# Patient Record
Sex: MALE | Race: BLACK OR AFRICAN AMERICAN | NOT HISPANIC OR LATINO | URBAN - METROPOLITAN AREA
[De-identification: names, ages, dates, MRNs, and addresses within clinical notes are randomized per-mention and may not be internally consistent; named-entity substitution may affect disease eponyms.]

---

## 2023-01-01 ENCOUNTER — INPATIENT (INPATIENT)
Facility: HOSPITAL | Age: 73
LOS: 1 days | Discharge: HOME | DRG: 280 | End: 2023-03-30
Attending: INTERNAL MEDICINE | Admitting: INTERNAL MEDICINE
Payer: MEDICARE

## 2023-01-01 ENCOUNTER — INPATIENT (INPATIENT)
Facility: HOSPITAL | Age: 73
LOS: 4 days | Discharge: NURSING, REHAB OR LONG-TERM CARE | DRG: 682 | End: 2023-05-09
Attending: INTERNAL MEDICINE | Admitting: INTERNAL MEDICINE
Payer: MEDICARE

## 2023-01-01 ENCOUNTER — INPATIENT (INPATIENT)
Facility: HOSPITAL | Age: 73
LOS: 10 days | Discharge: HOME | DRG: 683 | End: 2023-05-23
Attending: HOSPITALIST | Admitting: HOSPITALIST
Payer: MEDICARE

## 2023-01-01 ENCOUNTER — INPATIENT (INPATIENT)
Facility: HOSPITAL | Age: 73
LOS: 2 days | DRG: 951 | End: 2023-05-26
Attending: FAMILY MEDICINE | Admitting: FAMILY MEDICINE
Payer: OTHER MISCELLANEOUS

## 2023-01-01 ENCOUNTER — EMERGENCY (EMERGENCY)
Facility: HOSPITAL | Age: 73
LOS: 1 days | Discharge: HOME | End: 2023-01-01
Attending: EMERGENCY MEDICINE
Payer: MEDICARE

## 2023-01-01 ENCOUNTER — INPATIENT (INPATIENT)
Facility: HOSPITAL | Age: 73
LOS: 19 days | Discharge: NURSING, REHAB OR LONG-TERM CARE | DRG: 291 | End: 2023-04-28
Attending: STUDENT IN AN ORGANIZED HEALTH CARE EDUCATION/TRAINING PROGRAM | Admitting: STUDENT IN AN ORGANIZED HEALTH CARE EDUCATION/TRAINING PROGRAM
Payer: MEDICARE

## 2023-01-01 VITALS
TEMPERATURE: 99 F | RESPIRATION RATE: 18 BRPM | OXYGEN SATURATION: 100 % | SYSTOLIC BLOOD PRESSURE: 121 MMHG | WEIGHT: 149.03 LBS | HEART RATE: 84 BPM | HEIGHT: 64 IN | DIASTOLIC BLOOD PRESSURE: 80 MMHG

## 2023-01-01 VITALS
SYSTOLIC BLOOD PRESSURE: 153 MMHG | RESPIRATION RATE: 18 BRPM | DIASTOLIC BLOOD PRESSURE: 105 MMHG | OXYGEN SATURATION: 100 % | TEMPERATURE: 97 F | HEART RATE: 101 BPM

## 2023-01-01 VITALS
SYSTOLIC BLOOD PRESSURE: 70 MMHG | OXYGEN SATURATION: 87 % | RESPIRATION RATE: 20 BRPM | HEART RATE: 42 BPM | TEMPERATURE: 101 F | DIASTOLIC BLOOD PRESSURE: 38 MMHG

## 2023-01-01 VITALS
OXYGEN SATURATION: 99 % | TEMPERATURE: 98 F | RESPIRATION RATE: 22 BRPM | HEART RATE: 115 BPM | SYSTOLIC BLOOD PRESSURE: 145 MMHG | DIASTOLIC BLOOD PRESSURE: 87 MMHG

## 2023-01-01 VITALS
RESPIRATION RATE: 21 BRPM | HEART RATE: 130 BPM | DIASTOLIC BLOOD PRESSURE: 94 MMHG | SYSTOLIC BLOOD PRESSURE: 142 MMHG | TEMPERATURE: 98 F | OXYGEN SATURATION: 98 %

## 2023-01-01 VITALS
RESPIRATION RATE: 19 BRPM | HEART RATE: 87 BPM | DIASTOLIC BLOOD PRESSURE: 92 MMHG | TEMPERATURE: 98 F | SYSTOLIC BLOOD PRESSURE: 142 MMHG | OXYGEN SATURATION: 100 %

## 2023-01-01 VITALS
DIASTOLIC BLOOD PRESSURE: 85 MMHG | WEIGHT: 138.01 LBS | HEART RATE: 98 BPM | OXYGEN SATURATION: 95 % | HEIGHT: 64 IN | RESPIRATION RATE: 20 BRPM | SYSTOLIC BLOOD PRESSURE: 130 MMHG

## 2023-01-01 VITALS
DIASTOLIC BLOOD PRESSURE: 83 MMHG | HEART RATE: 79 BPM | SYSTOLIC BLOOD PRESSURE: 105 MMHG | RESPIRATION RATE: 16 BRPM | OXYGEN SATURATION: 100 % | TEMPERATURE: 98 F

## 2023-01-01 VITALS
OXYGEN SATURATION: 100 % | DIASTOLIC BLOOD PRESSURE: 87 MMHG | HEART RATE: 72 BPM | RESPIRATION RATE: 18 BRPM | HEIGHT: 64 IN | SYSTOLIC BLOOD PRESSURE: 100 MMHG

## 2023-01-01 VITALS
OXYGEN SATURATION: 99 % | HEART RATE: 78 BPM | HEIGHT: 64 IN | TEMPERATURE: 98 F | SYSTOLIC BLOOD PRESSURE: 104 MMHG | DIASTOLIC BLOOD PRESSURE: 70 MMHG | RESPIRATION RATE: 18 BRPM

## 2023-01-01 VITALS
SYSTOLIC BLOOD PRESSURE: 133 MMHG | DIASTOLIC BLOOD PRESSURE: 92 MMHG | TEMPERATURE: 98 F | OXYGEN SATURATION: 97 % | HEART RATE: 95 BPM | RESPIRATION RATE: 18 BRPM

## 2023-01-01 DIAGNOSIS — F20.9 SCHIZOPHRENIA, UNSPECIFIED: ICD-10-CM

## 2023-01-01 DIAGNOSIS — R53.81 OTHER MALAISE: ICD-10-CM

## 2023-01-01 DIAGNOSIS — I21.4 NON-ST ELEVATION (NSTEMI) MYOCARDIAL INFARCTION: ICD-10-CM

## 2023-01-01 DIAGNOSIS — R06.03 ACUTE RESPIRATORY DISTRESS: ICD-10-CM

## 2023-01-01 DIAGNOSIS — Z51.5 ENCOUNTER FOR PALLIATIVE CARE: ICD-10-CM

## 2023-01-01 DIAGNOSIS — I42.8 OTHER CARDIOMYOPATHIES: ICD-10-CM

## 2023-01-01 DIAGNOSIS — F79 UNSPECIFIED INTELLECTUAL DISABILITIES: ICD-10-CM

## 2023-01-01 DIAGNOSIS — Z29.9 ENCOUNTER FOR PROPHYLACTIC MEASURES, UNSPECIFIED: ICD-10-CM

## 2023-01-01 DIAGNOSIS — I50.9 HEART FAILURE, UNSPECIFIED: ICD-10-CM

## 2023-01-01 DIAGNOSIS — J44.9 CHRONIC OBSTRUCTIVE PULMONARY DISEASE, UNSPECIFIED: ICD-10-CM

## 2023-01-01 DIAGNOSIS — E78.5 HYPERLIPIDEMIA, UNSPECIFIED: ICD-10-CM

## 2023-01-01 DIAGNOSIS — R31.9 HEMATURIA, UNSPECIFIED: ICD-10-CM

## 2023-01-01 DIAGNOSIS — I10 ESSENTIAL (PRIMARY) HYPERTENSION: ICD-10-CM

## 2023-01-01 DIAGNOSIS — I25.10 ATHEROSCLEROTIC HEART DISEASE OF NATIVE CORONARY ARTERY WITHOUT ANGINA PECTORIS: ICD-10-CM

## 2023-01-01 DIAGNOSIS — Q90.9 DOWN SYNDROME, UNSPECIFIED: ICD-10-CM

## 2023-01-01 DIAGNOSIS — E11.9 TYPE 2 DIABETES MELLITUS WITHOUT COMPLICATIONS: ICD-10-CM

## 2023-01-01 DIAGNOSIS — R56.9 UNSPECIFIED CONVULSIONS: ICD-10-CM

## 2023-01-01 DIAGNOSIS — I24.8 OTHER FORMS OF ACUTE ISCHEMIC HEART DISEASE: ICD-10-CM

## 2023-01-01 DIAGNOSIS — R91.1 SOLITARY PULMONARY NODULE: ICD-10-CM

## 2023-01-01 DIAGNOSIS — D72.829 ELEVATED WHITE BLOOD CELL COUNT, UNSPECIFIED: ICD-10-CM

## 2023-01-01 DIAGNOSIS — K11.7 DISTURBANCES OF SALIVARY SECRETION: ICD-10-CM

## 2023-01-01 DIAGNOSIS — N17.9 ACUTE KIDNEY FAILURE, UNSPECIFIED: ICD-10-CM

## 2023-01-01 DIAGNOSIS — N39.0 URINARY TRACT INFECTION, SITE NOT SPECIFIED: ICD-10-CM

## 2023-01-01 DIAGNOSIS — R07.9 CHEST PAIN, UNSPECIFIED: ICD-10-CM

## 2023-01-01 DIAGNOSIS — Z87.09 PERSONAL HISTORY OF OTHER DISEASES OF THE RESPIRATORY SYSTEM: ICD-10-CM

## 2023-01-01 DIAGNOSIS — E87.5 HYPERKALEMIA: ICD-10-CM

## 2023-01-01 DIAGNOSIS — I50.42 CHRONIC COMBINED SYSTOLIC (CONGESTIVE) AND DIASTOLIC (CONGESTIVE) HEART FAILURE: ICD-10-CM

## 2023-01-01 DIAGNOSIS — I50.23 ACUTE ON CHRONIC SYSTOLIC (CONGESTIVE) HEART FAILURE: ICD-10-CM

## 2023-01-01 DIAGNOSIS — N40.0 BENIGN PROSTATIC HYPERPLASIA WITHOUT LOWER URINARY TRACT SYMPTOMS: ICD-10-CM

## 2023-01-01 DIAGNOSIS — I42.9 CARDIOMYOPATHY, UNSPECIFIED: ICD-10-CM

## 2023-01-01 DIAGNOSIS — G93.41 METABOLIC ENCEPHALOPATHY: ICD-10-CM

## 2023-01-01 DIAGNOSIS — E46 UNSPECIFIED PROTEIN-CALORIE MALNUTRITION: ICD-10-CM

## 2023-01-01 DIAGNOSIS — I50.22 CHRONIC SYSTOLIC (CONGESTIVE) HEART FAILURE: ICD-10-CM

## 2023-01-01 DIAGNOSIS — J02.9 ACUTE PHARYNGITIS, UNSPECIFIED: ICD-10-CM

## 2023-01-01 DIAGNOSIS — I50.43 ACUTE ON CHRONIC COMBINED SYSTOLIC (CONGESTIVE) AND DIASTOLIC (CONGESTIVE) HEART FAILURE: ICD-10-CM

## 2023-01-01 DIAGNOSIS — J96.01 ACUTE RESPIRATORY FAILURE WITH HYPOXIA: ICD-10-CM

## 2023-01-01 LAB
-  AMIKACIN: SIGNIFICANT CHANGE UP
-  AMOXICILLIN/CLAVULANIC ACID: SIGNIFICANT CHANGE UP
-  AMPICILLIN/SULBACTAM: SIGNIFICANT CHANGE UP
-  AMPICILLIN: SIGNIFICANT CHANGE UP
-  AZTREONAM: SIGNIFICANT CHANGE UP
-  CEFAZOLIN: SIGNIFICANT CHANGE UP
-  CEFEPIME: SIGNIFICANT CHANGE UP
-  CEFOXITIN: SIGNIFICANT CHANGE UP
-  CEFTRIAXONE: SIGNIFICANT CHANGE UP
-  CEFUROXIME: SIGNIFICANT CHANGE UP
-  CIPROFLOXACIN: SIGNIFICANT CHANGE UP
-  ERTAPENEM: SIGNIFICANT CHANGE UP
-  GENTAMICIN: SIGNIFICANT CHANGE UP
-  IMIPENEM: SIGNIFICANT CHANGE UP
-  LEVOFLOXACIN: SIGNIFICANT CHANGE UP
-  MEROPENEM: SIGNIFICANT CHANGE UP
-  NITROFURANTOIN: SIGNIFICANT CHANGE UP
-  PIPERACILLIN/TAZOBACTAM: SIGNIFICANT CHANGE UP
-  TOBRAMYCIN: SIGNIFICANT CHANGE UP
-  TRIMETHOPRIM/SULFAMETHOXAZOLE: SIGNIFICANT CHANGE UP
A1C WITH ESTIMATED AVERAGE GLUCOSE RESULT: 6.8 % — HIGH (ref 4–5.6)
ALBUMIN SERPL ELPH-MCNC: 1.9 G/DL — LOW (ref 3.5–5)
ALBUMIN SERPL ELPH-MCNC: 2.1 G/DL — LOW (ref 3.5–5)
ALBUMIN SERPL ELPH-MCNC: 2.2 G/DL — LOW (ref 3.5–5)
ALBUMIN SERPL ELPH-MCNC: 2.4 G/DL — LOW (ref 3.5–5)
ALBUMIN SERPL ELPH-MCNC: 2.4 G/DL — LOW (ref 3.5–5)
ALBUMIN SERPL ELPH-MCNC: 2.6 G/DL — LOW (ref 3.5–5)
ALBUMIN SERPL ELPH-MCNC: 2.7 G/DL — LOW (ref 3.5–5)
ALBUMIN SERPL ELPH-MCNC: 2.8 G/DL — LOW (ref 3.5–5)
ALBUMIN SERPL ELPH-MCNC: 2.8 G/DL — LOW (ref 3.5–5)
ALBUMIN SERPL ELPH-MCNC: 2.9 G/DL — LOW (ref 3.5–5)
ALBUMIN SERPL ELPH-MCNC: 3 G/DL — LOW (ref 3.5–5)
ALBUMIN SERPL ELPH-MCNC: 3 G/DL — LOW (ref 3.5–5)
ALBUMIN SERPL ELPH-MCNC: 3.2 G/DL — LOW (ref 3.5–5)
ALBUMIN SERPL ELPH-MCNC: 3.2 G/DL — LOW (ref 3.5–5)
ALBUMIN SERPL ELPH-MCNC: 3.3 G/DL — LOW (ref 3.5–5)
ALP SERPL-CCNC: 104 U/L — SIGNIFICANT CHANGE UP (ref 40–120)
ALP SERPL-CCNC: 107 U/L — SIGNIFICANT CHANGE UP (ref 40–120)
ALP SERPL-CCNC: 108 U/L — SIGNIFICANT CHANGE UP (ref 40–120)
ALP SERPL-CCNC: 114 U/L — SIGNIFICANT CHANGE UP (ref 40–120)
ALP SERPL-CCNC: 114 U/L — SIGNIFICANT CHANGE UP (ref 40–120)
ALP SERPL-CCNC: 119 U/L — SIGNIFICANT CHANGE UP (ref 40–120)
ALP SERPL-CCNC: 120 U/L — SIGNIFICANT CHANGE UP (ref 40–120)
ALP SERPL-CCNC: 74 U/L — SIGNIFICANT CHANGE UP (ref 40–120)
ALP SERPL-CCNC: 78 U/L — SIGNIFICANT CHANGE UP (ref 40–120)
ALP SERPL-CCNC: 79 U/L — SIGNIFICANT CHANGE UP (ref 40–120)
ALP SERPL-CCNC: 87 U/L — SIGNIFICANT CHANGE UP (ref 40–120)
ALP SERPL-CCNC: 87 U/L — SIGNIFICANT CHANGE UP (ref 40–120)
ALP SERPL-CCNC: 88 U/L — SIGNIFICANT CHANGE UP (ref 40–120)
ALP SERPL-CCNC: 91 U/L — SIGNIFICANT CHANGE UP (ref 40–120)
ALP SERPL-CCNC: 92 U/L — SIGNIFICANT CHANGE UP (ref 40–120)
ALP SERPL-CCNC: 93 U/L — SIGNIFICANT CHANGE UP (ref 40–120)
ALP SERPL-CCNC: 95 U/L — SIGNIFICANT CHANGE UP (ref 40–120)
ALP SERPL-CCNC: 97 U/L — SIGNIFICANT CHANGE UP (ref 40–120)
ALP SERPL-CCNC: 99 U/L — SIGNIFICANT CHANGE UP (ref 40–120)
ALT FLD-CCNC: 105 U/L DA — HIGH (ref 10–60)
ALT FLD-CCNC: 47 U/L DA — SIGNIFICANT CHANGE UP (ref 10–60)
ALT FLD-CCNC: 52 U/L DA — SIGNIFICANT CHANGE UP (ref 10–60)
ALT FLD-CCNC: 53 U/L DA — SIGNIFICANT CHANGE UP (ref 10–60)
ALT FLD-CCNC: 54 U/L DA — SIGNIFICANT CHANGE UP (ref 10–60)
ALT FLD-CCNC: 55 U/L DA — SIGNIFICANT CHANGE UP (ref 10–60)
ALT FLD-CCNC: 58 U/L DA — SIGNIFICANT CHANGE UP (ref 10–60)
ALT FLD-CCNC: 61 U/L DA — HIGH (ref 10–60)
ALT FLD-CCNC: 63 U/L DA — HIGH (ref 10–60)
ALT FLD-CCNC: 65 U/L DA — HIGH (ref 10–60)
ALT FLD-CCNC: 66 U/L DA — HIGH (ref 10–60)
ALT FLD-CCNC: 66 U/L DA — HIGH (ref 10–60)
ALT FLD-CCNC: 67 U/L DA — HIGH (ref 10–60)
ALT FLD-CCNC: 68 U/L DA — HIGH (ref 10–60)
ALT FLD-CCNC: 71 U/L DA — HIGH (ref 10–60)
ALT FLD-CCNC: 71 U/L DA — HIGH (ref 10–60)
ALT FLD-CCNC: 72 U/L DA — HIGH (ref 10–60)
ALT FLD-CCNC: 72 U/L DA — HIGH (ref 10–60)
ALT FLD-CCNC: 75 U/L DA — HIGH (ref 10–60)
ALT FLD-CCNC: 76 U/L DA — HIGH (ref 10–60)
ALT FLD-CCNC: 79 U/L DA — HIGH (ref 10–60)
ALT FLD-CCNC: 83 U/L DA — HIGH (ref 10–60)
ALT FLD-CCNC: 83 U/L DA — HIGH (ref 10–60)
ANION GAP SERPL CALC-SCNC: 10 MMOL/L — SIGNIFICANT CHANGE UP (ref 5–17)
ANION GAP SERPL CALC-SCNC: 10 MMOL/L — SIGNIFICANT CHANGE UP (ref 5–17)
ANION GAP SERPL CALC-SCNC: 3 MMOL/L — LOW (ref 5–17)
ANION GAP SERPL CALC-SCNC: 4 MMOL/L — LOW (ref 5–17)
ANION GAP SERPL CALC-SCNC: 5 MMOL/L — SIGNIFICANT CHANGE UP (ref 5–17)
ANION GAP SERPL CALC-SCNC: 6 MMOL/L — SIGNIFICANT CHANGE UP (ref 5–17)
ANION GAP SERPL CALC-SCNC: 7 MMOL/L — SIGNIFICANT CHANGE UP (ref 5–17)
ANION GAP SERPL CALC-SCNC: 8 MMOL/L — SIGNIFICANT CHANGE UP (ref 5–17)
ANION GAP SERPL CALC-SCNC: 9 MMOL/L — SIGNIFICANT CHANGE UP (ref 5–17)
ANISOCYTOSIS BLD QL: SLIGHT — SIGNIFICANT CHANGE UP
ANISOCYTOSIS BLD QL: SLIGHT — SIGNIFICANT CHANGE UP
APPEARANCE UR: ABNORMAL
APPEARANCE UR: ABNORMAL
APPEARANCE UR: CLEAR — SIGNIFICANT CHANGE UP
APPEARANCE UR: CLEAR — SIGNIFICANT CHANGE UP
APTT BLD: 30.5 SEC — SIGNIFICANT CHANGE UP (ref 27.5–35.5)
APTT BLD: 35.6 SEC — HIGH (ref 27.5–35.5)
APTT BLD: 62 SEC — HIGH (ref 27.5–35.5)
AST SERPL-CCNC: 20 U/L — SIGNIFICANT CHANGE UP (ref 10–40)
AST SERPL-CCNC: 21 U/L — SIGNIFICANT CHANGE UP (ref 10–40)
AST SERPL-CCNC: 23 U/L — SIGNIFICANT CHANGE UP (ref 10–40)
AST SERPL-CCNC: 25 U/L — SIGNIFICANT CHANGE UP (ref 10–40)
AST SERPL-CCNC: 26 U/L — SIGNIFICANT CHANGE UP (ref 10–40)
AST SERPL-CCNC: 27 U/L — SIGNIFICANT CHANGE UP (ref 10–40)
AST SERPL-CCNC: 27 U/L — SIGNIFICANT CHANGE UP (ref 10–40)
AST SERPL-CCNC: 28 U/L — SIGNIFICANT CHANGE UP (ref 10–40)
AST SERPL-CCNC: 30 U/L — SIGNIFICANT CHANGE UP (ref 10–40)
AST SERPL-CCNC: 31 U/L — SIGNIFICANT CHANGE UP (ref 10–40)
AST SERPL-CCNC: 32 U/L — SIGNIFICANT CHANGE UP (ref 10–40)
AST SERPL-CCNC: 33 U/L — SIGNIFICANT CHANGE UP (ref 10–40)
AST SERPL-CCNC: 34 U/L — SIGNIFICANT CHANGE UP (ref 10–40)
AST SERPL-CCNC: 35 U/L — SIGNIFICANT CHANGE UP (ref 10–40)
AST SERPL-CCNC: 37 U/L — SIGNIFICANT CHANGE UP (ref 10–40)
AST SERPL-CCNC: 38 U/L — SIGNIFICANT CHANGE UP (ref 10–40)
AST SERPL-CCNC: 39 U/L — SIGNIFICANT CHANGE UP (ref 10–40)
AST SERPL-CCNC: 39 U/L — SIGNIFICANT CHANGE UP (ref 10–40)
AST SERPL-CCNC: 42 U/L — HIGH (ref 10–40)
AST SERPL-CCNC: 43 U/L — HIGH (ref 10–40)
AST SERPL-CCNC: 43 U/L — HIGH (ref 10–40)
AST SERPL-CCNC: 48 U/L — HIGH (ref 10–40)
AST SERPL-CCNC: 49 U/L — HIGH (ref 10–40)
AST SERPL-CCNC: 68 U/L — HIGH (ref 10–40)
BACTERIA # UR AUTO: ABNORMAL /HPF
BASE EXCESS BLDA CALC-SCNC: 1.5 MMOL/L — SIGNIFICANT CHANGE UP (ref -2–3)
BASE EXCESS BLDV CALC-SCNC: 1.8 MMOL/L — SIGNIFICANT CHANGE UP
BASOPHILS # BLD AUTO: 0.01 K/UL — SIGNIFICANT CHANGE UP (ref 0–0.2)
BASOPHILS # BLD AUTO: 0.01 K/UL — SIGNIFICANT CHANGE UP (ref 0–0.2)
BASOPHILS # BLD AUTO: 0.02 K/UL — SIGNIFICANT CHANGE UP (ref 0–0.2)
BASOPHILS # BLD AUTO: 0.03 K/UL — SIGNIFICANT CHANGE UP (ref 0–0.2)
BASOPHILS # BLD AUTO: 0.04 K/UL — SIGNIFICANT CHANGE UP (ref 0–0.2)
BASOPHILS # BLD AUTO: 0.05 K/UL — SIGNIFICANT CHANGE UP (ref 0–0.2)
BASOPHILS # BLD AUTO: 0.06 K/UL — SIGNIFICANT CHANGE UP (ref 0–0.2)
BASOPHILS # BLD AUTO: 0.06 K/UL — SIGNIFICANT CHANGE UP (ref 0–0.2)
BASOPHILS # BLD AUTO: 0.07 K/UL — SIGNIFICANT CHANGE UP (ref 0–0.2)
BASOPHILS # BLD AUTO: 0.09 K/UL — SIGNIFICANT CHANGE UP (ref 0–0.2)
BASOPHILS # BLD AUTO: 0.09 K/UL — SIGNIFICANT CHANGE UP (ref 0–0.2)
BASOPHILS # BLD AUTO: 0.12 K/UL — SIGNIFICANT CHANGE UP (ref 0–0.2)
BASOPHILS NFR BLD AUTO: 0.1 % — SIGNIFICANT CHANGE UP (ref 0–2)
BASOPHILS NFR BLD AUTO: 0.2 % — SIGNIFICANT CHANGE UP (ref 0–2)
BASOPHILS NFR BLD AUTO: 0.3 % — SIGNIFICANT CHANGE UP (ref 0–2)
BASOPHILS NFR BLD AUTO: 0.4 % — SIGNIFICANT CHANGE UP (ref 0–2)
BASOPHILS NFR BLD AUTO: 0.5 % — SIGNIFICANT CHANGE UP (ref 0–2)
BASOPHILS NFR BLD AUTO: 0.6 % — SIGNIFICANT CHANGE UP (ref 0–2)
BASOPHILS NFR BLD AUTO: 0.7 % — SIGNIFICANT CHANGE UP (ref 0–2)
BASOPHILS NFR BLD AUTO: 0.8 % — SIGNIFICANT CHANGE UP (ref 0–2)
BASOPHILS NFR BLD AUTO: 1 % — SIGNIFICANT CHANGE UP (ref 0–2)
BASOPHILS NFR BLD AUTO: 1.1 % — SIGNIFICANT CHANGE UP (ref 0–2)
BILIRUB SERPL-MCNC: 0.4 MG/DL — SIGNIFICANT CHANGE UP (ref 0.2–1.2)
BILIRUB SERPL-MCNC: 0.5 MG/DL — SIGNIFICANT CHANGE UP (ref 0.2–1.2)
BILIRUB SERPL-MCNC: 0.6 MG/DL — SIGNIFICANT CHANGE UP (ref 0.2–1.2)
BILIRUB SERPL-MCNC: 0.7 MG/DL — SIGNIFICANT CHANGE UP (ref 0.2–1.2)
BILIRUB SERPL-MCNC: 0.9 MG/DL — SIGNIFICANT CHANGE UP (ref 0.2–1.2)
BILIRUB SERPL-MCNC: 1 MG/DL — SIGNIFICANT CHANGE UP (ref 0.2–1.2)
BILIRUB SERPL-MCNC: 1.1 MG/DL — SIGNIFICANT CHANGE UP (ref 0.2–1.2)
BILIRUB UR-MCNC: ABNORMAL
BILIRUB UR-MCNC: NEGATIVE — SIGNIFICANT CHANGE UP
BLOOD GAS COMMENTS ARTERIAL: SIGNIFICANT CHANGE UP
BUN SERPL-MCNC: 28 MG/DL — HIGH (ref 7–18)
BUN SERPL-MCNC: 30 MG/DL — HIGH (ref 7–18)
BUN SERPL-MCNC: 32 MG/DL — HIGH (ref 7–18)
BUN SERPL-MCNC: 32 MG/DL — HIGH (ref 7–18)
BUN SERPL-MCNC: 34 MG/DL — HIGH (ref 7–18)
BUN SERPL-MCNC: 34 MG/DL — HIGH (ref 7–18)
BUN SERPL-MCNC: 35 MG/DL — HIGH (ref 7–18)
BUN SERPL-MCNC: 36 MG/DL — HIGH (ref 7–18)
BUN SERPL-MCNC: 38 MG/DL — HIGH (ref 7–18)
BUN SERPL-MCNC: 42 MG/DL — HIGH (ref 7–18)
BUN SERPL-MCNC: 43 MG/DL — HIGH (ref 7–18)
BUN SERPL-MCNC: 47 MG/DL — HIGH (ref 7–18)
BUN SERPL-MCNC: 50 MG/DL — HIGH (ref 7–18)
BUN SERPL-MCNC: 51 MG/DL — HIGH (ref 7–18)
BUN SERPL-MCNC: 55 MG/DL — HIGH (ref 7–18)
BUN SERPL-MCNC: 57 MG/DL — HIGH (ref 7–18)
BUN SERPL-MCNC: 57 MG/DL — HIGH (ref 7–18)
BUN SERPL-MCNC: 58 MG/DL — HIGH (ref 7–18)
BUN SERPL-MCNC: 58 MG/DL — HIGH (ref 7–18)
BUN SERPL-MCNC: 60 MG/DL — HIGH (ref 7–18)
BUN SERPL-MCNC: 60 MG/DL — HIGH (ref 7–18)
BUN SERPL-MCNC: 63 MG/DL — HIGH (ref 7–18)
BUN SERPL-MCNC: 64 MG/DL — HIGH (ref 7–18)
BUN SERPL-MCNC: 66 MG/DL — HIGH (ref 7–18)
BUN SERPL-MCNC: 66 MG/DL — HIGH (ref 7–18)
BUN SERPL-MCNC: 67 MG/DL — HIGH (ref 7–18)
BUN SERPL-MCNC: 69 MG/DL — HIGH (ref 7–18)
BUN SERPL-MCNC: 70 MG/DL — HIGH (ref 7–18)
BUN SERPL-MCNC: 72 MG/DL — HIGH (ref 7–18)
BUN SERPL-MCNC: 73 MG/DL — HIGH (ref 7–18)
BUN SERPL-MCNC: 77 MG/DL — HIGH (ref 7–18)
BUN SERPL-MCNC: 79 MG/DL — HIGH (ref 7–18)
BUN SERPL-MCNC: 80 MG/DL — HIGH (ref 7–18)
BUN SERPL-MCNC: 83 MG/DL — HIGH (ref 7–18)
BUN SERPL-MCNC: 83 MG/DL — HIGH (ref 7–18)
BUN SERPL-MCNC: 84 MG/DL — HIGH (ref 7–18)
BUN SERPL-MCNC: 84 MG/DL — HIGH (ref 7–18)
BUN SERPL-MCNC: 85 MG/DL — HIGH (ref 7–18)
BUN SERPL-MCNC: 91 MG/DL — HIGH (ref 7–18)
BUN SERPL-MCNC: 96 MG/DL — HIGH (ref 7–18)
CALCIUM SERPL-MCNC: 10 MG/DL — SIGNIFICANT CHANGE UP (ref 8.4–10.5)
CALCIUM SERPL-MCNC: 10.1 MG/DL — SIGNIFICANT CHANGE UP (ref 8.4–10.5)
CALCIUM SERPL-MCNC: 10.2 MG/DL — SIGNIFICANT CHANGE UP (ref 8.4–10.5)
CALCIUM SERPL-MCNC: 10.2 MG/DL — SIGNIFICANT CHANGE UP (ref 8.4–10.5)
CALCIUM SERPL-MCNC: 10.4 MG/DL — SIGNIFICANT CHANGE UP (ref 8.4–10.5)
CALCIUM SERPL-MCNC: 10.4 MG/DL — SIGNIFICANT CHANGE UP (ref 8.4–10.5)
CALCIUM SERPL-MCNC: 10.5 MG/DL — SIGNIFICANT CHANGE UP (ref 8.4–10.5)
CALCIUM SERPL-MCNC: 10.6 MG/DL — HIGH (ref 8.4–10.5)
CALCIUM SERPL-MCNC: 8.9 MG/DL — SIGNIFICANT CHANGE UP (ref 8.4–10.5)
CALCIUM SERPL-MCNC: 9 MG/DL — SIGNIFICANT CHANGE UP (ref 8.4–10.5)
CALCIUM SERPL-MCNC: 9.2 MG/DL — SIGNIFICANT CHANGE UP (ref 8.4–10.5)
CALCIUM SERPL-MCNC: 9.3 MG/DL — SIGNIFICANT CHANGE UP (ref 8.4–10.5)
CALCIUM SERPL-MCNC: 9.3 MG/DL — SIGNIFICANT CHANGE UP (ref 8.4–10.5)
CALCIUM SERPL-MCNC: 9.4 MG/DL — SIGNIFICANT CHANGE UP (ref 8.4–10.5)
CALCIUM SERPL-MCNC: 9.5 MG/DL — SIGNIFICANT CHANGE UP (ref 8.4–10.5)
CALCIUM SERPL-MCNC: 9.6 MG/DL — SIGNIFICANT CHANGE UP (ref 8.4–10.5)
CALCIUM SERPL-MCNC: 9.7 MG/DL — SIGNIFICANT CHANGE UP (ref 8.4–10.5)
CALCIUM SERPL-MCNC: 9.8 MG/DL — SIGNIFICANT CHANGE UP (ref 8.4–10.5)
CALCIUM SERPL-MCNC: 9.9 MG/DL — SIGNIFICANT CHANGE UP (ref 8.4–10.5)
CHLORIDE SERPL-SCNC: 101 MMOL/L — SIGNIFICANT CHANGE UP (ref 96–108)
CHLORIDE SERPL-SCNC: 103 MMOL/L — SIGNIFICANT CHANGE UP (ref 96–108)
CHLORIDE SERPL-SCNC: 103 MMOL/L — SIGNIFICANT CHANGE UP (ref 96–108)
CHLORIDE SERPL-SCNC: 105 MMOL/L — SIGNIFICANT CHANGE UP (ref 96–108)
CHLORIDE SERPL-SCNC: 105 MMOL/L — SIGNIFICANT CHANGE UP (ref 96–108)
CHLORIDE SERPL-SCNC: 106 MMOL/L — SIGNIFICANT CHANGE UP (ref 96–108)
CHLORIDE SERPL-SCNC: 107 MMOL/L — SIGNIFICANT CHANGE UP (ref 96–108)
CHLORIDE SERPL-SCNC: 107 MMOL/L — SIGNIFICANT CHANGE UP (ref 96–108)
CHLORIDE SERPL-SCNC: 108 MMOL/L — SIGNIFICANT CHANGE UP (ref 96–108)
CHLORIDE SERPL-SCNC: 109 MMOL/L — HIGH (ref 96–108)
CHLORIDE SERPL-SCNC: 110 MMOL/L — HIGH (ref 96–108)
CHLORIDE SERPL-SCNC: 111 MMOL/L — HIGH (ref 96–108)
CHLORIDE SERPL-SCNC: 112 MMOL/L — HIGH (ref 96–108)
CHLORIDE SERPL-SCNC: 113 MMOL/L — HIGH (ref 96–108)
CHLORIDE SERPL-SCNC: 114 MMOL/L — HIGH (ref 96–108)
CHLORIDE SERPL-SCNC: 115 MMOL/L — HIGH (ref 96–108)
CHLORIDE SERPL-SCNC: 115 MMOL/L — HIGH (ref 96–108)
CHLORIDE SERPL-SCNC: 116 MMOL/L — HIGH (ref 96–108)
CHLORIDE SERPL-SCNC: 116 MMOL/L — HIGH (ref 96–108)
CHLORIDE SERPL-SCNC: 118 MMOL/L — HIGH (ref 96–108)
CHLORIDE UR-SCNC: 32 MMOL/L — SIGNIFICANT CHANGE UP
CHLORIDE UR-SCNC: <10 MMOL/L — SIGNIFICANT CHANGE UP
CHOLEST SERPL-MCNC: 152 MG/DL — SIGNIFICANT CHANGE UP
CO2 SERPL-SCNC: 22 MMOL/L — SIGNIFICANT CHANGE UP (ref 22–31)
CO2 SERPL-SCNC: 23 MMOL/L — SIGNIFICANT CHANGE UP (ref 22–31)
CO2 SERPL-SCNC: 24 MMOL/L — SIGNIFICANT CHANGE UP (ref 22–31)
CO2 SERPL-SCNC: 24 MMOL/L — SIGNIFICANT CHANGE UP (ref 22–31)
CO2 SERPL-SCNC: 25 MMOL/L — SIGNIFICANT CHANGE UP (ref 22–31)
CO2 SERPL-SCNC: 26 MMOL/L — SIGNIFICANT CHANGE UP (ref 22–31)
CO2 SERPL-SCNC: 27 MMOL/L — SIGNIFICANT CHANGE UP (ref 22–31)
CO2 SERPL-SCNC: 28 MMOL/L — SIGNIFICANT CHANGE UP (ref 22–31)
CO2 SERPL-SCNC: 29 MMOL/L — SIGNIFICANT CHANGE UP (ref 22–31)
CO2 SERPL-SCNC: 30 MMOL/L — SIGNIFICANT CHANGE UP (ref 22–31)
CO2 SERPL-SCNC: 31 MMOL/L — SIGNIFICANT CHANGE UP (ref 22–31)
CO2 SERPL-SCNC: 31 MMOL/L — SIGNIFICANT CHANGE UP (ref 22–31)
COLOR SPEC: YELLOW — SIGNIFICANT CHANGE UP
COMMENT - URINE: SIGNIFICANT CHANGE UP
CREAT ?TM UR-MCNC: 149 MG/DL — SIGNIFICANT CHANGE UP
CREAT ?TM UR-MCNC: 46 MG/DL — SIGNIFICANT CHANGE UP
CREAT ?TM UR-MCNC: 94 MG/DL — SIGNIFICANT CHANGE UP
CREAT ?TM UR-MCNC: 94 MG/DL — SIGNIFICANT CHANGE UP
CREAT ?TM UR-MCNC: 96 MG/DL — SIGNIFICANT CHANGE UP
CREAT SERPL-MCNC: 1.38 MG/DL — HIGH (ref 0.5–1.3)
CREAT SERPL-MCNC: 1.57 MG/DL — HIGH (ref 0.5–1.3)
CREAT SERPL-MCNC: 1.59 MG/DL — HIGH (ref 0.5–1.3)
CREAT SERPL-MCNC: 1.6 MG/DL — HIGH (ref 0.5–1.3)
CREAT SERPL-MCNC: 1.63 MG/DL — HIGH (ref 0.5–1.3)
CREAT SERPL-MCNC: 1.71 MG/DL — HIGH (ref 0.5–1.3)
CREAT SERPL-MCNC: 1.72 MG/DL — HIGH (ref 0.5–1.3)
CREAT SERPL-MCNC: 1.77 MG/DL — HIGH (ref 0.5–1.3)
CREAT SERPL-MCNC: 1.82 MG/DL — HIGH (ref 0.5–1.3)
CREAT SERPL-MCNC: 2.08 MG/DL — HIGH (ref 0.5–1.3)
CREAT SERPL-MCNC: 2.13 MG/DL — HIGH (ref 0.5–1.3)
CREAT SERPL-MCNC: 2.13 MG/DL — HIGH (ref 0.5–1.3)
CREAT SERPL-MCNC: 2.14 MG/DL — HIGH (ref 0.5–1.3)
CREAT SERPL-MCNC: 2.18 MG/DL — HIGH (ref 0.5–1.3)
CREAT SERPL-MCNC: 2.23 MG/DL — HIGH (ref 0.5–1.3)
CREAT SERPL-MCNC: 2.28 MG/DL — HIGH (ref 0.5–1.3)
CREAT SERPL-MCNC: 2.47 MG/DL — HIGH (ref 0.5–1.3)
CREAT SERPL-MCNC: 2.58 MG/DL — HIGH (ref 0.5–1.3)
CREAT SERPL-MCNC: 2.69 MG/DL — HIGH (ref 0.5–1.3)
CREAT SERPL-MCNC: 2.81 MG/DL — HIGH (ref 0.5–1.3)
CREAT SERPL-MCNC: 2.81 MG/DL — HIGH (ref 0.5–1.3)
CREAT SERPL-MCNC: 2.85 MG/DL — HIGH (ref 0.5–1.3)
CREAT SERPL-MCNC: 2.95 MG/DL — HIGH (ref 0.5–1.3)
CREAT SERPL-MCNC: 2.97 MG/DL — HIGH (ref 0.5–1.3)
CREAT SERPL-MCNC: 2.99 MG/DL — HIGH (ref 0.5–1.3)
CREAT SERPL-MCNC: 3.02 MG/DL — HIGH (ref 0.5–1.3)
CREAT SERPL-MCNC: 3.02 MG/DL — HIGH (ref 0.5–1.3)
CREAT SERPL-MCNC: 3.03 MG/DL — HIGH (ref 0.5–1.3)
CREAT SERPL-MCNC: 3.04 MG/DL — HIGH (ref 0.5–1.3)
CREAT SERPL-MCNC: 3.09 MG/DL — HIGH (ref 0.5–1.3)
CREAT SERPL-MCNC: 3.14 MG/DL — HIGH (ref 0.5–1.3)
CREAT SERPL-MCNC: 3.3 MG/DL — HIGH (ref 0.5–1.3)
CREAT SERPL-MCNC: 3.58 MG/DL — HIGH (ref 0.5–1.3)
CREAT SERPL-MCNC: 3.62 MG/DL — HIGH (ref 0.5–1.3)
CREAT SERPL-MCNC: 3.76 MG/DL — HIGH (ref 0.5–1.3)
CREAT SERPL-MCNC: 4.1 MG/DL — HIGH (ref 0.5–1.3)
CREAT SERPL-MCNC: 4.21 MG/DL — HIGH (ref 0.5–1.3)
CREAT SERPL-MCNC: 4.27 MG/DL — HIGH (ref 0.5–1.3)
CREAT SERPL-MCNC: 4.38 MG/DL — HIGH (ref 0.5–1.3)
CREAT SERPL-MCNC: 4.55 MG/DL — HIGH (ref 0.5–1.3)
CREAT SERPL-MCNC: 4.87 MG/DL — HIGH (ref 0.5–1.3)
CREAT SERPL-MCNC: 4.9 MG/DL — HIGH (ref 0.5–1.3)
CREAT SERPL-MCNC: 5.01 MG/DL — HIGH (ref 0.5–1.3)
CREAT SERPL-MCNC: 5.16 MG/DL — HIGH (ref 0.5–1.3)
CULTURE RESULTS: NO GROWTH — SIGNIFICANT CHANGE UP
CULTURE RESULTS: SIGNIFICANT CHANGE UP
DIFF PNL FLD: ABNORMAL
DIFF PNL FLD: ABNORMAL
DIFF PNL FLD: NEGATIVE — SIGNIFICANT CHANGE UP
DIFF PNL FLD: NEGATIVE — SIGNIFICANT CHANGE UP
EGFR: 11 ML/MIN/1.73M2 — LOW
EGFR: 12 ML/MIN/1.73M2 — LOW
EGFR: 13 ML/MIN/1.73M2 — LOW
EGFR: 14 ML/MIN/1.73M2 — LOW
EGFR: 15 ML/MIN/1.73M2 — LOW
EGFR: 16 ML/MIN/1.73M2 — LOW
EGFR: 17 ML/MIN/1.73M2 — LOW
EGFR: 17 ML/MIN/1.73M2 — LOW
EGFR: 19 ML/MIN/1.73M2 — LOW
EGFR: 20 ML/MIN/1.73M2 — LOW
EGFR: 21 ML/MIN/1.73M2 — LOW
EGFR: 22 ML/MIN/1.73M2 — LOW
EGFR: 23 ML/MIN/1.73M2 — LOW
EGFR: 25 ML/MIN/1.73M2 — LOW
EGFR: 26 ML/MIN/1.73M2 — LOW
EGFR: 27 ML/MIN/1.73M2 — LOW
EGFR: 30 ML/MIN/1.73M2 — LOW
EGFR: 31 ML/MIN/1.73M2 — LOW
EGFR: 32 ML/MIN/1.73M2 — LOW
EGFR: 33 ML/MIN/1.73M2 — LOW
EGFR: 39 ML/MIN/1.73M2 — LOW
EGFR: 41 ML/MIN/1.73M2 — LOW
EGFR: 42 ML/MIN/1.73M2 — LOW
EGFR: 42 ML/MIN/1.73M2 — LOW
EGFR: 45 ML/MIN/1.73M2 — LOW
EGFR: 46 ML/MIN/1.73M2 — LOW
EGFR: 46 ML/MIN/1.73M2 — LOW
EGFR: 47 ML/MIN/1.73M2 — LOW
EGFR: 55 ML/MIN/1.73M2 — LOW
EOSINOPHIL # BLD AUTO: 0.04 K/UL — SIGNIFICANT CHANGE UP (ref 0–0.5)
EOSINOPHIL # BLD AUTO: 0.05 K/UL — SIGNIFICANT CHANGE UP (ref 0–0.5)
EOSINOPHIL # BLD AUTO: 0.05 K/UL — SIGNIFICANT CHANGE UP (ref 0–0.5)
EOSINOPHIL # BLD AUTO: 0.06 K/UL — SIGNIFICANT CHANGE UP (ref 0–0.5)
EOSINOPHIL # BLD AUTO: 0.06 K/UL — SIGNIFICANT CHANGE UP (ref 0–0.5)
EOSINOPHIL # BLD AUTO: 0.07 K/UL — SIGNIFICANT CHANGE UP (ref 0–0.5)
EOSINOPHIL # BLD AUTO: 0.08 K/UL — SIGNIFICANT CHANGE UP (ref 0–0.5)
EOSINOPHIL # BLD AUTO: 0.09 K/UL — SIGNIFICANT CHANGE UP (ref 0–0.5)
EOSINOPHIL # BLD AUTO: 0.11 K/UL — SIGNIFICANT CHANGE UP (ref 0–0.5)
EOSINOPHIL # BLD AUTO: 0.22 K/UL — SIGNIFICANT CHANGE UP (ref 0–0.5)
EOSINOPHIL # BLD AUTO: 0.47 K/UL — SIGNIFICANT CHANGE UP (ref 0–0.5)
EOSINOPHIL # BLD AUTO: 0.5 K/UL — SIGNIFICANT CHANGE UP (ref 0–0.5)
EOSINOPHIL # BLD AUTO: 0.51 K/UL — HIGH (ref 0–0.5)
EOSINOPHIL # BLD AUTO: 0.62 K/UL — HIGH (ref 0–0.5)
EOSINOPHIL # BLD AUTO: 0.66 K/UL — HIGH (ref 0–0.5)
EOSINOPHIL # BLD AUTO: 0.7 K/UL — HIGH (ref 0–0.5)
EOSINOPHIL # BLD AUTO: 0.74 K/UL — HIGH (ref 0–0.5)
EOSINOPHIL # BLD AUTO: 0.94 K/UL — HIGH (ref 0–0.5)
EOSINOPHIL # BLD AUTO: 0.96 K/UL — HIGH (ref 0–0.5)
EOSINOPHIL # BLD AUTO: 1.1 K/UL — HIGH (ref 0–0.5)
EOSINOPHIL # BLD AUTO: 1.35 K/UL — HIGH (ref 0–0.5)
EOSINOPHIL NFR BLD AUTO: 0.5 % — SIGNIFICANT CHANGE UP (ref 0–6)
EOSINOPHIL NFR BLD AUTO: 0.6 % — SIGNIFICANT CHANGE UP (ref 0–6)
EOSINOPHIL NFR BLD AUTO: 0.8 % — SIGNIFICANT CHANGE UP (ref 0–6)
EOSINOPHIL NFR BLD AUTO: 0.9 % — SIGNIFICANT CHANGE UP (ref 0–6)
EOSINOPHIL NFR BLD AUTO: 1 % — SIGNIFICANT CHANGE UP (ref 0–6)
EOSINOPHIL NFR BLD AUTO: 1.1 % — SIGNIFICANT CHANGE UP (ref 0–6)
EOSINOPHIL NFR BLD AUTO: 1.2 % — SIGNIFICANT CHANGE UP (ref 0–6)
EOSINOPHIL NFR BLD AUTO: 1.3 % — SIGNIFICANT CHANGE UP (ref 0–6)
EOSINOPHIL NFR BLD AUTO: 1.3 % — SIGNIFICANT CHANGE UP (ref 0–6)
EOSINOPHIL NFR BLD AUTO: 1.4 % — SIGNIFICANT CHANGE UP (ref 0–6)
EOSINOPHIL NFR BLD AUTO: 10.1 % — HIGH (ref 0–6)
EOSINOPHIL NFR BLD AUTO: 10.2 % — HIGH (ref 0–6)
EOSINOPHIL NFR BLD AUTO: 10.3 % — HIGH (ref 0–6)
EOSINOPHIL NFR BLD AUTO: 11.4 % — HIGH (ref 0–6)
EOSINOPHIL NFR BLD AUTO: 11.4 % — HIGH (ref 0–6)
EOSINOPHIL NFR BLD AUTO: 13.3 % — HIGH (ref 0–6)
EOSINOPHIL NFR BLD AUTO: 15.5 % — HIGH (ref 0–6)
EOSINOPHIL NFR BLD AUTO: 2.7 % — SIGNIFICANT CHANGE UP (ref 0–6)
EOSINOPHIL NFR BLD AUTO: 4 % — SIGNIFICANT CHANGE UP (ref 0–6)
EOSINOPHIL NFR BLD AUTO: 4.4 % — SIGNIFICANT CHANGE UP (ref 0–6)
EOSINOPHIL NFR BLD AUTO: 4.4 % — SIGNIFICANT CHANGE UP (ref 0–6)
EOSINOPHIL NFR BLD AUTO: 6.5 % — HIGH (ref 0–6)
EPI CELLS # UR: ABNORMAL /HPF
EPI CELLS # UR: ABNORMAL /HPF
EPI CELLS # UR: SIGNIFICANT CHANGE UP /HPF
ESTIMATED AVERAGE GLUCOSE: 148 MG/DL — HIGH (ref 68–114)
GAS PNL BLDV: SIGNIFICANT CHANGE UP
GLUCOSE BLDC GLUCOMTR-MCNC: 101 MG/DL — HIGH (ref 70–99)
GLUCOSE BLDC GLUCOMTR-MCNC: 104 MG/DL — HIGH (ref 70–99)
GLUCOSE BLDC GLUCOMTR-MCNC: 106 MG/DL — HIGH (ref 70–99)
GLUCOSE BLDC GLUCOMTR-MCNC: 108 MG/DL — HIGH (ref 70–99)
GLUCOSE BLDC GLUCOMTR-MCNC: 108 MG/DL — HIGH (ref 70–99)
GLUCOSE BLDC GLUCOMTR-MCNC: 109 MG/DL — HIGH (ref 70–99)
GLUCOSE BLDC GLUCOMTR-MCNC: 110 MG/DL — HIGH (ref 70–99)
GLUCOSE BLDC GLUCOMTR-MCNC: 110 MG/DL — HIGH (ref 70–99)
GLUCOSE BLDC GLUCOMTR-MCNC: 113 MG/DL — HIGH (ref 70–99)
GLUCOSE BLDC GLUCOMTR-MCNC: 115 MG/DL — HIGH (ref 70–99)
GLUCOSE BLDC GLUCOMTR-MCNC: 115 MG/DL — HIGH (ref 70–99)
GLUCOSE BLDC GLUCOMTR-MCNC: 117 MG/DL — HIGH (ref 70–99)
GLUCOSE BLDC GLUCOMTR-MCNC: 118 MG/DL — HIGH (ref 70–99)
GLUCOSE BLDC GLUCOMTR-MCNC: 118 MG/DL — HIGH (ref 70–99)
GLUCOSE BLDC GLUCOMTR-MCNC: 120 MG/DL — HIGH (ref 70–99)
GLUCOSE BLDC GLUCOMTR-MCNC: 121 MG/DL — HIGH (ref 70–99)
GLUCOSE BLDC GLUCOMTR-MCNC: 122 MG/DL — HIGH (ref 70–99)
GLUCOSE BLDC GLUCOMTR-MCNC: 123 MG/DL — HIGH (ref 70–99)
GLUCOSE BLDC GLUCOMTR-MCNC: 124 MG/DL — HIGH (ref 70–99)
GLUCOSE BLDC GLUCOMTR-MCNC: 126 MG/DL — HIGH (ref 70–99)
GLUCOSE BLDC GLUCOMTR-MCNC: 127 MG/DL — HIGH (ref 70–99)
GLUCOSE BLDC GLUCOMTR-MCNC: 127 MG/DL — HIGH (ref 70–99)
GLUCOSE BLDC GLUCOMTR-MCNC: 128 MG/DL — HIGH (ref 70–99)
GLUCOSE BLDC GLUCOMTR-MCNC: 129 MG/DL — HIGH (ref 70–99)
GLUCOSE BLDC GLUCOMTR-MCNC: 131 MG/DL — HIGH (ref 70–99)
GLUCOSE BLDC GLUCOMTR-MCNC: 132 MG/DL — HIGH (ref 70–99)
GLUCOSE BLDC GLUCOMTR-MCNC: 134 MG/DL — HIGH (ref 70–99)
GLUCOSE BLDC GLUCOMTR-MCNC: 135 MG/DL — HIGH (ref 70–99)
GLUCOSE BLDC GLUCOMTR-MCNC: 140 MG/DL — HIGH (ref 70–99)
GLUCOSE BLDC GLUCOMTR-MCNC: 141 MG/DL — HIGH (ref 70–99)
GLUCOSE BLDC GLUCOMTR-MCNC: 142 MG/DL — HIGH (ref 70–99)
GLUCOSE BLDC GLUCOMTR-MCNC: 142 MG/DL — HIGH (ref 70–99)
GLUCOSE BLDC GLUCOMTR-MCNC: 143 MG/DL — HIGH (ref 70–99)
GLUCOSE BLDC GLUCOMTR-MCNC: 143 MG/DL — HIGH (ref 70–99)
GLUCOSE BLDC GLUCOMTR-MCNC: 144 MG/DL — HIGH (ref 70–99)
GLUCOSE BLDC GLUCOMTR-MCNC: 145 MG/DL — HIGH (ref 70–99)
GLUCOSE BLDC GLUCOMTR-MCNC: 146 MG/DL — HIGH (ref 70–99)
GLUCOSE BLDC GLUCOMTR-MCNC: 146 MG/DL — HIGH (ref 70–99)
GLUCOSE BLDC GLUCOMTR-MCNC: 147 MG/DL — HIGH (ref 70–99)
GLUCOSE BLDC GLUCOMTR-MCNC: 148 MG/DL — HIGH (ref 70–99)
GLUCOSE BLDC GLUCOMTR-MCNC: 149 MG/DL — HIGH (ref 70–99)
GLUCOSE BLDC GLUCOMTR-MCNC: 153 MG/DL — HIGH (ref 70–99)
GLUCOSE BLDC GLUCOMTR-MCNC: 154 MG/DL — HIGH (ref 70–99)
GLUCOSE BLDC GLUCOMTR-MCNC: 155 MG/DL — HIGH (ref 70–99)
GLUCOSE BLDC GLUCOMTR-MCNC: 156 MG/DL — HIGH (ref 70–99)
GLUCOSE BLDC GLUCOMTR-MCNC: 157 MG/DL — HIGH (ref 70–99)
GLUCOSE BLDC GLUCOMTR-MCNC: 158 MG/DL — HIGH (ref 70–99)
GLUCOSE BLDC GLUCOMTR-MCNC: 159 MG/DL — HIGH (ref 70–99)
GLUCOSE BLDC GLUCOMTR-MCNC: 160 MG/DL — HIGH (ref 70–99)
GLUCOSE BLDC GLUCOMTR-MCNC: 161 MG/DL — HIGH (ref 70–99)
GLUCOSE BLDC GLUCOMTR-MCNC: 161 MG/DL — HIGH (ref 70–99)
GLUCOSE BLDC GLUCOMTR-MCNC: 162 MG/DL — HIGH (ref 70–99)
GLUCOSE BLDC GLUCOMTR-MCNC: 163 MG/DL — HIGH (ref 70–99)
GLUCOSE BLDC GLUCOMTR-MCNC: 165 MG/DL — HIGH (ref 70–99)
GLUCOSE BLDC GLUCOMTR-MCNC: 166 MG/DL — HIGH (ref 70–99)
GLUCOSE BLDC GLUCOMTR-MCNC: 167 MG/DL — HIGH (ref 70–99)
GLUCOSE BLDC GLUCOMTR-MCNC: 172 MG/DL — HIGH (ref 70–99)
GLUCOSE BLDC GLUCOMTR-MCNC: 175 MG/DL — HIGH (ref 70–99)
GLUCOSE BLDC GLUCOMTR-MCNC: 176 MG/DL — HIGH (ref 70–99)
GLUCOSE BLDC GLUCOMTR-MCNC: 176 MG/DL — HIGH (ref 70–99)
GLUCOSE BLDC GLUCOMTR-MCNC: 177 MG/DL — HIGH (ref 70–99)
GLUCOSE BLDC GLUCOMTR-MCNC: 180 MG/DL — HIGH (ref 70–99)
GLUCOSE BLDC GLUCOMTR-MCNC: 181 MG/DL — HIGH (ref 70–99)
GLUCOSE BLDC GLUCOMTR-MCNC: 181 MG/DL — HIGH (ref 70–99)
GLUCOSE BLDC GLUCOMTR-MCNC: 184 MG/DL — HIGH (ref 70–99)
GLUCOSE BLDC GLUCOMTR-MCNC: 185 MG/DL — HIGH (ref 70–99)
GLUCOSE BLDC GLUCOMTR-MCNC: 186 MG/DL — HIGH (ref 70–99)
GLUCOSE BLDC GLUCOMTR-MCNC: 186 MG/DL — HIGH (ref 70–99)
GLUCOSE BLDC GLUCOMTR-MCNC: 187 MG/DL — HIGH (ref 70–99)
GLUCOSE BLDC GLUCOMTR-MCNC: 188 MG/DL — HIGH (ref 70–99)
GLUCOSE BLDC GLUCOMTR-MCNC: 188 MG/DL — HIGH (ref 70–99)
GLUCOSE BLDC GLUCOMTR-MCNC: 189 MG/DL — HIGH (ref 70–99)
GLUCOSE BLDC GLUCOMTR-MCNC: 190 MG/DL — HIGH (ref 70–99)
GLUCOSE BLDC GLUCOMTR-MCNC: 191 MG/DL — HIGH (ref 70–99)
GLUCOSE BLDC GLUCOMTR-MCNC: 191 MG/DL — HIGH (ref 70–99)
GLUCOSE BLDC GLUCOMTR-MCNC: 193 MG/DL — HIGH (ref 70–99)
GLUCOSE BLDC GLUCOMTR-MCNC: 195 MG/DL — HIGH (ref 70–99)
GLUCOSE BLDC GLUCOMTR-MCNC: 196 MG/DL — HIGH (ref 70–99)
GLUCOSE BLDC GLUCOMTR-MCNC: 197 MG/DL — HIGH (ref 70–99)
GLUCOSE BLDC GLUCOMTR-MCNC: 198 MG/DL — HIGH (ref 70–99)
GLUCOSE BLDC GLUCOMTR-MCNC: 199 MG/DL — HIGH (ref 70–99)
GLUCOSE BLDC GLUCOMTR-MCNC: 199 MG/DL — HIGH (ref 70–99)
GLUCOSE BLDC GLUCOMTR-MCNC: 202 MG/DL — HIGH (ref 70–99)
GLUCOSE BLDC GLUCOMTR-MCNC: 202 MG/DL — HIGH (ref 70–99)
GLUCOSE BLDC GLUCOMTR-MCNC: 204 MG/DL — HIGH (ref 70–99)
GLUCOSE BLDC GLUCOMTR-MCNC: 207 MG/DL — HIGH (ref 70–99)
GLUCOSE BLDC GLUCOMTR-MCNC: 209 MG/DL — HIGH (ref 70–99)
GLUCOSE BLDC GLUCOMTR-MCNC: 210 MG/DL — HIGH (ref 70–99)
GLUCOSE BLDC GLUCOMTR-MCNC: 213 MG/DL — HIGH (ref 70–99)
GLUCOSE BLDC GLUCOMTR-MCNC: 213 MG/DL — HIGH (ref 70–99)
GLUCOSE BLDC GLUCOMTR-MCNC: 216 MG/DL — HIGH (ref 70–99)
GLUCOSE BLDC GLUCOMTR-MCNC: 218 MG/DL — HIGH (ref 70–99)
GLUCOSE BLDC GLUCOMTR-MCNC: 218 MG/DL — HIGH (ref 70–99)
GLUCOSE BLDC GLUCOMTR-MCNC: 221 MG/DL — HIGH (ref 70–99)
GLUCOSE BLDC GLUCOMTR-MCNC: 223 MG/DL — HIGH (ref 70–99)
GLUCOSE BLDC GLUCOMTR-MCNC: 225 MG/DL — HIGH (ref 70–99)
GLUCOSE BLDC GLUCOMTR-MCNC: 227 MG/DL — HIGH (ref 70–99)
GLUCOSE BLDC GLUCOMTR-MCNC: 228 MG/DL — HIGH (ref 70–99)
GLUCOSE BLDC GLUCOMTR-MCNC: 232 MG/DL — HIGH (ref 70–99)
GLUCOSE BLDC GLUCOMTR-MCNC: 234 MG/DL — HIGH (ref 70–99)
GLUCOSE BLDC GLUCOMTR-MCNC: 235 MG/DL — HIGH (ref 70–99)
GLUCOSE BLDC GLUCOMTR-MCNC: 240 MG/DL — HIGH (ref 70–99)
GLUCOSE BLDC GLUCOMTR-MCNC: 241 MG/DL — HIGH (ref 70–99)
GLUCOSE BLDC GLUCOMTR-MCNC: 241 MG/DL — HIGH (ref 70–99)
GLUCOSE BLDC GLUCOMTR-MCNC: 244 MG/DL — HIGH (ref 70–99)
GLUCOSE BLDC GLUCOMTR-MCNC: 249 MG/DL — HIGH (ref 70–99)
GLUCOSE BLDC GLUCOMTR-MCNC: 250 MG/DL — HIGH (ref 70–99)
GLUCOSE BLDC GLUCOMTR-MCNC: 270 MG/DL — HIGH (ref 70–99)
GLUCOSE BLDC GLUCOMTR-MCNC: 278 MG/DL — HIGH (ref 70–99)
GLUCOSE BLDC GLUCOMTR-MCNC: 280 MG/DL — HIGH (ref 70–99)
GLUCOSE BLDC GLUCOMTR-MCNC: 297 MG/DL — HIGH (ref 70–99)
GLUCOSE BLDC GLUCOMTR-MCNC: 305 MG/DL — HIGH (ref 70–99)
GLUCOSE BLDC GLUCOMTR-MCNC: 317 MG/DL — HIGH (ref 70–99)
GLUCOSE BLDC GLUCOMTR-MCNC: 48 MG/DL — CRITICAL LOW (ref 70–99)
GLUCOSE BLDC GLUCOMTR-MCNC: 49 MG/DL — CRITICAL LOW (ref 70–99)
GLUCOSE BLDC GLUCOMTR-MCNC: 57 MG/DL — LOW (ref 70–99)
GLUCOSE BLDC GLUCOMTR-MCNC: 68 MG/DL — LOW (ref 70–99)
GLUCOSE BLDC GLUCOMTR-MCNC: 69 MG/DL — LOW (ref 70–99)
GLUCOSE BLDC GLUCOMTR-MCNC: 70 MG/DL — SIGNIFICANT CHANGE UP (ref 70–99)
GLUCOSE BLDC GLUCOMTR-MCNC: 74 MG/DL — SIGNIFICANT CHANGE UP (ref 70–99)
GLUCOSE BLDC GLUCOMTR-MCNC: 75 MG/DL — SIGNIFICANT CHANGE UP (ref 70–99)
GLUCOSE BLDC GLUCOMTR-MCNC: 79 MG/DL — SIGNIFICANT CHANGE UP (ref 70–99)
GLUCOSE BLDC GLUCOMTR-MCNC: 79 MG/DL — SIGNIFICANT CHANGE UP (ref 70–99)
GLUCOSE BLDC GLUCOMTR-MCNC: 83 MG/DL — SIGNIFICANT CHANGE UP (ref 70–99)
GLUCOSE BLDC GLUCOMTR-MCNC: 88 MG/DL — SIGNIFICANT CHANGE UP (ref 70–99)
GLUCOSE BLDC GLUCOMTR-MCNC: 88 MG/DL — SIGNIFICANT CHANGE UP (ref 70–99)
GLUCOSE BLDC GLUCOMTR-MCNC: 89 MG/DL — SIGNIFICANT CHANGE UP (ref 70–99)
GLUCOSE BLDC GLUCOMTR-MCNC: 91 MG/DL — SIGNIFICANT CHANGE UP (ref 70–99)
GLUCOSE BLDC GLUCOMTR-MCNC: 91 MG/DL — SIGNIFICANT CHANGE UP (ref 70–99)
GLUCOSE BLDC GLUCOMTR-MCNC: 93 MG/DL — SIGNIFICANT CHANGE UP (ref 70–99)
GLUCOSE BLDC GLUCOMTR-MCNC: 95 MG/DL — SIGNIFICANT CHANGE UP (ref 70–99)
GLUCOSE BLDC GLUCOMTR-MCNC: 98 MG/DL — SIGNIFICANT CHANGE UP (ref 70–99)
GLUCOSE SERPL-MCNC: 104 MG/DL — HIGH (ref 70–99)
GLUCOSE SERPL-MCNC: 105 MG/DL — HIGH (ref 70–99)
GLUCOSE SERPL-MCNC: 109 MG/DL — HIGH (ref 70–99)
GLUCOSE SERPL-MCNC: 110 MG/DL — HIGH (ref 70–99)
GLUCOSE SERPL-MCNC: 117 MG/DL — HIGH (ref 70–99)
GLUCOSE SERPL-MCNC: 120 MG/DL — HIGH (ref 70–99)
GLUCOSE SERPL-MCNC: 126 MG/DL — HIGH (ref 70–99)
GLUCOSE SERPL-MCNC: 130 MG/DL — HIGH (ref 70–99)
GLUCOSE SERPL-MCNC: 136 MG/DL — HIGH (ref 70–99)
GLUCOSE SERPL-MCNC: 142 MG/DL — HIGH (ref 70–99)
GLUCOSE SERPL-MCNC: 144 MG/DL — HIGH (ref 70–99)
GLUCOSE SERPL-MCNC: 148 MG/DL — HIGH (ref 70–99)
GLUCOSE SERPL-MCNC: 148 MG/DL — HIGH (ref 70–99)
GLUCOSE SERPL-MCNC: 150 MG/DL — HIGH (ref 70–99)
GLUCOSE SERPL-MCNC: 152 MG/DL — HIGH (ref 70–99)
GLUCOSE SERPL-MCNC: 157 MG/DL — HIGH (ref 70–99)
GLUCOSE SERPL-MCNC: 167 MG/DL — HIGH (ref 70–99)
GLUCOSE SERPL-MCNC: 167 MG/DL — HIGH (ref 70–99)
GLUCOSE SERPL-MCNC: 168 MG/DL — HIGH (ref 70–99)
GLUCOSE SERPL-MCNC: 170 MG/DL — HIGH (ref 70–99)
GLUCOSE SERPL-MCNC: 171 MG/DL — HIGH (ref 70–99)
GLUCOSE SERPL-MCNC: 172 MG/DL — HIGH (ref 70–99)
GLUCOSE SERPL-MCNC: 172 MG/DL — HIGH (ref 70–99)
GLUCOSE SERPL-MCNC: 178 MG/DL — HIGH (ref 70–99)
GLUCOSE SERPL-MCNC: 180 MG/DL — HIGH (ref 70–99)
GLUCOSE SERPL-MCNC: 182 MG/DL — HIGH (ref 70–99)
GLUCOSE SERPL-MCNC: 186 MG/DL — HIGH (ref 70–99)
GLUCOSE SERPL-MCNC: 191 MG/DL — HIGH (ref 70–99)
GLUCOSE SERPL-MCNC: 191 MG/DL — HIGH (ref 70–99)
GLUCOSE SERPL-MCNC: 192 MG/DL — HIGH (ref 70–99)
GLUCOSE SERPL-MCNC: 196 MG/DL — HIGH (ref 70–99)
GLUCOSE SERPL-MCNC: 197 MG/DL — HIGH (ref 70–99)
GLUCOSE SERPL-MCNC: 204 MG/DL — HIGH (ref 70–99)
GLUCOSE SERPL-MCNC: 228 MG/DL — HIGH (ref 70–99)
GLUCOSE SERPL-MCNC: 233 MG/DL — HIGH (ref 70–99)
GLUCOSE SERPL-MCNC: 269 MG/DL — HIGH (ref 70–99)
GLUCOSE SERPL-MCNC: 285 MG/DL — HIGH (ref 70–99)
GLUCOSE SERPL-MCNC: 321 MG/DL — HIGH (ref 70–99)
GLUCOSE SERPL-MCNC: 72 MG/DL — SIGNIFICANT CHANGE UP (ref 70–99)
GLUCOSE SERPL-MCNC: 74 MG/DL — SIGNIFICANT CHANGE UP (ref 70–99)
GLUCOSE SERPL-MCNC: 86 MG/DL — SIGNIFICANT CHANGE UP (ref 70–99)
GLUCOSE SERPL-MCNC: 95 MG/DL — SIGNIFICANT CHANGE UP (ref 70–99)
GLUCOSE UR QL: 1000 MG/DL
GLUCOSE UR QL: 1000 MG/DL
GLUCOSE UR QL: 250 MG/DL
GLUCOSE UR QL: NEGATIVE — SIGNIFICANT CHANGE UP
HCO3 BLDA-SCNC: 26 MMOL/L — SIGNIFICANT CHANGE UP (ref 21–28)
HCO3 BLDV-SCNC: 27 MMOL/L — SIGNIFICANT CHANGE UP (ref 22–29)
HCT VFR BLD CALC: 34.2 % — LOW (ref 39–50)
HCT VFR BLD CALC: 35.5 % — LOW (ref 39–50)
HCT VFR BLD CALC: 35.8 % — LOW (ref 39–50)
HCT VFR BLD CALC: 36.1 % — LOW (ref 39–50)
HCT VFR BLD CALC: 36.6 % — LOW (ref 39–50)
HCT VFR BLD CALC: 37.2 % — LOW (ref 39–50)
HCT VFR BLD CALC: 37.2 % — LOW (ref 39–50)
HCT VFR BLD CALC: 38 % — LOW (ref 39–50)
HCT VFR BLD CALC: 39.2 % — SIGNIFICANT CHANGE UP (ref 39–50)
HCT VFR BLD CALC: 39.6 % — SIGNIFICANT CHANGE UP (ref 39–50)
HCT VFR BLD CALC: 40.4 % — SIGNIFICANT CHANGE UP (ref 39–50)
HCT VFR BLD CALC: 40.5 % — SIGNIFICANT CHANGE UP (ref 39–50)
HCT VFR BLD CALC: 40.9 % — SIGNIFICANT CHANGE UP (ref 39–50)
HCT VFR BLD CALC: 41.2 % — SIGNIFICANT CHANGE UP (ref 39–50)
HCT VFR BLD CALC: 41.4 % — SIGNIFICANT CHANGE UP (ref 39–50)
HCT VFR BLD CALC: 42.4 % — SIGNIFICANT CHANGE UP (ref 39–50)
HCT VFR BLD CALC: 42.6 % — SIGNIFICANT CHANGE UP (ref 39–50)
HCT VFR BLD CALC: 42.9 % — SIGNIFICANT CHANGE UP (ref 39–50)
HCT VFR BLD CALC: 44.3 % — SIGNIFICANT CHANGE UP (ref 39–50)
HCT VFR BLD CALC: 46.6 % — SIGNIFICANT CHANGE UP (ref 39–50)
HCT VFR BLD CALC: 47 % — SIGNIFICANT CHANGE UP (ref 39–50)
HCT VFR BLD CALC: 47.2 % — SIGNIFICANT CHANGE UP (ref 39–50)
HCT VFR BLD CALC: 48.1 % — SIGNIFICANT CHANGE UP (ref 39–50)
HCT VFR BLD CALC: 48.1 % — SIGNIFICANT CHANGE UP (ref 39–50)
HCT VFR BLD CALC: 48.5 % — SIGNIFICANT CHANGE UP (ref 39–50)
HCT VFR BLD CALC: 48.9 % — SIGNIFICANT CHANGE UP (ref 39–50)
HCT VFR BLD CALC: 49.1 % — SIGNIFICANT CHANGE UP (ref 39–50)
HCT VFR BLD CALC: 49.4 % — SIGNIFICANT CHANGE UP (ref 39–50)
HCT VFR BLD CALC: 50.1 % — HIGH (ref 39–50)
HCT VFR BLD CALC: 50.5 % — HIGH (ref 39–50)
HCT VFR BLD CALC: 50.8 % — HIGH (ref 39–50)
HCT VFR BLD CALC: 50.9 % — HIGH (ref 39–50)
HCT VFR BLD CALC: 51.5 % — HIGH (ref 39–50)
HCT VFR BLD CALC: 52.2 % — HIGH (ref 39–50)
HCT VFR BLD CALC: 52.3 % — HIGH (ref 39–50)
HCT VFR BLD CALC: 53.2 % — HIGH (ref 39–50)
HCT VFR BLD CALC: 53.6 % — HIGH (ref 39–50)
HCV AB S/CO SERPL IA: 0.11 S/CO — SIGNIFICANT CHANGE UP (ref 0–0.99)
HCV AB SERPL-IMP: SIGNIFICANT CHANGE UP
HDLC SERPL-MCNC: 79 MG/DL — SIGNIFICANT CHANGE UP
HGB BLD-MCNC: 10.6 G/DL — LOW (ref 13–17)
HGB BLD-MCNC: 10.6 G/DL — LOW (ref 13–17)
HGB BLD-MCNC: 10.9 G/DL — LOW (ref 13–17)
HGB BLD-MCNC: 11 G/DL — LOW (ref 13–17)
HGB BLD-MCNC: 11.1 G/DL — LOW (ref 13–17)
HGB BLD-MCNC: 11.3 G/DL — LOW (ref 13–17)
HGB BLD-MCNC: 11.5 G/DL — LOW (ref 13–17)
HGB BLD-MCNC: 11.6 G/DL — LOW (ref 13–17)
HGB BLD-MCNC: 12 G/DL — LOW (ref 13–17)
HGB BLD-MCNC: 12.3 G/DL — LOW (ref 13–17)
HGB BLD-MCNC: 12.3 G/DL — LOW (ref 13–17)
HGB BLD-MCNC: 12.4 G/DL — LOW (ref 13–17)
HGB BLD-MCNC: 12.5 G/DL — LOW (ref 13–17)
HGB BLD-MCNC: 12.7 G/DL — LOW (ref 13–17)
HGB BLD-MCNC: 12.9 G/DL — LOW (ref 13–17)
HGB BLD-MCNC: 13.1 G/DL — SIGNIFICANT CHANGE UP (ref 13–17)
HGB BLD-MCNC: 13.4 G/DL — SIGNIFICANT CHANGE UP (ref 13–17)
HGB BLD-MCNC: 13.5 G/DL — SIGNIFICANT CHANGE UP (ref 13–17)
HGB BLD-MCNC: 13.6 G/DL — SIGNIFICANT CHANGE UP (ref 13–17)
HGB BLD-MCNC: 13.6 G/DL — SIGNIFICANT CHANGE UP (ref 13–17)
HGB BLD-MCNC: 13.8 G/DL — SIGNIFICANT CHANGE UP (ref 13–17)
HGB BLD-MCNC: 14.5 G/DL — SIGNIFICANT CHANGE UP (ref 13–17)
HGB BLD-MCNC: 14.8 G/DL — SIGNIFICANT CHANGE UP (ref 13–17)
HGB BLD-MCNC: 14.9 G/DL — SIGNIFICANT CHANGE UP (ref 13–17)
HGB BLD-MCNC: 15 G/DL — SIGNIFICANT CHANGE UP (ref 13–17)
HGB BLD-MCNC: 15.2 G/DL — SIGNIFICANT CHANGE UP (ref 13–17)
HGB BLD-MCNC: 15.3 G/DL — SIGNIFICANT CHANGE UP (ref 13–17)
HGB BLD-MCNC: 15.6 G/DL — SIGNIFICANT CHANGE UP (ref 13–17)
HGB BLD-MCNC: 15.6 G/DL — SIGNIFICANT CHANGE UP (ref 13–17)
HGB BLD-MCNC: 15.7 G/DL — SIGNIFICANT CHANGE UP (ref 13–17)
HGB BLD-MCNC: 15.9 G/DL — SIGNIFICANT CHANGE UP (ref 13–17)
HGB BLD-MCNC: 16 G/DL — SIGNIFICANT CHANGE UP (ref 13–17)
HGB BLD-MCNC: 16 G/DL — SIGNIFICANT CHANGE UP (ref 13–17)
HGB BLD-MCNC: 16.3 G/DL — SIGNIFICANT CHANGE UP (ref 13–17)
HGB BLD-MCNC: 16.7 G/DL — SIGNIFICANT CHANGE UP (ref 13–17)
HOROWITZ INDEX BLDA+IHG-RTO: 32 — SIGNIFICANT CHANGE UP
HOROWITZ INDEX BLDV+IHG-RTO: 100 — SIGNIFICANT CHANGE UP
HYALINE CASTS # UR AUTO: ABNORMAL /LPF
HYPOCHROMIA BLD QL: SLIGHT — SIGNIFICANT CHANGE UP
IMM GRANULOCYTES NFR BLD AUTO: 0.2 % — SIGNIFICANT CHANGE UP (ref 0–0.9)
IMM GRANULOCYTES NFR BLD AUTO: 0.3 % — SIGNIFICANT CHANGE UP (ref 0–0.9)
IMM GRANULOCYTES NFR BLD AUTO: 0.4 % — SIGNIFICANT CHANGE UP (ref 0–0.9)
IMM GRANULOCYTES NFR BLD AUTO: 0.5 % — SIGNIFICANT CHANGE UP (ref 0–0.9)
IMM GRANULOCYTES NFR BLD AUTO: 0.6 % — SIGNIFICANT CHANGE UP (ref 0–0.9)
IMM GRANULOCYTES NFR BLD AUTO: 0.7 % — SIGNIFICANT CHANGE UP (ref 0–0.9)
IMM GRANULOCYTES NFR BLD AUTO: 0.8 % — SIGNIFICANT CHANGE UP (ref 0–0.9)
IMM GRANULOCYTES NFR BLD AUTO: 0.9 % — SIGNIFICANT CHANGE UP (ref 0–0.9)
IMM GRANULOCYTES NFR BLD AUTO: 0.9 % — SIGNIFICANT CHANGE UP (ref 0–0.9)
INR BLD: 1.06 RATIO — SIGNIFICANT CHANGE UP (ref 0.88–1.16)
INR BLD: 1.24 RATIO — HIGH (ref 0.88–1.16)
KETONES UR-MCNC: ABNORMAL
KETONES UR-MCNC: ABNORMAL
KETONES UR-MCNC: NEGATIVE — SIGNIFICANT CHANGE UP
KETONES UR-MCNC: NEGATIVE — SIGNIFICANT CHANGE UP
LACTATE SERPL-SCNC: 1.5 MMOL/L — SIGNIFICANT CHANGE UP (ref 0.7–2)
LACTATE SERPL-SCNC: 1.8 MMOL/L — SIGNIFICANT CHANGE UP (ref 0.7–2)
LACTATE SERPL-SCNC: 2.6 MMOL/L — HIGH (ref 0.7–2)
LACTATE SERPL-SCNC: 2.8 MMOL/L — HIGH (ref 0.7–2)
LEUKOCYTE ESTERASE UR-ACNC: ABNORMAL
LEUKOCYTE ESTERASE UR-ACNC: ABNORMAL
LEUKOCYTE ESTERASE UR-ACNC: NEGATIVE — SIGNIFICANT CHANGE UP
LEUKOCYTE ESTERASE UR-ACNC: NEGATIVE — SIGNIFICANT CHANGE UP
LIDOCAIN IGE QN: 95 U/L — SIGNIFICANT CHANGE UP (ref 73–393)
LIPID PNL WITH DIRECT LDL SERPL: 59 MG/DL — SIGNIFICANT CHANGE UP
LYMPHOCYTES # BLD AUTO: 0.67 K/UL — LOW (ref 1–3.3)
LYMPHOCYTES # BLD AUTO: 0.72 K/UL — LOW (ref 1–3.3)
LYMPHOCYTES # BLD AUTO: 0.75 K/UL — LOW (ref 1–3.3)
LYMPHOCYTES # BLD AUTO: 0.82 K/UL — LOW (ref 1–3.3)
LYMPHOCYTES # BLD AUTO: 0.85 K/UL — LOW (ref 1–3.3)
LYMPHOCYTES # BLD AUTO: 0.88 K/UL — LOW (ref 1–3.3)
LYMPHOCYTES # BLD AUTO: 0.9 K/UL — LOW (ref 1–3.3)
LYMPHOCYTES # BLD AUTO: 1.04 K/UL — SIGNIFICANT CHANGE UP (ref 1–3.3)
LYMPHOCYTES # BLD AUTO: 1.07 K/UL — SIGNIFICANT CHANGE UP (ref 1–3.3)
LYMPHOCYTES # BLD AUTO: 1.08 K/UL — SIGNIFICANT CHANGE UP (ref 1–3.3)
LYMPHOCYTES # BLD AUTO: 1.17 K/UL — SIGNIFICANT CHANGE UP (ref 1–3.3)
LYMPHOCYTES # BLD AUTO: 1.19 K/UL — SIGNIFICANT CHANGE UP (ref 1–3.3)
LYMPHOCYTES # BLD AUTO: 1.24 K/UL — SIGNIFICANT CHANGE UP (ref 1–3.3)
LYMPHOCYTES # BLD AUTO: 1.25 K/UL — SIGNIFICANT CHANGE UP (ref 1–3.3)
LYMPHOCYTES # BLD AUTO: 1.32 K/UL — SIGNIFICANT CHANGE UP (ref 1–3.3)
LYMPHOCYTES # BLD AUTO: 1.37 K/UL — SIGNIFICANT CHANGE UP (ref 1–3.3)
LYMPHOCYTES # BLD AUTO: 1.4 K/UL — SIGNIFICANT CHANGE UP (ref 1–3.3)
LYMPHOCYTES # BLD AUTO: 1.43 K/UL — SIGNIFICANT CHANGE UP (ref 1–3.3)
LYMPHOCYTES # BLD AUTO: 1.44 K/UL — SIGNIFICANT CHANGE UP (ref 1–3.3)
LYMPHOCYTES # BLD AUTO: 1.52 K/UL — SIGNIFICANT CHANGE UP (ref 1–3.3)
LYMPHOCYTES # BLD AUTO: 1.54 K/UL — SIGNIFICANT CHANGE UP (ref 1–3.3)
LYMPHOCYTES # BLD AUTO: 1.56 K/UL — SIGNIFICANT CHANGE UP (ref 1–3.3)
LYMPHOCYTES # BLD AUTO: 1.68 K/UL — SIGNIFICANT CHANGE UP (ref 1–3.3)
LYMPHOCYTES # BLD AUTO: 10 % — LOW (ref 13–44)
LYMPHOCYTES # BLD AUTO: 10.1 % — LOW (ref 13–44)
LYMPHOCYTES # BLD AUTO: 10.4 % — LOW (ref 13–44)
LYMPHOCYTES # BLD AUTO: 11.4 % — LOW (ref 13–44)
LYMPHOCYTES # BLD AUTO: 11.8 % — LOW (ref 13–44)
LYMPHOCYTES # BLD AUTO: 12.2 % — LOW (ref 13–44)
LYMPHOCYTES # BLD AUTO: 13.7 % — SIGNIFICANT CHANGE UP (ref 13–44)
LYMPHOCYTES # BLD AUTO: 13.8 % — SIGNIFICANT CHANGE UP (ref 13–44)
LYMPHOCYTES # BLD AUTO: 14 % — SIGNIFICANT CHANGE UP (ref 13–44)
LYMPHOCYTES # BLD AUTO: 14.4 % — SIGNIFICANT CHANGE UP (ref 13–44)
LYMPHOCYTES # BLD AUTO: 16.1 % — SIGNIFICANT CHANGE UP (ref 13–44)
LYMPHOCYTES # BLD AUTO: 16.2 % — SIGNIFICANT CHANGE UP (ref 13–44)
LYMPHOCYTES # BLD AUTO: 17 % — SIGNIFICANT CHANGE UP (ref 13–44)
LYMPHOCYTES # BLD AUTO: 17.1 % — SIGNIFICANT CHANGE UP (ref 13–44)
LYMPHOCYTES # BLD AUTO: 17.2 % — SIGNIFICANT CHANGE UP (ref 13–44)
LYMPHOCYTES # BLD AUTO: 17.5 % — SIGNIFICANT CHANGE UP (ref 13–44)
LYMPHOCYTES # BLD AUTO: 19.6 % — SIGNIFICANT CHANGE UP (ref 13–44)
LYMPHOCYTES # BLD AUTO: 19.7 % — SIGNIFICANT CHANGE UP (ref 13–44)
LYMPHOCYTES # BLD AUTO: 19.9 % — SIGNIFICANT CHANGE UP (ref 13–44)
LYMPHOCYTES # BLD AUTO: 2.2 K/UL — SIGNIFICANT CHANGE UP (ref 1–3.3)
LYMPHOCYTES # BLD AUTO: 21.8 % — SIGNIFICANT CHANGE UP (ref 13–44)
LYMPHOCYTES # BLD AUTO: 23.7 % — SIGNIFICANT CHANGE UP (ref 13–44)
LYMPHOCYTES # BLD AUTO: 25.2 % — SIGNIFICANT CHANGE UP (ref 13–44)
LYMPHOCYTES # BLD AUTO: 8.2 % — LOW (ref 13–44)
LYMPHOCYTES # BLD AUTO: 9 % — LOW (ref 13–44)
MACROCYTES BLD QL: SLIGHT — SIGNIFICANT CHANGE UP
MAGNESIUM SERPL-MCNC: 2.5 MG/DL — SIGNIFICANT CHANGE UP (ref 1.6–2.6)
MAGNESIUM SERPL-MCNC: 2.6 MG/DL — SIGNIFICANT CHANGE UP (ref 1.6–2.6)
MAGNESIUM SERPL-MCNC: 2.7 MG/DL — HIGH (ref 1.6–2.6)
MAGNESIUM SERPL-MCNC: 2.8 MG/DL — HIGH (ref 1.6–2.6)
MAGNESIUM SERPL-MCNC: 2.9 MG/DL — HIGH (ref 1.6–2.6)
MAGNESIUM SERPL-MCNC: 3 MG/DL — HIGH (ref 1.6–2.6)
MAGNESIUM SERPL-MCNC: 3.1 MG/DL — HIGH (ref 1.6–2.6)
MAGNESIUM SERPL-MCNC: 3.2 MG/DL — HIGH (ref 1.6–2.6)
MAGNESIUM SERPL-MCNC: 3.2 MG/DL — HIGH (ref 1.6–2.6)
MAGNESIUM SERPL-MCNC: 3.3 MG/DL — HIGH (ref 1.6–2.6)
MAGNESIUM SERPL-MCNC: 3.4 MG/DL — HIGH (ref 1.6–2.6)
MANUAL SMEAR VERIFICATION: SIGNIFICANT CHANGE UP
MANUAL SMEAR VERIFICATION: SIGNIFICANT CHANGE UP
MCHC RBC-ENTMCNC: 29.6 GM/DL — LOW (ref 32–36)
MCHC RBC-ENTMCNC: 29.9 GM/DL — LOW (ref 32–36)
MCHC RBC-ENTMCNC: 29.9 GM/DL — LOW (ref 32–36)
MCHC RBC-ENTMCNC: 30.3 GM/DL — LOW (ref 32–36)
MCHC RBC-ENTMCNC: 30.4 GM/DL — LOW (ref 32–36)
MCHC RBC-ENTMCNC: 30.4 GM/DL — LOW (ref 32–36)
MCHC RBC-ENTMCNC: 30.5 GM/DL — LOW (ref 32–36)
MCHC RBC-ENTMCNC: 30.6 GM/DL — LOW (ref 32–36)
MCHC RBC-ENTMCNC: 30.7 GM/DL — LOW (ref 32–36)
MCHC RBC-ENTMCNC: 30.8 GM/DL — LOW (ref 32–36)
MCHC RBC-ENTMCNC: 30.8 GM/DL — LOW (ref 32–36)
MCHC RBC-ENTMCNC: 30.9 GM/DL — LOW (ref 32–36)
MCHC RBC-ENTMCNC: 30.9 PG — SIGNIFICANT CHANGE UP (ref 27–34)
MCHC RBC-ENTMCNC: 30.9 PG — SIGNIFICANT CHANGE UP (ref 27–34)
MCHC RBC-ENTMCNC: 31 GM/DL — LOW (ref 32–36)
MCHC RBC-ENTMCNC: 31.1 GM/DL — LOW (ref 32–36)
MCHC RBC-ENTMCNC: 31.2 GM/DL — LOW (ref 32–36)
MCHC RBC-ENTMCNC: 31.2 PG — SIGNIFICANT CHANGE UP (ref 27–34)
MCHC RBC-ENTMCNC: 31.3 GM/DL — LOW (ref 32–36)
MCHC RBC-ENTMCNC: 31.3 PG — SIGNIFICANT CHANGE UP (ref 27–34)
MCHC RBC-ENTMCNC: 31.4 GM/DL — LOW (ref 32–36)
MCHC RBC-ENTMCNC: 31.4 GM/DL — LOW (ref 32–36)
MCHC RBC-ENTMCNC: 31.4 PG — SIGNIFICANT CHANGE UP (ref 27–34)
MCHC RBC-ENTMCNC: 31.5 GM/DL — LOW (ref 32–36)
MCHC RBC-ENTMCNC: 31.5 PG — SIGNIFICANT CHANGE UP (ref 27–34)
MCHC RBC-ENTMCNC: 31.6 GM/DL — LOW (ref 32–36)
MCHC RBC-ENTMCNC: 31.6 PG — SIGNIFICANT CHANGE UP (ref 27–34)
MCHC RBC-ENTMCNC: 31.7 GM/DL — LOW (ref 32–36)
MCHC RBC-ENTMCNC: 31.7 PG — SIGNIFICANT CHANGE UP (ref 27–34)
MCHC RBC-ENTMCNC: 31.8 PG — SIGNIFICANT CHANGE UP (ref 27–34)
MCHC RBC-ENTMCNC: 31.9 GM/DL — LOW (ref 32–36)
MCHC RBC-ENTMCNC: 31.9 PG — SIGNIFICANT CHANGE UP (ref 27–34)
MCHC RBC-ENTMCNC: 31.9 PG — SIGNIFICANT CHANGE UP (ref 27–34)
MCHC RBC-ENTMCNC: 32 PG — SIGNIFICANT CHANGE UP (ref 27–34)
MCHC RBC-ENTMCNC: 32.1 GM/DL — SIGNIFICANT CHANGE UP (ref 32–36)
MCHC RBC-ENTMCNC: 32.1 PG — SIGNIFICANT CHANGE UP (ref 27–34)
MCHC RBC-ENTMCNC: 32.3 PG — SIGNIFICANT CHANGE UP (ref 27–34)
MCHC RBC-ENTMCNC: 32.4 PG — SIGNIFICANT CHANGE UP (ref 27–34)
MCV RBC AUTO: 100 FL — SIGNIFICANT CHANGE UP (ref 80–100)
MCV RBC AUTO: 100.2 FL — HIGH (ref 80–100)
MCV RBC AUTO: 100.2 FL — HIGH (ref 80–100)
MCV RBC AUTO: 100.4 FL — HIGH (ref 80–100)
MCV RBC AUTO: 100.4 FL — HIGH (ref 80–100)
MCV RBC AUTO: 100.6 FL — HIGH (ref 80–100)
MCV RBC AUTO: 100.7 FL — HIGH (ref 80–100)
MCV RBC AUTO: 100.8 FL — HIGH (ref 80–100)
MCV RBC AUTO: 100.9 FL — HIGH (ref 80–100)
MCV RBC AUTO: 101.1 FL — HIGH (ref 80–100)
MCV RBC AUTO: 101.5 FL — HIGH (ref 80–100)
MCV RBC AUTO: 101.5 FL — HIGH (ref 80–100)
MCV RBC AUTO: 101.6 FL — HIGH (ref 80–100)
MCV RBC AUTO: 101.7 FL — HIGH (ref 80–100)
MCV RBC AUTO: 101.9 FL — HIGH (ref 80–100)
MCV RBC AUTO: 102.4 FL — HIGH (ref 80–100)
MCV RBC AUTO: 102.5 FL — HIGH (ref 80–100)
MCV RBC AUTO: 102.5 FL — HIGH (ref 80–100)
MCV RBC AUTO: 102.6 FL — HIGH (ref 80–100)
MCV RBC AUTO: 102.7 FL — HIGH (ref 80–100)
MCV RBC AUTO: 103.3 FL — HIGH (ref 80–100)
MCV RBC AUTO: 103.4 FL — HIGH (ref 80–100)
MCV RBC AUTO: 103.4 FL — HIGH (ref 80–100)
MCV RBC AUTO: 103.5 FL — HIGH (ref 80–100)
MCV RBC AUTO: 103.6 FL — HIGH (ref 80–100)
MCV RBC AUTO: 103.6 FL — HIGH (ref 80–100)
MCV RBC AUTO: 103.8 FL — HIGH (ref 80–100)
MCV RBC AUTO: 103.9 FL — HIGH (ref 80–100)
MCV RBC AUTO: 104.4 FL — HIGH (ref 80–100)
MCV RBC AUTO: 104.5 FL — HIGH (ref 80–100)
MCV RBC AUTO: 104.6 FL — HIGH (ref 80–100)
MCV RBC AUTO: 104.8 FL — HIGH (ref 80–100)
MCV RBC AUTO: 105 FL — HIGH (ref 80–100)
MCV RBC AUTO: 105.2 FL — HIGH (ref 80–100)
MCV RBC AUTO: 99.1 FL — SIGNIFICANT CHANGE UP (ref 80–100)
MCV RBC AUTO: 99.1 FL — SIGNIFICANT CHANGE UP (ref 80–100)
MCV RBC AUTO: 99.4 FL — SIGNIFICANT CHANGE UP (ref 80–100)
METHOD TYPE: SIGNIFICANT CHANGE UP
MONOCYTES # BLD AUTO: 0.63 K/UL — SIGNIFICANT CHANGE UP (ref 0–0.9)
MONOCYTES # BLD AUTO: 0.63 K/UL — SIGNIFICANT CHANGE UP (ref 0–0.9)
MONOCYTES # BLD AUTO: 0.75 K/UL — SIGNIFICANT CHANGE UP (ref 0–0.9)
MONOCYTES # BLD AUTO: 0.85 K/UL — SIGNIFICANT CHANGE UP (ref 0–0.9)
MONOCYTES # BLD AUTO: 0.89 K/UL — SIGNIFICANT CHANGE UP (ref 0–0.9)
MONOCYTES # BLD AUTO: 0.9 K/UL — SIGNIFICANT CHANGE UP (ref 0–0.9)
MONOCYTES # BLD AUTO: 0.91 K/UL — HIGH (ref 0–0.9)
MONOCYTES # BLD AUTO: 0.97 K/UL — HIGH (ref 0–0.9)
MONOCYTES # BLD AUTO: 0.99 K/UL — HIGH (ref 0–0.9)
MONOCYTES # BLD AUTO: 0.99 K/UL — HIGH (ref 0–0.9)
MONOCYTES # BLD AUTO: 1 K/UL — HIGH (ref 0–0.9)
MONOCYTES # BLD AUTO: 1.03 K/UL — HIGH (ref 0–0.9)
MONOCYTES # BLD AUTO: 1.06 K/UL — HIGH (ref 0–0.9)
MONOCYTES # BLD AUTO: 1.08 K/UL — HIGH (ref 0–0.9)
MONOCYTES # BLD AUTO: 1.16 K/UL — HIGH (ref 0–0.9)
MONOCYTES # BLD AUTO: 1.22 K/UL — HIGH (ref 0–0.9)
MONOCYTES # BLD AUTO: 1.23 K/UL — HIGH (ref 0–0.9)
MONOCYTES # BLD AUTO: 1.27 K/UL — HIGH (ref 0–0.9)
MONOCYTES # BLD AUTO: 1.29 K/UL — HIGH (ref 0–0.9)
MONOCYTES # BLD AUTO: 1.32 K/UL — HIGH (ref 0–0.9)
MONOCYTES # BLD AUTO: 1.32 K/UL — HIGH (ref 0–0.9)
MONOCYTES # BLD AUTO: 1.39 K/UL — HIGH (ref 0–0.9)
MONOCYTES # BLD AUTO: 1.43 K/UL — HIGH (ref 0–0.9)
MONOCYTES # BLD AUTO: 1.48 K/UL — HIGH (ref 0–0.9)
MONOCYTES NFR BLD AUTO: 11 % — SIGNIFICANT CHANGE UP (ref 2–14)
MONOCYTES NFR BLD AUTO: 11.5 % — SIGNIFICANT CHANGE UP (ref 2–14)
MONOCYTES NFR BLD AUTO: 11.6 % — SIGNIFICANT CHANGE UP (ref 2–14)
MONOCYTES NFR BLD AUTO: 11.6 % — SIGNIFICANT CHANGE UP (ref 2–14)
MONOCYTES NFR BLD AUTO: 12 % — SIGNIFICANT CHANGE UP (ref 2–14)
MONOCYTES NFR BLD AUTO: 12.1 % — SIGNIFICANT CHANGE UP (ref 2–14)
MONOCYTES NFR BLD AUTO: 12.6 % — SIGNIFICANT CHANGE UP (ref 2–14)
MONOCYTES NFR BLD AUTO: 13.2 % — SIGNIFICANT CHANGE UP (ref 2–14)
MONOCYTES NFR BLD AUTO: 13.3 % — SIGNIFICANT CHANGE UP (ref 2–14)
MONOCYTES NFR BLD AUTO: 13.8 % — SIGNIFICANT CHANGE UP (ref 2–14)
MONOCYTES NFR BLD AUTO: 14 % — SIGNIFICANT CHANGE UP (ref 2–14)
MONOCYTES NFR BLD AUTO: 14.1 % — HIGH (ref 2–14)
MONOCYTES NFR BLD AUTO: 14.2 % — HIGH (ref 2–14)
MONOCYTES NFR BLD AUTO: 14.5 % — HIGH (ref 2–14)
MONOCYTES NFR BLD AUTO: 14.6 % — HIGH (ref 2–14)
MONOCYTES NFR BLD AUTO: 14.6 % — HIGH (ref 2–14)
MONOCYTES NFR BLD AUTO: 15 % — HIGH (ref 2–14)
MONOCYTES NFR BLD AUTO: 15.2 % — HIGH (ref 2–14)
MONOCYTES NFR BLD AUTO: 15.7 % — HIGH (ref 2–14)
MONOCYTES NFR BLD AUTO: 16 % — HIGH (ref 2–14)
MONOCYTES NFR BLD AUTO: 17.3 % — HIGH (ref 2–14)
MONOCYTES NFR BLD AUTO: 20.5 % — HIGH (ref 2–14)
MONOCYTES NFR BLD AUTO: 7.6 % — SIGNIFICANT CHANGE UP (ref 2–14)
MONOCYTES NFR BLD AUTO: 8.9 % — SIGNIFICANT CHANGE UP (ref 2–14)
NEUTROPHILS # BLD AUTO: 11.45 K/UL — HIGH (ref 1.8–7.4)
NEUTROPHILS # BLD AUTO: 3.34 K/UL — SIGNIFICANT CHANGE UP (ref 1.8–7.4)
NEUTROPHILS # BLD AUTO: 3.66 K/UL — SIGNIFICANT CHANGE UP (ref 1.8–7.4)
NEUTROPHILS # BLD AUTO: 3.71 K/UL — SIGNIFICANT CHANGE UP (ref 1.8–7.4)
NEUTROPHILS # BLD AUTO: 3.77 K/UL — SIGNIFICANT CHANGE UP (ref 1.8–7.4)
NEUTROPHILS # BLD AUTO: 3.8 K/UL — SIGNIFICANT CHANGE UP (ref 1.8–7.4)
NEUTROPHILS # BLD AUTO: 4.15 K/UL — SIGNIFICANT CHANGE UP (ref 1.8–7.4)
NEUTROPHILS # BLD AUTO: 4.21 K/UL — SIGNIFICANT CHANGE UP (ref 1.8–7.4)
NEUTROPHILS # BLD AUTO: 4.23 K/UL — SIGNIFICANT CHANGE UP (ref 1.8–7.4)
NEUTROPHILS # BLD AUTO: 4.26 K/UL — SIGNIFICANT CHANGE UP (ref 1.8–7.4)
NEUTROPHILS # BLD AUTO: 4.58 K/UL — SIGNIFICANT CHANGE UP (ref 1.8–7.4)
NEUTROPHILS # BLD AUTO: 4.6 K/UL — SIGNIFICANT CHANGE UP (ref 1.8–7.4)
NEUTROPHILS # BLD AUTO: 4.98 K/UL — SIGNIFICANT CHANGE UP (ref 1.8–7.4)
NEUTROPHILS # BLD AUTO: 5 K/UL — SIGNIFICANT CHANGE UP (ref 1.8–7.4)
NEUTROPHILS # BLD AUTO: 5.02 K/UL — SIGNIFICANT CHANGE UP (ref 1.8–7.4)
NEUTROPHILS # BLD AUTO: 5.05 K/UL — SIGNIFICANT CHANGE UP (ref 1.8–7.4)
NEUTROPHILS # BLD AUTO: 5.09 K/UL — SIGNIFICANT CHANGE UP (ref 1.8–7.4)
NEUTROPHILS # BLD AUTO: 5.29 K/UL — SIGNIFICANT CHANGE UP (ref 1.8–7.4)
NEUTROPHILS # BLD AUTO: 5.36 K/UL — SIGNIFICANT CHANGE UP (ref 1.8–7.4)
NEUTROPHILS # BLD AUTO: 5.78 K/UL — SIGNIFICANT CHANGE UP (ref 1.8–7.4)
NEUTROPHILS # BLD AUTO: 6.82 K/UL — SIGNIFICANT CHANGE UP (ref 1.8–7.4)
NEUTROPHILS # BLD AUTO: 7.38 K/UL — SIGNIFICANT CHANGE UP (ref 1.8–7.4)
NEUTROPHILS # BLD AUTO: 7.82 K/UL — HIGH (ref 1.8–7.4)
NEUTROPHILS # BLD AUTO: 9.59 K/UL — HIGH (ref 1.8–7.4)
NEUTROPHILS NFR BLD AUTO: 51.6 % — SIGNIFICANT CHANGE UP (ref 43–77)
NEUTROPHILS NFR BLD AUTO: 52.5 % — SIGNIFICANT CHANGE UP (ref 43–77)
NEUTROPHILS NFR BLD AUTO: 56.3 % — SIGNIFICANT CHANGE UP (ref 43–77)
NEUTROPHILS NFR BLD AUTO: 57.7 % — SIGNIFICANT CHANGE UP (ref 43–77)
NEUTROPHILS NFR BLD AUTO: 58.5 % — SIGNIFICANT CHANGE UP (ref 43–77)
NEUTROPHILS NFR BLD AUTO: 59.5 % — SIGNIFICANT CHANGE UP (ref 43–77)
NEUTROPHILS NFR BLD AUTO: 60 % — SIGNIFICANT CHANGE UP (ref 43–77)
NEUTROPHILS NFR BLD AUTO: 60.6 % — SIGNIFICANT CHANGE UP (ref 43–77)
NEUTROPHILS NFR BLD AUTO: 61.4 % — SIGNIFICANT CHANGE UP (ref 43–77)
NEUTROPHILS NFR BLD AUTO: 62.2 % — SIGNIFICANT CHANGE UP (ref 43–77)
NEUTROPHILS NFR BLD AUTO: 63.9 % — SIGNIFICANT CHANGE UP (ref 43–77)
NEUTROPHILS NFR BLD AUTO: 64.4 % — SIGNIFICANT CHANGE UP (ref 43–77)
NEUTROPHILS NFR BLD AUTO: 66.7 % — SIGNIFICANT CHANGE UP (ref 43–77)
NEUTROPHILS NFR BLD AUTO: 66.7 % — SIGNIFICANT CHANGE UP (ref 43–77)
NEUTROPHILS NFR BLD AUTO: 67.5 % — SIGNIFICANT CHANGE UP (ref 43–77)
NEUTROPHILS NFR BLD AUTO: 67.9 % — SIGNIFICANT CHANGE UP (ref 43–77)
NEUTROPHILS NFR BLD AUTO: 69.1 % — SIGNIFICANT CHANGE UP (ref 43–77)
NEUTROPHILS NFR BLD AUTO: 71.1 % — SIGNIFICANT CHANGE UP (ref 43–77)
NEUTROPHILS NFR BLD AUTO: 71.4 % — SIGNIFICANT CHANGE UP (ref 43–77)
NEUTROPHILS NFR BLD AUTO: 71.7 % — SIGNIFICANT CHANGE UP (ref 43–77)
NEUTROPHILS NFR BLD AUTO: 72 % — SIGNIFICANT CHANGE UP (ref 43–77)
NEUTROPHILS NFR BLD AUTO: 73.1 % — SIGNIFICANT CHANGE UP (ref 43–77)
NEUTROPHILS NFR BLD AUTO: 75.7 % — SIGNIFICANT CHANGE UP (ref 43–77)
NEUTROPHILS NFR BLD AUTO: 82.3 % — HIGH (ref 43–77)
NITRITE UR-MCNC: NEGATIVE — SIGNIFICANT CHANGE UP
NON HDL CHOLESTEROL: 73 MG/DL — SIGNIFICANT CHANGE UP
NRBC # BLD: 0 /100 WBCS — SIGNIFICANT CHANGE UP (ref 0–0)
NRBC # BLD: 1 /100 WBCS — HIGH (ref 0–0)
NRBC # BLD: 1 /100 WBCS — HIGH (ref 0–0)
NRBC # BLD: 2 /100 WBCS — HIGH (ref 0–0)
NT-PROBNP SERPL-SCNC: HIGH PG/ML (ref 0–125)
NT-PROBNP SERPL-SCNC: HIGH PG/ML (ref 0–125)
ORGANISM # SPEC MICROSCOPIC CNT: SIGNIFICANT CHANGE UP
ORGANISM # SPEC MICROSCOPIC CNT: SIGNIFICANT CHANGE UP
PCO2 BLDA: 39 MMHG — SIGNIFICANT CHANGE UP (ref 35–48)
PCO2 BLDV: 48 MMHG — SIGNIFICANT CHANGE UP (ref 42–55)
PH BLDA: 7.43 — SIGNIFICANT CHANGE UP (ref 7.35–7.45)
PH BLDV: 7.37 — SIGNIFICANT CHANGE UP (ref 7.32–7.43)
PH UR: 5 — SIGNIFICANT CHANGE UP (ref 5–8)
PH UR: 5 — SIGNIFICANT CHANGE UP (ref 5–8)
PH UR: 6 — SIGNIFICANT CHANGE UP (ref 5–8)
PH UR: 6 — SIGNIFICANT CHANGE UP (ref 5–8)
PHOSPHATE SERPL-MCNC: 2.1 MG/DL — LOW (ref 2.5–4.5)
PHOSPHATE SERPL-MCNC: 2.2 MG/DL — LOW (ref 2.5–4.5)
PHOSPHATE SERPL-MCNC: 2.3 MG/DL — LOW (ref 2.5–4.5)
PHOSPHATE SERPL-MCNC: 2.5 MG/DL — SIGNIFICANT CHANGE UP (ref 2.5–4.5)
PHOSPHATE SERPL-MCNC: 2.7 MG/DL — SIGNIFICANT CHANGE UP (ref 2.5–4.5)
PHOSPHATE SERPL-MCNC: 2.8 MG/DL — SIGNIFICANT CHANGE UP (ref 2.5–4.5)
PHOSPHATE SERPL-MCNC: 2.8 MG/DL — SIGNIFICANT CHANGE UP (ref 2.5–4.5)
PHOSPHATE SERPL-MCNC: 3 MG/DL — SIGNIFICANT CHANGE UP (ref 2.5–4.5)
PHOSPHATE SERPL-MCNC: 3 MG/DL — SIGNIFICANT CHANGE UP (ref 2.5–4.5)
PHOSPHATE SERPL-MCNC: 3.1 MG/DL — SIGNIFICANT CHANGE UP (ref 2.5–4.5)
PHOSPHATE SERPL-MCNC: 3.2 MG/DL — SIGNIFICANT CHANGE UP (ref 2.5–4.5)
PHOSPHATE SERPL-MCNC: 3.3 MG/DL — SIGNIFICANT CHANGE UP (ref 2.5–4.5)
PHOSPHATE SERPL-MCNC: 3.4 MG/DL — SIGNIFICANT CHANGE UP (ref 2.5–4.5)
PHOSPHATE SERPL-MCNC: 3.4 MG/DL — SIGNIFICANT CHANGE UP (ref 2.5–4.5)
PHOSPHATE SERPL-MCNC: 3.5 MG/DL — SIGNIFICANT CHANGE UP (ref 2.5–4.5)
PHOSPHATE SERPL-MCNC: 3.5 MG/DL — SIGNIFICANT CHANGE UP (ref 2.5–4.5)
PHOSPHATE SERPL-MCNC: 3.6 MG/DL — SIGNIFICANT CHANGE UP (ref 2.5–4.5)
PHOSPHATE SERPL-MCNC: 3.7 MG/DL — SIGNIFICANT CHANGE UP (ref 2.5–4.5)
PHOSPHATE SERPL-MCNC: 3.8 MG/DL — SIGNIFICANT CHANGE UP (ref 2.5–4.5)
PHOSPHATE SERPL-MCNC: 3.9 MG/DL — SIGNIFICANT CHANGE UP (ref 2.5–4.5)
PHOSPHATE SERPL-MCNC: 4 MG/DL — SIGNIFICANT CHANGE UP (ref 2.5–4.5)
PHOSPHATE SERPL-MCNC: 4 MG/DL — SIGNIFICANT CHANGE UP (ref 2.5–4.5)
PHOSPHATE SERPL-MCNC: 4.1 MG/DL — SIGNIFICANT CHANGE UP (ref 2.5–4.5)
PHOSPHATE SERPL-MCNC: 4.2 MG/DL — SIGNIFICANT CHANGE UP (ref 2.5–4.5)
PHOSPHATE SERPL-MCNC: 4.2 MG/DL — SIGNIFICANT CHANGE UP (ref 2.5–4.5)
PHOSPHATE SERPL-MCNC: 4.3 MG/DL — SIGNIFICANT CHANGE UP (ref 2.5–4.5)
PHOSPHATE SERPL-MCNC: 4.4 MG/DL — SIGNIFICANT CHANGE UP (ref 2.5–4.5)
PHOSPHATE SERPL-MCNC: 5.2 MG/DL — HIGH (ref 2.5–4.5)
PHOSPHATE SERPL-MCNC: 5.4 MG/DL — HIGH (ref 2.5–4.5)
PHOSPHATE SERPL-MCNC: 5.7 MG/DL — HIGH (ref 2.5–4.5)
PLAT MORPH BLD: NORMAL — SIGNIFICANT CHANGE UP
PLAT MORPH BLD: NORMAL — SIGNIFICANT CHANGE UP
PLATELET # BLD AUTO: 132 K/UL — LOW (ref 150–400)
PLATELET # BLD AUTO: 136 K/UL — LOW (ref 150–400)
PLATELET # BLD AUTO: 152 K/UL — SIGNIFICANT CHANGE UP (ref 150–400)
PLATELET # BLD AUTO: 154 K/UL — SIGNIFICANT CHANGE UP (ref 150–400)
PLATELET # BLD AUTO: 163 K/UL — SIGNIFICANT CHANGE UP (ref 150–400)
PLATELET # BLD AUTO: 172 K/UL — SIGNIFICANT CHANGE UP (ref 150–400)
PLATELET # BLD AUTO: 175 K/UL — SIGNIFICANT CHANGE UP (ref 150–400)
PLATELET # BLD AUTO: 175 K/UL — SIGNIFICANT CHANGE UP (ref 150–400)
PLATELET # BLD AUTO: 176 K/UL — SIGNIFICANT CHANGE UP (ref 150–400)
PLATELET # BLD AUTO: 179 K/UL — SIGNIFICANT CHANGE UP (ref 150–400)
PLATELET # BLD AUTO: 179 K/UL — SIGNIFICANT CHANGE UP (ref 150–400)
PLATELET # BLD AUTO: 180 K/UL — SIGNIFICANT CHANGE UP (ref 150–400)
PLATELET # BLD AUTO: 181 K/UL — SIGNIFICANT CHANGE UP (ref 150–400)
PLATELET # BLD AUTO: 183 K/UL — SIGNIFICANT CHANGE UP (ref 150–400)
PLATELET # BLD AUTO: 185 K/UL — SIGNIFICANT CHANGE UP (ref 150–400)
PLATELET # BLD AUTO: 189 K/UL — SIGNIFICANT CHANGE UP (ref 150–400)
PLATELET # BLD AUTO: 190 K/UL — SIGNIFICANT CHANGE UP (ref 150–400)
PLATELET # BLD AUTO: 192 K/UL — SIGNIFICANT CHANGE UP (ref 150–400)
PLATELET # BLD AUTO: 193 K/UL — SIGNIFICANT CHANGE UP (ref 150–400)
PLATELET # BLD AUTO: 195 K/UL — SIGNIFICANT CHANGE UP (ref 150–400)
PLATELET # BLD AUTO: 196 K/UL — SIGNIFICANT CHANGE UP (ref 150–400)
PLATELET # BLD AUTO: 197 K/UL — SIGNIFICANT CHANGE UP (ref 150–400)
PLATELET # BLD AUTO: 198 K/UL — SIGNIFICANT CHANGE UP (ref 150–400)
PLATELET # BLD AUTO: 198 K/UL — SIGNIFICANT CHANGE UP (ref 150–400)
PLATELET # BLD AUTO: 201 K/UL — SIGNIFICANT CHANGE UP (ref 150–400)
PLATELET # BLD AUTO: 207 K/UL — SIGNIFICANT CHANGE UP (ref 150–400)
PLATELET # BLD AUTO: 210 K/UL — SIGNIFICANT CHANGE UP (ref 150–400)
PLATELET # BLD AUTO: 211 K/UL — SIGNIFICANT CHANGE UP (ref 150–400)
PLATELET # BLD AUTO: 212 K/UL — SIGNIFICANT CHANGE UP (ref 150–400)
PLATELET # BLD AUTO: 226 K/UL — SIGNIFICANT CHANGE UP (ref 150–400)
PLATELET # BLD AUTO: 232 K/UL — SIGNIFICANT CHANGE UP (ref 150–400)
PLATELET # BLD AUTO: 234 K/UL — SIGNIFICANT CHANGE UP (ref 150–400)
PLATELET # BLD AUTO: 235 K/UL — SIGNIFICANT CHANGE UP (ref 150–400)
PLATELET # BLD AUTO: 236 K/UL — SIGNIFICANT CHANGE UP (ref 150–400)
PLATELET # BLD AUTO: 252 K/UL — SIGNIFICANT CHANGE UP (ref 150–400)
PLATELET # BLD AUTO: 253 K/UL — SIGNIFICANT CHANGE UP (ref 150–400)
PLATELET # BLD AUTO: 256 K/UL — SIGNIFICANT CHANGE UP (ref 150–400)
PLATELET COUNT - ESTIMATE: NORMAL — SIGNIFICANT CHANGE UP
PLATELET COUNT - ESTIMATE: NORMAL — SIGNIFICANT CHANGE UP
PO2 BLDA: 129 MMHG — HIGH (ref 83–108)
PO2 BLDV: 32 MMHG — SIGNIFICANT CHANGE UP
POIKILOCYTOSIS BLD QL AUTO: SLIGHT — SIGNIFICANT CHANGE UP
POLYCHROMASIA BLD QL SMEAR: SLIGHT — SIGNIFICANT CHANGE UP
POTASSIUM SERPL-MCNC: 3.8 MMOL/L — SIGNIFICANT CHANGE UP (ref 3.5–5.3)
POTASSIUM SERPL-MCNC: 3.9 MMOL/L — SIGNIFICANT CHANGE UP (ref 3.5–5.3)
POTASSIUM SERPL-MCNC: 3.9 MMOL/L — SIGNIFICANT CHANGE UP (ref 3.5–5.3)
POTASSIUM SERPL-MCNC: 4 MMOL/L — SIGNIFICANT CHANGE UP (ref 3.5–5.3)
POTASSIUM SERPL-MCNC: 4 MMOL/L — SIGNIFICANT CHANGE UP (ref 3.5–5.3)
POTASSIUM SERPL-MCNC: 4.1 MMOL/L — SIGNIFICANT CHANGE UP (ref 3.5–5.3)
POTASSIUM SERPL-MCNC: 4.2 MMOL/L — SIGNIFICANT CHANGE UP (ref 3.5–5.3)
POTASSIUM SERPL-MCNC: 4.3 MMOL/L — SIGNIFICANT CHANGE UP (ref 3.5–5.3)
POTASSIUM SERPL-MCNC: 4.4 MMOL/L — SIGNIFICANT CHANGE UP (ref 3.5–5.3)
POTASSIUM SERPL-MCNC: 4.5 MMOL/L — SIGNIFICANT CHANGE UP (ref 3.5–5.3)
POTASSIUM SERPL-MCNC: 4.5 MMOL/L — SIGNIFICANT CHANGE UP (ref 3.5–5.3)
POTASSIUM SERPL-MCNC: 4.6 MMOL/L — SIGNIFICANT CHANGE UP (ref 3.5–5.3)
POTASSIUM SERPL-MCNC: 4.6 MMOL/L — SIGNIFICANT CHANGE UP (ref 3.5–5.3)
POTASSIUM SERPL-MCNC: 4.7 MMOL/L — SIGNIFICANT CHANGE UP (ref 3.5–5.3)
POTASSIUM SERPL-MCNC: 4.8 MMOL/L — SIGNIFICANT CHANGE UP (ref 3.5–5.3)
POTASSIUM SERPL-MCNC: 4.9 MMOL/L — SIGNIFICANT CHANGE UP (ref 3.5–5.3)
POTASSIUM SERPL-MCNC: 4.9 MMOL/L — SIGNIFICANT CHANGE UP (ref 3.5–5.3)
POTASSIUM SERPL-MCNC: 5 MMOL/L — SIGNIFICANT CHANGE UP (ref 3.5–5.3)
POTASSIUM SERPL-MCNC: 5 MMOL/L — SIGNIFICANT CHANGE UP (ref 3.5–5.3)
POTASSIUM SERPL-MCNC: 5.1 MMOL/L — SIGNIFICANT CHANGE UP (ref 3.5–5.3)
POTASSIUM SERPL-MCNC: 5.1 MMOL/L — SIGNIFICANT CHANGE UP (ref 3.5–5.3)
POTASSIUM SERPL-MCNC: 5.3 MMOL/L — SIGNIFICANT CHANGE UP (ref 3.5–5.3)
POTASSIUM SERPL-MCNC: 5.5 MMOL/L — HIGH (ref 3.5–5.3)
POTASSIUM SERPL-MCNC: 5.5 MMOL/L — HIGH (ref 3.5–5.3)
POTASSIUM SERPL-MCNC: 5.7 MMOL/L — HIGH (ref 3.5–5.3)
POTASSIUM SERPL-MCNC: 5.8 MMOL/L — HIGH (ref 3.5–5.3)
POTASSIUM SERPL-MCNC: 6.1 MMOL/L — HIGH (ref 3.5–5.3)
POTASSIUM SERPL-MCNC: 6.1 MMOL/L — HIGH (ref 3.5–5.3)
POTASSIUM SERPL-MCNC: 6.3 MMOL/L — CRITICAL HIGH (ref 3.5–5.3)
POTASSIUM SERPL-MCNC: 6.9 MMOL/L — CRITICAL HIGH (ref 3.5–5.3)
POTASSIUM SERPL-SCNC: 3.8 MMOL/L — SIGNIFICANT CHANGE UP (ref 3.5–5.3)
POTASSIUM SERPL-SCNC: 3.9 MMOL/L — SIGNIFICANT CHANGE UP (ref 3.5–5.3)
POTASSIUM SERPL-SCNC: 3.9 MMOL/L — SIGNIFICANT CHANGE UP (ref 3.5–5.3)
POTASSIUM SERPL-SCNC: 4 MMOL/L — SIGNIFICANT CHANGE UP (ref 3.5–5.3)
POTASSIUM SERPL-SCNC: 4 MMOL/L — SIGNIFICANT CHANGE UP (ref 3.5–5.3)
POTASSIUM SERPL-SCNC: 4.1 MMOL/L — SIGNIFICANT CHANGE UP (ref 3.5–5.3)
POTASSIUM SERPL-SCNC: 4.2 MMOL/L — SIGNIFICANT CHANGE UP (ref 3.5–5.3)
POTASSIUM SERPL-SCNC: 4.3 MMOL/L — SIGNIFICANT CHANGE UP (ref 3.5–5.3)
POTASSIUM SERPL-SCNC: 4.4 MMOL/L — SIGNIFICANT CHANGE UP (ref 3.5–5.3)
POTASSIUM SERPL-SCNC: 4.5 MMOL/L — SIGNIFICANT CHANGE UP (ref 3.5–5.3)
POTASSIUM SERPL-SCNC: 4.5 MMOL/L — SIGNIFICANT CHANGE UP (ref 3.5–5.3)
POTASSIUM SERPL-SCNC: 4.6 MMOL/L — SIGNIFICANT CHANGE UP (ref 3.5–5.3)
POTASSIUM SERPL-SCNC: 4.6 MMOL/L — SIGNIFICANT CHANGE UP (ref 3.5–5.3)
POTASSIUM SERPL-SCNC: 4.7 MMOL/L — SIGNIFICANT CHANGE UP (ref 3.5–5.3)
POTASSIUM SERPL-SCNC: 4.8 MMOL/L — SIGNIFICANT CHANGE UP (ref 3.5–5.3)
POTASSIUM SERPL-SCNC: 4.9 MMOL/L — SIGNIFICANT CHANGE UP (ref 3.5–5.3)
POTASSIUM SERPL-SCNC: 4.9 MMOL/L — SIGNIFICANT CHANGE UP (ref 3.5–5.3)
POTASSIUM SERPL-SCNC: 5 MMOL/L — SIGNIFICANT CHANGE UP (ref 3.5–5.3)
POTASSIUM SERPL-SCNC: 5 MMOL/L — SIGNIFICANT CHANGE UP (ref 3.5–5.3)
POTASSIUM SERPL-SCNC: 5.1 MMOL/L — SIGNIFICANT CHANGE UP (ref 3.5–5.3)
POTASSIUM SERPL-SCNC: 5.1 MMOL/L — SIGNIFICANT CHANGE UP (ref 3.5–5.3)
POTASSIUM SERPL-SCNC: 5.3 MMOL/L — SIGNIFICANT CHANGE UP (ref 3.5–5.3)
POTASSIUM SERPL-SCNC: 5.5 MMOL/L — HIGH (ref 3.5–5.3)
POTASSIUM SERPL-SCNC: 5.5 MMOL/L — HIGH (ref 3.5–5.3)
POTASSIUM SERPL-SCNC: 5.7 MMOL/L — HIGH (ref 3.5–5.3)
POTASSIUM SERPL-SCNC: 5.8 MMOL/L — HIGH (ref 3.5–5.3)
POTASSIUM SERPL-SCNC: 6.1 MMOL/L — HIGH (ref 3.5–5.3)
POTASSIUM SERPL-SCNC: 6.1 MMOL/L — HIGH (ref 3.5–5.3)
POTASSIUM SERPL-SCNC: 6.3 MMOL/L — CRITICAL HIGH (ref 3.5–5.3)
POTASSIUM SERPL-SCNC: 6.9 MMOL/L — CRITICAL HIGH (ref 3.5–5.3)
PROT SERPL-MCNC: 5.4 G/DL — LOW (ref 6–8.3)
PROT SERPL-MCNC: 5.5 G/DL — LOW (ref 6–8.3)
PROT SERPL-MCNC: 5.6 G/DL — LOW (ref 6–8.3)
PROT SERPL-MCNC: 5.7 G/DL — LOW (ref 6–8.3)
PROT SERPL-MCNC: 6 G/DL — SIGNIFICANT CHANGE UP (ref 6–8.3)
PROT SERPL-MCNC: 6.3 G/DL — SIGNIFICANT CHANGE UP (ref 6–8.3)
PROT SERPL-MCNC: 6.4 G/DL — SIGNIFICANT CHANGE UP (ref 6–8.3)
PROT SERPL-MCNC: 6.4 G/DL — SIGNIFICANT CHANGE UP (ref 6–8.3)
PROT SERPL-MCNC: 6.5 G/DL — SIGNIFICANT CHANGE UP (ref 6–8.3)
PROT SERPL-MCNC: 6.6 G/DL — SIGNIFICANT CHANGE UP (ref 6–8.3)
PROT SERPL-MCNC: 6.6 G/DL — SIGNIFICANT CHANGE UP (ref 6–8.3)
PROT SERPL-MCNC: 6.7 G/DL — SIGNIFICANT CHANGE UP (ref 6–8.3)
PROT SERPL-MCNC: 6.8 G/DL — SIGNIFICANT CHANGE UP (ref 6–8.3)
PROT SERPL-MCNC: 6.9 G/DL — SIGNIFICANT CHANGE UP (ref 6–8.3)
PROT SERPL-MCNC: 7 G/DL — SIGNIFICANT CHANGE UP (ref 6–8.3)
PROT SERPL-MCNC: 7.1 G/DL — SIGNIFICANT CHANGE UP (ref 6–8.3)
PROT SERPL-MCNC: 7.2 G/DL — SIGNIFICANT CHANGE UP (ref 6–8.3)
PROT SERPL-MCNC: 7.4 G/DL — SIGNIFICANT CHANGE UP (ref 6–8.3)
PROT SERPL-MCNC: 7.5 G/DL — SIGNIFICANT CHANGE UP (ref 6–8.3)
PROT UR-MCNC: 100 MG/DL
PROT UR-MCNC: 100 MG/DL
PROT UR-MCNC: 30 MG/DL
PROT UR-MCNC: 30 MG/DL
PROTHROM AB SERPL-ACNC: 12.6 SEC — SIGNIFICANT CHANGE UP (ref 10.5–13.4)
PROTHROM AB SERPL-ACNC: 14.8 SEC — HIGH (ref 10.5–13.4)
RAPID RVP RESULT: SIGNIFICANT CHANGE UP
RAPID RVP RESULT: SIGNIFICANT CHANGE UP
RBC # BLD: 3.27 M/UL — LOW (ref 4.2–5.8)
RBC # BLD: 3.38 M/UL — LOW (ref 4.2–5.8)
RBC # BLD: 3.43 M/UL — LOW (ref 4.2–5.8)
RBC # BLD: 3.48 M/UL — LOW (ref 4.2–5.8)
RBC # BLD: 3.56 M/UL — LOW (ref 4.2–5.8)
RBC # BLD: 3.59 M/UL — LOW (ref 4.2–5.8)
RBC # BLD: 3.61 M/UL — LOW (ref 4.2–5.8)
RBC # BLD: 3.66 M/UL — LOW (ref 4.2–5.8)
RBC # BLD: 3.79 M/UL — LOW (ref 4.2–5.8)
RBC # BLD: 3.81 M/UL — LOW (ref 4.2–5.8)
RBC # BLD: 3.91 M/UL — LOW (ref 4.2–5.8)
RBC # BLD: 3.93 M/UL — LOW (ref 4.2–5.8)
RBC # BLD: 3.95 M/UL — LOW (ref 4.2–5.8)
RBC # BLD: 4.03 M/UL — LOW (ref 4.2–5.8)
RBC # BLD: 4.04 M/UL — LOW (ref 4.2–5.8)
RBC # BLD: 4.11 M/UL — LOW (ref 4.2–5.8)
RBC # BLD: 4.25 M/UL — SIGNIFICANT CHANGE UP (ref 4.2–5.8)
RBC # BLD: 4.26 M/UL — SIGNIFICANT CHANGE UP (ref 4.2–5.8)
RBC # BLD: 4.28 M/UL — SIGNIFICANT CHANGE UP (ref 4.2–5.8)
RBC # BLD: 4.28 M/UL — SIGNIFICANT CHANGE UP (ref 4.2–5.8)
RBC # BLD: 4.36 M/UL — SIGNIFICANT CHANGE UP (ref 4.2–5.8)
RBC # BLD: 4.62 M/UL — SIGNIFICANT CHANGE UP (ref 4.2–5.8)
RBC # BLD: 4.64 M/UL — SIGNIFICANT CHANGE UP (ref 4.2–5.8)
RBC # BLD: 4.67 M/UL — SIGNIFICANT CHANGE UP (ref 4.2–5.8)
RBC # BLD: 4.72 M/UL — SIGNIFICANT CHANGE UP (ref 4.2–5.8)
RBC # BLD: 4.73 M/UL — SIGNIFICANT CHANGE UP (ref 4.2–5.8)
RBC # BLD: 4.75 M/UL — SIGNIFICANT CHANGE UP (ref 4.2–5.8)
RBC # BLD: 4.78 M/UL — SIGNIFICANT CHANGE UP (ref 4.2–5.8)
RBC # BLD: 4.82 M/UL — SIGNIFICANT CHANGE UP (ref 4.2–5.8)
RBC # BLD: 4.89 M/UL — SIGNIFICANT CHANGE UP (ref 4.2–5.8)
RBC # BLD: 4.91 M/UL — SIGNIFICANT CHANGE UP (ref 4.2–5.8)
RBC # BLD: 4.93 M/UL — SIGNIFICANT CHANGE UP (ref 4.2–5.8)
RBC # BLD: 5.05 M/UL — SIGNIFICANT CHANGE UP (ref 4.2–5.8)
RBC # BLD: 5.09 M/UL — SIGNIFICANT CHANGE UP (ref 4.2–5.8)
RBC # BLD: 5.11 M/UL — SIGNIFICANT CHANGE UP (ref 4.2–5.8)
RBC # BLD: 5.13 M/UL — SIGNIFICANT CHANGE UP (ref 4.2–5.8)
RBC # BLD: 5.19 M/UL — SIGNIFICANT CHANGE UP (ref 4.2–5.8)
RBC # BLD: 5.27 M/UL — SIGNIFICANT CHANGE UP (ref 4.2–5.8)
RBC # BLD: 5.28 M/UL — SIGNIFICANT CHANGE UP (ref 4.2–5.8)
RBC # FLD: 13.5 % — SIGNIFICANT CHANGE UP (ref 10.3–14.5)
RBC # FLD: 13.6 % — SIGNIFICANT CHANGE UP (ref 10.3–14.5)
RBC # FLD: 13.8 % — SIGNIFICANT CHANGE UP (ref 10.3–14.5)
RBC # FLD: 13.8 % — SIGNIFICANT CHANGE UP (ref 10.3–14.5)
RBC # FLD: 14 % — SIGNIFICANT CHANGE UP (ref 10.3–14.5)
RBC # FLD: 14.1 % — SIGNIFICANT CHANGE UP (ref 10.3–14.5)
RBC # FLD: 14.2 % — SIGNIFICANT CHANGE UP (ref 10.3–14.5)
RBC # FLD: 14.3 % — SIGNIFICANT CHANGE UP (ref 10.3–14.5)
RBC # FLD: 14.6 % — HIGH (ref 10.3–14.5)
RBC # FLD: 14.6 % — HIGH (ref 10.3–14.5)
RBC # FLD: 14.7 % — HIGH (ref 10.3–14.5)
RBC # FLD: 14.9 % — HIGH (ref 10.3–14.5)
RBC # FLD: 15 % — HIGH (ref 10.3–14.5)
RBC # FLD: 15.1 % — HIGH (ref 10.3–14.5)
RBC # FLD: 15.2 % — HIGH (ref 10.3–14.5)
RBC # FLD: 15.3 % — HIGH (ref 10.3–14.5)
RBC # FLD: 15.3 % — HIGH (ref 10.3–14.5)
RBC # FLD: 15.5 % — HIGH (ref 10.3–14.5)
RBC # FLD: 15.6 % — HIGH (ref 10.3–14.5)
RBC # FLD: 15.6 % — HIGH (ref 10.3–14.5)
RBC # FLD: 15.7 % — HIGH (ref 10.3–14.5)
RBC # FLD: 15.8 % — HIGH (ref 10.3–14.5)
RBC # FLD: 15.8 % — HIGH (ref 10.3–14.5)
RBC # FLD: 16.1 % — HIGH (ref 10.3–14.5)
RBC # FLD: 16.3 % — HIGH (ref 10.3–14.5)
RBC # FLD: 16.3 % — HIGH (ref 10.3–14.5)
RBC # FLD: 16.6 % — HIGH (ref 10.3–14.5)
RBC # FLD: 17 % — HIGH (ref 10.3–14.5)
RBC # FLD: 17 % — HIGH (ref 10.3–14.5)
RBC # FLD: 17.2 % — HIGH (ref 10.3–14.5)
RBC # FLD: 17.3 % — HIGH (ref 10.3–14.5)
RBC # FLD: 17.4 % — HIGH (ref 10.3–14.5)
RBC # FLD: 17.5 % — HIGH (ref 10.3–14.5)
RBC BLD AUTO: ABNORMAL
RBC BLD AUTO: ABNORMAL
RBC CASTS # UR COMP ASSIST: ABNORMAL /HPF (ref 0–2)
RBC CASTS # UR COMP ASSIST: SIGNIFICANT CHANGE UP /HPF (ref 0–2)
RBC CASTS # UR COMP ASSIST: SIGNIFICANT CHANGE UP /HPF (ref 0–2)
SAO2 % BLDA: 100 % — SIGNIFICANT CHANGE UP
SAO2 % BLDV: 42 % — SIGNIFICANT CHANGE UP
SARS-COV-2 RNA SPEC QL NAA+PROBE: SIGNIFICANT CHANGE UP
SODIUM SERPL-SCNC: 133 MMOL/L — LOW (ref 135–145)
SODIUM SERPL-SCNC: 134 MMOL/L — LOW (ref 135–145)
SODIUM SERPL-SCNC: 137 MMOL/L — SIGNIFICANT CHANGE UP (ref 135–145)
SODIUM SERPL-SCNC: 137 MMOL/L — SIGNIFICANT CHANGE UP (ref 135–145)
SODIUM SERPL-SCNC: 139 MMOL/L — SIGNIFICANT CHANGE UP (ref 135–145)
SODIUM SERPL-SCNC: 139 MMOL/L — SIGNIFICANT CHANGE UP (ref 135–145)
SODIUM SERPL-SCNC: 140 MMOL/L — SIGNIFICANT CHANGE UP (ref 135–145)
SODIUM SERPL-SCNC: 140 MMOL/L — SIGNIFICANT CHANGE UP (ref 135–145)
SODIUM SERPL-SCNC: 141 MMOL/L — SIGNIFICANT CHANGE UP (ref 135–145)
SODIUM SERPL-SCNC: 142 MMOL/L — SIGNIFICANT CHANGE UP (ref 135–145)
SODIUM SERPL-SCNC: 143 MMOL/L — SIGNIFICANT CHANGE UP (ref 135–145)
SODIUM SERPL-SCNC: 144 MMOL/L — SIGNIFICANT CHANGE UP (ref 135–145)
SODIUM SERPL-SCNC: 145 MMOL/L — SIGNIFICANT CHANGE UP (ref 135–145)
SODIUM SERPL-SCNC: 146 MMOL/L — HIGH (ref 135–145)
SODIUM SERPL-SCNC: 147 MMOL/L — HIGH (ref 135–145)
SODIUM SERPL-SCNC: 147 MMOL/L — HIGH (ref 135–145)
SODIUM SERPL-SCNC: 148 MMOL/L — HIGH (ref 135–145)
SODIUM SERPL-SCNC: 149 MMOL/L — HIGH (ref 135–145)
SODIUM UR-SCNC: 41 MMOL/L — SIGNIFICANT CHANGE UP
SODIUM UR-SCNC: 47 MMOL/L — SIGNIFICANT CHANGE UP
SODIUM UR-SCNC: 9 MMOL/L — SIGNIFICANT CHANGE UP
SP GR SPEC: 1.01 — SIGNIFICANT CHANGE UP (ref 1.01–1.02)
SP GR SPEC: 1.02 — SIGNIFICANT CHANGE UP (ref 1.01–1.02)
SPECIMEN SOURCE: SIGNIFICANT CHANGE UP
TRIGL SERPL-MCNC: 71 MG/DL — SIGNIFICANT CHANGE UP
TROPONIN I, HIGH SENSITIVITY RESULT: 104.6 NG/L — HIGH
TROPONIN I, HIGH SENSITIVITY RESULT: 122.5 NG/L — HIGH
TROPONIN I, HIGH SENSITIVITY RESULT: 98.3 NG/L — HIGH
TSH SERPL-MCNC: 0.98 UU/ML — SIGNIFICANT CHANGE UP (ref 0.34–4.82)
UROBILINOGEN FLD QL: 12 MG/DL
UROBILINOGEN FLD QL: 4 MG/DL
UUN UR-MCNC: 533 MG/DL — SIGNIFICANT CHANGE UP
UUN UR-MCNC: 691 MG/DL — SIGNIFICANT CHANGE UP
UUN UR-MCNC: 791 MG/DL — SIGNIFICANT CHANGE UP
UUN UR-MCNC: 900 MG/DL — SIGNIFICANT CHANGE UP
VALPROATE SERPL-MCNC: 39 UG/ML — LOW (ref 50–100)
VALPROATE SERPL-MCNC: 44 UG/ML — LOW (ref 50–100)
WBC # BLD: 10.24 K/UL — SIGNIFICANT CHANGE UP (ref 3.8–10.5)
WBC # BLD: 10.69 K/UL — HIGH (ref 3.8–10.5)
WBC # BLD: 11.73 K/UL — HIGH (ref 3.8–10.5)
WBC # BLD: 12.66 K/UL — HIGH (ref 3.8–10.5)
WBC # BLD: 13.06 K/UL — HIGH (ref 3.8–10.5)
WBC # BLD: 16.04 K/UL — HIGH (ref 3.8–10.5)
WBC # BLD: 5.03 K/UL — SIGNIFICANT CHANGE UP (ref 3.8–10.5)
WBC # BLD: 5.44 K/UL — SIGNIFICANT CHANGE UP (ref 3.8–10.5)
WBC # BLD: 5.99 K/UL — SIGNIFICANT CHANGE UP (ref 3.8–10.5)
WBC # BLD: 6.06 K/UL — SIGNIFICANT CHANGE UP (ref 3.8–10.5)
WBC # BLD: 6.11 K/UL — SIGNIFICANT CHANGE UP (ref 3.8–10.5)
WBC # BLD: 6.23 K/UL — SIGNIFICANT CHANGE UP (ref 3.8–10.5)
WBC # BLD: 6.33 K/UL — SIGNIFICANT CHANGE UP (ref 3.8–10.5)
WBC # BLD: 6.43 K/UL — SIGNIFICANT CHANGE UP (ref 3.8–10.5)
WBC # BLD: 6.5 K/UL — SIGNIFICANT CHANGE UP (ref 3.8–10.5)
WBC # BLD: 6.82 K/UL — SIGNIFICANT CHANGE UP (ref 3.8–10.5)
WBC # BLD: 6.99 K/UL — SIGNIFICANT CHANGE UP (ref 3.8–10.5)
WBC # BLD: 7.08 K/UL — SIGNIFICANT CHANGE UP (ref 3.8–10.5)
WBC # BLD: 7.34 K/UL — SIGNIFICANT CHANGE UP (ref 3.8–10.5)
WBC # BLD: 7.43 K/UL — SIGNIFICANT CHANGE UP (ref 3.8–10.5)
WBC # BLD: 7.45 K/UL — SIGNIFICANT CHANGE UP (ref 3.8–10.5)
WBC # BLD: 7.53 K/UL — SIGNIFICANT CHANGE UP (ref 3.8–10.5)
WBC # BLD: 7.75 K/UL — SIGNIFICANT CHANGE UP (ref 3.8–10.5)
WBC # BLD: 7.79 K/UL — SIGNIFICANT CHANGE UP (ref 3.8–10.5)
WBC # BLD: 7.83 K/UL — SIGNIFICANT CHANGE UP (ref 3.8–10.5)
WBC # BLD: 7.83 K/UL — SIGNIFICANT CHANGE UP (ref 3.8–10.5)
WBC # BLD: 7.96 K/UL — SIGNIFICANT CHANGE UP (ref 3.8–10.5)
WBC # BLD: 7.98 K/UL — SIGNIFICANT CHANGE UP (ref 3.8–10.5)
WBC # BLD: 8.13 K/UL — SIGNIFICANT CHANGE UP (ref 3.8–10.5)
WBC # BLD: 8.17 K/UL — SIGNIFICANT CHANGE UP (ref 3.8–10.5)
WBC # BLD: 8.25 K/UL — SIGNIFICANT CHANGE UP (ref 3.8–10.5)
WBC # BLD: 8.26 K/UL — SIGNIFICANT CHANGE UP (ref 3.8–10.5)
WBC # BLD: 8.29 K/UL — SIGNIFICANT CHANGE UP (ref 3.8–10.5)
WBC # BLD: 8.71 K/UL — SIGNIFICANT CHANGE UP (ref 3.8–10.5)
WBC # BLD: 8.87 K/UL — SIGNIFICANT CHANGE UP (ref 3.8–10.5)
WBC # BLD: 9.4 K/UL — SIGNIFICANT CHANGE UP (ref 3.8–10.5)
WBC # BLD: 9.57 K/UL — SIGNIFICANT CHANGE UP (ref 3.8–10.5)
WBC # BLD: 9.96 K/UL — SIGNIFICANT CHANGE UP (ref 3.8–10.5)
WBC # BLD: 9.99 K/UL — SIGNIFICANT CHANGE UP (ref 3.8–10.5)
WBC # FLD AUTO: 10.24 K/UL — SIGNIFICANT CHANGE UP (ref 3.8–10.5)
WBC # FLD AUTO: 10.69 K/UL — HIGH (ref 3.8–10.5)
WBC # FLD AUTO: 11.73 K/UL — HIGH (ref 3.8–10.5)
WBC # FLD AUTO: 12.66 K/UL — HIGH (ref 3.8–10.5)
WBC # FLD AUTO: 13.06 K/UL — HIGH (ref 3.8–10.5)
WBC # FLD AUTO: 16.04 K/UL — HIGH (ref 3.8–10.5)
WBC # FLD AUTO: 5.03 K/UL — SIGNIFICANT CHANGE UP (ref 3.8–10.5)
WBC # FLD AUTO: 5.44 K/UL — SIGNIFICANT CHANGE UP (ref 3.8–10.5)
WBC # FLD AUTO: 5.99 K/UL — SIGNIFICANT CHANGE UP (ref 3.8–10.5)
WBC # FLD AUTO: 6.06 K/UL — SIGNIFICANT CHANGE UP (ref 3.8–10.5)
WBC # FLD AUTO: 6.11 K/UL — SIGNIFICANT CHANGE UP (ref 3.8–10.5)
WBC # FLD AUTO: 6.23 K/UL — SIGNIFICANT CHANGE UP (ref 3.8–10.5)
WBC # FLD AUTO: 6.33 K/UL — SIGNIFICANT CHANGE UP (ref 3.8–10.5)
WBC # FLD AUTO: 6.43 K/UL — SIGNIFICANT CHANGE UP (ref 3.8–10.5)
WBC # FLD AUTO: 6.5 K/UL — SIGNIFICANT CHANGE UP (ref 3.8–10.5)
WBC # FLD AUTO: 6.82 K/UL — SIGNIFICANT CHANGE UP (ref 3.8–10.5)
WBC # FLD AUTO: 6.99 K/UL — SIGNIFICANT CHANGE UP (ref 3.8–10.5)
WBC # FLD AUTO: 7.08 K/UL — SIGNIFICANT CHANGE UP (ref 3.8–10.5)
WBC # FLD AUTO: 7.34 K/UL — SIGNIFICANT CHANGE UP (ref 3.8–10.5)
WBC # FLD AUTO: 7.43 K/UL — SIGNIFICANT CHANGE UP (ref 3.8–10.5)
WBC # FLD AUTO: 7.45 K/UL — SIGNIFICANT CHANGE UP (ref 3.8–10.5)
WBC # FLD AUTO: 7.53 K/UL — SIGNIFICANT CHANGE UP (ref 3.8–10.5)
WBC # FLD AUTO: 7.75 K/UL — SIGNIFICANT CHANGE UP (ref 3.8–10.5)
WBC # FLD AUTO: 7.79 K/UL — SIGNIFICANT CHANGE UP (ref 3.8–10.5)
WBC # FLD AUTO: 7.83 K/UL — SIGNIFICANT CHANGE UP (ref 3.8–10.5)
WBC # FLD AUTO: 7.83 K/UL — SIGNIFICANT CHANGE UP (ref 3.8–10.5)
WBC # FLD AUTO: 7.96 K/UL — SIGNIFICANT CHANGE UP (ref 3.8–10.5)
WBC # FLD AUTO: 7.98 K/UL — SIGNIFICANT CHANGE UP (ref 3.8–10.5)
WBC # FLD AUTO: 8.13 K/UL — SIGNIFICANT CHANGE UP (ref 3.8–10.5)
WBC # FLD AUTO: 8.17 K/UL — SIGNIFICANT CHANGE UP (ref 3.8–10.5)
WBC # FLD AUTO: 8.25 K/UL — SIGNIFICANT CHANGE UP (ref 3.8–10.5)
WBC # FLD AUTO: 8.26 K/UL — SIGNIFICANT CHANGE UP (ref 3.8–10.5)
WBC # FLD AUTO: 8.29 K/UL — SIGNIFICANT CHANGE UP (ref 3.8–10.5)
WBC # FLD AUTO: 8.71 K/UL — SIGNIFICANT CHANGE UP (ref 3.8–10.5)
WBC # FLD AUTO: 8.87 K/UL — SIGNIFICANT CHANGE UP (ref 3.8–10.5)
WBC # FLD AUTO: 9.4 K/UL — SIGNIFICANT CHANGE UP (ref 3.8–10.5)
WBC # FLD AUTO: 9.57 K/UL — SIGNIFICANT CHANGE UP (ref 3.8–10.5)
WBC # FLD AUTO: 9.96 K/UL — SIGNIFICANT CHANGE UP (ref 3.8–10.5)
WBC # FLD AUTO: 9.99 K/UL — SIGNIFICANT CHANGE UP (ref 3.8–10.5)
WBC UR QL: >50 /HPF (ref 0–5)
WBC UR QL: SIGNIFICANT CHANGE UP /HPF (ref 0–5)
WBC UR QL: SIGNIFICANT CHANGE UP /HPF (ref 0–5)

## 2023-01-01 PROCEDURE — 97110 THERAPEUTIC EXERCISES: CPT

## 2023-01-01 PROCEDURE — 94640 AIRWAY INHALATION TREATMENT: CPT

## 2023-01-01 PROCEDURE — 84132 ASSAY OF SERUM POTASSIUM: CPT

## 2023-01-01 PROCEDURE — 36415 COLL VENOUS BLD VENIPUNCTURE: CPT

## 2023-01-01 PROCEDURE — 99233 SBSQ HOSP IP/OBS HIGH 50: CPT

## 2023-01-01 PROCEDURE — 99222 1ST HOSP IP/OBS MODERATE 55: CPT

## 2023-01-01 PROCEDURE — 99233 SBSQ HOSP IP/OBS HIGH 50: CPT | Mod: GC

## 2023-01-01 PROCEDURE — 82570 ASSAY OF URINE CREATININE: CPT

## 2023-01-01 PROCEDURE — 82436 ASSAY OF URINE CHLORIDE: CPT

## 2023-01-01 PROCEDURE — 83605 ASSAY OF LACTIC ACID: CPT

## 2023-01-01 PROCEDURE — 84443 ASSAY THYROID STIM HORMONE: CPT

## 2023-01-01 PROCEDURE — 71250 CT THORAX DX C-: CPT

## 2023-01-01 PROCEDURE — 82962 GLUCOSE BLOOD TEST: CPT

## 2023-01-01 PROCEDURE — 99285 EMERGENCY DEPT VISIT HI MDM: CPT

## 2023-01-01 PROCEDURE — 99232 SBSQ HOSP IP/OBS MODERATE 35: CPT

## 2023-01-01 PROCEDURE — 99232 SBSQ HOSP IP/OBS MODERATE 35: CPT | Mod: GC

## 2023-01-01 PROCEDURE — 96374 THER/PROPH/DIAG INJ IV PUSH: CPT

## 2023-01-01 PROCEDURE — 71045 X-RAY EXAM CHEST 1 VIEW: CPT | Mod: 26

## 2023-01-01 PROCEDURE — 85025 COMPLETE CBC W/AUTO DIFF WBC: CPT

## 2023-01-01 PROCEDURE — 87635 SARS-COV-2 COVID-19 AMP PRB: CPT

## 2023-01-01 PROCEDURE — 83735 ASSAY OF MAGNESIUM: CPT

## 2023-01-01 PROCEDURE — 80053 COMPREHEN METABOLIC PANEL: CPT

## 2023-01-01 PROCEDURE — 84300 ASSAY OF URINE SODIUM: CPT

## 2023-01-01 PROCEDURE — 82803 BLOOD GASES ANY COMBINATION: CPT

## 2023-01-01 PROCEDURE — 84540 ASSAY OF URINE/UREA-N: CPT

## 2023-01-01 PROCEDURE — 99285 EMERGENCY DEPT VISIT HI MDM: CPT | Mod: 25

## 2023-01-01 PROCEDURE — 93010 ELECTROCARDIOGRAM REPORT: CPT

## 2023-01-01 PROCEDURE — 76770 US EXAM ABDO BACK WALL COMP: CPT | Mod: 26

## 2023-01-01 PROCEDURE — 81001 URINALYSIS AUTO W/SCOPE: CPT

## 2023-01-01 PROCEDURE — 85730 THROMBOPLASTIN TIME PARTIAL: CPT

## 2023-01-01 PROCEDURE — 84484 ASSAY OF TROPONIN QUANT: CPT

## 2023-01-01 PROCEDURE — P9047: CPT

## 2023-01-01 PROCEDURE — 99223 1ST HOSP IP/OBS HIGH 75: CPT | Mod: GC

## 2023-01-01 PROCEDURE — 93005 ELECTROCARDIOGRAM TRACING: CPT

## 2023-01-01 PROCEDURE — 71250 CT THORAX DX C-: CPT | Mod: 26

## 2023-01-01 PROCEDURE — 99284 EMERGENCY DEPT VISIT MOD MDM: CPT | Mod: 25

## 2023-01-01 PROCEDURE — 80048 BASIC METABOLIC PNL TOTAL CA: CPT

## 2023-01-01 PROCEDURE — 80164 ASSAY DIPROPYLACETIC ACD TOT: CPT

## 2023-01-01 PROCEDURE — 85027 COMPLETE CBC AUTOMATED: CPT

## 2023-01-01 PROCEDURE — 76775 US EXAM ABDO BACK WALL LIM: CPT | Mod: 26

## 2023-01-01 PROCEDURE — 83880 ASSAY OF NATRIURETIC PEPTIDE: CPT

## 2023-01-01 PROCEDURE — 70490 CT SOFT TISSUE NECK W/O DYE: CPT | Mod: 26

## 2023-01-01 PROCEDURE — 87086 URINE CULTURE/COLONY COUNT: CPT

## 2023-01-01 PROCEDURE — 99223 1ST HOSP IP/OBS HIGH 75: CPT

## 2023-01-01 PROCEDURE — 84100 ASSAY OF PHOSPHORUS: CPT

## 2023-01-01 PROCEDURE — 85610 PROTHROMBIN TIME: CPT

## 2023-01-01 PROCEDURE — 71045 X-RAY EXAM CHEST 1 VIEW: CPT

## 2023-01-01 PROCEDURE — 87070 CULTURE OTHR SPECIMN AEROBIC: CPT

## 2023-01-01 PROCEDURE — G0316 PROLONG INPT EVAL ADD15 M: CPT

## 2023-01-01 PROCEDURE — 84295 ASSAY OF SERUM SODIUM: CPT

## 2023-01-01 PROCEDURE — 87186 SC STD MICRODIL/AGAR DIL: CPT

## 2023-01-01 PROCEDURE — 83690 ASSAY OF LIPASE: CPT

## 2023-01-01 PROCEDURE — 51702 INSERT TEMP BLADDER CATH: CPT

## 2023-01-01 PROCEDURE — 76775 US EXAM ABDO BACK WALL LIM: CPT

## 2023-01-01 PROCEDURE — 82330 ASSAY OF CALCIUM: CPT

## 2023-01-01 PROCEDURE — 97116 GAIT TRAINING THERAPY: CPT

## 2023-01-01 PROCEDURE — 76770 US EXAM ABDO BACK WALL COMP: CPT

## 2023-01-01 PROCEDURE — 99239 HOSP IP/OBS DSCHRG MGMT >30: CPT

## 2023-01-01 PROCEDURE — 87040 BLOOD CULTURE FOR BACTERIA: CPT

## 2023-01-01 PROCEDURE — 70490 CT SOFT TISSUE NECK W/O DYE: CPT

## 2023-01-01 PROCEDURE — 70450 CT HEAD/BRAIN W/O DYE: CPT | Mod: MA

## 2023-01-01 PROCEDURE — 80061 LIPID PANEL: CPT

## 2023-01-01 PROCEDURE — 70450 CT HEAD/BRAIN W/O DYE: CPT | Mod: 26,MA

## 2023-01-01 PROCEDURE — 99223 1ST HOSP IP/OBS HIGH 75: CPT | Mod: 25

## 2023-01-01 PROCEDURE — 82550 ASSAY OF CK (CPK): CPT

## 2023-01-01 PROCEDURE — 0225U NFCT DS DNA&RNA 21 SARSCOV2: CPT

## 2023-01-01 PROCEDURE — 97161 PT EVAL LOW COMPLEX 20 MIN: CPT

## 2023-01-01 PROCEDURE — 99223 1ST HOSP IP/OBS HIGH 75: CPT | Mod: GC,AI

## 2023-01-01 PROCEDURE — 99284 EMERGENCY DEPT VISIT MOD MDM: CPT

## 2023-01-01 PROCEDURE — 82553 CREATINE MB FRACTION: CPT

## 2023-01-01 PROCEDURE — 93306 TTE W/DOPPLER COMPLETE: CPT

## 2023-01-01 PROCEDURE — 83036 HEMOGLOBIN GLYCOSYLATED A1C: CPT

## 2023-01-01 PROCEDURE — 99285 EMERGENCY DEPT VISIT HI MDM: CPT | Mod: GC

## 2023-01-01 PROCEDURE — 86803 HEPATITIS C AB TEST: CPT

## 2023-01-01 PROCEDURE — 94760 N-INVAS EAR/PLS OXIMETRY 1: CPT

## 2023-01-01 RX ORDER — ATORVASTATIN CALCIUM 80 MG/1
40 TABLET, FILM COATED ORAL AT BEDTIME
Refills: 0 | Status: DISCONTINUED | OUTPATIENT
Start: 2023-01-01 | End: 2023-01-01

## 2023-01-01 RX ORDER — INSULIN LISPRO 100/ML
4 VIAL (ML) SUBCUTANEOUS
Refills: 0 | Status: DISCONTINUED | OUTPATIENT
Start: 2023-01-01 | End: 2023-01-01

## 2023-01-01 RX ORDER — INSULIN HUMAN 100 [IU]/ML
10 INJECTION, SOLUTION SUBCUTANEOUS ONCE
Refills: 0 | Status: COMPLETED | OUTPATIENT
Start: 2023-01-01 | End: 2023-01-01

## 2023-01-01 RX ORDER — METOPROLOL TARTRATE 50 MG
25 TABLET ORAL
Refills: 0 | Status: DISCONTINUED | OUTPATIENT
Start: 2023-01-01 | End: 2023-01-01

## 2023-01-01 RX ORDER — CEFTRIAXONE 500 MG/1
1000 INJECTION, POWDER, FOR SOLUTION INTRAMUSCULAR; INTRAVENOUS EVERY 24 HOURS
Refills: 0 | Status: DISCONTINUED | OUTPATIENT
Start: 2023-01-01 | End: 2023-01-01

## 2023-01-01 RX ORDER — SODIUM ZIRCONIUM CYCLOSILICATE 10 G/10G
10 POWDER, FOR SUSPENSION ORAL ONCE
Refills: 0 | Status: COMPLETED | OUTPATIENT
Start: 2023-01-01 | End: 2023-01-01

## 2023-01-01 RX ORDER — FLUTICASONE PROPIONATE 50 MCG
1 SPRAY, SUSPENSION NASAL
Qty: 30 | Refills: 0
Start: 2023-01-01

## 2023-01-01 RX ORDER — CEFPODOXIME PROXETIL 100 MG
1 TABLET ORAL
Qty: 4 | Refills: 0
Start: 2023-01-01 | End: 2023-01-01

## 2023-01-01 RX ORDER — FUROSEMIDE 40 MG
20 TABLET ORAL EVERY 12 HOURS
Refills: 0 | Status: DISCONTINUED | OUTPATIENT
Start: 2023-01-01 | End: 2023-01-01

## 2023-01-01 RX ORDER — OLANZAPINE 15 MG/1
10 TABLET, FILM COATED ORAL AT BEDTIME
Refills: 0 | Status: DISCONTINUED | OUTPATIENT
Start: 2023-01-01 | End: 2023-01-01

## 2023-01-01 RX ORDER — TAMSULOSIN HYDROCHLORIDE 0.4 MG/1
1 CAPSULE ORAL
Refills: 0 | DISCHARGE

## 2023-01-01 RX ORDER — VALPROIC ACID (AS SODIUM SALT) 250 MG/5ML
1000 SOLUTION, ORAL ORAL AT BEDTIME
Refills: 0 | Status: DISCONTINUED | OUTPATIENT
Start: 2023-01-01 | End: 2023-01-01

## 2023-01-01 RX ORDER — LANOLIN ALCOHOL/MO/W.PET/CERES
5 CREAM (GRAM) TOPICAL AT BEDTIME
Refills: 0 | Status: DISCONTINUED | OUTPATIENT
Start: 2023-01-01 | End: 2023-01-01

## 2023-01-01 RX ORDER — VALPROIC ACID (AS SODIUM SALT) 250 MG/5ML
250 SOLUTION, ORAL ORAL
Refills: 0 | Status: DISCONTINUED | OUTPATIENT
Start: 2023-01-01 | End: 2023-01-01

## 2023-01-01 RX ORDER — FUROSEMIDE 40 MG
40 TABLET ORAL EVERY 12 HOURS
Refills: 0 | Status: DISCONTINUED | OUTPATIENT
Start: 2023-01-01 | End: 2023-01-01

## 2023-01-01 RX ORDER — TAMSULOSIN HYDROCHLORIDE 0.4 MG/1
0.4 CAPSULE ORAL AT BEDTIME
Refills: 0 | Status: DISCONTINUED | OUTPATIENT
Start: 2023-01-01 | End: 2023-01-01

## 2023-01-01 RX ORDER — FINASTERIDE 5 MG/1
5 TABLET, FILM COATED ORAL DAILY
Refills: 0 | Status: DISCONTINUED | OUTPATIENT
Start: 2023-01-01 | End: 2023-01-01

## 2023-01-01 RX ORDER — VALPROIC ACID (AS SODIUM SALT) 250 MG/5ML
500 SOLUTION, ORAL ORAL AT BEDTIME
Refills: 0 | Status: DISCONTINUED | OUTPATIENT
Start: 2023-01-01 | End: 2023-01-01

## 2023-01-01 RX ORDER — AMANTADINE HCL 100 MG
100 CAPSULE ORAL
Refills: 0 | Status: DISCONTINUED | OUTPATIENT
Start: 2023-01-01 | End: 2023-01-01

## 2023-01-01 RX ORDER — VALPROIC ACID (AS SODIUM SALT) 250 MG/5ML
250 SOLUTION, ORAL ORAL AT BEDTIME
Refills: 0 | Status: DISCONTINUED | OUTPATIENT
Start: 2023-01-01 | End: 2023-01-01

## 2023-01-01 RX ORDER — FUROSEMIDE 40 MG
20 TABLET ORAL DAILY
Refills: 0 | Status: DISCONTINUED | OUTPATIENT
Start: 2023-01-01 | End: 2023-01-01

## 2023-01-01 RX ORDER — BUMETANIDE 0.25 MG/ML
1 INJECTION INTRAMUSCULAR; INTRAVENOUS
Qty: 0 | Refills: 0 | DISCHARGE
Start: 2023-01-01

## 2023-01-01 RX ORDER — ATORVASTATIN CALCIUM 80 MG/1
1 TABLET, FILM COATED ORAL
Refills: 0 | DISCHARGE

## 2023-01-01 RX ORDER — INSULIN GLARGINE 100 [IU]/ML
8 INJECTION, SOLUTION SUBCUTANEOUS AT BEDTIME
Refills: 0 | Status: DISCONTINUED | OUTPATIENT
Start: 2023-01-01 | End: 2023-01-01

## 2023-01-01 RX ORDER — HYDROMORPHONE HYDROCHLORIDE 2 MG/ML
0.3 INJECTION INTRAMUSCULAR; INTRAVENOUS; SUBCUTANEOUS EVERY 4 HOURS
Refills: 0 | Status: DISCONTINUED | OUTPATIENT
Start: 2023-01-01 | End: 2023-01-01

## 2023-01-01 RX ORDER — INSULIN HUMAN 100 [IU]/ML
5 INJECTION, SOLUTION SUBCUTANEOUS ONCE
Refills: 0 | Status: COMPLETED | OUTPATIENT
Start: 2023-01-01 | End: 2023-01-01

## 2023-01-01 RX ORDER — GLUCAGON INJECTION, SOLUTION 0.5 MG/.1ML
1 INJECTION, SOLUTION SUBCUTANEOUS ONCE
Refills: 0 | Status: DISCONTINUED | OUTPATIENT
Start: 2023-01-01 | End: 2023-01-01

## 2023-01-01 RX ORDER — ALBUMIN HUMAN 25 %
50 VIAL (ML) INTRAVENOUS EVERY 8 HOURS
Refills: 0 | Status: DISCONTINUED | OUTPATIENT
Start: 2023-01-01 | End: 2023-01-01

## 2023-01-01 RX ORDER — INSULIN LISPRO 100/ML
VIAL (ML) SUBCUTANEOUS AT BEDTIME
Refills: 0 | Status: DISCONTINUED | OUTPATIENT
Start: 2023-01-01 | End: 2023-01-01

## 2023-01-01 RX ORDER — HYDROMORPHONE HYDROCHLORIDE 2 MG/ML
0.5 INJECTION INTRAMUSCULAR; INTRAVENOUS; SUBCUTANEOUS
Refills: 0 | Status: DISCONTINUED | OUTPATIENT
Start: 2023-01-01 | End: 2023-01-01

## 2023-01-01 RX ORDER — SODIUM CHLORIDE 0.65 %
1 AEROSOL, SPRAY (ML) NASAL
Refills: 0 | DISCHARGE

## 2023-01-01 RX ORDER — ACETAMINOPHEN 500 MG
2 TABLET ORAL
Refills: 0 | DISCHARGE

## 2023-01-01 RX ORDER — HEPARIN SODIUM 5000 [USP'U]/ML
5000 INJECTION INTRAVENOUS; SUBCUTANEOUS EVERY 8 HOURS
Refills: 0 | Status: DISCONTINUED | OUTPATIENT
Start: 2023-01-01 | End: 2023-01-01

## 2023-01-01 RX ORDER — SODIUM CHLORIDE 9 MG/ML
1000 INJECTION, SOLUTION INTRAVENOUS
Refills: 0 | Status: DISCONTINUED | OUTPATIENT
Start: 2023-01-01 | End: 2023-01-01

## 2023-01-01 RX ORDER — HEPARIN SODIUM 5000 [USP'U]/ML
INJECTION INTRAVENOUS; SUBCUTANEOUS
Qty: 25000 | Refills: 0 | Status: DISCONTINUED | OUTPATIENT
Start: 2023-01-01 | End: 2023-01-01

## 2023-01-01 RX ORDER — MONTELUKAST 4 MG/1
10 TABLET, CHEWABLE ORAL AT BEDTIME
Refills: 0 | Status: DISCONTINUED | OUTPATIENT
Start: 2023-01-01 | End: 2023-01-01

## 2023-01-01 RX ORDER — ALBUMIN HUMAN 25 %
50 VIAL (ML) INTRAVENOUS EVERY 8 HOURS
Refills: 0 | Status: COMPLETED | OUTPATIENT
Start: 2023-01-01 | End: 2023-01-01

## 2023-01-01 RX ORDER — ISOSORBIDE MONONITRATE 60 MG/1
30 TABLET, EXTENDED RELEASE ORAL DAILY
Refills: 0 | Status: DISCONTINUED | OUTPATIENT
Start: 2023-01-01 | End: 2023-01-01

## 2023-01-01 RX ORDER — OXYCODONE HYDROCHLORIDE 5 MG/1
1 TABLET ORAL
Refills: 0 | DISCHARGE

## 2023-01-01 RX ORDER — SODIUM CHLORIDE 0.65 %
2 AEROSOL, SPRAY (ML) NASAL
Refills: 0 | DISCHARGE

## 2023-01-01 RX ORDER — SODIUM CHLORIDE 9 MG/ML
500 INJECTION INTRAMUSCULAR; INTRAVENOUS; SUBCUTANEOUS ONCE
Refills: 0 | Status: COMPLETED | OUTPATIENT
Start: 2023-01-01 | End: 2023-01-01

## 2023-01-01 RX ORDER — TIZANIDINE 4 MG/1
1 TABLET ORAL
Refills: 0 | DISCHARGE

## 2023-01-01 RX ORDER — LORATADINE 10 MG/1
10 TABLET ORAL DAILY
Refills: 0 | Status: DISCONTINUED | OUTPATIENT
Start: 2023-01-01 | End: 2023-01-01

## 2023-01-01 RX ORDER — INSULIN LISPRO 100/ML
3 VIAL (ML) SUBCUTANEOUS
Refills: 0 | Status: DISCONTINUED | OUTPATIENT
Start: 2023-01-01 | End: 2023-01-01

## 2023-01-01 RX ORDER — METOPROLOL TARTRATE 50 MG
50 TABLET ORAL DAILY
Refills: 0 | Status: DISCONTINUED | OUTPATIENT
Start: 2023-01-01 | End: 2023-01-01

## 2023-01-01 RX ORDER — INSULIN LISPRO 100/ML
VIAL (ML) SUBCUTANEOUS
Refills: 0 | Status: DISCONTINUED | OUTPATIENT
Start: 2023-01-01 | End: 2023-01-01

## 2023-01-01 RX ORDER — METOPROLOL TARTRATE 50 MG
25 TABLET ORAL ONCE
Refills: 0 | Status: COMPLETED | OUTPATIENT
Start: 2023-01-01 | End: 2023-01-01

## 2023-01-01 RX ORDER — HYDROMORPHONE HYDROCHLORIDE 2 MG/ML
1 INJECTION INTRAMUSCULAR; INTRAVENOUS; SUBCUTANEOUS ONCE
Refills: 0 | Status: DISCONTINUED | OUTPATIENT
Start: 2023-01-01 | End: 2023-01-01

## 2023-01-01 RX ORDER — ACETAMINOPHEN 500 MG
650 TABLET ORAL ONCE
Refills: 0 | Status: COMPLETED | OUTPATIENT
Start: 2023-01-01 | End: 2023-01-01

## 2023-01-01 RX ORDER — SODIUM CHLORIDE 9 MG/ML
4 INJECTION INTRAMUSCULAR; INTRAVENOUS; SUBCUTANEOUS EVERY 12 HOURS
Refills: 0 | Status: DISCONTINUED | OUTPATIENT
Start: 2023-01-01 | End: 2023-01-01

## 2023-01-01 RX ORDER — FUROSEMIDE 40 MG
40 TABLET ORAL DAILY
Refills: 0 | Status: DISCONTINUED | OUTPATIENT
Start: 2023-01-01 | End: 2023-01-01

## 2023-01-01 RX ORDER — VALPROIC ACID (AS SODIUM SALT) 250 MG/5ML
250 SOLUTION, ORAL ORAL DAILY
Refills: 0 | Status: DISCONTINUED | OUTPATIENT
Start: 2023-01-01 | End: 2023-01-01

## 2023-01-01 RX ORDER — SCOPALAMINE 1 MG/3D
1 PATCH, EXTENDED RELEASE TRANSDERMAL
Refills: 0 | Status: DISCONTINUED | OUTPATIENT
Start: 2023-01-01 | End: 2023-01-01

## 2023-01-01 RX ORDER — MONTELUKAST 4 MG/1
1 TABLET, CHEWABLE ORAL
Refills: 0 | DISCHARGE

## 2023-01-01 RX ORDER — IPRATROPIUM/ALBUTEROL SULFATE 18-103MCG
3 AEROSOL WITH ADAPTER (GRAM) INHALATION ONCE
Refills: 0 | Status: COMPLETED | OUTPATIENT
Start: 2023-01-01 | End: 2023-01-01

## 2023-01-01 RX ORDER — AMANTADINE HCL 100 MG
100 CAPSULE ORAL EVERY 12 HOURS
Refills: 0 | Status: DISCONTINUED | OUTPATIENT
Start: 2023-01-01 | End: 2023-01-01

## 2023-01-01 RX ORDER — FLUTICASONE PROPIONATE 50 MCG
1 SPRAY, SUSPENSION NASAL
Refills: 0 | Status: DISCONTINUED | OUTPATIENT
Start: 2023-01-01 | End: 2023-01-01

## 2023-01-01 RX ORDER — ALBUMIN HUMAN 25 %
50 VIAL (ML) INTRAVENOUS EVERY 6 HOURS
Refills: 0 | Status: COMPLETED | OUTPATIENT
Start: 2023-01-01 | End: 2023-01-01

## 2023-01-01 RX ORDER — HYDROMORPHONE HYDROCHLORIDE 2 MG/ML
0.3 INJECTION INTRAMUSCULAR; INTRAVENOUS; SUBCUTANEOUS ONCE
Refills: 0 | Status: DISCONTINUED | OUTPATIENT
Start: 2023-01-01 | End: 2023-01-01

## 2023-01-01 RX ORDER — IPRATROPIUM/ALBUTEROL SULFATE 18-103MCG
3 AEROSOL WITH ADAPTER (GRAM) INHALATION EVERY 6 HOURS
Refills: 0 | Status: DISCONTINUED | OUTPATIENT
Start: 2023-01-01 | End: 2023-01-01

## 2023-01-01 RX ORDER — IPRATROPIUM/ALBUTEROL SULFATE 18-103MCG
3 AEROSOL WITH ADAPTER (GRAM) INHALATION
Qty: 30 | Refills: 0
Start: 2023-01-01

## 2023-01-01 RX ORDER — HYDRALAZINE HCL 50 MG
1 TABLET ORAL
Qty: 0 | Refills: 0 | DISCHARGE
Start: 2023-01-01

## 2023-01-01 RX ORDER — EMPAGLIFLOZIN 10 MG/1
1 TABLET, FILM COATED ORAL
Refills: 0 | DISCHARGE

## 2023-01-01 RX ORDER — CLONAZEPAM 1 MG
0.5 TABLET ORAL AT BEDTIME
Refills: 0 | Status: DISCONTINUED | OUTPATIENT
Start: 2023-01-01 | End: 2023-01-01

## 2023-01-01 RX ORDER — SODIUM ZIRCONIUM CYCLOSILICATE 10 G/10G
10 POWDER, FOR SUSPENSION ORAL ONCE
Refills: 0 | Status: DISCONTINUED | OUTPATIENT
Start: 2023-01-01 | End: 2023-01-01

## 2023-01-01 RX ORDER — METOPROLOL TARTRATE 50 MG
1 TABLET ORAL
Refills: 0 | DISCHARGE

## 2023-01-01 RX ORDER — DEXTROSE 50 % IN WATER 50 %
25 SYRINGE (ML) INTRAVENOUS ONCE
Refills: 0 | Status: DISCONTINUED | OUTPATIENT
Start: 2023-01-01 | End: 2023-01-01

## 2023-01-01 RX ORDER — INSULIN LISPRO 100/ML
6 VIAL (ML) SUBCUTANEOUS
Refills: 0 | Status: DISCONTINUED | OUTPATIENT
Start: 2023-01-01 | End: 2023-01-01

## 2023-01-01 RX ORDER — ACETAMINOPHEN 500 MG
650 TABLET ORAL EVERY 6 HOURS
Refills: 0 | Status: DISCONTINUED | OUTPATIENT
Start: 2023-01-01 | End: 2023-01-01

## 2023-01-01 RX ORDER — ASPIRIN/CALCIUM CARB/MAGNESIUM 324 MG
81 TABLET ORAL DAILY
Refills: 0 | Status: DISCONTINUED | OUTPATIENT
Start: 2023-01-01 | End: 2023-01-01

## 2023-01-01 RX ORDER — HYDRALAZINE HCL 50 MG
1 TABLET ORAL
Refills: 0 | DISCHARGE

## 2023-01-01 RX ORDER — METOPROLOL TARTRATE 50 MG
25 TABLET ORAL DAILY
Refills: 0 | Status: DISCONTINUED | OUTPATIENT
Start: 2023-01-01 | End: 2023-01-01

## 2023-01-01 RX ORDER — MEMANTINE HYDROCHLORIDE 10 MG/1
1 TABLET ORAL
Refills: 0 | DISCHARGE

## 2023-01-01 RX ORDER — ALBUTEROL 90 UG/1
2 AEROSOL, METERED ORAL EVERY 6 HOURS
Refills: 0 | Status: DISCONTINUED | OUTPATIENT
Start: 2023-01-01 | End: 2023-01-01

## 2023-01-01 RX ORDER — OXYCODONE HYDROCHLORIDE 5 MG/1
5 TABLET ORAL EVERY 6 HOURS
Refills: 0 | Status: DISCONTINUED | OUTPATIENT
Start: 2023-01-01 | End: 2023-01-01

## 2023-01-01 RX ORDER — VALPROIC ACID (AS SODIUM SALT) 250 MG/5ML
5 SOLUTION, ORAL ORAL
Refills: 0 | DISCHARGE

## 2023-01-01 RX ORDER — VALPROIC ACID (AS SODIUM SALT) 250 MG/5ML
250 SOLUTION, ORAL ORAL EVERY 12 HOURS
Refills: 0 | Status: DISCONTINUED | OUTPATIENT
Start: 2023-01-01 | End: 2023-01-01

## 2023-01-01 RX ORDER — DEXTROSE 50 % IN WATER 50 %
50 SYRINGE (ML) INTRAVENOUS ONCE
Refills: 0 | Status: COMPLETED | OUTPATIENT
Start: 2023-01-01 | End: 2023-01-01

## 2023-01-01 RX ORDER — MEMANTINE HYDROCHLORIDE 10 MG/1
5 TABLET ORAL
Refills: 0 | Status: DISCONTINUED | OUTPATIENT
Start: 2023-01-01 | End: 2023-01-01

## 2023-01-01 RX ORDER — BUMETANIDE 0.25 MG/ML
1 INJECTION INTRAMUSCULAR; INTRAVENOUS DAILY
Refills: 0 | Status: DISCONTINUED | OUTPATIENT
Start: 2023-01-01 | End: 2023-01-01

## 2023-01-01 RX ORDER — LACTULOSE 10 G/15ML
10 SOLUTION ORAL ONCE
Refills: 0 | Status: COMPLETED | OUTPATIENT
Start: 2023-01-01 | End: 2023-01-01

## 2023-01-01 RX ORDER — SITAGLIPTIN 50 MG/1
1 TABLET, FILM COATED ORAL
Refills: 0 | DISCHARGE

## 2023-01-01 RX ORDER — DEXTROSE 50 % IN WATER 50 %
16 SYRINGE (ML) INTRAVENOUS ONCE
Refills: 0 | Status: DISCONTINUED | OUTPATIENT
Start: 2023-01-01 | End: 2023-01-01

## 2023-01-01 RX ORDER — TIOTROPIUM BROMIDE 18 UG/1
2 CAPSULE ORAL; RESPIRATORY (INHALATION) DAILY
Refills: 0 | Status: DISCONTINUED | OUTPATIENT
Start: 2023-01-01 | End: 2023-01-01

## 2023-01-01 RX ORDER — SODIUM ZIRCONIUM CYCLOSILICATE 10 G/10G
10 POWDER, FOR SUSPENSION ORAL THREE TIMES A DAY
Refills: 0 | Status: COMPLETED | OUTPATIENT
Start: 2023-01-01 | End: 2023-01-01

## 2023-01-01 RX ORDER — CLONAZEPAM 1 MG
0.5 TABLET ORAL
Refills: 0 | Status: DISCONTINUED | OUTPATIENT
Start: 2023-01-01 | End: 2023-01-01

## 2023-01-01 RX ORDER — OLANZAPINE 15 MG/1
2.5 TABLET, FILM COATED ORAL AT BEDTIME
Refills: 0 | Status: DISCONTINUED | OUTPATIENT
Start: 2023-01-01 | End: 2023-01-01

## 2023-01-01 RX ORDER — FUROSEMIDE 40 MG
40 TABLET ORAL ONCE
Refills: 0 | Status: COMPLETED | OUTPATIENT
Start: 2023-01-01 | End: 2023-01-01

## 2023-01-01 RX ORDER — DEXTROSE 50 % IN WATER 50 %
15 SYRINGE (ML) INTRAVENOUS ONCE
Refills: 0 | Status: DISCONTINUED | OUTPATIENT
Start: 2023-01-01 | End: 2023-01-01

## 2023-01-01 RX ORDER — FUROSEMIDE 40 MG
20 TABLET ORAL ONCE
Refills: 0 | Status: COMPLETED | OUTPATIENT
Start: 2023-01-01 | End: 2023-01-01

## 2023-01-01 RX ORDER — INSULIN LISPRO 100/ML
2 VIAL (ML) SUBCUTANEOUS
Refills: 0 | Status: DISCONTINUED | OUTPATIENT
Start: 2023-01-01 | End: 2023-01-01

## 2023-01-01 RX ORDER — HYDROMORPHONE HYDROCHLORIDE 2 MG/ML
0.5 INJECTION INTRAMUSCULAR; INTRAVENOUS; SUBCUTANEOUS
Qty: 0 | Refills: 0 | DISCHARGE
Start: 2023-01-01

## 2023-01-01 RX ORDER — HYDRALAZINE HCL 50 MG
10 TABLET ORAL THREE TIMES A DAY
Refills: 0 | Status: DISCONTINUED | OUTPATIENT
Start: 2023-01-01 | End: 2023-01-01

## 2023-01-01 RX ORDER — BUMETANIDE 0.25 MG/ML
1 INJECTION INTRAMUSCULAR; INTRAVENOUS ONCE
Refills: 0 | Status: COMPLETED | OUTPATIENT
Start: 2023-01-01 | End: 2023-01-01

## 2023-01-01 RX ORDER — ALBUTEROL 90 UG/1
2 AEROSOL, METERED ORAL
Refills: 0 | DISCHARGE

## 2023-01-01 RX ORDER — IBUPROFEN 200 MG
1 TABLET ORAL
Refills: 0 | DISCHARGE

## 2023-01-01 RX ORDER — SODIUM,POTASSIUM PHOSPHATES 278-250MG
1 POWDER IN PACKET (EA) ORAL
Refills: 0 | Status: COMPLETED | OUTPATIENT
Start: 2023-01-01 | End: 2023-01-01

## 2023-01-01 RX ORDER — CEFEPIME 1 G/1
1000 INJECTION, POWDER, FOR SOLUTION INTRAMUSCULAR; INTRAVENOUS EVERY 12 HOURS
Refills: 0 | Status: DISCONTINUED | OUTPATIENT
Start: 2023-01-01 | End: 2023-01-01

## 2023-01-01 RX ORDER — HYDROMORPHONE HYDROCHLORIDE 2 MG/ML
0.5 INJECTION INTRAMUSCULAR; INTRAVENOUS; SUBCUTANEOUS ONCE
Refills: 0 | Status: DISCONTINUED | OUTPATIENT
Start: 2023-01-01 | End: 2023-01-01

## 2023-01-01 RX ORDER — AMANTADINE HCL 100 MG
1 CAPSULE ORAL
Refills: 0 | DISCHARGE

## 2023-01-01 RX ORDER — INSULIN GLARGINE 100 [IU]/ML
5 INJECTION, SOLUTION SUBCUTANEOUS AT BEDTIME
Refills: 0 | Status: DISCONTINUED | OUTPATIENT
Start: 2023-01-01 | End: 2023-01-01

## 2023-01-01 RX ORDER — LANOLIN ALCOHOL/MO/W.PET/CERES
1 CREAM (GRAM) TOPICAL
Refills: 0 | DISCHARGE

## 2023-01-01 RX ORDER — SENNA PLUS 8.6 MG/1
2 TABLET ORAL AT BEDTIME
Refills: 0 | Status: DISCONTINUED | OUTPATIENT
Start: 2023-01-01 | End: 2023-01-01

## 2023-01-01 RX ORDER — CALCIUM GLUCONATE 100 MG/ML
1 VIAL (ML) INTRAVENOUS ONCE
Refills: 0 | Status: COMPLETED | OUTPATIENT
Start: 2023-01-01 | End: 2023-01-01

## 2023-01-01 RX ORDER — FUROSEMIDE 40 MG
1 TABLET ORAL
Qty: 30 | Refills: 0
Start: 2023-01-01

## 2023-01-01 RX ORDER — CLONAZEPAM 1 MG
0.5 TABLET ORAL DAILY
Refills: 0 | Status: DISCONTINUED | OUTPATIENT
Start: 2023-01-01 | End: 2023-01-01

## 2023-01-01 RX ORDER — OLANZAPINE 15 MG/1
2 TABLET, FILM COATED ORAL
Refills: 0 | DISCHARGE

## 2023-01-01 RX ORDER — ROBINUL 0.2 MG/ML
0.2 INJECTION INTRAMUSCULAR; INTRAVENOUS EVERY 4 HOURS
Refills: 0 | Status: DISCONTINUED | OUTPATIENT
Start: 2023-01-01 | End: 2023-01-01

## 2023-01-01 RX ORDER — CLONAZEPAM 1 MG
1 TABLET ORAL
Refills: 0 | DISCHARGE

## 2023-01-01 RX ORDER — METOPROLOL TARTRATE 50 MG
12.5 TABLET ORAL
Refills: 0 | Status: DISCONTINUED | OUTPATIENT
Start: 2023-01-01 | End: 2023-01-01

## 2023-01-01 RX ORDER — ASPIRIN/CALCIUM CARB/MAGNESIUM 324 MG
1 TABLET ORAL
Refills: 0 | DISCHARGE

## 2023-01-01 RX ORDER — MONTELUKAST 4 MG/1
10 TABLET, CHEWABLE ORAL DAILY
Refills: 0 | Status: DISCONTINUED | OUTPATIENT
Start: 2023-01-01 | End: 2023-01-01

## 2023-01-01 RX ORDER — HYDROMORPHONE HYDROCHLORIDE 2 MG/ML
0.2 INJECTION INTRAMUSCULAR; INTRAVENOUS; SUBCUTANEOUS
Qty: 100 | Refills: 0 | Status: DISCONTINUED | OUTPATIENT
Start: 2023-01-01 | End: 2023-01-01

## 2023-01-01 RX ORDER — GUAIFENESIN/PSEUDOEPHEDRNE HCL 1200-120MG
1 TABLET, EXTENDED RELEASE 12 HR ORAL
Refills: 0 | DISCHARGE

## 2023-01-01 RX ORDER — MEMANTINE HYDROCHLORIDE 10 MG/1
0.5 TABLET ORAL
Refills: 0 | DISCHARGE

## 2023-01-01 RX ORDER — VALPROIC ACID (AS SODIUM SALT) 250 MG/5ML
20 SOLUTION, ORAL ORAL
Refills: 0 | DISCHARGE

## 2023-01-01 RX ORDER — TIOTROPIUM BROMIDE 18 UG/1
2 CAPSULE ORAL; RESPIRATORY (INHALATION)
Qty: 0 | Refills: 0 | DISCHARGE
Start: 2023-01-01

## 2023-01-01 RX ORDER — HYDROMORPHONE HYDROCHLORIDE 2 MG/ML
1 INJECTION INTRAMUSCULAR; INTRAVENOUS; SUBCUTANEOUS
Refills: 0 | Status: DISCONTINUED | OUTPATIENT
Start: 2023-01-01 | End: 2023-01-01

## 2023-01-01 RX ORDER — HYDRALAZINE HCL 50 MG
10 TABLET ORAL ONCE
Refills: 0 | Status: COMPLETED | OUTPATIENT
Start: 2023-01-01 | End: 2023-01-01

## 2023-01-01 RX ORDER — DEXTROSE 50 % IN WATER 50 %
12.5 SYRINGE (ML) INTRAVENOUS ONCE
Refills: 0 | Status: DISCONTINUED | OUTPATIENT
Start: 2023-01-01 | End: 2023-01-01

## 2023-01-01 RX ORDER — LACTULOSE 10 G/15ML
20 SOLUTION ORAL AT BEDTIME
Refills: 0 | Status: DISCONTINUED | OUTPATIENT
Start: 2023-01-01 | End: 2023-01-01

## 2023-01-01 RX ORDER — POLYETHYLENE GLYCOL 3350 17 G/17G
17 POWDER, FOR SOLUTION ORAL DAILY
Refills: 0 | Status: DISCONTINUED | OUTPATIENT
Start: 2023-01-01 | End: 2023-01-01

## 2023-01-01 RX ORDER — FINASTERIDE 5 MG/1
1 TABLET, FILM COATED ORAL
Refills: 0 | DISCHARGE

## 2023-01-01 RX ORDER — ROBINUL 0.2 MG/ML
0.4 INJECTION INTRAMUSCULAR; INTRAVENOUS EVERY 4 HOURS
Refills: 0 | Status: DISCONTINUED | OUTPATIENT
Start: 2023-01-01 | End: 2023-01-01

## 2023-01-01 RX ORDER — OLANZAPINE 15 MG/1
1 TABLET, FILM COATED ORAL
Refills: 0 | DISCHARGE

## 2023-01-01 RX ORDER — TAMSULOSIN HYDROCHLORIDE 0.4 MG/1
0.8 CAPSULE ORAL AT BEDTIME
Refills: 0 | Status: DISCONTINUED | OUTPATIENT
Start: 2023-01-01 | End: 2023-01-01

## 2023-01-01 RX ORDER — LACTULOSE 10 G/15ML
30 SOLUTION ORAL
Refills: 0 | DISCHARGE

## 2023-01-01 RX ORDER — ROBINUL 0.2 MG/ML
0.4 INJECTION INTRAMUSCULAR; INTRAVENOUS EVERY 6 HOURS
Refills: 0 | Status: DISCONTINUED | OUTPATIENT
Start: 2023-01-01 | End: 2023-01-01

## 2023-01-01 RX ORDER — METOPROLOL TARTRATE 50 MG
1 TABLET ORAL
Qty: 0 | Refills: 0 | DISCHARGE
Start: 2023-01-01

## 2023-01-01 RX ORDER — FUROSEMIDE 40 MG
1 TABLET ORAL
Refills: 0 | DISCHARGE

## 2023-01-01 RX ORDER — ISOSORBIDE MONONITRATE 60 MG/1
1 TABLET, EXTENDED RELEASE ORAL
Qty: 0 | Refills: 0 | DISCHARGE
Start: 2023-01-01

## 2023-01-01 RX ORDER — ENOXAPARIN SODIUM 100 MG/ML
40 INJECTION SUBCUTANEOUS EVERY 24 HOURS
Refills: 0 | Status: DISCONTINUED | OUTPATIENT
Start: 2023-01-01 | End: 2023-01-01

## 2023-01-01 RX ORDER — HEPARIN SODIUM 5000 [USP'U]/ML
5000 INJECTION INTRAVENOUS; SUBCUTANEOUS EVERY 12 HOURS
Refills: 0 | Status: DISCONTINUED | OUTPATIENT
Start: 2023-01-01 | End: 2023-01-01

## 2023-01-01 RX ORDER — ALBUTEROL 90 UG/1
3 AEROSOL, METERED ORAL
Refills: 0 | DISCHARGE

## 2023-01-01 RX ORDER — MORPHINE SULFATE 50 MG/1
2 CAPSULE, EXTENDED RELEASE ORAL ONCE
Refills: 0 | Status: DISCONTINUED | OUTPATIENT
Start: 2023-01-01 | End: 2023-01-01

## 2023-01-01 RX ORDER — DEXTROSE 50 % IN WATER 50 %
15 SYRINGE (ML) INTRAVENOUS ONCE
Refills: 0 | Status: COMPLETED | OUTPATIENT
Start: 2023-01-01 | End: 2023-01-01

## 2023-01-01 RX ORDER — BENZOCAINE AND MENTHOL 5; 1 G/100ML; G/100ML
1 LIQUID ORAL THREE TIMES A DAY
Refills: 0 | Status: DISCONTINUED | OUTPATIENT
Start: 2023-01-01 | End: 2023-01-01

## 2023-01-01 RX ORDER — PIPERACILLIN AND TAZOBACTAM 4; .5 G/20ML; G/20ML
3.38 INJECTION, POWDER, LYOPHILIZED, FOR SOLUTION INTRAVENOUS ONCE
Refills: 0 | Status: COMPLETED | OUTPATIENT
Start: 2023-01-01 | End: 2023-01-01

## 2023-01-01 RX ORDER — SODIUM,POTASSIUM PHOSPHATES 278-250MG
1 POWDER IN PACKET (EA) ORAL ONCE
Refills: 0 | Status: COMPLETED | OUTPATIENT
Start: 2023-01-01 | End: 2023-01-01

## 2023-01-01 RX ORDER — INSULIN LISPRO 100/ML
VIAL (ML) SUBCUTANEOUS EVERY 6 HOURS
Refills: 0 | Status: DISCONTINUED | OUTPATIENT
Start: 2023-01-01 | End: 2023-01-01

## 2023-01-01 RX ADMIN — MONTELUKAST 10 MILLIGRAM(S): 4 TABLET, CHEWABLE ORAL at 12:09

## 2023-01-01 RX ADMIN — Medication 3 MILLILITER(S): at 07:50

## 2023-01-01 RX ADMIN — Medication 4 UNIT(S): at 17:27

## 2023-01-01 RX ADMIN — Medication 25 MILLIGRAM(S): at 06:20

## 2023-01-01 RX ADMIN — TAMSULOSIN HYDROCHLORIDE 0.4 MILLIGRAM(S): 0.4 CAPSULE ORAL at 21:53

## 2023-01-01 RX ADMIN — Medication 1 SPRAY(S): at 05:11

## 2023-01-01 RX ADMIN — LACTULOSE 20 GRAM(S): 10 SOLUTION ORAL at 22:24

## 2023-01-01 RX ADMIN — FINASTERIDE 5 MILLIGRAM(S): 5 TABLET, FILM COATED ORAL at 12:38

## 2023-01-01 RX ADMIN — LORATADINE 10 MILLIGRAM(S): 10 TABLET ORAL at 11:52

## 2023-01-01 RX ADMIN — Medication 250 MILLIGRAM(S): at 06:01

## 2023-01-01 RX ADMIN — Medication 250 MILLIGRAM(S): at 11:03

## 2023-01-01 RX ADMIN — TAMSULOSIN HYDROCHLORIDE 0.4 MILLIGRAM(S): 0.4 CAPSULE ORAL at 21:47

## 2023-01-01 RX ADMIN — Medication 2: at 17:21

## 2023-01-01 RX ADMIN — Medication 2: at 08:21

## 2023-01-01 RX ADMIN — LACTULOSE 20 GRAM(S): 10 SOLUTION ORAL at 22:53

## 2023-01-01 RX ADMIN — MEMANTINE HYDROCHLORIDE 5 MILLIGRAM(S): 10 TABLET ORAL at 06:04

## 2023-01-01 RX ADMIN — Medication 50 MILLILITER(S): at 16:56

## 2023-01-01 RX ADMIN — ALBUTEROL 2 PUFF(S): 90 AEROSOL, METERED ORAL at 14:25

## 2023-01-01 RX ADMIN — MONTELUKAST 10 MILLIGRAM(S): 4 TABLET, CHEWABLE ORAL at 22:31

## 2023-01-01 RX ADMIN — LACTULOSE 20 GRAM(S): 10 SOLUTION ORAL at 21:42

## 2023-01-01 RX ADMIN — Medication 40 MILLIGRAM(S): at 06:37

## 2023-01-01 RX ADMIN — Medication 1200 MILLIGRAM(S): at 06:08

## 2023-01-01 RX ADMIN — Medication 250 MILLIGRAM(S): at 22:10

## 2023-01-01 RX ADMIN — Medication 200 MILLIGRAM(S): at 15:22

## 2023-01-01 RX ADMIN — Medication 50 MILLILITER(S): at 06:09

## 2023-01-01 RX ADMIN — BENZOCAINE AND MENTHOL 1 LOZENGE: 5; 1 LIQUID ORAL at 05:38

## 2023-01-01 RX ADMIN — Medication 0.5 MILLIGRAM(S): at 10:50

## 2023-01-01 RX ADMIN — Medication 200 MILLIGRAM(S): at 06:04

## 2023-01-01 RX ADMIN — Medication 1000 MILLIGRAM(S): at 22:27

## 2023-01-01 RX ADMIN — HYDROMORPHONE HYDROCHLORIDE 1 MILLIGRAM(S): 2 INJECTION INTRAMUSCULAR; INTRAVENOUS; SUBCUTANEOUS at 21:20

## 2023-01-01 RX ADMIN — Medication 12.5 MILLIGRAM(S): at 06:59

## 2023-01-01 RX ADMIN — ATORVASTATIN CALCIUM 40 MILLIGRAM(S): 80 TABLET, FILM COATED ORAL at 22:05

## 2023-01-01 RX ADMIN — MEMANTINE HYDROCHLORIDE 5 MILLIGRAM(S): 10 TABLET ORAL at 05:16

## 2023-01-01 RX ADMIN — Medication 100 MILLIGRAM(S): at 05:59

## 2023-01-01 RX ADMIN — ATORVASTATIN CALCIUM 40 MILLIGRAM(S): 80 TABLET, FILM COATED ORAL at 22:30

## 2023-01-01 RX ADMIN — ROBINUL 0.4 MILLIGRAM(S): 0.2 INJECTION INTRAMUSCULAR; INTRAVENOUS at 10:05

## 2023-01-01 RX ADMIN — INSULIN GLARGINE 5 UNIT(S): 100 INJECTION, SOLUTION SUBCUTANEOUS at 21:59

## 2023-01-01 RX ADMIN — FINASTERIDE 5 MILLIGRAM(S): 5 TABLET, FILM COATED ORAL at 12:24

## 2023-01-01 RX ADMIN — ALBUTEROL 2 PUFF(S): 90 AEROSOL, METERED ORAL at 08:21

## 2023-01-01 RX ADMIN — Medication 3 MILLILITER(S): at 20:21

## 2023-01-01 RX ADMIN — ATORVASTATIN CALCIUM 40 MILLIGRAM(S): 80 TABLET, FILM COATED ORAL at 21:37

## 2023-01-01 RX ADMIN — Medication 3 MILLILITER(S): at 16:20

## 2023-01-01 RX ADMIN — HYDROMORPHONE HYDROCHLORIDE 0.5 MILLIGRAM(S): 2 INJECTION INTRAMUSCULAR; INTRAVENOUS; SUBCUTANEOUS at 04:50

## 2023-01-01 RX ADMIN — Medication 4 UNIT(S): at 08:21

## 2023-01-01 RX ADMIN — MEMANTINE HYDROCHLORIDE 5 MILLIGRAM(S): 10 TABLET ORAL at 17:20

## 2023-01-01 RX ADMIN — HEPARIN SODIUM 5000 UNIT(S): 5000 INJECTION INTRAVENOUS; SUBCUTANEOUS at 05:55

## 2023-01-01 RX ADMIN — Medication 4 UNIT(S): at 08:08

## 2023-01-01 RX ADMIN — TAMSULOSIN HYDROCHLORIDE 0.4 MILLIGRAM(S): 0.4 CAPSULE ORAL at 21:26

## 2023-01-01 RX ADMIN — MEMANTINE HYDROCHLORIDE 5 MILLIGRAM(S): 10 TABLET ORAL at 17:12

## 2023-01-01 RX ADMIN — Medication 1200 MILLIGRAM(S): at 05:37

## 2023-01-01 RX ADMIN — BENZOCAINE AND MENTHOL 1 LOZENGE: 5; 1 LIQUID ORAL at 13:01

## 2023-01-01 RX ADMIN — TAMSULOSIN HYDROCHLORIDE 0.8 MILLIGRAM(S): 0.4 CAPSULE ORAL at 21:58

## 2023-01-01 RX ADMIN — Medication 2: at 17:29

## 2023-01-01 RX ADMIN — HEPARIN SODIUM 5000 UNIT(S): 5000 INJECTION INTRAVENOUS; SUBCUTANEOUS at 22:15

## 2023-01-01 RX ADMIN — MEMANTINE HYDROCHLORIDE 5 MILLIGRAM(S): 10 TABLET ORAL at 06:19

## 2023-01-01 RX ADMIN — ALBUTEROL 2 PUFF(S): 90 AEROSOL, METERED ORAL at 03:27

## 2023-01-01 RX ADMIN — Medication 3 MILLILITER(S): at 14:06

## 2023-01-01 RX ADMIN — MEMANTINE HYDROCHLORIDE 5 MILLIGRAM(S): 10 TABLET ORAL at 17:56

## 2023-01-01 RX ADMIN — ATORVASTATIN CALCIUM 40 MILLIGRAM(S): 80 TABLET, FILM COATED ORAL at 22:24

## 2023-01-01 RX ADMIN — Medication 4: at 12:24

## 2023-01-01 RX ADMIN — Medication 3 MILLILITER(S): at 08:21

## 2023-01-01 RX ADMIN — Medication 50 MILLIGRAM(S): at 06:03

## 2023-01-01 RX ADMIN — PIPERACILLIN AND TAZOBACTAM 200 GRAM(S): 4; .5 INJECTION, POWDER, LYOPHILIZED, FOR SOLUTION INTRAVENOUS at 13:44

## 2023-01-01 RX ADMIN — Medication 4 UNIT(S): at 12:04

## 2023-01-01 RX ADMIN — Medication 4 UNIT(S): at 17:08

## 2023-01-01 RX ADMIN — Medication 2: at 08:20

## 2023-01-01 RX ADMIN — MEMANTINE HYDROCHLORIDE 5 MILLIGRAM(S): 10 TABLET ORAL at 05:40

## 2023-01-01 RX ADMIN — Medication 50 MILLIGRAM(S): at 05:54

## 2023-01-01 RX ADMIN — TIOTROPIUM BROMIDE 2 PUFF(S): 18 CAPSULE ORAL; RESPIRATORY (INHALATION) at 12:48

## 2023-01-01 RX ADMIN — LORATADINE 10 MILLIGRAM(S): 10 TABLET ORAL at 12:20

## 2023-01-01 RX ADMIN — OLANZAPINE 10 MILLIGRAM(S): 15 TABLET, FILM COATED ORAL at 21:58

## 2023-01-01 RX ADMIN — HEPARIN SODIUM 5000 UNIT(S): 5000 INJECTION INTRAVENOUS; SUBCUTANEOUS at 17:16

## 2023-01-01 RX ADMIN — FINASTERIDE 5 MILLIGRAM(S): 5 TABLET, FILM COATED ORAL at 11:30

## 2023-01-01 RX ADMIN — MONTELUKAST 10 MILLIGRAM(S): 4 TABLET, CHEWABLE ORAL at 11:46

## 2023-01-01 RX ADMIN — INSULIN GLARGINE 5 UNIT(S): 100 INJECTION, SOLUTION SUBCUTANEOUS at 22:13

## 2023-01-01 RX ADMIN — Medication 250 MILLIGRAM(S): at 21:42

## 2023-01-01 RX ADMIN — Medication 3 MILLILITER(S): at 15:59

## 2023-01-01 RX ADMIN — Medication 50 MILLILITER(S): at 06:03

## 2023-01-01 RX ADMIN — OLANZAPINE 10 MILLIGRAM(S): 15 TABLET, FILM COATED ORAL at 21:47

## 2023-01-01 RX ADMIN — Medication 1 MILLIGRAM(S): at 23:48

## 2023-01-01 RX ADMIN — LORATADINE 10 MILLIGRAM(S): 10 TABLET ORAL at 11:46

## 2023-01-01 RX ADMIN — Medication 2: at 08:31

## 2023-01-01 RX ADMIN — ALBUTEROL 2 PUFF(S): 90 AEROSOL, METERED ORAL at 21:54

## 2023-01-01 RX ADMIN — Medication 4 UNIT(S): at 17:53

## 2023-01-01 RX ADMIN — ROBINUL 0.4 MILLIGRAM(S): 0.2 INJECTION INTRAMUSCULAR; INTRAVENOUS at 12:23

## 2023-01-01 RX ADMIN — TIOTROPIUM BROMIDE 2 PUFF(S): 18 CAPSULE ORAL; RESPIRATORY (INHALATION) at 11:04

## 2023-01-01 RX ADMIN — OLANZAPINE 10 MILLIGRAM(S): 15 TABLET, FILM COATED ORAL at 21:38

## 2023-01-01 RX ADMIN — Medication 1 SPRAY(S): at 06:18

## 2023-01-01 RX ADMIN — HYDROMORPHONE HYDROCHLORIDE 0.3 MILLIGRAM(S): 2 INJECTION INTRAMUSCULAR; INTRAVENOUS; SUBCUTANEOUS at 05:54

## 2023-01-01 RX ADMIN — TIOTROPIUM BROMIDE 2 PUFF(S): 18 CAPSULE ORAL; RESPIRATORY (INHALATION) at 14:29

## 2023-01-01 RX ADMIN — Medication 1000 MILLIGRAM(S): at 21:47

## 2023-01-01 RX ADMIN — Medication 1 SPRAY(S): at 05:38

## 2023-01-01 RX ADMIN — LACTULOSE 20 GRAM(S): 10 SOLUTION ORAL at 21:47

## 2023-01-01 RX ADMIN — Medication 1 SPRAY(S): at 06:04

## 2023-01-01 RX ADMIN — OLANZAPINE 10 MILLIGRAM(S): 15 TABLET, FILM COATED ORAL at 21:52

## 2023-01-01 RX ADMIN — Medication 2: at 11:58

## 2023-01-01 RX ADMIN — HYDROMORPHONE HYDROCHLORIDE 0.3 MILLIGRAM(S): 2 INJECTION INTRAMUSCULAR; INTRAVENOUS; SUBCUTANEOUS at 06:19

## 2023-01-01 RX ADMIN — Medication 1000 MILLIGRAM(S): at 21:59

## 2023-01-01 RX ADMIN — HYDROMORPHONE HYDROCHLORIDE 0.3 MILLIGRAM(S): 2 INJECTION INTRAMUSCULAR; INTRAVENOUS; SUBCUTANEOUS at 01:00

## 2023-01-01 RX ADMIN — HYDROMORPHONE HYDROCHLORIDE 0.3 MILLIGRAM(S): 2 INJECTION INTRAMUSCULAR; INTRAVENOUS; SUBCUTANEOUS at 20:32

## 2023-01-01 RX ADMIN — Medication 4 UNIT(S): at 18:02

## 2023-01-01 RX ADMIN — Medication 650 MILLIGRAM(S): at 19:52

## 2023-01-01 RX ADMIN — Medication 50 MILLIGRAM(S): at 06:19

## 2023-01-01 RX ADMIN — Medication 3 MILLILITER(S): at 20:11

## 2023-01-01 RX ADMIN — ATORVASTATIN CALCIUM 40 MILLIGRAM(S): 80 TABLET, FILM COATED ORAL at 21:40

## 2023-01-01 RX ADMIN — Medication 5 MILLIGRAM(S): at 22:05

## 2023-01-01 RX ADMIN — Medication 3 MILLILITER(S): at 21:43

## 2023-01-01 RX ADMIN — INSULIN GLARGINE 5 UNIT(S): 100 INJECTION, SOLUTION SUBCUTANEOUS at 22:36

## 2023-01-01 RX ADMIN — HEPARIN SODIUM 5000 UNIT(S): 5000 INJECTION INTRAVENOUS; SUBCUTANEOUS at 06:18

## 2023-01-01 RX ADMIN — SCOPALAMINE 1 PATCH: 1 PATCH, EXTENDED RELEASE TRANSDERMAL at 19:28

## 2023-01-01 RX ADMIN — HEPARIN SODIUM 5000 UNIT(S): 5000 INJECTION INTRAVENOUS; SUBCUTANEOUS at 14:31

## 2023-01-01 RX ADMIN — Medication 10 MILLIGRAM(S): at 06:07

## 2023-01-01 RX ADMIN — Medication 200 MILLIGRAM(S): at 14:00

## 2023-01-01 RX ADMIN — Medication 5 MILLIGRAM(S): at 21:33

## 2023-01-01 RX ADMIN — Medication 100 MILLIGRAM(S): at 17:38

## 2023-01-01 RX ADMIN — Medication 3 MILLILITER(S): at 15:41

## 2023-01-01 RX ADMIN — Medication 50 MILLIGRAM(S): at 06:14

## 2023-01-01 RX ADMIN — OLANZAPINE 10 MILLIGRAM(S): 15 TABLET, FILM COATED ORAL at 21:17

## 2023-01-01 RX ADMIN — Medication 100 MILLIGRAM(S): at 05:10

## 2023-01-01 RX ADMIN — Medication 10 MILLIGRAM(S): at 06:11

## 2023-01-01 RX ADMIN — Medication 2: at 07:52

## 2023-01-01 RX ADMIN — HYDROMORPHONE HYDROCHLORIDE 0.3 MILLIGRAM(S): 2 INJECTION INTRAMUSCULAR; INTRAVENOUS; SUBCUTANEOUS at 18:40

## 2023-01-01 RX ADMIN — FINASTERIDE 5 MILLIGRAM(S): 5 TABLET, FILM COATED ORAL at 11:59

## 2023-01-01 RX ADMIN — Medication 4 UNIT(S): at 18:09

## 2023-01-01 RX ADMIN — Medication 4 UNIT(S): at 12:24

## 2023-01-01 RX ADMIN — Medication 1 SPRAY(S): at 17:36

## 2023-01-01 RX ADMIN — OLANZAPINE 10 MILLIGRAM(S): 15 TABLET, FILM COATED ORAL at 22:00

## 2023-01-01 RX ADMIN — Medication 4 UNIT(S): at 08:15

## 2023-01-01 RX ADMIN — TIOTROPIUM BROMIDE 2 PUFF(S): 18 CAPSULE ORAL; RESPIRATORY (INHALATION) at 11:28

## 2023-01-01 RX ADMIN — HYDROMORPHONE HYDROCHLORIDE 0.5 MILLIGRAM(S): 2 INJECTION INTRAMUSCULAR; INTRAVENOUS; SUBCUTANEOUS at 08:50

## 2023-01-01 RX ADMIN — Medication 3 MILLILITER(S): at 03:09

## 2023-01-01 RX ADMIN — BENZOCAINE AND MENTHOL 1 LOZENGE: 5; 1 LIQUID ORAL at 22:01

## 2023-01-01 RX ADMIN — Medication 250 MILLIGRAM(S): at 05:43

## 2023-01-01 RX ADMIN — Medication 3 MILLILITER(S): at 08:12

## 2023-01-01 RX ADMIN — BUMETANIDE 1 MILLIGRAM(S): 0.25 INJECTION INTRAMUSCULAR; INTRAVENOUS at 06:59

## 2023-01-01 RX ADMIN — Medication 1 SPRAY(S): at 17:22

## 2023-01-01 RX ADMIN — TAMSULOSIN HYDROCHLORIDE 0.4 MILLIGRAM(S): 0.4 CAPSULE ORAL at 21:59

## 2023-01-01 RX ADMIN — Medication 5 MILLIGRAM(S): at 21:26

## 2023-01-01 RX ADMIN — HEPARIN SODIUM 5000 UNIT(S): 5000 INJECTION INTRAVENOUS; SUBCUTANEOUS at 18:03

## 2023-01-01 RX ADMIN — Medication 81 MILLIGRAM(S): at 12:38

## 2023-01-01 RX ADMIN — Medication 52.5 MILLIGRAM(S): at 12:23

## 2023-01-01 RX ADMIN — HYDROMORPHONE HYDROCHLORIDE 0.3 MILLIGRAM(S): 2 INJECTION INTRAMUSCULAR; INTRAVENOUS; SUBCUTANEOUS at 05:34

## 2023-01-01 RX ADMIN — Medication 1 SPRAY(S): at 17:13

## 2023-01-01 RX ADMIN — BUMETANIDE 1 MILLIGRAM(S): 0.25 INJECTION INTRAMUSCULAR; INTRAVENOUS at 06:02

## 2023-01-01 RX ADMIN — HEPARIN SODIUM 5000 UNIT(S): 5000 INJECTION INTRAVENOUS; SUBCUTANEOUS at 05:41

## 2023-01-01 RX ADMIN — Medication 1 SPRAY(S): at 06:09

## 2023-01-01 RX ADMIN — LACTULOSE 20 GRAM(S): 10 SOLUTION ORAL at 23:21

## 2023-01-01 RX ADMIN — Medication 4: at 11:48

## 2023-01-01 RX ADMIN — ATORVASTATIN CALCIUM 40 MILLIGRAM(S): 80 TABLET, FILM COATED ORAL at 21:26

## 2023-01-01 RX ADMIN — Medication 6 UNIT(S): at 17:35

## 2023-01-01 RX ADMIN — ATORVASTATIN CALCIUM 40 MILLIGRAM(S): 80 TABLET, FILM COATED ORAL at 21:16

## 2023-01-01 RX ADMIN — HYDROMORPHONE HYDROCHLORIDE 0.3 MILLIGRAM(S): 2 INJECTION INTRAMUSCULAR; INTRAVENOUS; SUBCUTANEOUS at 09:05

## 2023-01-01 RX ADMIN — Medication 1000 MILLIGRAM(S): at 22:21

## 2023-01-01 RX ADMIN — Medication 10 MILLIGRAM(S): at 22:10

## 2023-01-01 RX ADMIN — TAMSULOSIN HYDROCHLORIDE 0.4 MILLIGRAM(S): 0.4 CAPSULE ORAL at 22:24

## 2023-01-01 RX ADMIN — SODIUM ZIRCONIUM CYCLOSILICATE 10 GRAM(S): 10 POWDER, FOR SUSPENSION ORAL at 20:30

## 2023-01-01 RX ADMIN — INSULIN GLARGINE 5 UNIT(S): 100 INJECTION, SOLUTION SUBCUTANEOUS at 22:21

## 2023-01-01 RX ADMIN — Medication 100 MILLIGRAM(S): at 17:11

## 2023-01-01 RX ADMIN — Medication 3 MILLILITER(S): at 09:01

## 2023-01-01 RX ADMIN — HEPARIN SODIUM 5000 UNIT(S): 5000 INJECTION INTRAVENOUS; SUBCUTANEOUS at 05:46

## 2023-01-01 RX ADMIN — Medication 12.5 MILLIGRAM(S): at 05:44

## 2023-01-01 RX ADMIN — Medication 100 MILLIGRAM(S): at 18:47

## 2023-01-01 RX ADMIN — Medication 12.5 MILLIGRAM(S): at 06:08

## 2023-01-01 RX ADMIN — Medication 50 MILLIGRAM(S): at 06:15

## 2023-01-01 RX ADMIN — Medication 50 MILLILITER(S): at 17:38

## 2023-01-01 RX ADMIN — MONTELUKAST 10 MILLIGRAM(S): 4 TABLET, CHEWABLE ORAL at 12:17

## 2023-01-01 RX ADMIN — Medication 1000 MILLIGRAM(S): at 21:39

## 2023-01-01 RX ADMIN — MEMANTINE HYDROCHLORIDE 5 MILLIGRAM(S): 10 TABLET ORAL at 05:30

## 2023-01-01 RX ADMIN — SCOPALAMINE 1 PATCH: 1 PATCH, EXTENDED RELEASE TRANSDERMAL at 19:27

## 2023-01-01 RX ADMIN — MONTELUKAST 10 MILLIGRAM(S): 4 TABLET, CHEWABLE ORAL at 22:23

## 2023-01-01 RX ADMIN — Medication 40 MILLIGRAM(S): at 16:17

## 2023-01-01 RX ADMIN — Medication 2: at 17:53

## 2023-01-01 RX ADMIN — Medication 1: at 17:20

## 2023-01-01 RX ADMIN — MONTELUKAST 10 MILLIGRAM(S): 4 TABLET, CHEWABLE ORAL at 11:27

## 2023-01-01 RX ADMIN — Medication 3 MILLILITER(S): at 20:17

## 2023-01-01 RX ADMIN — TAMSULOSIN HYDROCHLORIDE 0.4 MILLIGRAM(S): 0.4 CAPSULE ORAL at 21:33

## 2023-01-01 RX ADMIN — Medication 81 MILLIGRAM(S): at 12:17

## 2023-01-01 RX ADMIN — HEPARIN SODIUM 5000 UNIT(S): 5000 INJECTION INTRAVENOUS; SUBCUTANEOUS at 17:29

## 2023-01-01 RX ADMIN — Medication 0.5 MILLIGRAM(S): at 16:45

## 2023-01-01 RX ADMIN — Medication 40 MILLIGRAM(S): at 06:05

## 2023-01-01 RX ADMIN — Medication 3 MILLILITER(S): at 03:08

## 2023-01-01 RX ADMIN — ROBINUL 0.2 MILLIGRAM(S): 0.2 INJECTION INTRAMUSCULAR; INTRAVENOUS at 11:41

## 2023-01-01 RX ADMIN — Medication 10 MILLIGRAM(S): at 14:33

## 2023-01-01 RX ADMIN — Medication 5 MILLIGRAM(S): at 21:59

## 2023-01-01 RX ADMIN — ROBINUL 0.4 MILLIGRAM(S): 0.2 INJECTION INTRAMUSCULAR; INTRAVENOUS at 12:16

## 2023-01-01 RX ADMIN — Medication 81 MILLIGRAM(S): at 11:58

## 2023-01-01 RX ADMIN — Medication 10 MILLIGRAM(S): at 00:41

## 2023-01-01 RX ADMIN — Medication 50 MILLIGRAM(S): at 05:39

## 2023-01-01 RX ADMIN — MEMANTINE HYDROCHLORIDE 5 MILLIGRAM(S): 10 TABLET ORAL at 17:29

## 2023-01-01 RX ADMIN — Medication 100 MILLIGRAM(S): at 17:36

## 2023-01-01 RX ADMIN — Medication 2: at 08:09

## 2023-01-01 RX ADMIN — Medication 2 UNIT(S): at 07:53

## 2023-01-01 RX ADMIN — Medication 81 MILLIGRAM(S): at 12:09

## 2023-01-01 RX ADMIN — Medication 250 MILLIGRAM(S): at 13:28

## 2023-01-01 RX ADMIN — Medication 6 UNIT(S): at 08:17

## 2023-01-01 RX ADMIN — ALBUTEROL 2 PUFF(S): 90 AEROSOL, METERED ORAL at 09:30

## 2023-01-01 RX ADMIN — HEPARIN SODIUM 5000 UNIT(S): 5000 INJECTION INTRAVENOUS; SUBCUTANEOUS at 06:02

## 2023-01-01 RX ADMIN — Medication 3 MILLILITER(S): at 15:20

## 2023-01-01 RX ADMIN — MEMANTINE HYDROCHLORIDE 5 MILLIGRAM(S): 10 TABLET ORAL at 06:20

## 2023-01-01 RX ADMIN — HEPARIN SODIUM 5000 UNIT(S): 5000 INJECTION INTRAVENOUS; SUBCUTANEOUS at 05:39

## 2023-01-01 RX ADMIN — Medication 50 MILLIGRAM(S): at 06:09

## 2023-01-01 RX ADMIN — HEPARIN SODIUM 5000 UNIT(S): 5000 INJECTION INTRAVENOUS; SUBCUTANEOUS at 18:09

## 2023-01-01 RX ADMIN — BENZOCAINE AND MENTHOL 1 LOZENGE: 5; 1 LIQUID ORAL at 14:33

## 2023-01-01 RX ADMIN — Medication 650 MILLIGRAM(S): at 21:09

## 2023-01-01 RX ADMIN — Medication 10 MILLIGRAM(S): at 06:59

## 2023-01-01 RX ADMIN — Medication 3 MILLILITER(S): at 03:35

## 2023-01-01 RX ADMIN — Medication 100 MILLIGRAM(S): at 18:46

## 2023-01-01 RX ADMIN — HEPARIN SODIUM 5000 UNIT(S): 5000 INJECTION INTRAVENOUS; SUBCUTANEOUS at 06:03

## 2023-01-01 RX ADMIN — Medication 3 MILLILITER(S): at 03:34

## 2023-01-01 RX ADMIN — Medication 10 MILLIGRAM(S): at 14:52

## 2023-01-01 RX ADMIN — Medication 40 MILLIGRAM(S): at 05:46

## 2023-01-01 RX ADMIN — ROBINUL 0.4 MILLIGRAM(S): 0.2 INJECTION INTRAMUSCULAR; INTRAVENOUS at 17:37

## 2023-01-01 RX ADMIN — ALBUTEROL 2 PUFF(S): 90 AEROSOL, METERED ORAL at 17:59

## 2023-01-01 RX ADMIN — Medication 40 MILLIGRAM(S): at 06:14

## 2023-01-01 RX ADMIN — MEMANTINE HYDROCHLORIDE 5 MILLIGRAM(S): 10 TABLET ORAL at 06:00

## 2023-01-01 RX ADMIN — Medication 1 SPRAY(S): at 05:32

## 2023-01-01 RX ADMIN — Medication 1 TABLET(S): at 11:42

## 2023-01-01 RX ADMIN — Medication 4 UNIT(S): at 11:49

## 2023-01-01 RX ADMIN — Medication 10 MILLIGRAM(S): at 22:23

## 2023-01-01 RX ADMIN — Medication 100 MILLIGRAM(S): at 06:15

## 2023-01-01 RX ADMIN — Medication 5 MILLIGRAM(S): at 21:47

## 2023-01-01 RX ADMIN — OLANZAPINE 10 MILLIGRAM(S): 15 TABLET, FILM COATED ORAL at 22:23

## 2023-01-01 RX ADMIN — Medication 200 MILLIGRAM(S): at 08:47

## 2023-01-01 RX ADMIN — Medication 500 MILLIGRAM(S): at 00:02

## 2023-01-01 RX ADMIN — Medication 250 MILLIGRAM(S): at 11:58

## 2023-01-01 RX ADMIN — HEPARIN SODIUM 5000 UNIT(S): 5000 INJECTION INTRAVENOUS; SUBCUTANEOUS at 05:42

## 2023-01-01 RX ADMIN — CEFEPIME 100 MILLIGRAM(S): 1 INJECTION, POWDER, FOR SOLUTION INTRAMUSCULAR; INTRAVENOUS at 17:28

## 2023-01-01 RX ADMIN — TAMSULOSIN HYDROCHLORIDE 0.8 MILLIGRAM(S): 0.4 CAPSULE ORAL at 21:47

## 2023-01-01 RX ADMIN — HEPARIN SODIUM 5000 UNIT(S): 5000 INJECTION INTRAVENOUS; SUBCUTANEOUS at 06:04

## 2023-01-01 RX ADMIN — Medication 4 UNIT(S): at 12:08

## 2023-01-01 RX ADMIN — HEPARIN SODIUM 5000 UNIT(S): 5000 INJECTION INTRAVENOUS; SUBCUTANEOUS at 05:44

## 2023-01-01 RX ADMIN — LACTULOSE 10 GRAM(S): 10 SOLUTION ORAL at 13:58

## 2023-01-01 RX ADMIN — HYDROMORPHONE HYDROCHLORIDE 0.5 MILLIGRAM(S): 2 INJECTION INTRAMUSCULAR; INTRAVENOUS; SUBCUTANEOUS at 06:42

## 2023-01-01 RX ADMIN — INSULIN GLARGINE 5 UNIT(S): 100 INJECTION, SOLUTION SUBCUTANEOUS at 22:23

## 2023-01-01 RX ADMIN — Medication 1 TABLET(S): at 11:49

## 2023-01-01 RX ADMIN — TIOTROPIUM BROMIDE 2 PUFF(S): 18 CAPSULE ORAL; RESPIRATORY (INHALATION) at 12:30

## 2023-01-01 RX ADMIN — Medication 1 SPRAY(S): at 17:21

## 2023-01-01 RX ADMIN — Medication 52.5 MILLIGRAM(S): at 21:55

## 2023-01-01 RX ADMIN — Medication 10 MILLIGRAM(S): at 06:20

## 2023-01-01 RX ADMIN — HYDROMORPHONE HYDROCHLORIDE 0.5 MILLIGRAM(S): 2 INJECTION INTRAMUSCULAR; INTRAVENOUS; SUBCUTANEOUS at 21:54

## 2023-01-01 RX ADMIN — Medication 1 SPRAY(S): at 05:46

## 2023-01-01 RX ADMIN — TIOTROPIUM BROMIDE 2 PUFF(S): 18 CAPSULE ORAL; RESPIRATORY (INHALATION) at 11:30

## 2023-01-01 RX ADMIN — FINASTERIDE 5 MILLIGRAM(S): 5 TABLET, FILM COATED ORAL at 11:47

## 2023-01-01 RX ADMIN — Medication 3 MILLILITER(S): at 08:30

## 2023-01-01 RX ADMIN — Medication 1 SPRAY(S): at 17:16

## 2023-01-01 RX ADMIN — MEMANTINE HYDROCHLORIDE 5 MILLIGRAM(S): 10 TABLET ORAL at 06:03

## 2023-01-01 RX ADMIN — ALBUTEROL 2 PUFF(S): 90 AEROSOL, METERED ORAL at 02:16

## 2023-01-01 RX ADMIN — Medication 2 UNIT(S): at 17:13

## 2023-01-01 RX ADMIN — Medication 50 MILLIGRAM(S): at 05:41

## 2023-01-01 RX ADMIN — Medication 12.5 MILLIGRAM(S): at 05:36

## 2023-01-01 RX ADMIN — Medication 2: at 17:27

## 2023-01-01 RX ADMIN — HEPARIN SODIUM 5000 UNIT(S): 5000 INJECTION INTRAVENOUS; SUBCUTANEOUS at 05:54

## 2023-01-01 RX ADMIN — OLANZAPINE 10 MILLIGRAM(S): 15 TABLET, FILM COATED ORAL at 21:16

## 2023-01-01 RX ADMIN — Medication 4 UNIT(S): at 12:43

## 2023-01-01 RX ADMIN — ATORVASTATIN CALCIUM 40 MILLIGRAM(S): 80 TABLET, FILM COATED ORAL at 22:22

## 2023-01-01 RX ADMIN — Medication 81 MILLIGRAM(S): at 12:16

## 2023-01-01 RX ADMIN — MEMANTINE HYDROCHLORIDE 5 MILLIGRAM(S): 10 TABLET ORAL at 06:14

## 2023-01-01 RX ADMIN — Medication 5 MILLIGRAM(S): at 22:28

## 2023-01-01 RX ADMIN — TIOTROPIUM BROMIDE 2 PUFF(S): 18 CAPSULE ORAL; RESPIRATORY (INHALATION) at 12:04

## 2023-01-01 RX ADMIN — MONTELUKAST 10 MILLIGRAM(S): 4 TABLET, CHEWABLE ORAL at 22:15

## 2023-01-01 RX ADMIN — Medication 81 MILLIGRAM(S): at 11:49

## 2023-01-01 RX ADMIN — HEPARIN SODIUM 5000 UNIT(S): 5000 INJECTION INTRAVENOUS; SUBCUTANEOUS at 18:47

## 2023-01-01 RX ADMIN — BENZOCAINE AND MENTHOL 1 LOZENGE: 5; 1 LIQUID ORAL at 13:57

## 2023-01-01 RX ADMIN — MONTELUKAST 10 MILLIGRAM(S): 4 TABLET, CHEWABLE ORAL at 12:38

## 2023-01-01 RX ADMIN — HEPARIN SODIUM 5000 UNIT(S): 5000 INJECTION INTRAVENOUS; SUBCUTANEOUS at 17:07

## 2023-01-01 RX ADMIN — HYDROMORPHONE HYDROCHLORIDE 0.5 MILLIGRAM(S): 2 INJECTION INTRAMUSCULAR; INTRAVENOUS; SUBCUTANEOUS at 09:00

## 2023-01-01 RX ADMIN — Medication 3 MILLILITER(S): at 15:36

## 2023-01-01 RX ADMIN — Medication 1 TABLET(S): at 11:59

## 2023-01-01 RX ADMIN — Medication 1: at 17:47

## 2023-01-01 RX ADMIN — Medication 52.5 MILLIGRAM(S): at 21:24

## 2023-01-01 RX ADMIN — Medication 4: at 12:37

## 2023-01-01 RX ADMIN — TAMSULOSIN HYDROCHLORIDE 0.8 MILLIGRAM(S): 0.4 CAPSULE ORAL at 22:28

## 2023-01-01 RX ADMIN — TIOTROPIUM BROMIDE 2 PUFF(S): 18 CAPSULE ORAL; RESPIRATORY (INHALATION) at 11:55

## 2023-01-01 RX ADMIN — Medication 2: at 11:51

## 2023-01-01 RX ADMIN — OLANZAPINE 10 MILLIGRAM(S): 15 TABLET, FILM COATED ORAL at 23:05

## 2023-01-01 RX ADMIN — ROBINUL 0.4 MILLIGRAM(S): 0.2 INJECTION INTRAMUSCULAR; INTRAVENOUS at 18:29

## 2023-01-01 RX ADMIN — Medication 10 MILLIGRAM(S): at 21:34

## 2023-01-01 RX ADMIN — Medication 1: at 08:21

## 2023-01-01 RX ADMIN — SCOPALAMINE 1 PATCH: 1 PATCH, EXTENDED RELEASE TRANSDERMAL at 19:44

## 2023-01-01 RX ADMIN — MEMANTINE HYDROCHLORIDE 5 MILLIGRAM(S): 10 TABLET ORAL at 18:46

## 2023-01-01 RX ADMIN — MEMANTINE HYDROCHLORIDE 5 MILLIGRAM(S): 10 TABLET ORAL at 05:46

## 2023-01-01 RX ADMIN — LORATADINE 10 MILLIGRAM(S): 10 TABLET ORAL at 12:03

## 2023-01-01 RX ADMIN — Medication 1 SPRAY(S): at 06:15

## 2023-01-01 RX ADMIN — HEPARIN SODIUM 5000 UNIT(S): 5000 INJECTION INTRAVENOUS; SUBCUTANEOUS at 06:14

## 2023-01-01 RX ADMIN — MONTELUKAST 10 MILLIGRAM(S): 4 TABLET, CHEWABLE ORAL at 12:26

## 2023-01-01 RX ADMIN — MEMANTINE HYDROCHLORIDE 5 MILLIGRAM(S): 10 TABLET ORAL at 17:50

## 2023-01-01 RX ADMIN — Medication 6 UNIT(S): at 08:06

## 2023-01-01 RX ADMIN — TIOTROPIUM BROMIDE 2 PUFF(S): 18 CAPSULE ORAL; RESPIRATORY (INHALATION) at 11:31

## 2023-01-01 RX ADMIN — ALBUTEROL 2 PUFF(S): 90 AEROSOL, METERED ORAL at 15:55

## 2023-01-01 RX ADMIN — Medication 100 MILLIGRAM(S): at 06:14

## 2023-01-01 RX ADMIN — ALBUTEROL 2 PUFF(S): 90 AEROSOL, METERED ORAL at 15:59

## 2023-01-01 RX ADMIN — Medication 5 MILLIGRAM(S): at 23:16

## 2023-01-01 RX ADMIN — HYDROMORPHONE HYDROCHLORIDE 1 MILLIGRAM(S): 2 INJECTION INTRAMUSCULAR; INTRAVENOUS; SUBCUTANEOUS at 18:01

## 2023-01-01 RX ADMIN — OLANZAPINE 10 MILLIGRAM(S): 15 TABLET, FILM COATED ORAL at 22:06

## 2023-01-01 RX ADMIN — Medication 1 SPRAY(S): at 18:04

## 2023-01-01 RX ADMIN — Medication 2: at 17:54

## 2023-01-01 RX ADMIN — Medication 1 SPRAY(S): at 18:37

## 2023-01-01 RX ADMIN — Medication 4 UNIT(S): at 17:30

## 2023-01-01 RX ADMIN — Medication 50 MILLIGRAM(S): at 06:04

## 2023-01-01 RX ADMIN — Medication 250 MILLIGRAM(S): at 06:02

## 2023-01-01 RX ADMIN — HEPARIN SODIUM 750 UNIT(S)/HR: 5000 INJECTION INTRAVENOUS; SUBCUTANEOUS at 12:25

## 2023-01-01 RX ADMIN — Medication 3 MILLILITER(S): at 20:15

## 2023-01-01 RX ADMIN — Medication 1: at 11:55

## 2023-01-01 RX ADMIN — Medication 6: at 21:36

## 2023-01-01 RX ADMIN — MEMANTINE HYDROCHLORIDE 5 MILLIGRAM(S): 10 TABLET ORAL at 17:54

## 2023-01-01 RX ADMIN — Medication 1: at 12:32

## 2023-01-01 RX ADMIN — Medication 20 MILLIGRAM(S): at 05:17

## 2023-01-01 RX ADMIN — Medication 200 MILLIGRAM(S): at 20:35

## 2023-01-01 RX ADMIN — Medication 10 MILLIGRAM(S): at 21:41

## 2023-01-01 RX ADMIN — ALBUTEROL 2 PUFF(S): 90 AEROSOL, METERED ORAL at 09:16

## 2023-01-01 RX ADMIN — Medication 250 MILLIGRAM(S): at 06:19

## 2023-01-01 RX ADMIN — Medication 1 SPRAY(S): at 17:53

## 2023-01-01 RX ADMIN — LORATADINE 10 MILLIGRAM(S): 10 TABLET ORAL at 11:28

## 2023-01-01 RX ADMIN — FINASTERIDE 5 MILLIGRAM(S): 5 TABLET, FILM COATED ORAL at 12:10

## 2023-01-01 RX ADMIN — Medication 1200 MILLIGRAM(S): at 17:47

## 2023-01-01 RX ADMIN — Medication 2 UNIT(S): at 08:32

## 2023-01-01 RX ADMIN — HYDROMORPHONE HYDROCHLORIDE 0.3 MILLIGRAM(S): 2 INJECTION INTRAMUSCULAR; INTRAVENOUS; SUBCUTANEOUS at 00:42

## 2023-01-01 RX ADMIN — Medication 3 MILLILITER(S): at 20:05

## 2023-01-01 RX ADMIN — OLANZAPINE 10 MILLIGRAM(S): 15 TABLET, FILM COATED ORAL at 21:33

## 2023-01-01 RX ADMIN — Medication 3 MILLILITER(S): at 08:50

## 2023-01-01 RX ADMIN — Medication 10 MILLIGRAM(S): at 21:59

## 2023-01-01 RX ADMIN — Medication 100 MILLIGRAM(S): at 17:31

## 2023-01-01 RX ADMIN — HEPARIN SODIUM 5000 UNIT(S): 5000 INJECTION INTRAVENOUS; SUBCUTANEOUS at 13:36

## 2023-01-01 RX ADMIN — Medication 6 UNIT(S): at 11:51

## 2023-01-01 RX ADMIN — Medication 2: at 12:16

## 2023-01-01 RX ADMIN — Medication 1 SPRAY(S): at 05:41

## 2023-01-01 RX ADMIN — ATORVASTATIN CALCIUM 40 MILLIGRAM(S): 80 TABLET, FILM COATED ORAL at 22:16

## 2023-01-01 RX ADMIN — Medication 1 SPRAY(S): at 06:02

## 2023-01-01 RX ADMIN — Medication 200 MILLIGRAM(S): at 12:20

## 2023-01-01 RX ADMIN — MONTELUKAST 10 MILLIGRAM(S): 4 TABLET, CHEWABLE ORAL at 12:20

## 2023-01-01 RX ADMIN — HYDROMORPHONE HYDROCHLORIDE 0.3 MILLIGRAM(S): 2 INJECTION INTRAMUSCULAR; INTRAVENOUS; SUBCUTANEOUS at 15:45

## 2023-01-01 RX ADMIN — Medication 1 SPRAY(S): at 18:36

## 2023-01-01 RX ADMIN — Medication 3 MILLILITER(S): at 20:13

## 2023-01-01 RX ADMIN — TIOTROPIUM BROMIDE 2 PUFF(S): 18 CAPSULE ORAL; RESPIRATORY (INHALATION) at 11:49

## 2023-01-01 RX ADMIN — BUMETANIDE 1 MILLIGRAM(S): 0.25 INJECTION INTRAMUSCULAR; INTRAVENOUS at 05:41

## 2023-01-01 RX ADMIN — HYDROMORPHONE HYDROCHLORIDE 0.5 MILLIGRAM(S): 2 INJECTION INTRAMUSCULAR; INTRAVENOUS; SUBCUTANEOUS at 10:36

## 2023-01-01 RX ADMIN — Medication 3 MILLILITER(S): at 15:03

## 2023-01-01 RX ADMIN — MEMANTINE HYDROCHLORIDE 5 MILLIGRAM(S): 10 TABLET ORAL at 05:45

## 2023-01-01 RX ADMIN — Medication 3 MILLILITER(S): at 08:39

## 2023-01-01 RX ADMIN — Medication 1 PACKET(S): at 18:48

## 2023-01-01 RX ADMIN — HEPARIN SODIUM 5000 UNIT(S): 5000 INJECTION INTRAVENOUS; SUBCUTANEOUS at 18:48

## 2023-01-01 RX ADMIN — Medication 12.5 MILLIGRAM(S): at 06:03

## 2023-01-01 RX ADMIN — Medication 1 MILLIGRAM(S): at 14:30

## 2023-01-01 RX ADMIN — FINASTERIDE 5 MILLIGRAM(S): 5 TABLET, FILM COATED ORAL at 12:25

## 2023-01-01 RX ADMIN — Medication 81 MILLIGRAM(S): at 11:47

## 2023-01-01 RX ADMIN — TAMSULOSIN HYDROCHLORIDE 0.4 MILLIGRAM(S): 0.4 CAPSULE ORAL at 21:37

## 2023-01-01 RX ADMIN — HYDROMORPHONE HYDROCHLORIDE 0.5 MILLIGRAM(S): 2 INJECTION INTRAMUSCULAR; INTRAVENOUS; SUBCUTANEOUS at 06:57

## 2023-01-01 RX ADMIN — Medication 1200 MILLIGRAM(S): at 18:23

## 2023-01-01 RX ADMIN — ALBUTEROL 2 PUFF(S): 90 AEROSOL, METERED ORAL at 15:24

## 2023-01-01 RX ADMIN — Medication 10 MILLIGRAM(S): at 22:06

## 2023-01-01 RX ADMIN — MEMANTINE HYDROCHLORIDE 5 MILLIGRAM(S): 10 TABLET ORAL at 17:09

## 2023-01-01 RX ADMIN — INSULIN GLARGINE 5 UNIT(S): 100 INJECTION, SOLUTION SUBCUTANEOUS at 21:52

## 2023-01-01 RX ADMIN — Medication 1 SPRAY(S): at 17:29

## 2023-01-01 RX ADMIN — Medication 100 MILLIGRAM(S): at 05:55

## 2023-01-01 RX ADMIN — Medication 100 MILLIGRAM(S): at 06:03

## 2023-01-01 RX ADMIN — INSULIN GLARGINE 5 UNIT(S): 100 INJECTION, SOLUTION SUBCUTANEOUS at 22:18

## 2023-01-01 RX ADMIN — Medication 4 UNIT(S): at 17:15

## 2023-01-01 RX ADMIN — Medication 20 MILLIGRAM(S): at 05:31

## 2023-01-01 RX ADMIN — MORPHINE SULFATE 2 MILLIGRAM(S): 50 CAPSULE, EXTENDED RELEASE ORAL at 11:06

## 2023-01-01 RX ADMIN — ALBUTEROL 2 PUFF(S): 90 AEROSOL, METERED ORAL at 09:15

## 2023-01-01 RX ADMIN — FINASTERIDE 5 MILLIGRAM(S): 5 TABLET, FILM COATED ORAL at 11:03

## 2023-01-01 RX ADMIN — Medication 81 MILLIGRAM(S): at 12:22

## 2023-01-01 RX ADMIN — FINASTERIDE 5 MILLIGRAM(S): 5 TABLET, FILM COATED ORAL at 11:31

## 2023-01-01 RX ADMIN — BENZOCAINE AND MENTHOL 1 LOZENGE: 5; 1 LIQUID ORAL at 22:39

## 2023-01-01 RX ADMIN — Medication 1 SPRAY(S): at 05:44

## 2023-01-01 RX ADMIN — Medication 4: at 18:01

## 2023-01-01 RX ADMIN — Medication 50 MILLILITER(S): at 11:59

## 2023-01-01 RX ADMIN — Medication 1200 MILLIGRAM(S): at 06:19

## 2023-01-01 RX ADMIN — HEPARIN SODIUM 5000 UNIT(S): 5000 INJECTION INTRAVENOUS; SUBCUTANEOUS at 17:37

## 2023-01-01 RX ADMIN — Medication 100 MILLIGRAM(S): at 17:19

## 2023-01-01 RX ADMIN — FINASTERIDE 5 MILLIGRAM(S): 5 TABLET, FILM COATED ORAL at 12:46

## 2023-01-01 RX ADMIN — BENZOCAINE AND MENTHOL 1 LOZENGE: 5; 1 LIQUID ORAL at 05:32

## 2023-01-01 RX ADMIN — SCOPALAMINE 1 PATCH: 1 PATCH, EXTENDED RELEASE TRANSDERMAL at 19:14

## 2023-01-01 RX ADMIN — Medication 4 UNIT(S): at 17:52

## 2023-01-01 RX ADMIN — Medication 1 SPRAY(S): at 18:45

## 2023-01-01 RX ADMIN — OLANZAPINE 10 MILLIGRAM(S): 15 TABLET, FILM COATED ORAL at 22:16

## 2023-01-01 RX ADMIN — TAMSULOSIN HYDROCHLORIDE 0.4 MILLIGRAM(S): 0.4 CAPSULE ORAL at 22:00

## 2023-01-01 RX ADMIN — Medication 3 MILLILITER(S): at 14:37

## 2023-01-01 RX ADMIN — Medication 3 MILLILITER(S): at 20:12

## 2023-01-01 RX ADMIN — HYDROMORPHONE HYDROCHLORIDE 0.3 MILLIGRAM(S): 2 INJECTION INTRAMUSCULAR; INTRAVENOUS; SUBCUTANEOUS at 20:17

## 2023-01-01 RX ADMIN — ROBINUL 0.2 MILLIGRAM(S): 0.2 INJECTION INTRAMUSCULAR; INTRAVENOUS at 20:17

## 2023-01-01 RX ADMIN — Medication 40 MILLIGRAM(S): at 06:04

## 2023-01-01 RX ADMIN — Medication 0.5 MILLIGRAM(S): at 21:34

## 2023-01-01 RX ADMIN — Medication 1000 MILLIGRAM(S): at 21:36

## 2023-01-01 RX ADMIN — TIOTROPIUM BROMIDE 2 PUFF(S): 18 CAPSULE ORAL; RESPIRATORY (INHALATION) at 11:56

## 2023-01-01 RX ADMIN — LORATADINE 10 MILLIGRAM(S): 10 TABLET ORAL at 11:36

## 2023-01-01 RX ADMIN — Medication 4: at 11:51

## 2023-01-01 RX ADMIN — Medication 5 MILLIGRAM(S): at 22:22

## 2023-01-01 RX ADMIN — MONTELUKAST 10 MILLIGRAM(S): 4 TABLET, CHEWABLE ORAL at 22:22

## 2023-01-01 RX ADMIN — HEPARIN SODIUM 5000 UNIT(S): 5000 INJECTION INTRAVENOUS; SUBCUTANEOUS at 17:53

## 2023-01-01 RX ADMIN — Medication 250 MILLIGRAM(S): at 12:47

## 2023-01-01 RX ADMIN — MONTELUKAST 10 MILLIGRAM(S): 4 TABLET, CHEWABLE ORAL at 12:24

## 2023-01-01 RX ADMIN — Medication 250 MILLIGRAM(S): at 05:59

## 2023-01-01 RX ADMIN — SODIUM CHLORIDE 50 MILLILITER(S): 9 INJECTION, SOLUTION INTRAVENOUS at 14:21

## 2023-01-01 RX ADMIN — MONTELUKAST 10 MILLIGRAM(S): 4 TABLET, CHEWABLE ORAL at 11:35

## 2023-01-01 RX ADMIN — Medication 3 MILLILITER(S): at 03:30

## 2023-01-01 RX ADMIN — Medication 100 MILLIGRAM(S): at 06:17

## 2023-01-01 RX ADMIN — Medication 81 MILLIGRAM(S): at 12:46

## 2023-01-01 RX ADMIN — Medication 1200 MILLIGRAM(S): at 17:07

## 2023-01-01 RX ADMIN — Medication 5 MILLIGRAM(S): at 22:16

## 2023-01-01 RX ADMIN — Medication 3 MILLILITER(S): at 08:16

## 2023-01-01 RX ADMIN — Medication 40 MILLIGRAM(S): at 05:59

## 2023-01-01 RX ADMIN — MONTELUKAST 10 MILLIGRAM(S): 4 TABLET, CHEWABLE ORAL at 21:48

## 2023-01-01 RX ADMIN — BUMETANIDE 1 MILLIGRAM(S): 0.25 INJECTION INTRAMUSCULAR; INTRAVENOUS at 06:07

## 2023-01-01 RX ADMIN — Medication 1 SPRAY(S): at 05:17

## 2023-01-01 RX ADMIN — ALBUTEROL 2 PUFF(S): 90 AEROSOL, METERED ORAL at 22:28

## 2023-01-01 RX ADMIN — Medication 2: at 17:15

## 2023-01-01 RX ADMIN — ROBINUL 0.4 MILLIGRAM(S): 0.2 INJECTION INTRAMUSCULAR; INTRAVENOUS at 23:48

## 2023-01-01 RX ADMIN — TAMSULOSIN HYDROCHLORIDE 0.4 MILLIGRAM(S): 0.4 CAPSULE ORAL at 22:31

## 2023-01-01 RX ADMIN — ISOSORBIDE MONONITRATE 30 MILLIGRAM(S): 60 TABLET, EXTENDED RELEASE ORAL at 11:58

## 2023-01-01 RX ADMIN — Medication 250 MILLIGRAM(S): at 12:27

## 2023-01-01 RX ADMIN — Medication 200 MILLIGRAM(S): at 22:59

## 2023-01-01 RX ADMIN — ROBINUL 0.4 MILLIGRAM(S): 0.2 INJECTION INTRAMUSCULAR; INTRAVENOUS at 04:51

## 2023-01-01 RX ADMIN — Medication 81 MILLIGRAM(S): at 12:15

## 2023-01-01 RX ADMIN — Medication 1 SPRAY(S): at 17:08

## 2023-01-01 RX ADMIN — Medication 20 MILLIGRAM(S): at 05:27

## 2023-01-01 RX ADMIN — Medication 5 MILLIGRAM(S): at 21:41

## 2023-01-01 RX ADMIN — Medication 3 MILLILITER(S): at 09:11

## 2023-01-01 RX ADMIN — TAMSULOSIN HYDROCHLORIDE 0.4 MILLIGRAM(S): 0.4 CAPSULE ORAL at 22:15

## 2023-01-01 RX ADMIN — Medication 250 MILLIGRAM(S): at 06:58

## 2023-01-01 RX ADMIN — OLANZAPINE 10 MILLIGRAM(S): 15 TABLET, FILM COATED ORAL at 22:14

## 2023-01-01 RX ADMIN — Medication 3 MILLILITER(S): at 08:04

## 2023-01-01 RX ADMIN — Medication 81 MILLIGRAM(S): at 11:30

## 2023-01-01 RX ADMIN — Medication 1000 MILLIGRAM(S): at 22:13

## 2023-01-01 RX ADMIN — Medication 81 MILLIGRAM(S): at 13:31

## 2023-01-01 RX ADMIN — Medication 3 MILLILITER(S): at 02:11

## 2023-01-01 RX ADMIN — BUMETANIDE 1 MILLIGRAM(S): 0.25 INJECTION INTRAMUSCULAR; INTRAVENOUS at 18:10

## 2023-01-01 RX ADMIN — ATORVASTATIN CALCIUM 40 MILLIGRAM(S): 80 TABLET, FILM COATED ORAL at 21:47

## 2023-01-01 RX ADMIN — Medication 1000 MILLIGRAM(S): at 22:31

## 2023-01-01 RX ADMIN — Medication 3 MILLILITER(S): at 14:48

## 2023-01-01 RX ADMIN — TAMSULOSIN HYDROCHLORIDE 0.4 MILLIGRAM(S): 0.4 CAPSULE ORAL at 22:23

## 2023-01-01 RX ADMIN — Medication 3 MILLILITER(S): at 20:14

## 2023-01-01 RX ADMIN — Medication 100 MILLIGRAM(S): at 17:26

## 2023-01-01 RX ADMIN — Medication 12.5 MILLIGRAM(S): at 17:48

## 2023-01-01 RX ADMIN — Medication 25 MILLIGRAM(S): at 18:36

## 2023-01-01 RX ADMIN — Medication 25 MILLIGRAM(S): at 05:16

## 2023-01-01 RX ADMIN — Medication 2: at 11:48

## 2023-01-01 RX ADMIN — Medication 250 MILLIGRAM(S): at 07:59

## 2023-01-01 RX ADMIN — LACTULOSE 20 GRAM(S): 10 SOLUTION ORAL at 22:10

## 2023-01-01 RX ADMIN — FINASTERIDE 5 MILLIGRAM(S): 5 TABLET, FILM COATED ORAL at 13:18

## 2023-01-01 RX ADMIN — ALBUTEROL 2 PUFF(S): 90 AEROSOL, METERED ORAL at 03:32

## 2023-01-01 RX ADMIN — Medication 100 MILLIGRAM(S): at 05:16

## 2023-01-01 RX ADMIN — HEPARIN SODIUM 5000 UNIT(S): 5000 INJECTION INTRAVENOUS; SUBCUTANEOUS at 17:21

## 2023-01-01 RX ADMIN — Medication 15 GRAM(S): at 18:39

## 2023-01-01 RX ADMIN — Medication 40 MILLIGRAM(S): at 17:37

## 2023-01-01 RX ADMIN — Medication 1 SPRAY(S): at 17:27

## 2023-01-01 RX ADMIN — Medication 250 MILLIGRAM(S): at 06:18

## 2023-01-01 RX ADMIN — CEFTRIAXONE 100 MILLIGRAM(S): 500 INJECTION, POWDER, FOR SOLUTION INTRAMUSCULAR; INTRAVENOUS at 06:17

## 2023-01-01 RX ADMIN — BENZOCAINE AND MENTHOL 1 LOZENGE: 5; 1 LIQUID ORAL at 22:21

## 2023-01-01 RX ADMIN — MONTELUKAST 10 MILLIGRAM(S): 4 TABLET, CHEWABLE ORAL at 11:52

## 2023-01-01 RX ADMIN — Medication 250 MILLIGRAM(S): at 11:49

## 2023-01-01 RX ADMIN — Medication 1200 MILLIGRAM(S): at 18:24

## 2023-01-01 RX ADMIN — Medication 6: at 07:52

## 2023-01-01 RX ADMIN — HEPARIN SODIUM 5000 UNIT(S): 5000 INJECTION INTRAVENOUS; SUBCUTANEOUS at 05:16

## 2023-01-01 RX ADMIN — Medication 81 MILLIGRAM(S): at 13:17

## 2023-01-01 RX ADMIN — Medication 3: at 22:01

## 2023-01-01 RX ADMIN — Medication 100 MILLIGRAM(S): at 05:40

## 2023-01-01 RX ADMIN — Medication 100 MILLIGRAM(S): at 05:43

## 2023-01-01 RX ADMIN — Medication 50 MILLIGRAM(S): at 05:56

## 2023-01-01 RX ADMIN — Medication 1200 MILLIGRAM(S): at 06:07

## 2023-01-01 RX ADMIN — SODIUM ZIRCONIUM CYCLOSILICATE 10 GRAM(S): 10 POWDER, FOR SUSPENSION ORAL at 05:43

## 2023-01-01 RX ADMIN — FINASTERIDE 5 MILLIGRAM(S): 5 TABLET, FILM COATED ORAL at 11:48

## 2023-01-01 RX ADMIN — Medication 1200 MILLIGRAM(S): at 17:36

## 2023-01-01 RX ADMIN — MEMANTINE HYDROCHLORIDE 5 MILLIGRAM(S): 10 TABLET ORAL at 05:56

## 2023-01-01 RX ADMIN — Medication 81 MILLIGRAM(S): at 11:04

## 2023-01-01 RX ADMIN — Medication 20 MILLIGRAM(S): at 06:16

## 2023-01-01 RX ADMIN — Medication 100 MILLIGRAM(S): at 05:38

## 2023-01-01 RX ADMIN — Medication 1 SPRAY(S): at 05:40

## 2023-01-01 RX ADMIN — Medication 50 MILLILITER(S): at 09:05

## 2023-01-01 RX ADMIN — ATORVASTATIN CALCIUM 40 MILLIGRAM(S): 80 TABLET, FILM COATED ORAL at 21:51

## 2023-01-01 RX ADMIN — Medication 1000 MILLIGRAM(S): at 21:18

## 2023-01-01 RX ADMIN — Medication 1000 MILLIGRAM(S): at 21:51

## 2023-01-01 RX ADMIN — Medication 1: at 17:06

## 2023-01-01 RX ADMIN — ATORVASTATIN CALCIUM 40 MILLIGRAM(S): 80 TABLET, FILM COATED ORAL at 21:52

## 2023-01-01 RX ADMIN — BUMETANIDE 1 MILLIGRAM(S): 0.25 INJECTION INTRAMUSCULAR; INTRAVENOUS at 05:43

## 2023-01-01 RX ADMIN — HEPARIN SODIUM 5000 UNIT(S): 5000 INJECTION INTRAVENOUS; SUBCUTANEOUS at 15:33

## 2023-01-01 RX ADMIN — Medication 1 TABLET(S): at 11:31

## 2023-01-01 RX ADMIN — Medication 4: at 12:03

## 2023-01-01 RX ADMIN — Medication 1 SPRAY(S): at 05:15

## 2023-01-01 RX ADMIN — ATORVASTATIN CALCIUM 40 MILLIGRAM(S): 80 TABLET, FILM COATED ORAL at 22:14

## 2023-01-01 RX ADMIN — HYDROMORPHONE HYDROCHLORIDE 0.5 MILLIGRAM(S): 2 INJECTION INTRAMUSCULAR; INTRAVENOUS; SUBCUTANEOUS at 05:15

## 2023-01-01 RX ADMIN — Medication 1 SPRAY(S): at 05:59

## 2023-01-01 RX ADMIN — ROBINUL 0.4 MILLIGRAM(S): 0.2 INJECTION INTRAMUSCULAR; INTRAVENOUS at 05:14

## 2023-01-01 RX ADMIN — Medication 3 MILLILITER(S): at 03:46

## 2023-01-01 RX ADMIN — ATORVASTATIN CALCIUM 40 MILLIGRAM(S): 80 TABLET, FILM COATED ORAL at 21:38

## 2023-01-01 RX ADMIN — HEPARIN SODIUM 5000 UNIT(S): 5000 INJECTION INTRAVENOUS; SUBCUTANEOUS at 17:13

## 2023-01-01 RX ADMIN — HYDROMORPHONE HYDROCHLORIDE 0.3 MILLIGRAM(S): 2 INJECTION INTRAMUSCULAR; INTRAVENOUS; SUBCUTANEOUS at 20:56

## 2023-01-01 RX ADMIN — Medication 3 MILLILITER(S): at 15:07

## 2023-01-01 RX ADMIN — ATORVASTATIN CALCIUM 40 MILLIGRAM(S): 80 TABLET, FILM COATED ORAL at 22:18

## 2023-01-01 RX ADMIN — FINASTERIDE 5 MILLIGRAM(S): 5 TABLET, FILM COATED ORAL at 11:35

## 2023-01-01 RX ADMIN — MEMANTINE HYDROCHLORIDE 5 MILLIGRAM(S): 10 TABLET ORAL at 17:26

## 2023-01-01 RX ADMIN — Medication 81 MILLIGRAM(S): at 11:52

## 2023-01-01 RX ADMIN — Medication 50 MILLILITER(S): at 06:11

## 2023-01-01 RX ADMIN — Medication 1 SPRAY(S): at 06:16

## 2023-01-01 RX ADMIN — Medication 3 MILLILITER(S): at 20:29

## 2023-01-01 RX ADMIN — MONTELUKAST 10 MILLIGRAM(S): 4 TABLET, CHEWABLE ORAL at 12:18

## 2023-01-01 RX ADMIN — ROBINUL 0.4 MILLIGRAM(S): 0.2 INJECTION INTRAMUSCULAR; INTRAVENOUS at 01:01

## 2023-01-01 RX ADMIN — Medication 100 MILLIGRAM(S): at 06:09

## 2023-01-01 RX ADMIN — ROBINUL 0.4 MILLIGRAM(S): 0.2 INJECTION INTRAMUSCULAR; INTRAVENOUS at 10:36

## 2023-01-01 RX ADMIN — ISOSORBIDE MONONITRATE 30 MILLIGRAM(S): 60 TABLET, EXTENDED RELEASE ORAL at 11:48

## 2023-01-01 RX ADMIN — INSULIN HUMAN 10 UNIT(S): 100 INJECTION, SOLUTION SUBCUTANEOUS at 17:38

## 2023-01-01 RX ADMIN — Medication 4 UNIT(S): at 17:36

## 2023-01-01 RX ADMIN — Medication 3 MILLILITER(S): at 15:26

## 2023-01-01 RX ADMIN — Medication 100 MILLIGRAM(S): at 17:12

## 2023-01-01 RX ADMIN — ALBUTEROL 2 PUFF(S): 90 AEROSOL, METERED ORAL at 22:30

## 2023-01-01 RX ADMIN — HYDROMORPHONE HYDROCHLORIDE 0.5 MILLIGRAM(S): 2 INJECTION INTRAMUSCULAR; INTRAVENOUS; SUBCUTANEOUS at 11:09

## 2023-01-01 RX ADMIN — Medication 6 UNIT(S): at 17:07

## 2023-01-01 RX ADMIN — SODIUM ZIRCONIUM CYCLOSILICATE 10 GRAM(S): 10 POWDER, FOR SUSPENSION ORAL at 14:31

## 2023-01-01 RX ADMIN — SCOPALAMINE 1 PATCH: 1 PATCH, EXTENDED RELEASE TRANSDERMAL at 08:21

## 2023-01-01 RX ADMIN — Medication 1000 MILLIGRAM(S): at 22:18

## 2023-01-01 RX ADMIN — Medication 1 SPRAY(S): at 17:24

## 2023-01-01 RX ADMIN — Medication 1 SPRAY(S): at 18:24

## 2023-01-01 RX ADMIN — BENZOCAINE AND MENTHOL 1 LOZENGE: 5; 1 LIQUID ORAL at 13:41

## 2023-01-01 RX ADMIN — Medication 10 MILLIGRAM(S): at 05:44

## 2023-01-01 RX ADMIN — ATORVASTATIN CALCIUM 40 MILLIGRAM(S): 80 TABLET, FILM COATED ORAL at 22:28

## 2023-01-01 RX ADMIN — ATORVASTATIN CALCIUM 40 MILLIGRAM(S): 80 TABLET, FILM COATED ORAL at 21:17

## 2023-01-01 RX ADMIN — TAMSULOSIN HYDROCHLORIDE 0.4 MILLIGRAM(S): 0.4 CAPSULE ORAL at 21:17

## 2023-01-01 RX ADMIN — Medication 3 MILLILITER(S): at 08:02

## 2023-01-01 RX ADMIN — Medication 12.5 MILLIGRAM(S): at 17:36

## 2023-01-01 RX ADMIN — Medication 4 UNIT(S): at 17:21

## 2023-01-01 RX ADMIN — Medication 20 MILLIGRAM(S): at 06:38

## 2023-01-01 RX ADMIN — Medication 1000 MILLIGRAM(S): at 22:32

## 2023-01-01 RX ADMIN — Medication 3 MILLILITER(S): at 08:08

## 2023-01-01 RX ADMIN — Medication 650 MILLIGRAM(S): at 05:19

## 2023-01-01 RX ADMIN — Medication 100 MILLIGRAM(S): at 05:17

## 2023-01-01 RX ADMIN — HEPARIN SODIUM 5000 UNIT(S): 5000 INJECTION INTRAVENOUS; SUBCUTANEOUS at 05:31

## 2023-01-01 RX ADMIN — Medication 3 MILLILITER(S): at 12:09

## 2023-01-01 RX ADMIN — Medication 3 MILLILITER(S): at 16:02

## 2023-01-01 RX ADMIN — Medication 1200 MILLIGRAM(S): at 06:02

## 2023-01-01 RX ADMIN — FINASTERIDE 5 MILLIGRAM(S): 5 TABLET, FILM COATED ORAL at 12:28

## 2023-01-01 RX ADMIN — Medication 1 SPRAY(S): at 18:10

## 2023-01-01 RX ADMIN — Medication 25 MILLIGRAM(S): at 05:40

## 2023-01-01 RX ADMIN — OLANZAPINE 10 MILLIGRAM(S): 15 TABLET, FILM COATED ORAL at 22:20

## 2023-01-01 RX ADMIN — MONTELUKAST 10 MILLIGRAM(S): 4 TABLET, CHEWABLE ORAL at 21:26

## 2023-01-01 RX ADMIN — LORATADINE 10 MILLIGRAM(S): 10 TABLET ORAL at 12:32

## 2023-01-01 RX ADMIN — Medication 100 MILLIGRAM(S): at 17:09

## 2023-01-01 RX ADMIN — OLANZAPINE 10 MILLIGRAM(S): 15 TABLET, FILM COATED ORAL at 21:40

## 2023-01-01 RX ADMIN — MEMANTINE HYDROCHLORIDE 5 MILLIGRAM(S): 10 TABLET ORAL at 05:38

## 2023-01-01 RX ADMIN — TIOTROPIUM BROMIDE 2 PUFF(S): 18 CAPSULE ORAL; RESPIRATORY (INHALATION) at 13:32

## 2023-01-01 RX ADMIN — Medication 1 SPRAY(S): at 19:10

## 2023-01-01 RX ADMIN — HYDROMORPHONE HYDROCHLORIDE 0.5 MILLIGRAM(S): 2 INJECTION INTRAMUSCULAR; INTRAVENOUS; SUBCUTANEOUS at 17:00

## 2023-01-01 RX ADMIN — MONTELUKAST 10 MILLIGRAM(S): 4 TABLET, CHEWABLE ORAL at 23:10

## 2023-01-01 RX ADMIN — Medication 1000 MILLIGRAM(S): at 21:53

## 2023-01-01 RX ADMIN — BUMETANIDE 1 MILLIGRAM(S): 0.25 INJECTION INTRAMUSCULAR; INTRAVENOUS at 05:16

## 2023-01-01 RX ADMIN — TIOTROPIUM BROMIDE 2 PUFF(S): 18 CAPSULE ORAL; RESPIRATORY (INHALATION) at 13:34

## 2023-01-01 RX ADMIN — ISOSORBIDE MONONITRATE 30 MILLIGRAM(S): 60 TABLET, EXTENDED RELEASE ORAL at 12:47

## 2023-01-01 RX ADMIN — Medication 1 TABLET(S): at 11:55

## 2023-01-01 RX ADMIN — ROBINUL 0.4 MILLIGRAM(S): 0.2 INJECTION INTRAMUSCULAR; INTRAVENOUS at 10:58

## 2023-01-01 RX ADMIN — Medication 1200 MILLIGRAM(S): at 06:04

## 2023-01-01 RX ADMIN — Medication 250 MILLIGRAM(S): at 21:58

## 2023-01-01 RX ADMIN — Medication 3 MILLILITER(S): at 15:22

## 2023-01-01 RX ADMIN — TIOTROPIUM BROMIDE 2 PUFF(S): 18 CAPSULE ORAL; RESPIRATORY (INHALATION) at 12:23

## 2023-01-01 RX ADMIN — ALBUTEROL 2 PUFF(S): 90 AEROSOL, METERED ORAL at 11:49

## 2023-01-01 RX ADMIN — Medication 4: at 11:36

## 2023-01-01 RX ADMIN — OLANZAPINE 10 MILLIGRAM(S): 15 TABLET, FILM COATED ORAL at 22:22

## 2023-01-01 RX ADMIN — Medication 1: at 08:20

## 2023-01-01 RX ADMIN — Medication 100 MILLIGRAM(S): at 17:53

## 2023-01-01 RX ADMIN — Medication 1 MILLIGRAM(S): at 05:14

## 2023-01-01 RX ADMIN — Medication 100 MILLIGRAM(S): at 05:27

## 2023-01-01 RX ADMIN — Medication 2: at 18:09

## 2023-01-01 RX ADMIN — SODIUM ZIRCONIUM CYCLOSILICATE 10 GRAM(S): 10 POWDER, FOR SUSPENSION ORAL at 22:16

## 2023-01-01 RX ADMIN — Medication 1 SPRAY(S): at 17:46

## 2023-01-01 RX ADMIN — LORATADINE 10 MILLIGRAM(S): 10 TABLET ORAL at 12:24

## 2023-01-01 RX ADMIN — ALBUTEROL 2 PUFF(S): 90 AEROSOL, METERED ORAL at 22:10

## 2023-01-01 RX ADMIN — INSULIN GLARGINE 5 UNIT(S): 100 INJECTION, SOLUTION SUBCUTANEOUS at 21:33

## 2023-01-01 RX ADMIN — FINASTERIDE 5 MILLIGRAM(S): 5 TABLET, FILM COATED ORAL at 11:57

## 2023-01-01 RX ADMIN — HYDROMORPHONE HYDROCHLORIDE 0.5 MILLIGRAM(S): 2 INJECTION INTRAMUSCULAR; INTRAVENOUS; SUBCUTANEOUS at 10:39

## 2023-01-01 RX ADMIN — Medication 50 MILLILITER(S): at 21:52

## 2023-01-01 RX ADMIN — Medication 3 MILLILITER(S): at 03:01

## 2023-01-01 RX ADMIN — Medication 250 MILLIGRAM(S): at 06:14

## 2023-01-01 RX ADMIN — SCOPALAMINE 1 PATCH: 1 PATCH, EXTENDED RELEASE TRANSDERMAL at 11:41

## 2023-01-01 RX ADMIN — Medication 1000 MILLIGRAM(S): at 21:32

## 2023-01-01 RX ADMIN — MEMANTINE HYDROCHLORIDE 5 MILLIGRAM(S): 10 TABLET ORAL at 17:21

## 2023-01-01 RX ADMIN — Medication 81 MILLIGRAM(S): at 12:57

## 2023-01-01 RX ADMIN — Medication 1 TABLET(S): at 12:28

## 2023-01-01 RX ADMIN — HYDROMORPHONE HYDROCHLORIDE 0.3 MILLIGRAM(S): 2 INJECTION INTRAMUSCULAR; INTRAVENOUS; SUBCUTANEOUS at 05:44

## 2023-01-01 RX ADMIN — MEMANTINE HYDROCHLORIDE 5 MILLIGRAM(S): 10 TABLET ORAL at 18:04

## 2023-01-01 RX ADMIN — Medication 81 MILLIGRAM(S): at 12:19

## 2023-01-01 RX ADMIN — Medication 4 UNIT(S): at 07:59

## 2023-01-01 RX ADMIN — TAMSULOSIN HYDROCHLORIDE 0.4 MILLIGRAM(S): 0.4 CAPSULE ORAL at 21:40

## 2023-01-01 RX ADMIN — LORATADINE 10 MILLIGRAM(S): 10 TABLET ORAL at 12:17

## 2023-01-01 RX ADMIN — Medication 4 UNIT(S): at 12:16

## 2023-01-01 RX ADMIN — MEMANTINE HYDROCHLORIDE 5 MILLIGRAM(S): 10 TABLET ORAL at 06:09

## 2023-01-01 RX ADMIN — FINASTERIDE 5 MILLIGRAM(S): 5 TABLET, FILM COATED ORAL at 11:42

## 2023-01-01 RX ADMIN — OLANZAPINE 10 MILLIGRAM(S): 15 TABLET, FILM COATED ORAL at 22:24

## 2023-01-01 RX ADMIN — Medication 81 MILLIGRAM(S): at 12:03

## 2023-01-01 RX ADMIN — Medication 4 UNIT(S): at 08:05

## 2023-01-01 RX ADMIN — ALBUTEROL 2 PUFF(S): 90 AEROSOL, METERED ORAL at 22:04

## 2023-01-01 RX ADMIN — Medication 81 MILLIGRAM(S): at 12:25

## 2023-01-01 RX ADMIN — Medication 1 TABLET(S): at 12:46

## 2023-01-01 RX ADMIN — Medication 1000 MILLIGRAM(S): at 22:22

## 2023-01-01 RX ADMIN — TAMSULOSIN HYDROCHLORIDE 0.4 MILLIGRAM(S): 0.4 CAPSULE ORAL at 21:51

## 2023-01-01 RX ADMIN — Medication 1000 MILLIGRAM(S): at 22:20

## 2023-01-01 RX ADMIN — Medication 650 MILLIGRAM(S): at 03:18

## 2023-01-01 RX ADMIN — ROBINUL 0.4 MILLIGRAM(S): 0.2 INJECTION INTRAMUSCULAR; INTRAVENOUS at 15:03

## 2023-01-01 RX ADMIN — Medication 250 MILLIGRAM(S): at 22:27

## 2023-01-01 RX ADMIN — Medication 1 SPRAY(S): at 17:11

## 2023-01-01 RX ADMIN — TAMSULOSIN HYDROCHLORIDE 0.4 MILLIGRAM(S): 0.4 CAPSULE ORAL at 22:22

## 2023-01-01 RX ADMIN — ATORVASTATIN CALCIUM 40 MILLIGRAM(S): 80 TABLET, FILM COATED ORAL at 21:32

## 2023-01-01 RX ADMIN — Medication 2: at 18:02

## 2023-01-01 RX ADMIN — MEMANTINE HYDROCHLORIDE 5 MILLIGRAM(S): 10 TABLET ORAL at 18:16

## 2023-01-01 RX ADMIN — Medication 650 MILLIGRAM(S): at 22:19

## 2023-01-01 RX ADMIN — TAMSULOSIN HYDROCHLORIDE 0.4 MILLIGRAM(S): 0.4 CAPSULE ORAL at 22:12

## 2023-01-01 RX ADMIN — TIOTROPIUM BROMIDE 2 PUFF(S): 18 CAPSULE ORAL; RESPIRATORY (INHALATION) at 12:11

## 2023-01-01 RX ADMIN — Medication 3 MILLILITER(S): at 03:11

## 2023-01-01 RX ADMIN — Medication 100 MILLIGRAM(S): at 06:04

## 2023-01-01 RX ADMIN — MEMANTINE HYDROCHLORIDE 5 MILLIGRAM(S): 10 TABLET ORAL at 17:51

## 2023-01-01 RX ADMIN — Medication 20 MILLIGRAM(S): at 05:26

## 2023-01-01 RX ADMIN — Medication 10 MILLIGRAM(S): at 06:04

## 2023-01-01 RX ADMIN — Medication 250 MILLIGRAM(S): at 17:20

## 2023-01-01 RX ADMIN — Medication 10 MILLIGRAM(S): at 06:02

## 2023-01-01 RX ADMIN — ROBINUL 0.4 MILLIGRAM(S): 0.2 INJECTION INTRAMUSCULAR; INTRAVENOUS at 18:33

## 2023-01-01 RX ADMIN — TAMSULOSIN HYDROCHLORIDE 0.8 MILLIGRAM(S): 0.4 CAPSULE ORAL at 21:52

## 2023-01-01 RX ADMIN — Medication 20 MILLIGRAM(S): at 16:20

## 2023-01-01 RX ADMIN — LORATADINE 10 MILLIGRAM(S): 10 TABLET ORAL at 11:47

## 2023-01-01 RX ADMIN — MONTELUKAST 10 MILLIGRAM(S): 4 TABLET, CHEWABLE ORAL at 14:28

## 2023-01-01 RX ADMIN — Medication 52.5 MILLIGRAM(S): at 18:30

## 2023-01-01 RX ADMIN — HYDROMORPHONE HYDROCHLORIDE 0.3 MILLIGRAM(S): 2 INJECTION INTRAMUSCULAR; INTRAVENOUS; SUBCUTANEOUS at 06:03

## 2023-01-01 RX ADMIN — Medication 100 MILLIGRAM(S): at 17:29

## 2023-01-01 RX ADMIN — Medication 81 MILLIGRAM(S): at 11:35

## 2023-01-01 RX ADMIN — Medication 4 UNIT(S): at 12:19

## 2023-01-01 RX ADMIN — Medication 1: at 17:29

## 2023-01-01 RX ADMIN — Medication 100 MILLIGRAM(S): at 22:28

## 2023-01-01 RX ADMIN — Medication 650 MILLIGRAM(S): at 23:19

## 2023-01-01 RX ADMIN — Medication 250 MILLIGRAM(S): at 05:26

## 2023-01-01 RX ADMIN — ALBUTEROL 2 PUFF(S): 90 AEROSOL, METERED ORAL at 08:17

## 2023-01-01 RX ADMIN — MONTELUKAST 10 MILLIGRAM(S): 4 TABLET, CHEWABLE ORAL at 12:03

## 2023-01-01 RX ADMIN — FINASTERIDE 5 MILLIGRAM(S): 5 TABLET, FILM COATED ORAL at 11:54

## 2023-01-01 RX ADMIN — Medication 25 MILLIGRAM(S): at 05:07

## 2023-01-01 RX ADMIN — Medication 25 MILLIGRAM(S): at 18:15

## 2023-01-01 RX ADMIN — Medication 4 UNIT(S): at 17:50

## 2023-01-01 RX ADMIN — TIOTROPIUM BROMIDE 2 PUFF(S): 18 CAPSULE ORAL; RESPIRATORY (INHALATION) at 11:40

## 2023-01-01 RX ADMIN — Medication 100 MILLIGRAM(S): at 17:54

## 2023-01-01 RX ADMIN — Medication 200 MILLIGRAM(S): at 05:59

## 2023-01-01 RX ADMIN — Medication 4 UNIT(S): at 08:31

## 2023-01-01 RX ADMIN — HEPARIN SODIUM 5000 UNIT(S): 5000 INJECTION INTRAVENOUS; SUBCUTANEOUS at 21:19

## 2023-01-01 RX ADMIN — MONTELUKAST 10 MILLIGRAM(S): 4 TABLET, CHEWABLE ORAL at 23:05

## 2023-01-01 RX ADMIN — Medication 1 SPRAY(S): at 05:27

## 2023-01-01 RX ADMIN — Medication 40 MILLIGRAM(S): at 17:33

## 2023-01-01 RX ADMIN — Medication 12.5 MILLIGRAM(S): at 06:20

## 2023-01-01 RX ADMIN — Medication 2: at 17:28

## 2023-01-01 RX ADMIN — Medication 12.5 MILLIGRAM(S): at 06:04

## 2023-01-01 RX ADMIN — Medication 50 MILLIGRAM(S): at 05:37

## 2023-01-01 RX ADMIN — Medication 1: at 17:10

## 2023-01-01 RX ADMIN — Medication 250 MILLIGRAM(S): at 11:27

## 2023-01-01 RX ADMIN — Medication 6 UNIT(S): at 17:47

## 2023-01-01 RX ADMIN — Medication 5 MILLIGRAM(S): at 22:24

## 2023-01-01 RX ADMIN — Medication 1200 MILLIGRAM(S): at 17:49

## 2023-01-01 RX ADMIN — Medication 50 MILLILITER(S): at 16:13

## 2023-01-01 RX ADMIN — HYDROMORPHONE HYDROCHLORIDE 0.3 MILLIGRAM(S): 2 INJECTION INTRAMUSCULAR; INTRAVENOUS; SUBCUTANEOUS at 15:53

## 2023-01-01 RX ADMIN — FINASTERIDE 5 MILLIGRAM(S): 5 TABLET, FILM COATED ORAL at 12:47

## 2023-01-01 RX ADMIN — Medication 81 MILLIGRAM(S): at 11:57

## 2023-01-01 RX ADMIN — ROBINUL 0.4 MILLIGRAM(S): 0.2 INJECTION INTRAMUSCULAR; INTRAVENOUS at 02:07

## 2023-01-01 RX ADMIN — OLANZAPINE 2.5 MILLIGRAM(S): 15 TABLET, FILM COATED ORAL at 22:27

## 2023-01-01 RX ADMIN — Medication 1 PACKET(S): at 10:19

## 2023-01-01 RX ADMIN — BENZOCAINE AND MENTHOL 1 LOZENGE: 5; 1 LIQUID ORAL at 21:52

## 2023-01-01 RX ADMIN — Medication 50 MILLILITER(S): at 23:25

## 2023-01-01 RX ADMIN — Medication 1 SPRAY(S): at 17:56

## 2023-01-01 RX ADMIN — SCOPALAMINE 1 PATCH: 1 PATCH, EXTENDED RELEASE TRANSDERMAL at 19:08

## 2023-01-01 RX ADMIN — HEPARIN SODIUM 5000 UNIT(S): 5000 INJECTION INTRAVENOUS; SUBCUTANEOUS at 17:26

## 2023-01-01 RX ADMIN — INSULIN GLARGINE 5 UNIT(S): 100 INJECTION, SOLUTION SUBCUTANEOUS at 21:49

## 2023-01-01 RX ADMIN — OLANZAPINE 10 MILLIGRAM(S): 15 TABLET, FILM COATED ORAL at 22:18

## 2023-01-01 RX ADMIN — Medication 81 MILLIGRAM(S): at 12:24

## 2023-01-01 RX ADMIN — Medication 10 MILLIGRAM(S): at 13:32

## 2023-01-01 RX ADMIN — BUMETANIDE 1 MILLIGRAM(S): 0.25 INJECTION INTRAMUSCULAR; INTRAVENOUS at 06:09

## 2023-01-01 RX ADMIN — Medication 3 MILLILITER(S): at 20:16

## 2023-01-01 RX ADMIN — MEMANTINE HYDROCHLORIDE 5 MILLIGRAM(S): 10 TABLET ORAL at 17:16

## 2023-01-01 RX ADMIN — Medication 1: at 11:46

## 2023-01-01 RX ADMIN — Medication 1 TABLET(S): at 13:18

## 2023-01-01 RX ADMIN — Medication 81 MILLIGRAM(S): at 12:28

## 2023-01-01 RX ADMIN — ALBUTEROL 2 PUFF(S): 90 AEROSOL, METERED ORAL at 10:56

## 2023-01-01 RX ADMIN — INSULIN GLARGINE 5 UNIT(S): 100 INJECTION, SOLUTION SUBCUTANEOUS at 22:26

## 2023-01-01 RX ADMIN — Medication 3 MILLILITER(S): at 16:07

## 2023-01-01 RX ADMIN — Medication 3 MILLILITER(S): at 09:34

## 2023-01-01 RX ADMIN — ALBUTEROL 2 PUFF(S): 90 AEROSOL, METERED ORAL at 14:52

## 2023-01-01 RX ADMIN — MONTELUKAST 10 MILLIGRAM(S): 4 TABLET, CHEWABLE ORAL at 21:47

## 2023-01-01 RX ADMIN — Medication 100 MILLIGRAM(S): at 06:13

## 2023-01-01 RX ADMIN — Medication 100 MILLIGRAM(S): at 18:15

## 2023-01-01 RX ADMIN — MEMANTINE HYDROCHLORIDE 5 MILLIGRAM(S): 10 TABLET ORAL at 05:31

## 2023-01-01 RX ADMIN — Medication 1 TABLET(S): at 11:51

## 2023-01-01 RX ADMIN — MEMANTINE HYDROCHLORIDE 5 MILLIGRAM(S): 10 TABLET ORAL at 18:10

## 2023-01-01 RX ADMIN — Medication 100 MILLIGRAM(S): at 17:37

## 2023-01-01 RX ADMIN — ALBUTEROL 2 PUFF(S): 90 AEROSOL, METERED ORAL at 14:29

## 2023-01-01 RX ADMIN — TIOTROPIUM BROMIDE 2 PUFF(S): 18 CAPSULE ORAL; RESPIRATORY (INHALATION) at 13:48

## 2023-01-01 RX ADMIN — Medication 1: at 08:16

## 2023-01-01 RX ADMIN — ISOSORBIDE MONONITRATE 30 MILLIGRAM(S): 60 TABLET, EXTENDED RELEASE ORAL at 11:42

## 2023-01-01 RX ADMIN — Medication 3 MILLILITER(S): at 02:30

## 2023-01-01 RX ADMIN — Medication 6 UNIT(S): at 11:56

## 2023-01-01 RX ADMIN — Medication 1 TABLET(S): at 12:47

## 2023-01-01 RX ADMIN — CEFEPIME 100 MILLIGRAM(S): 1 INJECTION, POWDER, FOR SOLUTION INTRAMUSCULAR; INTRAVENOUS at 05:38

## 2023-01-01 RX ADMIN — Medication 100 MILLIGRAM(S): at 17:17

## 2023-01-01 RX ADMIN — Medication 25 MILLIGRAM(S): at 05:26

## 2023-01-01 RX ADMIN — HEPARIN SODIUM 5000 UNIT(S): 5000 INJECTION INTRAVENOUS; SUBCUTANEOUS at 05:59

## 2023-01-01 RX ADMIN — Medication 250 MILLIGRAM(S): at 12:11

## 2023-01-01 RX ADMIN — Medication 1 SPRAY(S): at 17:31

## 2023-01-01 RX ADMIN — Medication 3 MILLILITER(S): at 14:27

## 2023-01-01 RX ADMIN — Medication 2: at 12:26

## 2023-01-01 RX ADMIN — Medication 200 MILLIGRAM(S): at 01:49

## 2023-01-01 RX ADMIN — LACTULOSE 20 GRAM(S): 10 SOLUTION ORAL at 22:27

## 2023-01-01 RX ADMIN — OLANZAPINE 2.5 MILLIGRAM(S): 15 TABLET, FILM COATED ORAL at 21:26

## 2023-01-01 RX ADMIN — MEMANTINE HYDROCHLORIDE 5 MILLIGRAM(S): 10 TABLET ORAL at 18:47

## 2023-01-01 RX ADMIN — LACTULOSE 20 GRAM(S): 10 SOLUTION ORAL at 22:21

## 2023-01-01 RX ADMIN — TIOTROPIUM BROMIDE 2 PUFF(S): 18 CAPSULE ORAL; RESPIRATORY (INHALATION) at 12:38

## 2023-01-01 RX ADMIN — BENZOCAINE AND MENTHOL 1 LOZENGE: 5; 1 LIQUID ORAL at 13:17

## 2023-01-01 RX ADMIN — ATORVASTATIN CALCIUM 40 MILLIGRAM(S): 80 TABLET, FILM COATED ORAL at 22:10

## 2023-01-01 RX ADMIN — ALBUTEROL 2 PUFF(S): 90 AEROSOL, METERED ORAL at 21:26

## 2023-01-01 RX ADMIN — Medication 1: at 08:30

## 2023-01-01 RX ADMIN — MEMANTINE HYDROCHLORIDE 5 MILLIGRAM(S): 10 TABLET ORAL at 05:06

## 2023-01-01 RX ADMIN — Medication 250 MILLIGRAM(S): at 11:42

## 2023-01-01 RX ADMIN — Medication 200 MILLIGRAM(S): at 05:33

## 2023-01-01 RX ADMIN — Medication 1 SPRAY(S): at 05:31

## 2023-01-01 RX ADMIN — HYDROMORPHONE HYDROCHLORIDE 1 MILLIGRAM(S): 2 INJECTION INTRAMUSCULAR; INTRAVENOUS; SUBCUTANEOUS at 23:19

## 2023-01-01 RX ADMIN — Medication 200 MILLIGRAM(S): at 17:13

## 2023-01-01 RX ADMIN — HYDROMORPHONE HYDROCHLORIDE 0.5 MILLIGRAM(S): 2 INJECTION INTRAMUSCULAR; INTRAVENOUS; SUBCUTANEOUS at 06:08

## 2023-01-01 RX ADMIN — FINASTERIDE 5 MILLIGRAM(S): 5 TABLET, FILM COATED ORAL at 11:27

## 2023-01-01 RX ADMIN — TAMSULOSIN HYDROCHLORIDE 0.8 MILLIGRAM(S): 0.4 CAPSULE ORAL at 22:20

## 2023-01-01 RX ADMIN — TAMSULOSIN HYDROCHLORIDE 0.8 MILLIGRAM(S): 0.4 CAPSULE ORAL at 21:38

## 2023-01-01 RX ADMIN — Medication 250 MILLIGRAM(S): at 07:52

## 2023-01-01 RX ADMIN — ROBINUL 0.4 MILLIGRAM(S): 0.2 INJECTION INTRAMUSCULAR; INTRAVENOUS at 05:27

## 2023-01-01 RX ADMIN — Medication 81 MILLIGRAM(S): at 11:50

## 2023-01-01 RX ADMIN — Medication 25 MILLIGRAM(S): at 17:12

## 2023-01-01 RX ADMIN — ALBUTEROL 2 PUFF(S): 90 AEROSOL, METERED ORAL at 03:04

## 2023-01-01 RX ADMIN — Medication 40 MILLIGRAM(S): at 18:33

## 2023-01-01 RX ADMIN — Medication 250 MILLIGRAM(S): at 06:41

## 2023-01-01 RX ADMIN — ALBUTEROL 2 PUFF(S): 90 AEROSOL, METERED ORAL at 21:52

## 2023-01-01 RX ADMIN — HYDROMORPHONE HYDROCHLORIDE 0.3 MILLIGRAM(S): 2 INJECTION INTRAMUSCULAR; INTRAVENOUS; SUBCUTANEOUS at 09:00

## 2023-01-01 RX ADMIN — Medication 100 MILLIGRAM(S): at 18:36

## 2023-01-01 RX ADMIN — Medication 25 MILLIGRAM(S): at 06:00

## 2023-01-01 RX ADMIN — MEMANTINE HYDROCHLORIDE 5 MILLIGRAM(S): 10 TABLET ORAL at 05:26

## 2023-01-01 RX ADMIN — Medication 1 TABLET(S): at 11:04

## 2023-01-01 RX ADMIN — Medication 12.5 MILLIGRAM(S): at 17:21

## 2023-01-01 RX ADMIN — ALBUTEROL 2 PUFF(S): 90 AEROSOL, METERED ORAL at 14:33

## 2023-01-01 RX ADMIN — FINASTERIDE 5 MILLIGRAM(S): 5 TABLET, FILM COATED ORAL at 13:31

## 2023-01-01 RX ADMIN — Medication 4 UNIT(S): at 07:52

## 2023-01-01 RX ADMIN — Medication 1 TABLET(S): at 12:10

## 2023-01-01 RX ADMIN — Medication 250 MILLIGRAM(S): at 08:02

## 2023-01-01 RX ADMIN — Medication 100 MILLIGRAM(S): at 18:10

## 2023-01-01 RX ADMIN — Medication 1 SPRAY(S): at 06:19

## 2023-01-01 RX ADMIN — Medication 3 MILLILITER(S): at 02:23

## 2023-01-01 RX ADMIN — ATORVASTATIN CALCIUM 40 MILLIGRAM(S): 80 TABLET, FILM COATED ORAL at 22:53

## 2023-01-01 RX ADMIN — SODIUM ZIRCONIUM CYCLOSILICATE 10 GRAM(S): 10 POWDER, FOR SUSPENSION ORAL at 05:38

## 2023-01-01 RX ADMIN — Medication 250 MILLIGRAM(S): at 18:23

## 2023-01-01 RX ADMIN — CEFEPIME 100 MILLIGRAM(S): 1 INJECTION, POWDER, FOR SOLUTION INTRAMUSCULAR; INTRAVENOUS at 18:27

## 2023-01-01 RX ADMIN — Medication 1 SPRAY(S): at 05:45

## 2023-01-01 RX ADMIN — MONTELUKAST 10 MILLIGRAM(S): 4 TABLET, CHEWABLE ORAL at 22:12

## 2023-01-01 RX ADMIN — Medication 1 SPRAY(S): at 06:59

## 2023-01-01 RX ADMIN — BUMETANIDE 1 MILLIGRAM(S): 0.25 INJECTION INTRAMUSCULAR; INTRAVENOUS at 06:04

## 2023-01-01 RX ADMIN — SODIUM CHLORIDE 500 MILLILITER(S): 9 INJECTION INTRAMUSCULAR; INTRAVENOUS; SUBCUTANEOUS at 11:06

## 2023-01-01 RX ADMIN — Medication 3 MILLILITER(S): at 15:13

## 2023-01-01 RX ADMIN — CEFEPIME 100 MILLIGRAM(S): 1 INJECTION, POWDER, FOR SOLUTION INTRAMUSCULAR; INTRAVENOUS at 06:17

## 2023-01-01 RX ADMIN — ALBUTEROL 2 PUFF(S): 90 AEROSOL, METERED ORAL at 16:04

## 2023-01-01 RX ADMIN — HYDROMORPHONE HYDROCHLORIDE 0.2 MG/HR: 2 INJECTION INTRAMUSCULAR; INTRAVENOUS; SUBCUTANEOUS at 16:17

## 2023-01-01 RX ADMIN — ATORVASTATIN CALCIUM 40 MILLIGRAM(S): 80 TABLET, FILM COATED ORAL at 21:59

## 2023-01-01 RX ADMIN — ISOSORBIDE MONONITRATE 30 MILLIGRAM(S): 60 TABLET, EXTENDED RELEASE ORAL at 12:27

## 2023-01-01 RX ADMIN — ALBUTEROL 2 PUFF(S): 90 AEROSOL, METERED ORAL at 10:23

## 2023-01-01 RX ADMIN — Medication 250 MILLIGRAM(S): at 07:26

## 2023-01-01 RX ADMIN — Medication 2: at 11:34

## 2023-01-01 RX ADMIN — ALBUTEROL 2 PUFF(S): 90 AEROSOL, METERED ORAL at 08:34

## 2023-01-01 RX ADMIN — Medication 10 MILLIGRAM(S): at 05:37

## 2023-01-01 RX ADMIN — HYDROMORPHONE HYDROCHLORIDE 1 MILLIGRAM(S): 2 INJECTION INTRAMUSCULAR; INTRAVENOUS; SUBCUTANEOUS at 14:35

## 2023-01-01 RX ADMIN — Medication 4 UNIT(S): at 17:31

## 2023-01-01 RX ADMIN — FINASTERIDE 5 MILLIGRAM(S): 5 TABLET, FILM COATED ORAL at 12:20

## 2023-01-01 RX ADMIN — Medication 52.5 MILLIGRAM(S): at 11:41

## 2023-01-01 RX ADMIN — Medication 2: at 17:45

## 2023-01-01 RX ADMIN — Medication 10 MILLIGRAM(S): at 23:07

## 2023-01-01 RX ADMIN — Medication 1 SPRAY(S): at 06:12

## 2023-01-01 RX ADMIN — CEFEPIME 100 MILLIGRAM(S): 1 INJECTION, POWDER, FOR SOLUTION INTRAMUSCULAR; INTRAVENOUS at 05:16

## 2023-01-01 RX ADMIN — Medication 1000 MILLIGRAM(S): at 23:07

## 2023-01-01 RX ADMIN — Medication 3 MILLILITER(S): at 20:31

## 2023-01-01 RX ADMIN — MEMANTINE HYDROCHLORIDE 5 MILLIGRAM(S): 10 TABLET ORAL at 05:17

## 2023-01-01 RX ADMIN — Medication 3 MILLILITER(S): at 21:44

## 2023-01-01 RX ADMIN — Medication 1 SPRAY(S): at 06:03

## 2023-01-01 RX ADMIN — Medication 250 MILLIGRAM(S): at 22:05

## 2023-01-01 RX ADMIN — ALBUTEROL 2 PUFF(S): 90 AEROSOL, METERED ORAL at 02:07

## 2023-01-01 RX ADMIN — TAMSULOSIN HYDROCHLORIDE 0.4 MILLIGRAM(S): 0.4 CAPSULE ORAL at 21:32

## 2023-01-01 RX ADMIN — ISOSORBIDE MONONITRATE 30 MILLIGRAM(S): 60 TABLET, EXTENDED RELEASE ORAL at 11:54

## 2023-01-01 RX ADMIN — FINASTERIDE 5 MILLIGRAM(S): 5 TABLET, FILM COATED ORAL at 12:07

## 2023-01-01 RX ADMIN — HYDROMORPHONE HYDROCHLORIDE 0.5 MILLIGRAM(S): 2 INJECTION INTRAMUSCULAR; INTRAVENOUS; SUBCUTANEOUS at 10:30

## 2023-01-01 RX ADMIN — Medication 81 MILLIGRAM(S): at 12:10

## 2023-01-01 RX ADMIN — Medication 1: at 16:40

## 2023-01-01 RX ADMIN — ROBINUL 0.4 MILLIGRAM(S): 0.2 INJECTION INTRAMUSCULAR; INTRAVENOUS at 13:58

## 2023-01-01 RX ADMIN — LACTULOSE 20 GRAM(S): 10 SOLUTION ORAL at 21:17

## 2023-01-01 RX ADMIN — ENOXAPARIN SODIUM 40 MILLIGRAM(S): 100 INJECTION SUBCUTANEOUS at 18:04

## 2023-01-01 RX ADMIN — Medication 3 MILLILITER(S): at 09:14

## 2023-01-01 RX ADMIN — HEPARIN SODIUM 5000 UNIT(S): 5000 INJECTION INTRAVENOUS; SUBCUTANEOUS at 17:25

## 2023-01-01 RX ADMIN — INSULIN GLARGINE 5 UNIT(S): 100 INJECTION, SOLUTION SUBCUTANEOUS at 23:05

## 2023-01-01 RX ADMIN — LORATADINE 10 MILLIGRAM(S): 10 TABLET ORAL at 12:38

## 2023-01-01 RX ADMIN — MONTELUKAST 10 MILLIGRAM(S): 4 TABLET, CHEWABLE ORAL at 21:17

## 2023-01-01 RX ADMIN — ROBINUL 0.4 MILLIGRAM(S): 0.2 INJECTION INTRAMUSCULAR; INTRAVENOUS at 21:12

## 2023-01-01 RX ADMIN — HYDROMORPHONE HYDROCHLORIDE 0.5 MILLIGRAM(S): 2 INJECTION INTRAMUSCULAR; INTRAVENOUS; SUBCUTANEOUS at 19:00

## 2023-01-01 RX ADMIN — MEMANTINE HYDROCHLORIDE 5 MILLIGRAM(S): 10 TABLET ORAL at 18:37

## 2023-01-01 RX ADMIN — Medication 81 MILLIGRAM(S): at 11:31

## 2023-01-01 RX ADMIN — Medication 0.5 MILLIGRAM(S): at 06:59

## 2023-01-01 RX ADMIN — Medication 4 UNIT(S): at 08:12

## 2023-01-01 RX ADMIN — Medication 5 MILLIGRAM(S): at 22:21

## 2023-01-01 RX ADMIN — Medication 20 MILLIGRAM(S): at 05:05

## 2023-01-01 RX ADMIN — Medication 3 MILLILITER(S): at 02:36

## 2023-01-01 RX ADMIN — HEPARIN SODIUM 5000 UNIT(S): 5000 INJECTION INTRAVENOUS; SUBCUTANEOUS at 22:22

## 2023-01-01 RX ADMIN — Medication 81 MILLIGRAM(S): at 11:27

## 2023-01-01 RX ADMIN — ENOXAPARIN SODIUM 40 MILLIGRAM(S): 100 INJECTION SUBCUTANEOUS at 18:22

## 2023-01-01 RX ADMIN — ATORVASTATIN CALCIUM 40 MILLIGRAM(S): 80 TABLET, FILM COATED ORAL at 22:31

## 2023-01-01 RX ADMIN — Medication 100 MILLIGRAM(S): at 05:31

## 2023-01-01 RX ADMIN — BUMETANIDE 1 MILLIGRAM(S): 0.25 INJECTION INTRAMUSCULAR; INTRAVENOUS at 05:44

## 2023-01-01 RX ADMIN — Medication 650 MILLIGRAM(S): at 05:25

## 2023-01-01 RX ADMIN — TIOTROPIUM BROMIDE 2 PUFF(S): 18 CAPSULE ORAL; RESPIRATORY (INHALATION) at 11:48

## 2023-01-01 RX ADMIN — ALBUTEROL 2 PUFF(S): 90 AEROSOL, METERED ORAL at 17:26

## 2023-01-01 RX ADMIN — Medication 3 MILLILITER(S): at 02:13

## 2023-01-01 RX ADMIN — Medication 1 MILLIGRAM(S): at 18:30

## 2023-01-01 RX ADMIN — Medication 100 MILLIGRAM(S): at 17:21

## 2023-01-01 RX ADMIN — FINASTERIDE 5 MILLIGRAM(S): 5 TABLET, FILM COATED ORAL at 12:15

## 2023-01-01 RX ADMIN — Medication 4 UNIT(S): at 08:32

## 2023-01-01 RX ADMIN — Medication 100 MILLIGRAM(S): at 19:47

## 2023-01-01 RX ADMIN — INSULIN GLARGINE 5 UNIT(S): 100 INJECTION, SOLUTION SUBCUTANEOUS at 22:20

## 2023-01-01 RX ADMIN — MEMANTINE HYDROCHLORIDE 5 MILLIGRAM(S): 10 TABLET ORAL at 17:28

## 2023-01-01 RX ADMIN — ALBUTEROL 2 PUFF(S): 90 AEROSOL, METERED ORAL at 02:03

## 2023-01-01 RX ADMIN — HYDROMORPHONE HYDROCHLORIDE 0.5 MILLIGRAM(S): 2 INJECTION INTRAMUSCULAR; INTRAVENOUS; SUBCUTANEOUS at 16:45

## 2023-01-01 RX ADMIN — FINASTERIDE 5 MILLIGRAM(S): 5 TABLET, FILM COATED ORAL at 11:29

## 2023-01-01 RX ADMIN — SCOPALAMINE 1 PATCH: 1 PATCH, EXTENDED RELEASE TRANSDERMAL at 07:00

## 2023-01-01 RX ADMIN — TIOTROPIUM BROMIDE 2 PUFF(S): 18 CAPSULE ORAL; RESPIRATORY (INHALATION) at 08:48

## 2023-01-01 RX ADMIN — Medication 3 MILLILITER(S): at 03:24

## 2023-01-01 RX ADMIN — TAMSULOSIN HYDROCHLORIDE 0.8 MILLIGRAM(S): 0.4 CAPSULE ORAL at 22:17

## 2023-01-01 RX ADMIN — Medication 3 MILLILITER(S): at 20:47

## 2023-01-01 RX ADMIN — Medication 1: at 11:49

## 2023-01-01 RX ADMIN — MONTELUKAST 10 MILLIGRAM(S): 4 TABLET, CHEWABLE ORAL at 11:28

## 2023-01-01 RX ADMIN — Medication 3 MILLILITER(S): at 15:10

## 2023-01-01 RX ADMIN — CEFEPIME 100 MILLIGRAM(S): 1 INJECTION, POWDER, FOR SOLUTION INTRAMUSCULAR; INTRAVENOUS at 05:44

## 2023-01-01 RX ADMIN — SODIUM ZIRCONIUM CYCLOSILICATE 10 GRAM(S): 10 POWDER, FOR SUSPENSION ORAL at 23:22

## 2023-01-01 RX ADMIN — BUMETANIDE 1 MILLIGRAM(S): 0.25 INJECTION INTRAMUSCULAR; INTRAVENOUS at 13:35

## 2023-01-01 RX ADMIN — Medication 50 MILLIGRAM(S): at 05:30

## 2023-01-01 RX ADMIN — Medication 3 MILLILITER(S): at 03:29

## 2023-01-01 RX ADMIN — HYDROMORPHONE HYDROCHLORIDE 0.5 MILLIGRAM(S): 2 INJECTION INTRAMUSCULAR; INTRAVENOUS; SUBCUTANEOUS at 22:00

## 2023-01-01 RX ADMIN — OLANZAPINE 10 MILLIGRAM(S): 15 TABLET, FILM COATED ORAL at 00:02

## 2023-01-01 RX ADMIN — Medication 250 MILLIGRAM(S): at 06:10

## 2023-01-01 RX ADMIN — TAMSULOSIN HYDROCHLORIDE 0.4 MILLIGRAM(S): 0.4 CAPSULE ORAL at 22:18

## 2023-01-01 RX ADMIN — HEPARIN SODIUM 5000 UNIT(S): 5000 INJECTION INTRAVENOUS; SUBCUTANEOUS at 05:43

## 2023-01-01 RX ADMIN — TIOTROPIUM BROMIDE 2 PUFF(S): 18 CAPSULE ORAL; RESPIRATORY (INHALATION) at 11:53

## 2023-01-01 RX ADMIN — Medication 2: at 17:25

## 2023-01-01 RX ADMIN — Medication 50 MILLILITER(S): at 23:10

## 2023-01-01 RX ADMIN — FINASTERIDE 5 MILLIGRAM(S): 5 TABLET, FILM COATED ORAL at 12:09

## 2023-01-01 RX ADMIN — Medication 1200 MILLIGRAM(S): at 05:44

## 2023-01-01 RX ADMIN — INSULIN GLARGINE 5 UNIT(S): 100 INJECTION, SOLUTION SUBCUTANEOUS at 22:01

## 2023-01-01 RX ADMIN — MEMANTINE HYDROCHLORIDE 5 MILLIGRAM(S): 10 TABLET ORAL at 17:53

## 2023-01-01 RX ADMIN — Medication 20 MILLIGRAM(S): at 05:07

## 2023-01-01 RX ADMIN — Medication 1000 MILLIGRAM(S): at 21:52

## 2023-01-01 RX ADMIN — MONTELUKAST 10 MILLIGRAM(S): 4 TABLET, CHEWABLE ORAL at 21:40

## 2023-01-01 RX ADMIN — BUMETANIDE 1 MILLIGRAM(S): 0.25 INJECTION INTRAMUSCULAR; INTRAVENOUS at 06:17

## 2023-01-01 RX ADMIN — OLANZAPINE 10 MILLIGRAM(S): 15 TABLET, FILM COATED ORAL at 21:59

## 2023-01-01 RX ADMIN — LACTULOSE 20 GRAM(S): 10 SOLUTION ORAL at 22:16

## 2023-01-01 RX ADMIN — Medication 81 MILLIGRAM(S): at 11:48

## 2023-01-01 RX ADMIN — Medication 25 MILLIGRAM(S): at 05:35

## 2023-01-01 RX ADMIN — Medication 10 MILLIGRAM(S): at 13:18

## 2023-01-01 RX ADMIN — Medication 10 MILLIGRAM(S): at 13:27

## 2023-01-01 RX ADMIN — ALBUTEROL 2 PUFF(S): 90 AEROSOL, METERED ORAL at 21:41

## 2023-01-01 RX ADMIN — TIOTROPIUM BROMIDE 2 PUFF(S): 18 CAPSULE ORAL; RESPIRATORY (INHALATION) at 11:43

## 2023-01-01 RX ADMIN — Medication 1 SPRAY(S): at 18:15

## 2023-01-01 RX ADMIN — MEMANTINE HYDROCHLORIDE 5 MILLIGRAM(S): 10 TABLET ORAL at 05:59

## 2023-01-01 RX ADMIN — Medication 4 UNIT(S): at 11:35

## 2023-01-01 RX ADMIN — Medication 50 MILLILITER(S): at 11:49

## 2023-01-01 RX ADMIN — HEPARIN SODIUM 5000 UNIT(S): 5000 INJECTION INTRAVENOUS; SUBCUTANEOUS at 17:46

## 2023-01-01 RX ADMIN — OLANZAPINE 10 MILLIGRAM(S): 15 TABLET, FILM COATED ORAL at 21:46

## 2023-01-01 RX ADMIN — Medication 1: at 12:45

## 2023-01-01 RX ADMIN — Medication 1000 MILLIGRAM(S): at 21:46

## 2023-01-01 RX ADMIN — Medication 3 MILLILITER(S): at 20:22

## 2023-01-01 RX ADMIN — ALBUTEROL 2 PUFF(S): 90 AEROSOL, METERED ORAL at 21:40

## 2023-01-01 RX ADMIN — MEMANTINE HYDROCHLORIDE 5 MILLIGRAM(S): 10 TABLET ORAL at 17:22

## 2023-01-01 RX ADMIN — Medication 600 MILLIGRAM(S): at 05:17

## 2023-01-01 RX ADMIN — Medication 4 UNIT(S): at 11:37

## 2023-01-01 RX ADMIN — SCOPALAMINE 1 PATCH: 1 PATCH, EXTENDED RELEASE TRANSDERMAL at 18:20

## 2023-01-01 RX ADMIN — Medication 52.5 MILLIGRAM(S): at 12:24

## 2023-01-01 RX ADMIN — Medication 3 MILLILITER(S): at 08:09

## 2023-01-01 RX ADMIN — ROBINUL 0.2 MILLIGRAM(S): 0.2 INJECTION INTRAMUSCULAR; INTRAVENOUS at 15:46

## 2023-01-01 RX ADMIN — HEPARIN SODIUM 5000 UNIT(S): 5000 INJECTION INTRAVENOUS; SUBCUTANEOUS at 06:05

## 2023-01-01 RX ADMIN — Medication 1 SPRAY(S): at 06:00

## 2023-01-01 RX ADMIN — Medication 1000 MILLIGRAM(S): at 22:01

## 2023-01-01 RX ADMIN — TIOTROPIUM BROMIDE 2 PUFF(S): 18 CAPSULE ORAL; RESPIRATORY (INHALATION) at 12:19

## 2023-01-01 RX ADMIN — Medication 81 MILLIGRAM(S): at 11:29

## 2023-01-01 RX ADMIN — CEFEPIME 100 MILLIGRAM(S): 1 INJECTION, POWDER, FOR SOLUTION INTRAMUSCULAR; INTRAVENOUS at 17:11

## 2023-01-01 RX ADMIN — Medication 3 MILLILITER(S): at 15:38

## 2023-01-01 RX ADMIN — HYDROMORPHONE HYDROCHLORIDE 0.5 MILLIGRAM(S): 2 INJECTION INTRAMUSCULAR; INTRAVENOUS; SUBCUTANEOUS at 05:43

## 2023-01-01 RX ADMIN — TIOTROPIUM BROMIDE 2 PUFF(S): 18 CAPSULE ORAL; RESPIRATORY (INHALATION) at 15:06

## 2023-01-01 RX ADMIN — ALBUTEROL 2 PUFF(S): 90 AEROSOL, METERED ORAL at 02:48

## 2023-01-01 RX ADMIN — HEPARIN SODIUM 5000 UNIT(S): 5000 INJECTION INTRAVENOUS; SUBCUTANEOUS at 06:19

## 2023-01-01 RX ADMIN — Medication 6 UNIT(S): at 12:26

## 2023-01-01 RX ADMIN — Medication 3 MILLILITER(S): at 14:54

## 2023-01-01 RX ADMIN — Medication 4 UNIT(S): at 11:29

## 2023-01-01 RX ADMIN — Medication 0.5 MILLIGRAM(S): at 00:55

## 2023-01-01 RX ADMIN — Medication 1000 MILLIGRAM(S): at 22:24

## 2023-01-01 RX ADMIN — FINASTERIDE 5 MILLIGRAM(S): 5 TABLET, FILM COATED ORAL at 12:16

## 2023-01-01 RX ADMIN — TAMSULOSIN HYDROCHLORIDE 0.4 MILLIGRAM(S): 0.4 CAPSULE ORAL at 22:53

## 2023-01-01 RX ADMIN — Medication 10 MILLIGRAM(S): at 15:24

## 2023-01-01 RX ADMIN — TIOTROPIUM BROMIDE 2 PUFF(S): 18 CAPSULE ORAL; RESPIRATORY (INHALATION) at 12:25

## 2023-01-01 RX ADMIN — FINASTERIDE 5 MILLIGRAM(S): 5 TABLET, FILM COATED ORAL at 11:52

## 2023-01-01 RX ADMIN — Medication 4 UNIT(S): at 11:57

## 2023-01-01 RX ADMIN — TIOTROPIUM BROMIDE 2 PUFF(S): 18 CAPSULE ORAL; RESPIRATORY (INHALATION) at 13:23

## 2023-01-01 RX ADMIN — Medication 250 MILLIGRAM(S): at 06:03

## 2023-01-01 RX ADMIN — Medication 12.5 MILLIGRAM(S): at 18:24

## 2023-01-01 RX ADMIN — MONTELUKAST 10 MILLIGRAM(S): 4 TABLET, CHEWABLE ORAL at 13:32

## 2023-01-01 RX ADMIN — Medication 600 MILLIGRAM(S): at 18:05

## 2023-01-01 RX ADMIN — ROBINUL 0.4 MILLIGRAM(S): 0.2 INJECTION INTRAMUSCULAR; INTRAVENOUS at 17:26

## 2023-01-01 RX ADMIN — Medication 250 MILLIGRAM(S): at 08:32

## 2023-01-01 RX ADMIN — Medication 1200 MILLIGRAM(S): at 06:59

## 2023-01-01 RX ADMIN — Medication 1 SPRAY(S): at 17:09

## 2023-01-01 RX ADMIN — Medication 3: at 12:14

## 2023-01-01 RX ADMIN — ATORVASTATIN CALCIUM 40 MILLIGRAM(S): 80 TABLET, FILM COATED ORAL at 22:20

## 2023-01-01 RX ADMIN — HEPARIN SODIUM 5000 UNIT(S): 5000 INJECTION INTRAVENOUS; SUBCUTANEOUS at 17:20

## 2023-01-01 RX ADMIN — Medication 4 UNIT(S): at 08:09

## 2023-01-01 RX ADMIN — Medication 12.5 MILLIGRAM(S): at 17:46

## 2023-01-01 RX ADMIN — Medication 200 MILLIGRAM(S): at 06:01

## 2023-01-01 RX ADMIN — Medication 2: at 11:50

## 2023-01-01 RX ADMIN — Medication 1 MILLIGRAM(S): at 10:59

## 2023-01-01 RX ADMIN — Medication 6 UNIT(S): at 08:34

## 2023-01-01 RX ADMIN — Medication 25 MILLIGRAM(S): at 06:15

## 2023-01-01 RX ADMIN — Medication 2: at 17:51

## 2023-01-01 RX ADMIN — Medication 81 MILLIGRAM(S): at 11:54

## 2023-01-01 RX ADMIN — ALBUTEROL 2 PUFF(S): 90 AEROSOL, METERED ORAL at 08:59

## 2023-01-01 RX ADMIN — ATORVASTATIN CALCIUM 40 MILLIGRAM(S): 80 TABLET, FILM COATED ORAL at 21:58

## 2023-01-01 RX ADMIN — Medication 6 UNIT(S): at 17:26

## 2023-01-01 RX ADMIN — Medication 250 MILLIGRAM(S): at 21:35

## 2023-01-01 RX ADMIN — Medication 1: at 12:12

## 2023-01-01 RX ADMIN — Medication 3 MILLILITER(S): at 08:52

## 2023-01-01 RX ADMIN — HYDROMORPHONE HYDROCHLORIDE 0.3 MILLIGRAM(S): 2 INJECTION INTRAMUSCULAR; INTRAVENOUS; SUBCUTANEOUS at 06:49

## 2023-01-01 RX ADMIN — LACTULOSE 20 GRAM(S): 10 SOLUTION ORAL at 21:58

## 2023-01-01 RX ADMIN — Medication 2 UNIT(S): at 11:37

## 2023-01-01 RX ADMIN — HYDROMORPHONE HYDROCHLORIDE 0.5 MILLIGRAM(S): 2 INJECTION INTRAMUSCULAR; INTRAVENOUS; SUBCUTANEOUS at 18:34

## 2023-01-01 RX ADMIN — OLANZAPINE 10 MILLIGRAM(S): 15 TABLET, FILM COATED ORAL at 21:39

## 2023-01-01 RX ADMIN — Medication 100 MILLIGRAM(S): at 06:19

## 2023-01-01 RX ADMIN — Medication 2: at 17:35

## 2023-01-01 RX ADMIN — Medication 1 MILLIGRAM(S): at 16:47

## 2023-01-01 RX ADMIN — LACTULOSE 20 GRAM(S): 10 SOLUTION ORAL at 22:04

## 2023-01-01 RX ADMIN — HEPARIN SODIUM 5000 UNIT(S): 5000 INJECTION INTRAVENOUS; SUBCUTANEOUS at 06:20

## 2023-01-01 RX ADMIN — Medication 2 UNIT(S): at 17:45

## 2023-01-01 RX ADMIN — TAMSULOSIN HYDROCHLORIDE 0.4 MILLIGRAM(S): 0.4 CAPSULE ORAL at 22:20

## 2023-01-01 RX ADMIN — FINASTERIDE 5 MILLIGRAM(S): 5 TABLET, FILM COATED ORAL at 12:03

## 2023-01-01 RX ADMIN — SODIUM ZIRCONIUM CYCLOSILICATE 10 GRAM(S): 10 POWDER, FOR SUSPENSION ORAL at 18:45

## 2023-01-01 RX ADMIN — HYDROMORPHONE HYDROCHLORIDE 0.3 MILLIGRAM(S): 2 INJECTION INTRAMUSCULAR; INTRAVENOUS; SUBCUTANEOUS at 20:41

## 2023-01-01 RX ADMIN — Medication 3 MILLILITER(S): at 09:00

## 2023-01-01 RX ADMIN — Medication 4: at 17:08

## 2023-01-01 RX ADMIN — Medication 650 MILLIGRAM(S): at 02:18

## 2023-01-01 RX ADMIN — HYDROMORPHONE HYDROCHLORIDE 1 MILLIGRAM(S): 2 INJECTION INTRAMUSCULAR; INTRAVENOUS; SUBCUTANEOUS at 23:20

## 2023-01-01 RX ADMIN — FINASTERIDE 5 MILLIGRAM(S): 5 TABLET, FILM COATED ORAL at 11:46

## 2023-01-01 RX ADMIN — FINASTERIDE 5 MILLIGRAM(S): 5 TABLET, FILM COATED ORAL at 12:22

## 2023-01-01 RX ADMIN — Medication 1: at 08:06

## 2023-01-01 RX ADMIN — Medication 100 MILLIGRAM(S): at 06:05

## 2023-01-01 RX ADMIN — Medication 1 SPRAY(S): at 17:48

## 2023-01-01 RX ADMIN — HEPARIN SODIUM 5000 UNIT(S): 5000 INJECTION INTRAVENOUS; SUBCUTANEOUS at 05:36

## 2023-01-01 RX ADMIN — ALBUTEROL 2 PUFF(S): 90 AEROSOL, METERED ORAL at 22:00

## 2023-01-01 RX ADMIN — HEPARIN SODIUM 5000 UNIT(S): 5000 INJECTION INTRAVENOUS; SUBCUTANEOUS at 06:15

## 2023-01-01 RX ADMIN — BUMETANIDE 1 MILLIGRAM(S): 0.25 INJECTION INTRAMUSCULAR; INTRAVENOUS at 05:37

## 2023-01-01 RX ADMIN — Medication 4 UNIT(S): at 11:52

## 2023-01-01 RX ADMIN — HYDROMORPHONE HYDROCHLORIDE 0.3 MILLIGRAM(S): 2 INJECTION INTRAMUSCULAR; INTRAVENOUS; SUBCUTANEOUS at 18:06

## 2023-01-01 RX ADMIN — Medication 100 MILLIGRAM(S): at 05:46

## 2023-01-01 RX ADMIN — Medication 81 MILLIGRAM(S): at 12:07

## 2023-01-01 RX ADMIN — Medication 10 MILLIGRAM(S): at 17:25

## 2023-01-01 RX ADMIN — ATORVASTATIN CALCIUM 40 MILLIGRAM(S): 80 TABLET, FILM COATED ORAL at 21:33

## 2023-01-01 RX ADMIN — TIOTROPIUM BROMIDE 2 PUFF(S): 18 CAPSULE ORAL; RESPIRATORY (INHALATION) at 12:47

## 2023-01-01 RX ADMIN — HYDROMORPHONE HYDROCHLORIDE 1 MILLIGRAM(S): 2 INJECTION INTRAMUSCULAR; INTRAVENOUS; SUBCUTANEOUS at 17:37

## 2023-01-01 RX ADMIN — TAMSULOSIN HYDROCHLORIDE 0.4 MILLIGRAM(S): 0.4 CAPSULE ORAL at 22:06

## 2023-01-01 RX ADMIN — TIOTROPIUM BROMIDE 2 PUFF(S): 18 CAPSULE ORAL; RESPIRATORY (INHALATION) at 12:07

## 2023-01-01 RX ADMIN — Medication 100 MILLIGRAM(S): at 05:41

## 2023-01-01 RX ADMIN — LORATADINE 10 MILLIGRAM(S): 10 TABLET ORAL at 12:09

## 2023-01-01 RX ADMIN — HYDROMORPHONE HYDROCHLORIDE 0.5 MILLIGRAM(S): 2 INJECTION INTRAMUSCULAR; INTRAVENOUS; SUBCUTANEOUS at 11:10

## 2023-01-01 RX ADMIN — LACTULOSE 20 GRAM(S): 10 SOLUTION ORAL at 21:35

## 2023-01-01 RX ADMIN — MEMANTINE HYDROCHLORIDE 5 MILLIGRAM(S): 10 TABLET ORAL at 17:37

## 2023-01-01 RX ADMIN — BENZOCAINE AND MENTHOL 1 LOZENGE: 5; 1 LIQUID ORAL at 06:06

## 2023-01-01 RX ADMIN — Medication 6 UNIT(S): at 12:46

## 2023-01-01 RX ADMIN — HYDROMORPHONE HYDROCHLORIDE 0.5 MILLIGRAM(S): 2 INJECTION INTRAMUSCULAR; INTRAVENOUS; SUBCUTANEOUS at 16:47

## 2023-01-01 RX ADMIN — FINASTERIDE 5 MILLIGRAM(S): 5 TABLET, FILM COATED ORAL at 12:18

## 2023-01-01 RX ADMIN — Medication 3 MILLILITER(S): at 20:18

## 2023-01-01 RX ADMIN — Medication 2: at 08:15

## 2023-01-01 RX ADMIN — Medication 1: at 17:19

## 2023-01-01 RX ADMIN — HYDROMORPHONE HYDROCHLORIDE 0.5 MILLIGRAM(S): 2 INJECTION INTRAMUSCULAR; INTRAVENOUS; SUBCUTANEOUS at 16:18

## 2023-01-01 RX ADMIN — Medication 3 MILLILITER(S): at 02:57

## 2023-01-01 RX ADMIN — Medication 1 SPRAY(S): at 05:54

## 2023-01-01 RX ADMIN — HEPARIN SODIUM 5000 UNIT(S): 5000 INJECTION INTRAVENOUS; SUBCUTANEOUS at 21:47

## 2023-01-01 RX ADMIN — ISOSORBIDE MONONITRATE 30 MILLIGRAM(S): 60 TABLET, EXTENDED RELEASE ORAL at 13:17

## 2023-01-01 RX ADMIN — Medication 100 MILLIGRAM(S): at 18:03

## 2023-01-01 RX ADMIN — BUMETANIDE 1 MILLIGRAM(S): 0.25 INJECTION INTRAMUSCULAR; INTRAVENOUS at 06:20

## 2023-01-01 RX ADMIN — HEPARIN SODIUM 5000 UNIT(S): 5000 INJECTION INTRAVENOUS; SUBCUTANEOUS at 17:48

## 2023-01-01 RX ADMIN — Medication 3 MILLILITER(S): at 14:14

## 2023-01-01 RX ADMIN — Medication 25 MILLIGRAM(S): at 05:43

## 2023-01-01 RX ADMIN — Medication 1 SPRAY(S): at 18:01

## 2023-01-01 RX ADMIN — TIOTROPIUM BROMIDE 2 PUFF(S): 18 CAPSULE ORAL; RESPIRATORY (INHALATION) at 13:40

## 2023-01-01 RX ADMIN — SCOPALAMINE 1 PATCH: 1 PATCH, EXTENDED RELEASE TRANSDERMAL at 07:27

## 2023-01-01 RX ADMIN — Medication 12.5 MILLIGRAM(S): at 17:20

## 2023-01-01 RX ADMIN — Medication 250 MILLIGRAM(S): at 12:17

## 2023-01-01 RX ADMIN — Medication 1000 MILLIGRAM(S): at 21:17

## 2023-01-01 RX ADMIN — MEMANTINE HYDROCHLORIDE 5 MILLIGRAM(S): 10 TABLET ORAL at 17:32

## 2023-01-01 RX ADMIN — MEMANTINE HYDROCHLORIDE 5 MILLIGRAM(S): 10 TABLET ORAL at 05:41

## 2023-01-01 RX ADMIN — ATORVASTATIN CALCIUM 40 MILLIGRAM(S): 80 TABLET, FILM COATED ORAL at 22:00

## 2023-01-01 RX ADMIN — HYDROMORPHONE HYDROCHLORIDE 0.5 MILLIGRAM(S): 2 INJECTION INTRAMUSCULAR; INTRAVENOUS; SUBCUTANEOUS at 10:05

## 2023-01-01 RX ADMIN — CEFEPIME 100 MILLIGRAM(S): 1 INJECTION, POWDER, FOR SOLUTION INTRAMUSCULAR; INTRAVENOUS at 20:30

## 2023-01-01 RX ADMIN — Medication 12.5 MILLIGRAM(S): at 17:07

## 2023-01-01 RX ADMIN — Medication 1 SPRAY(S): at 06:21

## 2023-01-01 RX ADMIN — HEPARIN SODIUM 5000 UNIT(S): 5000 INJECTION INTRAVENOUS; SUBCUTANEOUS at 17:31

## 2023-01-01 RX ADMIN — Medication 1 SPRAY(S): at 17:54

## 2023-01-01 RX ADMIN — HEPARIN SODIUM 5000 UNIT(S): 5000 INJECTION INTRAVENOUS; SUBCUTANEOUS at 17:50

## 2023-01-01 RX ADMIN — Medication 1 SPRAY(S): at 18:47

## 2023-01-01 RX ADMIN — Medication 100 MILLIGRAM(S): at 05:30

## 2023-01-01 RX ADMIN — Medication 250 MILLIGRAM(S): at 21:27

## 2023-01-01 RX ADMIN — Medication 250 MILLIGRAM(S): at 11:48

## 2023-01-01 RX ADMIN — Medication 5 MILLIGRAM(S): at 22:54

## 2023-01-01 RX ADMIN — LORATADINE 10 MILLIGRAM(S): 10 TABLET ORAL at 15:05

## 2023-01-01 RX ADMIN — Medication 250 MILLIGRAM(S): at 11:30

## 2023-01-01 RX ADMIN — TAMSULOSIN HYDROCHLORIDE 0.4 MILLIGRAM(S): 0.4 CAPSULE ORAL at 22:02

## 2023-01-01 RX ADMIN — Medication 4 UNIT(S): at 12:42

## 2023-01-01 RX ADMIN — Medication 100 MILLIGRAM(S): at 18:04

## 2023-01-01 RX ADMIN — Medication 50 MILLILITER(S): at 18:22

## 2023-01-01 RX ADMIN — SCOPALAMINE 1 PATCH: 1 PATCH, EXTENDED RELEASE TRANSDERMAL at 07:37

## 2023-01-01 RX ADMIN — HEPARIN SODIUM 5000 UNIT(S): 5000 INJECTION INTRAVENOUS; SUBCUTANEOUS at 06:10

## 2023-01-01 RX ADMIN — Medication 1200 MILLIGRAM(S): at 17:20

## 2023-01-01 RX ADMIN — Medication 1 SPRAY(S): at 06:05

## 2023-01-01 RX ADMIN — Medication 40 MILLIGRAM(S): at 18:35

## 2023-01-01 RX ADMIN — HEPARIN SODIUM 5000 UNIT(S): 5000 INJECTION INTRAVENOUS; SUBCUTANEOUS at 17:09

## 2023-01-01 RX ADMIN — Medication 4 UNIT(S): at 12:07

## 2023-01-01 RX ADMIN — LORATADINE 10 MILLIGRAM(S): 10 TABLET ORAL at 12:26

## 2023-01-01 RX ADMIN — MONTELUKAST 10 MILLIGRAM(S): 4 TABLET, CHEWABLE ORAL at 22:05

## 2023-01-01 RX ADMIN — Medication 1 SPRAY(S): at 17:37

## 2023-01-01 RX ADMIN — ROBINUL 0.4 MILLIGRAM(S): 0.2 INJECTION INTRAMUSCULAR; INTRAVENOUS at 23:50

## 2023-01-01 RX ADMIN — MONTELUKAST 10 MILLIGRAM(S): 4 TABLET, CHEWABLE ORAL at 22:53

## 2023-01-01 RX ADMIN — TIOTROPIUM BROMIDE 2 PUFF(S): 18 CAPSULE ORAL; RESPIRATORY (INHALATION) at 12:57

## 2023-01-01 RX ADMIN — Medication 100 MILLIGRAM(S): at 17:28

## 2023-01-01 RX ADMIN — TAMSULOSIN HYDROCHLORIDE 0.4 MILLIGRAM(S): 0.4 CAPSULE ORAL at 23:05

## 2023-01-01 RX ADMIN — BUMETANIDE 1 MILLIGRAM(S): 0.25 INJECTION INTRAMUSCULAR; INTRAVENOUS at 06:14

## 2023-01-01 RX ADMIN — Medication 2: at 08:32

## 2023-01-01 RX ADMIN — Medication 50 MILLIGRAM(S): at 05:59

## 2023-01-01 RX ADMIN — Medication 81 MILLIGRAM(S): at 11:42

## 2023-01-01 RX ADMIN — ALBUTEROL 2 PUFF(S): 90 AEROSOL, METERED ORAL at 22:20

## 2023-01-01 RX ADMIN — MONTELUKAST 10 MILLIGRAM(S): 4 TABLET, CHEWABLE ORAL at 21:59

## 2023-01-01 RX ADMIN — ALBUTEROL 2 PUFF(S): 90 AEROSOL, METERED ORAL at 05:27

## 2023-01-01 RX ADMIN — Medication 3 MILLILITER(S): at 20:08

## 2023-01-01 RX ADMIN — TAMSULOSIN HYDROCHLORIDE 0.8 MILLIGRAM(S): 0.4 CAPSULE ORAL at 22:13

## 2023-01-01 RX ADMIN — Medication 50 MILLILITER(S): at 18:23

## 2023-01-01 RX ADMIN — MONTELUKAST 10 MILLIGRAM(S): 4 TABLET, CHEWABLE ORAL at 21:52

## 2023-01-01 RX ADMIN — MONTELUKAST 10 MILLIGRAM(S): 4 TABLET, CHEWABLE ORAL at 21:33

## 2023-01-01 RX ADMIN — OLANZAPINE 10 MILLIGRAM(S): 15 TABLET, FILM COATED ORAL at 22:31

## 2023-01-01 RX ADMIN — SCOPALAMINE 1 PATCH: 1 PATCH, EXTENDED RELEASE TRANSDERMAL at 07:33

## 2023-01-01 RX ADMIN — Medication 40 MILLIGRAM(S): at 05:38

## 2023-01-01 RX ADMIN — Medication 1000 MILLIGRAM(S): at 22:16

## 2023-01-01 RX ADMIN — LACTULOSE 20 GRAM(S): 10 SOLUTION ORAL at 21:27

## 2023-01-01 RX ADMIN — HYDROMORPHONE HYDROCHLORIDE 0.2 MG/HR: 2 INJECTION INTRAMUSCULAR; INTRAVENOUS; SUBCUTANEOUS at 16:21

## 2023-01-01 RX ADMIN — OLANZAPINE 10 MILLIGRAM(S): 15 TABLET, FILM COATED ORAL at 22:28

## 2023-01-01 RX ADMIN — Medication 250 MILLIGRAM(S): at 11:54

## 2023-01-01 RX ADMIN — INSULIN HUMAN 5 UNIT(S): 100 INJECTION, SOLUTION SUBCUTANEOUS at 09:05

## 2023-01-01 RX ADMIN — HEPARIN SODIUM 5000 UNIT(S): 5000 INJECTION INTRAVENOUS; SUBCUTANEOUS at 05:38

## 2023-01-01 RX ADMIN — Medication 250 MILLIGRAM(S): at 06:17

## 2023-01-01 RX ADMIN — Medication 20 MILLIGRAM(S): at 18:05

## 2023-01-01 RX ADMIN — FINASTERIDE 5 MILLIGRAM(S): 5 TABLET, FILM COATED ORAL at 12:58

## 2023-01-01 RX ADMIN — Medication 100 GRAM(S): at 17:38

## 2023-01-01 RX ADMIN — Medication 5 MILLIGRAM(S): at 22:10

## 2023-01-01 RX ADMIN — Medication 5 MILLIGRAM(S): at 21:17

## 2023-01-01 RX ADMIN — Medication 2 UNIT(S): at 11:52

## 2023-01-01 RX ADMIN — HEPARIN SODIUM 5000 UNIT(S): 5000 INJECTION INTRAVENOUS; SUBCUTANEOUS at 23:06

## 2023-01-01 RX ADMIN — INSULIN GLARGINE 5 UNIT(S): 100 INJECTION, SOLUTION SUBCUTANEOUS at 21:46

## 2023-01-01 RX ADMIN — Medication 25 MILLIGRAM(S): at 06:12

## 2023-01-01 RX ADMIN — Medication 1 SPRAY(S): at 05:36

## 2023-01-01 RX ADMIN — Medication 50 MILLIGRAM(S): at 05:46

## 2023-01-01 RX ADMIN — TAMSULOSIN HYDROCHLORIDE 0.8 MILLIGRAM(S): 0.4 CAPSULE ORAL at 21:53

## 2023-01-01 RX ADMIN — Medication 100 MILLIGRAM(S): at 17:56

## 2023-01-01 RX ADMIN — Medication 10 MILLIGRAM(S): at 13:54

## 2023-01-01 RX ADMIN — MONTELUKAST 10 MILLIGRAM(S): 4 TABLET, CHEWABLE ORAL at 11:47

## 2023-01-01 RX ADMIN — Medication 3 MILLILITER(S): at 08:11

## 2023-01-01 RX ADMIN — HEPARIN SODIUM 5000 UNIT(S): 5000 INJECTION INTRAVENOUS; SUBCUTANEOUS at 14:34

## 2023-01-01 RX ADMIN — Medication 10 MILLIGRAM(S): at 21:26

## 2023-01-01 RX ADMIN — ATORVASTATIN CALCIUM 40 MILLIGRAM(S): 80 TABLET, FILM COATED ORAL at 23:16

## 2023-01-01 RX ADMIN — Medication 50 MILLILITER(S): at 21:58

## 2023-01-01 RX ADMIN — Medication 3 MILLILITER(S): at 09:41

## 2023-01-01 RX ADMIN — Medication 1 PACKET(S): at 11:48

## 2023-01-01 RX ADMIN — ATORVASTATIN CALCIUM 40 MILLIGRAM(S): 80 TABLET, FILM COATED ORAL at 21:46

## 2023-01-01 RX ADMIN — Medication 3 MILLILITER(S): at 15:42

## 2023-01-01 RX ADMIN — HYDROMORPHONE HYDROCHLORIDE 1 MILLIGRAM(S): 2 INJECTION INTRAMUSCULAR; INTRAVENOUS; SUBCUTANEOUS at 13:58

## 2023-01-01 RX ADMIN — ALBUTEROL 2 PUFF(S): 90 AEROSOL, METERED ORAL at 08:24

## 2023-01-01 RX ADMIN — HEPARIN SODIUM 5000 UNIT(S): 5000 INJECTION INTRAVENOUS; SUBCUTANEOUS at 18:45

## 2023-03-28 NOTE — H&P ADULT - HISTORY OF PRESENT ILLNESS
73 y o [facility]  PMH of  intellectual disability, Down Syndrome, asthma, COPD with chronic cough, HTN, DM, possible CKD elevated PSA, BPH, schizophrenia, hepatitis B, carrier, bilateral sensorineural hearing loss, who came in to the ED, BIBEMS at the call of the HHA, due to severe weakness associated with midsubsternal chest pain and  increased work of breathing this morning as he was getting ready for a prostate MRI appointment. In the ED, patient endorsed a resolved chest pain, that was worse when he felt palpitations, but denied Fevers, chills, nausea, vomiting, diarrhea, recent illness or sick contacts.      In the ED /99   Exam had JVD, crackles, a wet cough was also noted  Labs: Macrocytic, anemia, prerenal YASSINE,  NSTEMI, lactic acidosis  Cxr right base infiltrate and possibly on the left

## 2023-03-28 NOTE — H&P ADULT - PROBLEM SELECTOR PLAN 8
Comes in for   Found to have YASSINE,   BUN / Cr ration > 20  likely pre-renal in the setting of cardio renal ?  f/u urine lytes to be obtained prior to diuresis  avoid NSAIDs and nephrotoxic agents   holding ACEI/ARB, may resume once YASSINE resolves   monitor CMP daily for improvement.  f/u BMP daily Patient was going for prostate MRI today prior to presentation for admission   the patient states he is urinating with no abnormalities  with lasix and with BPH and YASSINE on the labs  will do a bladder scan to ensure patient is voiding well    -f/u bladder scan  -c/w home tamsulosin and finasteride

## 2023-03-28 NOTE — H&P ADULT - PROBLEM SELECTOR PLAN 2
Takes metoprolol at home 25 daily as well as enalapril 20mg BID and hydralazine   ALso on lasix at home  has LVH and HTN  c/w BB will switch to tatrate for now with parameters  IV lasix as above  hold enalapril due to YASSINE   HOld Hydralazine and resume if BP is >150 on lasix and lopressor Comes in with chest pain   found to have EKG with TWI   Trop and BNP elevated  now admitted to rule out ACS  f/u lipid panel, A1c, TSH  f/u Echo  maintain on tele  cardiology ; consulted Dr Andrea

## 2023-03-28 NOTE — CONSULT NOTE ADULT - PROBLEM SELECTOR RECOMMENDATION 2
-Stable  - DASH/TLC  - BP goal of < 150/90 (age > 60)  - c/w home meds w/ holding parameters  -monitor BP & titrate BP meds PRN

## 2023-03-28 NOTE — H&P ADULT - PROBLEM SELECTOR PLAN 9
Patient is now on heparin drip for NSTEMI Comes in for   Found to have YASSINE,   BUN / Cr ration > 20  likely pre-renal in the setting of cardio renal ?  f/u urine lytes to be obtained prior to diuresis  avoid NSAIDs and nephrotoxic agents   holding ACEI/ARB, may resume once YASSINE resolves   monitor CMP daily for improvement.  f/u BMP daily

## 2023-03-28 NOTE — ED ADULT NURSE NOTE - NSSEPSISSUSPECTED_ED_A_ED
[FreeTextEntry1] : For L then R trigger thumb release\par R/B/A of surgery discussed with the patient. Risks including but not limited to infection, nerve damage, tendon damage, pain, stiffness, recurrence, no resolution of symptoms, loss of function, limb or life. They understand and agree to surgery \par Return post op  No

## 2023-03-28 NOTE — H&P ADULT - ASSESSMENT
73 y o with ambulatory at baseline from a [facility] home number  10  PMH of  intellectual disability, Down Syndrome, asthma, COPD with chronic cough, HTN, DM, possible CKD elevated PSA, BPH, schizophrenia, hepatitis B, carrier, bilateral sensorineural hearing loss, who came in to the ED, BIBEMS at the call of the HHA, due to weakness and chest pain with increased work of breathing now admitted for NSTEMI.

## 2023-03-28 NOTE — H&P ADULT - PROBLEM SELECTOR PLAN 1
Comes in with chest pain   found to have EKG with TWI   Trop and BNP elevated  now admitted to rule out ACS  f/u lipid panel, A1c, TSH  f/u Echo  maintain on tele  cardiology ; consulted Dr Andrea crackles, JVD   Pulm infiltrates  BNP severely elevatd  with a hx of non ischemic cardiomyopathy    -Not on O2  -c/w lasix IV   -Strict I/o  -Dr. Ames on board

## 2023-03-28 NOTE — PATIENT PROFILE ADULT - INTERNATIONAL TRAVEL
Maday Gamboa Jordi   2017 10:05 AM   Office Visit   MRN: [id number]    Department:  [facility]   Dept Phone:  [phone]    Description:  Female : 1986   Provider:  Elvis Segura PA-C           Reason for Visit     Abdominal Pain x3 days. Generalized abdominal pain. Fatigue, nausea, cramping. Food and liquids make stomach feel worse.      Allergies as of 2017     Allergen Noted Reactions    Sulfa Drugs 2011       Childhood allergy        You were diagnosed with     Abdominal pain in female   [3977145]       Epigastric pain   [[id number]]         Vital Signs     Blood Pressure Pulse Temperature Respirations Weight Oxygen Saturation    96/62 mmHg 77 36.5 °C (97.7 °F) 16 56.246 kg (124 lb) 100%    Last Menstrual Period Breastfeeding? Smoking Status             2017 No Never Smoker          Basic Information     Date Of Birth Sex Race Ethnicity Preferred Language    1986 Female White Non- English      Health Maintenance        Date Due Completion Dates    IMM DTaP/Tdap/Td Vaccine (1 - Tdap) 2007 ---    PAP SMEAR 2009 ---            Results     POCT Urinalysis      Component Value Standard Range & Units    POC Color Yellow Negative    POC Appearance Clear Negative    POC Leukocyte Esterase Neg Negative    POC Nitrites Neg Negative    POC Urobiligen Neg Negative (0.2) mg/dL    POC Protein Neg Negative mg/dL    POC Urine PH 8.0 5.0 - 8.0    POC Blood Trace Negative    POC Specific Gravity 1.010 <1.005 - >1.030    POC Ketones Neg Negative mg/dL    POC Biliruben Neg Negative mg/dL    POC Glucose Neg Negative mg/dL                POCT Pregnancy      Component Value Standard Range & Units    POC Urine Pregnancy Test Neg Negative    Internal Control Positive Positive     Internal Control Negative Negative                         Current Immunizations     No immunizations on file.      Below and/or attached are the medications your provider expects you to take.  Review all of your home medications and newly ordered medications with your provider and/or pharmacist. Follow medication instructions as directed by your provider and/or pharmacist. Please keep your medication list with you and share with your provider. Update the information when medications are discontinued, doses are changed, or new medications (including over-the-counter products) are added; and carry medication information at all times in the event of emergency situations     Allergies:  SULFA DRUGS - (reactions not documented)               Medications  Valid as of: June 22, 2017 - 12:33 PM    Generic Name Brand Name Tablet Size Instructions for use    Albuterol Sulfate (Aero Soln) albuterol 108 (90 BASE) MCG/ACT Inhale 1-2 Puffs by mouth every 6 hours as needed for Shortness of Breath.        Albuterol Sulfate (Aero Soln) albuterol 108 (90 BASE) MCG/ACT Inhale 2 Puffs by mouth every 6 hours as needed for Shortness of Breath.        Amoxicillin-Pot Clavulanate (Tab) AUGMENTIN 875-125 MG Take 1 Tab by mouth 2 times a day.        Aspirin Effervescent (Effer Tab) ALLEGRA-SELTZER 325 MG Take 1 Tab by mouth every 6 hours as needed.        Azithromycin (Tab) ZITHROMAX 250 MG As directed        Benzonatate (Cap) TESSALON 200 MG Take 1 Cap by mouth 3 times a day as needed for Cough.        Fluticasone Propionate (Suspension) FLONASE 50 MCG/ACT Spray 2 Sprays in nose every day.        Hydrocod Polst-Chlorphen Polst (Liquid CR) TUSSIONEX 10-8 MG/5ML Take 5 mL by mouth every 12 hours.        Hydrocod Polst-Chlorphen Polst (Liquid CR) TUSSIONEX 10-8 MG/5ML Take 5 mL by mouth every 12 hours as needed for Cough or Rhinitis.        Meclizine HCl (Tab) ANTIVERT 25 MG Take 1 Tab by mouth 3 times a day as needed for Dizziness, Nausea/Vomiting or Vertigo.        Pseudoeph-Doxylamine-DM-APAP   Take  by mouth.        .                 Medicines prescribed today were sent to:     [facility], [place] - [address]  [address] [address]    Phone: [phone] Fax: [phone]    Open 24 Hours?: No      Medication refill instructions:       If your prescription bottle indicates you have medication refills left, it is not necessary to call your provider’s office. Please contact your pharmacy and they will refill your medication.    If your prescription bottle indicates you do not have any refills left, you may request refills at any time through one of the following ways: The online [employer] system (except Urgent Care), by calling your provider’s office, or by asking your pharmacy to contact your provider’s office with a refill request. Medication refills are processed only during regular business hours and may not be available until the next business day. Your provider may request additional information or to have a follow-up visit with you prior to refilling your medication.   *Please Note: Medication refills are assigned a new Rx number when refilled electronically. Your pharmacy may indicate that no refills were authorized even though a new prescription for the same medication is available at the pharmacy. Please request the medicine by name with the pharmacy before contacting your provider for a refill.        Your To Do List     Future Labs/Procedures Complete By Expires    CBC WITHOUT DIFFERENTIAL  As directed 12/22/2017    COMP METABOLIC PANEL  As directed 6/22/2018    LIPASE  As directed 6/22/2018         [facility] [employer] Access Code: [email or web address]  Expires: 7/5/2017  3:57 AM    [employer] [employer]  A secure, online tool to manage your health information     [employer]’s [employer]® is a secure, online tool that connects you to your personalized health information from the privacy of your home -- day or night - making it very easy for you to manage your healthcare. Once the activation process is completed, you can even access your medical information using the [employer] [employer] sarah, which is available for free in the  Apple Danny store or Google Play store.     [employer] provides the following levels of access (as shown below):   My Chart Features   [facility] Doctor [facility]  Specialists [facility]  Urgent  Care Non-[facility]  Primary Care  Doctor   Email your healthcare team securely and privately 24/7 X X X    Manage appointments: schedule your next appointment; view details of past/upcoming appointments X      Request prescription refills. X      View recent personal medical records, including lab and immunizations X X X X   View health record, including health history, allergies, medications X X X X   Read reports about your outpatient visits, procedures, consult and ER notes X X X X   See your discharge summary, which is a recap of your hospital and/or ER visit that includes your diagnosis, lab results, and care plan. X X       How to register for [employer]:  1. Go to  [email or web address].  2. Click on the Sign Up Now box, which takes you to the New Member Sign Up page. You will need to provide the following information:  a. Enter your [employer] Access Code exactly as it appears at the top of this page. (You will not need to use this code after you’ve completed the sign-up process. If you do not sign up before the expiration date, you must request a new code.)   b. Enter your date of birth.   c. Enter your home email address.   d. Click Submit, and follow the next screen’s instructions.  3. Create a [employer] ID. This will be your [employer] login ID and cannot be changed, so think of one that is secure and easy to remember.  4. Create a [employer] password. You can change your password at any time.  5. Enter your Password Reset Question and Answer. This can be used at a later time if you forget your password.   6. Enter your e-mail address. This allows you to receive e-mail notifications when new information is available in [employer].  7. Click Sign Up. You can now view your health information.    For assistance activating your  MyChart account, call [phone]         No

## 2023-03-28 NOTE — CONSULT NOTE ADULT - SUBJECTIVE AND OBJECTIVE BOX
CHIEF COMPLAINT:  cough, congestion    HPI: 73 years old make with PMHX Intellectual disability, HTN, HLD, COPD, DM, CHF presented to the hospital with c/o of cough, SOB, nasal congestion midsternal chest pain.  Patient reports chest pains to come on only when he coughs. The chest pain gets better when he stops coughing. It does not radiate anywhere.  He also reports episodes of palpitations and lightheadedness when he coughs. Patient denies syncope, LE edema, PND/orthopnea.     PAST MEDICAL & SURGICAL HISTORY:  intellectual disability  HTN  HLD  COPD  DM  CHF        Allergies    No Known Allergies    Intolerances        MEDICATIONS  (STANDING):    MEDICATIONS  (PRN):      FAMILY HISTORY:  ***No family history of premature coronary artery disease or sudden cardiac death    SOCIAL HISTORY:  Smoking-denies  Alcohol-denies  Illicit Drug use-denies    REVIEW OF SYSTEMS:  Constitutional: [ ] fever, [ ]weight loss,  [ ]fatigue  Eyes: [ ] visual changes  Respiratory: [x ]shortness of breath;  [x] cough, [ ]wheezing, [ ]chills, [ ]hemoptysis  Cardiovascular: [x ] chest pain, [x ]palpitations, [ ]dizziness,  [ ]leg swelling [ ]syncope  Gastrointestinal: [ ] abdominal pain, [ ]nausea, [ ]vomiting,  [ ]diarrhea   Genitourinary: [ ] dysuria, [ ] hematuria  Neurologic: [ ] headaches [ ] tremors  [ ] weakness [x ] lightheadedness  Skin: [ ] itching, [ ]burning, [ ] rashes  Endocrine: [ ] heat or cold intolerance  Musculoskeletal: [ ] joint pain or swelling; [ ] muscle, back, or extremity pain  Psychiatric: [ ] depression, [ ]anxiety, [ ]mood swings, or [ ]difficulty sleeping  Hematologic: [ ] easy bruising, [ ] bleeding gums     [ x] All others negative	  [ ] Unable to obtain    Vital Signs Last 24 Hrs  T(C): --  T(F): --  HR: --  BP: --  BP(mean): --  RR: --  SpO2: --      I&O's Summary      PHYSICAL EXAM:  General: No acute distress  HEENT: EOMI  Neck:  No JVD  Lungs: Ronchi to auscultation bilaterally; No rales or wheezing  Heart: Regular rate and rhythm; No murmurs, rubs, or gallops  Abdomen: soft, non tender, non distended   Extremities: warm, no edema   Nervous system:  Alert & Oriented X3  Psychiatric: Normal affect  Skin: No rashes or lesions    LABS:  03-28    145  |  116<H>  |  30<H>  ----------------------------<  150<H>  4.0   |  27  |  1.34<H>    Ca    9.2      28 Mar 2023 12:11    TPro  6.1  /  Alb  2.7<L>  /  TBili  0.4  /  DBili  x   /  AST  30  /  ALT  46  /  AlkPhos  82  03-28    Creatinine Trend: 1.34<--                        15.6   6.71  )-----------( 175      ( 28 Mar 2023 09:11 )             49.4     PT/INR - ( 28 Mar 2023 09:11 )   PT: 13.2 sec;   INR: 1.11 ratio         PTT - ( 28 Mar 2023 09:11 )  PTT:33.5 sec    Lipid Panel:   Cardiac Enzymes: CARDIAC MARKERS ( 28 Mar 2023 09:11 )  x     / x     / 221 U/L / x     / 6.5 ng/mL            RADIOLOGY: < from: Xray Chest 1 View AP/PA (03.28.23 @ 10:09) >  There is is an infiltrate at the right base and possibly on the left.    IMPRESSION: Lung infiltration as above.    --- End of Report ---      < end of copied text >      ECG [my interpretation]: 3/28/2023  8:57:41  Sinus tachy Hr 105  LVH, TWI  lateral leads    TELEMETRY: Sinus rhythm Hr range                                                                                          CHIEF COMPLAINT:  cough, congestion    HPI: 73 years old make with PMHX Intellectual disability, HTN, HLD, COPD, DM, CHF presented to the hospital with c/o of cough, SOB, nasal congestion midsternal chest pain.  Patient reports chest pains to come on only when he coughs. The chest pain gets better when he stops coughing. It does not radiate anywhere.  He also reports episodes of palpitations and lightheadedness when he coughs. Patient denies syncope, LE edema, PND/orthopnea.     PAST MEDICAL & SURGICAL HISTORY:  intellectual disability  HTN  HLD  COPD  DM  CHF        Allergies    No Known Allergies    Intolerances        MEDICATIONS  (STANDING):    MEDICATIONS  (PRN):      FAMILY HISTORY:  ***No family history of premature coronary artery disease or sudden cardiac death    SOCIAL HISTORY:  Smoking-denies  Alcohol-denies  Illicit Drug use-denies    REVIEW OF SYSTEMS:  Constitutional: [ ] fever, [ ]weight loss,  [ ]fatigue  Eyes: [ ] visual changes  Respiratory: [x ]shortness of breath;  [x] cough, [ ]wheezing, [ ]chills, [ ]hemoptysis  Cardiovascular: [x ] chest pain, [x ]palpitations, [ ]dizziness,  [ ]leg swelling [ ]syncope  Gastrointestinal: [ ] abdominal pain, [ ]nausea, [ ]vomiting,  [ ]diarrhea   Genitourinary: [ ] dysuria, [ ] hematuria  Neurologic: [ ] headaches [ ] tremors  [ ] weakness [x ] lightheadedness  Skin: [ ] itching, [ ]burning, [ ] rashes  Endocrine: [ ] heat or cold intolerance  Musculoskeletal: [ ] joint pain or swelling; [ ] muscle, back, or extremity pain  Psychiatric: [ ] depression, [ ]anxiety, [ ]mood swings, or [ ]difficulty sleeping  Hematologic: [ ] easy bruising, [ ] bleeding gums     [ x] All others negative	  [ ] Unable to obtain    Vital Signs Last 24 Hrs  T(C): --98  T(F): --  HR: --103  BP: --156/112  BP(mean): --  RR: --18  SpO2: --98%      I&O's Summary      PHYSICAL EXAM:  General: No acute distress  HEENT: EOMI  Neck:  No JVD  Lungs: Ronchi to auscultation bilaterally; No rales or wheezing  Heart: Regular rate and rhythm; No murmurs, rubs, or gallops  Abdomen: soft, non tender, non distended   Extremities: warm, no edema   Nervous system:  Alert & Oriented X3  Psychiatric: Normal affect  Skin: No rashes or lesions    LABS:  03-28    145  |  116<H>  |  30<H>  ----------------------------<  150<H>  4.0   |  27  |  1.34<H>    Ca    9.2      28 Mar 2023 12:11    TPro  6.1  /  Alb  2.7<L>  /  TBili  0.4  /  DBili  x   /  AST  30  /  ALT  46  /  AlkPhos  82  03-28    Creatinine Trend: 1.34<--                        15.6   6.71  )-----------( 175      ( 28 Mar 2023 09:11 )             49.4     PT/INR - ( 28 Mar 2023 09:11 )   PT: 13.2 sec;   INR: 1.11 ratio         PTT - ( 28 Mar 2023 09:11 )  PTT:33.5 sec    Lipid Panel:   Cardiac Enzymes: CARDIAC MARKERS ( 28 Mar 2023 09:11 )  x     / x     / 221 U/L / x     / 6.5 ng/mL            RADIOLOGY: < from: Xray Chest 1 View AP/PA (03.28.23 @ 10:09) >  There is is an infiltrate at the right base and possibly on the left.    IMPRESSION: Lung infiltration as above.    --- End of Report ---      < end of copied text >      ECG [my interpretation]: 3/28/2023  8:57:41  Sinus tachy Hr 105  LVH, TWI  lateral leads    TELEMETRY: Sinus rhythm Hr range

## 2023-03-28 NOTE — H&P ADULT - NSHPPHYSICALEXAM_GEN_ALL_CORE
T(C): 37.1 (03-28-23 @ 15:56), Max: 37.1 (03-28-23 @ 15:56)  T(F): 98.7 (03-28-23 @ 15:56), Max: 98.7 (03-28-23 @ 15:56)  HR: 99 (03-28-23 @ 15:56) (99 - 101)  BP: 145/99 (03-28-23 @ 15:56) (145/99 - 153/105)  RR: 30 (03-28-23 @ 15:56) (18 - 30)  SpO2: 99% (03-28-23 @ 15:56) (99% - 100%)    GENERAL: NAD, lying in bed comfortably  HEAD:  Atraumatic, Normocephalic  EYES: EOMI, PERRLA, conjunctiva and sclera clear  ENT: Moist mucous membranes  NECK: Supple, +mild JVD  CHEST/LUNG: + rales in the right and some mild on left lung bases, cough productive clear sputum,   HEART: Regular rate and rhythm; No murmurs, rubs, or gallops  ABDOMEN: Bowel sounds present; Soft, Nontender, Nondistended. No hepatomegally  EXTREMITIES:  2+ Peripheral Pulses, brisk capillary refill. No clubbing, cyanosis, or edema  NERVOUS SYSTEM:  Alert & Oriented X3, speech clear. No deficits   MSK: FROM all 4 extremities, full and equal strength  SKIN: No rashes or lesions

## 2023-03-28 NOTE — H&P ADULT - PROBLEM SELECTOR PLAN 6
on Janumet and jardiance  can hold for now  FS ACH sa ISS and Asthma  on PRN albuterol and monteleukast   c/w home meds

## 2023-03-28 NOTE — ED ADULT NURSE REASSESSMENT NOTE - NS ED NURSE REASSESS COMMENT FT1
EMR back online as of 1430. Refer to paper chart. Pt is AAOX3. IV Saline lock to Right AC. Pt being admitted at this time. Vital signs stable and documented. Tele monitor in place, NSR. Will continue to monitor and assess

## 2023-03-28 NOTE — CONSULT NOTE ADULT - NS ATTEND AMEND GEN_ALL_CORE FT
Please see modifications to above note as follows:    73M w Intellectual disability, HTN, HLD, COPD, DM, CKD, non ischemic CM (HFrEF 40-45% 2020, CCTA 2021 proximal cors w luminal irregularities, artifact in distal vessels) admitted for sob. History from pt and pt's aide who report that pt started coughing last night and worsened towards the morning. He also has not been eating as he was NPO for an imaging test. Due to cough and weakness pt was brought to ER. Pt reports he had chest pain/throat pain/nose pain when he coughed last night and this morning. Pain was not L sided, but mid sternal and non radiating and only occurred w cough. Unable to describe intensity/quality. Currently endorses cough and nasal congestion. Denies chest pain and palpitations. On exam pt is in NAD and appears comfortable, JVD ~8cm, lung exam with diffuse rhonchi, CV exam with RRR, abd soft, extremities with no edema and warm.   CXR shows R base infiltrate. ECG with SR PVCs and lateral TWI. Labs notable for Cr 1.3 Troponin 84.6-->81.8. ProBNP 87920. Tele w sinus rhythm and sinus tach w PVCs.   This presentation is most consistent with an infection, likely PNA given CXR infiltrate, productive cough. Pain that pt describes do not appear to be cardiac in nature as it occurs w coughing fits. Furthermore ECG TWI are unchanged from ECGs in 2020 Pt has known cardiomyopathy which is believed to be non ischemic. Trop elevation likely demand in the setting of YASSINE/CKD and presumed PNA. Pro BNP is elevated, not grossly volume up on exam.   - stop hep gtt  - TTE to assess function  - consider ID/pulm consult, defer Abx regimen to primary team   - COPD mgmt per primary team   - cautious fluids given HFrEF  - tele  - dose meds for GFR  - pt should be on statin

## 2023-03-28 NOTE — CONSULT NOTE ADULT - ASSESSMENT
73 years old make with PMHX Intellectual disability, HTN, HLD, COPD, DM, CHF presented to the hospital with c/o of cough, SOB, nasal congestion midsternal chest pain.  Patient reports chest pains to come on only when he coughs. The chest pain gets better when he stops coughing. It does not radiate anywhere.  He also reports episodes of palpitations and lightheadedness when he coughs. Patient denies syncope, LE edema, PND/orthopnea.      73 years old make with PMHX Intellectual disability, HTN, HLD, COPD, DM, CHF presented to the hospital with c/o of cough, SOB, nasal congestion midsternal chest pain.  Patient reports chest pains to come on only when he coughs. The chest pain gets better when he stops coughing. It does not radiate anywhere.  He also reports episodes of palpitations and lightheadedness when he coughs. Patient denies syncope, LE edema, PND/orthopnea. Cardiology was consulted.

## 2023-03-28 NOTE — CONSULT NOTE ADULT - PROBLEM SELECTOR RECOMMENDATION 3
- Pt appears clinically normovolemic  -obtain echo for EF and structural abnormalities   - BNP 09836H on admission  - CXR as above  - Strict I&O  - Daily weights  - Fluid restriction  - Low salt, low cholesterol diet - Pt appears clinically normovolemic  -obtain echo for EF and structural abnormalities   - BNP 98524C on admission  - CXR as above  - Strict I&O  - Daily weights  - Low salt, low cholesterol diet

## 2023-03-28 NOTE — H&P ADULT - PROBLEM SELECTOR PLAN 4
takes atorvastatin 20  but this is a 73 y o diabetic admitted to rule out ACS  will switch to 40mg and schizzophrenia in addition   mood component is also possible    -on olanzepine, and valproate  -also on memantine and amantadine   -also on clonazepam in AM   will c/w home meds

## 2023-03-28 NOTE — PATIENT PROFILE ADULT - FALL HARM RISK - HARM RISK INTERVENTIONS

## 2023-03-28 NOTE — H&P ADULT - PROBLEM SELECTOR PLAN 5
and Asthma  on PRN albuterol and monteleukast   c/w home meds takes atorvastatin 20  but this is a 73 y o diabetic admitted to rule out ACS  will switch to 40mg

## 2023-03-28 NOTE — H&P ADULT - PROBLEM SELECTOR PLAN 3
and schizzophrenia in addition   mood component is also possible    -on olanzepine, and valproate  -also on memantine and amantadine   -also on clonazepam in AM   will c/w home meds Takes metoprolol at home 25 daily as well as enalapril 20mg BID and hydralazine   ALso on lasix at home  has LVH and HTN  c/w BB will switch to tatrate for now with parameters  IV lasix as above  hold enalapril due to YASSINE   HOld Hydralazine and resume if BP is >150 on lasix and lopressor

## 2023-03-28 NOTE — H&P ADULT - PROBLEM SELECTOR PLAN 7
Patient was going for prostate MRI today prior to presentation for admission   the patient states he is urinating with no abnormalities  with lasix and with BPH and YASSINE on the labs  will do a bladder scan to ensure patient is voiding well    -f/u bladder scan  -c/w home tamsulosin and finasteride on Janumet and jardiance  can hold for now  FS ACH sa ISS

## 2023-03-28 NOTE — CONSULT NOTE ADULT - PROBLEM SELECTOR RECOMMENDATION 9
-due to persistent cough likely due to PNA as per xray  -recommend to start antibiotics  -troponins 84.6=>81.8 (trending down) likely due YASSINE; therefore, will call it type II MI  -recommend discontinuing Heparin drip  -obtain echo for EF and structural abnormalities

## 2023-03-28 NOTE — H&P ADULT - NSHPREVIEWOFSYSTEMS_GEN_ALL_CORE
REVIEW OF SYSTEMS:    CONSTITUTIONAL:+ weakness, no fever  EYES/ENT: No visual changes;  No vertigo or throat pain   NECK: No pain or stiffness  RESPIRATORY:+ cough, respiratory difficulty,   CARDIOVASCULAR: + chest pain and palpitations  GASTROINTESTINAL: No abdominal or epigastric pain. No nausea, vomiting, or hematemesis; No diarrhea or constipation. No melena or hematochezia.  GENITOURINARY: No dysuria, frequency or hematuria  NEUROLOGICAL: No numbness or weakness  SKIN: No itching, rashes

## 2023-03-28 NOTE — H&P ADULT - NSICDXPASTMEDICALHX_GEN_ALL_CORE_FT
PAST MEDICAL HISTORY:  Diabetes mellitus     Down syndrome     HTN (hypertension)     Hyperlipidemia     Intellectual disability

## 2023-03-28 NOTE — H&P ADULT - ATTENDING COMMENTS
Patient came with c/o parasternal chest pain while coughing.  Cough is new.    Patient has history of non-ischemic cardiomyopathy.     alert, cooperative man in mild respiratory distress  Vital Signs Last 24 Hrs  T(C): 37.1 (03-28-23 @ 15:56), Max: 37.1 (03-28-23 @ 15:56)  T(F): 98.7 (03-28-23 @ 15:56), Max: 98.7 (03-28-23 @ 15:56)  HR: 99 (03-28-23 @ 15:56) (99 - 101)  BP: 145/99 (03-28-23 @ 15:56) (145/99 - 153/105)  BP(mean): --  RR: 30 (03-28-23 @ 15:56) (18 - 30)  SpO2: 99% (03-28-23 @ 15:56) (99% - 100%)    Lungs, bilateral basilar rales  Cor, no gallop appreciated  Abdomen, soft  Neurological, non-focal    EKG, sinus tachycardia, LVH with strain  < from: Xray Chest 1 View AP/PA (03.28.23 @ 10:09) >    There is is an infiltrate at the right base and possibly on the left.    IMPRESSION: Lung infiltration as abov    < end of copied text >                            15.6   6.71  )-----------( 175      ( 28 Mar 2023 09:11 )             49.4       03-28    145  |  116<H>  |  30<H>  ----------------------------<  150<H>  4.0   |  27  |  1.34<H>    Ca    9.2      28 Mar 2023 12:11    TPro  6.1  /  Alb  2.7<L>  /  TBili  0.4  /  DBili  x   /  AST  30  /  ALT  46  /  AlkPhos  82  03-28                  PT/INR - ( 28 Mar 2023 09:11 )   PT: 13.2 sec;   INR: 1.11 ratio         PTT - ( 28 Mar 2023 09:11 )  PTT:33.5 sec    Lactate Trend  03-28 @ 09:11 Lactate:2.7       CARDIAC MARKERS ( 28 Mar 2023 09:11 )  x     / x     / 221 U/L / x     / 6.5 ng/mL        CAPILLARY BLOOD GLUCOSE      POCT Blood Glucose.: 202 mg/dL (28 Mar 2023 17:41)      IMP:  CHF exacerbation.  Non-ischemic by previous w/u             DM             cognitive disability             BPH             hypertensive heart disease  Cardiology consultation, appreciated  Plan: Discontinue heparin drip             IV lasix             insulin coverage             Echo

## 2023-03-29 NOTE — CHART NOTE - NSCHARTNOTEFT_GEN_A_CORE
EVENT: Received telephone call from RN that pt's BP is 172/ 116. Rechecked it was 150/112; 157/100    HPI: 73 y o with ambulatory at baseline from a [employer] [facility] home number  10  PMH of  intellectual disability, Down Syndrome, asthma, COPD with chronic cough, HTN, DM, possible CKD elevated PSA, BPH, schizophrenia, hepatitis B, carrier, bilateral sensorineural hearing loss, who came in to the ED, BIBEMS at the call of the HHA, due to weakness and chest pain with increased work of breathing now admitted for NSTEMI.    SUBJECTIVE: n/a    OBJECTIVE:  Vital Signs Last 24 Hrs  T(C): 36.9 (28 Mar 2023 23:31), Max: 37.1 (28 Mar 2023 15:56)  T(F): 98.4 (28 Mar 2023 23:31), Max: 98.7 (28 Mar 2023 15:56)  HR: 111 (28 Mar 2023 23:32) (99 - 112)  BP: 157/103 (28 Mar 2023 23:32) (143/90 - 172/116)  BP(mean): --  RR: 23 (28 Mar 2023 23:32) (18 - 30)  SpO2: 100% (28 Mar 2023 23:31) (93% - 100%)    Parameters below as of 28 Mar 2023 23:31  Patient On (Oxygen Delivery Method): nasal cannula  O2 Flow (L/min): 2      FOCUSED PHYSICAL EXAM:  Neuro: awake, alert, oriented x 3. No neuro deficit  Cardiovascular: Pulses +2 B/L in lower and upper extremities, HR regular, BP stable, No edema.  Respiratory: Respirations regular, unlabored, breath sounds clear B/L.   GI: Abdomen soft, non-tender, positive bowel sounds.  : no bladder distention noted. No complaints at this time.  Skin: Dry, intact, no bruising, no diaphoresis.    LABS:                        15.6   6.71  )-----------( 175      ( 28 Mar 2023 09:11 )             49.4   CARDIAC MARKERS ( 28 Mar 2023 09:11 )  x     / x     / 221 U/L / x     / 6.5 ng/mL    03-28    145  |  116<H>  |  30<H>  ----------------------------<  150<H>  4.0   |  27  |  1.34<H>    Ca    9.2      28 Mar 2023 12:11  Mg     2.9     03-28    TPro  6.1  /  Alb  2.7<L>  /  TBili  0.4  /  DBili  x   /  AST  30  /  ALT  46  /  AlkPhos  82  03-28      EKG:   IMAGING:      ASSESSMENT/PROBLEM: Uncontrolled hypertension      PLAN:   1. Hydralazine 10mg, IVP x 1 dose ordered  2. Monitor response to treatment  3. Cont present care/treatment  4. Supportive care

## 2023-03-29 NOTE — PROGRESS NOTE ADULT - ASSESSMENT
73 y o with ambulatory at baseline from a [facility] home number  10  PMH of  intellectual disability, Down Syndrome, asthma, COPD with chronic cough, HTN, DM, possible CKD elevated PSA, BPH, schizophrenia, hepatitis B, carrier, bilateral sensorineural hearing loss, who came in to the ED, BIBEMS at the call of the HHA, due to weakness and chest pain with increased work of breathing now admitted for NSTEMI.

## 2023-03-29 NOTE — PROGRESS NOTE ADULT - PROBLEM SELECTOR PLAN 6
Throughout shift, pt is refusing to open eyes / take medications / respond verbally. Pt will \"umhum\" and nod / shake head, but will not speak. Blood sugar and vitals taken and WNL for patient. RN notified provider on call. Provider came to assess patient at bedside. Pt will pull blankets up, BUE moving equally, no facial droop or asymmetry. PM medications were held d/t uncooperative state. and Asthma  c/w PRN albuterol and monteleukast   start duoneb q6 and Asthma  c/w PRN albuterol and monteleukast   start duoneb q6  start flonase 1 spray each nostril BID for congestion

## 2023-03-29 NOTE — PROGRESS NOTE ADULT - SUBJECTIVE AND OBJECTIVE BOX
PGY-1 Progress Note discussed with attending    PAGER #: [[phone]] TILL 5:00 PM  PLEASE CONTACT ON CALL TEAM:  - On Call Team (Please refer to Red) FROM 5:00 PM - 8:30PM  - Nightfloat Team FROM 8:30 -7:30 AM    CC: Patient is a 73y old  Male who presents with a chief complaint of chest pain (29 Mar 2023 11:49)      OVERNIGHT EVENTS:    SUBJECTIVE / INTERVAL HPI: Patient seen and examined at bedside. Complaining of sinus congestion, cough, hoarseness, throat tightness, and mild shortness of breath.  Says that chest pain has resolved. Denies, headache, abdominal pain, nausea, vomiting, diarrhea, constipation, and dysuria.     ROS:  CONSTITUTIONAL: No weakness, fevers or chills  EYES/ENT: No visual changes;  No vertigo; +throat pain, +congestion   NECK: No pain or stiffness  RESPIRATORY: +cough, wheezing, hemoptysis; +shortness of breath  CARDIOVASCULAR: No chest pain or palpitations  GASTROINTESTINAL: No abdominal or epigastric pain. No nausea, vomiting, or hematemesis; No diarrhea or constipation. No melena or hematochezia.  GENITOURINARY: No dysuria, frequency or hematuria  NEUROLOGICAL: No numbness or weakness  SKIN: No itching, burning, rashes, or lesions     VITAL SIGNS:  Vital Signs Last 24 Hrs  T(C): 36.2 (29 Mar 2023 10:55), Max: 37.1 (28 Mar 2023 15:56)  T(F): 97.2 (29 Mar 2023 10:55), Max: 98.7 (28 Mar 2023 15:56)  HR: 82 (29 Mar 2023 10:55) (82 - 112)  BP: 136/90 (29 Mar 2023 10:55) (130/97 - 172/116)  BP(mean): --  RR: 20 (29 Mar 2023 10:55) (18 - 30)  SpO2: 94% (29 Mar 2023 10:55) (93% - 100%)    Parameters below as of 29 Mar 2023 10:55  Patient On (Oxygen Delivery Method): room air        PHYSICAL EXAM:    General: WDWN, facial characteristics of down syndrom  HEENT: NC/AT; PERRL, left eye ptosis, anicteric sclera; MMM, poor dentition  Neck: supple  Cardiovascular: +S1/S2; RRR, no M/R/G  Respiratory: rhonchi with underlying wheezes and transmitted upper airway sounds B/L  Gastrointestinal: soft, NT/ND; +BSx4  Extremities: WWP; no edema, clubbing or cyanosis  Vascular: 2+ radial, DP/PT pulses B/L  Skin: Warm, dry, good turgor, no rashes, or ecchymoses  Neurological: AAOx3; no focal deficits    MEDICATIONS:  MEDICATIONS  (STANDING):  albuterol/ipratropium for Nebulization 3 milliLiter(s) Nebulizer every 6 hours  amantadine 100 milliGRAM(s) Oral two times a day  aspirin enteric coated 81 milliGRAM(s) Oral daily  atorvastatin 40 milliGRAM(s) Oral at bedtime  enalapril 20 milliGRAM(s) Oral daily  enoxaparin Injectable 40 milliGRAM(s) SubCutaneous every 24 hours  finasteride 5 milliGRAM(s) Oral daily  fluticasone propionate 50 MICROgram(s)/spray Nasal Spray 1 Spray(s) Both Nostrils two times a day  furosemide   Injectable 40 milliGRAM(s) IV Push daily  insulin lispro (ADMELOG) corrective regimen sliding scale   SubCutaneous three times a day before meals  insulin lispro (ADMELOG) corrective regimen sliding scale   SubCutaneous at bedtime  lactulose Syrup 20 Gram(s) Oral at bedtime  memantine 5 milliGRAM(s) Oral two times a day  metoprolol succinate ER 25 milliGRAM(s) Oral daily  montelukast 10 milliGRAM(s) Oral daily  OLANZapine 10 milliGRAM(s) Oral at bedtime  tamsulosin 0.4 milliGRAM(s) Oral at bedtime  valproic  acid Syrup 250 milliGRAM(s) Oral daily  valproic  acid Syrup 1000 milliGRAM(s) Oral at bedtime    MEDICATIONS  (PRN):  albuterol    90 MICROgram(s) HFA Inhaler 2 Puff(s) Inhalation every 6 hours PRN Respiratory Distress  clonazePAM Oral Disintegrating Tablet 0.5 milliGRAM(s) Oral at bedtime PRN anxiety  dextrose Oral Gel 15 Gram(s) Oral once PRN Blood Glucose LESS THAN 70 milliGRAM(s)/deciliter      ALLERGIES:  Allergies    No Known Allergies    Intolerances        LABS:                        16.7   5.44  )-----------( 176      ( 29 Mar 2023 06:12 )             52.3     03-29    143  |  113<H>  |  28<H>  ----------------------------<  152<H>  3.9   |  26  |  1.38<H>    Ca    10.1      29 Mar 2023 06:12  Phos  3.0     03-29  Mg     2.8     03-29    TPro  6.9  /  Alb  3.0<L>  /  TBili  0.4  /  DBili  x   /  AST  32  /  ALT  47  /  AlkPhos  95  03-29    PT/INR - ( 28 Mar 2023 09:11 )   PT: 13.2 sec;   INR: 1.11 ratio         PTT - ( 28 Mar 2023 18:00 )  PTT:62.0 sec    CAPILLARY BLOOD GLUCOSE      POCT Blood Glucose.: 175 mg/dL (29 Mar 2023 11:06)      RADIOLOGY & ADDITIONAL TESTS: Reviewed.

## 2023-03-29 NOTE — PROGRESS NOTE ADULT - PROBLEM SELECTOR PLAN 2
Comes in with chest pain   found to have EKG with TWI   Trop mildly elevated 84.6>81.8   admitted to rule out ACS  f/u lipid panel, A1c, TSH  f/u Echo  d/c tele  cardiology consulted Dr Reyes Comes in with chest pain   found to have EKG with TWI   Trop mildly elevated 84.6>81.8   admitted to rule out ACS  lipids WNL, A1c 6.8, TSH 0.98  f/u Echo  d/c tele  cardiology consulted Dr Reyes

## 2023-03-29 NOTE — PROGRESS NOTE ADULT - PROBLEM SELECTOR PLAN 4
and schizzophrenia in addition   mood component is also possible    -on olanzepine, and valproate  -also on memantine and amantadine   -also on clonazepam in AM   will c/w home meds and schizophrenia in addition   mood component is also possible    -on olanzepine, and valproate  -also on memantine and amantadine   -also on clonazepam in AM   will c/w home meds

## 2023-03-29 NOTE — PHARMACOTHERAPY INTERVENTION NOTE - COMMENTS
ACS nomogram based on dosing weight
Height, weight, and allergy status (NKDA) entered from triage sheet.
Outside medication list updated per list sent with patient.
aPTT ordered 
Pt is on STAR JEANNETTE LIST-ACUTE MI.  Medication profile reviewed.  Recommend to change Montelukast to HS.  No other recommendations at this time.

## 2023-03-29 NOTE — PATIENT PROFILE ADULT - NSPROPTRIGHTCAREGIVER_GEN_A_NUR
Pt reports vaginal itching that comes and goes. Offered pt appt for evaluation and pt accepted. Appt scheduled today. Pt agreed and voiced understanding. declines

## 2023-03-29 NOTE — PROGRESS NOTE ADULT - ASSESSMENT
72 yo M with Intellectual disability, HTN, HLD, COPD, DM, CKD, non ischemic CM (HFrEF 40-45% 2020) with non-obstructive CAD on CCTA (2021) who presented with dyspnea in the setting of underlying pneumonia.         1. Elevated cardiac enzymes: Likely represents type II MI (demand ischemia) in setting of underlying infection as well as YASSINE/CKD (creatinine was 1.45 in 01/2023).   -Echocardiogram is pending  -No clinical suspicion for ACS, no need for telemetry monitoring    2. Non-ischemia cardiomyopathy:   -Echo pending to re-assess LVEF  -Patient previously evaluated by EP and deemed not to be good candidate for ICD placement  -Continue with medical therapy including metoprolol ER 25mg  -Restart enalapril 20mg daily and titrate as needed (patient is on 20mg PO BID at home)  -Consider Entresto as outpatient  -Patient is on Jardiance 10mg once daily @ home, can resume here     3. HTN: On metoprolol, resume lisinopril as per above    ***Note that this is a preliminary note and any recommendations should NOT be carried out until this note is finalized. *** 72 yo M with Intellectual disability, HTN, HLD, COPD, DM, CKD, non ischemic CM (HFrEF 40-45% 2020) with non-obstructive CAD on CCTA (2021) who presented with dyspnea in the setting of URI and mild systolic HF exacerbation.     1. Elevated cardiac enzymes: Likely represents type II MI (demand ischemia) in setting of underlying infection as well as CKD (creatinine was 1.45 in 01/2023). Patient with non-obstructive CAD on CCTA.   -Echocardiogram is pending  -No clinical suspicion for ACS, no need for telemetry monitoring    2. Non-ischemia cardiomyopathy:   -Echo pending to re-assess LVEF  -Patient previously evaluated by EP and deemed not to be good candidate for ICD placement  -Continue with medical therapy including metoprolol ER 25mg  -Restart enalapril 20mg daily and titrate as needed (patient is on 20mg PO BID at home)  -Consider Entresto as outpatient  -Patient is on Jardiance 10mg once daily @ home, can resume here   -Patient with minimal clinical evidence of fluid overload at this time, supported by absence of orthopnea. Agree with management of COPD exacerbation/URI (given wheezing)  -Strict Is and Os, please check daily weights    3. HTN: On metoprolol, resume lisinopril as per above    4. HLD: On atorvastatin

## 2023-03-30 NOTE — DISCHARGE NOTE PROVIDER - CARE PROVIDERS DIRECT ADDRESSES
[email or web address]

## 2023-03-30 NOTE — DIETITIAN INITIAL EVALUATION ADULT - PROBLEM SELECTOR PLAN 2
Comes in with chest pain   found to have EKG with TWI   Trop and BNP elevated  now admitted to rule out ACS  f/u lipid panel, A1c, TSH  f/u Echo  maintain on tele  cardiology ; consulted Dr Andrea

## 2023-03-30 NOTE — PROGRESS NOTE ADULT - PROBLEM SELECTOR PLAN 6
and Asthma  c/w PRN albuterol and monteleukast   start duoneb q6  start flonase 1 spray each nostril BID for congestio

## 2023-03-30 NOTE — DISCHARGE NOTE PROVIDER - NSDCCPCAREPLAN_GEN_ALL_CORE_FT
PRINCIPAL DISCHARGE DIAGNOSIS  Diagnosis: Acute decompensated heart failure  Assessment and Plan of Treatment: You have history of CHF (congestive heart failure).  On this admission, your CXR showed some fluid in the lungs. Echocardiogram showed showed severe global left ventricular systolic dysfunction, grade I diastolic dysfunction (impaired relaxation mild), EF observed to be 10-15% (decreased from 2020), and agitated saline injection revealed late bubbles in the left heart, consistent with transpulmonic shunting.  You were treated with LASIX IV to help with diuresis which has improved your respiratory symptoms.  Please weigh yourself daily and If you gain 3lbs in 3 days, or 5lbs in a week and /or have any swelling or increased swelling in your feet, ankles, and/or stomach call your Health Care Provider.  Do not eat or drink foods containing more than 2000 mg of salt (sodium) in your diet every day and limit fluids to 800cc to 1000 cc per day.  Please take your medication as PRESCRIBED and follow up with your PCP/Cardiologist in 1 week from discharge to adjust medications as needed. Specifically, follow up with cardiologist, Dr. Vangie Estrada, at [facility] to discuss starting a medication called ENTRESTO and whether you should have a implantable cardioverter-defibrillator (ICD) placed.         SECONDARY DISCHARGE DIAGNOSES  Diagnosis: COPD with asthma  Assessment and Plan of Treatment: You have a history of COPD with asthma. You came to the hospital with worsening shortness of breath, increased sputum production, sore throat, sinus congestion and cough. It is likely that you had an upper respiratory viral illness that triggered COPD and asthma symptoms in addition to CHF symptoms. You were provided with supplemental oxygen as well as nebulizer treatment with DUONEB every 6 hours. Your home medications SINGULAIR and ALBUTEROL were continued and you showed improvement of symptoms and were able to be weaned off supplemental oxygen successfully. You were also given FLONASE and MUCINEX to help with nasal congestion and clearance of mucous from your lungs. Please continue to take your medications as prescribed and follow up with your PCP and Pulmonologist in 1 week from discharge to adjust medications as needed.      Diagnosis: Demand ischemia of myocardium  Assessment and Plan of Treatment: You came to the hospital complaining of chest pain and shortness of breath concerning for myocardial infarction (heart attack). An EKG showed sinus tachycardia and some T-wave inversions in the lateral leads concerning for possible NSTEMI. A blood marker of cardiac stress called troponin was found to be mildly elevated but down trended making a diagnosis of NSTEMI less likely. Your heart rate and rhythm was monitored continuously on telemetry and showed no acute or abnormal events. Cardiology was consulted and determined that your chest pain and elevated troponin was due to demand ischemia in the setting of CHF exacerbation and possible upper airway viral infection. Please continue to take your medications as PRESCRIBED and follow up with your PCP in a week from discharge to adjust medications as needed.    Diagnosis: Acute kidney injury superimposed on CKD  Assessment and Plan of Treatment: You were diagnosed with acute kidney injury superimposed on known chronic kidney disease. YASSINE (acute kidney injury) is a condition in which the kidneys suddenly can't filter waste from the blood. YASSINE is most common in those who are critically ill and already hospitalized. Symptoms include decreased urinary output, swelling due to fluid retention, nausea, fatigue, and shortness of breath. Sometimes symptoms may be subtle or may not appear at all. In addition to addressing the underlying cause, treatments include fluids. Fluids were not given due to underlying CHF and the risk of fluid overload. Your home medication ENALAPRIL was initially held as this medication can sometimes worsen kidney function in the setting of YASSINE.  A marker of kidney injury is serum Creatinine levels which elevates when the kidney gets injured. Your Creatinine was elevated and then improved down to baseline. Your enalapril was restarted and your creatinine went up slightly which is to be expected. Your creatinine should normalize in a few days after restarting this medication. Please follow up with your PCP or nephrologist in a week from discharge for further recommendations and repeat testing of your kidney function.      Diagnosis: HLD (hyperlipidemia)  Assessment and Plan of Treatment: You have history of Hyperlipidemia. You were continued on your home statin medication while in the hospital.  Please continue to take your medication as prescribed. Maintain healthy lifestyle, eat a low fat diet, exercise regularly and check your lipid levels routinely. Please follow up with your PCP in 1 week from discharge.      Diagnosis: Diabetes mellitus  Assessment and Plan of Treatment: You have a history of diabetes.   Your HbA1c was 6.8 during this admission.  You need to continue monitoring your blood sugar levels closely and maintain healthy lifestyle by eating healthy diabetic regimen and exercising regularly as tolerated.  Please continue to take your medications as prescribed.   Please follow up with your PCP/Endocrinologist within a week of discharge.      Diagnosis: HTN (hypertension)  Assessment and Plan of Treatment: You have a history of Hypertension.   On this admission, your Blood Pressure was adequately controlled with METOPROLOL, ENALIPRIL, and LASIX  Your blood pressure target is 120-140/80-90. To care for your blood pressure at home maintain a healthy lifestyle, eat a low salt diet, avoid fatty food, try to lose weight, and exercise regularly or stay active as tolerated 30 mins X 3 times per week.  Notify your doctor if you have any of the following symptoms:   (Dizziness, Lightheadedness, Blurry vision, Headache, Chest pain, Shortness of breath.)  Please continue to take your medications as PRESCRIBED and follow up with your PCP in a week from discharge to adjust medications as needed.      Diagnosis: BPH with elevated PSA  Assessment and Plan of Treatment: You have history of BPH (benign prostatic hyperplasia) which means that you have an age associated prostate gland enlargement that can cause difficulties with urination and is not considered to be a precurosr to prostate cancer. You were continued on your HOME medications TAMSULOSIN during your hospital stay.  Please continue to take your HOME medications and  follow up with your PCP in a week from discharge.       PRINCIPAL DISCHARGE DIAGNOSIS  Diagnosis: Acute decompensated heart failure  Assessment and Plan of Treatment: You have history of CHF (congestive heart failure).  On this admission, your CXR showed some fluid in the lungs. Echocardiogram showed showed severe global left ventricular systolic dysfunction, grade I diastolic dysfunction (impaired relaxation mild), EF observed to be 10-15% (decreased from 2020), and agitated saline injection revealed late bubbles in the left heart, consistent with transpulmonic shunting.  You were treated with LASIX IV to help with diuresis which has improved your respiratory symptoms. You are recommended to continue on oral lasix 20 mg  and follow up with cardiologist, Dr. Vangie Estrada, at [facility] to discuss starting a medication called ENTRESTO and whether you should have a implantable cardioverter-defibrillator (ICD) placed.         SECONDARY DISCHARGE DIAGNOSES  Diagnosis: HTN (hypertension)  Assessment and Plan of Treatment: You have a history of Hypertension.   On this admission, your Blood Pressure was adequately controlled with METOPROLOL, ENALIPRIL, and LASIX  Your blood pressure target is 120-140/80-90. To care for your blood pressure at home maintain a healthy lifestyle, eat a low salt diet, avoid fatty food, try to lose weight, and exercise regularly or stay active as tolerated 30 mins X 3 times per week.  Notify your doctor if you have any of the following symptoms:   (Dizziness, Lightheadedness, Blurry vision, Headache, Chest pain, Shortness of breath.)  Please continue to take your medications as PRESCRIBED and follow up with your PCP in a week from discharge to adjust medications as needed.      Diagnosis: HLD (hyperlipidemia)  Assessment and Plan of Treatment: You have history of Hyperlipidemia. You were continued on your home statin medication while in the hospital.  Please continue to take your medication as prescribed. Maintain healthy lifestyle, eat a low fat diet, exercise regularly and check your lipid levels routinely. Please follow up with your PCP in 1 week from discharge.      Diagnosis: Diabetes mellitus  Assessment and Plan of Treatment: You have a history of diabetes.   Your HbA1c was 6.8 during this admission.  You need to continue monitoring your blood sugar levels closely and maintain healthy lifestyle by eating healthy diabetic regimen and exercising regularly as tolerated.  Please continue to take your medications as prescribed.   Please follow up with your PCP/Endocrinologist within a week of discharge.      Diagnosis: COPD with asthma  Assessment and Plan of Treatment: You have a history of COPD with asthma. You came to the hospital with worsening shortness of breath, increased sputum production, sore throat, sinus congestion and cough. It is likely that you had an upper respiratory viral illness that triggered COPD and asthma symptoms in addition to CHF symptoms. You were provided with supplemental oxygen as well as nebulizer treatment with DUONEB every 6 hours. Your home medications SINGULAIR and ALBUTEROL were continued and you showed improvement of symptoms and were able to be weaned off supplemental oxygen successfully. You were also given FLONASE and MUCINEX to help with nasal congestion and clearance of mucous from your lungs. Please continue to take your medications as prescribed and follow up with your PCP and Pulmonologist in 1 week from discharge to adjust medications as needed.      Diagnosis: Demand ischemia of myocardium  Assessment and Plan of Treatment: You came to the hospital complaining of chest pain and shortness of breath concerning for myocardial infarction (heart attack). An EKG showed sinus tachycardia and some T-wave inversions in the lateral leads concerning for possible NSTEMI. A blood marker of cardiac stress called troponin was found to be mildly elevated but down trended making a diagnosis of NSTEMI less likely. Your heart rate and rhythm was monitored continuously on telemetry and showed no acute or abnormal events. Cardiology was consulted and determined that your chest pain and elevated troponin was due to demand ischemia in the setting of CHF exacerbation and possible upper airway viral infection. Please continue to take your medications as PRESCRIBED and follow up with your PCP in a week from discharge to adjust medications as needed.    Diagnosis: BPH with elevated PSA  Assessment and Plan of Treatment: You have history of BPH (benign prostatic hyperplasia) which means that you have an age associated prostate gland enlargement that can cause difficulties with urination and is not considered to be a precurosr to prostate cancer. You were continued on your HOME medications TAMSULOSIN during your hospital stay.  Please continue to take your HOME medications and  follow up with your PCP in a week from discharge.      Diagnosis: Acute kidney injury superimposed on CKD  Assessment and Plan of Treatment: You were diagnosed with acute kidney injury superimposed on known chronic kidney disease. You are recommended to repeat creatinine within one week of dicharge. YASSINE (acute kidney injury) is a condition in which the kidneys suddenly can't filter waste from the blood. YASSINE is most common in those who are critically ill and already hospitalized. Symptoms include decreased urinary output, swelling due to fluid retention, nausea, fatigue, and shortness of breath. Sometimes symptoms may be subtle or may not appear at all. In addition to addressing the underlying cause, treatments include fluids. Fluids were not given due to underlying CHF and the risk of fluid overload. Your home medication ENALAPRIL was initially held as this medication can sometimes worsen kidney function in the setting of YASSINE.  A marker of kidney injury is serum Creatinine levels which elevates when the kidney gets injured. Your Creatinine was elevated and then improved down to baseline. Your enalapril was restarted and your creatinine went up slightly which is to be expected. Your creatinine should normalize in a few days after restarting this medication. Please follow up with your PCP or nephrologist in a week from discharge for further recommendations and repeat testing of your kidney function.

## 2023-03-30 NOTE — PHYSICAL THERAPY INITIAL EVALUATION ADULT - GENERAL OBSERVATIONS, REHAB EVAL
male group home patient in the presence of coordinator bedside, [facility] down syndrome, refer for PT and safe dc planning, presented independent in alds including gait

## 2023-03-30 NOTE — DISCHARGE NOTE NURSING/CASE MANAGEMENT/SOCIAL WORK - PATIENT PORTAL LINK FT
You can access the FollowMyHealth Patient Portal offered by [facility] by registering at the following website: [email or web address]. By joining [employer]’s FollowMyHealth portal, you will also be able to view your health information using other applications (apps) compatible with our system.

## 2023-03-30 NOTE — PROGRESS NOTE ADULT - SUBJECTIVE AND OBJECTIVE BOX
PGY-1 Progress Note discussed with attending    PAGER #: [[phone]] TILL 5:00 PM  PLEASE CONTACT ON CALL TEAM:  - On Call Team (Please refer to Red) FROM 5:00 PM - 8:30PM  - Nightfloat Team FROM 8:30 -7:30 AM    CHIEF COMPLAINT & BRIEF HOSPITAL COURSE:    INTERVAL HPI/OVERNIGHT EVENTS:   MEDICATIONS  (STANDING):  albuterol/ipratropium for Nebulization 3 milliLiter(s) Nebulizer every 6 hours  amantadine 100 milliGRAM(s) Oral two times a day  aspirin enteric coated 81 milliGRAM(s) Oral daily  atorvastatin 40 milliGRAM(s) Oral at bedtime  enalapril 20 milliGRAM(s) Oral daily  enoxaparin Injectable 40 milliGRAM(s) SubCutaneous every 24 hours  finasteride 5 milliGRAM(s) Oral daily  fluticasone propionate 50 MICROgram(s)/spray Nasal Spray 1 Spray(s) Both Nostrils two times a day  guaiFENesin  milliGRAM(s) Oral every 12 hours  insulin lispro (ADMELOG) corrective regimen sliding scale   SubCutaneous three times a day before meals  insulin lispro (ADMELOG) corrective regimen sliding scale   SubCutaneous at bedtime  lactulose Syrup 20 Gram(s) Oral at bedtime  memantine 5 milliGRAM(s) Oral two times a day  metoprolol succinate ER 25 milliGRAM(s) Oral daily  montelukast 10 milliGRAM(s) Oral daily  OLANZapine 10 milliGRAM(s) Oral at bedtime  tamsulosin 0.4 milliGRAM(s) Oral at bedtime  valproic  acid Syrup 250 milliGRAM(s) Oral daily  valproic  acid Syrup 1000 milliGRAM(s) Oral at bedtime    MEDICATIONS  (PRN):  albuterol    90 MICROgram(s) HFA Inhaler 2 Puff(s) Inhalation every 6 hours PRN Respiratory Distress  clonazePAM Oral Disintegrating Tablet 0.5 milliGRAM(s) Oral at bedtime PRN anxiety  dextrose Oral Gel 15 Gram(s) Oral once PRN Blood Glucose LESS THAN 70 milliGRAM(s)/deciliter      REVIEW OF SYSTEMS:  CONSTITUTIONAL: No fever, weight loss, or fatigue  RESPIRATORY: No cough, wheezing, chills or hemoptysis; No shortness of breath  CARDIOVASCULAR: No chest pain, palpitations, dizziness, or leg swelling  GASTROINTESTINAL: No abdominal pain. No nausea, vomiting, or diarrhea.  GENITOURINARY: No dysuria or hematuria, urinary frequency  NEUROLOGICAL: No headaches, memory loss, loss of strength, numbness, or tremors  SKIN: No itching, burning, rashes, or lesions     Vital Signs Last 24 Hrs  T(C): 36.8 (30 Mar 2023 10:00), Max: 36.9 (29 Mar 2023 23:40)  T(F): 98.2 (30 Mar 2023 10:00), Max: 98.4 (29 Mar 2023 23:40)  HR: 96 (30 Mar 2023 10:00) (86 - 98)  BP: 131/72 (30 Mar 2023 10:00) (129/99 - 148/87)  BP(mean): --  RR: 20 (30 Mar 2023 10:00) (19 - 20)  SpO2: 98% (30 Mar 2023 10:00) (98% - 100%)    Parameters below as of 30 Mar 2023 10:00  Patient On (Oxygen Delivery Method): nasal cannula        PHYSICAL EXAMINATION:  GENERAL: NAD, well built  HEAD:  Atraumatic, Normocephalic  EYES:  conjunctiva and sclera clear  NECK: Supple, No JVD, Normal thyroid  CHEST/LUNG: Clear to auscultation. No rales, rhonchi, wheezing, or rubs  HEART: Regular rate and rhythm; No murmurs, rubs, or gallops  ABDOMEN: Soft, Nontender, Nondistended; Bowel sounds present  NERVOUS SYSTEM:  Alert & Oriented X3,    EXTREMITIES:  2+ Peripheral Pulses, No clubbing, cyanosis, or edema  SKIN: warm dry                          16.0   5.03  )-----------( 181      ( 30 Mar 2023 05:28 )             51.5     03-30    144  |  114<H>  |  32<H>  ----------------------------<  136<H>  4.3   |  26  |  1.63<H>    Ca    9.7      30 Mar 2023 05:28  Phos  3.0     03-29  Mg     2.9     03-30    TPro  6.9  /  Alb  3.0<L>  /  TBili  0.4  /  DBili  x   /  AST  32  /  ALT  47  /  AlkPhos  95  03-29    LIVER FUNCTIONS - ( 29 Mar 2023 06:12 )  Alb: 3.0 g/dL / Pro: 6.9 g/dL / ALK PHOS: 95 U/L / ALT: 47 U/L DA / AST: 32 U/L / GGT: x               PTT - ( 28 Mar 2023 18:00 )  PTT:62.0 sec    CAPILLARY BLOOD GLUCOSE      RADIOLOGY & ADDITIONAL TESTS:< from: Xray Chest 1 View AP/PA (03.28.23 @ 10:09) >  COMPARISON: None available.    Heart magnified by technique.    There is is an infiltrate at the right base and possibly on the left.    IMPRESSION: Lung infiltration as above.    < end of copied text >  < from: 12 Lead ECG (03.28.23 @ 08:56) >  Diagnosis Line Sinus tachycardia with occasional Premature ventricular complexes  Voltage criteria for left ventricular hypertrophy  ST & T wave abnormality, consider lateral ischemia  Abnormal ECG    < end of copied text >  < from: TTE with Doppler (w/Cont) (03.29.23 @ 07:03) >  OBSERVATIONS:  Mitral Valve: Normal mitral valve. Trace mitral  regurgitation.  Aortic Root: Normal aortic root.  Aortic Valve: Aortic valve leaflet morphology not well  visualized, opens normally.  Left Atrium: Normal left atrium.  LA volume index = 20  cc/m2.  Left Ventricle: Severe global left ventricular systolic  dysfunction. Ultrasound LV opacification agent was used to  enhance endocardial definition. Normal left ventricular  internal dimensions and wall thicknesses. Grade I diastolic  dysfunction (Impaired relaxation, mild).  Right Heart: Normal right atrium. Normal right ventricular  size and function. There is trace tricuspid regurgitation.  There is trace pulmonic regurgitation.  Pericardium/PleuraNormal pericardium with no pericardial  effusion.  Hemodynamic: Incomplete tricuspid regurgitation jet  precludes accurate assessment of pulmonary artery systolic  pressure. Agitated saline injectionrevealed late bubbles  in the left heart, consistent with transpulmonic shunting.  ------------------------------------------------------------------------  CONCLUSIONS:  1. Trace mitral regurgitation.  2. Normal left ventricular internal dimensions and wall  thicknesses.  3. Severe global left ventricular systolic dysfunction.  Ultrasound LV opacification agent was used to enhance  endocardial definition.  4. Grade I diastolic dysfunction (Impaired relaxation,  mild).  5. Normal right ventricular size and function.  6. Agitated saline injection revealed late bubbles in the  left heart, consistent with transpulmonic shunting.    < end of copied text >   PGY-1 Progress Note discussed with attending    PAGER #: [[phone]] TILL 5:00 PM  PLEASE CONTACT ON CALL TEAM:  - On Call Team (Please refer to Red) FROM 5:00 PM - 8:30PM  - Nightfloat Team FROM 8:30 -7:30 AM    INTERVAL HPI/OVERNIGHT EVENTS: Pt was examined at bedside. He has no complaints from overnight and suffered no acute events.     MEDICATIONS  (STANDING):  albuterol/ipratropium for Nebulization 3 milliLiter(s) Nebulizer every 6 hours  amantadine 100 milliGRAM(s) Oral two times a day  aspirin enteric coated 81 milliGRAM(s) Oral daily  atorvastatin 40 milliGRAM(s) Oral at bedtime  enalapril 20 milliGRAM(s) Oral daily  enoxaparin Injectable 40 milliGRAM(s) SubCutaneous every 24 hours  finasteride 5 milliGRAM(s) Oral daily  fluticasone propionate 50 MICROgram(s)/spray Nasal Spray 1 Spray(s) Both Nostrils two times a day  guaiFENesin  milliGRAM(s) Oral every 12 hours  insulin lispro (ADMELOG) corrective regimen sliding scale   SubCutaneous three times a day before meals  insulin lispro (ADMELOG) corrective regimen sliding scale   SubCutaneous at bedtime  lactulose Syrup 20 Gram(s) Oral at bedtime  memantine 5 milliGRAM(s) Oral two times a day  metoprolol succinate ER 25 milliGRAM(s) Oral daily  montelukast 10 milliGRAM(s) Oral daily  OLANZapine 10 milliGRAM(s) Oral at bedtime  tamsulosin 0.4 milliGRAM(s) Oral at bedtime  valproic  acid Syrup 250 milliGRAM(s) Oral daily  valproic  acid Syrup 1000 milliGRAM(s) Oral at bedtime    MEDICATIONS  (PRN):  albuterol    90 MICROgram(s) HFA Inhaler 2 Puff(s) Inhalation every 6 hours PRN Respiratory Distress  clonazePAM Oral Disintegrating Tablet 0.5 milliGRAM(s) Oral at bedtime PRN anxiety  dextrose Oral Gel 15 Gram(s) Oral once PRN Blood Glucose LESS THAN 70 milliGRAM(s)/deciliter      REVIEW OF SYSTEMS:  CONSTITUTIONAL: No fever, weight loss, or fatigue  RESPIRATORY: + mild cough and congestion, Denies wheezing, chills or hemoptysis; No shortness of breath  CARDIOVASCULAR: No chest pain, palpitations, dizziness, or leg swelling  GASTROINTESTINAL: No abdominal pain. No nausea, vomiting, or diarrhea.  GENITOURINARY: No dysuria or hematuria, urinary frequency  NEUROLOGICAL: No headaches, memory loss, loss of strength, numbness, or tremors  SKIN: No itching, burning, rashes, or lesions     Vital Signs Last 24 Hrs  T(C): 36.8 (30 Mar 2023 10:00), Max: 36.9 (29 Mar 2023 23:40)  T(F): 98.2 (30 Mar 2023 10:00), Max: 98.4 (29 Mar 2023 23:40)  HR: 96 (30 Mar 2023 10:00) (86 - 98)  BP: 131/72 (30 Mar 2023 10:00) (129/99 - 148/87)  BP(mean): --  RR: 20 (30 Mar 2023 10:00) (19 - 20)  SpO2: 98% (30 Mar 2023 10:00) (98% - 100%)    Parameters below as of 30 Mar 2023 10:00  Patient On (Oxygen Delivery Method): nasal cannula    General: WDWN, facial characteristics of down syndrom  HEENT: NC/AT; PERRL, left eye ptosis, anicteric sclera; MMM, poor dentition  Neck: supple  Cardiovascular: +S1/S2; RRR, no M/R/G  Respiratory: b/l rhonchi and wheezes in the upper and lower lung fields, R>L  Gastrointestinal: soft, NT/ND; +BSx4  Extremities: WWP; no edema, clubbing or cyanosis  Vascular: 2+ radial, DP/PT pulses B/L  Skin: Warm, dry, good turgor, no rashes, or ecchymoses  Neurological: AAOx3; no focal deficits                          16.0   5.03  )-----------( 181      ( 30 Mar 2023 05:28 )             51.5     03-30    144  |  114<H>  |  32<H>  ----------------------------<  136<H>  4.3   |  26  |  1.63<H>    Ca    9.7      30 Mar 2023 05:28  Phos  3.0     03-29  Mg     2.9     03-30    TPro  6.9  /  Alb  3.0<L>  /  TBili  0.4  /  DBili  x   /  AST  32  /  ALT  47  /  AlkPhos  95  03-29    LIVER FUNCTIONS - ( 29 Mar 2023 06:12 )  Alb: 3.0 g/dL / Pro: 6.9 g/dL / ALK PHOS: 95 U/L / ALT: 47 U/L DA / AST: 32 U/L / GGT: x               PTT - ( 28 Mar 2023 18:00 )  PTT:62.0 sec    CAPILLARY BLOOD GLUCOSE      RADIOLOGY & ADDITIONAL TESTS:< from: Xray Chest 1 View AP/PA (03.28.23 @ 10:09) >  COMPARISON: None available.    Heart magnified by technique.    There is is an infiltrate at the right base and possibly on the left.    IMPRESSION: Lung infiltration as above.    < end of copied text >  < from: 12 Lead ECG (03.28.23 @ 08:56) >  Diagnosis Line Sinus tachycardia with occasional Premature ventricular complexes  Voltage criteria for left ventricular hypertrophy  ST & T wave abnormality, consider lateral ischemia  Abnormal ECG    < end of copied text >  < from: TTE with Doppler (w/Cont) (03.29.23 @ 07:03) >  OBSERVATIONS:  Mitral Valve: Normal mitral valve. Trace mitral  regurgitation.  Aortic Root: Normal aortic root.  Aortic Valve: Aortic valve leaflet morphology not well  visualized, opens normally.  Left Atrium: Normal left atrium.  LA volume index = 20  cc/m2.  Left Ventricle: Severe global left ventricular systolic  dysfunction. Ultrasound LV opacification agent was used to  enhance endocardial definition. Normal left ventricular  internal dimensions and wall thicknesses. Grade I diastolic  dysfunction (Impaired relaxation, mild).  Right Heart: Normal right atrium. Normal right ventricular  size and function. There is trace tricuspid regurgitation.  There is trace pulmonic regurgitation.  Pericardium/PleuraNormal pericardium with no pericardial  effusion.  Hemodynamic: Incomplete tricuspid regurgitation jet  precludes accurate assessment of pulmonary artery systolic  pressure. Agitated saline injectionrevealed late bubbles  in the left heart, consistent with transpulmonic shunting.  ------------------------------------------------------------------------  CONCLUSIONS:  1. Trace mitral regurgitation.  2. Normal left ventricular internal dimensions and wall  thicknesses.  3. Severe global left ventricular systolic dysfunction.  Ultrasound LV opacification agent was used to enhance  endocardial definition.  4. Grade I diastolic dysfunction (Impaired relaxation,  mild).  5. Normal right ventricular size and function.  6. Agitated saline injection revealed late bubbles in the  left heart, consistent with transpulmonic shunting.    < end of copied text >

## 2023-03-30 NOTE — DIETITIAN INITIAL EVALUATION ADULT - PROBLEM SELECTOR PLAN 3
Takes metoprolol at home 25 daily as well as enalapril 20mg BID and hydralazine   ALso on lasix at home  has LVH and HTN  c/w BB will switch to tatrate for now with parameters  IV lasix as above  hold enalapril due to YASSINE   HOld Hydralazine and resume if BP is >150 on lasix and lopressor

## 2023-03-30 NOTE — PROGRESS NOTE ADULT - PROBLEM SELECTOR PLAN 7
on Janumet and jardiance  can hold for now  [facility] sa ISS.
on Janumet and jardiance  can hold for now  [facility] sa ISS

## 2023-03-30 NOTE — DIETITIAN INITIAL EVALUATION ADULT - PERTINENT LABORATORY DATA
03-30    144  |  114<H>  |  32<H>  ----------------------------<  136<H>  4.3   |  26  |  1.63<H>    Ca    9.7      30 Mar 2023 05:28  Phos  3.0     03-29  Mg     2.9     03-30    TPro  6.9  /  Alb  3.0<L>  /  TBili  0.4  /  DBili  x   /  AST  32  /  ALT  47  /  AlkPhos  95  03-29  POCT Blood Glucose.: 126 mg/dL (03-30-23 @ 07:54)  A1C with Estimated Average Glucose Result: 6.8 % (03-29-23 @ 06:12)

## 2023-03-30 NOTE — PROGRESS NOTE ADULT - PROBLEM SELECTOR PLAN 4
and schizophrenia in addition   mood component is also possible  -on olanzepine, and valproate  -also on memantine and amantadine   -also on clonazepam in AM   will c/w home meds

## 2023-03-30 NOTE — DIETITIAN INITIAL EVALUATION ADULT - OTHER INFO
Pt lives home at a group home PTA, alert, verbally responsive, forgetful, but able to answer simple questions, also spoke to group home staff at bedside; Reported appetite good, on Puree food PTA, tolerating puree food in-house, 100% intake at breakfast observed; denied GI distress, swallowing problem at present, no specific food choices , Unknown food allergies Pt lives home at a group home PTA, alert, verbally responsive, forgetful, but able to answer simple questions, also spoke to group home staff at bedside; Reported appetite good with puree food PTA, unclear recent wt changes, tolerating puree food in-house, 100% intake at breakfast observed; denied GI distress, swallowing problem at present, no specific food choices , Unknown food allergies; h/o DM, BeyP9W=0.8 noted

## 2023-03-30 NOTE — PROGRESS NOTE ADULT - ATTENDING COMMENTS
72 yo M with Intellectual disability, HTN, HLD, COPD, DM, CKD, non ischemic CM (HFrEF 40-45% 2020) with non-obstructive CAD on CCTA (2021) who presented with SOB 2/2 systolic CHF exacerbation and URI.    #Acute on chronic systolic CHF exacerbation   #URI  #COPD  #Demand ischemia  #CKD  - TTE showing EF 15% - outpatient follow up and can consider Entresto outpatient  - continue metoprolol ER 25mg  - Mucinex and duoneb for URI - improved  - Tolerating RA well  - Continue enalapril   - Continue jardiance and statin  - DVT ppx
72 yo M with Intellectual disability, HTN, HLD, COPD, DM, CKD, non ischemic CM (HFrEF 40-45% 2020) with non-obstructive CAD on CCTA (2021) who presented with SOB 2/2 systolic CHF exacerbation and URI.    #Acute on chronic systolic CHF exacerbation   #URI  #COPD  #Demand ischemia  #CKD  - f/u TTE  - continue metoprolol ER 25mg  - Mucinex and duoneb for URI  - Continue enalapril   - Continue jardiance and statin  - DVT ppx  - DC telemetry

## 2023-03-30 NOTE — PROGRESS NOTE ADULT - PROBLEM SELECTOR PLAN 9
Found to have YASSINE,   BUN / Cr ratio > 20, today (3/30) still >20  urine lytes: urNa 47, urCr 46, FENa=0.9%  avoid NSAIDs and nephrotoxic agents   monitor CMP daily for improvement.  f/u BMP daily.
Comes in for   Found to have YASSINE,   BUN / Cr ratio > 20  likely pre-renal in the setting of cardio renal ?  urine lytes: urNa 47, urCr 46, FENa=0.9%  avoid NSAIDs and nephrotoxic agents   holding ACEI/ARB, may resume once YASSINE resolves   monitor CMP daily for improvement.  f/u BMP daily

## 2023-03-30 NOTE — PROGRESS NOTE ADULT - PROBLEM SELECTOR PLAN 5
takes atorvastatin 20  but this is a 73 y o diabetic admitted to rule out ACS  will switch to 40mg
Continue atorvastatin 40mg

## 2023-03-30 NOTE — DIETITIAN INITIAL EVALUATION ADULT - PROBLEM SELECTOR PLAN 1
crackles, JVD   Pulm infiltrates  BNP severely elevatd  with a hx of non ischemic cardiomyopathy    -Not on O2  -c/w lasix IV   -Strict I/o  -Dr. Ames on board

## 2023-03-30 NOTE — DIETITIAN INITIAL EVALUATION ADULT - PROBLEM SELECTOR PLAN 8
Patient was going for prostate MRI today prior to presentation for admission   the patient states he is urinating with no abnormalities  with lasix and with BPH and YASSINE on the labs  will do a bladder scan to ensure patient is voiding well    -f/u bladder scan  -c/w home tamsulosin and finasteride

## 2023-03-30 NOTE — PROGRESS NOTE ADULT - PROBLEM SELECTOR PLAN 3
Takes metoprolol at home 25 daily as well as enalapril 20mg BID and hydralazine, also on lasix at home  has LVH and HTN  c/w BB will switch to tatrate for now with parameters  Discontinued IV lasix   restart enalapril patient at baseline srCr   Hold Hydralazine and resume if BP is >150 on lasix and lopressor.  Trend creatinine
Takes metoprolol at home 25 daily as well as enalapril 20mg BID and hydralazine   ALso on lasix at home  has LVH and HTN  c/w BB will switch to tatrate for now with parameters  IV lasix as above  restart enalapril patient at baseline srCr   Hold Hydralazine and resume if BP is >150 on lasix and lopressor

## 2023-03-30 NOTE — DIETITIAN INITIAL EVALUATION ADULT - PROBLEM SELECTOR PLAN 9
Comes in for   Found to have YASSINE,   BUN / Cr ration > 20  likely pre-renal in the setting of cardio renal ?  f/u urine lytes to be obtained prior to diuresis  avoid NSAIDs and nephrotoxic agents   holding ACEI/ARB, may resume once YASSINE resolves   monitor CMP daily for improvement.  f/u BMP daily

## 2023-03-30 NOTE — DIETITIAN INITIAL EVALUATION ADULT - PROBLEM SELECTOR PLAN 4
and schizzophrenia in addition   mood component is also possible    -on olanzepine, and valproate  -also on memantine and amantadine   -also on clonazepam in AM   will c/w home meds

## 2023-03-30 NOTE — DISCHARGE NOTE PROVIDER - NSDCMRMEDTOKEN_GEN_ALL_CORE_FT
acetaminophen 500 mg oral tablet: 2 tab(s) orally once a day as needed for  mild pain  albuterol 2.5 mg/3 mL (0.083%) inhalation solution: 3 milliliter(s) by nebulizer every 4 hours as needed for  shortness of breath  albuterol 90 mcg/inh inhalation aerosol: 2 puff(s) inhaled 4 times a day as needed for wheezing  amantadine 100 mg oral tablet: 1 tab(s) orally 2 times a day  aspirin 81 mg oral delayed release tablet: 1 tab(s) orally once a day  atorvastatin 40 mg oral tablet: 1 tab(s) orally once a day (in the evening)  Ayr Saline 0.65% nasal gel: 1 application intranasally as directed  clonazePAM 0.5 mg oral tablet: 1 tab(s) orally once a day (in the morning)  enalapril 20 mg oral tablet: 1 tab(s) orally 2 times a day hold if pulse below 50  finasteride 5 mg oral tablet: 1 tab(s) orally once a day  furosemide 20 mg oral tablet: 1 tab(s) orally once a day  hydrALAZINE 50 mg oral tablet: 1 tab(s) orally 2 times a day  ibuprofen 600 mg oral tablet: 1 tab(s) orally every 8 hours as needed for  moderate pain  Januvia 100 mg oral tablet: 1 tab(s) orally once a day  Jardiance 10 mg oral tablet: 1 tab(s) orally once a day  lactulose 10 g/15 mL oral syrup: 30 milliliter(s) orally once a day (at bedtime)  Melatonin 5 mg oral tablet: 1 tab(s) orally once a day (at bedtime)  memantine 5 mg oral tablet: 1 tab(s) orally 2 times a day  metoprolol succinate 25 mg oral tablet, extended release: 1 tab(s) orally once a day hold if pulse below 50  montelukast 10 mg oral tablet: 1 tab(s) orally once a day  Multiple Vitamins oral tablet: 1 tab(s) orally once a day  OLANZapine 10 mg oral tablet: 1 tab(s) orally once a day (at bedtime)  oxyCODONE 5 mg oral tablet: 1 tab(s) orally every 6 hours as needed for  severe pain  sodium chloride 0.65% nasal solution: 2 spray(s) in each nostril 2 times a day  tamsulosin 0.4 mg oral capsule: 1 cap(s) orally once a day  tiZANidine 2 mg oral tablet: 1 tab(s) orally every 8 hours as needed  valproic acid 250 mg/5 mL oral liquid: 5 milliliter(s) orally once a day (in the morning)  valproic acid 250 mg/5 mL oral liquid: 20 milliliter(s) orally once a day (at bedtime)   acetaminophen 500 mg oral tablet: 2 tab(s) orally once a day as needed for  mild pain  albuterol 2.5 mg/3 mL (0.083%) inhalation solution: 3 milliliter(s) by nebulizer every 4 hours as needed for  shortness of breath  albuterol 90 mcg/inh inhalation aerosol: 2 puff(s) inhaled 4 times a day as needed for wheezing  amantadine 100 mg oral tablet: 1 tab(s) orally 2 times a day  aspirin 81 mg oral delayed release tablet: 1 tab(s) orally once a day  atorvastatin 40 mg oral tablet: 1 tab(s) orally once a day (in the evening)  clonazePAM 0.5 mg oral tablet: 1 tab(s) orally once a day (in the morning)  enalapril 20 mg oral tablet: 1 tab(s) orally once a day  finasteride 5 mg oral tablet: 1 tab(s) orally once a day  fluticasone 50 mcg/inh nasal spray: 1 spray(s) nasal 2 times a day  furosemide 20 mg oral tablet: 1 tab(s) orally once a day  guaiFENesin 600 mg oral tablet, extended release: 1 tab(s) orally every 12 hours  ibuprofen 600 mg oral tablet: 1 tab(s) orally every 8 hours as needed for  moderate pain  ipratropium-albuterol 0.5 mg-2.5 mg/3 mL inhalation solution: 3 milliliter(s) inhaled every 6 hours  Januvia 100 mg oral tablet: 1 tab(s) orally once a day  Jardiance 10 mg oral tablet: 1 tab(s) orally once a day  lactulose 10 g/15 mL oral syrup: 30 milliliter(s) orally once a day (at bedtime)  Melatonin 5 mg oral tablet: 1 tab(s) orally once a day (at bedtime)  memantine 5 mg oral tablet: 1 tab(s) orally 2 times a day  metoprolol succinate 25 mg oral tablet, extended release: 1 tab(s) orally once a day  montelukast 10 mg oral tablet: 1 tab(s) orally once a day  Multiple Vitamins oral tablet: 1 tab(s) orally once a day  OLANZapine 10 mg oral tablet: 1 tab(s) orally once a day (at bedtime)  oxyCODONE 5 mg oral tablet: 1 tab(s) orally every 6 hours as needed for  severe pain  tamsulosin 0.4 mg oral capsule: 1 cap(s) orally once a day  tiZANidine 2 mg oral tablet: 1 tab(s) orally every 8 hours as needed  valproic acid 250 mg/5 mL oral liquid: 5 milliliter(s) orally once a day (in the morning)  valproic acid 250 mg/5 mL oral liquid: 20 milliliter(s) orally once a day (at bedtime)

## 2023-03-30 NOTE — DISCHARGE NOTE PROVIDER - PROVIDER TOKENS
PROVIDER:[TOKEN:[[id number]],FOLLOWUP:[1 week],ESTABLISHEDPATIENT:[T]],PROVIDER:[TOKEN:[[id number]],FOLLOWUP:[1 week],ESTABLISHEDPATIENT:[T]]

## 2023-03-30 NOTE — PROGRESS NOTE ADULT - PROBLEM SELECTOR PLAN 8
c/w home tamsulosin and finasteride.
Patient was going for prostate MRI today prior to presentation for admission   the patient states he is urinating with no abnormalities  with lasix and with BPH and YASSINE on the labs  will do a bladder scan to ensure patient is voiding well    -f/u bladder scan  -c/w home tamsulosin and finasteride

## 2023-03-30 NOTE — PHYSICAL THERAPY INITIAL EVALUATION ADULT - NSPTDISCHREC_GEN_A_CORE
I called mom regarding a refill request for Guanfacine that we had received for Noemy.  Per mom their insurance does not cover coming here to see [facility]any, so they have transferred care to [facility].    Per mom we can disregard the refill request as the new provider at [facility] has taken over all her meds that had previously been prescribed by [facility].   may return to Group Home in care of Coordinators

## 2023-03-30 NOTE — DISCHARGE NOTE PROVIDER - HOSPITAL COURSE
73 y o [facility]/ PMH of intellectual disability, Down Syndrome, asthma, COPD with chronic cough, HTN, DM, possible CKD elevated PSA, BPH, schizophrenia, hepatitis B, carrier, bilateral sensorineural hearing loss, who came in to the ED, BIBEMS at the call of the A, due to severe weakness associated with midsubsternal chest pain and increased work of breathing this morning as he was getting ready for a prostate MRI appointment. In the ED, patient endorsed a resolved chest pain, that was worse when he felt palpitations, but denied fevers, chills, nausea, vomiting, diarrhea, recent illness or sick contacts. In the ED, the pt had a BP of 145/99 and HR of 108. Physical exam showed JVD, crackles, and a wet cough. CXR demonstrated right base infiltrate and possibly on the left. Pt was admitted to telemetry for an NSTEMI with ACS rule-out. Cardiology was consulted and determined that the elevated cardiac enzymes likely represented type II MI (demand ischemia) in setting of underlying infection as well as CKD (creatinine was 1.45 in 01/2023). Patient with non-obstructive CAD on CCTA and no clinical suspicion for ACS. Additionally, continue the medical therapy for HTN, HLD, and non-ischemia cardiomyopathy with metoprolol ER 25mg, Enalapril 20mg QD, atorvastatin, and continue Entresto outpatient. The patient had minimal clinical evidence of fluid overload at this time, supported by absence of orthopnea. Agree with management of COPD exacerbation/URI (given wheezing). The echo showed severe global left ventricular systolic dysfunction, grade I diastolic dysfunction (impaired relaxation mild), EF observed to be 10-15%, and agitated saline injection revealed late bubbles in the left heart, consistent with transpulmonic shunting. ACS was ruled out, and pt was continued on the cardioprotective medications: elanapril 20mgQD, metoprolol succinate ER 25mg QD, atorvostatin 40mg QD taking into consideration of his mild CKD/ acute kidney injury at the hospital. It also was determined that his lung rhonchi and wheezes could be attributed to an URI superimposed on his COPD and asthma comorbidities.   Patient was recommended to follow up with Dr. Vangie Estrada outpatient for discussions on starting entresto and having ICD placement for worsening heart failure.   Patient is stable for discharge and is advised to follow up with PCP as outpatient.  Please refer to patient's complete medical chart with documents for a full hospital course, for this is only a brief summary.

## 2023-03-30 NOTE — DIETITIAN INITIAL EVALUATION ADULT - FACTORS AFF FOOD INTAKE
acute on chronic comorbidities including CHF, MI, COPD, Intellectual Disability with Down Syndrome/change in mental status/difficulty chewing

## 2023-03-30 NOTE — DISCHARGE NOTE PROVIDER - CARE PROVIDER_API CALL
Vangie Estrada)  Medicine  Cardiology  [address] [place] [address], [address]  [place], [place] [id number]  Phone: [phone]  Fax: [phone]  Established Patient  Follow Up Time: 1 week    Gale Nation  [facility]  [address] [place] [address]  Phone: [phone]  Fax: [phone]  Established Patient  Follow Up Time: 1 week

## 2023-03-30 NOTE — PROGRESS NOTE ADULT - PROBLEM SELECTOR PLAN 1
- p/w crackles, JVD   - CXR: There is is an infiltrate at the right base and possibly on the left.  - Echo: Trace mitral regurgitation. Normal left ventricular internal dimensions and wall thicknesses. Severe global left ventricular systolic dysfunction. Ultrasound LV opacification agent was used to enhance endocardial definition. Grade I diastolic dysfunction (Impaired relaxation, mild). Normal right ventricular size and function. Agitated saline injection revealed late bubbles in the left heart, consistent with transpulmonic shunting  - BNP 32366, Trop 84.6>81.8  - with a hx of non ischemic cardiomyopathy  -Strict I/o  -Dr. Reyes on board  - Discontinued lasix 40mg qd IV   - F/U with cardio
p/w crackles, JVD   CXR: There is is an infiltrate at the right base and possibly on the left.  BNP 56280, Trop 84.6>81.8  with a hx of non ischemic cardiomyopathy  -c/w lasix 40mg qd IV   -Strict I/o  -Dr. Reyes on board

## 2023-03-30 NOTE — PROGRESS NOTE ADULT - ASSESSMENT
73 y o with ambulatory at baseline from a [facility] home number  10  PMH of  intellectual disability, Down Syndrome, asthma, COPD with chronic cough, HTN, DM, possible CKD elevated PSA, BPH, schizophrenia, hepatitis B, carrier, bilateral sensorineural hearing loss, who came in to the ED, BIBEMS at the call of the HHA, due to weakness and chest pain with increased work of breathing was admitted for NSTEMI. Upon further work-up it was determined that he suffered from demand ischemia. Pt is stable for discharge back to his group home.

## 2023-03-30 NOTE — DISCHARGE NOTE NURSING/CASE MANAGEMENT/SOCIAL WORK - NSDCPEFALRISK_GEN_ALL_CORE
For information on Fall & Injury Prevention, visit: [email or web address] OR  [email or web address] OR  https://www.cdc.gov/steadi/patient.html

## 2023-03-30 NOTE — DIETITIAN INITIAL EVALUATION ADULT - PERTINENT MEDS FT
MEDICATIONS  (STANDING):  albuterol/ipratropium for Nebulization 3 milliLiter(s) Nebulizer every 6 hours  amantadine 100 milliGRAM(s) Oral two times a day  aspirin enteric coated 81 milliGRAM(s) Oral daily  atorvastatin 40 milliGRAM(s) Oral at bedtime  enalapril 20 milliGRAM(s) Oral daily  enoxaparin Injectable 40 milliGRAM(s) SubCutaneous every 24 hours  finasteride 5 milliGRAM(s) Oral daily  fluticasone propionate 50 MICROgram(s)/spray Nasal Spray 1 Spray(s) Both Nostrils two times a day  guaiFENesin  milliGRAM(s) Oral every 12 hours  insulin lispro (ADMELOG) corrective regimen sliding scale   SubCutaneous three times a day before meals  insulin lispro (ADMELOG) corrective regimen sliding scale   SubCutaneous at bedtime  lactulose Syrup 20 Gram(s) Oral at bedtime  memantine 5 milliGRAM(s) Oral two times a day  metoprolol succinate ER 25 milliGRAM(s) Oral daily  montelukast 10 milliGRAM(s) Oral daily  OLANZapine 10 milliGRAM(s) Oral at bedtime  tamsulosin 0.4 milliGRAM(s) Oral at bedtime  valproic  acid Syrup 250 milliGRAM(s) Oral daily  valproic  acid Syrup 1000 milliGRAM(s) Oral at bedtime    MEDICATIONS  (PRN):  albuterol    90 MICROgram(s) HFA Inhaler 2 Puff(s) Inhalation every 6 hours PRN Respiratory Distress  clonazePAM Oral Disintegrating Tablet 0.5 milliGRAM(s) Oral at bedtime PRN anxiety  dextrose Oral Gel 15 Gram(s) Oral once PRN Blood Glucose LESS THAN 70 milliGRAM(s)/deciliter

## 2023-03-30 NOTE — DISCHARGE NOTE PROVIDER - ATTENDING DISCHARGE PHYSICAL EXAMINATION:
General: WDWN, facial characteristics of down syndrom  HEENT: NC/AT; PERRL, left eye ptosis, anicteric sclera; MMM, poor dentition  Neck: supple  Cardiovascular: +S1/S2; RRR, no M/R/G  Respiratory: mild b/l rhonchi and wheezes bilateral lung fields  Gastrointestinal: soft, NT/ND; +BSx4  Extremities: WWP; no edema, clubbing or cyanosis  Vascular: 2+ radial, DP/PT pulses B/L  Skin: Warm, dry, good turgor, no rashes, or ecchymoses  Neurological: AAOx3; no focal deficits

## 2023-03-31 NOTE — DOWNTIME INTERRUPTION NOTE - WHICH MANUAL FORMS INITIATED?
Tavares Chamorro, PGY-3, MD: I was not involved in the care of this patient and am only entering information to back log for downtime. Regarding Emergency Department care during this visit/admission, please see paper chart for isolation and medical management details, as the mandatory fields in the electronic record may be incomplete/inaccurate.

## 2023-04-08 PROBLEM — E11.9 TYPE 2 DIABETES MELLITUS WITHOUT COMPLICATIONS: Chronic | Status: ACTIVE | Noted: 2023-01-01

## 2023-04-08 PROBLEM — E78.5 HYPERLIPIDEMIA, UNSPECIFIED: Chronic | Status: ACTIVE | Noted: 2023-01-01

## 2023-04-08 PROBLEM — I10 ESSENTIAL (PRIMARY) HYPERTENSION: Chronic | Status: ACTIVE | Noted: 2023-01-01

## 2023-04-08 PROBLEM — F79 UNSPECIFIED INTELLECTUAL DISABILITIES: Chronic | Status: ACTIVE | Noted: 2023-01-01

## 2023-04-08 NOTE — H&P ADULT - NSHPLABSRESULTS_GEN_ALL_CORE
LABS:                        15.0   6.82  )-----------( 179      ( 08 Apr 2023 10:30 )             48.5     04-08    145  |  118<H>  |  34<H>  ----------------------------<  186<H>  5.1   |  23  |  1.57<H>    Ca    9.3      08 Apr 2023 14:44  Mg     2.8     04-08    TPro  6.4  /  Alb  2.8<L>  /  TBili  0.6  /  DBili  x   /  AST  49<H>  /  ALT  55  /  AlkPhos  79  04-08    PT/INR - ( 08 Apr 2023 10:30 )   PT: 12.6 sec;   INR: 1.06 ratio         PTT - ( 08 Apr 2023 10:30 )  PTT:35.6 sec    LIVER FUNCTIONS - ( 08 Apr 2023 10:30 )  Alb: 2.8 g/dL / Pro: 6.4 g/dL / ALK PHOS: 79 U/L / ALT: 55 U/L DA / AST: 49 U/L / GGT: x           Lipase, Serum: 95 U/L (04-08-23 @ 10:30)

## 2023-04-08 NOTE — H&P ADULT - ASSESSMENT
73 y o [facility]/ PMH of intellectual disability, Down Syndrome, asthma, COPD with chronic cough, HTN, DM, possible CKD elevated PSA, BPH, schizophrenia, hepatitis B, carrier, bilateral sensorineural hearing loss, who came in to the ED for sore throat and chest pain. Admitted for further management.  73 y o with ambulatory at baseline from a [facility] home number 10 w/ PMH of intellectual disability, Down Syndrome, asthma, COPD with chronic cough, HTN, DM,  CKD elevated PSA, BPH, schizophrenia, hepatitis B, carrier, bilateral sensorineural hearing loss, who came in to the ED for sore throat and chest pain. Admitted for further management.

## 2023-04-08 NOTE — ED PROVIDER NOTE - CLINICAL SUMMARY MEDICAL DECISION MAKING FREE TEXT BOX
72 yo presenting from [facility] w/ PMHx of intellectual disability, Down Syndrome, asthma, COPD with chronic cough, HTN, DM, CHF, possible CKD, elevated PSA, BPH, schizophrenia, hepatitis B, carrier, bilateral sensorineural hearing loss, recently admitted for nstemi and possible pna - discharged 3/30/23, presenting now for increasing throat pain, CP, and abd pain. Staff also reporting increasing lethargy and mild confusion w/ poor memory. No fevers. Associated congestion. Denies changes in urination or BMs. No N/V. Not presently on abx. Exam as above. Consider CHF exacerbation, vascular congestion, renal failure, YASSINE, electrolyte abnormality, occult infection, bacterial pneumonia, viral pneumonia, viral URI, medication side effect.  No changes in bowel movements or urination.  Abdomen nontender to palpation, less likely acute intra-abdominal process.  Will obtain labs, EKG, chest x-ray, will reassess.

## 2023-04-08 NOTE — H&P ADULT - PROBLEM SELECTOR PLAN 7
IMPROVE VTE Individual Risk Assessment          RISK                                                          Points  [  ] Previous VTE                                                3  [  ] Thrombophilia                                             2  [  ] Lower limb paralysis                                   2        (unable to hold up >15 seconds)    [  ] Current Cancer                                             2         (within 6 months)  [ x ] Immobilization > 24 hrs                              1  [  ] ICU/CCU stay > 24 hours                             1  [  ] Age > 60                                                         1    IMPROVE VTE Score:         [    1     ]      heparin SUbQ

## 2023-04-08 NOTE — ED ADULT NURSE NOTE - ED STAT RN HANDOFF DETAILS
Patient A&Ox2 admitted to Tele Vital signs stable as documented, IV intact, no redness or swelling noted. Normal sinus rhythm noted on tele monitor, HR 93 no acute distress noted. Endorsed to Leonardo ARDON.

## 2023-04-08 NOTE — ED PROVIDER NOTE - OBJECTIVE STATEMENT
74 yo presenting from [facility] w/ PMHx of intellectual disability, Down Syndrome, asthma, COPD with chronic cough, HTN, DM, CHF, possible CKD, elevated PSA, BPH, schizophrenia, hepatitis B, carrier, bilateral sensorineural hearing loss, recently admitted for nstemi and possible pna - discharged 3/30/23, presenting now for increasing throat pain, CP, and abd pain. Staff also reporting increasing lethargy and mild confusion w/ poor memory. No fevers. Associated congestion. Denies changes in urination or BMs. No N/V. Not presently on abx.

## 2023-04-08 NOTE — ED PROVIDER NOTE - PHYSICAL EXAMINATION
Gen: Alert Ox3. NAD.   HEENT: Atraumatic. Mucous membranes dry.  CV: RRR. No significant LE edema.   Resp: Tahcypneic. Crackles in bases bilat, no wheezing.  GI: Abdomen non tender to palpation, soft.  Skin/MSK: No open wounds.   Neuro: EOMI. Pupils ERRL. Following commands.   Psych: Appropriate mood, cooperative

## 2023-04-08 NOTE — H&P ADULT - PROBLEM SELECTOR PLAN 5
pt takes Januvia and jardiance at home  Will start SS  monitor BS pt takes atorvastatin at home  c/w home meds

## 2023-04-08 NOTE — ED PROVIDER NOTE - ATTENDING CONTRIBUTION TO CARE
73 year old male with PMH down syndrome, asthma, COPD, HTN, DM, CHF, CKD presents with chest discomfort. Patient with chest discomfort and SOB. On exam, patient tachypneic with bibasilar crackles and BLE edema. Suspect fluid overload from CHF vs. CKD. Plan includes labs, imaging, supportive treatment, likely admit.

## 2023-04-08 NOTE — H&P ADULT - PROBLEM SELECTOR PLAN 1
Physical Therapy  Initial Assessment     Name: Cedric Chávez  : 1972  MRN: [id number]    Date of Service: 2019    Evaluating PT: Rosendo Winn, PT, DPT [id number]    Room #:  6883/6519-R    Diagnosis: SDH  Precautions: Fall risk, L craniectomy, helmet, tone, L UE and R LE weakness, trach, PEG, incontinent, TSM, alarm  PMHx: Testicular CA, TBI, hx of craniotomy; right FT cranioplasty and superficial sharp debridement of skin incision (18), hx recent craniectomy    Pt recently discharged to [place] [address] from [address] [place] [address]. Pt was at [place] [address] for 1 day where pt had fall OOB. Per chart, pt lives with wife in a 2 story house with 1 stair(s) and 0 rail(s) to enter. Bed is on the second floor and bath is on the second floor. Per wife, pt was nonambulatory at [facility] and performed stand pivot transfers. Pt stood in [facility] with therapists. Initial Evaluation  Date: 19 Treatment Date: Short Term/ Long Term   Goals   AM-PAC 6 Clicks      Was pt agreeable to Eval/treatment? Yes     Does pt have pain? No current complaints/indications of pain     Bed Mobility  Rolling: Max A  Supine to sit: Dependent x2  Sit to supine: Dependent x2  Scooting: Dependent x2 toward HOB  Rolling: Min A  Supine to sit: Max A  Sit to supine: Max A  Scooting: Max A   Transfers Sit to stand: Max A x2 (partial stand)  Stand to sit: Max A x2  Stand pivot: NT  Sit to stand: Max A  Stand to sit: Max A  Stand pivot: Max A with [employer]   Ambulation   NT  Sidestep 3 feet to L and  R with [employer] with Max A   Stair negotiation: NT  NA   ROM B UE: Refer to OT note  B LE: B LE flexor tone noted     Strength B UE: Refer to OT note  B LE: L quad 3+/5, R quad 0/5     Balance Sitting EOB: Max A  Dynamic standing: NT  Sitting EOB: Min A  Dynamic standing: Max A with [employer]     Pt is A & O x: 2 grossly to person and year. Pt reported he was at [facility]. Pt appears confused to situation. Sensation: Unable to accurately assess due to pt's cognitive status.   Coordination: p/w chest pain and sore throat  Afebrile, hemodynamically stable  trop 104>98  EKG : t wave inversion p/w chest pain and sore throat  Afebrile, hemodynamically stable  trop 104>98  EKG : t wave inversion lateral leads (similar to prev)  unlikely ACS, likely MSK   Admit to tele

## 2023-04-08 NOTE — ED ADULT NURSE NOTE - NSIMPLEMENTINTERV_GEN_ALL_ED
Implemented All Fall Risk Interventions:  [place] to call system. Call bell, personal items and telephone within reach. Instruct patient to call for assistance. Room bathroom lighting operational. Non-slip footwear when patient is off stretcher. Physically safe environment: no spills, clutter or unnecessary equipment. Stretcher in lowest position, wheels locked, appropriate side rails in place. Provide visual cue, wrist band, yellow gown, etc. Monitor gait and stability. Monitor for mental status changes and reorient to person, place, and time. Review medications for side effects contributing to fall risk. Reinforce activity limits and safety measures with patient and family.

## 2023-04-08 NOTE — H&P ADULT - PROBLEM SELECTOR PLAN 3
pt takes enalapril  c/w home meds  DASH diet hx of CHF, with recent ECHO 3/23 showed severely reduced EF  does not appear in exacerbation  BNP 12K (10K prev admission)  Will start LAsix 20mg IV daily

## 2023-04-08 NOTE — ED ADULT TRIAGE NOTE - HEART RATE (BEATS/MIN)
98 Render Note In Bullet Format When Appropriate: No Duration Of Freeze Thaw-Cycle (Seconds): 5 Post-Care Instructions: I reviewed with the patient in detail post-care instructions. Patient is to wear sunprotection, and avoid picking at any of the treated lesions. Pt may apply Vaseline to crusted or scabbing areas. Number Of Freeze-Thaw Cycles: 2 freeze-thaw cycles Detail Level: Detailed Consent: The patient's consent was obtained including but not limited to risks of crusting, scabbing, blistering, scarring, darker or lighter pigmentary change, recurrence, incomplete removal and infection.

## 2023-04-08 NOTE — H&P ADULT - PROBLEM SELECTOR PLAN 6
IMPROVE VTE Individual Risk Assessment          RISK                                                          Points  [  ] Previous VTE                                                3  [  ] Thrombophilia                                             2  [  ] Lower limb paralysis                                   2        (unable to hold up >15 seconds)    [  ] Current Cancer                                             2         (within 6 months)  [ x ] Immobilization > 24 hrs                              1  [  ] ICU/CCU stay > 24 hours                             1  [  ] Age > 60                                                         1    IMPROVE VTE Score:         [    1     ]      heparin SUbQ pt takes Januvia and jardiance at home  Will start SS  monitor BS

## 2023-04-08 NOTE — ED ADULT NURSE NOTE - OBJECTIVE STATEMENT
pt is here c/o difficulty breathing , o2 sat is 94 on room air , pt placed on 2 L n/c , hob elevated , SOB on exertion is noted, pt is placed on CM tele box 6

## 2023-04-09 NOTE — PROGRESS NOTE ADULT - PROBLEM SELECTOR PLAN 1
p/w chest pain and sore throat  Afebrile, hemodynamically stable  trop 104>98  EKG : t wave inversion lateral leads (similar to prev)  unlikely ACS, likely MSK   Admit to tele

## 2023-04-09 NOTE — PATIENT PROFILE ADULT - FALL HARM RISK - HARM RISK INTERVENTIONS

## 2023-04-09 NOTE — PROGRESS NOTE ADULT - PROBLEM SELECTOR PLAN 2
p/w sore throat   likely post nasal drip   c/w fluticasone nasal spray and saline spray p/w sore throat   likely post nasal drip   c/w fluticasone nasal spray and saline spray  Start Cepacol

## 2023-04-09 NOTE — PROGRESS NOTE ADULT - ASSESSMENT
73 y o with ambulatory at baseline from a [facility] home number 10 w/ PMH of intellectual disability, Down Syndrome, asthma, COPD with chronic cough, HTN, DM,  CKD elevated PSA, BPH, schizophrenia, hepatitis B, carrier, bilateral sensorineural hearing loss, who came in to the ED for sore throat and chest pain. Admitted for further management.

## 2023-04-09 NOTE — PROGRESS NOTE ADULT - ATTENDING COMMENTS
73 M with Down syndrome from group home, asthma, COPD, HFrEF, DM, HTN, CKD3 schizophrenia p/w son, chest pain and palpitations. ROS positive for productive cough, sorethroat, BEARDEN  FOund on 3-4 l of O2 NC, ? with stridor         p/w chest pain  sore throat  HFrEF  HTN  DM  chronic schizophrenia  YASSINE on CKD3    Afebrile, hemodynamically stable  trop 104>98  EKG : t wave inversion lateral leads (similar to prev)  c/w fluticasone nasal spray and saline spray.  hx of CHF, with recent ECHO 3/23 showed severely reduced EF  does not appear in exacerbation  BNP 12K (10K prev admission)  cont LAsix 20mg IV daily.  hold enalapril in the setting of IV diuresis and YASSINE  will get CT neck and chest in v/o stridor to evaluate further

## 2023-04-09 NOTE — PROGRESS NOTE ADULT - PROBLEM SELECTOR PLAN 3
hx of CHF, with recent ECHO 3/23 showed severely reduced EF  does not appear in exacerbation  BNP 12K (10K prev admission)  Will start LAsix 20mg IV daily hx of CHF, with recent ECHO 3/23 showed severely reduced EF 10-15%  does not appear in exacerbation  BNP 12K (10K prev admission)  Will c/w LAsix 20mg IV daily  Will hold enalapril in setting of worsening YASSINE to allow for diuresis.   C/w metoprolol.

## 2023-04-09 NOTE — PATIENT PROFILE ADULT - NSPROPASSIVESMOKEEXPOSURE_GEN_A_NUR
Mastoid Interpolation Flap Text: A decision was made to reconstruct the defect utilizing an interpolation axial flap and a staged reconstruction.  A telfa template was made of the defect.  This telfa template was then used to outline the mastoid interpolation flap.  The donor area for the pedicle flap was then injected with anesthesia.  The flap was excised through the skin and subcutaneous tissue down to the layer of the underlying musculature.  The pedicle flap was carefully excised within this deep plane to maintain its blood supply.  The edges of the donor site were undermined.   The donor site was closed in a primary fashion.  The pedicle was then rotated into position and sutured.  Once the tube was sutured into place, adequate blood supply was confirmed with blanching and refill.  The pedicle was then wrapped with xeroform gauze and dressed appropriately with a telfa and gauze bandage to ensure continued blood supply and protect the attached pedicle. No

## 2023-04-09 NOTE — PROGRESS NOTE ADULT - SUBJECTIVE AND OBJECTIVE BOX
PGY-1 Progress Note discussed with attending    INTERVAL HPI/OVERNIGHT EVENTS:   MEDICATIONS  (STANDING):  albuterol    90 MICROgram(s) HFA Inhaler 2 Puff(s) Inhalation every 6 hours  amantadine 100 milliGRAM(s) Oral every 12 hours  aspirin enteric coated 81 milliGRAM(s) Oral daily  atorvastatin 40 milliGRAM(s) Oral at bedtime  enalapril 20 milliGRAM(s) Oral daily  finasteride 5 milliGRAM(s) Oral daily  fluticasone propionate 50 MICROgram(s)/spray Nasal Spray 1 Spray(s) Both Nostrils two times a day  furosemide   Injectable 20 milliGRAM(s) IV Push daily  loratadine 10 milliGRAM(s) Oral daily  memantine 5 milliGRAM(s) Oral two times a day  metoprolol succinate ER 25 milliGRAM(s) Oral daily  montelukast 10 milliGRAM(s) Oral daily  OLANZapine 10 milliGRAM(s) Oral at bedtime  tamsulosin 0.4 milliGRAM(s) Oral at bedtime  tiotropium 2.5 MICROgram(s) Inhaler 2 Puff(s) Inhalation daily  valproic  acid Syrup 250 milliGRAM(s) Oral <User Schedule>  valproic  acid Syrup 1000 milliGRAM(s) Oral at bedtime    MEDICATIONS  (PRN):      REVIEW OF SYSTEMS:  CONSTITUTIONAL: No fever, weight loss, or fatigue  RESPIRATORY: No cough, wheezing, chills or hemoptysis; No shortness of breath  CARDIOVASCULAR: No chest pain, palpitations, dizziness, or leg swelling  GASTROINTESTINAL: No abdominal pain. No nausea, vomiting, or hematemesis; No diarrhea or constipation. No melena or hematochezia.  GENITOURINARY: No dysuria or hematuria, urinary frequency  NEUROLOGICAL: No headaches, memory loss, loss of strength, numbness, or tremors  SKIN: No itching, burning, rashes, or lesions     Vital Signs Last 24 Hrs  T(C): 36.4 (09 Apr 2023 04:34), Max: 37.2 (08 Apr 2023 12:27)  T(F): 97.5 (09 Apr 2023 04:34), Max: 98.9 (08 Apr 2023 12:27)  HR: 51 (09 Apr 2023 04:34) (51 - 101)  BP: 140/104 (09 Apr 2023 04:34) (122/83 - 160/99)  BP(mean): --  RR: 20 (09 Apr 2023 04:34) (18 - 20)  SpO2: 99% (09 Apr 2023 04:34) (95% - 99%)    Parameters below as of 09 Apr 2023 04:34  Patient On (Oxygen Delivery Method): room air        PHYSICAL EXAMINATION:  GENERAL: NAD, well built  HEAD:  Atraumatic, Normocephalic  EYES:  conjunctiva and sclera clear  NECK: Supple, No JVD, Normal thyroid  CHEST/LUNG: Clear to auscultation. Clear to percussion bilaterally; No rales, rhonchi, wheezing, or rubs  HEART: Regular rate and rhythm; No murmurs, rubs, or gallops  ABDOMEN: Soft, Nontender, Nondistended; Bowel sounds present  NERVOUS SYSTEM:  Alert & Oriented X3,    EXTREMITIES:  2+ Peripheral Pulses, No clubbing, cyanosis, or edema  SKIN: warm dry                          16.3   6.23  )-----------( 211      ( 09 Apr 2023 06:34 )             53.6     04-09    144  |  115<H>  |  42<H>  ----------------------------<  228<H>  4.7   |  25  |  1.77<H>    Ca    10.6<H>      09 Apr 2023 06:34  Phos  3.8     04-09  Mg     3.0     04-09    TPro  7.1  /  Alb  3.2<L>  /  TBili  0.5  /  DBili  x   /  AST  27  /  ALT  66<H>  /  AlkPhos  114  04-09    LIVER FUNCTIONS - ( 09 Apr 2023 06:34 )  Alb: 3.2 g/dL / Pro: 7.1 g/dL / ALK PHOS: 114 U/L / ALT: 66 U/L DA / AST: 27 U/L / GGT: x               PT/INR - ( 08 Apr 2023 10:30 )   PT: 12.6 sec;   INR: 1.06 ratio         PTT - ( 08 Apr 2023 10:30 )  PTT:35.6 sec    CAPILLARY BLOOD GLUCOSE      RADIOLOGY & ADDITIONAL TESTS:                   PGY-1 Progress Note discussed with attending    INTERVAL HPI/OVERNIGHT EVENTS:   No acute overnight events. Patient complains of sore throat and not being able to breath. Patient reports he still has mild chest pain. Patient also complains of cough with sputum. No other complains were reported by the patient.     MEDICATIONS  (STANDING):  albuterol    90 MICROgram(s) HFA Inhaler 2 Puff(s) Inhalation every 6 hours  amantadine 100 milliGRAM(s) Oral every 12 hours  aspirin enteric coated 81 milliGRAM(s) Oral daily  atorvastatin 40 milliGRAM(s) Oral at bedtime  enalapril 20 milliGRAM(s) Oral daily  finasteride 5 milliGRAM(s) Oral daily  fluticasone propionate 50 MICROgram(s)/spray Nasal Spray 1 Spray(s) Both Nostrils two times a day  furosemide   Injectable 20 milliGRAM(s) IV Push daily  loratadine 10 milliGRAM(s) Oral daily  memantine 5 milliGRAM(s) Oral two times a day  metoprolol succinate ER 25 milliGRAM(s) Oral daily  montelukast 10 milliGRAM(s) Oral daily  OLANZapine 10 milliGRAM(s) Oral at bedtime  tamsulosin 0.4 milliGRAM(s) Oral at bedtime  tiotropium 2.5 MICROgram(s) Inhaler 2 Puff(s) Inhalation daily  valproic  acid Syrup 250 milliGRAM(s) Oral <User Schedule>  valproic  acid Syrup 1000 milliGRAM(s) Oral at bedtime    MEDICATIONS  (PRN):      REVIEW OF SYSTEMS:  CONSTITUTIONAL: No fever, weight loss, or fatigue  RESPIRATORY: +cough w/sputum, +sore throat, no wheezing, chills or hemoptysis; No shortness of breath  CARDIOVASCULAR: +chest pain, no palpitations, dizziness, or leg swelling  GASTROINTESTINAL: No abdominal pain. No nausea, vomiting, or hematemesis; No diarrhea or constipation. No melena or hematochezia.  GENITOURINARY: No dysuria or hematuria, urinary frequency  NEUROLOGICAL: No headaches, memory loss, loss of strength, numbness, or tremors  SKIN: No itching, burning, rashes, or lesions     Vital Signs Last 24 Hrs  T(C): 36.4 (09 Apr 2023 04:34), Max: 37.2 (08 Apr 2023 12:27)  T(F): 97.5 (09 Apr 2023 04:34), Max: 98.9 (08 Apr 2023 12:27)  HR: 51 (09 Apr 2023 04:34) (51 - 101)  BP: 140/104 (09 Apr 2023 04:34) (122/83 - 160/99)  BP(mean): --  RR: 20 (09 Apr 2023 04:34) (18 - 20)  SpO2: 99% (09 Apr 2023 04:34) (95% - 99%)    Parameters below as of 09 Apr 2023 04:34  Patient On (Oxygen Delivery Method): room air        PHYSICAL EXAMINATION:  GENERAL: NAD, well built  HEAD:  Atraumatic, Normocephalic  EYES:  conjunctiva and sclera clear  NECK: Supple  CHEST/LUNG: Clear to auscultation. No rales, rhonchi, wheezing, or rubs  HEART: Regular rate and rhythm; No murmurs, rubs, or gallops  ABDOMEN: Soft, Nontender, Nondistended; Bowel sounds present  NERVOUS SYSTEM:  Alert & Oriented X3,    EXTREMITIES:  2+ Peripheral Pulses, No edema  SKIN: warm dry                          16.3   6.23  )-----------( 211      ( 09 Apr 2023 06:34 )             53.6     04-09    144  |  115<H>  |  42<H>  ----------------------------<  228<H>  4.7   |  25  |  1.77<H>    Ca    10.6<H>      09 Apr 2023 06:34  Phos  3.8     04-09  Mg     3.0     04-09    TPro  7.1  /  Alb  3.2<L>  /  TBili  0.5  /  DBili  x   /  AST  27  /  ALT  66<H>  /  AlkPhos  114  04-09    LIVER FUNCTIONS - ( 09 Apr 2023 06:34 )  Alb: 3.2 g/dL / Pro: 7.1 g/dL / ALK PHOS: 114 U/L / ALT: 66 U/L DA / AST: 27 U/L / GGT: x               PT/INR - ( 08 Apr 2023 10:30 )   PT: 12.6 sec;   INR: 1.06 ratio         PTT - ( 08 Apr 2023 10:30 )  PTT:35.6 sec    CAPILLARY BLOOD GLUCOSE      RADIOLOGY & ADDITIONAL TESTS:

## 2023-04-09 NOTE — PROGRESS NOTE ADULT - PROBLEM SELECTOR PLAN 4
pt takes enalapril  c/w home meds  DASH diet Patient has worsening creatinine 1.77 on 4/9/23. (unknown baseline, Cr 1.34 on 03/28/23)  Likely in setting of worsening CHF vs aggressive diuresis vs poor oral intake.   BUN/CR ratio > 20  Likely prerenal  Avoid nephrotoxic agents  Will hold enalapril in setting of YASSINE.   Will c/w IV lasix in setting of severely decreased EF

## 2023-04-09 NOTE — PROGRESS NOTE ADULT - PROBLEM SELECTOR PLAN 5
pt takes atorvastatin at home  c/w home meds pt takes enalapril  Will hold enalapril in setting of YASSINE.   DASH diet

## 2023-04-10 NOTE — PROGRESS NOTE ADULT - ASSESSMENT
73 y o with ambulatory at baseline from a [facility] home number 10 w/ PMH of intellectual disability, Down Syndrome, asthma, COPD with chronic cough, HTN, DM,  CKD elevated PSA, BPH, schizophrenia, hepatitis B, carrier, bilateral sensorineural hearing loss, who came in to the ED for sore throat and chest pain. Admitted for further management.

## 2023-04-10 NOTE — PROGRESS NOTE ADULT - PROBLEM SELECTOR PLAN 1
p/w chest pain and sore throat  Afebrile, hemodynamically stable  trop 104>98  EKG : t wave inversion lateral leads (similar to prev)  unlikely ACS, likely MSK   Admit to tele AHRF 2/2 systolic HF vs. COPD exacerbation  - Afebrile, hemodynamically stable, still SOB and cough, wheezing   - admits to chest pain, but trop negative (104>98), EKG : t wave inversion lateral leads (similar to prev), unlikely ACS, likely MSK  - started on duoneb q6, c/w spiriva  - c/w fluticasone nasal spray and saline spray  - c/w lasix IV 20 mg qd

## 2023-04-10 NOTE — CONSULT NOTE ADULT - PROBLEM SELECTOR RECOMMENDATION 4
Patient with increased functional limitations due to worsening CHF symptoms, in context of Down's syndrome, intellectual disability, presumed dementia, and multiple medical comorbidities.  He is at risk for further physical and functional decline    Recommend OOB to chair  Aggressive PT  Continued pureed diet

## 2023-04-10 NOTE — CONSULT NOTE ADULT - PROBLEM SELECTOR RECOMMENDATION 5
Patient now with advanced combined systolic/diastolic HF, in setting of severe NICM, with EF of 10-15% and increasin BEARDEN/SOB with performance of ADLs, and functional limitation.  He is not considered to be a candidate for ICD.  With his increasing symptom burden and new oxygen dependence, he likely cannot return to his group home setting and may need to be placed at a higher level of care    Patient has at minimum Stage C NYHA class III heart failure.  He is at risk for continued progression of disease, further functional decline, and recurrent hospitalizations.    Patient is under auspices of [facility]. Spoke to manager/supervisor Rina Eller--see GO conversation above.  She is in agreement to begin initial legal review process by [employer] for possible DNR/DNI.  Will place 1750-b pre-MOLST checklist on chart    Will collaborate with team SW and case management.  Patient remains FULL CODE for now  Palliative care team will continue to follow. Private car

## 2023-04-10 NOTE — PROGRESS NOTE ADULT - PROBLEM SELECTOR PLAN 3
hx of CHF, with recent ECHO 3/23 showed severely reduced EF 10-15%  does not appear in exacerbation  BNP 12K (10K prev admission)  Will c/w LAsix 20mg IV daily  Will hold enalapril in setting of worsening YASSINE to allow for diuresis.   C/w metoprolol. Patient has worsening creatinine 1.77 on 4/9/23. (unknown baseline, Cr 1.34 on 03/28/23)  Likely in setting of worsening CHF vs aggressive diuresis vs poor oral intake.   BUN/CR ratio > 20  Likely prerenal  Avoid nephrotoxic agents  Will hold enalapril in setting of YASSINE.   Will c/w IV lasix in setting of severely decreased EF

## 2023-04-10 NOTE — PROGRESS NOTE ADULT - PROBLEM SELECTOR PLAN 7
pt takes Januvia and jardiance at home  Will start SS  monitor BS IMPROVE VTE Individual Risk Assessment          RISK                                                          Points  [  ] Previous VTE                                                3  [  ] Thrombophilia                                             2  [  ] Lower limb paralysis                                   2        (unable to hold up >15 seconds)    [  ] Current Cancer                                             2         (within 6 months)  [ x ] Immobilization > 24 hrs                              1  [  ] ICU/CCU stay > 24 hours                             1  [  ] Age > 60                                                         1    IMPROVE VTE Score:         [    1     ]      heparin SUbQ

## 2023-04-10 NOTE — CONSULT NOTE ADULT - CONVERSATION DETAILS
Telephonic meeting held with  Rina Eller at [facility] x 20 minutes to review patient's medical diagnosis, prognosis, ACP, and goals of care.  I advised Rina that this is patient's second  admission in last 2 weeks for heart failure.  We also discussed echo results showing that patient has severe NICM with EF of 10-15%.  He was previously seen by ER (as per last admission) and already deemed not to be a candidate for ICD placement.  We discussed how patient is having increased symptoms and functional limitation due to his worsening heart failure.  He is also current requiring oxygen for relief of his dyspnea.  We discussed that both factors may not allow patient to return to his group home setting, and that he may require placement in a SNF or higher level of care (I previously confirmed with group home nurse that current setting cannot take him back with oxygen).    We also discussed that patient now has advanced cardiac disease and would benefit from initiating discussions around advanced care planning, specifically orders for DNR and DNI should be considered.  Given his advanced heart failure and cardiomyopathy in combination with his other medical comorbidities, if patient were to suffer a cardiac arrest and require CPR and intubation, he likely would not survive to hospital discharge or return to his current baseline of functioning.  Rina was responsive to this information is in agreement that patient should be considered for DNR and DNI--she will begin the review process with [facility] and [employer].  She requested that this consult, a full Cardiology consult, and medical records be faxed to her agency at [phone]. Will collaborate with team SW and  to send records to [facility] as well for additonal review.  Patient will remain full code at this time.    1750-b pre-MOLST checklist will need to be completed--will place in chart.    Patient's overall plan of care managed by [facility] agency and no family/surrogate available at bedside to participate in this discussion.

## 2023-04-10 NOTE — CONSULT NOTE ADULT - CONSULT REASON
Complex medical decision medical in the context of advanced heart failure and patient with Down's syndrome and I/DD.

## 2023-04-10 NOTE — PROGRESS NOTE ADULT - PROBLEM SELECTOR PLAN 2
p/w sore throat   likely post nasal drip   c/w fluticasone nasal spray and saline spray  Start Cepacol hx of CHF, with recent ECHO 3/23 showed severely reduced EF 10-15%  does not appear in exacerbation  BNP 12K (10K prev admission)  Will c/w Lasix 20mg IV daily  Will hold enalapril in setting of worsening YASSINE to allow for diuresis.   C/w metoprolol.

## 2023-04-10 NOTE — CONSULT NOTE ADULT - PROBLEM SELECTOR RECOMMENDATION 3
With associated intellectual disability, schizophrenia, and likely dementia    Continue home meds, including Namenda, olanzapine, valproic acid 250 mg AM/1000 mg QHS, and amantadine  Please resume home clonazepam PRN for anxiety if needed.    Supportive care, frequent reorientation

## 2023-04-10 NOTE — CONSULT NOTE ADULT - SUBJECTIVE AND OBJECTIVE BOX
Consult to: Discuss complex medical decision making related to goals of care    [facility] Geriatric and Palliative Consult Service:  Aliza Hsu DO: cell ([phone])  Reji Hart MD: cell ([phone])  Joel Weiner NP: cell ([phone])   Michelle Iqbal DNP: cell ([phone])  Kahlil Lucas LMSW: cell ([phone])   Bharti Nogueira NP: via [employer] Teams    Can contact any Palliative Team member via [employer] Teams for consults and questions      HPI:  73 y o with ambulatory at baseline from a [facility] home number 10 w/ PMH of intellectual disability, Down Syndrome, asthma, COPD with chronic cough, HTN, DM,  CKD elevated PSA, BPH, schizophrenia, hepatitis B, carrier, bilateral sensorineural hearing loss, who came in to the ED for sore throat and chest pain.  Patient states he developed a sore throat and dry cough a few days ago. Then last night had chest pain, located on the left side, w/o radiation, described as sharp. Pt states the pain lasted throughout the night and resolved this morning. Currently denies any fever, chills, nausea vomiting SOB, abdominal pain, PND, orthopnea or change in bowel habits  (08 Apr 2023 18:22)      PAST MEDICAL & SURGICAL HISTORY:  Down syndrome      HTN (hypertension)      Hyperlipidemia      Diabetes mellitus      Intellectual disability      No significant past surgical history          SOCIAL HISTORY:    Admitted from:  home  (with HHA)           assisted living          [facility]       LT   [ none ] Substance abuse, [ none ] Tobacco hx, [ none ] Alcohol hx, [ none ] Home Opioid Hx    FAMILY HISTORY:  No pertinent family history in first degree relatives     unable to obtain from patient due to poor mentation, family unable to give information, see H&P for history  Baseline ADLs (prior to admission):    Allergies    No Known Allergies    Intolerances      Present Symptoms: Mild, Moderate, Severe  Pain:             Location -                               Aggravating factors -             Quality -             Radiation -             Timing-             Severity (0-10 scale):             Minimal acceptable level (0-10 scale):  Fatigue:  Nausea:  Lack of Appetite:   SOB:  Depression:  Anxiety:  Review of Systems: [All others negative or Unable to obtain due to poor mentation]    CPOT:    https://[email or web address]/getattachment/[email or web address]/Critical-Care-Pain-Observation-Tool-(CPOT)  PAIN AD Score:   [email or web address].pdf (press ctrl +  left click to view)      MEDICATIONS  (STANDING):  albuterol    90 MICROgram(s) HFA Inhaler 2 Puff(s) Inhalation every 6 hours  albuterol/ipratropium for Nebulization 3 milliLiter(s) Nebulizer every 6 hours  amantadine 100 milliGRAM(s) Oral every 12 hours  aspirin enteric coated 81 milliGRAM(s) Oral daily  atorvastatin 40 milliGRAM(s) Oral at bedtime  benzocaine/menthol Lozenge 1 Lozenge Oral three times a day  finasteride 5 milliGRAM(s) Oral daily  fluticasone propionate 50 MICROgram(s)/spray Nasal Spray 1 Spray(s) Both Nostrils two times a day  furosemide   Injectable 20 milliGRAM(s) IV Push daily  insulin lispro (ADMELOG) corrective regimen sliding scale   SubCutaneous three times a day before meals  insulin lispro (ADMELOG) corrective regimen sliding scale   SubCutaneous at bedtime  loratadine 10 milliGRAM(s) Oral daily  memantine 5 milliGRAM(s) Oral two times a day  metoprolol tartrate 25 milliGRAM(s) Oral two times a day  montelukast 10 milliGRAM(s) Oral daily  OLANZapine 10 milliGRAM(s) Oral at bedtime  tamsulosin 0.4 milliGRAM(s) Oral at bedtime  tiotropium 2.5 MICROgram(s) Inhaler 2 Puff(s) Inhalation daily  valproic  acid Syrup 250 milliGRAM(s) Oral <User Schedule>  valproic  acid Syrup 1000 milliGRAM(s) Oral at bedtime    MEDICATIONS  (PRN):      PHYSICAL EXAM:  Vital Signs Last 24 Hrs  T(C): 36.4 (10 Apr 2023 20:41), Max: 36.5 (10 Apr 2023 11:06)  T(F): 97.5 (10 Apr 2023 20:41), Max: 97.7 (10 Apr 2023 11:06)  HR: 90 (10 Apr 2023 20:42) (81 - 103)  BP: 149/116 (10 Apr 2023 20:42) (128/92 - 156/113)  BP(mean): --  RR: 18 (10 Apr 2023 20:41) (18 - 20)  SpO2: 99% (10 Apr 2023 20:41) (99% - 100%)    Parameters below as of 10 Apr 2023 20:41  Patient On (Oxygen Delivery Method): nasal cannula  O2 Flow (L/min): 2      General: alert  oriented x ____    lethargic distressed cachexia  nonverbal  unarousable verbal    Palliative Performance Scale/Karnofsky Score:  http://npcrc.org/files/news/palliative_performance_scale_ppsv2.pdf    HEENT: no abnormal lesion, dry mouth  ET tube/trach oral lesions:  Lungs: tachypnea/labored breathing, audible excessive secretions  CV: RRR, S1S2, tachycardia  GI: soft non distended non tender  incontinent               PEG/NG/OG tube  constipation  last BM:   : incontinent  oliguria/anuria  chance  Musculoskeletal: weakness x4 edema x4    ambulatory with assistance   mostly/fully bedbound/wheelchair bound  Skin: no abnormal skin lesions, poor skin turgor, pressure ulcer stage:   Neuro: no deficits, mild cognitive impairment dsyphagia/dysarthria paresis  Oral intake ability: unable/only mouth care, minimal moderate full capability    LABS:                        16.3   6.23  )-----------( 211      ( 09 Apr 2023 06:34 )             53.6     04-09    144  |  115<H>  |  42<H>  ----------------------------<  228<H>  4.7   |  25  |  1.77<H>    Ca    10.6<H>      09 Apr 2023 06:34  Phos  3.8     04-09  Mg     3.0     04-09    TPro  7.1  /  Alb  3.2<L>  /  TBili  0.5  /  DBili  x   /  AST  27  /  ALT  66<H>  /  AlkPhos  114  04-09        RADIOLOGY & ADDITIONAL STUDIES:     Consult to: Discuss complex medical decision making related to goals of care    [facility] Geriatric and Palliative Consult Service:  Aliza Hsu DO: cell ([phone])  Reji Hart MD: cell ([phone])  Joel Weiner NP: cell ([phone])   Michelle Rasheed DNP: cell ([phone])  Kahlil Lucas LMSW: cell ([phone])   Bharti Nogueira NP: via [email or web address] Teams    Can contact any Palliative Team member via [email or web address] Teams for consults and questions      HPI:  73 y o with ambulatory at baseline from a [facility]  #10 w/ PMH of intellectual disability, Down Syndrome, asthma, COPD with chronic cough, HTN, DM, non-ischemic cardiomyopathy, CKD, elevated PSA, BPH, schizophrenia, hepatitis B carrier, bilateral sensorineural hearing loss, and possible dementia.  Recently admitted to [facility] from 3/28 to 3/30 for weakness, chest pain, and increased WOB, with concern for possible acute CHF.  Also with elevated troponins, thought to be demand ishcemia, no clinical evidence of ACS.  Echo on 3/29 showed severe global LV systolic dysfunction with EF of  10-15%.  Presented back to [facility] ED on 4/8 for sore throat and chest pain.  Patient states he developed a sore throat and dry cough a few days ago. Then last night had chest pain, located on the left side, w/o radiation, described as sharp. Pt states the pain lasted throughout the night and resolved this morning. Currently denies any fever, chills, nausea vomiting SOB, abdominal pain, PND, orthopnea or change in bowel habits  (08 Apr 2023 18:22)     Admitted for musculoskeletal chest pain and acute on chronic combined systolic/diastolic HF.  CT of chest notable for pulmonary edema and  bilateral pleural effusions.      Patient examined earlier today.  His aide Ricardo is at bedside.  Patient alert and oriented x 2, fair to poor historian.  Denies pain acutely, but reports feeling congested and not being able to breathe through his nose.  Unable to tell me if he has acute dyspnea, but he does report occasional "boom boom boom" sensation in his chest which then causes him to feel out of breath.  Denies nausea or vomiting.  No other acute complaints.      PAST MEDICAL & SURGICAL HISTORY:  Down syndrome      HTN (hypertension)      Hyperlipidemia      Diabetes mellitus      Intellectual disability      No significant past surgical history          SOCIAL HISTORY:    Admitted from:  [facility]  [ none ] Substance abuse, [ none ] Tobacco hx, [ none ] Alcohol hx, [ none ] Home Opioid Hx    FAMILY HISTORY:  Unable to obtain family history due to dementia and history of Down's syndrome/intellectual disability      Baseline ADLs (prior to admission):  Per aide at bedside (and nurse from his [facility]), patient previously independent in ADLs except for bathing.  Was walking on his own, feeding himself.  However aide and nurse report patient having increased BEARDEN and increasing SOB with performance of ADLs over the last 2 weeks.  Patient now winded just going to the bathroom.      Allergies    No Known Allergies    Intolerances      Present Symptoms: Mild, Moderate, Severe  Pain:             Location - Denies presently                            Aggravating factors -             Quality -             Radiation -             Timing-             Severity (0-10 scale):  0             Minimal acceptable level (0-10 scale):  0  Fatigue:  Denies  Nausea:  Denies  Lack of Appetite: Denies  SOB:  None presently.  ++ worsening BEARDEN and SOB with performance of ADLs  Depression:  none  Anxiety:  Review of Systems:  All other systems reviewed and are negative    Additional medication review--patient has oxycodone IR and tizanidine listed on his home medication list.  Nurse Zhang from [facility] that patient was previously on oxycodone for ? dental procedure, but has been pain free and is no longer taking.  Also was on tizanidine for ? hip pain, also resolved and no longer taking.      MEDICATIONS  (STANDING):  albuterol    90 MICROgram(s) HFA Inhaler 2 Puff(s) Inhalation every 6 hours  albuterol/ipratropium for Nebulization 3 milliLiter(s) Nebulizer every 6 hours  amantadine 100 milliGRAM(s) Oral every 12 hours  aspirin enteric coated 81 milliGRAM(s) Oral daily  atorvastatin 40 milliGRAM(s) Oral at bedtime  benzocaine/menthol Lozenge 1 Lozenge Oral three times a day  finasteride 5 milliGRAM(s) Oral daily  fluticasone propionate 50 MICROgram(s)/spray Nasal Spray 1 Spray(s) Both Nostrils two times a day  furosemide   Injectable 20 milliGRAM(s) IV Push daily  insulin lispro (ADMELOG) corrective regimen sliding scale   SubCutaneous three times a day before meals  insulin lispro (ADMELOG) corrective regimen sliding scale   SubCutaneous at bedtime  loratadine 10 milliGRAM(s) Oral daily  memantine 5 milliGRAM(s) Oral two times a day  metoprolol tartrate 25 milliGRAM(s) Oral two times a day  montelukast 10 milliGRAM(s) Oral daily  OLANZapine 10 milliGRAM(s) Oral at bedtime  tamsulosin 0.4 milliGRAM(s) Oral at bedtime  tiotropium 2.5 MICROgram(s) Inhaler 2 Puff(s) Inhalation daily  valproic  acid Syrup 250 milliGRAM(s) Oral <User Schedule>  valproic  acid Syrup 1000 milliGRAM(s) Oral at bedtime    MEDICATIONS  (PRN):      PHYSICAL EXAM:  Vital Signs Last 24 Hrs  T(C): 36.4 (10 Apr 2023 20:41), Max: 36.5 (10 Apr 2023 11:06)  T(F): 97.5 (10 Apr 2023 20:41), Max: 97.7 (10 Apr 2023 11:06)  HR: 90 (10 Apr 2023 20:42) (81 - 103)  BP: 149/116 (10 Apr 2023 20:42) (128/92 - 156/113)  BP(mean): --  RR: 18 (10 Apr 2023 20:41) (18 - 20)  SpO2: 99% (10 Apr 2023 20:41) (99% - 100%)    Parameters below as of 10 Apr 2023 20:41  Patient On (Oxygen Delivery Method): nasal cannula  O2 Flow (L/min): 2      General: alert  oriented x __2__   Awake, verbal, calm, easily responsive, able to answer simple questions.  Mild temporal wasting.      Palliative Performance Scale/Karnofsky Score:  40%  http://npcrc.org/files/news/palliative_performance_scale_ppsv2.pdf    HEENT: EOMI, anicteric, pharynx clear, MM most  Lungs: Difficult exam, +++ transmitted expiratory rhonchi throughout, respirations unlabored, no overt wheezing appreciated  CV: RRR, normal S1 and S2, no murmur  GI: soft non distended non tender  normal bowel sounds  :  Normal  Musculoskeletal:  Now ambulatory with assist, no focal abnormal joint findings identified, no edema  Skin: no abnormal skin lesions, poor skin turgor, pressure ulcer stage:   Neuro: no focal deficits, mild dysphagia at baseline (aide denies cough with feeds)  Oral intake ability:  full capability on pureed diet    LABS:                        16.3   6.23  )-----------( 211      ( 09 Apr 2023 06:34 )             53.6     04-09    144  |  115<H>  |  42<H>  ----------------------------<  228<H>  4.7   |  25  |  1.77<H>    Ca    10.6<H>      09 Apr 2023 06:34  Phos  3.8     04-09  Mg     3.0     04-09    TPro  7.1  /  Alb  3.2<L>  /  TBili  0.5  /  DBili  x   /  AST  27  /  ALT  66<H>  /  AlkPhos  114  04-09        RADIOLOGY & ADDITIONAL STUDIES:    ACC: [id number] EXAM:  XR CHEST PORTABLE URGENT 1V   ORDERED BY: GALINA GATICA     PROCEDURE DATE:  04/08/2023          INTERPRETATION:  Portable chest radiograph    CLINICAL INFORMATION: Pneumonia/congestive heart failure    TECHNIQUE:  PortableAP chest radiograph.    COMPARISON: None. .    FINDINGS:  CATHETERS AND TUBES: None    PULMONARY: Persistent bilateral perihilar/RIGHT greater than LEFT   bibasilar diffuse airspace disease and/or small effusions.  .   No pneumothorax.    HEART/VASCULAR: The heart and mediastinum size and configuration are   within normal limits.    BONES: Visualized osseous thorax intact.    IMPRESSION:   Persistent bilateral perihilar/RIGHT greater than LEFT   bibasilar diffuse airspace disease and/or small effusions.  .    --- End of Report ---    ACC: [id number] EXAM:  CT CHEST   ORDERED BY: RAFAEL RODRIGUEZ     PROCEDURE DATE:  04/09/2023          INTERPRETATION:  INDICATION: Stridor and worsening shortness of breath,   CHF    TECHNIQUE: Volumetric images of the chest without intravenous contrast.  Maximum intensity projection images were generated.    COMPARISON: No prior chest CT.    FINDINGS:    LUNGS/AIRWAYS/PLEURA: Biconvex configuration of the trachea suggestive of   tracheobronchomalacia. No endobronchial lesion. Interlobular septal   thickening and not primarily peribronchial vascular groundglass in both   lungs. 7 mm subsolid nodule in the left upper lobe (3-67). Small pleural   effusions.    LYMPH NODES/MEDIASTINUM: No lymphadenopathy.    HEART/VASCULATURE: Enlarged heart. No pericardial effusion. Normal   caliber aorta.    UPPER ABDOMEN: Right renal cyst.    BONES/SOFT TISSUES: Unremarkable.      IMPRESSION:    Pulmonary edema and small pleural effusions.    Indeterminate left upper lobe 7 mm nodule. Recommend follow-up   noncontrast CT chest in 3 months to assess for change.    --- End of Report ---    ACC: [id number] EXAM:  CT NECK SOFT TISSUE   ORDERED BY: RAFAEL RODRIGUEZ     PROCEDURE DATE:  04/09/2023          INTERPRETATION:  CLINICAL INFORMATION: Stridor.  Patient feels something   compressing the trachea.    TECHNIQUE:  Noncontrast axial CT images were acquired through the neck soft tissues  Sagittal and coronal reformats were generated.      COMPARISON STUDY: None.    FINDINGS:  The examination is mildly to moderately motion degraded.    Evaluation for malignancy is limited without intravenous contrast.    There is no enlargement of the adenoids and tonsils.  There is no   thickening of the soft palate/uvula, epiglottis, pharyngeal wall or   aryepiglottic folds.  The larynx and upper trachea are grossly   unremarkable, within limits of motion artifact.    There is no retropharyngeal edema.    The parotid glands, submandibular glands and thyroid appear grossly   unremarkable.    There is no gross cervical lymphadenopathy.    The unopacified cervical vasculature appears unremarkable; no   atherosclerotic calcification is identified.    There is partial straightening of the cervical lordosis.  There are   multilevel degenerative changes.  At C2-C3 there is a tiny central disc protrusion.  At C3-C4 and C4-C5 there are disc osteophytecomplexes producing mild   spinal canal stenosis.  At C5-C6 there is a disc osteophyte complex producing minimal spinal   canal stenosis.  At C6-C7 and C7-T1 there is no significant disc herniation or spinal   canal stenosis.    Uncovertebral/facet hypertrophy causes mild to moderate neural foraminal   narrowing at C2-C3 on the left, C3-C4 bilaterally, C4-C5 bilaterally,   C5-C6 on the left, C6-C7 on the right and C7-T1 on the right.    There appears to be mild circumferential wall thickening in the upper   thoracic esophagus.  Small pleural effusions are partially visualized at   both lung apices.  There is nonspecific interlobular septal thickening in   both lung apices.    Within the visualized brain, there is mild to moderate generalized   cerebral volume loss and mild periventricular white matter   hypoattenuation compatible with chronic microvascular ischemic disease. A   right ocular prosthesis is noted.    IMPRESSION:  The examination is mildly to moderately motion degraded.    Evaluation for malignancy is limited without intravenous contrast.    No abnormality is identified in the pharynx, larynx or upper trachea,   within limits of noncontrast CT technique.  There is no foreign body,   inflammatory change or extrinsic mass effect on the pharynx/larynx or   upper trachea.    There appears to be mild circumferential wall thickening in the upper   thoracic esophagus.  Upper endoscopy and/or esophagram may be obtained as   clinically warranted.    Small pleural effusions arepartially visualized bilaterally.    Nonspecific interlobular septal thickening in the lung apices.    --- End of Report ---

## 2023-04-10 NOTE — CONSULT NOTE ADULT - PROBLEM SELECTOR RECOMMENDATION 9
Patient with advanced heart failure due to severe NICM, EF now 10-15% on recent Echo.   He is having increasing BEARDEN, SOB with performance of ADLs, and functional limitation due to his heart failure    He is minimum Stage C NYHA class III disease (symptoms with minimal exertion).  Please get Cardiology consult to optimize full GDMT.  Remainder of management as per primary team

## 2023-04-10 NOTE — PROGRESS NOTE ADULT - PROBLEM SELECTOR PLAN 5
pt takes enalapril  Will hold enalapril in setting of YASSINE.   DASH diet pt takes atorvastatin at home  c/w home meds

## 2023-04-10 NOTE — CONSULT NOTE ADULT - ASSESSMENT
73 y o with ambulatory at baseline from a [facility] home #10 w/ PMH of intellectual disability, Down Syndrome, asthma, COPD with chronic cough, HTN, DM, NICM with EF of 10-15% by recent Echo (3/29) CKD elevated PSA, BPH, schizophrenia, hepatitis B carrier, bilateral sensorineural hearing loss, who came in to the ED for sore throat and chest pain.   Also previously admitted 3/28 to 3/30 for demand ischemia and CHF exacerbation.  Now admitted with musculoskeletal chest pain and chronic combined systolic/diastolic heart failure.  Group home staff report worsening BEARDEN and ADL/functional limitations due to dyspnea at home.

## 2023-04-10 NOTE — PROGRESS NOTE ADULT - PROBLEM SELECTOR PLAN 4
Patient has worsening creatinine 1.77 on 4/9/23. (unknown baseline, Cr 1.34 on 03/28/23)  Likely in setting of worsening CHF vs aggressive diuresis vs poor oral intake.   BUN/CR ratio > 20  Likely prerenal  Avoid nephrotoxic agents  Will hold enalapril in setting of YASSINE.   Will c/w IV lasix in setting of severely decreased EF pt takes enalapril  Will hold enalapril in setting of YASSINE.   DASH diet

## 2023-04-10 NOTE — CONSULT NOTE ADULT - PROBLEM SELECTOR RECOMMENDATION 2
As above, EF of 10-15%.  As per notes from last admission, patient previously seen by EP and deemed not to be a candidate for ICD placement    Further management as per Cardiology and primary team

## 2023-04-10 NOTE — PROGRESS NOTE ADULT - PROBLEM SELECTOR PLAN 6
pt takes atorvastatin at home  c/w home meds pt takes Januvia and jardiance at home  Will start SS  monitor BS

## 2023-04-10 NOTE — PROGRESS NOTE ADULT - SUBJECTIVE AND OBJECTIVE BOX
PGY-1 Progress Note discussed with attending    PAGER #: [[phone]] TILL 5:00 PM  PLEASE CONTACT ON CALL TEAM:  - On Call Team (Please refer to Red) FROM 5:00 PM - 8:30PM  - Nightfloat Team FROM 8:30 -7:30 AM    CHIEF COMPLAINT & BRIEF HOSPITAL COURSE:    INTERVAL HPI/OVERNIGHT EVENTS:   MEDICATIONS  (STANDING):  albuterol    90 MICROgram(s) HFA Inhaler 2 Puff(s) Inhalation every 6 hours  albuterol/ipratropium for Nebulization 3 milliLiter(s) Nebulizer every 6 hours  amantadine 100 milliGRAM(s) Oral every 12 hours  aspirin enteric coated 81 milliGRAM(s) Oral daily  atorvastatin 40 milliGRAM(s) Oral at bedtime  benzocaine/menthol Lozenge 1 Lozenge Oral three times a day  enalapril 20 milliGRAM(s) Oral daily  finasteride 5 milliGRAM(s) Oral daily  fluticasone propionate 50 MICROgram(s)/spray Nasal Spray 1 Spray(s) Both Nostrils two times a day  furosemide   Injectable 20 milliGRAM(s) IV Push daily  insulin lispro (ADMELOG) corrective regimen sliding scale   SubCutaneous three times a day before meals  insulin lispro (ADMELOG) corrective regimen sliding scale   SubCutaneous at bedtime  loratadine 10 milliGRAM(s) Oral daily  memantine 5 milliGRAM(s) Oral two times a day  metoprolol tartrate 25 milliGRAM(s) Oral two times a day  montelukast 10 milliGRAM(s) Oral daily  OLANZapine 10 milliGRAM(s) Oral at bedtime  tamsulosin 0.4 milliGRAM(s) Oral at bedtime  tiotropium 2.5 MICROgram(s) Inhaler 2 Puff(s) Inhalation daily  valproic  acid Syrup 250 milliGRAM(s) Oral <User Schedule>  valproic  acid Syrup 1000 milliGRAM(s) Oral at bedtime    MEDICATIONS  (PRN):      REVIEW OF SYSTEMS:  CONSTITUTIONAL: No fever, weight loss, or fatigue  RESPIRATORY: No cough, wheezing, chills or hemoptysis; No shortness of breath  CARDIOVASCULAR: No chest pain, palpitations, dizziness, or leg swelling  GASTROINTESTINAL: No abdominal pain. No nausea, vomiting, or hematemesis; No diarrhea or constipation. No melena or hematochezia.  GENITOURINARY: No dysuria or hematuria, urinary frequency  NEUROLOGICAL: No headaches, memory loss, loss of strength, numbness, or tremors  SKIN: No itching, burning, rashes, or lesions     Vital Signs Last 24 Hrs  T(C): 36.5 (10 Apr 2023 11:06), Max: 36.6 (09 Apr 2023 18:16)  T(F): 97.7 (10 Apr 2023 11:06), Max: 97.9 (09 Apr 2023 18:16)  HR: 81 (10 Apr 2023 11:06) (81 - 103)  BP: 137/77 (10 Apr 2023 11:06) (128/92 - 155/121)  BP(mean): --  RR: 18 (10 Apr 2023 11:06) (18 - 20)  SpO2: 100% (10 Apr 2023 11:06) (99% - 100%)    Parameters below as of 10 Apr 2023 11:06  Patient On (Oxygen Delivery Method): nasal cannula  O2 Flow (L/min): 2      PHYSICAL EXAMINATION:  GENERAL: NAD, well built  HEAD:  Atraumatic, Normocephalic  EYES:  conjunctiva and sclera clear  NECK: Supple, No JVD, Normal thyroid  CHEST/LUNG: Clear to auscultation. Clear to percussion bilaterally; No rales, rhonchi, wheezing, or rubs  HEART: Regular rate and rhythm; No murmurs, rubs, or gallops  ABDOMEN: Soft, Nontender, Nondistended; Bowel sounds present  NERVOUS SYSTEM:  Alert & Oriented X3,    EXTREMITIES:  2+ Peripheral Pulses, No clubbing, cyanosis, or edema  SKIN: warm dry                          16.3   6.23  )-----------( 211      ( 09 Apr 2023 06:34 )             53.6     04-09    144  |  115<H>  |  42<H>  ----------------------------<  228<H>  4.7   |  25  |  1.77<H>    Ca    10.6<H>      09 Apr 2023 06:34  Phos  3.8     04-09  Mg     3.0     04-09    TPro  7.1  /  Alb  3.2<L>  /  TBili  0.5  /  DBili  x   /  AST  27  /  ALT  66<H>  /  AlkPhos  114  04-09    LIVER FUNCTIONS - ( 09 Apr 2023 06:34 )  Alb: 3.2 g/dL / Pro: 7.1 g/dL / ALK PHOS: 114 U/L / ALT: 66 U/L DA / AST: 27 U/L / GGT: x                   CAPILLARY BLOOD GLUCOSE      RADIOLOGY & ADDITIONAL TESTS:                   PGY-1 Progress Note discussed with attending    PLEASE CONTACT ON CALL TEAM:  - On Call Team (Please refer to Red) FROM 5:00 PM - 8:30PM  - Nightfloat Team FROM 8:30 -7:30 AM    INTERVAL HPI/OVERNIGHT EVENTS: No acute events overnight. On 3-4L NC due to shortness of breath. Still intermittent, cough and wheezing, mild chest pain. Denies fevers, n/v/d/constipation.     MEDICATIONS  (STANDING):  albuterol    90 MICROgram(s) HFA Inhaler 2 Puff(s) Inhalation every 6 hours  albuterol/ipratropium for Nebulization 3 milliLiter(s) Nebulizer every 6 hours  amantadine 100 milliGRAM(s) Oral every 12 hours  aspirin enteric coated 81 milliGRAM(s) Oral daily  atorvastatin 40 milliGRAM(s) Oral at bedtime  benzocaine/menthol Lozenge 1 Lozenge Oral three times a day  enalapril 20 milliGRAM(s) Oral daily  finasteride 5 milliGRAM(s) Oral daily  fluticasone propionate 50 MICROgram(s)/spray Nasal Spray 1 Spray(s) Both Nostrils two times a day  furosemide   Injectable 20 milliGRAM(s) IV Push daily  insulin lispro (ADMELOG) corrective regimen sliding scale   SubCutaneous three times a day before meals  insulin lispro (ADMELOG) corrective regimen sliding scale   SubCutaneous at bedtime  loratadine 10 milliGRAM(s) Oral daily  memantine 5 milliGRAM(s) Oral two times a day  metoprolol tartrate 25 milliGRAM(s) Oral two times a day  montelukast 10 milliGRAM(s) Oral daily  OLANZapine 10 milliGRAM(s) Oral at bedtime  tamsulosin 0.4 milliGRAM(s) Oral at bedtime  tiotropium 2.5 MICROgram(s) Inhaler 2 Puff(s) Inhalation daily  valproic  acid Syrup 250 milliGRAM(s) Oral <User Schedule>  valproic  acid Syrup 1000 milliGRAM(s) Oral at bedtime    MEDICATIONS  (PRN):      REVIEW OF SYSTEMS:  CONSTITUTIONAL: No fever, weight loss, or fatigue  RESPIRATORY: (+) cough wheezing, sob   CARDIOVASCULAR: (+) chest pain,  No, palpitations, dizziness, or leg swelling  GASTROINTESTINAL: No abdominal pain. No nausea, vomiting, or hematemesis; No diarrhea or constipation. No melena or hematochezia.  GENITOURINARY: No dysuria or hematuria, urinary frequency  NEUROLOGICAL: No headaches, memory loss, loss of strength, numbness, or tremors  SKIN: No itching, burning, rashes, or lesions     Vital Signs Last 24 Hrs  T(C): 36.5 (10 Apr 2023 11:06), Max: 36.6 (09 Apr 2023 18:16)  T(F): 97.7 (10 Apr 2023 11:06), Max: 97.9 (09 Apr 2023 18:16)  HR: 81 (10 Apr 2023 11:06) (81 - 103)  BP: 137/77 (10 Apr 2023 11:06) (128/92 - 155/121)  BP(mean): --  RR: 18 (10 Apr 2023 11:06) (18 - 20)  SpO2: 100% (10 Apr 2023 11:06) (99% - 100%)    Parameters below as of 10 Apr 2023 11:06  Patient On (Oxygen Delivery Method): nasal cannula  O2 Flow (L/min): 2      PHYSICAL EXAMINATION:  GENERAL: NAD, well built  HEAD:  Atraumatic, Normocephalic  EYES:  conjunctiva and sclera clear  NECK: Supple, No JVD, Normal thyroid  CHEST/LUNG: (+) mild expiratory wheezes auscultated b/l lung fields on exam. Clear to percussion bilaterally; No rales, rhonchi, or rubs  HEART: Regular rate and rhythm; No murmurs, rubs, or gallops  ABDOMEN: Soft, Nontender, Nondistended; Bowel sounds present  NERVOUS SYSTEM:  Alert & Oriented X3,    EXTREMITIES:  2+ Peripheral Pulses, No clubbing, cyanosis, or edema  SKIN: warm dry                          16.3   6.23  )-----------( 211      ( 09 Apr 2023 06:34 )             53.6     04-09    144  |  115<H>  |  42<H>  ----------------------------<  228<H>  4.7   |  25  |  1.77<H>    Ca    10.6<H>      09 Apr 2023 06:34  Phos  3.8     04-09  Mg     3.0     04-09    TPro  7.1  /  Alb  3.2<L>  /  TBili  0.5  /  DBili  x   /  AST  27  /  ALT  66<H>  /  AlkPhos  114  04-09    LIVER FUNCTIONS - ( 09 Apr 2023 06:34 )  Alb: 3.2 g/dL / Pro: 7.1 g/dL / ALK PHOS: 114 U/L / ALT: 66 U/L DA / AST: 27 U/L / GGT: x                   CAPILLARY BLOOD GLUCOSE      RADIOLOGY & ADDITIONAL TESTS:

## 2023-04-10 NOTE — PROGRESS NOTE ADULT - ATTENDING COMMENTS
73 M with Down syndrome from group home, asthma, COPD, HFrEF, DM, HTN, CKD3 schizophrenia p/w son, chest pain and palpitations. ROS positive for productive cough, sorethroat, BEARDEN  FOund on 3-4 l of O2 NC, ? with stridor         p/w chest pain  sore throat  HFrEF  HTN  DM  chronic schizophrenia  YASSINE on CKD3    Afebrile, hemodynamically stable  trop 104>98  EKG : t wave inversion lateral leads (similar to prev)  c/w fluticasone nasal spray and saline spray.  hx of CHF, with recent ECHO 3/23 showed severely reduced EF  BNP 12K (10K prev admission)  cont LAsix 20mg IV daily pending to assess Cr  CT chest and neck did not show upper airway disease/compression, sob likely from pulm edema with small b/l pleural effusions from CHF.   hold enalapril in the setting of IV diuresis and YASSINE  CHF/cards consult  wean off O2 as tolerates  dvt px

## 2023-04-11 NOTE — DIETITIAN INITIAL EVALUATION ADULT - NSFNSGIIOFT_GEN_A_CORE
SBS=834 lb   Wt data in EMR reviewed: 132.4 lb 3/28/23; 130.7 lb 3/30/23; 138.4 lb 4/9/23; 140.8 lb 4/11/23, changes may due to scale/fluid variance

## 2023-04-11 NOTE — PROGRESS NOTE ADULT - ASSESSMENT
73 y o with ambulatory at baseline from a [facility] home number 10 w/ PMH of intellectual disability, Down Syndrome, asthma, COPD with chronic cough, HTN, DM,  CKD elevated PSA, BPH, schizophrenia, hepatitis B, carrier, bilateral sensorineural hearing loss, who came in to the ED for sore throat and chest pain. Admitted for further management.  73 y o with ambulatory at baseline from a [facility] home number 10 w/ PMH of intellectual disability, Down Syndrome, asthma, COPD with chronic cough, HTN, DM,  CKD elevated PSA, BPH, schizophrenia, hepatitis B, carrier, bilateral sensorineural hearing loss, who came in to the ED for sore throat and chest pain. Admitted for further management.

## 2023-04-11 NOTE — DIETITIAN INITIAL EVALUATION ADULT - OTHER INFO
Pt lives at a group home PTA, alert, verbally responsive, forgetful, but able to answer simple questions, known to RD from recent admission ( Nutrition Assessment 3/30/23), appetite good with puree food at Group Home, unclear recent wt changes, tolerating puree food in-house, 76 to 100% breakfast intake observed; no specific food choices , Unknown food allergies; h/o DM, [id number]=4.8 ( 3/29/23), finger stick range=75 to 317 noted Pt lives at a group home PTA, alert, verbally responsive, forgetful, but able to answer simple questions, known to RD from recent admission ( Nutrition Assessment 3/30/23), appetite good with puree food at Group Home, unclear recent wt changes, tolerating puree food in-house, 76 to 100% breakfast intake observed; no specific food choices , Unknown food allergies; h/o DM, [id number]=2.8 ( 3/29/23), finger stick range=75 to 317 noted; s/p  consult

## 2023-04-11 NOTE — PROGRESS NOTE ADULT - ATTENDING COMMENTS
Patient was seen and examined with group home attendant at bedside. Patient reports feeling fine and wants to go back to his group home. Reports he was feeling SOB and chest pain before but now he feels better.     ICU Vital Signs Last 24 Hrs  T(C): 36.4 (11 Apr 2023 11:06), Max: 36.8 (11 Apr 2023 00:02)  T(F): 97.5 (11 Apr 2023 11:06), Max: 98.2 (11 Apr 2023 00:02)  HR: 90 (11 Apr 2023 11:06) (84 - 98)  BP: 122/82 (11 Apr 2023 11:06) (119/76 - 156/113)  BP(mean): --  ABP: --  ABP(mean): --  RR: 18 (11 Apr 2023 11:06) (18 - 18)  SpO2: 96% (11 Apr 2023 11:06) (96% - 100%)    O2 Parameters below as of 11 Apr 2023 11:06  Patient On (Oxygen Delivery Method): room air      P/E:  NAD  AAOx2, not able to follow all commands   BL mild wheezing in crepitations, more on right side   S1S2 WNL, no MRG   Abd soft, non tender, BS present   BLLE no edema or calf tenderness     Labs noted     A/P:  Acute hypoxic respiratory failure due to acute on chronic systolic heart failure exacerbation   YASSINE on CKD   DM with hyperglycemia   HTN  HLD  Down syndrome   Intellectual disability    Plan:  Diuresis changed to oral Lasix 40mg qd   Patient is off oxygen now, will check O2 on ambulation   As per notes patient's functional ability are progressively worsening due to worsening HF  Dr. Hamilton consulted, changed Metoprolol to extended release as part of GDMT  Rule out urinary retention   Monitor renal function   Increased insulin regimen  Cont to hold ACEI/ARB for YASSINE   Appreciate consult from Dr. Hart   Will assess patient's ambulatory status and ambulatory O2 sat to assess disposition plan  PT eval  CM and SW for safe discharge Patient was seen and examined with group home attendant at bedside. Patient reports feeling fine and wants to go back to his group home. Reports he was feeling SOB and chest pain before but now he feels better.     ICU Vital Signs Last 24 Hrs  T(C): 36.4 (11 Apr 2023 11:06), Max: 36.8 (11 Apr 2023 00:02)  T(F): 97.5 (11 Apr 2023 11:06), Max: 98.2 (11 Apr 2023 00:02)  HR: 90 (11 Apr 2023 11:06) (84 - 98)  BP: 122/82 (11 Apr 2023 11:06) (119/76 - 156/113)  BP(mean): --  ABP: --  ABP(mean): --  RR: 18 (11 Apr 2023 11:06) (18 - 18)  SpO2: 96% (11 Apr 2023 11:06) (96% - 100%)    O2 Parameters below as of 11 Apr 2023 11:06  Patient On (Oxygen Delivery Method): room air      P/E:  NAD  AAOx2, not able to follow all commands   BL mild wheezing in crepitations, more on right side   S1S2 WNL, no MRG   Abd soft, non tender, BS present   BLLE no edema or calf tenderness     Labs noted     A/P:  Acute hypoxic respiratory failure due to acute on chronic systolic heart failure exacerbation   YASSINE on CKD   DM with hyperglycemia   HTN  HLD  Down syndrome   Intellectual disability  Esophageal thickening    Plan:  Diuresis changed to oral Lasix 40mg qd   Patient is off oxygen now, will check O2 on ambulation   As per notes patient's functional ability are progressively worsening due to worsening HF  Dr. Hamilton consulted, changed Metoprolol to extended release as part of GDMT  Rule out urinary retention   Monitor renal function   Increased insulin regimen  Cont to hold ACEI/ARB for YASSINE   Appreciate consult from Dr. Hart   Will assess patient's ambulatory status and ambulatory O2 sat to assess disposition plan  Thickening of esophagus- will give outpatient GI follow up. Patient is asymptomatic now  PT eval  CM and SW for safe discharge

## 2023-04-11 NOTE — DIETITIAN INITIAL EVALUATION ADULT - FACTORS AFF FOOD INTAKE
change in mental status; difficulty chewing; acute on chronic comorbidities including CHF, COPD, Intellectual Disability with Down Syndrome

## 2023-04-11 NOTE — DIETITIAN INITIAL EVALUATION ADULT - PROBLEM SELECTOR PLAN 3
hx of CHF, with recent ECHO 3/23 showed severely reduced EF  does not appear in exacerbation  BNP 12K (10K prev admission)  Will start LAsix 20mg IV daily

## 2023-04-11 NOTE — DIETITIAN INITIAL EVALUATION ADULT - PERTINENT LABORATORY DATA
04-11    143  |  109<H>  |  35<H>  ----------------------------<  197<H>  4.2   |  31  |  1.72<H>    Ca    9.9      11 Apr 2023 06:20  Phos  3.2     04-11  Mg     2.9     04-11    TPro  6.4  /  Alb  2.6<L>  /  TBili  0.4  /  DBili  x   /  AST  30  /  ALT  76<H>  /  AlkPhos  99  04-11  POCT Blood Glucose.: 278 mg/dL (04-11-23 @ 07:33)  A1C with Estimated Average Glucose Result: 6.8 % (03-29-23 @ 06:12)

## 2023-04-11 NOTE — DIETITIAN INITIAL EVALUATION ADULT - FEEDING ASSISTANCE
Nursing to continue feeding assistance and encouragement as needed; Provide food choices within diet Rx as available/updated

## 2023-04-11 NOTE — DIETITIAN INITIAL EVALUATION ADULT - PERTINENT MEDS FT
MEDICATIONS  (STANDING):  albuterol    90 MICROgram(s) HFA Inhaler 2 Puff(s) Inhalation every 6 hours  albuterol/ipratropium for Nebulization 3 milliLiter(s) Nebulizer every 6 hours  amantadine 100 milliGRAM(s) Oral every 12 hours  aspirin enteric coated 81 milliGRAM(s) Oral daily  atorvastatin 40 milliGRAM(s) Oral at bedtime  benzocaine/menthol Lozenge 1 Lozenge Oral three times a day  finasteride 5 milliGRAM(s) Oral daily  fluticasone propionate 50 MICROgram(s)/spray Nasal Spray 1 Spray(s) Both Nostrils two times a day  furosemide   Injectable 20 milliGRAM(s) IV Push daily  insulin lispro (ADMELOG) corrective regimen sliding scale   SubCutaneous three times a day before meals  insulin lispro (ADMELOG) corrective regimen sliding scale   SubCutaneous at bedtime  loratadine 10 milliGRAM(s) Oral daily  memantine 5 milliGRAM(s) Oral two times a day  metoprolol tartrate 25 milliGRAM(s) Oral two times a day  montelukast 10 milliGRAM(s) Oral daily  OLANZapine 10 milliGRAM(s) Oral at bedtime  tamsulosin 0.4 milliGRAM(s) Oral at bedtime  tiotropium 2.5 MICROgram(s) Inhaler 2 Puff(s) Inhalation daily  valproic  acid Syrup 250 milliGRAM(s) Oral <User Schedule>  valproic  acid Syrup 1000 milliGRAM(s) Oral at bedtime    MEDICATIONS  (PRN):

## 2023-04-11 NOTE — PROGRESS NOTE ADULT - PROBLEM SELECTOR PLAN 1
AHRF 2/2 systolic HF vs. COPD exacerbation  - Afebrile, hemodynamically stable, still SOB and cough, wheezing   - admits to chest pain, but trop negative (104>98), EKG : t wave inversion lateral leads (similar to prev), unlikely ACS, likely MSK  - started on duoneb q6, c/w spiriva  - c/w fluticasone nasal spray and saline spray  - c/w lasix IV 20 mg qd AHRF 2/2 systolic HF vs. COPD exacerbation  - Afebrile, hemodynamically stable, still SOB and cough, wheezing   - admits to chest pain, but trop negative (104>98), EKG : t wave inversion lateral leads (similar to prev), unlikely ACS, likely MSK  - c/w duoneb q6, c/w spiriva  - c/w fluticasone nasal spray and saline spray  - switched to lasix 20 mg PO BID  - check ambulatory O2 saturation

## 2023-04-11 NOTE — PROGRESS NOTE ADULT - SUBJECTIVE AND OBJECTIVE BOX
PGY-1 Progress Note discussed with attending    PAGER #: [[phone]] TILL 5:00 PM  PLEASE CONTACT ON CALL TEAM:  - On Call Team (Please refer to Red) FROM 5:00 PM - 8:30PM  - Nightfloat Team FROM 8:30 -7:30 AM    CHIEF COMPLAINT & BRIEF HOSPITAL COURSE:    INTERVAL HPI/OVERNIGHT EVENTS:   MEDICATIONS  (STANDING):  albuterol    90 MICROgram(s) HFA Inhaler 2 Puff(s) Inhalation every 6 hours  albuterol/ipratropium for Nebulization 3 milliLiter(s) Nebulizer every 6 hours  amantadine 100 milliGRAM(s) Oral every 12 hours  aspirin enteric coated 81 milliGRAM(s) Oral daily  atorvastatin 40 milliGRAM(s) Oral at bedtime  benzocaine/menthol Lozenge 1 Lozenge Oral three times a day  finasteride 5 milliGRAM(s) Oral daily  fluticasone propionate 50 MICROgram(s)/spray Nasal Spray 1 Spray(s) Both Nostrils two times a day  furosemide   Injectable 20 milliGRAM(s) IV Push daily  insulin lispro (ADMELOG) corrective regimen sliding scale   SubCutaneous three times a day before meals  insulin lispro (ADMELOG) corrective regimen sliding scale   SubCutaneous at bedtime  loratadine 10 milliGRAM(s) Oral daily  memantine 5 milliGRAM(s) Oral two times a day  metoprolol tartrate 25 milliGRAM(s) Oral two times a day  montelukast 10 milliGRAM(s) Oral daily  OLANZapine 10 milliGRAM(s) Oral at bedtime  tamsulosin 0.4 milliGRAM(s) Oral at bedtime  tiotropium 2.5 MICROgram(s) Inhaler 2 Puff(s) Inhalation daily  valproic  acid Syrup 250 milliGRAM(s) Oral <User Schedule>  valproic  acid Syrup 1000 milliGRAM(s) Oral at bedtime    MEDICATIONS  (PRN):      REVIEW OF SYSTEMS:  CONSTITUTIONAL: No fever, weight loss, or fatigue  RESPIRATORY: No cough, wheezing, chills or hemoptysis; No shortness of breath  CARDIOVASCULAR: No chest pain, palpitations, dizziness, or leg swelling  GASTROINTESTINAL: No abdominal pain. No nausea, vomiting, or hematemesis; No diarrhea or constipation. No melena or hematochezia.  GENITOURINARY: No dysuria or hematuria, urinary frequency  NEUROLOGICAL: No headaches, memory loss, loss of strength, numbness, or tremors  SKIN: No itching, burning, rashes, or lesions     Vital Signs Last 24 Hrs  T(C): 36.6 (11 Apr 2023 05:20), Max: 36.8 (11 Apr 2023 00:02)  T(F): 97.9 (11 Apr 2023 05:20), Max: 98.2 (11 Apr 2023 00:02)  HR: 98 (11 Apr 2023 05:20) (81 - 98)  BP: 130/93 (11 Apr 2023 05:20) (119/76 - 156/113)  BP(mean): --  RR: 18 (11 Apr 2023 05:20) (18 - 20)  SpO2: 100% (11 Apr 2023 05:20) (99% - 100%)    Parameters below as of 11 Apr 2023 05:20  Patient On (Oxygen Delivery Method): nasal cannula  O2 Flow (L/min): 2      PHYSICAL EXAMINATION:  GENERAL: NAD, well built  HEAD:  Atraumatic, Normocephalic  EYES:  conjunctiva and sclera clear  NECK: Supple, No JVD, Normal thyroid  CHEST/LUNG: Clear to auscultation. Clear to percussion bilaterally; No rales, rhonchi, wheezing, or rubs  HEART: Regular rate and rhythm; No murmurs, rubs, or gallops  ABDOMEN: Soft, Nontender, Nondistended; Bowel sounds present  NERVOUS SYSTEM:  Alert & Oriented X3,    EXTREMITIES:  2+ Peripheral Pulses, No clubbing, cyanosis, or edema  SKIN: warm dry                      CAPILLARY BLOOD GLUCOSE      RADIOLOGY & ADDITIONAL TESTS:                   PGY-1 Progress Note discussed with attending    PLEASE CONTACT ON CALL TEAM:  - On Call Team (Please refer to Red) FROM 5:00 PM - 8:30PM  - Nightfloat Team FROM 8:30 -7:30 AM    INTERVAL HPI/OVERNIGHT EVENTS: No acute events overnight. Pt off O2, saturating 96% on RA. Not in respiratory distress. no chest pain, sob, palpitations, abdominal pain,/v/d/constipation.   MEDICATIONS  (STANDING):  albuterol    90 MICROgram(s) HFA Inhaler 2 Puff(s) Inhalation every 6 hours  albuterol/ipratropium for Nebulization 3 milliLiter(s) Nebulizer every 6 hours  amantadine 100 milliGRAM(s) Oral every 12 hours  aspirin enteric coated 81 milliGRAM(s) Oral daily  atorvastatin 40 milliGRAM(s) Oral at bedtime  benzocaine/menthol Lozenge 1 Lozenge Oral three times a day  finasteride 5 milliGRAM(s) Oral daily  fluticasone propionate 50 MICROgram(s)/spray Nasal Spray 1 Spray(s) Both Nostrils two times a day  furosemide   Injectable 20 milliGRAM(s) IV Push daily  insulin lispro (ADMELOG) corrective regimen sliding scale   SubCutaneous three times a day before meals  insulin lispro (ADMELOG) corrective regimen sliding scale   SubCutaneous at bedtime  loratadine 10 milliGRAM(s) Oral daily  memantine 5 milliGRAM(s) Oral two times a day  metoprolol tartrate 25 milliGRAM(s) Oral two times a day  montelukast 10 milliGRAM(s) Oral daily  OLANZapine 10 milliGRAM(s) Oral at bedtime  tamsulosin 0.4 milliGRAM(s) Oral at bedtime  tiotropium 2.5 MICROgram(s) Inhaler 2 Puff(s) Inhalation daily  valproic  acid Syrup 250 milliGRAM(s) Oral <User Schedule>  valproic  acid Syrup 1000 milliGRAM(s) Oral at bedtime    MEDICATIONS  (PRN):      REVIEW OF SYSTEMS:  CONSTITUTIONAL: No fever, weight loss, or fatigue  RESPIRATORY: No cough, wheezing, chills or hemoptysis; No shortness of breath  CARDIOVASCULAR: No chest pain, palpitations, dizziness, or leg swelling  GASTROINTESTINAL: No abdominal pain. No nausea, vomiting, or hematemesis; No diarrhea or constipation. No melena or hematochezia.  GENITOURINARY: No dysuria or hematuria, urinary frequency  NEUROLOGICAL: No headaches, memory loss, loss of strength, numbness, or tremors  SKIN: No itching, burning, rashes, or lesions     Vital Signs Last 24 Hrs  T(C): 36.6 (11 Apr 2023 05:20), Max: 36.8 (11 Apr 2023 00:02)  T(F): 97.9 (11 Apr 2023 05:20), Max: 98.2 (11 Apr 2023 00:02)  HR: 98 (11 Apr 2023 05:20) (81 - 98)  BP: 130/93 (11 Apr 2023 05:20) (119/76 - 156/113)  BP(mean): --  RR: 18 (11 Apr 2023 05:20) (18 - 20)  SpO2: 100% (11 Apr 2023 05:20) (99% - 100%)    Parameters below as of 11 Apr 2023 05:20  Patient On (Oxygen Delivery Method): nasal cannula  O2 Flow (L/min): 2      PHYSICAL EXAMINATION:  GENERAL: NAD, well built  HEAD:  Atraumatic, Normocephalic  EYES:  conjunctiva and sclera clear  NECK: Supple, No JVD, Normal thyroid  CHEST/LUNG: (+) mild expiratory wheezes auscultated RLL . Clear to percussion bilaterally; No rales, rhonchi,  or rubs  HEART: Regular rate and rhythm; No murmurs, rubs, or gallops  ABDOMEN: Soft, Nontender, Nondistended; Bowel sounds present  NERVOUS SYSTEM:  Alert & Oriented X3,    EXTREMITIES:  2+ Peripheral Pulses, No clubbing, cyanosis, or edema  SKIN: warm dry                      CAPILLARY BLOOD GLUCOSE      RADIOLOGY & ADDITIONAL TESTS:                   PGY-1 Progress Note discussed with attending    PLEASE CONTACT ON CALL TEAM:  - On Call Team (Please refer to Red) FROM 5:00 PM - 8:30PM  - Nightfloat Team FROM 8:30 -7:30 AM    INTERVAL HPI/OVERNIGHT EVENTS: No acute events overnight. Pt off O2, saturating 96% on RA. Not in respiratory distress. no chest pain, sob, palpitations, abdominal pain, n/v/d/constipation   MEDICATIONS  (STANDING):  albuterol    90 MICROgram(s) HFA Inhaler 2 Puff(s) Inhalation every 6 hours  albuterol/ipratropium for Nebulization 3 milliLiter(s) Nebulizer every 6 hours  amantadine 100 milliGRAM(s) Oral every 12 hours  aspirin enteric coated 81 milliGRAM(s) Oral daily  atorvastatin 40 milliGRAM(s) Oral at bedtime  benzocaine/menthol Lozenge 1 Lozenge Oral three times a day  finasteride 5 milliGRAM(s) Oral daily  fluticasone propionate 50 MICROgram(s)/spray Nasal Spray 1 Spray(s) Both Nostrils two times a day  furosemide   Injectable 20 milliGRAM(s) IV Push daily  insulin lispro (ADMELOG) corrective regimen sliding scale   SubCutaneous three times a day before meals  insulin lispro (ADMELOG) corrective regimen sliding scale   SubCutaneous at bedtime  loratadine 10 milliGRAM(s) Oral daily  memantine 5 milliGRAM(s) Oral two times a day  metoprolol tartrate 25 milliGRAM(s) Oral two times a day  montelukast 10 milliGRAM(s) Oral daily  OLANZapine 10 milliGRAM(s) Oral at bedtime  tamsulosin 0.4 milliGRAM(s) Oral at bedtime  tiotropium 2.5 MICROgram(s) Inhaler 2 Puff(s) Inhalation daily  valproic  acid Syrup 250 milliGRAM(s) Oral <User Schedule>  valproic  acid Syrup 1000 milliGRAM(s) Oral at bedtime    MEDICATIONS  (PRN):      REVIEW OF SYSTEMS:  CONSTITUTIONAL: No fever, weight loss, or fatigue  RESPIRATORY: No cough, wheezing, chills or hemoptysis; No shortness of breath  CARDIOVASCULAR: No chest pain, palpitations, dizziness, or leg swelling  GASTROINTESTINAL: No abdominal pain. No nausea, vomiting, or hematemesis; No diarrhea or constipation. No melena or hematochezia.  GENITOURINARY: No dysuria or hematuria, urinary frequency  NEUROLOGICAL: No headaches, memory loss, loss of strength, numbness, or tremors  SKIN: No itching, burning, rashes, or lesions     Vital Signs Last 24 Hrs  T(C): 36.6 (11 Apr 2023 05:20), Max: 36.8 (11 Apr 2023 00:02)  T(F): 97.9 (11 Apr 2023 05:20), Max: 98.2 (11 Apr 2023 00:02)  HR: 98 (11 Apr 2023 05:20) (81 - 98)  BP: 130/93 (11 Apr 2023 05:20) (119/76 - 156/113)  BP(mean): --  RR: 18 (11 Apr 2023 05:20) (18 - 20)  SpO2: 100% (11 Apr 2023 05:20) (99% - 100%)    Parameters below as of 11 Apr 2023 05:20  Patient On (Oxygen Delivery Method): nasal cannula  O2 Flow (L/min): 2      PHYSICAL EXAMINATION:  GENERAL: NAD, well built  HEAD:  Atraumatic, Normocephalic  EYES:  conjunctiva and sclera clear  NECK: Supple, No JVD, Normal thyroid  CHEST/LUNG: (+) mild expiratory wheezes auscultated RLL . Clear to percussion bilaterally; No rales, rhonchi,  or rubs  HEART: Regular rate and rhythm; No murmurs, rubs, or gallops  ABDOMEN: Soft, Nontender, Nondistended; Bowel sounds present  NERVOUS SYSTEM:  Alert & Oriented X3,    EXTREMITIES:  2+ Peripheral Pulses, No clubbing, cyanosis, or edema  SKIN: warm dry                      CAPILLARY BLOOD GLUCOSE      RADIOLOGY & ADDITIONAL TESTS:

## 2023-04-11 NOTE — PROGRESS NOTE ADULT - PROBLEM SELECTOR PLAN 3
Patient has worsening creatinine 1.77 on 4/9/23. (unknown baseline, Cr 1.34 on 03/28/23)  Likely in setting of worsening CHF vs aggressive diuresis vs poor oral intake.   BUN/CR ratio > 20  Likely prerenal  Avoid nephrotoxic agents  Will hold enalapril in setting of YASSINE.   Will c/w IV lasix in setting of severely decreased EF Patient has worsening creatinine 1.77 (unknown baseline, Cr 1.34 on 03/28/23)  Likely in setting of worsening CHF vs aggressive diuresis vs poor oral intake.   BUN/CR ratio > 20  Likely prerenal  Cr 1.72 4/11/23  Avoid nephrotoxic agents  Will hold enalapril in setting of YASSINE   - switched to lasix 20 mg PO BID

## 2023-04-11 NOTE — PROGRESS NOTE ADULT - PROBLEM SELECTOR PLAN 2
hx of CHF, with recent ECHO 3/23 showed severely reduced EF 10-15%  does not appear in exacerbation  BNP 12K (10K prev admission)  Will c/w Lasix 20mg IV daily  Will hold enalapril in setting of worsening YASSINE to allow for diuresis.   C/w metoprolol. hx of CHF, with recent ECHO 3/23 showed severely reduced EF 10-15%  does not appear in exacerbation  NYHA Heart Failure class IIC  BNP 12K (10K prev admission)  - switched to lasix 20 mg PO BID  Will hold enalapril in setting of worsening YASSINE to allow for diuresis.   C/w metoprolol. hx of CHF, with recent ECHO 3/23 showed severely reduced EF 10-15%  does not appear in exacerbation  NYHA Heart Failure class III stage C  BNP 12K (10K prev admission)  - switched to lasix 20 mg PO BID  Will hold enalapril in setting of worsening YASSINE to allow for diuresis.   C/w metoprolol.

## 2023-04-11 NOTE — PROGRESS NOTE ADULT - PROBLEM SELECTOR PLAN 6
pt takes Januvia and jardiance at home  Will start SS  monitor BS pt takes Januvia and jardiance at home  - sugars have been elevated 200s-300s past 24 hours  - started on lantus 8, lispro 2 u pre-meal + ISS  monitor BS pt takes Januvia and jardiance at home  - sugars have been elevated 200s-300s past 24 hours  - started on Lantus 8, lispro 2 u pre-meal + ISS  monitor BS

## 2023-04-12 NOTE — PROGRESS NOTE ADULT - PROBLEM SELECTOR PLAN 1
Patient with advanced systolic heart failure due to severe NICM, EF now 10-15% on recent Echo.   He is having increasing BEARDEN, SOB with performance of ADLs, and functional limitation due to his heart failure    He is minimum Stage C NYHA class III disease (symptoms with minimal exertion).  Cardiology consult requested to optimize full GDMT.  Remainder of management as per primary team.

## 2023-04-12 NOTE — PROGRESS NOTE ADULT - PROBLEM SELECTOR PLAN 2
As above, EF of 10-15%.  As per notes from last admission, patient previously seen by EP and deemed not to be a candidate for ICD placement    Further management as per Cardiology and primary team.

## 2023-04-12 NOTE — PROGRESS NOTE ADULT - PROBLEM SELECTOR PLAN 8
CT chest showed Indeterminate left upper lobe 7 mm nodule  Patient needs to follow outpatient for monitoring noncontrast CT chest in 3 months to assess for change  Will keep monitoring for now

## 2023-04-12 NOTE — DISCHARGE NOTE PROVIDER - CARE PROVIDERS DIRECT ADDRESSES
,DirectAddress_Unknown,[email or web address] ,DirectAddress_Unknown,[email or web address],DirectAddress_Unknown

## 2023-04-12 NOTE — DISCHARGE NOTE PROVIDER - NSDCMRMEDTOKEN_GEN_ALL_CORE_FT
acetaminophen 500 mg oral tablet: 2 tab(s) orally once a day as needed for  mild pain  albuterol 2.5 mg/3 mL (0.083%) inhalation solution: 3 milliliter(s) by nebulizer every 4 hours as needed for  shortness of breath  albuterol 90 mcg/inh inhalation aerosol: 2 puff(s) inhaled 4 times a day as needed for wheezing  amantadine 100 mg oral tablet: 1 tab(s) orally 2 times a day  aspirin 81 mg oral delayed release tablet: 1 tab(s) orally once a day  atorvastatin 40 mg oral tablet: 1 tab(s) orally once a day (in the evening)  clonazePAM 0.5 mg oral tablet: 1 tab(s) orally once a day (in the morning)  enalapril 20 mg oral tablet: 1 tab(s) orally once a day  finasteride 5 mg oral tablet: 1 tab(s) orally once a day  fluticasone 50 mcg/inh nasal spray: 1 spray(s) nasal 2 times a day  furosemide 20 mg oral tablet: 1 tab(s) orally once a day  guaiFENesin 600 mg oral tablet, extended release: 1 tab(s) orally every 12 hours  ibuprofen 600 mg oral tablet: 1 tab(s) orally every 8 hours as needed for  moderate pain  ipratropium-albuterol 0.5 mg-2.5 mg/3 mL inhalation solution: 3 milliliter(s) inhaled every 6 hours  Januvia 100 mg oral tablet: 1 tab(s) orally once a day  Jardiance 10 mg oral tablet: 1 tab(s) orally once a day  lactulose 10 g/15 mL oral syrup: 30 milliliter(s) orally once a day (at bedtime)  Melatonin 5 mg oral tablet: 1 tab(s) orally once a day (at bedtime)  memantine 5 mg oral tablet: 1 tab(s) orally 2 times a day  metoprolol succinate 25 mg oral tablet, extended release: 1 tab(s) orally once a day  montelukast 10 mg oral tablet: 1 tab(s) orally once a day  Multiple Vitamins oral tablet: 1 tab(s) orally once a day  OLANZapine 10 mg oral tablet: 1 tab(s) orally once a day (at bedtime)  oxyCODONE 5 mg oral tablet: 1 tab(s) orally every 6 hours as needed for  severe pain  tamsulosin 0.4 mg oral capsule: 1 cap(s) orally once a day  tiZANidine 2 mg oral tablet: 1 tab(s) orally every 8 hours as needed  valproic acid 250 mg/5 mL oral liquid: 5 milliliter(s) orally once a day (in the morning)  valproic acid 250 mg/5 mL oral liquid: 20 milliliter(s) orally once a day (at bedtime)   acetaminophen 500 mg oral tablet: 2 tab(s) orally once a day as needed for  mild pain  albuterol 2.5 mg/3 mL (0.083%) inhalation solution: 3 milliliter(s) by nebulizer every 4 hours as needed for  shortness of breath  albuterol 90 mcg/inh inhalation aerosol: 2 puff(s) inhaled 4 times a day as needed for wheezing  amantadine 100 mg oral tablet: 1 tab(s) orally 2 times a day  aspirin 81 mg oral delayed release tablet: 1 tab(s) orally once a day  atorvastatin 40 mg oral tablet: 1 tab(s) orally once a day (in the evening)  bumetanide 1 mg oral tablet: 1 tab(s) orally once a day  clonazePAM 0.5 mg oral tablet: 1 tab(s) orally once a day (in the morning)  enalapril 20 mg oral tablet: 1 tab(s) orally once a day  finasteride 5 mg oral tablet: 1 tab(s) orally once a day  fluticasone 50 mcg/inh nasal spray: 1 spray(s) nasal 2 times a day  guaiFENesin 600 mg oral tablet, extended release: 1 tab(s) orally every 12 hours  ibuprofen 600 mg oral tablet: 1 tab(s) orally every 8 hours as needed for  moderate pain  ipratropium-albuterol 0.5 mg-2.5 mg/3 mL inhalation solution: 3 milliliter(s) inhaled every 6 hours  Januvia 100 mg oral tablet: 1 tab(s) orally once a day  Jardiance 10 mg oral tablet: 1 tab(s) orally once a day  lactulose 10 g/15 mL oral syrup: 30 milliliter(s) orally once a day (at bedtime)  Melatonin 5 mg oral tablet: 1 tab(s) orally once a day (at bedtime)  memantine 5 mg oral tablet: 1 tab(s) orally 2 times a day  metoprolol succinate 25 mg oral tablet, extended release: 1 tab(s) orally once a day  montelukast 10 mg oral tablet: 1 tab(s) orally once a day  Multiple Vitamins oral tablet: 1 tab(s) orally once a day  OLANZapine 10 mg oral tablet: 1 tab(s) orally once a day (at bedtime)  oxyCODONE 5 mg oral tablet: 1 tab(s) orally every 6 hours as needed for  severe pain  tamsulosin 0.4 mg oral capsule: 1 cap(s) orally once a day  tiotropium 2.5 mcg/inh inhalation aerosol: 2 puff(s) inhaled once a day  tiZANidine 2 mg oral tablet: 1 tab(s) orally every 8 hours as needed  valproic acid 250 mg/5 mL oral liquid: 5 milliliter(s) orally once a day (in the morning)  valproic acid 250 mg/5 mL oral liquid: 20 milliliter(s) orally once a day (at bedtime)

## 2023-04-12 NOTE — PROGRESS NOTE ADULT - PROBLEM SELECTOR PLAN 1
Most likely secondary to Acute on Chronic Systolic Heart Failure  ECHO 3/23 showed severely reduced EF 10-15%  NYHA Heart Failure class III stage C  Lasix 40 mg PO qd  Metoprolol 50 mg PO qd  Plan to restart enalapril and Jardiance once renal function improves   Plan to add Spironolactone   Cardiology onboard   No cardiac cath at this moment   PT to follow this afternoon   Will keep monitoring.

## 2023-04-12 NOTE — DISCHARGE NOTE PROVIDER - CARE PROVIDER_API CALL
Dima Hamilton)  Cardiology  [address] [place] [address]  Phone: [phone]  Fax: [phone]  Follow Up Time:     Gale Nation  [facility]  [address] [place] [address]  Phone: [phone]  Fax: [phone]  Follow Up Time:    Dima Hamilton)  Cardiology  [address] [place] [address]  Phone: [phone]  Fax: [phone]  Follow Up Time:     Gale Nation  [facility]  [address] [place] [address]  Phone: [phone]  Fax: [phone]  Follow Up Time:     Priya Pederson)  Internal Medicine  [address], [address]  [place], [place] [id number]  Phone: [phone]  Fax: [phone]  Follow Up Time:

## 2023-04-12 NOTE — PROGRESS NOTE ADULT - PROBLEM SELECTOR PLAN 7
pt takes Januvia and jardiance at home  Lantus 5 units   Lispro 4 u TID before meals + ISS  Monitor BS

## 2023-04-12 NOTE — PROGRESS NOTE ADULT - ASSESSMENT
73 y o with ambulatory at baseline from a [facility] home number 10 w/ PMH of intellectual disability, Down Syndrome, asthma, COPD with chronic cough, HTN, DM,  CKD elevated PSA, BPH, schizophrenia, hepatitis B, carrier, bilateral sensorineural hearing loss, admitted for AHRF secondary to Acute on Chronic Systolic Heart Failure.

## 2023-04-12 NOTE — PROGRESS NOTE ADULT - PROBLEM SELECTOR PLAN 5
Patient now with advanced systolic HF, in setting of severe NICM, with EF of 10-15%, and increasing BEARDEN/SOB with performance of ADLs resulting in functional limitation.  He is not considered to be a candidate for ICD.  With his increasing symptom burden he may not be able to return to his group home setting and may need to be placed at a higher level of care. There is also concern regarding transitions of care and support for adequate outpatient f/u of his heart failure in his current setting.      Patient has at minimum Stage C NYHA class III heart failure.  He is at risk for continued progression of disease, further functional decline, recurrent hospitalizations, increasing morbidity, and sudden death.      Patient is under auspices of [facility]. Spoke to manager/supervisor Rina Eller--please see previous GO conversation from 4/10.   is in agreement to begin initial legal review process by [employer] for possible DNR/DNI.  1750-b pre-MOLST checklist placed on chart.    I also spoke with group home medical director Dr. Hearn ([phone]), who shares above concerns regarding patient's current home setting, advance directives, and transitions of care.  She is requesting telephone conference with herself and group home staff at least 24 hours prior to any anticipated discharge.  Placement at a higher level of care may be required.    Will collaborate with team SW and case management.  Case also discussed in detail with Dr. Quinteros.  Patient remains FULL CODE for now  Awaiting PT and formal Cardiology consults to help inform GOC planning/decision making as outlined above.  Palliative care team will continue to follow.

## 2023-04-12 NOTE — DISCHARGE NOTE PROVIDER - HOSPITAL COURSE
Patient is a 72 yo with Intellectual disability with Downs Syndrome, from [facility] home number 10 w/ history of COPD with chronic cough, hx of non ischemic cardiomyopathy with non-obstructive CAD on CCTA (2021), HFrEF, HTN, DM, CKD elevated PSA, BPH, schizophrenia, hepatitis B, carrier, bilateral sensorineural hearing loss, who came in to the ED for sore throat and chest pain, and was admitted to the hospital for Acute hypoxic respiratory failure 2/2 Acute on chronic systolic heart failure.  Patient was adequately diuresed with Lasix IV and then transitioned to PO regimen. Enalapril was held due initially due to concern for YASSINE, however renal function improved with diuresis, suspect cardio-renal syndrome. Transthoracic Echo March 2023 showed significantly reduced EF 10-15%, Grade I DD.   Dr. Hamilotn cardiology followed the patient in patient. Patient's ambulatory status was assessed and physical therapy recommended ****    Enalapril and spironolactone was resumed after YASSINE returned back to baseline (baseline 1.3-1.6), and patient was continued on Metoprolol for GDMT for CHF.     Patient is a 72 yo with Intellectual disability with Downs Syndrome, from [facility] home number 10 w/ history of COPD with chronic cough, hx of non ischemic cardiomyopathy with non-obstructive CAD on CCTA (2021), HFrEF, HTN, DM, CKD elevated PSA, BPH, schizophrenia, hepatitis B, carrier, bilateral sensorineural hearing loss, who came in to the ED for sore throat and chest pain, and was admitted to the hospital for Acute hypoxic respiratory failure 2/2 Acute on chronic systolic heart failure.  Patient was adequately diuresed with Lasix IV and then transitioned to PO regimen. Enalapril was held due initially due to concern for YASSINE, however renal function improved with diuresis, suspect cardio-renal syndrome. Transthoracic Echo March 2023 showed significantly reduced EF 10-15%, Grade I DD.   Dr. Hamilton cardiology followed the patient in patient. Patient's ambulatory status was assessed and physical therapy recommended CARLOS.    Enalapril and spironolactone was resumed after AYSSINE returned back to baseline (baseline 1.3-1.6), and patient was continued on Metoprolol for GDMT for CHF.     Patient is a 73 year old male with a past medical history of Intellectual disability, Downs Syndrome, from [facility] home number 10 w/ history of COPD with chronic cough, hx of non ischemic cardiomyopathy with non-obstructive CAD on CCTA (2021), HFrEF, HTN, DM, CKD elevated PSA, BPH, schizophrenia, hepatitis B, carrier, bilateral sensorineural hearing loss, who came in to the ED for sore throat and chest pain, and was admitted to the hospital for Acute hypoxic respiratory failure 2/2 Acute on chronic systolic heart failure.  Patient was adequately diuresed with Lasix IV and then transitioned to PO regimen. Enalapril and Lasix was held due to concern for YASSINE.  Patient was evaluated by nephrology and urology and their recommendations were followed.  Patient initally had elevated bladder scans revelaing patient was retaining urine, a Reyes cath and straight cath were considered by urology who initally recommended holding Reyes cath given high likelhood of patient pulling Reyes and causing harm to himself as he had previously pull out mutpile IV Lines. When his SCr continued to worsen patient declined Reyes cath insertion.  Patient starting having bladder scans showing he was no longer retaining urine.  Given YASSINE not improving with continued urination patient jane had ATN per Nephrology.  Bumex 1mg Qd was started due to patients volume status.  Cardiology Dr. Hamilton was consulted and his recommendations were followed.  Patient had an Echo from March 2023 which showed significantly reduced EF 10-15%, and Grade I DD. Patient's ambulatory status was assessed and physical therapy recommended CARLOS.      Patient is able to tolerate diet prior to discharge. Patient is stable for discharge per attending and is advised to follow up with PCP as outpatient. Please refer to patient's complete medical chart with documents for a full hospital course, for this is only a brief summary. Patient is a 73 year old male with a past medical history of Intellectual disability, Downs Syndrome, from [facility] number [facility] w/ history of COPD with chronic cough, hx of non ischemic cardiomyopathy with non-obstructive CAD on CCTA (2021), HFrEF, HTN, DM, CKD elevated PSA, BPH, schizophrenia, hepatitis B, carrier, bilateral sensorineural hearing loss, who came in to the ED for sore throat and chest pain, and was admitted to the hospital for Acute hypoxic respiratory failure 2/2 Acute on chronic systolic heart failure.  Patient was adequately diuresed with Lasix IV and then transitioned to PO regimen. Enalapril and Lasix was held due to concern for YASSINE.  Patient was evaluated by nephrology and urology and their recommendations were followed.  Patient initially had elevated bladder scans revealing patient was retaining urine, a Reyes cath and straight cath were considered by urology who initially recommended holding Reyes cath given high likelihood of patient pulling Reyes and causing harm to himself as he had previously pull out mutpile IV Lines. When his SCr continued to worsen patient declined Reyes cath insertion. Patient starting having bladder scans showing he was no longer retaining urine.  Given YASSINE not improving with continued urination patient jane had ATN per Nephrology.  Bumex 1mg Qd was started due to patients volume status.  Cardiology Dr. Hamilton was consulted and his recommendations were followed.  Patient had an Echo from March 2023 which showed significantly reduced EF 10-15%, and Grade I DD. Patient's ambulatory status was assessed and physical therapy recommended CARLOS.      Patient is able to tolerate diet prior to discharge. Patient is stable for discharge per attending and is advised to follow up with PCP as outpatient. Please refer to patient's complete medical chart with documents for a full hospital course, for this is only a brief summary.

## 2023-04-12 NOTE — DISCHARGE NOTE PROVIDER - DISCHARGING ATTENDING PHYSICIAN:
Eyes closed. Respirations even and non labored. 2nd NS IV bolus infusing at this time.    Yariel Blackwell

## 2023-04-12 NOTE — DISCHARGE NOTE PROVIDER - ATTENDING DISCHARGE PHYSICAL EXAMINATION:
Gen: NAD, elderly  psych: Intellectual disability  Neuro: alert, oriented x2, answering qs appropriately, moves all extremities  Pulm: good inspiratory effort, CTAB  Abd: soft, NT/ND, BS+  Ext: no edema  Skin: warm, dry   #AHRF  #ADHF  #YASSINE likely ATN on CKD    Gen: NAD, elderly  psych: Intellectual disability  Neuro: alert, oriented x2, answering qs appropriately, moves all extremities  Pulm: good inspiratory effort, CTAB  Abd: soft, NT/ND, BS+  Ext: no edema  Skin: warm, dry   Oriented - self; Oriented - place; Oriented - time

## 2023-04-12 NOTE — PROGRESS NOTE ADULT - SUBJECTIVE AND OBJECTIVE BOX
follow up on:  complex medical decision making related to goals of care    [facility] Consult Service:  Alizaroberto Hsu DO: cell ([phone])  Reji Hart MD: cell ([phone])  Joel Weiner NP: cell [phone])   Michelle Iqbal DNP: cell ([phone])  Kahlil Lucas LMSW: cell [phone])   Bharti Nogueira NP: via [employer]    Can contact any Palliative Team member via [employer] for consults and questions      OVERNIGHT EVENTS:  None  Patient examined earlier today, group home aide at beside.  Alert and oriented x 3, calm, easily conversant.  Now off oxygen, converted to oral Lasix.  Looks much better.  Denies chest pain or SOB at rest presently.  Still reports getting occasional "boom boom boom" sensation with tightness in his chest.  Denies nausea, vomiting, or diarrhea.  Appetite good.  Still awaiting formal PT eval.  Otherwise has no acute complaints.    Present Symptoms: Mild, Moderate, Severe  Pain:             Location -   Denies                            Aggravating factors -             Quality -             Radiation -             Timing-             Severity (0-10 scale):  0             Minimal acceptable level (0-10 scale):  0  Fatigue:  Denies  Nausea:  Denies  Lack of Appetite:   Denies  SOB:  None  Depression:  Anxiety:  Review of Systems: All other systems reviewed and are negative      MEDICATIONS  (STANDING):  albuterol    90 MICROgram(s) HFA Inhaler 2 Puff(s) Inhalation every 6 hours  albuterol/ipratropium for Nebulization 3 milliLiter(s) Nebulizer every 6 hours  amantadine 100 milliGRAM(s) Oral every 12 hours  aspirin enteric coated 81 milliGRAM(s) Oral daily  atorvastatin 40 milliGRAM(s) Oral at bedtime  benzocaine/menthol Lozenge 1 Lozenge Oral three times a day  finasteride 5 milliGRAM(s) Oral daily  fluticasone propionate 50 MICROgram(s)/spray Nasal Spray 1 Spray(s) Both Nostrils two times a day  furosemide    Tablet 40 milliGRAM(s) Oral daily  heparin   Injectable 5000 Unit(s) SubCutaneous every 12 hours  insulin glargine Injectable (LANTUS) 5 Unit(s) SubCutaneous at bedtime  insulin lispro (ADMELOG) corrective regimen sliding scale   SubCutaneous three times a day before meals  insulin lispro (ADMELOG) corrective regimen sliding scale   SubCutaneous at bedtime  insulin lispro Injectable (ADMELOG) 4 Unit(s) SubCutaneous three times a day before meals  loratadine 10 milliGRAM(s) Oral daily  memantine 5 milliGRAM(s) Oral two times a day  metoprolol succinate ER 50 milliGRAM(s) Oral daily  montelukast 10 milliGRAM(s) Oral daily  OLANZapine 10 milliGRAM(s) Oral at bedtime  tamsulosin 0.4 milliGRAM(s) Oral at bedtime  tiotropium 2.5 MICROgram(s) Inhaler 2 Puff(s) Inhalation daily  valproic  acid Syrup 250 milliGRAM(s) Oral <User Schedule>  valproic  acid Syrup 1000 milliGRAM(s) Oral at bedtime    MEDICATIONS  (PRN):  guaiFENesin Oral Liquid (Sugar-Free) 200 milliGRAM(s) Oral every 6 hours PRN Cough      PHYSICAL EXAM:  Vital Signs Last 24 Hrs  T(C): 36.3 (12 Apr 2023 11:56), Max: 36.7 (11 Apr 2023 23:46)  T(F): 97.3 (12 Apr 2023 11:56), Max: 98 (11 Apr 2023 23:46)  HR: 92 (12 Apr 2023 11:56) (92 - 101)  BP: 112/69 (12 Apr 2023 11:56) (112/69 - 164/102)  BP(mean): --  RR: 19 (12 Apr 2023 11:56) (18 - 20)  SpO2: 96% (12 Apr 2023 11:56) (94% - 100%)    Parameters below as of 12 Apr 2023 11:56  Patient On (Oxygen Delivery Method): room air        General: alert  oriented x __3__   NAD, pleasant, calm, mild temporal wasting noted    Palliative Performance Scale/Karnofsky Score:  40%  http://npcrc.org/files/news/palliative_performance_scale_[email or web address].pdf    HEENT: EOMI anicteric, pharynx clear, MM moist  Lungs:  clear to auscultation, no rales or rhonchi noted, respirations unlabored  CV:  RSR normal S1 and S2, no murmur  GI: soft non distended non tender  normal bowel sounds   Musculoskeletal:  No focal abnormal joint findings or edema noted  Skin: no abnormal skin lesions or DU noted  Neuro: no focal deficits, mild to moderate cognitive impairment (in setting of Down's, I/DD, likely dementia)  Oral intake ability:  full capability, no reported dysphagia, on pureed diet    LABS:                          15.9   7.34  )-----------( 183      ( 12 Apr 2023 05:00 )             50.9     04-12    145  |  112<H>  |  30<H>  ----------------------------<  168<H>  4.1   |  28  |  1.59<H>    Ca    10.1      12 Apr 2023 05:00  Phos  2.3     04-12  Mg     2.6     04-12    TPro  6.7  /  Alb  2.7<L>  /  TBili  0.4  /  DBili  x   /  AST  31  /  ALT  72<H>  /  AlkPhos  97  04-12        RADIOLOGY & ADDITIONAL STUDIES:

## 2023-04-12 NOTE — PROGRESS NOTE ADULT - ASSESSMENT
73 y o with ambulatory at baseline from a [facility] home number 10 w/ PMH of intellectual disability, Down Syndrome, asthma, COPD with chronic cough, HTN, DM,  CKD elevated PSA, BPH, schizophrenia, hepatitis B, carrier, bilateral sensorineural hearing loss, admitted for AHRF secondary to Acute on Chronic Systolic Heart Failure.

## 2023-04-12 NOTE — PROGRESS NOTE ADULT - ATTENDING COMMENTS
Patient was seen and examined with group home attendant at bedside. Patient is very cheerful today, reports his chest pain and SOB has resolved but he still feels weak in his legs    ICU Vital Signs Last 24 Hrs  T(C): 36.3 (12 Apr 2023 11:56), Max: 36.7 (11 Apr 2023 23:46)  T(F): 97.3 (12 Apr 2023 11:56), Max: 98 (11 Apr 2023 23:46)  HR: 92 (12 Apr 2023 11:56) (92 - 101)  BP: 112/69 (12 Apr 2023 11:56) (112/69 - 164/102)  BP(mean): --  ABP: --  ABP(mean): --  RR: 19 (12 Apr 2023 11:56) (18 - 20)  SpO2: 96% (12 Apr 2023 11:56) (94% - 100%)    O2 Parameters below as of 12 Apr 2023 11:56  Patient On (Oxygen Delivery Method): room air      P/E:  NAD  AAOx2, no focal deficit    no crepitation or wheezing   S1S2 WNL, no MRG   Abd soft, non tender, BS present   BLLE no edema or calf tenderness     Labs noted     A/P:  Acute hypoxic respiratory failure resolved  Acute on chronic systolic heart failure exacerbation   YASSINE on CKD   DM with hyperglycemia   HTN  HLD  Down syndrome   Intellectual disability  Esophageal thickening    Plan:  Cont Lasix 40mg qd   Patient is off oxygen now, will check O2 on ambulation   Need PT eval for functional ability   Dr. Hamilton following  Monitor renal function, baseline possibly between 1.3-1.6  may add on one more GDMT if renal function remains stable   Increased ADMELOG to 3U, patient has good appetite now, will cont current Lantus   Appreciate consult from Dr. Hart   Pending assessment of patient's ambulatory status and ambulatory O2 sat to assess disposition plan  Thickening of esophagus- will give outpatient GI follow up. Patient is asymptomatic now, tolerating diet   CM and SW for safe discharge

## 2023-04-12 NOTE — DISCHARGE NOTE PROVIDER - NSDCCPCAREPLAN_GEN_ALL_CORE_FT
PRINCIPAL DISCHARGE DIAGNOSIS  Diagnosis: Acute on chronic systolic congestive heart failure  Assessment and Plan of Treatment:       SECONDARY DISCHARGE DIAGNOSES  Diagnosis: Diabetes mellitus  Assessment and Plan of Treatment:     Diagnosis: Acute kidney injury superimposed on CKD  Assessment and Plan of Treatment:     Diagnosis: Acute respiratory failure with hypoxia  Assessment and Plan of Treatment:      PRINCIPAL DISCHARGE DIAGNOSIS  Diagnosis: Acute on chronic systolic congestive heart failure  Assessment and Plan of Treatment: You were admitted to the hospital with complaints of      SECONDARY DISCHARGE DIAGNOSES  Diagnosis: Acute respiratory failure with hypoxia  Assessment and Plan of Treatment:     Diagnosis: Diabetes mellitus  Assessment and Plan of Treatment: You have Type 2 diabetes and were found to have a Hemoglobin A1c of 6.8% which shows a well controlled glucose levels in your body over the past 3 months. You are advised to eat foods that are low glycemic index which include beans and complex carbs, and to avoid high GI foods including white rice and potatoes. Uncontrolled sugars can lead to blindness, kidney failure, and contribute to diseases such as heart attacks and strokes. You are advised to continue taking your home medications for diabetes and keep your Primary Care Physician appointments in order to monitor and change medications as necessary.      Diagnosis: Acute kidney injury superimposed on CKD  Assessment and Plan of Treatment: You were noted to have elevated creatinine levels upon admission, which is likely due to decreased blood flow to the kidneys in the setting of acute heart failure exacerbation. Enalapril was initially held due to elevated creatinine levels and restarted on it when your kidney function returned to your baseline levels of creatinine around 1.3-1.6.     PRINCIPAL DISCHARGE DIAGNOSIS  Diagnosis: Acute on chronic systolic congestive heart failure  Assessment and Plan of Treatment: You were admitted to the hospital with complaints of shortness of breath and chest pain, for which you were evaluated and diagnosed to have an acute exacerbation of heart failure. Your heart pumping function was found to be severely reduced from your previous admission in March with an Ejection fraction of ~15% (normal heart pump function ranges above 55%) and Grade I Diastolic dysfunction. You have been evaluated on your prior admissions to not be a candidate for AICD placement. You are strongly recommended to continue taking your heart failure medications - Metoprolol, Enalapril and ****spironolactone***** upon hospital discharge and have a follow up with your primary care provider or cardiologist within 1 week of hospital discharge.      SECONDARY DISCHARGE DIAGNOSES  Diagnosis: Acute respiratory failure with hypoxia  Assessment and Plan of Treatment: You were noted to have breathing difficulty likely secondary to acute heart failure exacerbation. Please continue taking your heart failure medications and continue following a low salt and fluid restricted diet (~1L/day).   PLEASE SEEK IMMEDIATE MEDICAL CARE IF YOU HAVE ANY OF THE FOLLOWING SYMPTOMS: shortness of breath, change in mental status, chest pain, lightheadedness/dizziness/fainting, or worsening of symptoms including not being able to conduct normal physical activity.      Diagnosis: Diabetes mellitus  Assessment and Plan of Treatment: You have Type 2 diabetes and were found to have a Hemoglobin A1c of 6.8% which shows a well controlled glucose levels in your body over the past 3 months. You are advised to eat foods that are low glycemic index which include beans and complex carbs, and to avoid high GI foods including white rice and potatoes. Uncontrolled sugars can lead to blindness, kidney failure, and contribute to diseases such as heart attacks and strokes. You are advised to continue taking your home medications for diabetes and keep your Primary Care Physician appointments in order to monitor and change medications as necessary.      Diagnosis: Acute kidney injury superimposed on CKD  Assessment and Plan of Treatment: You were noted to have elevated creatinine levels upon admission, which is likely due to decreased blood flow to the kidneys in the setting of acute heart failure exacerbation. Enalapril was initially held due to elevated creatinine levels and restarted on it when your kidney function returned to your baseline levels of creatinine around 1.3-1.6.     PRINCIPAL DISCHARGE DIAGNOSIS  Diagnosis: Acute on chronic systolic congestive heart failure  Assessment and Plan of Treatment: You were admitted to the hospital with complaints of shortness of breath and chest pain, for which you were evaluated and diagnosed to have an acute exacerbation of heart failure. Your heart pumping function was found to be severely reduced from your previous admission in March with an Ejection fraction of ~15% (normal heart pump function ranges above 55%) and Grade I Diastolic dysfunction. You have been evaluated on your prior admissions to not be a candidate for AICD placement. You are strongly recommended to continue taking your heart failure medications - Metoprolol, Enalapril and please start taking Bumex 1mg every day as prescrbied.  Please stop taking spironolactone and lasix.upon hospital discharge and have a follow up with your primary care provider or cardiologist within 1 week of hospital discharge.      SECONDARY DISCHARGE DIAGNOSES  Diagnosis: Acute respiratory failure with hypoxia  Assessment and Plan of Treatment: You were noted to have breathing difficulty likely secondary to acute heart failure exacerbation. Please continue taking your heart failure medications and continue following a low salt and fluid restricted diet (~1L/day).   PLEASE SEEK IMMEDIATE MEDICAL CARE IF YOU HAVE ANY OF THE FOLLOWING SYMPTOMS: shortness of breath, change in mental status, chest pain, lightheadedness/dizziness/fainting, or worsening of symptoms including not being able to conduct normal physical activity.      Diagnosis: Diabetes mellitus  Assessment and Plan of Treatment: You have Type 2 diabetes and were found to have a Hemoglobin A1c of 6.8% which shows a well controlled glucose levels in your body over the past 3 months. You are advised to eat foods that are low glycemic index which include beans and complex carbs, and to avoid high GI foods including white rice and potatoes. Uncontrolled sugars can lead to blindness, kidney failure, and contribute to diseases such as heart attacks and strokes. You are advised to continue taking your home medications for diabetes and keep your Primary Care Physician appointments in order to monitor and change medications as necessary.      Diagnosis: Acute kidney injury superimposed on CKD  Assessment and Plan of Treatment: You were noted to have elevated creatinine levels upon admission, which is likely due to decreased blood flow to the kidneys in the setting of acute heart failure exacerbation. Enalapril was initially held due to elevated creatinine levels and restarted on it when your kidney function returned to your baseline levels of creatinine around 1.3-1.6.    Diagnosis: HTN (hypertension)  Assessment and Plan of Treatment: You have a history of Hypertension.  Your blood pressure target is 120-140/80-90, maintain healthy lifestyle, low salt diet, avoid fatty food, weight loss  Notify your doctor if you have any of the following symptoms:   (Dizziness, Lightheadedness, Blurry vision, Headache, Chest pain, Shortness of breath.)  Please continue taking your home medications and follow-up with your PCP in 1 week from discharge to adjust medications as needed.    Diagnosis: Hyperlipidemia  Assessment and Plan of Treatment: You have history of Hyperlipidemia.   Please take your medication as prescribed. Maintain healthy lifestyle, low fat diet, exercise regularly and check your lipid levels routinely.   Please follow up with your PCP in 1 week from discharge.    Diagnosis: Chronic obstructive pulmonary disease (COPD)  Assessment and Plan of Treatment: You have a history of COPD which is a condition of your lungs which can make it difficult for you to breath.  You were treated with inhalers and tolerated this well.  Your shortness of breath that brought you into the hospital was likely due to your Heart Failure.  Please continue to use your inhalers as prescribed.  Please follow up with your pcp and a pulmonologist within 1 week from discharge.     PRINCIPAL DISCHARGE DIAGNOSIS  Diagnosis: Acute on chronic systolic congestive heart failure  Assessment and Plan of Treatment: You were admitted to the hospital with complaints of shortness of breath and chest pain, for which you were evaluated and diagnosed to have an acute exacerbation of heart failure. Your heart pumping function was found to be severely reduced from your previous admission in March with an Ejection fraction of ~15% (normal heart pump function ranges above 55%) and Grade I Diastolic dysfunction. You have been evaluated on your prior admissions to not be a candidate for AICD placement. You are strongly recommended to continue taking your heart failure medications - Metoprolol, Enalapril and please start taking Bumex 1mg every day as prescrbied. Please stop taking spironolactone and lasix upon hospital discharge and have a follow up with your primary care provider or cardiologist within 1 week of hospital discharge.      SECONDARY DISCHARGE DIAGNOSES  Diagnosis: Acute respiratory failure with hypoxia  Assessment and Plan of Treatment: You were noted to have breathing difficulty likely secondary to acute heart failure exacerbation. Please continue taking your heart failure medications and continue following a low salt and fluid restricted diet (~1L/day).   PLEASE SEEK IMMEDIATE MEDICAL CARE IF YOU HAVE ANY OF THE FOLLOWING SYMPTOMS: shortness of breath, change in mental status, chest pain, lightheadedness/dizziness/fainting, or worsening of symptoms including not being able to conduct normal physical activity.      Diagnosis: Diabetes mellitus  Assessment and Plan of Treatment: You have Type 2 diabetes and were found to have a Hemoglobin A1c of 6.8% which shows a well controlled glucose levels in your body over the past 3 months. You are advised to eat foods that are low glycemic index which include beans and complex carbs, and to avoid high GI foods including white rice and potatoes. Uncontrolled sugars can lead to blindness, kidney failure, and contribute to diseases such as heart attacks and strokes. You are advised to continue taking your home medications for diabetes and keep your Primary Care Physician appointments in order to monitor and change medications as necessary.      Diagnosis: Acute kidney injury superimposed on CKD  Assessment and Plan of Treatment: You were noted to have elevated creatinine levels upon admission, which is likely due to decreased blood flow to the kidneys in the setting of acute heart failure exacerbation. Enalapril was initially held due to elevated creatinine levels and restarted on it when your kidney function returned to your baseline levels of creatinine around 1.3-1.6.    Diagnosis: HTN (hypertension)  Assessment and Plan of Treatment: You have a history of Hypertension.  Your blood pressure target is 120-140/80-90, maintain healthy lifestyle, low salt diet, avoid fatty food, weight loss  Notify your doctor if you have any of the following symptoms:   (Dizziness, Lightheadedness, Blurry vision, Headache, Chest pain, Shortness of breath.)  Please continue taking your home medications and follow-up with your PCP in 1 week from discharge to adjust medications as needed.    Diagnosis: Chronic obstructive pulmonary disease (COPD)  Assessment and Plan of Treatment: You have a history of COPD which is a condition of your lungs which can make it difficult for you to breath.  You were treated with inhalers and tolerated this well.  Your shortness of breath that brought you into the hospital was likely due to your Heart Failure.  Please continue to use your inhalers as prescribed.  Please follow up with your pcp and a pulmonologist within 1 week from discharge.    Diagnosis: Hyperlipidemia  Assessment and Plan of Treatment: You have history of Hyperlipidemia.   Please take your medication as prescribed. Maintain healthy lifestyle, low fat diet, exercise regularly and check your lipid levels routinely.   Please follow up with your PCP in 1 week from discharge     PRINCIPAL DISCHARGE DIAGNOSIS  Diagnosis: Acute on chronic systolic congestive heart failure  Assessment and Plan of Treatment: You were admitted to the hospital with complaints of shortness of breath and chest pain, for which you were evaluated and diagnosed to have an acute exacerbation of heart failure. Your heart pumping function was found to be severely reduced from your previous admission in March with an Ejection fraction of ~15% (normal heart pump function ranges above 55%) and Grade I Diastolic dysfunction. You have been evaluated on your prior admissions to not be a candidate for AICD placement. You are strongly recommended to continue taking your heart failure medications - Metoprolol, Enalapril and please start taking Bumex 1mg every day as prescrbied. Please stop taking spironolactone and lasix upon hospital discharge and have a follow up with your primary care provider or cardiologist within 1 week of hospital discharge.      SECONDARY DISCHARGE DIAGNOSES  Diagnosis: Acute respiratory failure with hypoxia  Assessment and Plan of Treatment: You were noted to have breathing difficulty likely secondary to acute heart failure exacerbation. Please continue taking your heart failure medications and continue following a low salt and fluid restricted diet (~1L/day).   PLEASE SEEK IMMEDIATE MEDICAL CARE IF YOU HAVE ANY OF THE FOLLOWING SYMPTOMS: shortness of breath, change in mental status, chest pain, lightheadedness/dizziness/fainting, or worsening of symptoms including not being able to conduct normal physical activity.      Diagnosis: Diabetes mellitus  Assessment and Plan of Treatment: You have Type 2 diabetes and were found to have a Hemoglobin A1c of 6.8% which shows a well controlled glucose levels in your body over the past 3 months. You are advised to eat foods that are low glycemic index which include beans and complex carbs, and to avoid high GI foods including white rice and potatoes. Uncontrolled sugars can lead to blindness, kidney failure, and contribute to diseases such as heart attacks and strokes. You are advised to continue taking your home medications for diabetes and keep your Primary Care Physician appointments in order to monitor and change medications as necessary.      Diagnosis: Acute kidney injury superimposed on CKD  Assessment and Plan of Treatment: You were noted to have elevated creatinine levels upon admission, which is likely due to decreased blood flow to the kidneys in the setting of acute heart failure exacerbation and urinary obstruction. For now, we recommend holding your spironolactone to prevent further kidney injury. Due to the nature of the kidney injury, it will take some time for your kidneys to fully recover. Please make sure to follow up with the outpatient Nephrologist provided.    Diagnosis: HTN (hypertension)  Assessment and Plan of Treatment: You have a history of Hypertension.  Your blood pressure target is 120-140/80-90, maintain healthy lifestyle, low salt diet, avoid fatty food, weight loss  Notify your doctor if you have any of the following symptoms:   (Dizziness, Lightheadedness, Blurry vision, Headache, Chest pain, Shortness of breath.)  Please continue taking your home medications and follow-up with your PCP in 1 week from discharge to adjust medications as needed.    Diagnosis: Chronic obstructive pulmonary disease (COPD)  Assessment and Plan of Treatment: You have a history of COPD which is a condition of your lungs which can make it difficult for you to breath.  You were treated with inhalers and tolerated this well.  Your shortness of breath that brought you into the hospital was likely due to your Heart Failure.  Please continue to use your inhalers as prescribed.  Please follow up with your pcp and a pulmonologist within 1 week from discharge.    Diagnosis: Hyperlipidemia  Assessment and Plan of Treatment: You have history of Hyperlipidemia.   Please take your medication as prescribed. Maintain healthy lifestyle, low fat diet, exercise regularly and check your lipid levels routinely.   Please follow up with your PCP in 1 week from discharge

## 2023-04-12 NOTE — PROGRESS NOTE ADULT - PROBLEM SELECTOR PLAN 4
Patient with increased functional limitations due to worsening CHF symptoms, in context of Down's syndrome, intellectual disability, presumed dementia, and multiple medical comorbidities.  He is at risk for further physical and functional decline    Recommend OOB to chair  Aggressive PT  Continued pureed diet.

## 2023-04-12 NOTE — PROGRESS NOTE ADULT - SUBJECTIVE AND OBJECTIVE BOX
*****CURRENTLY INCOMPLETE NOTE******  PLEASE FOLLOW ONCE NOTE COMPLETE BY ATTENDING AND RESIDENT  *****CURRENTLY INCOMPLETE NOTE******  PLEASE FOLLOW ONCE NOTE COMPLETE BY ATTENDING AND RESIDENT   PGY-1 Progress Note discussed with attending    PAGER #: [[phone]] TILL 5:00 PM  PLEASE CONTACT ON CALL TEAM:  - On Call Team (Please refer to Red) FROM 5:00 PM - 8:30PM  - Nightfloat Team FROM 8:30 -7:30 AM    INTERVAL HPI/OVERNIGHT EVENTS:   Patient seen and examined at bedside. No acute events overnight. Patient mentioned still feeling sore throat and chest discomfort that has not worsen since previous days. He does not have any other concerns today.     ROS  Constitutional: (-)fever, (-)night sweats, (-)chills, (-)cold intolerance, (-)fatigue  Eyes: (-)change in vision, (-)loss of vision, (-)blurred vision  Ears: (-)difficulty hearing, (-)hearing loss  Nose: (-)nasal discharge  Mouth/Throat/Voice: (-)lip sores, (-)dysphagia, (-)odynophagia, (+)Sore Throat   Neck: (-)neck pain, (-)neck stiffness, (-)neck lumps, (-)neck swelling  Respiratory: (-)dyspnea, (-)cough, (-)cough productive of sputum, (-)hemoptysis, (-)wheezing  Cardiovascular: (+)chest pain, (-)palpitations, (-)dyspnea at rest, (-)dyspnea with activity, (-)orthopnea  Gastrointestinal: (-)abdominal pain, (-)rectal pain, (-)nausea, (-)vomiting  Urinary: (-)dysuria, (-)hematuria, (-)urinary hesitancy, (-)difficulty initiating urine stream  Dermatologic/Integumentary: (-)change in hair texture, (-)change in skin texture, (-)change in nail appearance, (-)dry hair  Musculoskeletal: (-)muscle pain, (-)back pain, (-)tender points, (-)muscle cramps  Neurological: (-)headaches, (-)vertigo, (-)lightheadedness, (-)fainting  Psychiatric: (-)change in mood, (-)depression, (-)sadness interfering with function, (-)anxiety, (-)nervousness  Hematologic/Lymphatic: (-)easy bruising, (-)difficulty stopping blood flow    albuterol    90 MICROgram(s) HFA Inhaler 2 Puff(s) Inhalation every 6 hours  albuterol/ipratropium for Nebulization 3 milliLiter(s) Nebulizer every 6 hours  amantadine 100 milliGRAM(s) Oral every 12 hours  aspirin enteric coated 81 milliGRAM(s) Oral daily  atorvastatin 40 milliGRAM(s) Oral at bedtime  benzocaine/menthol Lozenge 1 Lozenge Oral three times a day  finasteride 5 milliGRAM(s) Oral daily  fluticasone propionate 50 MICROgram(s)/spray Nasal Spray 1 Spray(s) Both Nostrils two times a day  furosemide    Tablet 40 milliGRAM(s) Oral daily  guaiFENesin Oral Liquid (Sugar-Free) 200 milliGRAM(s) Oral every 6 hours PRN  heparin   Injectable 5000 Unit(s) SubCutaneous every 12 hours  insulin glargine Injectable (LANTUS) 5 Unit(s) SubCutaneous at bedtime  insulin lispro (ADMELOG) corrective regimen sliding scale   SubCutaneous three times a day before meals  insulin lispro (ADMELOG) corrective regimen sliding scale   SubCutaneous at bedtime  insulin lispro Injectable (ADMELOG) 4 Unit(s) SubCutaneous three times a day before meals  loratadine 10 milliGRAM(s) Oral daily  memantine 5 milliGRAM(s) Oral two times a day  metoprolol succinate ER 50 milliGRAM(s) Oral daily  montelukast 10 milliGRAM(s) Oral daily  OLANZapine 10 milliGRAM(s) Oral at bedtime  tamsulosin 0.4 milliGRAM(s) Oral at bedtime  tiotropium 2.5 MICROgram(s) Inhaler 2 Puff(s) Inhalation daily  valproic  acid Syrup 250 milliGRAM(s) Oral <User Schedule>  valproic  acid Syrup 1000 milliGRAM(s) Oral at bedtime      Vital Signs Last 24 Hrs  T(C): 36.3 (12 Apr 2023 11:56), Max: 36.7 (11 Apr 2023 23:46)  T(F): 97.3 (12 Apr 2023 11:56), Max: 98 (11 Apr 2023 23:46)  HR: 92 (12 Apr 2023 11:56) (92 - 101)  BP: 112/69 (12 Apr 2023 11:56) (112/69 - 164/102)  BP(mean): --  RR: 19 (12 Apr 2023 11:56) (18 - 20)  SpO2: 96% (12 Apr 2023 11:56) (94% - 100%)    Parameters below as of 12 Apr 2023 11:56  Patient On (Oxygen Delivery Method): room air        PHYSICAL EXAMINATION:  GENERAL: NAD  HEAD:  Atraumatic, Normocephalic  EYES:  Prosthesis noted on the right, Left with conjunctiva and sclera clear, pupil is equal, round, and reactive to light and accommodation.  NECK: Supple, No JVD, trachea is midline, no evidence of thyroid enlargement, no lymphadenopathy or tenderness.  CHEST/LUNG: No rales, rhonchi, minimal expiratory wheezing noted, no rubs  HEART: Regular rate and rhythm; systolic murmur noted on the left lower sternal border, no rubs, or gallops  ABDOMEN: Soft, Nontender, Nondistended; Bowel sounds present  NERVOUS SYSTEM:  Alert & Oriented X2; No focal sensory or motor deficits are noted; Appropriate mood and affect.  EXTREMITIES:  2+ Peripheral Pulses, No clubbing, cyanosis, or edema  SKIN: Warm, dry, and well perfused; Good turgor; No lesions, nodules or rashes are noted.                          15.9   7.34  )-----------( 183      ( 12 Apr 2023 05:00 )             50.9     04-12    145  |  112<H>  |  30<H>  ----------------------------<  168<H>  4.1   |  28  |  1.59<H>    Ca    10.1      12 Apr 2023 05:00  Phos  2.3     04-12  Mg     2.6     04-12    TPro  6.7  /  Alb  2.7<L>  /  TBili  0.4  /  DBili  x   /  AST  31  /  ALT  72<H>  /  AlkPhos  97  04-12    LIVER FUNCTIONS - ( 12 Apr 2023 05:00 )  Alb: 2.7 g/dL / Pro: 6.7 g/dL / ALK PHOS: 97 U/L / ALT: 72 U/L DA / AST: 31 U/L / GGT: x                   CAPILLARY BLOOD GLUCOSE      RADIOLOGY & ADDITIONAL TESTS:  < from: CT Neck Soft Tissue No Cont (04.09.23 @ 20:24) >  FINDINGS:  The examination is mildly to moderately motion degraded.    Evaluation for malignancy is limited without intravenous contrast.    There is no enlargement of the adenoids and tonsils.  There is no   thickening of the soft palate/uvula, epiglottis, pharyngeal wall or   aryepiglottic folds.  The larynx and upper trachea are grossly   unremarkable, within limits of motion artifact.    There is no retropharyngeal edema.    The parotid glands, submandibular glands and thyroid appear grossly   unremarkable.    There is no gross cervical lymphadenopathy.    The unopacified cervical vasculature appears unremarkable; no   atherosclerotic calcification is identified.    There is partial straightening of the cervical lordosis.  There are   multilevel degenerative changes.  At C2-C3 there is a tiny central disc protrusion.  At C3-C4 and C4-C5 there are disc osteophytecomplexes producing mild   spinal canal stenosis.  At C5-C6 there is a disc osteophyte complex producing minimal spinal   canal stenosis.  At C6-C7 and C7-T1 there is no significant disc herniation or spinal   canal stenosis.    Uncovertebral/facet hypertrophy causes mild to moderate neural foraminal   narrowing at C2-C3 on the left, C3-C4 bilaterally, C4-C5 bilaterally,   C5-C6 on the left, C6-C7 on the right and C7-T1 on the right.    There appears to be mild circumferential wall thickening in the upper   thoracic esophagus.  Small pleural effusions are partially visualized at   both lung apices.  There is nonspecific interlobular septal thickening in   both lung apices.    Within the visualized brain, there is mild to moderate generalized   cerebral volume loss and mild periventricular white matter   hypoattenuation compatible with chronic microvascular ischemic disease. A   right ocular prosthesis is noted.    IMPRESSION:  The examination is mildly to moderately motion degraded.    Evaluation for malignancy is limited without intravenous contrast.    No abnormality is identified in the pharynx, larynx or upper trachea,   within limits of noncontrast CT technique.  There is no foreign body,   inflammatory change or extrinsic mass effect on the pharynx/larynx or   upper trachea.    There appears to be mild circumferential wall thickening in the upper   thoracic esophagus.  Upper endoscopy and/or esophagram may be obtained as   clinically warranted.    Small pleural effusions arepartially visualized bilaterally.    Nonspecific interlobular septal thickening in the lung apices.      < end of copied text >  < from: CT Chest No Cont (04.09.23 @ 20:24) >  FINDINGS:    LUNGS/AIRWAYS/PLEURA: Biconvex configuration of the trachea suggestive of   tracheobronchomalacia. No endobronchial lesion. Interlobular septal   thickening and not primarily peribronchial vascular groundglass in both   lungs. 7 mm subsolid nodule in the left upper lobe (3-67). Small pleural   effusions.    LYMPH NODES/MEDIASTINUM: No lymphadenopathy.    HEART/VASCULATURE: Enlarged heart. No pericardial effusion. Normal   caliber aorta.    UPPER ABDOMEN: Right renal cyst.    BONES/SOFT TISSUES: Unremarkable.      IMPRESSION:    Pulmonary edema and small pleural effusions.    Indeterminate left upper lobe 7 mm nodule. Recommend follow-up   noncontrast CT chest in 3 months to assess for change.    < end of copied text >  < from: Xray Chest 1 View- PORTABLE-Urgent (Xray Chest 1 View- PORTABLE-Urgent .) (04.08.23 @ 10:51) >  IMPRESSION:   Persistent bilateral perihilar/RIGHT greater than LEFT   bibasilar diffuse airspace disease and/or small effusions.  .    < end of copied text >  < from: TTE with Doppler (w/Cont) (03.29.23 @ 07:03) >  1. Trace mitral regurgitation.  2. Normal left ventricular internal dimensions and wall  thicknesses.  3. Severe global left ventricular systolic dysfunction.  Ultrasound LV opacification agent was used to enhance  endocardial definition.  4. Grade I diastolic dysfunction (Impaired relaxation,  mild).  5. Normal right ventricular size and function.  6. Agitated saline injection revealed late bubbles in the  left heart, consistent with transpulmonic shunting.      < end of copied text >           PGY-1 Progress Note discussed with attending    PAGER #: [[phone]] TILL 5:00 PM  PLEASE CONTACT ON CALL TEAM:  - On Call Team (Please refer to Red) FROM 5:00 PM - 8:30PM  - Nightfloat Team FROM 8:30 -7:30 AM    INTERVAL HPI/OVERNIGHT EVENTS:   Patient seen and examined at bedside. No acute events overnight. Patient mentioned still feeling sore throat and chest discomfort that has not worsen since previous days. He does not have any other concerns today.     ROS  Constitutional: (-)fever, (-)night sweats, (-)chills, (-)cold intolerance, (-)fatigue  Eyes: (-)change in vision, (-)loss of vision, (-)blurred vision  Ears: (-)difficulty hearing, (-)hearing loss  Nose: (-)nasal discharge  Mouth/Throat/Voice: (-)lip sores, (-)dysphagia, (-)odynophagia, (+)Sore Throat   Neck: (-)neck pain, (-)neck stiffness, (-)neck lumps, (-)neck swelling  Respiratory: (-)dyspnea, (-)cough, (-)cough productive of sputum, (-)hemoptysis, (-)wheezing  Cardiovascular: (+)chest pain, (-)palpitations, (-)dyspnea at rest, (-)dyspnea with activity, (-)orthopnea  Gastrointestinal: (-)abdominal pain, (-)rectal pain, (-)nausea, (-)vomiting  Urinary: (-)dysuria, (-)hematuria, (-)urinary hesitancy, (-)difficulty initiating urine stream  Dermatologic/Integumentary: (-)change in hair texture, (-)change in skin texture, (-)change in nail appearance, (-)dry hair  Musculoskeletal: (-)muscle pain, (-)back pain, (-)tender points, (-)muscle cramps  Neurological: (-)headaches, (-)vertigo, (-)lightheadedness, (-)fainting  Psychiatric: (-)change in mood, (-)depression, (-)sadness interfering with function, (-)anxiety, (-)nervousness  Hematologic/Lymphatic: (-)easy bruising, (-)difficulty stopping blood flow    albuterol    90 MICROgram(s) HFA Inhaler 2 Puff(s) Inhalation every 6 hours  albuterol/ipratropium for Nebulization 3 milliLiter(s) Nebulizer every 6 hours  amantadine 100 milliGRAM(s) Oral every 12 hours  aspirin enteric coated 81 milliGRAM(s) Oral daily  atorvastatin 40 milliGRAM(s) Oral at bedtime  benzocaine/menthol Lozenge 1 Lozenge Oral three times a day  finasteride 5 milliGRAM(s) Oral daily  fluticasone propionate 50 MICROgram(s)/spray Nasal Spray 1 Spray(s) Both Nostrils two times a day  furosemide    Tablet 40 milliGRAM(s) Oral daily  guaiFENesin Oral Liquid (Sugar-Free) 200 milliGRAM(s) Oral every 6 hours PRN  heparin   Injectable 5000 Unit(s) SubCutaneous every 12 hours  insulin glargine Injectable (LANTUS) 5 Unit(s) SubCutaneous at bedtime  insulin lispro (ADMELOG) corrective regimen sliding scale   SubCutaneous three times a day before meals  insulin lispro (ADMELOG) corrective regimen sliding scale   SubCutaneous at bedtime  insulin lispro Injectable (ADMELOG) 4 Unit(s) SubCutaneous three times a day before meals  loratadine 10 milliGRAM(s) Oral daily  memantine 5 milliGRAM(s) Oral two times a day  metoprolol succinate ER 50 milliGRAM(s) Oral daily  montelukast 10 milliGRAM(s) Oral daily  OLANZapine 10 milliGRAM(s) Oral at bedtime  tamsulosin 0.4 milliGRAM(s) Oral at bedtime  tiotropium 2.5 MICROgram(s) Inhaler 2 Puff(s) Inhalation daily  valproic  acid Syrup 250 milliGRAM(s) Oral <User Schedule>  valproic  acid Syrup 1000 milliGRAM(s) Oral at bedtime      Vital Signs Last 24 Hrs  T(C): 36.3 (12 Apr 2023 11:56), Max: 36.7 (11 Apr 2023 23:46)  T(F): 97.3 (12 Apr 2023 11:56), Max: 98 (11 Apr 2023 23:46)  HR: 92 (12 Apr 2023 11:56) (92 - 101)  BP: 112/69 (12 Apr 2023 11:56) (112/69 - 164/102)  BP(mean): --  RR: 19 (12 Apr 2023 11:56) (18 - 20)  SpO2: 96% (12 Apr 2023 11:56) (94% - 100%)    Parameters below as of 12 Apr 2023 11:56  Patient On (Oxygen Delivery Method): room air        PHYSICAL EXAMINATION:  GENERAL: NAD  HEAD:  Atraumatic, Normocephalic  EYES:  Prosthesis noted on the right, Left with conjunctiva and sclera clear, pupil is equal, round, and reactive to light and accommodation.  NECK: Supple, No JVD, trachea is midline, no evidence of thyroid enlargement, no lymphadenopathy or tenderness.  CHEST/LUNG: No rales, rhonchi, minimal expiratory wheezing noted, no rubs  HEART: Regular rate and rhythm; systolic murmur noted on the left lower sternal border, no rubs, or gallops  ABDOMEN: Soft, Nontender, Nondistended; Bowel sounds present  NERVOUS SYSTEM:  Alert & Oriented X2; No focal sensory or motor deficits are noted; Appropriate mood and affect.  EXTREMITIES:  2+ Peripheral Pulses, No clubbing, cyanosis, or edema  SKIN: Warm, dry, and well perfused; Good turgor; No lesions, nodules or rashes are noted.                          15.9   7.34  )-----------( 183      ( 12 Apr 2023 05:00 )             50.9     04-12    145  |  112<H>  |  30<H>  ----------------------------<  168<H>  4.1   |  28  |  1.59<H>    Ca    10.1      12 Apr 2023 05:00  Phos  2.3     04-12  Mg     2.6     04-12    TPro  6.7  /  Alb  2.7<L>  /  TBili  0.4  /  DBili  x   /  AST  31  /  ALT  72<H>  /  AlkPhos  97  04-12    LIVER FUNCTIONS - ( 12 Apr 2023 05:00 )  Alb: 2.7 g/dL / Pro: 6.7 g/dL / ALK PHOS: 97 U/L / ALT: 72 U/L DA / AST: 31 U/L / GGT: x                   CAPILLARY BLOOD GLUCOSE      RADIOLOGY & ADDITIONAL TESTS:  < from: CT Neck Soft Tissue No Cont (04.09.23 @ 20:24) >  FINDINGS:  The examination is mildly to moderately motion degraded.    Evaluation for malignancy is limited without intravenous contrast.    There is no enlargement of the adenoids and tonsils.  There is no   thickening of the soft palate/uvula, epiglottis, pharyngeal wall or   aryepiglottic folds.  The larynx and upper trachea are grossly   unremarkable, within limits of motion artifact.    There is no retropharyngeal edema.    The parotid glands, submandibular glands and thyroid appear grossly   unremarkable.    There is no gross cervical lymphadenopathy.    The unopacified cervical vasculature appears unremarkable; no   atherosclerotic calcification is identified.    There is partial straightening of the cervical lordosis.  There are   multilevel degenerative changes.  At C2-C3 there is a tiny central disc protrusion.  At C3-C4 and C4-C5 there are disc osteophytecomplexes producing mild   spinal canal stenosis.  At C5-C6 there is a disc osteophyte complex producing minimal spinal   canal stenosis.  At C6-C7 and C7-T1 there is no significant disc herniation or spinal   canal stenosis.    Uncovertebral/facet hypertrophy causes mild to moderate neural foraminal   narrowing at C2-C3 on the left, C3-C4 bilaterally, C4-C5 bilaterally,   C5-C6 on the left, C6-C7 on the right and C7-T1 on the right.    There appears to be mild circumferential wall thickening in the upper   thoracic esophagus.  Small pleural effusions are partially visualized at   both lung apices.  There is nonspecific interlobular septal thickening in   both lung apices.    Within the visualized brain, there is mild to moderate generalized   cerebral volume loss and mild periventricular white matter   hypoattenuation compatible with chronic microvascular ischemic disease. A   right ocular prosthesis is noted.    IMPRESSION:  The examination is mildly to moderately motion degraded.    Evaluation for malignancy is limited without intravenous contrast.    No abnormality is identified in the pharynx, larynx or upper trachea,   within limits of noncontrast CT technique.  There is no foreign body,   inflammatory change or extrinsic mass effect on the pharynx/larynx or   upper trachea.    There appears to be mild circumferential wall thickening in the upper   thoracic esophagus.  Upper endoscopy and/or esophagram may be obtained as   clinically warranted.    Small pleural effusions arepartially visualized bilaterally.    Nonspecific interlobular septal thickening in the lung apices.      < end of copied text >  < from: CT Chest No Cont (04.09.23 @ 20:24) >  FINDINGS:    LUNGS/AIRWAYS/PLEURA: Biconvex configuration of the trachea suggestive of   tracheobronchomalacia. No endobronchial lesion. Interlobular septal   thickening and not primarily peribronchial vascular groundglass in both   lungs. 7 mm subsolid nodule in the left upper lobe (3-67). Small pleural   effusions.    LYMPH NODES/MEDIASTINUM: No lymphadenopathy.    HEART/VASCULATURE: Enlarged heart. No pericardial effusion. Normal   caliber aorta.    UPPER ABDOMEN: Right renal cyst.    BONES/SOFT TISSUES: Unremarkable.      IMPRESSION:    Pulmonary edema and small pleural effusions.    Indeterminate left upper lobe 7 mm nodule. Recommend follow-up   noncontrast CT chest in 3 months to assess for change.    < end of copied text >  < from: Xray Chest 1 View- PORTABLE-Urgent (Xray Chest 1 View- PORTABLE-Urgent .) (04.08.23 @ 10:51) >  IMPRESSION:   Persistent bilateral perihilar/RIGHT greater than LEFT   bibasilar diffuse airspace disease and/or small effusions.  .    < end of copied text >  < from: TTE with Doppler (w/Cont) (03.29.23 @ 07:03) >  1. Trace mitral regurgitation.  2. Normal left ventricular internal dimensions and wall  thicknesses.  3. Severe global left ventricular systolic dysfunction.  Ultrasound LV opacification agent was used to enhance  endocardial definition.  4. Grade I diastolic dysfunction (Impaired relaxation,  mild).  5. Normal right ventricular size and function.  6. Agitated saline injection revealed late bubbles in the  left heart, consistent with transpulmonic shunting.      < end of copied text >

## 2023-04-12 NOTE — PROGRESS NOTE ADULT - PROBLEM SELECTOR PLAN 3
With associated intellectual disability, schizophrenia, and likely dementia    Continue home meds, including Namenda, olanzapine, valproic acid 250 mg AM/1000 mg QHS, and amantadine  Please resume home clonazepam PRN for anxiety if needed.    Supportive care, frequent reorientation.

## 2023-04-12 NOTE — PROGRESS NOTE ADULT - ASSESSMENT
73 y o with ambulatory at baseline from a [facility] #[facility] w/ PMH of intellectual disability, Down Syndrome, asthma, COPD with chronic cough, HTN, DM, NICM with EF of 10-15% by recent Echo (3/29) CKD elevated PSA, BPH, schizophrenia, hepatitis B carrier, bilateral sensorineural hearing loss, who came in to the ED for sore throat and chest pain.   Also previously admitted 3/28 to 3/30 for demand ischemia and CHF exacerbation.  Now admitted with musculoskeletal chest pain and chronic combined systolic/diastolic heart failure.  Group home staff report worsening BEARDEN and ADL/functional limitations due to dyspnea at home.

## 2023-04-12 NOTE — DISCHARGE NOTE PROVIDER - NSDCCAREPROVSEEN_GEN_ALL_CORE_FT
Alfonso, Dima Lovelace, Nilsa Villasenor, Oral Whitmore Alfonso, Dima Lovelace, Nilsa Villasenor, Cole Rojas, Oral Cardona Alfonso, Dima Lovelace, Nilsa Villasenor, Cole Rojas, Kahlil Arevalo, Yariel Urban

## 2023-04-12 NOTE — DISCHARGE NOTE PROVIDER - PROVIDER TOKENS
PROVIDER:[TOKEN:[[id number]]],PROVIDER:[TOKEN:[[id number]]] PROVIDER:[TOKEN:[[id number]]],PROVIDER:[TOKEN:[[id number]]],PROVIDER:[TOKEN:[[id number]]]

## 2023-04-12 NOTE — PROGRESS NOTE ADULT - SUBJECTIVE AND OBJECTIVE BOX
PATIENT SEEN AND EXAMINED ON :- 4/12/23  DATE OF SERVICE:  4/12/23           Interim events noted,Labs ,Radiological studies and Cardiology tests reviewed .    MR#[id number]  PATIENT NAME:-[facility] COURSE: HPI:  73 y o with ambulatory at baseline from a [facility] number 10 w/ PMH of intellectual disability, Down Syndrome, asthma, COPD with chronic cough, HTN, DM,  CKD elevated PSA, BPH, schizophrenia, hepatitis B, carrier, bilateral sensorineural hearing loss, who came in to the ED for sore throat and chest pain.  Patient states he developed a sore throat and dry cough a few days ago. Then last night had chest pain, located on the left side, w/o radiation, described as sharp. Pt states the pain lasted throughout the night and resolved this morning. Currently denies any fever, chills, nausea vomiting SOB, abdominal pain, PND, orthopnea or change in bowel habits  (08 Apr 2023 18:22)      INTERIM EVENTS:Patient seen at bedside ,interim events noted.      PMH -reviewed admission note, no change since admission  HEART FAILURE: Acute[ ]Chronic[ ] Systolic[ ] Diastolic[ ] Combined Systolic and Diastolic[ ]  CAD[ ] CABG[ ] PCI[ ]  DEVICES[ ] PPM[ ] ICD[ ] ILR[ ]  ATRIAL FIBRILLATION[ ] Paroxysmal[ ] Permanent[ ] CHADS2-[  ]  YASSINE[ ] CKD1[ ] CKD2[ ] CKD3[ ] CKD4[ ] ESRD[ ]  COPD[ ] HTN[ ]   DM[ ] Type1[ ] Type 2[ ]   CVA[ ] Paresis[ ]    AMBULATION: Assisted[ ] Cane/walker[ ] Independent[ ]    MEDICATIONS  (STANDING):  albuterol    90 MICROgram(s) HFA Inhaler 2 Puff(s) Inhalation every 6 hours  albuterol/ipratropium for Nebulization 3 milliLiter(s) Nebulizer every 6 hours  amantadine 100 milliGRAM(s) Oral every 12 hours  aspirin enteric coated 81 milliGRAM(s) Oral daily  atorvastatin 40 milliGRAM(s) Oral at bedtime  benzocaine/menthol Lozenge 1 Lozenge Oral three times a day  finasteride 5 milliGRAM(s) Oral daily  fluticasone propionate 50 MICROgram(s)/spray Nasal Spray 1 Spray(s) Both Nostrils two times a day  furosemide    Tablet 40 milliGRAM(s) Oral daily  heparin   Injectable 5000 Unit(s) SubCutaneous every 12 hours  insulin glargine Injectable (LANTUS) 5 Unit(s) SubCutaneous at bedtime  insulin lispro (ADMELOG) corrective regimen sliding scale   SubCutaneous at bedtime  insulin lispro (ADMELOG) corrective regimen sliding scale   SubCutaneous three times a day before meals  insulin lispro Injectable (ADMELOG) 4 Unit(s) SubCutaneous three times a day before meals  loratadine 10 milliGRAM(s) Oral daily  memantine 5 milliGRAM(s) Oral two times a day  metoprolol succinate ER 50 milliGRAM(s) Oral daily  montelukast 10 milliGRAM(s) Oral daily  OLANZapine 10 milliGRAM(s) Oral at bedtime  tamsulosin 0.4 milliGRAM(s) Oral at bedtime  tiotropium 2.5 MICROgram(s) Inhaler 2 Puff(s) Inhalation daily  valproic  acid Syrup 250 milliGRAM(s) Oral <User Schedule>  valproic  acid Syrup 1000 milliGRAM(s) Oral at bedtime    MEDICATIONS  (PRN):  guaiFENesin Oral Liquid (Sugar-Free) 200 milliGRAM(s) Oral every 6 hours PRN Cough            REVIEW OF SYSTEMS:  Constitutional: [ ] fever, [ ]weight loss,  [ ]fatigue [ ]weight gain  Eyes: [ ] visual changes  Respiratory: [ ]shortness of breath;  [ ] cough, [ ]wheezing, [ ]chills, [ ]hemoptysis  Cardiovascular: [ ] chest pain, [ ]palpitations, [ ]dizziness,  [ ]leg swelling[ ]orthopnea[ ]PND  Gastrointestinal: [ ] abdominal pain, [ ]nausea, [ ]vomiting,  [ ]diarrhea [ ]Constipation [ ]Melena  Genitourinary: [ ] dysuria, [ ] hematuria [ ]Reyes  Neurologic: [ ] headaches [ ] tremors[ ]weakness [ ]Paralysis Right[ ] Left[ ]  Skin: [ ] itching, [ ]burning, [ ] rashes  Endocrine: [ ] heat or cold intolerance  Musculoskeletal: [ ] joint pain or swelling; [ ] muscle, back, or extremity pain  Psychiatric: [ ] depression, [ ]anxiety, [ ]mood swings, or [ ]difficulty sleeping  Hematologic: [ ] easy bruising, [ ] bleeding gums    [ ] All remaining systems negative except as per above.   [ ]Unable to obtain.  [x] No change in ROS since admission      Vital Signs Last 24 Hrs  T(C): 36.3 (12 Apr 2023 11:56), Max: 36.7 (11 Apr 2023 23:46)  T(F): 97.3 (12 Apr 2023 11:56), Max: 98 (11 Apr 2023 23:46)  HR: 92 (12 Apr 2023 11:56) (92 - 101)  BP: 112/69 (12 Apr 2023 11:56) (112/69 - 164/102)  BP(mean): --  RR: 19 (12 Apr 2023 11:56) (18 - 20)  SpO2: 96% (12 Apr 2023 11:56) (94% - 100%)    Parameters below as of 12 Apr 2023 11:56  Patient On (Oxygen Delivery Method): room air      I&O's Summary    11 Apr 2023 07:01  -  12 Apr 2023 07:00  --------------------------------------------------------  IN: 290 mL / OUT: 0 mL / NET: 290 mL    12 Apr 2023 07:01  -  12 Apr 2023 20:00  --------------------------------------------------------  IN: 0 mL / OUT: 350 mL / NET: -350 mL        PHYSICAL EXAM:  General: No acute distress BMI-  HEENT: EOMI, PERRL  Neck: Supple, [ ] JVD  Lungs: Equal air entry bilaterally; [ ] rales [ ] wheezing [ ] rhonchi  Heart: Regular rate and rhythm; [x ] murmur   2/6 [ x] systolic [ ] diastolic [ ] radiation[ ] rubs [ ]  gallops  Abdomen: Nontender, bowel sounds present  Extremities: No clubbing, cyanosis, [ ] edema [ ]Pulses  equal and intact  Nervous system:  Alert & Oriented X3, no focal deficits  Psychiatric: Normal affect  Skin: No rashes or lesions    LABS:  04-12    145  |  112<H>  |  30<H>  ----------------------------<  168<H>  4.1   |  28  |  1.59<H>    Ca    10.1      12 Apr 2023 05:00  Phos  2.3     04-12  Mg     2.6     04-12    TPro  6.7  /  Alb  2.7<L>  /  TBili  0.4  /  DBili  x   /  AST  31  /  ALT  72<H>  /  AlkPhos  97  04-12    Creatinine Trend: 1.59<--, 1.72<--, 1.77<--, 1.57<--, 1.60<--, 1.63<--                        15.9   7.34  )-----------( 183      ( 12 Apr 2023 05:00 )             50.9   < from: TTE with Doppler (w/Cont) (03.29.23 @ 07:03) >    Patient name: LITZY MARAVILLA  YOB: 1950   Age: 73 (M)   MR#: [id number]  Study Date: 3/29/2023  Location: [address] [place], [place] [address]onographer: Bradley Moyer [facility] [[email or web address]]  Study quality: Fair  Referring Physician:  SHANIQUE MONTERROSO MD  Blood Pressure: 136/90 mmHg  Height: 157 cm  Weight: 62 kg  BSA: 1.6 m2  ------------------------------------------------------------------------    PROCEDURE: Transthoracic echocardiogram with 2-D, M-Mode  and complete spectral and color flow Doppler.  Given _20_cc agitatedsaline intravenously. Verbal consent  was obtained for injection of ultrasound enhancing agent  following a discussion of risks and benefits. Following  intravenous injection of ultrasound enhancing agent,  harmonic imaging was performed. 2_cc of ultrasound  enhancing agent injected intravenously.  INDICATION: Heart failure, unspecified (I50.9)  HISTORY:  ------------------------------------------------------------------------  DIMENSIONS:  Dimensions:     Normal Values:  LA:     4.2 cm    2.0 - 4.0 cm  Ao:     3.3 cm    2.0 - 3.8 cm  SEPTUM: 0.8 cm    0.6 - 1.2 cm  PWT:    0.8 cm    0.6 - 1.1 cm  LVIDd:  5.4 cm    3.0 - 5.6 cm  LVIDs:  4.8 cm    1.8 - 4.0 cm      Derived Variables:  LVMI: 95 g/m2  RWT: 0.29  Ejection Fraction Visual Estimate: 10-15 %    ------------------------------------------------------------------------  OBSERVATIONS:  Mitral Valve: Normal mitral valve. Trace mitral  regurgitation.  Aortic Root: Normal aortic root.  Aortic Valve: Aortic valve leaflet morphology not well  visualized, opens normally.  Left Atrium: Normal left atrium.  LA volume index = 20  cc/m2.  Left Ventricle: Severe global left ventricular systolic  dysfunction. Ultrasound LV opacification agent was used to  enhance endocardial definition. Normal left ventricular  internal dimensions and wall thicknesses. Grade I diastolic  dysfunction (Impaired relaxation, mild).  Right Heart: Normal right atrium. Normal right ventricular  size and function. There is trace tricuspid regurgitation.  There is trace pulmonic regurgitation.  Pericardium/PleuraNormal pericardium with no pericardial  effusion.  Hemodynamic: Incomplete tricuspid regurgitation jet  precludes accurate assessment of pulmonary artery systolic  pressure. Agitated saline injectionrevealed late bubbles  in the left heart, consistent with transpulmonic shunting.  ------------------------------------------------------------------------  CONCLUSIONS:  1. Trace mitral regurgitation.  2. Normal left ventricular internal dimensions and wall  thicknesses.  3. Severe global left ventricular systolic dysfunction.  Ultrasound LV opacification agent was used to enhance  endocardial definition.  4. Grade I diastolic dysfunction (Impaired relaxation,  mild).  5. Normal right ventricular size and function.  6. Agitated saline injection revealed late bubbles in the  left heart, consistent with transpulmonic shunting.    ------------------------------------------------------------------------  Confirmed on  3/30/2023 - 10:06:13 by Donny Jiménez MD  -----------------------------------------------------------------------    < end of copied text >

## 2023-04-13 NOTE — PROGRESS NOTE ADULT - PROBLEM SELECTOR PLAN 1
ECHO 3/23 showed severely reduced EF 10-15%  NYHA Heart Failure class III stage C  Lasix 40 mg PO qd  Metoprolol 50 mg PO qd  Will hold enalapril and Jardiance for now until renal function back to baseline  Hold adding Spironolactone until renal function back to baseline  No cardiac cath at this moment

## 2023-04-13 NOTE — PHYSICAL THERAPY INITIAL EVALUATION ADULT - AMBULATION SKILLS, REHAB EVAL
[facility]  Discharge Summary      Patient Name: Patito Domingo  MRN: [id number]  Patient Class: IP- Inpatient  Admission Date: 2/2/2022  Hospital Length of Stay: 5 days  Discharge Date and Time:  02/09/2022 8:55 AM  Attending Physician: Suly Calix MD   Discharging Provider: Suly Duenas MD  Primary Care Provider: Yasir Myers Jr, MD      HPI:   Patito Domingo is a 71 y.o. female with a PMH of colon cancer status post chemotherapy in July 2021, arthritis, hypertension, and kidney stones who presents to the ED for evaluation of generalized fatigue, weakness, and suprapubic abdominal pain for 2 days. She states her abdominal pain is presently and 9/10 and was unrelieved with home roxycontin.  She reports nausea. Denies vomiting or diarrhea.  She reports fair appetite.   It is noted patient has chronic abdominal pain however she states her pain is worse than normal and she has had persistent nausea unrelieved with home antiemetics.       CT of the abdomen with contrast demonstrated  Mild emphysematous changes of the lung parenchyma.   Dilatation of the biliary channels throughout the liver parenchyma which was seen previously on the examination obtained in November 2021. Bilateral hydronephrosis and hydroureter however there is no obvious obstructing kidney stone seen within the ureter on either side.  The level of hydronephrosis and hydroureter has increased as compared to the previous examination. Large calcifications involving the inferior collecting system of each kidney.  Patient is noted to have recurrent urinary tract infections secondary to Klebsiella which is multi-drug resistant.  She was initiated on meropenem in the ED.        * No surgery found *      Hospital Course:   No notes on file     Goals of Care Treatment Preferences:  Code Status: Full Code      Consults:   Consults (From admission, onward)        Status Ordering Provider     Inpatient virtual consult to  Hospital Medicine  Once        Provider:  (Not yet assigned)    Completed MARYCHUY BRITTON     IP consult to case management  Once        Provider:  (Not yet assigned)    Acknowledged MARYCHUY BRITTON     Inpatient consult to Urology  Once        Provider:  (Not yet assigned)    Completed EVELIN CISNEROS          * Urinary tract infection without hematuria  Temp Readings from Last 3 Encounters:   02/09/22 98.6 °F (37 °C) (Oral)   01/08/22 97.9 °F (36.6 °C) (Oral)   12/29/21 97.9 °F (36.6 °C) (Oral)     Lab Results   Component Value Date    WBC 5.75 02/02/2022     No results for input(s): LACTATE in the last 72 hours.    Patient with positive nitrates although WBCs 13.   Urine culture with multi drug-resistant Klebsiella.    Cont 7 days of IV meropenem - Completed   Consultede Urology for recurrent UTI and hydronephrosis: Ok to d/c, follow up in office in 2-3 weeks with her urologist.    Antibiotics given-   Antibiotics (From admission, onward)            Start     Stop Route Frequency Ordered    02/08/22 1615  ertapenem (INVANZ) 1 g in sodium chloride 0.9% 100 mL IVPB         -- IV Every 24 hours (non-standard times) 02/08/22 1502        Cultures were taken-   Microbiology Results (last 7 days)     Procedure Component Value Units Date/Time    Urine Culture High Risk [[id number]]  (Abnormal)  (Susceptibility) Collected: 02/02/22 0915    Order Status: Completed Specimen: Urine, Clean Catch Updated: 02/06/22 1138     Urine Culture, Routine KLEBSIELLA PNEUMONIAE ESBL  >100,000 cfu/ml      Narrative:      Indicated criteria for high risk culture:->Prior to urologic  procedures          Other hydronephrosis  -bilateral hydronephrosis  -urology following; currently no plans for stent, will need outpatient follow-up      Diarrhea  - likely 2/2 antibiotics  - imodium, probiotic      Staghorn calculus  -noted  -urology following  -IV antibiotics      Essential hypertension  Chronic, controlled.  Latest blood  pressure and vitals reviewed-   Temp:  [97.3 °F (36.3 °C)-99.6 °F (37.6 °C)]   Pulse:  [60-71]   Resp:  [18-20]   BP: (114-178)/(60-79)   SpO2:  [94 %-100 %] .   Home meds for hypertension were reviewed and noted below.   Hypertension Medications             losartan (COZAAR) 50 MG tablet Take 1 tablet (50 mg total) by mouth once daily.    metoprolol tartrate (LOPRESSOR) 25 MG tablet Take 1 tablet (25 mg total) by mouth 2 (two) times daily.          While in the hospital, will manage blood pressure as follows; Continue home antihypertensive regimen    Will utilize p.r.n. blood pressure medication only if patient's blood pressure greater than  180/110 and she develops symptoms such as worsening chest pain or shortness of breath.        Chronic pain syndrome  Continue home opioids.    Iron deficiency anemia secondary to inadequate dietary iron intake  Patient's anemia is currently controlled. Has not received any PRBCs to date.. Etiology likely d/t iron def  Current CBC reviewed-   Lab Results   Component Value Date    HGB 9.9 (L) 02/02/2022    HCT 31.2 (L) 02/02/2022     Monitor serial CBC and transfuse if patient becomes hemodynamically unstable, symptomatic or H/H drops below 7/21.          Debility  Consult PT and OT, recommended home health    Secondary neuroendocrine tumor of liver  Chronic followed by oncology.  Status post chemotherapy in July of 2021.       Final Active Diagnoses:    Diagnosis Date Noted POA    PRINCIPAL PROBLEM:  Urinary tract infection without hematuria [N39.0] 04/28/2018 Yes    Other hydronephrosis [N13.39] 02/02/2022 Yes    Diarrhea [R19.7] 04/11/2021 Yes    Staghorn calculus [N20.0] 10/15/2020 Yes    Essential hypertension [I10] 11/19/2019 Yes     Chronic    Chronic pain syndrome [G89.4] 12/02/2018 Yes    Iron deficiency anemia secondary to inadequate dietary iron intake [D50.8] 08/14/2018 Yes    Debility [R53.81] 11/29/2016 Yes    Secondary neuroendocrine tumor of liver [C7B.8]  "12/01/2014 Yes      Problems Resolved During this Admission:       Discharged Condition: good    Disposition: Home or Self Care    Follow Up:   Follow-up Information     Yasir Myers Jr, MD In 1 week.    Specialty: Family Medicine  Why: For follow up and review of hosptial course   Contact information:  [address] [facility] [facility]  [address] [address] [phone]                       Patient Instructions:      GRAB BAR FOR HOME USE     Order Specific Question Answer Comments   Height: 5' 6" (1.676 m)    Weight: 78.5 kg (173 lb 1 oz)    Does patient have medical equipment at home? bedside commode    Does patient have medical equipment at home? shower chair    Does patient have medical equipment at home? walker, rolling    Does patient have medical equipment at home? wheelchair    Does patient have medical equipment at home? rollator    Length of need (1-99 months): 99      Diet diabetic     Notify your health care provider if you experience any of the following:  temperature >100.4     Activity as tolerated       Significant Diagnostic Studies: Labs: BMP: No results for input(s): GLU, NA, K, CL, CO2, BUN, CREATININE, CALCIUM, MG in the last 48 hours. and CBC No results for input(s): WBC, HGB, HCT, PLT in the last 48 hours.    Pending Diagnostic Studies:     None         Medications:  Reconciled Home Medications:      Medication List      CONTINUE taking these medications    alcohol swabs Padm  Commonly known as: BD ALCOHOL SWABS  Apply 1 each topically as needed.     atorvastatin 40 MG tablet  Commonly known as: LIPITOR  Take 1 tablet (40 mg total) by mouth every evening.     losartan 50 MG tablet  Commonly known as: COZAAR  Take 1 tablet (50 mg total) by mouth once daily.     magnesium oxide 400 mg (241.3 mg magnesium) tablet  Commonly known as: MAG-OX  Take 1 tablet (400 mg total) by mouth 2 (two) times daily.     meclizine 25 mg tablet  Commonly known as: ANTIVERT  TAKE 1 TABLET(25 MG) BY MOUTH THREE " TIMES DAILY AS NEEDED FOR DIZZINESS     metoprolol tartrate 25 MG tablet  Commonly known as: LOPRESSOR  Take 1 tablet (25 mg total) by mouth 2 (two) times daily.     oxyCODONE 15 MG Tab  Commonly known as: ROXICODONE  Take 15 mg by mouth every 4 (four) hours as needed (chronic abdominal pain, malignancy related).        ASK your doctor about these medications    cyanocobalamin 1000 MCG tablet  Commonly known as: VITAMIN B-12  Take 1 tablet (1,000 mcg total) by mouth once daily.            Indwelling Lines/Drains at time of discharge:   Lines/Drains/Airways     None                 Time spent on the discharge of patient: 30 minutes         The attending portion of this evaluation, treatment, and documentation was performed per Suly Duenas MD via Telemedicine AudioVisual using the secure [employer] software platform with 2 way audio/video. The provider was located off-site and the patient is located in the hospital. The aforementioned video software was utilized to document the relevant history and physical exam    Suly Duenas MD  Department of [facility]  [facility]   needs device and assist

## 2023-04-13 NOTE — PROGRESS NOTE ADULT - PROBLEM SELECTOR PLAN 4
Baseline 1.30s to 1.60s  Improving   Avoid nephrotoxic agents  Will keep monitoring   Rest as above Baseline 1.30s to 1.60s  Cr 1.59>1.71  Avoid nephrotoxic agents  Will keep monitoring   Rest as above

## 2023-04-13 NOTE — PROGRESS NOTE ADULT - ASSESSMENT
73 y o with ambulatory at baseline from a [facility] home number 10 w/ PMH of intellectual disability, Down Syndrome, asthma, COPD with chronic cough, HTN, DM,  CKD elevated PSA, BPH, schizophrenia, hepatitis B, carrier, bilateral sensorineural hearing loss, admitted for AHRF secondary to Acute on Chronic Systolic Heart Failure.

## 2023-04-13 NOTE — PHYSICAL THERAPY INITIAL EVALUATION ADULT - ADDITIONAL COMMENTS
Patient lives on 2nd floor of Group Home; needs to neg. steps. As per Director of Group [place], patient has a difficult time neg. steps. Patient lives on 2nd floor of Group Home; needs to neg. steps. As per Director of Group Home, patient has a difficult time neg. steps.  Patient has low tolerance for PT activities, theraex and just wanted to return to lay down. Patient lives on 2nd floor of Group Home; needs to neg. steps. As per Director of Group [place], patient has a difficult time neg. steps.  Patient has low tolerance for PT activities, theraex and just wanted to return to his room.

## 2023-04-13 NOTE — PROGRESS NOTE ADULT - PROBLEM SELECTOR PLAN 1
Most likely secondary to Acute on Chronic Systolic Heart Failure  ECHO 3/23 showed severely reduced EF 10-15%  NYHA Heart Failure class III stage C  Lasix 40 mg PO qd  Metoprolol 50 mg PO qd  Plan to restart enalapril and Jardiance once renal function improves   Plan to add Spironolactone   Cardiology onboard   No cardiac cath at this moment   PT to follow this afternoon   Will keep monitoring. ECHO 3/23 showed severely reduced EF 10-15%  NYHA Heart Failure class III stage C  Lasix 40 mg PO qd  Metoprolol 50 mg PO qd  Will hold enalapril and Jardiance for now until renal function back to baseline  Hold adding Spironolactone until renal function back to baseline  Cardiology onboard   No cardiac cath at this moment   Able to walk with PT, no skilled PT needs. Pending final report   Will keep monitoring.

## 2023-04-13 NOTE — PROGRESS NOTE ADULT - SUBJECTIVE AND OBJECTIVE BOX
PATIENT SEEN AND EXAMINED ON :- 4/13/23  DATE OF SERVICE:  4/13/23           Interim events noted,Labs ,Radiological studies and Cardiology tests reviewed .    MR#[id number]  PATIENT NAME:-[facility] COURSE: HPI:  73 y o with ambulatory at baseline from a [facility] number [facility] w/ PMH of intellectual disability, Down Syndrome, asthma, COPD with chronic cough, HTN, DM,  CKD elevated PSA, BPH, schizophrenia, hepatitis B, carrier, bilateral sensorineural hearing loss, who came in to the ED for sore throat and chest pain.  Patient states he developed a sore throat and dry cough a few days ago. Then last night had chest pain, located on the left side, w/o radiation, described as sharp. Pt states the pain lasted throughout the night and resolved this morning. Currently denies any fever, chills, nausea vomiting SOB, abdominal pain, PND, orthopnea or change in bowel habits  (08 Apr 2023 18:22)      INTERIM EVENTS:Patient seen at bedside ,interim events noted.      PMH -reviewed admission note, no change since admission  HEART FAILURE: Acute[ ]Chronic[ ] Systolic[ ] Diastolic[ ] Combined Systolic and Diastolic[ ]  CAD[ ] CABG[ ] PCI[ ]  DEVICES[ ] PPM[ ] ICD[ ] ILR[ ]  ATRIAL FIBRILLATION[ ] Paroxysmal[ ] Permanent[ ] CHADS2-[  ]  YASSINE[ ] CKD1[ ] CKD2[ ] CKD3[ ] CKD4[ ] ESRD[ ]  COPD[ ] HTN[ ]   DM[ ] Type1[ ] Type 2[ ]   CVA[ ] Paresis[ ]    AMBULATION: Assisted[ ] Cane/walker[ ] Independent[ ]    MEDICATIONS  (STANDING):  albuterol    90 MICROgram(s) HFA Inhaler 2 Puff(s) Inhalation every 6 hours  albuterol/ipratropium for Nebulization 3 milliLiter(s) Nebulizer every 6 hours  amantadine 100 milliGRAM(s) Oral every 12 hours  aspirin enteric coated 81 milliGRAM(s) Oral daily  atorvastatin 40 milliGRAM(s) Oral at bedtime  benzocaine/menthol Lozenge 1 Lozenge Oral three times a day  finasteride 5 milliGRAM(s) Oral daily  fluticasone propionate 50 MICROgram(s)/spray Nasal Spray 1 Spray(s) Both Nostrils two times a day  furosemide    Tablet 40 milliGRAM(s) Oral daily  heparin   Injectable 5000 Unit(s) SubCutaneous every 12 hours  insulin glargine Injectable (LANTUS) 5 Unit(s) SubCutaneous at bedtime  insulin lispro (ADMELOG) corrective regimen sliding scale   SubCutaneous at bedtime  insulin lispro (ADMELOG) corrective regimen sliding scale   SubCutaneous three times a day before meals  insulin lispro Injectable (ADMELOG) 4 Unit(s) SubCutaneous three times a day before meals  loratadine 10 milliGRAM(s) Oral daily  memantine 5 milliGRAM(s) Oral two times a day  metoprolol succinate ER 50 milliGRAM(s) Oral daily  montelukast 10 milliGRAM(s) Oral daily  OLANZapine 10 milliGRAM(s) Oral at bedtime  tamsulosin 0.4 milliGRAM(s) Oral at bedtime  tiotropium 2.5 MICROgram(s) Inhaler 2 Puff(s) Inhalation daily  valproic  acid Syrup 250 milliGRAM(s) Oral <User Schedule>  valproic  acid Syrup 1000 milliGRAM(s) Oral at bedtime    MEDICATIONS  (PRN):  guaiFENesin Oral Liquid (Sugar-Free) 200 milliGRAM(s) Oral every 6 hours PRN Cough            REVIEW OF SYSTEMS:  Constitutional: [ ] fever, [ ]weight loss,  [ ]fatigue [ ]weight gain  Eyes: [ ] visual changes  Respiratory: [ ]shortness of breath;  [ ] cough, [ ]wheezing, [ ]chills, [ ]hemoptysis  Cardiovascular: [ ] chest pain, [ ]palpitations, [ ]dizziness,  [ ]leg swelling[ ]orthopnea[ ]PND  Gastrointestinal: [ ] abdominal pain, [ ]nausea, [ ]vomiting,  [ ]diarrhea [ ]Constipation [ ]Melena  Genitourinary: [ ] dysuria, [ ] hematuria [ ]Reyes  Neurologic: [ ] headaches [ ] tremors[ ]weakness [ ]Paralysis Right[ ] Left[ ]  Skin: [ ] itching, [ ]burning, [ ] rashes  Endocrine: [ ] heat or cold intolerance  Musculoskeletal: [ ] joint pain or swelling; [ ] muscle, back, or extremity pain  Psychiatric: [ ] depression, [ ]anxiety, [ ]mood swings, or [ ]difficulty sleeping  Hematologic: [ ] easy bruising, [ ] bleeding gums    [ ] All remaining systems negative except as per above.   [ ]Unable to obtain.  [x] No change in ROS since admission      Vital Signs Last 24 Hrs  T(C): 36.1 (13 Apr 2023 13:23), Max: 36.8 (13 Apr 2023 05:36)  T(F): 97 (13 Apr 2023 13:23), Max: 98.2 (13 Apr 2023 05:36)  HR: 91 (13 Apr 2023 13:23) (91 - 104)  BP: 112/71 (13 Apr 2023 13:23) (112/71 - 132/70)  BP(mean): --  RR: 18 (13 Apr 2023 13:23) (18 - 18)  SpO2: 94% (13 Apr 2023 13:23) (94% - 99%)    Parameters below as of 13 Apr 2023 13:23  Patient On (Oxygen Delivery Method): room air      I&O's Summary    12 Apr 2023 07:01  -  13 Apr 2023 07:00  --------------------------------------------------------  IN: 0 mL / OUT: 550 mL / NET: -550 mL        PHYSICAL EXAM:  General: No acute distress BMI-  HEENT: EOMI, PERRL  Neck: Supple, [ ] JVD  Lungs: Equal air entry bilaterally; [ ] rales [ ] wheezing [ ] rhonchi  Heart: Regular rate and rhythm; [x ] murmur   2/6 [ x] systolic [ ] diastolic [ ] radiation[ ] rubs [ ]  gallops  Abdomen: Nontender, bowel sounds present  Extremities: No clubbing, cyanosis, [ ] edema [ ]Pulses  equal and intact  Nervous system:  Alert & Oriented X3, no focal deficits  Psychiatric: Normal affect  Skin: No rashes or lesions    LABS:  04-13    142  |  114<H>  |  32<H>  ----------------------------<  136<H>  4.2   |  25  |  1.71<H>    Ca    10.1      13 Apr 2023 08:30  Phos  2.8     04-13  Mg     2.5     04-13    TPro  6.6  /  Alb  2.6<L>  /  TBili  0.6  /  DBili  x   /  AST  34  /  ALT  65<H>  /  AlkPhos  97  04-13    Creatinine Trend: 1.71<--, 1.59<--, 1.72<--, 1.77<--, 1.57<--, 1.60<--                        16.0   8.87  )-----------( 226      ( 13 Apr 2023 08:30 )             50.8

## 2023-04-13 NOTE — PROGRESS NOTE ADULT - ASSESSMENT
73 y o with ambulatory at baseline from a [place] [facility] [place] [facility] w/ PMH of intellectual disability, Down Syndrome, asthma, COPD with chronic cough, HTN, DM,  CKD elevated PSA, BPH, schizophrenia, hepatitis B, carrier, bilateral sensorineural hearing loss, admitted for AHRF secondary to Acute on Chronic Systolic Heart Failure.

## 2023-04-13 NOTE — PROGRESS NOTE ADULT - SUBJECTIVE AND OBJECTIVE BOX
CURRENTLY INCOMPLETE NOTE  PLEASE FOLLOW ONCE NOTE COMPLETE BY ATTENDING AND RESIDENT  PGY-1 Progress Note discussed with attending    PAGER #: [[phone]] TILL 5:00 PM  PLEASE CONTACT ON CALL TEAM:  - On Call Team (Please refer to Red) FROM 5:00 PM - 8:30PM  - Nightfloat Team FROM 8:30 -7:30 AM      INTERVAL HPI/OVERNIGHT EVENTS:   Patient seen and examined at bedside. No acute events overnight. Patient does not have any concerns today.     ROS  Constitutional: (-)fever, (-)night sweats, (-)chills, (-)cold intolerance, (-)fatigue  Eyes: (-)change in vision, (-)loss of vision, (-)blurred vision  Ears: (-)difficulty hearing, (-)hearing loss  Nose: (-)nasal discharge  Mouth/Throat/Voice: (-)lip sores, (-)dysphagia, (-)odynophagia  Neck: (-)neck pain, (-)neck stiffness, (-)neck lumps, (-)neck swelling  Respiratory: (-)dyspnea, (-)cough, (-)cough productive of sputum, (-)hemoptysis, (-)wheezing  Cardiovascular: (-)chest pain, (-)palpitations, (-)dyspnea at rest, (-)dyspnea with activity, (-)orthopnea  Gastrointestinal: (-)abdominal pain, (-)rectal pain, (-)nausea, (-)vomiting  Urinary: (-)dysuria, (-)hematuria, (-)urinary hesitancy, (-)difficulty initiating urine stream  Dermatologic/Integumentary: (-)change in hair texture, (-)change in skin texture, (-)change in nail appearance, (-)dry hair  Musculoskeletal: (-)muscle pain, (-)back pain, (-)tender points, (-)muscle cramps  Neurological: (-)headaches, (-)vertigo, (-)lightheadedness, (-)fainting  Psychiatric: (-)change in mood, (-)depression, (-)sadness interfering with function, (-)anxiety, (-)nervousness  Hematologic/Lymphatic: (-)easy bruising, (-)difficulty stopping blood flow    albuterol    90 MICROgram(s) HFA Inhaler 2 Puff(s) Inhalation every 6 hours  albuterol/ipratropium for Nebulization 3 milliLiter(s) Nebulizer every 6 hours  amantadine 100 milliGRAM(s) Oral every 12 hours  aspirin enteric coated 81 milliGRAM(s) Oral daily  atorvastatin 40 milliGRAM(s) Oral at bedtime  benzocaine/menthol Lozenge 1 Lozenge Oral three times a day  finasteride 5 milliGRAM(s) Oral daily  fluticasone propionate 50 MICROgram(s)/spray Nasal Spray 1 Spray(s) Both Nostrils two times a day  furosemide    Tablet 40 milliGRAM(s) Oral daily  guaiFENesin Oral Liquid (Sugar-Free) 200 milliGRAM(s) Oral every 6 hours PRN  heparin   Injectable 5000 Unit(s) SubCutaneous every 12 hours  insulin glargine Injectable (LANTUS) 5 Unit(s) SubCutaneous at bedtime  insulin lispro (ADMELOG) corrective regimen sliding scale   SubCutaneous three times a day before meals  insulin lispro (ADMELOG) corrective regimen sliding scale   SubCutaneous at bedtime  insulin lispro Injectable (ADMELOG) 4 Unit(s) SubCutaneous three times a day before meals  loratadine 10 milliGRAM(s) Oral daily  memantine 5 milliGRAM(s) Oral two times a day  metoprolol succinate ER 50 milliGRAM(s) Oral daily  montelukast 10 milliGRAM(s) Oral daily  OLANZapine 10 milliGRAM(s) Oral at bedtime  tamsulosin 0.4 milliGRAM(s) Oral at bedtime  tiotropium 2.5 MICROgram(s) Inhaler 2 Puff(s) Inhalation daily  valproic  acid Syrup 250 milliGRAM(s) Oral <User Schedule>  valproic  acid Syrup 1000 milliGRAM(s) Oral at bedtime      Vital Signs Last 24 Hrs  T(C): 36.8 (13 Apr 2023 05:36), Max: 36.8 (13 Apr 2023 05:36)  T(F): 98.2 (13 Apr 2023 05:36), Max: 98.2 (13 Apr 2023 05:36)  HR: 104 (13 Apr 2023 10:00) (100 - 104)  BP: 132/70 (13 Apr 2023 10:00) (130/89 - 151/97)  BP(mean): --  RR: 18 (12 Apr 2023 20:25) (18 - 18)  SpO2: 99% (13 Apr 2023 10:00) (98% - 99%)    Parameters below as of 12 Apr 2023 20:25  Patient On (Oxygen Delivery Method): room air        PHYSICAL EXAMINATION:  GENERAL: NAD, lying in bed comfortably   HEAD:  Atraumatic, Normocephalic  EYES:  Prosthesis noted on the right, Left with conjunctiva and sclera clear, pupil is equal, round, and reactive to light and accommodation.  NECK: Supple, No JVD, trachea is midline, no evidence of thyroid enlargement, no lymphadenopathy or tenderness.  CHEST/LUNG: No rales, rhonchi, minimal expiratory wheezing noted, no rubs  HEART: Regular rate and rhythm; systolic murmur noted on the left lower sternal border, no rubs, or gallops  ABDOMEN: Soft, Nontender, Nondistended; Bowel sounds present  NERVOUS SYSTEM:  Alert & Oriented X2; No focal sensory or motor deficits are noted; Appropriate mood and affect.  EXTREMITIES:  2+ Peripheral Pulses, No clubbing, cyanosis, or edema  SKIN: Warm, dry, and well perfused; Good turgor; No lesions, nodules or rashes are noted.                          16.0   8.87  )-----------( 226      ( 13 Apr 2023 08:30 )             50.8     04-13    142  |  114<H>  |  32<H>  ----------------------------<  136<H>  4.2   |  25  |  1.71<H>    Ca    10.1      13 Apr 2023 08:30  Phos  2.8     04-13  Mg     2.5     04-13    TPro  6.6  /  Alb  2.6<L>  /  TBili  0.6  /  DBili  x   /  AST  34  /  ALT  65<H>  /  AlkPhos  97  04-13    LIVER FUNCTIONS - ( 13 Apr 2023 08:30 )  Alb: 2.6 g/dL / Pro: 6.6 g/dL / ALK PHOS: 97 U/L / ALT: 65 U/L DA / AST: 34 U/L / GGT: x                   CAPILLARY BLOOD GLUCOSE      RADIOLOGY & ADDITIONAL TESTS:  < from: CT Neck Soft Tissue No Cont (04.09.23 @ 20:24) >  FINDINGS:  The examination is mildly to moderately motion degraded.    Evaluation for malignancy is limited without intravenous contrast.    There is no enlargement of the adenoids and tonsils.  There is no   thickening of the soft palate/uvula, epiglottis, pharyngeal wall or   aryepiglottic folds.  The larynx and upper trachea are grossly   unremarkable, within limits of motion artifact.    There is no retropharyngeal edema.    The parotid glands, submandibular glands and thyroid appear grossly   unremarkable.    There is no gross cervical lymphadenopathy.    The unopacified cervical vasculature appears unremarkable; no   atherosclerotic calcification is identified.    There is partial straightening of the cervical lordosis.  There are   multilevel degenerative changes.  At C2-C3 there is a tiny central disc protrusion.  At C3-C4 and C4-C5 there are disc osteophytecomplexes producing mild   spinal canal stenosis.  At C5-C6 there is a disc osteophyte complex producing minimal spinal   canal stenosis.  At C6-C7 and C7-T1 there is no significant disc herniation or spinal   canal stenosis.    Uncovertebral/facet hypertrophy causes mild to moderate neural foraminal   narrowing at C2-C3 on the left, C3-C4 bilaterally, C4-C5 bilaterally,   C5-C6 on the left, C6-C7 on the right and C7-T1 on the right.    There appears to be mild circumferential wall thickening in the upper   thoracic esophagus.  Small pleural effusions are partially visualized at   both lung apices.  There is nonspecific interlobular septal thickening in   both lung apices.    Within the visualized brain, there is mild to moderate generalized   cerebral volume loss and mild periventricular white matter   hypoattenuation compatible with chronic microvascular ischemic disease. A   right ocular prosthesis is noted.    IMPRESSION:  The examination is mildly to moderately motion degraded.    Evaluation for malignancy is limited without intravenous contrast.    No abnormality is identified in the pharynx, larynx or upper trachea,   within limits of noncontrast CT technique.  There is no foreign body,   inflammatory change or extrinsic mass effect on the pharynx/larynx or   upper trachea.    There appears to be mild circumferential wall thickening in the upper   thoracic esophagus.  Upper endoscopy and/or esophagram may be obtained as   clinically warranted.    Small pleural effusions arepartially visualized bilaterally.    Nonspecific interlobular septal thickening in the lung apices.      < end of copied text >  < from: CT Chest No Cont (04.09.23 @ 20:24) >  FINDINGS:    LUNGS/AIRWAYS/PLEURA: Biconvex configuration of the trachea suggestive of   tracheobronchomalacia. No endobronchial lesion. Interlobular septal   thickening and not primarily peribronchial vascular groundglass in both   lungs. 7 mm subsolid nodule in the left upper lobe (3-67). Small pleural   effusions.    LYMPH NODES/MEDIASTINUM: No lymphadenopathy.    HEART/VASCULATURE: Enlarged heart. No pericardial effusion. Normal   caliber aorta.    UPPER ABDOMEN: Right renal cyst.    BONES/SOFT TISSUES: Unremarkable.      IMPRESSION:    Pulmonary edema and small pleural effusions.    Indeterminate left upper lobe 7 mm nodule. Recommend follow-up   noncontrast CT chest in 3 months to assess for change.    < end of copied text >  < from: Xray Chest 1 View- PORTABLE-Urgent (Xray Chest 1 View- PORTABLE-Urgent .) (04.08.23 @ 10:51) >  IMPRESSION:   Persistent bilateral perihilar/RIGHT greater than LEFT   bibasilar diffuse airspace disease and/or small effusions.  .    < end of copied text >  < from: TTE with Doppler (w/Cont) (03.29.23 @ 07:03) >  1. Trace mitral regurgitation.  2. Normal left ventricular internal dimensions and wall  thicknesses.  3. Severe global left ventricular systolic dysfunction.  Ultrasound LV opacification agent was used to enhance  endocardial definition.  4. Grade I diastolic dysfunction (Impaired relaxation,  mild).  5. Normal right ventricular size and function.  6. Agitated saline injection revealed late bubbles in the  left heart, consistent with transpulmonic shunting.      < end of copied text >

## 2023-04-13 NOTE — PROGRESS NOTE ADULT - ATTENDING COMMENTS
Patient was seen and examined at bedside. Complains of stuffed nose. Denies any other complains     ICU Vital Signs Last 24 Hrs  T(C): 36.8 (13 Apr 2023 05:36), Max: 36.8 (13 Apr 2023 05:36)  T(F): 98.2 (13 Apr 2023 05:36), Max: 98.2 (13 Apr 2023 05:36)  HR: 104 (13 Apr 2023 10:00) (100 - 104)  BP: 132/70 (13 Apr 2023 10:00) (130/89 - 151/97)  BP(mean): --  ABP: --  ABP(mean): --  RR: 18 (12 Apr 2023 20:25) (18 - 18)  SpO2: 99% (13 Apr 2023 10:00) (98% - 99%)    O2 Parameters below as of 12 Apr 2023 20:25  Patient On (Oxygen Delivery Method): room air      P/E:  NAD  AAOx2, no focal deficit    Mild wheezing noted   S1S2 WNL, no MRG   Abd soft, non tender, BS present   BLLE no edema or calf tenderness     Labs noted     A/P:  Acute hypoxic respiratory failure resolved  Acute on chronic systolic heart failure exacerbation   YASSINE on CKD   DM with hyperglycemia   HTN  HLD  Asthma  Down syndrome   Intellectual disability  Esophageal thickening    Plan:  Cont Lasix 40mg qd, cont Metoprolol Succinate  Was able to walk with PT, no skilled PT needs, final recommendation to follow   Dr. Hamilton following  Monitor renal function, baseline possibly between 1.3-1.6  Will hold off adding GDMT now   Cont Albuterol, spiriva and Duoneb  Cont current insulin regimen  Thickening of esophagus- will give outpatient GI follow up. Patient is asymptomatic now, tolerating diet   Spoke with Dr. Hearn, Director of [facility]. Updated about clinical improvement and PT eval. Concerned that patient lives in 2nd floor of [facility] and if patient will be able to tolerate taking stairs. Requested repeat PT eval  CM and SW for safe discharge

## 2023-04-14 NOTE — PROGRESS NOTE ADULT - PROBLEM SELECTOR PLAN 1
ECHO 3/23 showed severely reduced EF 10-15%  NYHA Heart Failure class III stage C  Metoprolol 50 mg PO qd  Will hold enalapril and Jardiance for now until renal function back to baseline  Hold adding Spironolactone until renal function back to baseline  Cardiology onboard   No cardiac cath at this moment   PT recommendations noted  Will hold Lasix 40 mg PO qd on 4/15/23 due to worsening renal function. Patient not drinking enough water. Nephro Dr Pederson consulted to provide recommendations.   Will keep monitoring.

## 2023-04-14 NOTE — PROGRESS NOTE ADULT - PROBLEM SELECTOR PLAN 4
Baseline 1.30s to 1.60s  Cr 1.59>1.71>2.13  Likely dehydrated. Patient not drinking enough water.   Will hold Lasix on 4/15/23  Nephrology consulted   Avoid nephrotoxic agents  Will keep monitoring   Rest as above

## 2023-04-14 NOTE — PROGRESS NOTE ADULT - SUBJECTIVE AND OBJECTIVE BOX
PATIENT SEEN AND EXAMINED ON :- 4/14/23  DATE OF SERVICE:   4/14/23          Interim events noted,Labs ,Radiological studies and Cardiology tests reviewed .    MR#[id number]  PATIENT NAME:-[facility] COURSE: HPI:  73 y o with ambulatory at baseline from a [facility] number [facility] w/ PMH of intellectual disability, Down Syndrome, asthma, COPD with chronic cough, HTN, DM,  CKD elevated PSA, BPH, schizophrenia, hepatitis B, carrier, bilateral sensorineural hearing loss, who came in to the ED for sore throat and chest pain.  Patient states he developed a sore throat and dry cough a few days ago. Then last night had chest pain, located on the left side, w/o radiation, described as sharp. Pt states the pain lasted throughout the night and resolved this morning. Currently denies any fever, chills, nausea vomiting SOB, abdominal pain, PND, orthopnea or change in bowel habits  (08 Apr 2023 18:22)      INTERIM EVENTS:Patient seen at bedside ,interim events noted.      PMH -reviewed admission note, no change since admission  HEART FAILURE: Acute[ ]Chronic[ ] Systolic[ ] Diastolic[ ] Combined Systolic and Diastolic[ ]  CAD[ ] CABG[ ] PCI[ ]  DEVICES[ ] PPM[ ] ICD[ ] ILR[ ]  ATRIAL FIBRILLATION[ ] Paroxysmal[ ] Permanent[ ] CHADS2-[  ]  YASSINE[ ] CKD1[ ] CKD2[ ] CKD3[ ] CKD4[ ] ESRD[ ]  COPD[ ] HTN[ ]   DM[ ] Type1[ ] Type 2[ ]   CVA[ ] Paresis[ ]    AMBULATION: Assisted[ ] Cane/walker[ ] Independent[ ]    MEDICATIONS  (STANDING):  albuterol/ipratropium for Nebulization 3 milliLiter(s) Nebulizer every 6 hours  amantadine 100 milliGRAM(s) Oral every 12 hours  aspirin enteric coated 81 milliGRAM(s) Oral daily  atorvastatin 40 milliGRAM(s) Oral at bedtime  finasteride 5 milliGRAM(s) Oral daily  fluticasone propionate 50 MICROgram(s)/spray Nasal Spray 1 Spray(s) Both Nostrils two times a day  heparin   Injectable 5000 Unit(s) SubCutaneous every 12 hours  insulin glargine Injectable (LANTUS) 5 Unit(s) SubCutaneous at bedtime  insulin lispro (ADMELOG) corrective regimen sliding scale   SubCutaneous three times a day before meals  insulin lispro (ADMELOG) corrective regimen sliding scale   SubCutaneous at bedtime  insulin lispro Injectable (ADMELOG) 4 Unit(s) SubCutaneous three times a day before meals  loratadine 10 milliGRAM(s) Oral daily  memantine 5 milliGRAM(s) Oral two times a day  metoprolol succinate ER 50 milliGRAM(s) Oral daily  montelukast 10 milliGRAM(s) Oral daily  OLANZapine 10 milliGRAM(s) Oral at bedtime  tamsulosin 0.4 milliGRAM(s) Oral at bedtime  tiotropium 2.5 MICROgram(s) Inhaler 2 Puff(s) Inhalation daily  valproic  acid Syrup 250 milliGRAM(s) Oral <User Schedule>  valproic  acid Syrup 1000 milliGRAM(s) Oral at bedtime    MEDICATIONS  (PRN):  albuterol    90 MICROgram(s) HFA Inhaler 2 Puff(s) Inhalation every 6 hours PRN Shortness of Breath and/or Wheezing  guaiFENesin Oral Liquid (Sugar-Free) 200 milliGRAM(s) Oral every 6 hours PRN Cough            REVIEW OF SYSTEMS:  Constitutional: [ ] fever, [ ]weight loss,  [ ]fatigue [ ]weight gain  Eyes: [ ] visual changes  Respiratory: [ ]shortness of breath;  [ ] cough, [ ]wheezing, [ ]chills, [ ]hemoptysis  Cardiovascular: [ ] chest pain, [ ]palpitations, [ ]dizziness,  [ ]leg swelling[ ]orthopnea[ ]PND  Gastrointestinal: [ ] abdominal pain, [ ]nausea, [ ]vomiting,  [ ]diarrhea [ ]Constipation [ ]Melena  Genitourinary: [ ] dysuria, [ ] hematuria [ ]Reyes  Neurologic: [ ] headaches [ ] tremors[ ]weakness [ ]Paralysis Right[ ] Left[ ]  Skin: [ ] itching, [ ]burning, [ ] rashes  Endocrine: [ ] heat or cold intolerance  Musculoskeletal: [ ] joint pain or swelling; [ ] muscle, back, or extremity pain  Psychiatric: [ ] depression, [ ]anxiety, [ ]mood swings, or [ ]difficulty sleeping  Hematologic: [ ] easy bruising, [ ] bleeding gums    [ ] All remaining systems negative except as per above.   [ ]Unable to obtain.  [x] No change in ROS since admission      Vital Signs Last 24 Hrs  T(C): 36.6 (14 Apr 2023 20:52), Max: 36.7 (14 Apr 2023 11:48)  T(F): 97.9 (14 Apr 2023 20:52), Max: 98 (14 Apr 2023 11:48)  HR: 98 (14 Apr 2023 20:52) (88 - 98)  BP: 144/92 (14 Apr 2023 20:52) (101/68 - 144/92)  BP(mean): --  RR: 18 (14 Apr 2023 20:52) (18 - 18)  SpO2: 99% (14 Apr 2023 20:52) (94% - 99%)    Parameters below as of 14 Apr 2023 20:52  Patient On (Oxygen Delivery Method): room air      I&O's Summary    13 Apr 2023 07:01  -  14 Apr 2023 07:00  --------------------------------------------------------  IN: 0 mL / OUT: 300 mL / NET: -300 mL    14 Apr 2023 07:01  -  14 Apr 2023 21:50  --------------------------------------------------------  IN: 0 mL / OUT: 200 mL / NET: -200 mL        PHYSICAL EXAM:  General: No acute distress BMI-  HEENT: EOMI, PERRL  Neck: Supple, [ ] JVD  Lungs: Equal air entry bilaterally; [ ] rales [ ] wheezing [ ] rhonchi  Heart: Regular rate and rhythm; [x ] murmur   2/6 [ x] systolic [ ] diastolic [ ] radiation[ ] rubs [ ]  gallops  Abdomen: Nontender, bowel sounds present  Extremities: No clubbing, cyanosis, [ ] edema [ ]Pulses  equal and intact  Nervous system:  Alert & Oriented X3, no focal deficits  Psychiatric: Normal affect  Skin: No rashes or lesions    LABS:  04-14    143  |  109<H>  |  38<H>  ----------------------------<  148<H>  4.3   |  28  |  2.13<H>    Ca    10.4      14 Apr 2023 05:32  Phos  3.3     04-14  Mg     2.6     04-14    TPro  6.8  /  Alb  2.6<L>  /  TBili  0.6  /  DBili  x   /  AST  27  /  ALT  63<H>  /  AlkPhos  99  04-14    Creatinine Trend: 2.13<--, 1.71<--, 1.59<--, 1.72<--, 1.77<--, 1.57<--                        15.6   7.79  )-----------( 197      ( 14 Apr 2023 05:32 )             49.4

## 2023-04-14 NOTE — PROGRESS NOTE ADULT - SUBJECTIVE AND OBJECTIVE BOX
CURRENTLY INCOMPLETE NOTE  PLEASE FOLLOW ONCE NOTE COMPLETE BY ATTENDING AND RESIDENT     PGY-1 Progress Note discussed with attending    PAGER #: [[phone]] TILL 5:00 PM  PLEASE CONTACT ON CALL TEAM:  - On Call Team (Please refer to Red) FROM 5:00 PM - 8:30PM  - Nightfloat Team FROM 8:30 -7:30 AM    CHIEF COMPLAINT & BRIEF HOSPITAL COURSE:    INTERVAL HPI/OVERNIGHT EVENTS:   Patient seen and examined at bedside. No acute events overnight. Patient does not have any concerns today.     ROS  Constitutional: (-)fever, (-)night sweats, (-)chills, (-)cold intolerance, (-)fatigue  Eyes: (-)change in vision, (-)loss of vision, (-)blurred vision  Ears: (-)difficulty hearing, (-)hearing loss  Nose: (-)nasal discharge  Mouth/Throat/Voice: (-)lip sores, (-)dysphagia, (-)odynophagia  Neck: (-)neck pain, (-)neck stiffness, (-)neck lumps, (-)neck swelling  Respiratory: (-)dyspnea, (-)cough, (-)cough productive of sputum, (-)hemoptysis, (-)wheezing  Cardiovascular: (-)chest pain, (-)palpitations, (-)dyspnea at rest, (-)dyspnea with activity, (-)orthopnea  Gastrointestinal: (-)abdominal pain, (-)rectal pain, (-)nausea, (-)vomiting  Urinary: (-)dysuria, (-)hematuria, (-)urinary hesitancy, (-)difficulty initiating urine stream  Dermatologic/Integumentary: (-)change in hair texture, (-)change in skin texture, (-)change in nail appearance, (-)dry hair  Musculoskeletal: (-)muscle pain, (-)back pain, (-)tender points, (-)muscle cramps  Neurological: (-)headaches, (-)vertigo, (-)lightheadedness, (-)fainting  Psychiatric: (-)change in mood, (-)depression, (-)sadness interfering with function, (-)anxiety, (-)nervousness  Hematologic/Lymphatic: (-)easy bruising, (-)difficulty stopping blood flow      albuterol    90 MICROgram(s) HFA Inhaler 2 Puff(s) Inhalation every 6 hours  albuterol/ipratropium for Nebulization 3 milliLiter(s) Nebulizer every 6 hours  amantadine 100 milliGRAM(s) Oral every 12 hours  aspirin enteric coated 81 milliGRAM(s) Oral daily  atorvastatin 40 milliGRAM(s) Oral at bedtime  benzocaine/menthol Lozenge 1 Lozenge Oral three times a day  finasteride 5 milliGRAM(s) Oral daily  fluticasone propionate 50 MICROgram(s)/spray Nasal Spray 1 Spray(s) Both Nostrils two times a day  guaiFENesin Oral Liquid (Sugar-Free) 200 milliGRAM(s) Oral every 6 hours PRN  heparin   Injectable 5000 Unit(s) SubCutaneous every 12 hours  insulin glargine Injectable (LANTUS) 5 Unit(s) SubCutaneous at bedtime  insulin lispro (ADMELOG) corrective regimen sliding scale   SubCutaneous three times a day before meals  insulin lispro (ADMELOG) corrective regimen sliding scale   SubCutaneous at bedtime  insulin lispro Injectable (ADMELOG) 4 Unit(s) SubCutaneous three times a day before meals  loratadine 10 milliGRAM(s) Oral daily  memantine 5 milliGRAM(s) Oral two times a day  metoprolol succinate ER 50 milliGRAM(s) Oral daily  montelukast 10 milliGRAM(s) Oral daily  OLANZapine 10 milliGRAM(s) Oral at bedtime  tamsulosin 0.4 milliGRAM(s) Oral at bedtime  tiotropium 2.5 MICROgram(s) Inhaler 2 Puff(s) Inhalation daily  valproic  acid Syrup 250 milliGRAM(s) Oral <User Schedule>  valproic  acid Syrup 1000 milliGRAM(s) Oral at bedtime      Vital Signs Last 24 Hrs  T(C): 36.6 (14 Apr 2023 05:10), Max: 36.6 (13 Apr 2023 20:39)  T(F): 97.9 (14 Apr 2023 05:10), Max: 97.9 (13 Apr 2023 20:39)  HR: 93 (14 Apr 2023 05:10) (91 - 105)  BP: 130/94 (14 Apr 2023 05:10) (112/71 - 133/99)  BP(mean): --  RR: 18 (14 Apr 2023 05:10) (18 - 18)  SpO2: 94% (14 Apr 2023 05:10) (94% - 97%)    Parameters below as of 14 Apr 2023 05:10  Patient On (Oxygen Delivery Method): room air        PHYSICAL EXAMINATION:  GENERAL: NAD, lying in bed comfortably   HEAD:  Atraumatic, Normocephalic  EYES:  Prosthesis noted on the right, Left with conjunctiva and sclera clear, pupil is equal, round, and reactive to light and accommodation.  NECK: Supple, No JVD, trachea is midline, no evidence of thyroid enlargement, no lymphadenopathy or tenderness.  CHEST/LUNG: No rales, rhonchi, minimal expiratory wheezing noted, no rubs  HEART: Regular rate and rhythm; systolic murmur noted on the left lower sternal border, no rubs, or gallops  ABDOMEN: Soft, Nontender, Nondistended; Bowel sounds present  NERVOUS SYSTEM:  Alert & Oriented X2; No focal sensory or motor deficits are noted; Appropriate mood and affect.  EXTREMITIES:  2+ Peripheral Pulses, No clubbing, cyanosis, or edema  SKIN: Warm, dry, and well perfused; Good turgor; No lesions, nodules or rashes are noted.                            15.6   7.79  )-----------( 197      ( 14 Apr 2023 05:32 )             49.4     04-14    143  |  109<H>  |  38<H>  ----------------------------<  148<H>  4.3   |  28  |  2.13<H>    Ca    10.4      14 Apr 2023 05:32  Phos  3.3     04-14  Mg     2.6     04-14    TPro  6.8  /  Alb  2.6<L>  /  TBili  0.6  /  DBili  x   /  AST  27  /  ALT  63<H>  /  AlkPhos  99  04-14    LIVER FUNCTIONS - ( 14 Apr 2023 05:32 )  Alb: 2.6 g/dL / Pro: 6.8 g/dL / ALK PHOS: 99 U/L / ALT: 63 U/L DA / AST: 27 U/L / GGT: x                   CAPILLARY BLOOD GLUCOSE      RADIOLOGY & ADDITIONAL TESTS:  < from: CT Neck Soft Tissue No Cont (04.09.23 @ 20:24) >  FINDINGS:  The examination is mildly to moderately motion degraded.    Evaluation for malignancy is limited without intravenous contrast.    There is no enlargement of the adenoids and tonsils.  There is no   thickening of the soft palate/uvula, epiglottis, pharyngeal wall or   aryepiglottic folds.  The larynx and upper trachea are grossly   unremarkable, within limits of motion artifact.    There is no retropharyngeal edema.    The parotid glands, submandibular glands and thyroid appear grossly   unremarkable.    There is no gross cervical lymphadenopathy.    The unopacified cervical vasculature appears unremarkable; no   atherosclerotic calcification is identified.    There is partial straightening of the cervical lordosis.  There are   multilevel degenerative changes.  At C2-C3 there is a tiny central disc protrusion.  At C3-C4 and C4-C5 there are disc osteophytecomplexes producing mild   spinal canal stenosis.  At C5-C6 there is a disc osteophyte complex producing minimal spinal   canal stenosis.  At C6-C7 and C7-T1 there is no significant disc herniation or spinal   canal stenosis.    Uncovertebral/facet hypertrophy causes mild to moderate neural foraminal   narrowing at C2-C3 on the left, C3-C4 bilaterally, C4-C5 bilaterally,   C5-C6 on the left, C6-C7 on the right and C7-T1 on the right.    There appears to be mild circumferential wall thickening in the upper   thoracic esophagus.  Small pleural effusions are partially visualized at   both lung apices.  There is nonspecific interlobular septal thickening in   both lung apices.    Within the visualized brain, there is mild to moderate generalized   cerebral volume loss and mild periventricular white matter   hypoattenuation compatible with chronic microvascular ischemic disease. A   right ocular prosthesis is noted.    IMPRESSION:  The examination is mildly to moderately motion degraded.    Evaluation for malignancy is limited without intravenous contrast.    No abnormality is identified in the pharynx, larynx or upper trachea,   within limits of noncontrast CT technique.  There is no foreign body,   inflammatory change or extrinsic mass effect on the pharynx/larynx or   upper trachea.    There appears to be mild circumferential wall thickening in the upper   thoracic esophagus.  Upper endoscopy and/or esophagram may be obtained as   clinically warranted.    Small pleural effusions arepartially visualized bilaterally.    Nonspecific interlobular septal thickening in the lung apices.      < end of copied text >  < from: CT Chest No Cont (04.09.23 @ 20:24) >  FINDINGS:    LUNGS/AIRWAYS/PLEURA: Biconvex configuration of the trachea suggestive of   tracheobronchomalacia. No endobronchial lesion. Interlobular septal   thickening and not primarily peribronchial vascular groundglass in both   lungs. 7 mm subsolid nodule in the left upper lobe (3-67). Small pleural   effusions.    LYMPH NODES/MEDIASTINUM: No lymphadenopathy.    HEART/VASCULATURE: Enlarged heart. No pericardial effusion. Normal   caliber aorta.    UPPER ABDOMEN: Right renal cyst.    BONES/SOFT TISSUES: Unremarkable.      IMPRESSION:    Pulmonary edema and small pleural effusions.    Indeterminate left upper lobe 7 mm nodule. Recommend follow-up   noncontrast CT chest in 3 months to assess for change.    < end of copied text >  < from: Xray Chest 1 View- PORTABLE-Urgent (Xray Chest 1 View- PORTABLE-Urgent .) (04.08.23 @ 10:51) >  IMPRESSION:   Persistent bilateral perihilar/RIGHT greater than LEFT   bibasilar diffuse airspace disease and/or small effusions.  .    < end of copied text >  < from: TTE with Doppler (w/Cont) (03.29.23 @ 07:03) >  1. Trace mitral regurgitation.  2. Normal left ventricular internal dimensions and wall  thicknesses.  3. Severe global left ventricular systolic dysfunction.  Ultrasound LV opacification agent was used to enhance  endocardial definition.  4. Grade I diastolic dysfunction (Impaired relaxation,  mild).  5. Normal right ventricular size and function.  6. Agitated saline injection revealed late bubbles in the  left heart, consistent with transpulmonic shunting.      < end of copied text >                     CURRENTLY INCOMPLETE NOTE  PLEASE FOLLOW ONCE NOTE COMPLETE BY ATTENDING AND RESIDENT     PGY-1 Progress Note discussed with attending    PAGER #: [[phone]] TILL 5:00 PM  PLEASE CONTACT ON CALL TEAM:  - On Call Team (Please refer to Red) FROM 5:00 PM - 8:30PM  - Nightfloat Team FROM 8:30 -7:30 AM      INTERVAL HPI/OVERNIGHT EVENTS:   Patient seen and examined at bedside. No acute events overnight. Patient does not have any concerns today.     ROS  Constitutional: (-)fever, (-)night sweats, (-)chills, (-)cold intolerance, (-)fatigue  Eyes: (-)change in vision, (-)loss of vision, (-)blurred vision  Ears: (-)difficulty hearing, (-)hearing loss  Nose: (-)nasal discharge  Mouth/Throat/Voice: (-)lip sores, (-)dysphagia, (-)odynophagia  Neck: (-)neck pain, (-)neck stiffness, (-)neck lumps, (-)neck swelling  Respiratory: (-)dyspnea, (-)cough, (-)cough productive of sputum, (-)hemoptysis, (-)wheezing  Cardiovascular: (-)chest pain, (-)palpitations, (-)dyspnea at rest, (-)dyspnea with activity, (-)orthopnea  Gastrointestinal: (-)abdominal pain, (-)rectal pain, (-)nausea, (-)vomiting  Urinary: (-)dysuria, (-)hematuria, (-)urinary hesitancy, (-)difficulty initiating urine stream  Dermatologic/Integumentary: (-)change in hair texture, (-)change in skin texture, (-)change in nail appearance, (-)dry hair  Musculoskeletal: (-)muscle pain, (-)back pain, (-)tender points, (-)muscle cramps  Neurological: (-)headaches, (-)vertigo, (-)lightheadedness, (-)fainting  Psychiatric: (-)change in mood, (-)depression, (-)sadness interfering with function, (-)anxiety, (-)nervousness  Hematologic/Lymphatic: (-)easy bruising, (-)difficulty stopping blood flow      albuterol    90 MICROgram(s) HFA Inhaler 2 Puff(s) Inhalation every 6 hours  albuterol/ipratropium for Nebulization 3 milliLiter(s) Nebulizer every 6 hours  amantadine 100 milliGRAM(s) Oral every 12 hours  aspirin enteric coated 81 milliGRAM(s) Oral daily  atorvastatin 40 milliGRAM(s) Oral at bedtime  benzocaine/menthol Lozenge 1 Lozenge Oral three times a day  finasteride 5 milliGRAM(s) Oral daily  fluticasone propionate 50 MICROgram(s)/spray Nasal Spray 1 Spray(s) Both Nostrils two times a day  guaiFENesin Oral Liquid (Sugar-Free) 200 milliGRAM(s) Oral every 6 hours PRN  heparin   Injectable 5000 Unit(s) SubCutaneous every 12 hours  insulin glargine Injectable (LANTUS) 5 Unit(s) SubCutaneous at bedtime  insulin lispro (ADMELOG) corrective regimen sliding scale   SubCutaneous three times a day before meals  insulin lispro (ADMELOG) corrective regimen sliding scale   SubCutaneous at bedtime  insulin lispro Injectable (ADMELOG) 4 Unit(s) SubCutaneous three times a day before meals  loratadine 10 milliGRAM(s) Oral daily  memantine 5 milliGRAM(s) Oral two times a day  metoprolol succinate ER 50 milliGRAM(s) Oral daily  montelukast 10 milliGRAM(s) Oral daily  OLANZapine 10 milliGRAM(s) Oral at bedtime  tamsulosin 0.4 milliGRAM(s) Oral at bedtime  tiotropium 2.5 MICROgram(s) Inhaler 2 Puff(s) Inhalation daily  valproic  acid Syrup 250 milliGRAM(s) Oral <User Schedule>  valproic  acid Syrup 1000 milliGRAM(s) Oral at bedtime      Vital Signs Last 24 Hrs  T(C): 36.6 (14 Apr 2023 05:10), Max: 36.6 (13 Apr 2023 20:39)  T(F): 97.9 (14 Apr 2023 05:10), Max: 97.9 (13 Apr 2023 20:39)  HR: 93 (14 Apr 2023 05:10) (91 - 105)  BP: 130/94 (14 Apr 2023 05:10) (112/71 - 133/99)  BP(mean): --  RR: 18 (14 Apr 2023 05:10) (18 - 18)  SpO2: 94% (14 Apr 2023 05:10) (94% - 97%)    Parameters below as of 14 Apr 2023 05:10  Patient On (Oxygen Delivery Method): room air        PHYSICAL EXAMINATION:  GENERAL: NAD, lying in bed comfortably   HEAD:  Atraumatic, Normocephalic  EYES:  Prosthesis noted on the right, Left with conjunctiva and sclera clear, pupil is equal, round, and reactive to light and accommodation.  NECK: Supple, No JVD, trachea is midline, no evidence of thyroid enlargement, no lymphadenopathy or tenderness.  CHEST/LUNG: No rales, rhonchi, minimal expiratory wheezing noted, no rubs  HEART: Regular rate and rhythm; systolic murmur noted on the left lower sternal border, no rubs, or gallops  ABDOMEN: Soft, Nontender, Nondistended; Bowel sounds present  NERVOUS SYSTEM:  Alert & Oriented X2; No focal sensory or motor deficits are noted; Appropriate mood and affect.  EXTREMITIES:  2+ Peripheral Pulses, No clubbing, cyanosis, or edema  SKIN: Warm, dry, and well perfused; Good turgor; No lesions, nodules or rashes are noted.                            15.6   7.79  )-----------( 197      ( 14 Apr 2023 05:32 )             49.4     04-14    143  |  109<H>  |  38<H>  ----------------------------<  148<H>  4.3   |  28  |  2.13<H>    Ca    10.4      14 Apr 2023 05:32  Phos  3.3     04-14  Mg     2.6     04-14    TPro  6.8  /  Alb  2.6<L>  /  TBili  0.6  /  DBili  x   /  AST  27  /  ALT  63<H>  /  AlkPhos  99  04-14    LIVER FUNCTIONS - ( 14 Apr 2023 05:32 )  Alb: 2.6 g/dL / Pro: 6.8 g/dL / ALK PHOS: 99 U/L / ALT: 63 U/L DA / AST: 27 U/L / GGT: x                   CAPILLARY BLOOD GLUCOSE      RADIOLOGY & ADDITIONAL TESTS:  < from: CT Neck Soft Tissue No Cont (04.09.23 @ 20:24) >  FINDINGS:  The examination is mildly to moderately motion degraded.    Evaluation for malignancy is limited without intravenous contrast.    There is no enlargement of the adenoids and tonsils.  There is no   thickening of the soft palate/uvula, epiglottis, pharyngeal wall or   aryepiglottic folds.  The larynx and upper trachea are grossly   unremarkable, within limits of motion artifact.    There is no retropharyngeal edema.    The parotid glands, submandibular glands and thyroid appear grossly   unremarkable.    There is no gross cervical lymphadenopathy.    The unopacified cervical vasculature appears unremarkable; no   atherosclerotic calcification is identified.    There is partial straightening of the cervical lordosis.  There are   multilevel degenerative changes.  At C2-C3 there is a tiny central disc protrusion.  At C3-C4 and C4-C5 there are disc osteophytecomplexes producing mild   spinal canal stenosis.  At C5-C6 there is a disc osteophyte complex producing minimal spinal   canal stenosis.  At C6-C7 and C7-T1 there is no significant disc herniation or spinal   canal stenosis.    Uncovertebral/facet hypertrophy causes mild to moderate neural foraminal   narrowing at C2-C3 on the left, C3-C4 bilaterally, C4-C5 bilaterally,   C5-C6 on the left, C6-C7 on the right and C7-T1 on the right.    There appears to be mild circumferential wall thickening in the upper   thoracic esophagus.  Small pleural effusions are partially visualized at   both lung apices.  There is nonspecific interlobular septal thickening in   both lung apices.    Within the visualized brain, there is mild to moderate generalized   cerebral volume loss and mild periventricular white matter   hypoattenuation compatible with chronic microvascular ischemic disease. A   right ocular prosthesis is noted.    IMPRESSION:  The examination is mildly to moderately motion degraded.    Evaluation for malignancy is limited without intravenous contrast.    No abnormality is identified in the pharynx, larynx or upper trachea,   within limits of noncontrast CT technique.  There is no foreign body,   inflammatory change or extrinsic mass effect on the pharynx/larynx or   upper trachea.    There appears to be mild circumferential wall thickening in the upper   thoracic esophagus.  Upper endoscopy and/or esophagram may be obtained as   clinically warranted.    Small pleural effusions arepartially visualized bilaterally.    Nonspecific interlobular septal thickening in the lung apices.      < end of copied text >  < from: CT Chest No Cont (04.09.23 @ 20:24) >  FINDINGS:    LUNGS/AIRWAYS/PLEURA: Biconvex configuration of the trachea suggestive of   tracheobronchomalacia. No endobronchial lesion. Interlobular septal   thickening and not primarily peribronchial vascular groundglass in both   lungs. 7 mm subsolid nodule in the left upper lobe (3-67). Small pleural   effusions.    LYMPH NODES/MEDIASTINUM: No lymphadenopathy.    HEART/VASCULATURE: Enlarged heart. No pericardial effusion. Normal   caliber aorta.    UPPER ABDOMEN: Right renal cyst.    BONES/SOFT TISSUES: Unremarkable.      IMPRESSION:    Pulmonary edema and small pleural effusions.    Indeterminate left upper lobe 7 mm nodule. Recommend follow-up   noncontrast CT chest in 3 months to assess for change.    < end of copied text >  < from: Xray Chest 1 View- PORTABLE-Urgent (Xray Chest 1 View- PORTABLE-Urgent .) (04.08.23 @ 10:51) >  IMPRESSION:   Persistent bilateral perihilar/RIGHT greater than LEFT   bibasilar diffuse airspace disease and/or small effusions.  .    < end of copied text >  < from: TTE with Doppler (w/Cont) (03.29.23 @ 07:03) >  1. Trace mitral regurgitation.  2. Normal left ventricular internal dimensions and wall  thicknesses.  3. Severe global left ventricular systolic dysfunction.  Ultrasound LV opacification agent was used to enhance  endocardial definition.  4. Grade I diastolic dysfunction (Impaired relaxation,  mild).  5. Normal right ventricular size and function.  6. Agitated saline injection revealed late bubbles in the  left heart, consistent with transpulmonic shunting.      < end of copied text >

## 2023-04-14 NOTE — PROGRESS NOTE ADULT - PROBLEM SELECTOR PLAN 1
ECHO 3/23 showed severely reduced EF 10-15%  NYHA Heart Failure class III stage C  Metoprolol 50 mg PO qd  Will hold enalapril and Jardiance for now until renal function back to baseline  Hold adding Spironolactone until renal function back to baseline  No cardiac cath at this moment   PT recommendations noted  Will hold Lasix 40 mg PO qd on 4/15/23 due to worsening renal function. Patient not drinking enough water. Nephro

## 2023-04-14 NOTE — PROGRESS NOTE ADULT - ASSESSMENT
73 y o with ambulatory at baseline from a [facility] home number 10 w/ PMH of intellectual disability, Down Syndrome, asthma, COPD with chronic cough, HTN, DM,  CKD elevated PSA, BPH, schizophrenia, hepatitis B, carrier, bilateral sensorineural hearing loss, admitted for AHRF secondary to Acute on Chronic Systolic Heart Failure.

## 2023-04-14 NOTE — PROGRESS NOTE ADULT - ASSESSMENT
73 y o with ambulatory at baseline from a [facility] home number 10 w/ PMH of intellectual disability, Down Syndrome, asthma, COPD with chronic cough, HTN, DM,  CKD elevated PSA, BPH, schizophrenia, hepatitis B, carrier, bilateral sensorineural hearing loss, admitted for AHRF secondary to Acute on Chronic Systolic Heart Failure.

## 2023-04-14 NOTE — PROGRESS NOTE ADULT - ATTENDING COMMENTS
Patient was seen and examined at bedside. Denies any complains     ICU Vital Signs Last 24 Hrs  T(C): 36.7 (14 Apr 2023 11:48), Max: 36.7 (14 Apr 2023 11:48)  T(F): 98 (14 Apr 2023 11:48), Max: 98 (14 Apr 2023 11:48)  HR: 88 (14 Apr 2023 11:48) (88 - 105)  BP: 101/68 (14 Apr 2023 11:48) (101/68 - 133/99)  BP(mean): --  ABP: --  ABP(mean): --  RR: 18 (14 Apr 2023 11:48) (18 - 18)  SpO2: 95% (14 Apr 2023 11:48) (94% - 97%)    O2 Parameters below as of 14 Apr 2023 11:48  Patient On (Oxygen Delivery Method): room air      P/E:  NAD  AAOx2, no focal deficit    CTABL  S1S2 WNL, no MRG   Abd soft, non tender, BS present   BLLE no edema or calf tenderness     Labs noted     A/P:  YASSINE on CKD   Acute hypoxic respiratory failure resolved  Acute on chronic systolic heart failure exacerbation   DM with hyperglycemia   HTN  HLD  Asthma  Down syndrome   Intellectual disability  Esophageal thickening    Plan:  Hold Lasix, nephrology consult   cont Metoprolol Succinate  Dr. Hamilton following  Monitor renal function, baseline possibly between 1.3-1.6, avoid Nephrotoxic medication  Will not start Entresto  Will hold off adding GDMT now   Bladder scan   Lactulose for constipation  Cont Albuterol, spiriva and Duoneb  Cont current insulin regimen  Thickening of esophagus- will give outpatient GI follow up. Patient is asymptomatic now, tolerating diet   Spoke with Dr. Hearn, updated above plan  CM and SW for safe discharge

## 2023-04-14 NOTE — PHARMACOTHERAPY INTERVENTION NOTE - COMMENTS
Patient identified by [facility] LIST for Heart Failure. No additional interventions at this time.  
74y/o M is on olanzapine, recommended caution and monitoring as FMj=[id number]    
72y/o M on VPA regimen (same as at home), as level came subtherapeutic (39--real even lower as checked between drug adm not right before), recommended to consider loading or increasing dose

## 2023-04-14 NOTE — CONSULT NOTE ADULT - ASSESSMENT
[facility] NEPHROLOGY  Raul Goncalves M.D.  Bennie Carcamo D.O.  Priya Pederson M.D.  Hellen Dietz, MSN, ANP-C  [phone]  Fax: [phone]    [address] [place] [address], [address]  [place] [id number]          IV from 4/8-4/11 which was changed to Lasix 40mg PO daily.  [facility] NEPHROLOGY  Raul Goncalves M.D.  Bennie Carcamo D.O.  Priya Pederson M.D.  Hellen Dietz, MSN, ANP-C  [phone]  Fax: [phone]    [address] [place] [address], [address]  [place] [address]   [facility] 72[facility],  h/o intellectual disability, Down Syndrome, asthma, COPD with chronic cough, HTN, DM, BPH, schizophrenia,, bilateral sensorineural hearing loss, who came in to the ED for sore throat and chest pain.  Pt a/w acute on chroni CHF. Pt was diuresed with Lasix.  Hosp cb YASSINE. Nephrology consulted for Elevated serum creatinine.    1. YASSINE- likely due to overdiuresis, for which Lasix 40mg PO daily was d/c today (last dose this am). Check UA and [place]. Continue to hold Enalapril. Check Renal US to r/o retention. Strict I/Os. Avoid nephrotoxins/ NSAIDs/ RCA. Monitor BMP.  2. CKD-3a- baseline SCr 1.3-1.6. Defer secondary w/u as an outpt. Avoid nephrotoxins  3. HTN 2/2 CKD- BP acceptable. Continue to hold Enalapril. Continue with current anti-hypertensive medications. Monitor BP.  4. BPH- c/w flomax. Check post void bladder scan to r/o retention.     [facility] NEPHROLOGY  Raul Goncalves M.D.  Bennie Carcamo D.O.  Priya Pederson M.D.  Hellen Dietz, MSN, ANP-C  [phone]  Fax: [phone] [place] [address], [address]  [place] [address]

## 2023-04-14 NOTE — CONSULT NOTE ADULT - SUBJECTIVE AND OBJECTIVE BOX
[facility] NEPHROLOGY- CONSULTATION NOTE    [facility] 74[facility],  h/o intellectual disability, Down Syndrome, asthma, COPD with chronic cough, HTN, DM,  \BPH, schizophrenia,, bilateral sensorineural hearing loss, who came in to the ED for sore throat and chest pain.        PAST MEDICAL & SURGICAL HISTORY:  Down syndrome      HTN (hypertension)      Hyperlipidemia      Diabetes mellitus      Intellectual disability      No significant past surgical history        No Known Allergies    Home Medications Reviewed  Hospital Medications:   MEDICATIONS  (STANDING):  albuterol/ipratropium for Nebulization 3 milliLiter(s) Nebulizer every 6 hours  amantadine 100 milliGRAM(s) Oral every 12 hours  aspirin enteric coated 81 milliGRAM(s) Oral daily  atorvastatin 40 milliGRAM(s) Oral at bedtime  finasteride 5 milliGRAM(s) Oral daily  fluticasone propionate 50 MICROgram(s)/spray Nasal Spray 1 Spray(s) Both Nostrils two times a day  heparin   Injectable 5000 Unit(s) SubCutaneous every 12 hours  insulin glargine Injectable (LANTUS) 5 Unit(s) SubCutaneous at bedtime  insulin lispro (ADMELOG) corrective regimen sliding scale   SubCutaneous three times a day before meals  insulin lispro (ADMELOG) corrective regimen sliding scale   SubCutaneous at bedtime  insulin lispro Injectable (ADMELOG) 4 Unit(s) SubCutaneous three times a day before meals  loratadine 10 milliGRAM(s) Oral daily  memantine 5 milliGRAM(s) Oral two times a day  metoprolol succinate ER 50 milliGRAM(s) Oral daily  montelukast 10 milliGRAM(s) Oral daily  OLANZapine 10 milliGRAM(s) Oral at bedtime  tamsulosin 0.4 milliGRAM(s) Oral at bedtime  tiotropium 2.5 MICROgram(s) Inhaler 2 Puff(s) Inhalation daily  valproic  acid Syrup 250 milliGRAM(s) Oral <User Schedule>  valproic  acid Syrup 1000 milliGRAM(s) Oral at bedtime    SOCIAL HISTORY:  Denies ETOh, Smoking, or drug use  FAMILY HISTORY:  No pertinent family history in first degree relatives        REVIEW OF SYSTEMS: Unable to obtain due to mental status    VITALS:  T(F): 97.9 (04-14-23 @ 20:52), Max: 98 (04-14-23 @ 11:48)  HR: 98 (04-14-23 @ 20:52)  BP: 144/92 (04-14-23 @ 20:52)  RR: 18 (04-14-23 @ 20:52)  SpO2: 99% (04-14-23 @ 20:52)  Wt(kg): --    04-13 @ 07:01  -  04-14 @ 07:00  --------------------------------------------------------  IN: 0 mL / OUT: 300 mL / NET: -300 mL    04-14 @ 07:01  -  04-14 @ 23:29  --------------------------------------------------------  IN: 0 mL / OUT: 200 mL / NET: -200 mL        PHYSICAL EXAM:  Gen: NAD, calm  HEENT: anicteric  Neck: no JVD  Cards: RRR, +S1/S2, no M/G/R  Resp: unable to assess as pt is making loud noises during exam  GI: soft, +mild distention  : no CVA tenderness  Extremities: no LE edema B/L  Derm: no rashes    LABS:  04-14    143  |  109<H>  |  38<H>  ----------------------------<  148<H>  4.3   |  28  |  2.13<H>    Ca    10.4      14 Apr 2023 05:32  Phos  3.3     04-14  Mg     2.6     04-14    TPro  6.8  /  Alb  2.6<L>  /  TBili  0.6  /  DBili      /  AST  27  /  ALT  63<H>  /  AlkPhos  99  04-14    Creatinine Trend: 2.13 <--, 1.71 <--, 1.59 <--, 1.72 <--, 1.77 <--, 1.57 <--, 1.60 <--                        15.6   7.79  )-----------( 197      ( 14 Apr 2023 05:32 )             49.4     Urine Studies:      RADIOLOGY & ADDITIONAL STUDIES:                 [facility] NEPHROLOGY- CONSULTATION NOTE    [facility] 72[facility],  h/o intellectual disability, Down Syndrome, asthma, COPD with chronic cough, HTN, DM,  \BPH, schizophrenia,, bilateral sensorineural hearing loss, who came in to the ED for sore throat and chest pain.  Pt a/w CHF. Pt was diuresed with Lasix.  Hosp cb Y[facility]. Nephrology consulted for Elevated serum creatinine.        PAST MEDICAL & SURGICAL HISTORY:  Down syndrome      HTN (hypertension)      Hyperlipidemia      Diabetes mellitus      Intellectual disability      No significant past surgical history        No Known Allergies    Home Medications Reviewed  Hospital Medications:   MEDICATIONS  (STANDING):  albuterol/ipratropium for Nebulization 3 milliLiter(s) Nebulizer every 6 hours  amantadine 100 milliGRAM(s) Oral every 12 hours  aspirin enteric coated 81 milliGRAM(s) Oral daily  atorvastatin 40 milliGRAM(s) Oral at bedtime  finasteride 5 milliGRAM(s) Oral daily  fluticasone propionate 50 MICROgram(s)/spray Nasal Spray 1 Spray(s) Both Nostrils two times a day  heparin   Injectable 5000 Unit(s) SubCutaneous every 12 hours  insulin glargine Injectable (LANTUS) 5 Unit(s) SubCutaneous at bedtime  insulin lispro (ADMELOG) corrective regimen sliding scale   SubCutaneous three times a day before meals  insulin lispro (ADMELOG) corrective regimen sliding scale   SubCutaneous at bedtime  insulin lispro Injectable (ADMELOG) 4 Unit(s) SubCutaneous three times a day before meals  loratadine 10 milliGRAM(s) Oral daily  memantine 5 milliGRAM(s) Oral two times a day  metoprolol succinate ER 50 milliGRAM(s) Oral daily  montelukast 10 milliGRAM(s) Oral daily  OLANZapine 10 milliGRAM(s) Oral at bedtime  tamsulosin 0.4 milliGRAM(s) Oral at bedtime  tiotropium 2.5 MICROgram(s) Inhaler 2 Puff(s) Inhalation daily  valproic  acid Syrup 250 milliGRAM(s) Oral <User Schedule>  valproic  acid Syrup 1000 milliGRAM(s) Oral at bedtime    SOCIAL HISTORY:  Denies ETOh, Smoking, or drug use  FAMILY HISTORY:  No pertinent family history in first degree relatives        REVIEW OF SYSTEMS: Unable to obtain due to mental status    VITALS:  T(F): 97.9 (04-14-23 @ 20:52), Max: 98 (04-14-23 @ 11:48)  HR: 98 (04-14-23 @ 20:52)  BP: 144/92 (04-14-23 @ 20:52)  RR: 18 (04-14-23 @ 20:52)  SpO2: 99% (04-14-23 @ 20:52)  Wt(kg): --    04-13 @ 07:01  -  04-14 @ 07:00  --------------------------------------------------------  IN: 0 mL / OUT: 300 mL / NET: -300 mL    04-14 @ 07:01  -  04-14 @ 23:29  --------------------------------------------------------  IN: 0 mL / OUT: 200 mL / NET: -200 mL        PHYSICAL EXAM:  Gen: NAD, calm  HEENT: anicteric  Neck: no JVD  Cards: RRR, +S1/S2, no M/G/R  Resp: unable to assess as pt is making loud noises during exam  GI: soft, +mild distention +mild diffuse tenderness on palpation  : no CVA tenderness  Extremities: no LE edema B/L  Derm: no rashes    LABS:  04-14    143  |  109<H>  |  38<H>  ----------------------------<  148<H>  4.3   |  28  |  2.13<H>    Ca    10.4      14 Apr 2023 05:32  Phos  3.3     04-14  Mg     2.6     04-14    TPro  6.8  /  Alb  2.6<L>  /  TBili  0.6  /  DBili      /  AST  27  /  ALT  63<H>  /  AlkPhos  99  04-14    Creatinine Trend: 2.13 <--, 1.71 <--, 1.59 <--, 1.72 <--, 1.77 <--, 1.57 <--, 1.60 <--                        15.6   7.79  )-----------( 197      ( 14 Apr 2023 05:32 )             49.4     Urine Studies:      RADIOLOGY & ADDITIONAL STUDIES:                 [facility] NEPHROLOGY- CONSULTATION NOTE    [facility] 72[facility],  h/o intellectual disability, Down Syndrome, asthma, COPD with chronic cough, HTN, DM, BPH, schizophrenia,, bilateral sensorineural hearing loss, who came in to the ED for sore throat and chest pain.  Pt a/w acute on chroni CHF. Pt was diuresed with Lasix.  Hosp cb [facility]. Nephrology consulted for Elevated serum creatinine.    Pt c/o SOB with abd pain. +coughing. Denies n/v/d    PAST MEDICAL & SURGICAL HISTORY:  Down syndrome      HTN (hypertension)      Hyperlipidemia      Diabetes mellitus      Intellectual disability      No significant past surgical history        No Known Allergies    Home Medications Reviewed  Hospital Medications:   MEDICATIONS  (STANDING):  albuterol/ipratropium for Nebulization 3 milliLiter(s) Nebulizer every 6 hours  amantadine 100 milliGRAM(s) Oral every 12 hours  aspirin enteric coated 81 milliGRAM(s) Oral daily  atorvastatin 40 milliGRAM(s) Oral at bedtime  finasteride 5 milliGRAM(s) Oral daily  fluticasone propionate 50 MICROgram(s)/spray Nasal Spray 1 Spray(s) Both Nostrils two times a day  heparin   Injectable 5000 Unit(s) SubCutaneous every 12 hours  insulin glargine Injectable (LANTUS) 5 Unit(s) SubCutaneous at bedtime  insulin lispro (ADMELOG) corrective regimen sliding scale   SubCutaneous three times a day before meals  insulin lispro (ADMELOG) corrective regimen sliding scale   SubCutaneous at bedtime  insulin lispro Injectable (ADMELOG) 4 Unit(s) SubCutaneous three times a day before meals  loratadine 10 milliGRAM(s) Oral daily  memantine 5 milliGRAM(s) Oral two times a day  metoprolol succinate ER 50 milliGRAM(s) Oral daily  montelukast 10 milliGRAM(s) Oral daily  OLANZapine 10 milliGRAM(s) Oral at bedtime  tamsulosin 0.4 milliGRAM(s) Oral at bedtime  tiotropium 2.5 MICROgram(s) Inhaler 2 Puff(s) Inhalation daily  valproic  acid Syrup 250 milliGRAM(s) Oral <User Schedule>  valproic  acid Syrup 1000 milliGRAM(s) Oral at bedtime    SOCIAL HISTORY:  Denies ETOh, Smoking, or drug use  FAMILY HISTORY:  No pertinent family history in first degree relatives        REVIEW OF SYSTEMS: Unable to obtain due to mental status    VITALS:  T(F): 97.9 (04-14-23 @ 20:52), Max: 98 (04-14-23 @ 11:48)  HR: 98 (04-14-23 @ 20:52)  BP: 144/92 (04-14-23 @ 20:52)  RR: 18 (04-14-23 @ 20:52)  SpO2: 99% (04-14-23 @ 20:52)  Wt(kg): --    04-13 @ 07:01  -  04-14 @ 07:00  --------------------------------------------------------  IN: 0 mL / OUT: 300 mL / NET: -300 mL    04-14 @ 07:01  -  04-14 @ 23:29  --------------------------------------------------------  IN: 0 mL / OUT: 200 mL / NET: -200 mL        PHYSICAL EXAM:  Gen: NAD, calm  HEENT: anicteric  Neck: no JVD  Cards: RRR, +S1/S2, no M/G/R  Resp: unable to assess as pt is making loud noises during exam  GI: soft, +mild distention +mild diffuse tenderness on palpation  : no CVA tenderness  Extremities: no LE edema B/L  Derm: no rashes    LABS:  04-14    143  |  109<H>  |  38<H>  ----------------------------<  148<H>  4.3   |  28  |  2.13<H>    Ca    10.4      14 Apr 2023 05:32  Phos  3.3     04-14  Mg     2.6     04-14    TPro  6.8  /  Alb  2.6<L>  /  TBili  0.6  /  DBili      /  AST  27  /  ALT  63<H>  /  AlkPhos  99  04-14    Creatinine Trend: 2.13 <--, 1.71 <--, 1.59 <--, 1.72 <--, 1.77 <--, 1.57 <--, 1.60 <--                        15.6   7.79  )-----------( 197      ( 14 Apr 2023 05:32 )             49.4     Urine Studies:      RADIOLOGY & ADDITIONAL STUDIES:

## 2023-04-15 NOTE — PROGRESS NOTE ADULT - SUBJECTIVE AND OBJECTIVE BOX
PATIENT SEEN AND EXAMINED ON :- 4/15/23  DATE OF SERVICE:    4/15/23         Interim events noted,Labs ,Radiological studies and Cardiology tests reviewed .    MR#[id number]  PATIENT NAME:-[facility] COURSE: HPI:  73 y o with ambulatory at baseline from a [facility] w/ PMH of intellectual disability, Down Syndrome, asthma, COPD with chronic cough, HTN, DM,  CKD elevated PSA, BPH, schizophrenia, hepatitis B, carrier, bilateral sensorineural hearing loss, who came in to the ED for sore throat and chest pain.  Patient states he developed a sore throat and dry cough a few days ago. Then last night had chest pain, located on the left side, w/o radiation, described as sharp. Pt states the pain lasted throughout the night and resolved this morning. Currently denies any fever, chills, nausea vomiting SOB, abdominal pain, PND, orthopnea or change in bowel habits  (08 Apr 2023 18:22)      INTERIM EVENTS:Patient seen at bedside ,interim events noted.      PMH -reviewed admission note, no change since admission  HEART FAILURE: Acute[ ]Chronic[ ] Systolic[ ] Diastolic[ ] Combined Systolic and Diastolic[ ]  CAD[ ] CABG[ ] PCI[ ]  DEVICES[ ] PPM[ ] ICD[ ] ILR[ ]  ATRIAL FIBRILLATION[ ] Paroxysmal[ ] Permanent[ ] CHADS2-[  ]  YASSINE[ ] CKD1[ ] CKD2[ ] CKD3[ ] CKD4[ ] ESRD[ ]  COPD[ ] HTN[ ]   DM[ ] Type1[ ] Type 2[ ]   CVA[ ] Paresis[ ]    AMBULATION: Assisted[ ] Cane/walker[ ] Independent[ ]    MEDICATIONS  (STANDING):  albuterol/ipratropium for Nebulization 3 milliLiter(s) Nebulizer every 6 hours  amantadine 100 milliGRAM(s) Oral every 12 hours  aspirin enteric coated 81 milliGRAM(s) Oral daily  atorvastatin 40 milliGRAM(s) Oral at bedtime  finasteride 5 milliGRAM(s) Oral daily  fluticasone propionate 50 MICROgram(s)/spray Nasal Spray 1 Spray(s) Both Nostrils two times a day  heparin   Injectable 5000 Unit(s) SubCutaneous every 12 hours  insulin glargine Injectable (LANTUS) 5 Unit(s) SubCutaneous at bedtime  insulin lispro (ADMELOG) corrective regimen sliding scale   SubCutaneous at bedtime  insulin lispro (ADMELOG) corrective regimen sliding scale   SubCutaneous three times a day before meals  insulin lispro Injectable (ADMELOG) 4 Unit(s) SubCutaneous three times a day before meals  loratadine 10 milliGRAM(s) Oral daily  memantine 5 milliGRAM(s) Oral two times a day  metoprolol succinate ER 50 milliGRAM(s) Oral daily  montelukast 10 milliGRAM(s) Oral daily  OLANZapine 10 milliGRAM(s) Oral at bedtime  predniSONE   Tablet 40 milliGRAM(s) Oral daily  tamsulosin 0.4 milliGRAM(s) Oral at bedtime  tiotropium 2.5 MICROgram(s) Inhaler 2 Puff(s) Inhalation daily  valproic  acid Syrup 250 milliGRAM(s) Oral <User Schedule>  valproic  acid Syrup 1000 milliGRAM(s) Oral at bedtime    MEDICATIONS  (PRN):  albuterol    90 MICROgram(s) HFA Inhaler 2 Puff(s) Inhalation every 6 hours PRN Shortness of Breath and/or Wheezing  guaiFENesin Oral Liquid (Sugar-Free) 200 milliGRAM(s) Oral every 6 hours PRN Cough            REVIEW OF SYSTEMS:  Constitutional: [ ] fever, [ ]weight loss,  [ ]fatigue [ ]weight gain  Eyes: [ ] visual changes  Respiratory: [ ]shortness of breath;  [ ] cough, [ ]wheezing, [ ]chills, [ ]hemoptysis  Cardiovascular: [ ] chest pain, [ ]palpitations, [ ]dizziness,  [ ]leg swelling[ ]orthopnea[ ]PND  Gastrointestinal: [ ] abdominal pain, [ ]nausea, [ ]vomiting,  [ ]diarrhea [ ]Constipation [ ]Melena  Genitourinary: [ ] dysuria, [ ] hematuria [ ]Reyes  Neurologic: [ ] headaches [ ] tremors[ ]weakness [ ]Paralysis Right[ ] Left[ ]  Skin: [ ] itching, [ ]burning, [ ] rashes  Endocrine: [ ] heat or cold intolerance  Musculoskeletal: [ ] joint pain or swelling; [ ] muscle, back, or extremity pain  Psychiatric: [ ] depression, [ ]anxiety, [ ]mood swings, or [ ]difficulty sleeping  Hematologic: [ ] easy bruising, [ ] bleeding gums    [ ] All remaining systems negative except as per above.   [ ]Unable to obtain.  [x] No change in ROS since admission      Vital Signs Last 24 Hrs  T(C): 36.6 (15 Apr 2023 14:25), Max: 36.6 (14 Apr 2023 20:52)  T(F): 97.8 (15 Apr 2023 14:25), Max: 97.9 (14 Apr 2023 20:52)  HR: 95 (15 Apr 2023 14:25) (92 - 98)  BP: 122/85 (15 Apr 2023 14:25) (112/82 - 144/92)  BP(mean): --  RR: 18 (15 Apr 2023 14:25) (17 - 18)  SpO2: 99% (15 Apr 2023 14:25) (99% - 100%)    Parameters below as of 15 Apr 2023 14:25  Patient On (Oxygen Delivery Method): room air      I&O's Summary    14 Apr 2023 07:01  -  15 Apr 2023 07:00  --------------------------------------------------------  IN: 0 mL / OUT: 200 mL / NET: -200 mL        PHYSICAL EXAM:  General: No acute distress BMI-  HEENT: EOMI, PERRL  Neck: Supple, [ ] JVD  Lungs: Equal air entry bilaterally; [ ] rales [ ] wheezing [ ] rhonchi  Heart: Regular rate and rhythm; [x ] murmur   2/6 [ x] systolic [ ] diastolic [ ] radiation[ ] rubs [ ]  gallops  Abdomen: Nontender, bowel sounds present  Extremities: No clubbing, cyanosis, [ ] edema [ ]Pulses  equal and intact  Nervous system:  Alert & Oriented X3, no focal deficits  Psychiatric: Normal affect  Skin: No rashes or lesions    LABS:  04-15    145  |  110<H>  |  36<H>  ----------------------------<  148<H>  4.0   |  29  |  1.82<H>    Ca    9.7      15 Apr 2023 05:48  Phos  2.8     04-15  Mg     2.6     04-15    TPro  5.4<L>  /  Alb  2.1<L>  /  TBili  0.5  /  DBili  x   /  AST  23  /  ALT  54  /  AlkPhos  78  04-15    Creatinine Trend: 1.82<--, 2.13<--, 1.71<--, 1.59<--, 1.72<--, 1.77<--                        13.4   5.99  )-----------( 152      ( 15 Apr 2023 05:48 )             42.9     < from: TTE with Doppler (w/Cont) (03.29.23 @ 07:03) >    Patient name: LITZY MARAVILLA  YOB: 1950   Age: 73 (M)   MR#: [id number]  Study Date: 3/29/2023  Location: [address] [place], [place] [address]onographer: Bradley Moyer [facility]  Study quality: Fair  Referring Physician:  SHANIQUE MONTERROSO MD  Blood Pressure: 136/90 mmHg  Height: 157 cm  Weight: 62 kg  BSA: 1.6 m2  ------------------------------------------------------------------------    PROCEDURE: Transthoracic echocardiogram with 2-D, M-Mode  and complete spectral and color flow Doppler.  Given _20_cc agitatedsaline intravenously. Verbal consent  was obtained for injection of ultrasound enhancing agent  following a discussion of risks and benefits. Following  intravenous injection of ultrasound enhancing agent,  harmonic imaging was performed. 2_cc of ultrasound  enhancing agent injected intravenously.  INDICATION: Heart failure, unspecified (I50.9)  HISTORY:  ------------------------------------------------------------------------  DIMENSIONS:  Dimensions:     Normal Values:  LA:     4.2 cm    2.0 - 4.0 cm  Ao:     3.3 cm    2.0 - 3.8 cm  SEPTUM: 0.8 cm    0.6 - 1.2 cm  PWT:    0.8 cm    0.6 - 1.1 cm  LVIDd:  5.4 cm    3.0 - 5.6 cm  LVIDs:  4.8 cm    1.8 - 4.0 cm      Derived Variables:  LVMI: 95 g/m2  RWT: 0.29  Ejection Fraction Visual Estimate: 10-15 %    ------------------------------------------------------------------------  OBSERVATIONS:  Mitral Valve: Normal mitral valve. Trace mitral  regurgitation.  Aortic Root: Normal aortic root.  Aortic Valve: Aortic valve leaflet morphology not well  visualized, opens normally.  Left Atrium: Normal left atrium.  LA volume index = 20  cc/m2.  Left Ventricle: Severe global left ventricular systolic  dysfunction. Ultrasound LV opacification agent was used to  enhance endocardial definition. Normal left ventricular  internal dimensions and wall thicknesses. Grade I diastolic  dysfunction (Impaired relaxation, mild).  Right Heart: Normal right atrium. Normal right ventricular  size and function. There is trace tricuspid regurgitation.  There is trace pulmonic regurgitation.  Pericardium/PleuraNormal pericardium with no pericardial  effusion.  Hemodynamic: Incomplete tricuspid regurgitation jet  precludes accurate assessment of pulmonary artery systolic  pressure. Agitated saline injectionrevealed late bubbles  in the left heart, consistent with transpulmonic shunting.  ------------------------------------------------------------------------  CONCLUSIONS:  1. Trace mitral regurgitation.  2. Normal left ventricular internal dimensions and wall  thicknesses.  3. Severe global left ventricular systolic dysfunction.  Ultrasound LV opacification agent was used to enhance  endocardial definition.  4. Grade I diastolic dysfunction (Impaired relaxation,  mild).  5. Normal right ventricular size and function.  6. Agitated saline injection revealed late bubbles in the  left heart, consistent with transpulmonic shunting.    ------------------------------------------------------------------------  Confirmed on  3/30/2023 - 10:06:13 by Donny Jiménez MD  ------------------------------------------------------------------------    < end of copied text >

## 2023-04-15 NOTE — PROGRESS NOTE ADULT - SUBJECTIVE AND OBJECTIVE BOX
PGY-1 Progress Note discussed with attending    PAGER #: [[phone]] TILL 5:00 PM  PLEASE CONTACT ON CALL TEAM:  - On Call Team (Please refer to Red) FROM 5:00 PM - 8:30PM  - Nightfloat Team FROM 8:30 -7:30 AM    INTERVAL HPI/OVERNIGHT EVENTS:   Patient seen and examined at bedside. No acute events overnight. Patient does not have any concerns today.     ROS  Constitutional: (-)fever, (-)night sweats, (-)chills, (-)cold intolerance, (-)fatigue  Eyes: (-)change in vision, (-)loss of vision, (-)blurred vision  Ears: (-)difficulty hearing, (-)hearing loss  Nose: (-)nasal discharge  Mouth/Throat/Voice: (-)lip sores, (-)dysphagia, (-)odynophagia  Neck: (-)neck pain, (-)neck stiffness, (-)neck lumps, (-)neck swelling  Respiratory: (-)dyspnea, (-)cough, (-)cough productive of sputum, (-)hemoptysis, (-)wheezing  Cardiovascular: (-)chest pain, (-)palpitations, (-)dyspnea at rest, (-)dyspnea with activity, (-)orthopnea  Gastrointestinal: (-)abdominal pain, (-)rectal pain, (-)nausea, (-)vomiting  Urinary: (-)dysuria, (-)hematuria, (-)urinary hesitancy, (-)difficulty initiating urine stream  Dermatologic/Integumentary: (-)change in hair texture, (-)change in skin texture, (-)change in nail appearance, (-)dry hair  Musculoskeletal: (-)muscle pain, (-)back pain, (-)tender points, (-)muscle cramps  Neurological: (-)headaches, (-)vertigo, (-)lightheadedness, (-)fainting  Psychiatric: (-)change in mood, (-)depression, (-)sadness interfering with function, (-)anxiety, (-)nervousness  Hematologic/Lymphatic: (-)easy bruising, (-)difficulty stopping blood flow        albuterol    90 MICROgram(s) HFA Inhaler 2 Puff(s) Inhalation every 6 hours PRN  albuterol/ipratropium for Nebulization 3 milliLiter(s) Nebulizer every 6 hours  amantadine 100 milliGRAM(s) Oral every 12 hours  aspirin enteric coated 81 milliGRAM(s) Oral daily  atorvastatin 40 milliGRAM(s) Oral at bedtime  finasteride 5 milliGRAM(s) Oral daily  fluticasone propionate 50 MICROgram(s)/spray Nasal Spray 1 Spray(s) Both Nostrils two times a day  guaiFENesin Oral Liquid (Sugar-Free) 200 milliGRAM(s) Oral every 6 hours PRN  heparin   Injectable 5000 Unit(s) SubCutaneous every 12 hours  insulin glargine Injectable (LANTUS) 5 Unit(s) SubCutaneous at bedtime  insulin lispro (ADMELOG) corrective regimen sliding scale   SubCutaneous three times a day before meals  insulin lispro (ADMELOG) corrective regimen sliding scale   SubCutaneous at bedtime  insulin lispro Injectable (ADMELOG) 4 Unit(s) SubCutaneous three times a day before meals  loratadine 10 milliGRAM(s) Oral daily  memantine 5 milliGRAM(s) Oral two times a day  metoprolol succinate ER 50 milliGRAM(s) Oral daily  montelukast 10 milliGRAM(s) Oral daily  OLANZapine 10 milliGRAM(s) Oral at bedtime  tamsulosin 0.4 milliGRAM(s) Oral at bedtime  tiotropium 2.5 MICROgram(s) Inhaler 2 Puff(s) Inhalation daily  valproic  acid Syrup 250 milliGRAM(s) Oral <User Schedule>  valproic  acid Syrup 1000 milliGRAM(s) Oral at bedtime      Vital Signs Last 24 Hrs  T(C): 36.5 (15 Apr 2023 05:13), Max: 36.7 (14 Apr 2023 11:48)  T(F): 97.7 (15 Apr 2023 05:13), Max: 98 (14 Apr 2023 11:48)  HR: 92 (15 Apr 2023 05:13) (88 - 98)  BP: 112/82 (15 Apr 2023 05:13) (101/68 - 144/92)  BP(mean): --  RR: 17 (15 Apr 2023 05:13) (17 - 18)  SpO2: 100% (15 Apr 2023 05:13) (95% - 100%)    Parameters below as of 15 Apr 2023 05:13  Patient On (Oxygen Delivery Method): room air        PHYSICAL EXAMINATION:  GENERAL: NAD, lying in bed comfortably   HEAD:  Atraumatic, Normocephalic  EYES:  Prosthesis noted on the right, Left with conjunctiva and sclera clear, pupil is equal, round, and reactive to light and accommodation.  NECK: Supple, No JVD, trachea is midline, no evidence of thyroid enlargement, no lymphadenopathy or tenderness.  CHEST/LUNG: No rales, rhonchi, minimal expiratory wheezing noted, no rubs  HEART: Regular rate and rhythm; systolic murmur noted on the left lower sternal border, no rubs, or gallops  ABDOMEN: Soft, Nontender, Nondistended; Bowel sounds present  NERVOUS SYSTEM:  Alert & Oriented X2; No focal sensory or motor deficits are noted; Appropriate mood and affect.  EXTREMITIES:  2+ Peripheral Pulses, No clubbing, cyanosis, or edema  SKIN: Warm, dry, and well perfused; Good turgor; No lesions, nodules or rashes are noted.                            13.4   5.99  )-----------( 152      ( 15 Apr 2023 05:48 )             42.9     04-15    145  |  110<H>  |  36<H>  ----------------------------<  148<H>  4.0   |  29  |  1.82<H>    Ca    9.7      15 Apr 2023 05:48  Phos  2.8     04-15  Mg     2.6     04-15    TPro  5.4<L>  /  Alb  2.1<L>  /  TBili  0.5  /  DBili  x   /  AST  23  /  ALT  54  /  AlkPhos  78  04-15    LIVER FUNCTIONS - ( 15 Apr 2023 05:48 )  Alb: 2.1 g/dL / Pro: 5.4 g/dL / ALK PHOS: 78 U/L / ALT: 54 U/L DA / AST: 23 U/L / GGT: x                   CAPILLARY BLOOD GLUCOSE      RADIOLOGY & ADDITIONAL TESTS:  < from: CT Neck Soft Tissue No Cont (04.09.23 @ 20:24) >  FINDINGS:  The examination is mildly to moderately motion degraded.    Evaluation for malignancy is limited without intravenous contrast.    There is no enlargement of the adenoids and tonsils.  There is no   thickening of the soft palate/uvula, epiglottis, pharyngeal wall or   aryepiglottic folds.  The larynx and upper trachea are grossly   unremarkable, within limits of motion artifact.    There is no retropharyngeal edema.    The parotid glands, submandibular glands and thyroid appear grossly   unremarkable.    There is no gross cervical lymphadenopathy.    The unopacified cervical vasculature appears unremarkable; no   atherosclerotic calcification is identified.    There is partial straightening of the cervical lordosis.  There are   multilevel degenerative changes.  At C2-C3 there is a tiny central disc protrusion.  At C3-C4 and C4-C5 there are disc osteophytecomplexes producing mild   spinal canal stenosis.  At C5-C6 there is a disc osteophyte complex producing minimal spinal   canal stenosis.  At C6-C7 and C7-T1 there is no significant disc herniation or spinal   canal stenosis.    Uncovertebral/facet hypertrophy causes mild to moderate neural foraminal   narrowing at C2-C3 on the left, C3-C4 bilaterally, C4-C5 bilaterally,   C5-C6 on the left, C6-C7 on the right and C7-T1 on the right.    There appears to be mild circumferential wall thickening in the upper   thoracic esophagus.  Small pleural effusions are partially visualized at   both lung apices.  There is nonspecific interlobular septal thickening in   both lung apices.    Within the visualized brain, there is mild to moderate generalized   cerebral volume loss and mild periventricular white matter   hypoattenuation compatible with chronic microvascular ischemic disease. A   right ocular prosthesis is noted.    IMPRESSION:  The examination is mildly to moderately motion degraded.    Evaluation for malignancy is limited without intravenous contrast.    No abnormality is identified in the pharynx, larynx or upper trachea,   within limits of noncontrast CT technique.  There is no foreign body,   inflammatory change or extrinsic mass effect on the pharynx/larynx or   upper trachea.    There appears to be mild circumferential wall thickening in the upper   thoracic esophagus.  Upper endoscopy and/or esophagram may be obtained as   clinically warranted.    Small pleural effusions arepartially visualized bilaterally.    Nonspecific interlobular septal thickening in the lung apices.      < end of copied text >  < from: CT Chest No Cont (04.09.23 @ 20:24) >  FINDINGS:    LUNGS/AIRWAYS/PLEURA: Biconvex configuration of the trachea suggestive of   tracheobronchomalacia. No endobronchial lesion. Interlobular septal   thickening and not primarily peribronchial vascular groundglass in both   lungs. 7 mm subsolid nodule in the left upper lobe (3-67). Small pleural   effusions.    LYMPH NODES/MEDIASTINUM: No lymphadenopathy.    HEART/VASCULATURE: Enlarged heart. No pericardial effusion. Normal   caliber aorta.    UPPER ABDOMEN: Right renal cyst.    BONES/SOFT TISSUES: Unremarkable.      IMPRESSION:    Pulmonary edema and small pleural effusions.    Indeterminate left upper lobe 7 mm nodule. Recommend follow-up   noncontrast CT chest in 3 months to assess for change.    < end of copied text >  < from: Xray Chest 1 View- PORTABLE-Urgent (Xray Chest 1 View- PORTABLE-Urgent .) (04.08.23 @ 10:51) >  IMPRESSION:   Persistent bilateral perihilar/RIGHT greater than LEFT   bibasilar diffuse airspace disease and/or small effusions.  .    < end of copied text >  < from: TTE with Doppler (w/Cont) (03.29.23 @ 07:03) >  1. Trace mitral regurgitation.  2. Normal left ventricular internal dimensions and wall  thicknesses.  3. Severe global left ventricular systolic dysfunction.  Ultrasound LV opacification agent was used to enhance  endocardial definition.  4. Grade I diastolic dysfunction (Impaired relaxation,  mild).  5. Normal right ventricular size and function.  6. Agitated saline injection revealed late bubbles in the  left heart, consistent with transpulmonic shunting.      < end of copied text >

## 2023-04-15 NOTE — PROGRESS NOTE ADULT - PROBLEM SELECTOR PLAN 1
ECHO 3/23 showed severely reduced EF 10-15%  NYHA Heart Failure class III stage C  Metoprolol 50 mg PO qd  Continue to hold enalapril and Jardiance for now until renal function back to baseline  Hold adding Spironolactone until renal function back to baseline  Cardiology onboard   No cardiac cath at this moment   PT recommendations noted  Hold Lasix 40 mg PO qd for now due to worsening renal function.   Nephro Dr Pederson onboard  Will keep monitoring.

## 2023-04-15 NOTE — PROGRESS NOTE ADULT - ASSESSMENT
73 y o with ambulatory at baseline from a [facility] home number 10 w/ PMH of intellectual disability, Down Syndrome, asthma, COPD with chronic cough, HTN, DM,  CKD elevated PSA, BPH, schizophrenia, hepatitis B, carrier, bilateral sensorineural hearing loss, admitted for AHRF secondary to Acute on Chronic Systolic Heart Failure.

## 2023-04-15 NOTE — PROGRESS NOTE ADULT - PROBLEM SELECTOR PLAN 4
Baseline 1.30s to 1.60s  Cr 1.59>1.71>2.13>1.82  Likely overdiuresis  Hold Lasix  Nephrology onboard   Avoid nephrotoxic agents  Will keep monitoring   Rest as above

## 2023-04-15 NOTE — PROGRESS NOTE ADULT - SUBJECTIVE AND OBJECTIVE BOX
[facility] NEPHROLOGY- PROGRESS NOTE    Patient is a 72yo Male from  [facility] home number with CKD,  h/o intellectual disability, Down Syndrome, asthma, COPD with chronic cough, HTN, DM, BPH, schizophrenia,, bilateral sensorineural hearing loss, who came in to the ED for sore throat and chest pain.  Pt a/w acute on chroni CHF. Pt was diuresed with Lasix.  Hosp cb [facility]. Nephrology consulted for Elevated serum creatinine.    REVIEW OF SYSTEMS:  Gen: no fevers  Cards: no chest pain  Resp: no dyspnea, + cough  GI: no nausea or vomiting or diarrhea  Vascular: no LE edema    No Known Allergies      Hospital Medications: Medications reviewed    VITALS:  T(F): 97.7 (04-15-23 @ 05:13), Max: 97.9 (23 @ 20:52)  HR: 92 (04-15-23 @ 05:13)  BP: 112/82 (04-15-23 @ 05:13)  RR: 17 (04-15-23 @ 05:13)  SpO2: 100% (04-15-23 @ 05:13)  Wt(kg): --  Height (cm): 162.6 ( @ 09:14), 157.5 ( @ 11:49)  Weight (kg): 62.6 ( @ :14), 62.596 ( 11:49)  BMI (kg/m2): 23.7 ( @ :14), 25.2 ( 11:49)  BSA (m2): 1.67 (:14), 1.63 ( 11:49)     @ 07:01  -  04-15 @ 07:00  --------------------------------------------------------  IN: 0 mL / OUT: 200 mL / NET: -200 mL        PHYSICAL EXAM:    Gen: NAD, calm  Cards: RRR, +S1/S2, no M/G/R  Resp: CTA B/L  GI: soft, NT, + ab distention, NABS  Vascular: no LE edema B/L    LABS:  04-15    145  |  110<H>  |  36<H>  ----------------------------<  148<H>  4.0   |  29  |  1.82<H>    Ca    9.7      15 Apr 2023 05:48  Phos  2.8     04-15  Mg     2.6     04-15    TPro  5.4<L>  /  Alb  2.1<L>  /  TBili  0.5  /  DBili      /  AST  23  /  ALT  54  /  AlkPhos  78  04-15    Creatinine Trend: 1.82 <--, 2.13 <--, 1.71 <--, 1.59 <--, 1.72 <--, 1.77 <--, 1.57 <--                        13.4   5.99  )-----------( 152      ( 15 Apr 2023 05:48 )             42.9     Urine Studies:  Urinalysis Basic - ( 15 Apr 2023 12:14 )    Color: Yellow / Appearance: Clear / S.015 / pH:   Gluc:  / Ketone: Negative  / Bili: Negative / Urobili: 12 mg/dL   Blood:  / Protein: 30 mg/dL / Nitrite: Negative   Leuk Esterase: Negative / RBC: 0-2 /HPF / WBC 0-2 /HPF   Sq Epi:  / Non Sq Epi:  / Bacteria: Trace /HPF      Creatinine, Random Urine: 149 mg/dL (04-15 @ 12:06)      RADIOLOGY & ADDITIONAL STUDIES:

## 2023-04-15 NOTE — PROGRESS NOTE ADULT - ASSESSMENT
73 y o with ambulatory at baseline from a [facility] home number 10 w/ PMH of intellectual disability, Down Syndrome, asthma, COPD with chronic cough, HTN, DM,  CKD elevated PSA, BPH, schizophrenia, hepatitis B, carrier, bilateral sensorineural hearing loss, admitted for AHRF secondary to Acute on Chronic Systolic Heart Failure.

## 2023-04-15 NOTE — PROGRESS NOTE ADULT - ATTENDING COMMENTS
Patient was seen and examined at bedside. Complains of stuffed nose and mild SOB    ICU Vital Signs Last 24 Hrs  T(C): 36.6 (15 Apr 2023 14:25), Max: 36.6 (14 Apr 2023 20:52)  T(F): 97.8 (15 Apr 2023 14:25), Max: 97.9 (14 Apr 2023 20:52)  HR: 95 (15 Apr 2023 14:25) (92 - 98)  BP: 122/85 (15 Apr 2023 14:25) (112/82 - 144/92)  BP(mean): --  ABP: --  ABP(mean): --  RR: 18 (15 Apr 2023 14:25) (17 - 18)  SpO2: 99% (15 Apr 2023 14:25) (99% - 100%)    O2 Parameters below as of 15 Apr 2023 14:25  Patient On (Oxygen Delivery Method): room air      P/E:  NAD  AAOx2, no focal deficit    BL wheezing mild   S1S2 WNL, no MRG   Abd soft, non tender, BS present   BLLE no edema or calf tenderness     Labs noted     A/P:  YASSINE on CKD   Acute hypoxic respiratory failure resolved  Acute on chronic systolic heart failure exacerbation   DM with hyperglycemia   HTN  HLD  Asthma  Down syndrome   Intellectual disability  Esophageal thickening    Plan:  Cont to Lasix, cr improving   cont Metoprolol Succinate  Dr. Hamilton following  Monitor renal function, baseline possibly between 1.3-1.6, avoid Nephrotoxic medication  Will hold off adding GDMT now   Cont Albuterol, spiriva and Duoneb, add short course of PO prednisone   Lactulose for constipation  Cont current insulin regimen  Thickening of esophagus- will give outpatient GI follow up. Patient is asymptomatic now, denies any dysphagia, tolerating diet   CM and SW for safe discharge

## 2023-04-15 NOTE — PROGRESS NOTE ADULT - ASSESSMENT
Patient is a 72yo Male from  [facility] home number with CKD,  h/o intellectual disability, Down Syndrome, asthma, COPD with chronic cough, HTN, DM, BPH, schizophrenia,, bilateral sensorineural hearing loss, who came in to the ED for sore throat and chest pain.  Pt a/w acute on chroni CHF. Pt was diuresed with Lasix.  Hosp cb [facility]. Nephrology consulted for Elevated serum creatinine.    1. YASSINE- likely due to overdiuresis, for which Lasix 40mg PO daily was discontinued. Scr improving. UA bland with proteinuria. Check urine sodium and urine creatinine. Continue to hold Enalapril. F/U Renal US to r/o retention. Strict I/Os. Avoid nephrotoxins/ NSAIDs/ RCA. Monitor BMP. Would restart lasix 40 mg daily in 1-2 days given h/o severe global LVSD.  2. CKD-3a- baseline SCr 1.3-1.6. Defer secondary w/u as an outpt. Avoid nephrotoxins  3. HTN 2/2 CKD- BP acceptable. Continue to hold Enalapril. Continue with current anti-hypertensive medications. Monitor BP.  4. BPH- c/w flomax. Check post void bladder scan to r/o retention.     [facility] NEPHROLOGY  Raul Goncalves M.D.  Bennie Carcamo D.O.  Priya Pederson M.D.  Hellen Dietz, MSN, ANP-C  [phone]  Fax: [phone] [place] [address], [address]  [place] [address]

## 2023-04-16 NOTE — PROGRESS NOTE ADULT - SUBJECTIVE AND OBJECTIVE BOX
[facility] NEPHROLOGY- PROGRESS NOTE    Patient is a 72yo Male from  [facility] home number with CKD,  h/o intellectual disability, Down Syndrome, asthma, COPD with chronic cough, HTN, DM, BPH, schizophrenia,, bilateral sensorineural hearing loss, who came in to the ED for sore throat and chest pain.  Pt a/w acute on chroni CHF. Pt was diuresed with Lasix.  Hosp cb [facility]. Nephrology consulted for Elevated serum creatinine.    REVIEW OF SYSTEMS:  Gen: no fevers  Cards: no chest pain  Resp: no dyspnea, + cough  GI: no nausea or vomiting or diarrhea  Vascular: no LE edema    No Known Allergies      Hospital Medications: Medications reviewed      VITALS:  T(F): 97.5 (23 @ 12:41), Max: 98.6 (23 @ 00:29)  HR: 88 (23 @ 12:41)  BP: 143/105 (23 @ 12:41)  RR: 17 (23 @ 12:41)  SpO2: 97% (23 @ 12:41)  Wt(kg): --        PHYSICAL EXAM:    Gen: NAD, calm  Cards: RRR, +S1/S2, no M/G/R  Resp: CTA B/L  GI: soft, NT, + ab distention, NABS  Vascular: no LE edema B/L      LABS:      144  |  110<H>  |  43<H>  ----------------------------<  192<H>  4.9   |  27  |  2.18<H>    Ca    10.2      2023 07:00  Phos  3.5       Mg     2.6         TPro  6.3  /  Alb  2.4<L>  /  TBili  0.6  /  DBili      /  AST  32  /  ALT  67<H>  /  AlkPhos  95      Creatinine Trend: 2.18 <--, 1.82 <--, 2.13 <--, 1.71 <--, 1.59 <--, 1.72 <--                        14.8   7.43  )-----------( 175      ( 2023 07:00 )             47.2     Urine Studies:  Urinalysis Basic - ( 15 Apr 2023 12:14 )    Color: Yellow / Appearance: Clear / S.015 / pH:   Gluc:  / Ketone: Negative  / Bili: Negative / Urobili: 12 mg/dL   Blood:  / Protein: 30 mg/dL / Nitrite: Negative   Leuk Esterase: Negative / RBC: 0-2 /HPF / WBC 0-2 /HPF   Sq Epi:  / Non Sq Epi:  / Bacteria: Trace /HPF      Creatinine, Random Urine: 149 mg/dL (04-15 @ 12:06)

## 2023-04-16 NOTE — PROGRESS NOTE ADULT - ASSESSMENT
Patient is a 72yo Male from  [facility],  h/o intellectual disability, Down Syndrome, asthma, COPD with chronic cough, HTN, DM, BPH, schizophrenia,, bilateral sensorineural hearing loss, who came in to the ED for sore throat and chest pain.  Pt a/w acute on chroni CHF. Pt was diuresed with Lasix.  Hosp cb [facility]. Nephrology consulted for Elevated serum creatinine.    1. YASSINE- likely due to overdiuresis, for which Lasix 40mg PO daily was discontinued. Scr increased this morning off of diuretics? urinary retention? Please follow up renal US. UA bland with proteinuria. FeUrea low. Continue to hold Enalapril and lasix. Encourage PO intake (defer IVF given h/o CHF with severe global LVSD on TTE). Strict I/Os. Avoid nephrotoxins/ NSAIDs/ RCA. Monitor BMP.  2. CKD-3a- baseline SCr 1.3-1.6. Defer secondary w/u as an outpt. Avoid nephrotoxins  3. HTN 2/2 CKD- BP acceptable. Continue to hold Enalapril. Continue with current anti-hypertensive medications. Monitor BP.  4. BPH- c/w flomax. Check post void bladder scan to r/o retention.     [facility] NEPHROLOGY  Raul Goncalves M.D.  Bennie Carcamo D.O.  Priya Pederson M.D.  Hellen Dietz, MSN, ANP-C  [phone]  Fax: [phone] [place] [address], [address]  [place] [address]

## 2023-04-16 NOTE — PROGRESS NOTE ADULT - ASSESSMENT
73 y o with ambulatory at baseline from a [facility] home number 10 w/ PMH of intellectual disability, Down Syndrome, asthma, COPD with chronic cough, HTN, DM,  CKD elevated PSA, BPH, schizophrenia, hepatitis B, carrier, bilateral sensorineural hearing loss, admitted for AHRF secondary to Acute on Chronic Systolic Heart Failure.

## 2023-04-16 NOTE — PROGRESS NOTE ADULT - NUTRITIONAL ASSESSMENT
YASSINE on CKD   Acute hypoxic respiratory failure resolved  Acute on chronic systolic heart failure exacerbation   DM with hyperglycemia   HTN  HLD  Asthma  Down syndrome   Intellectual disability  Esophageal thickening    Plan:  Cont short course of steroid   Cont to hold Lasix, BMP daily  cont Metoprolol Succinate  Dr. Hamilton following  Monitor renal function, baseline possibly between 1.3-1.6, avoid Nephrotoxic medication  Will hold off adding GDMT now   Cont Albuterol, spiriva and Duoneb, add short course of PO prednisone   Lactulose for constipation  Cont current insulin regimen  Thickening of esophagus- will give outpatient GI follow up. Patient is asymptomatic now, denies any dysphagia, tolerating diet   CM and SW for safe discharge   Will update Dr. Hearn tomorrow

## 2023-04-16 NOTE — PROGRESS NOTE ADULT - SUBJECTIVE AND OBJECTIVE BOX
PATIENT SEEN AND EXAMINED ON :- 4/16/23  DATE OF SERVICE:   4/16/23          Interim events noted,Labs ,Radiological studies and Cardiology tests reviewed .    MR#[id number]  PATIENT NAME:-[facility] COURSE: HPI:  73 y o with ambulatory at baseline from a [facility] number [facility] w/ PMH of intellectual disability, Down Syndrome, asthma, COPD with chronic cough, HTN, DM,  CKD elevated PSA, BPH, schizophrenia, hepatitis B, carrier, bilateral sensorineural hearing loss, who came in to the ED for sore throat and chest pain.  Patient states he developed a sore throat and dry cough a few days ago. Then last night had chest pain, located on the left side, w/o radiation, described as sharp. Pt states the pain lasted throughout the night and resolved this morning. Currently denies any fever, chills, nausea vomiting SOB, abdominal pain, PND, orthopnea or change in bowel habits  (08 Apr 2023 18:22)      INTERIM EVENTS:Patient seen at bedside ,interim events noted.      PMH -reviewed admission note, no change since admission  HEART FAILURE: Acute[ ]Chronic[ ] Systolic[ ] Diastolic[ ] Combined Systolic and Diastolic[ ]  CAD[ ] CABG[ ] PCI[ ]  DEVICES[ ] PPM[ ] ICD[ ] ILR[ ]  ATRIAL FIBRILLATION[ ] Paroxysmal[ ] Permanent[ ] CHADS2-[  ]  YASSINE[ ] CKD1[ ] CKD2[ ] CKD3[ ] CKD4[ ] ESRD[ ]  COPD[ ] HTN[ ]   DM[ ] Type1[ ] Type 2[ ]   CVA[ ] Paresis[ ]    AMBULATION: Assisted[ ] Cane/walker[ ] Independent[ ]    MEDICATIONS  (STANDING):  albuterol/ipratropium for Nebulization 3 milliLiter(s) Nebulizer every 6 hours  amantadine 100 milliGRAM(s) Oral every 12 hours  aspirin enteric coated 81 milliGRAM(s) Oral daily  atorvastatin 40 milliGRAM(s) Oral at bedtime  finasteride 5 milliGRAM(s) Oral daily  fluticasone propionate 50 MICROgram(s)/spray Nasal Spray 1 Spray(s) Both Nostrils two times a day  heparin   Injectable 5000 Unit(s) SubCutaneous every 12 hours  insulin glargine Injectable (LANTUS) 5 Unit(s) SubCutaneous at bedtime  insulin lispro (ADMELOG) corrective regimen sliding scale   SubCutaneous at bedtime  insulin lispro (ADMELOG) corrective regimen sliding scale   SubCutaneous three times a day before meals  insulin lispro Injectable (ADMELOG) 4 Unit(s) SubCutaneous three times a day before meals  loratadine 10 milliGRAM(s) Oral daily  memantine 5 milliGRAM(s) Oral two times a day  metoprolol succinate ER 50 milliGRAM(s) Oral daily  montelukast 10 milliGRAM(s) Oral daily  OLANZapine 10 milliGRAM(s) Oral at bedtime  predniSONE   Tablet 40 milliGRAM(s) Oral daily  tamsulosin 0.4 milliGRAM(s) Oral at bedtime  tiotropium 2.5 MICROgram(s) Inhaler 2 Puff(s) Inhalation daily  valproic  acid Syrup 1000 milliGRAM(s) Oral at bedtime  valproic  acid Syrup 250 milliGRAM(s) Oral <User Schedule>    MEDICATIONS  (PRN):  albuterol    90 MICROgram(s) HFA Inhaler 2 Puff(s) Inhalation every 6 hours PRN Shortness of Breath and/or Wheezing  guaiFENesin Oral Liquid (Sugar-Free) 200 milliGRAM(s) Oral every 6 hours PRN Cough            REVIEW OF SYSTEMS:  Constitutional: [ ] fever, [ ]weight loss,  [ ]fatigue [ ]weight gain  Eyes: [ ] visual changes  Respiratory: [ ]shortness of breath;  [ ] cough, [ ]wheezing, [ ]chills, [ ]hemoptysis  Cardiovascular: [ ] chest pain, [ ]palpitations, [ ]dizziness,  [ ]leg swelling[ ]orthopnea[ ]PND  Gastrointestinal: [ ] abdominal pain, [ ]nausea, [ ]vomiting,  [ ]diarrhea [ ]Constipation [ ]Melena  Genitourinary: [ ] dysuria, [ ] hematuria [ ]Reyes  Neurologic: [ ] headaches [ ] tremors[ ]weakness [ ]Paralysis Right[ ] Left[ ]  Skin: [ ] itching, [ ]burning, [ ] rashes  Endocrine: [ ] heat or cold intolerance  Musculoskeletal: [ ] joint pain or swelling; [ ] muscle, back, or extremity pain  Psychiatric: [ ] depression, [ ]anxiety, [ ]mood swings, or [ ]difficulty sleeping  Hematologic: [ ] easy bruising, [ ] bleeding gums    [ ] All remaining systems negative except as per above.   [ ]Unable to obtain.  [x] No change in ROS since admission      Vital Signs Last 24 Hrs  T(C): 36.4 (16 Apr 2023 12:41), Max: 37 (16 Apr 2023 00:29)  T(F): 97.5 (16 Apr 2023 12:41), Max: 98.6 (16 Apr 2023 00:29)  HR: 96 (16 Apr 2023 19:50) (86 - 97)  BP: 140/84 (16 Apr 2023 19:50) (113/82 - 151/94)  BP(mean): --  RR: 18 (16 Apr 2023 19:50) (17 - 20)  SpO2: 97% (16 Apr 2023 19:50) (95% - 98%)    Parameters below as of 16 Apr 2023 19:50  Patient On (Oxygen Delivery Method): room air      I&O's Summary      PHYSICAL EXAM:  General: No acute distress BMI-  HEENT: EOMI, PERRL  Neck: Supple, [ ] JVD  Lungs: Equal air entry bilaterally; [ ] rales [ ] wheezing [ ] rhonchi  Heart: Regular rate and rhythm; [x ] murmur   2/6 [ x] systolic [ ] diastolic [ ] radiation[ ] rubs [ ]  gallops  Abdomen: Nontender, bowel sounds present  Extremities: No clubbing, cyanosis, [ ] edema [ ]Pulses  equal and intact  Nervous system:  Alert & Oriented X3, no focal deficits  Psychiatric: Normal affect  Skin: No rashes or lesions    LABS:  04-16    144  |  110<H>  |  43<H>  ----------------------------<  192<H>  4.9   |  27  |  2.18<H>    Ca    10.2      16 Apr 2023 07:00  Phos  3.5     04-16  Mg     2.6     04-16    TPro  6.3  /  Alb  2.4<L>  /  TBili  0.6  /  DBili  x   /  AST  32  /  ALT  67<H>  /  AlkPhos  95  04-16    Creatinine Trend: 2.18<--, 1.82<--, 2.13<--, 1.71<--, 1.59<--, 1.72<--                        14.8   7.43  )-----------( 175      ( 16 Apr 2023 07:00 )             47.2

## 2023-04-17 NOTE — PROGRESS NOTE ADULT - SUBJECTIVE AND OBJECTIVE BOX
[facility] NEPHROLOGY- PROGRESS NOTE    Patient is a 74yo Male from  [facility] home number with CKD,  h/o intellectual disability, Down Syndrome, asthma, COPD with chronic cough, HTN, DM, BPH, schizophrenia,, bilateral sensorineural hearing loss, who came in to the ED for sore throat and chest pain.  Pt a/w acute on chroni CHF. Pt was diuresed with Lasix.  Hosp cb YASSINE. Nephrology consulted for Elevated serum creatinine.    REVIEW OF SYSTEMS:  Gen: no fevers  Cards: no chest pain  Resp: no dyspnea, + cough  GI: no nausea or vomiting or diarrhea  Vascular: no LE edema    No Known Allergies      Hospital Medications: Medications reviewed      VITALS:  T(F): 97.7 (23 @ 14:00), Max: 97.7 (23 @ 14:00)  HR: 88 (23 @ 14:00)  BP: 118/88 (23 @ 14:00)  RR: 18 (23 @ 14:00)  SpO2: 95% (23 @ 14:00)  Wt(kg): --     @ 07:01  -   @ 16:11  --------------------------------------------------------  IN: 0 mL / OUT: 96 mL / NET: -96 mL        PHYSICAL EXAM:    Gen: NAD, calm  Cards: RRR, +S1/S2, no M/G/R  Resp: CTA B/L  GI: soft, NT, + ab distention, NABS  Vascular: no LE edema B/L      LABS:      142  |  108  |  63<H>  ----------------------------<  233<H>  5.1   |  25  |  2.85<H>    Ca    10.1      2023 12:18  Phos  4.4       Mg     3.0         TPro  7.5  /  Alb  2.7<L>  /  TBili  0.7  /  DBili      /  AST  68<H>  /  ALT  83<H>  /  AlkPhos  107      Creatinine Trend: 2.85 <--, 3.02 <--, 2.18 <--, 1.82 <--, 2.13 <--, 1.71 <--, 1.59 <--, 1.72 <--                        15.7   9.57  )-----------( 193      ( 2023 12:18 )             50.1     Urine Studies:  Urinalysis Basic - ( 15 Apr 2023 12:14 )    Color: Yellow / Appearance: Clear / S.015 / pH:   Gluc:  / Ketone: Negative  / Bili: Negative / Urobili: 12 mg/dL   Blood:  / Protein: 30 mg/dL / Nitrite: Negative   Leuk Esterase: Negative / RBC: 0-2 /HPF / WBC 0-2 /HPF   Sq Epi:  / Non Sq Epi:  / Bacteria: Trace /HPF      Creatinine, Random Urine: 149 mg/dL (04-15 @ 12:06)    < from: US Renal (23 @ 14:48) >    ACC: [id number] EXAM:  US KIDNEY(S)   ORDERED BY: MERLIN ARAYA     PROCEDURE DATE:  2023          INTERPRETATION:  CLINICAL INFORMATION: Acute renal insufficiency.   Evaluate for urinary retention.    COMPARISON: None available.    TECHNIQUE: Sonography of the kidneys and bladder.    FINDINGS:  Right kidney: 11.1 cm. No hydronephrosis or calculi. Cystic foci   identified measuring up to 3.7 cm.    Left kidney: 10.2 cm. No hydronephrosis or calculi. Cystic foci   identified measuring up to 8 mm.    Urinary bladder: Bladder volume at time of examination approximates 275   cc. No bladder wall thickening noted. No intraluminal mass. Post void   residual is not obtained.    IMPRESSION:  No evidence for bilateral hydronephrosis.          < end of copied text >

## 2023-04-17 NOTE — PROGRESS NOTE ADULT - PROBLEM SELECTOR PLAN 1
ECHO 3/23 showed severely reduced EF 10-15%  NYHA Heart Failure class III stage C  Metoprolol 50 mg PO qd  Continue to hold enalapril and Jardiance for now until renal function back to baseline  Hold adding Spironolactone until renal function back to baseline  No cardiac cath at this moment   PT recommending CARLOS  Hold Lasix 40 mg PO qd for now due to worsening renal function.

## 2023-04-17 NOTE — PROGRESS NOTE ADULT - PROBLEM SELECTOR PLAN 4
Patient with increased functional limitations due to worsening CHF symptoms, in context of Down's syndrome, intellectual disability, presumed dementia, and multiple medical comorbidities.  He is at risk for further physical and functional decline    Recommend OOB to chair  Aggressive PT  Continued pureed diet. Patient with increased functional limitations due to worsening CHF symptoms, in context of Down's syndrome, intellectual disability, presumed dementia, and multiple medical comorbidities.  He is at risk for further physical and functional decline    Recommend OOB to chair  Aggressive PT--assess for ability to do stairs  Continued pureed diet.

## 2023-04-17 NOTE — PROGRESS NOTE ADULT - SUBJECTIVE AND OBJECTIVE BOX
PGY-1 Progress Note discussed with attending    PAGER #: [[phone]] TILL 5:00 PM  PLEASE CONTACT ON CALL TEAM:  - On Call Team (Please refer to Red) FROM 5:00 PM - 8:30PM  - Nightfloat Team FROM 8:30 -7:30 AM    CHIEF COMPLAINT & BRIEF HOSPITAL COURSE:    INTERVAL HPI/OVERNIGHT EVENTS:       REVIEW OF SYSTEMS:  CONSTITUTIONAL: No fever, weight loss, or fatigue  RESPIRATORY: No cough, wheezing, chills or hemoptysis; No shortness of breath  CARDIOVASCULAR: No chest pain, palpitations, dizziness, or leg swelling  GASTROINTESTINAL: No abdominal pain. No nausea, vomiting, or hematemesis; No diarrhea or constipation. No melena or hematochezia.  GENITOURINARY: No dysuria or hematuria, urinary frequency  NEUROLOGICAL: No headaches, memory loss, loss of strength, numbness, or tremors  SKIN: No itching, burning, rashes, or lesions     Vital Signs Last 24 Hrs  T(C): 36.4 (17 Apr 2023 05:57), Max: 36.4 (16 Apr 2023 12:41)  T(F): 97.5 (17 Apr 2023 05:57), Max: 97.5 (16 Apr 2023 12:41)  HR: 90 (17 Apr 2023 05:57) (88 - 96)  BP: 117/86 (17 Apr 2023 05:57) (117/86 - 151/109)  BP(mean): --  RR: 18 (17 Apr 2023 05:57) (17 - 18)  SpO2: 96% (17 Apr 2023 05:57) (96% - 99%)    Parameters below as of 17 Apr 2023 05:57  Patient On (Oxygen Delivery Method): room air        PHYSICAL EXAMINATION:  GENERAL: NAD, well built  HEAD:  Atraumatic, Normocephalic  EYES:  conjunctiva and sclera clear  NECK: Supple, No JVD, Normal thyroid  CHEST/LUNG: Clear to auscultation. Clear to percussion bilaterally; No rales, rhonchi, wheezing, or rubs  HEART: Regular rate and rhythm; No murmurs, rubs, or gallops  ABDOMEN: Soft, Nontender, Nondistended; Bowel sounds present  NERVOUS SYSTEM:  Alert & Oriented X3,    EXTREMITIES:  2+ Peripheral Pulses, No clubbing, cyanosis, or edema  SKIN: warm dry                          16.3   11.73 )-----------( 256      ( 17 Apr 2023 05:11 )             52.2     04-17    142  |  108  |  58<H>  ----------------------------<  167<H>  6.1<H>   |  25  |  3.02<H>    Ca    10.5      17 Apr 2023 05:11  Phos  4.4     04-17  Mg     3.0     04-17    TPro  7.5  /  Alb  2.7<L>  /  TBili  0.7  /  DBili  x   /  AST  68<H>  /  ALT  83<H>  /  AlkPhos  107  04-17    LIVER FUNCTIONS - ( 17 Apr 2023 05:11 )  Alb: 2.7 g/dL / Pro: 7.5 g/dL / ALK PHOS: 107 U/L / ALT: 83 U/L DA / AST: 68 U/L / GGT: x                   CAPILLARY BLOOD GLUCOSE      RADIOLOGY & ADDITIONAL TESTS:                   PGY-1 Progress Note discussed with attending    PAGER #: [[phone]] TILL 5:00 PM  PLEASE CONTACT ON CALL TEAM:  - On Call Team (Please refer to Red) FROM 5:00 PM - 8:30PM  - Nightfloat Team FROM 8:30 -7:30 AM    INTERVAL HPI/OVERNIGHT EVENTS:   no acute overnight events, pt. seen and examined at bedside, offers no complaints. Pt states that he feels good and has no complains today.       REVIEW OF SYSTEMS:  CONSTITUTIONAL: No fever, weight loss, or fatigue  RESPIRATORY: No cough, wheezing, chills or hemoptysis; No shortness of breath  CARDIOVASCULAR: No chest pain, palpitations, dizziness, or leg swelling  GASTROINTESTINAL: No abdominal pain. No nausea, vomiting, or hematemesis; No diarrhea or constipation. No melena or hematochezia.  GENITOURINARY: No dysuria or hematuria, urinary frequency  NEUROLOGICAL: No headaches, memory loss, loss of strength, numbness, or tremors  SKIN: No itching, burning, rashes, or lesions     Vital Signs Last 24 Hrs  T(C): 36.4 (17 Apr 2023 05:57), Max: 36.4 (16 Apr 2023 12:41)  T(F): 97.5 (17 Apr 2023 05:57), Max: 97.5 (16 Apr 2023 12:41)  HR: 90 (17 Apr 2023 05:57) (88 - 96)  BP: 117/86 (17 Apr 2023 05:57) (117/86 - 151/109)  BP(mean): --  RR: 18 (17 Apr 2023 05:57) (17 - 18)  SpO2: 96% (17 Apr 2023 05:57) (96% - 99%)    Parameters below as of 17 Apr 2023 05:57  Patient On (Oxygen Delivery Method): room air        PHYSICAL EXAMINATION:  GENERAL: NAD, lying in bed comfortably   HEAD:  Atraumatic, Normocephalic  EYES:  Prosthesis noted on the right, Left with conjunctiva and sclera clear, pupil is equal, round, and reactive to light and accommodation.  NECK: Supple, No JVD, trachea is midline, no evidence of thyroid enlargement, no lymphadenopathy or tenderness.  CHEST/LUNG: No rales, rhonchi, minimal expiratory wheezing noted, no rubs  HEART: Regular rate and rhythm; systolic murmur noted on the left lower sternal border, no rubs, or gallops  ABDOMEN: Soft, Nontender, Nondistended; Bowel sounds present  NERVOUS SYSTEM:  Alert & Oriented X2; No focal sensory or motor deficits are noted; Appropriate mood and affect.  EXTREMITIES:  2+ Peripheral Pulses, No clubbing, cyanosis, or edema  SKIN: Warm, dry, and well perfused; Good turgor; No lesions, nodules or rashes are noted.                        16.3   11.73 )-----------( 256      ( 17 Apr 2023 05:11 )             52.2     04-17    142  |  108  |  58<H>  ----------------------------<  167<H>  6.1<H>   |  25  |  3.02<H>    Ca    10.5      17 Apr 2023 05:11  Phos  4.4     04-17  Mg     3.0     04-17    TPro  7.5  /  Alb  2.7<L>  /  TBili  0.7  /  DBili  x   /  AST  68<H>  /  ALT  83<H>  /  AlkPhos  107  04-17    LIVER FUNCTIONS - ( 17 Apr 2023 05:11 )  Alb: 2.7 g/dL / Pro: 7.5 g/dL / ALK PHOS: 107 U/L / ALT: 83 U/L DA / AST: 68 U/L / GGT: x                   CAPILLARY BLOOD GLUCOSE      RADIOLOGY & ADDITIONAL TESTS:

## 2023-04-17 NOTE — PROGRESS NOTE ADULT - PROBLEM SELECTOR PLAN 5
Patient now with advanced systolic HF, in setting of severe NICM, with EF of 10-15%, and increasing BEARDEN/SOB with performance of ADLs resulting in functional limitation.  He is not considered to be a candidate for ICD.  With his increasing symptom burden he may not be able to return to his group home setting and may need to be placed at a higher level of care. There is also concern regarding transitions of care and support for adequate outpatient f/u of his heart failure in his current setting.      Patient has at minimum Stage C NYHA class III heart failure.  He is at risk for continued progression of disease, further functional decline, recurrent hospitalizations, increasing morbidity, and sudden death.      Patient is under auspices of [facility] [place]. Spoke to manager/supervisor Rina Eller--please see previous GO conversation from 4/10.   is in agreement to begin initial legal review process by [employer] for possible DNR/DNI.  1750-b pre-MOLST checklist placed on chart.    I also spoke with group home medical director Dr. Hearn ([phone]), who shares above concerns regarding patient's current home setting, advance directives, and transitions of care.  She is requesting telephone conference with herself and group home staff at least 24 hours prior to any anticipated discharge.  Placement at a higher level of care may be required.    Will collaborate with team SW and case management.  Case also discussed in detail with Dr. Quinteros.  Patient remains FULL CODE for now  Awaiting PT and formal Cardiology consults to help inform GOC planning/decision making as outlined above.  Palliative care team will continue to follow. Patient now with advanced systolic HF, in setting of severe NICM, with EF of 10-15%, and increasing BEARDEN/SOB with performance of ADLs resulting in functional limitation.  He is not considered to be a candidate for ICD.  With his increasing symptom burden he may not be able to return to his group home setting and may need to be placed at a higher level of care. There is also concern regarding transitions of care and support for adequate outpatient f/u of his heart failure in his current setting.      Patient has at minimum Stage C NYHA class III heart failure.  He now has worsening renal failure/ATN with concern for possible cardiorenal syndrome.  He is at risk for continued progression of disease, further functional decline, recurrent hospitalizations, increasing morbidity, and sudden death.  Patient is LOW THRESHOLD for referral to hospice if his renal failure and/or symptom burden continue to progress.      Patient is under auspices of [facility]. Spoke to manager/supervisor Rina Eller--please see previous [facility] conversation from 4/10.   previously in agreement to begin initial legal review process by [employer] for possible DNR/DNI.  1750-b pre-MOLST checklist placed on chart. Group home medical director Dr. Hearn ([phone]), also shares above concerns regarding patient's current home setting, advance directives, and transitions of care.  She is requesting telephone conference with herself and group home staff at least 24 hours prior to any anticipated discharge.  Placement at a higher level of care may be required.    Will collaborate with team SW and case management.  Case also discussed in detail with Dr. Quinteros.  Patient remains FULL CODE for now  Awaiting PT reevaluation.  Discharge planning tentatively for [facility], pending further discussion with [facility] and Dr. Hearn as above.  Palliative care team will continue to follow.

## 2023-04-17 NOTE — PROGRESS NOTE ADULT - SUBJECTIVE AND OBJECTIVE BOX
follow up on:  complex medical decision making related to goals of care    [facility] Geriatric and Palliative Consult Service:  Aliza Hsu DO: cell ([phone])  Reji Hart MD: cell ([phone])  Joel Weiner NP: cell ([phone])   Michelle Iqbal DNP: cell ([phone])  Kahlil Lucas LMSW: cell ([phone])   Bharti Nogueira NP: via WiN MS Teams    Can contact any Palliative Team member via WiN MS Teams for consults and questions      OVERNIGHT EVENTS:    Present Symptoms: Mild, Moderate, Severe  Pain:             Location -                               Aggravating factors -             Quality -             Radiation -             Timing-             Severity (0-10 scale):             Minimal acceptable level (0-10 scale):  Fatigue:  Nausea:  Lack of Appetite:   SOB:  Depression:  Anxiety:  Review of Systems: [All others negative or Unable to obtain due to poor mentation]    CPOT:    https://[email or web address]/[email or web address]/Critical-Care-Pain-Observation-Tool-(CPOT)  PAIN AD Score:   http://geriatrictoolkit.[email or web address]/painad.pdf (press ctrl +  left click to view)MEDICATIONS  (STANDING):  albuterol/ipratropium for Nebulization 3 milliLiter(s) Nebulizer every 6 hours  amantadine 100 milliGRAM(s) Oral every 12 hours  aspirin enteric coated 81 milliGRAM(s) Oral daily  atorvastatin 40 milliGRAM(s) Oral at bedtime  finasteride 5 milliGRAM(s) Oral daily  fluticasone propionate 50 MICROgram(s)/spray Nasal Spray 1 Spray(s) Both Nostrils two times a day  heparin   Injectable 5000 Unit(s) SubCutaneous every 12 hours  insulin glargine Injectable (LANTUS) 5 Unit(s) SubCutaneous at bedtime  insulin lispro (ADMELOG) corrective regimen sliding scale   SubCutaneous at bedtime  insulin lispro (ADMELOG) corrective regimen sliding scale   SubCutaneous three times a day before meals  insulin lispro Injectable (ADMELOG) 4 Unit(s) SubCutaneous three times a day before meals  loratadine 10 milliGRAM(s) Oral daily  memantine 5 milliGRAM(s) Oral two times a day  metoprolol succinate ER 50 milliGRAM(s) Oral daily  montelukast 10 milliGRAM(s) Oral daily  OLANZapine 10 milliGRAM(s) Oral at bedtime  predniSONE   Tablet 40 milliGRAM(s) Oral daily  tamsulosin 0.4 milliGRAM(s) Oral at bedtime  tiotropium 2.5 MICROgram(s) Inhaler 2 Puff(s) Inhalation daily  valproic  acid Syrup 1000 milliGRAM(s) Oral at bedtime  valproic  acid Syrup 250 milliGRAM(s) Oral <User Schedule>    MEDICATIONS  (PRN):  albuterol    90 MICROgram(s) HFA Inhaler 2 Puff(s) Inhalation every 6 hours PRN Shortness of Breath and/or Wheezing  guaiFENesin Oral Liquid (Sugar-Free) 200 milliGRAM(s) Oral every 6 hours PRN Cough      PHYSICAL EXAM:  Vital Signs Last 24 Hrs  T(C): 36.5 (17 Apr 2023 14:00), Max: 36.5 (17 Apr 2023 14:00)  T(F): 97.7 (17 Apr 2023 14:00), Max: 97.7 (17 Apr 2023 14:00)  HR: 94 (17 Apr 2023 20:54) (88 - 94)  BP: 120/89 (17 Apr 2023 20:54) (117/86 - 120/89)  BP(mean): --  RR: 18 (17 Apr 2023 20:54) (18 - 18)  SpO2: 98% (17 Apr 2023 20:54) (95% - 98%)    Parameters below as of 17 Apr 2023 20:54  Patient On (Oxygen Delivery Method): room air        General: alert  oriented x ____    lethargic distressed cachexia  verbal nonverbal  unarousable     Palliative Performance Scale/Karnofsky Score:  http://npcrc.org/files/news/palliative_performance_scale_ppsv2.pdf    HEENT: no abnormal lesion, dry mouth  ET tube/trach oral lesions:  Lungs: tachypnea/labored breathing, audible excessive secretions  CV: RRR, S1S2, tachycardia  GI: soft non distended non tender  incontinent               PEG/NG/OG tube  constipation  last BM:   : incontinent  oliguria/anuria  chance  Musculoskeletal: weakness x4 edema x4    ambulatory with assistance   mostly/fully bedbound/wheelchair bound  Skin: no abnormal skin lesions, poor skin turgor, pressure ulcers stage:   Neuro: no deficits, mild cognitive impairment dsyphagia/dysarthria paresis  Oral intake ability: unable/only mouth care, minimal moderate full capability    LABS:                          15.7   9.57  )-----------( 193      ( 17 Apr 2023 12:18 )             50.1     04-17    142  |  108  |  63<H>  ----------------------------<  233<H>  5.1   |  25  |  2.85<H>    Ca    10.1      17 Apr 2023 12:18  Phos  4.4     04-17  Mg     3.0     04-17    TPro  7.5  /  Alb  2.7<L>  /  TBili  0.7  /  DBili  x   /  AST  68<H>  /  ALT  83<H>  /  AlkPhos  107  04-17        RADIOLOGY & ADDITIONAL STUDIES: follow up on:  complex medical decision making related to goals of care    [facility] Geriatric and Palliative Consult Service:  Aliza Hsu DO: cell ([phone])  Reji Hart MD: cell [phone])  Joel Weiner NP: cell ([phone])   Michelle Iqbal DNP: cell ([phone])  Kahlil Lucas LMSW: cell ([phone])   Bharti Nogueira NP: via DivX Teams    Can contact any Palliative Team member via DivX Teams for consults and questions      OVERNIGHT EVENTS:  None  Patient seen late this afternoon with aide at bedside.  Just came back from renal sonogram, currently on neb tx.  Reports feeling congested in his nose again but denies chest pain or tommy SOB acutely.  Still not walking with PT--feels "too weak" to get out of bed.  Formal PT reeval still pending.  Otherwise has no acute complaints.    Present Symptoms: Mild, Moderate, Severe  Pain:  Denies             Location -                               Aggravating factors -             Quality -             Radiation -             Timing-             Severity (0-10 scale):  0/10             Minimal acceptable level (0-10 scale):  Fatigue:  Denies, but reports feeling weak in legs  Nausea:  Denies   Lack of Appetite:   Denies  SOB:  Denies  Depression:  Anxiety:  Review of Systems: All other systems reviewed and are negative      MEDICATIONS  (STANDING):  albuterol/ipratropium for Nebulization 3 milliLiter(s) Nebulizer every 6 hours  amantadine 100 milliGRAM(s) Oral every 12 hours  aspirin enteric coated 81 milliGRAM(s) Oral daily  atorvastatin 40 milliGRAM(s) Oral at bedtime  finasteride 5 milliGRAM(s) Oral daily  fluticasone propionate 50 MICROgram(s)/spray Nasal Spray 1 Spray(s) Both Nostrils two times a day  heparin   Injectable 5000 Unit(s) SubCutaneous every 12 hours  insulin glargine Injectable (LANTUS) 5 Unit(s) SubCutaneous at bedtime  insulin lispro (ADMELOG) corrective regimen sliding scale   SubCutaneous at bedtime  insulin lispro (ADMELOG) corrective regimen sliding scale   SubCutaneous three times a day before meals  insulin lispro Injectable (ADMELOG) 4 Unit(s) SubCutaneous three times a day before meals  loratadine 10 milliGRAM(s) Oral daily  memantine 5 milliGRAM(s) Oral two times a day  metoprolol succinate ER 50 milliGRAM(s) Oral daily  montelukast 10 milliGRAM(s) Oral daily  OLANZapine 10 milliGRAM(s) Oral at bedtime  predniSONE   Tablet 40 milliGRAM(s) Oral daily  tamsulosin 0.4 milliGRAM(s) Oral at bedtime  tiotropium 2.5 MICROgram(s) Inhaler 2 Puff(s) Inhalation daily  valproic  acid Syrup 1000 milliGRAM(s) Oral at bedtime  valproic  acid Syrup 250 milliGRAM(s) Oral <User Schedule>    MEDICATIONS  (PRN):  albuterol    90 MICROgram(s) HFA Inhaler 2 Puff(s) Inhalation every 6 hours PRN Shortness of Breath and/or Wheezing  guaiFENesin Oral Liquid (Sugar-Free) 200 milliGRAM(s) Oral every 6 hours PRN Cough      PHYSICAL EXAM:  Vital Signs Last 24 Hrs  T(C): 36.5 (17 Apr 2023 14:00), Max: 36.5 (17 Apr 2023 14:00)  T(F): 97.7 (17 Apr 2023 14:00), Max: 97.7 (17 Apr 2023 14:00)  HR: 94 (17 Apr 2023 20:54) (88 - 94)  BP: 120/89 (17 Apr 2023 20:54) (117/86 - 120/89)  BP(mean): --  RR: 18 (17 Apr 2023 20:54) (18 - 18)  SpO2: 98% (17 Apr 2023 20:54) (95% - 98%)    Parameters below as of 17 Apr 2023 20:54  Patient On (Oxygen Delivery Method): room air        General: alert  oriented x _2-3___    mild temporal wasting, calm, appropriate, NAD    Palliative Performance Scale/Karnofsky Score:  30%  http://npcrc.org/files/news/palliative_performance_scale_ppsv2.pdf    HEENT:  EOMI anicteric, pharynx clear, MM moist  Lungs:  clear to auscultation, respirations unlabored  CV:  RSR normal S1 and S2, no murmur  GI: soft non distended non tender  normal bowel sounds  : normal  Musculoskeletal:  mostly  bedbound, + weakness of LE bilaterally, no edema, no focal abnormal joint findings noted  Skin: no abnormal skin lesions or DU noted  Neuro: no focal deficits  Oral intake ability: full capability on pureed diet    LABS:                          15.7   9.57  )-----------( 193      ( 17 Apr 2023 12:18 )             50.1     04-17    142  |  108  |  63<H>  ----------------------------<  233<H>  5.1   |  25  |  2.85<H>    Ca    10.1      17 Apr 2023 12:18  Phos  4.4     04-17  Mg     3.0     04-17    TPro  7.5  /  Alb  2.7<L>  /  TBili  0.7  /  DBili  x   /  AST  68<H>  /  ALT  83<H>  /  AlkPhos  107  04-17        RADIOLOGY & ADDITIONAL STUDIES:

## 2023-04-17 NOTE — PROGRESS NOTE ADULT - ASSESSMENT
73 y o with ambulatory at baseline from a [facility] home #10 w/ PMH of intellectual disability, Down Syndrome, asthma, COPD with chronic cough, HTN, DM, NICM with EF of 10-15% by recent Echo (3/29) CKD elevated PSA, BPH, schizophrenia, hepatitis B carrier, bilateral sensorineural hearing loss, who came in to the ED for sore throat and chest pain.   Also previously admitted 3/28 to 3/30 for demand ischemia and CHF exacerbation.  Now admitted with musculoskeletal chest pain and chronic combined systolic/diastolic heart failure.  Group home staff report worsening BEARDEN and ADL/functional limitations due to dyspnea at home.

## 2023-04-17 NOTE — PROGRESS NOTE ADULT - ATTENDING COMMENTS
Patient was seen and examined at bedside. Sitting on chair. Complains of weakness in both LE. SOB has improved     ICU Vital Signs Last 24 Hrs  T(C): 36.5 (17 Apr 2023 14:00), Max: 36.5 (17 Apr 2023 14:00)  T(F): 97.7 (17 Apr 2023 14:00), Max: 97.7 (17 Apr 2023 14:00)  HR: 88 (17 Apr 2023 14:00) (88 - 96)  BP: 118/88 (17 Apr 2023 14:00) (117/86 - 151/109)  BP(mean): --  ABP: --  ABP(mean): --  RR: 18 (17 Apr 2023 14:00) (18 - 18)  SpO2: 95% (17 Apr 2023 14:00) (95% - 99%)    O2 Parameters below as of 17 Apr 2023 14:00  Patient On (Oxygen Delivery Method): room air      P/E:  NAD  AAOx2, no focal deficit    CTABL today  S1S2 WNL, no MRG   Abd soft, non tender, BS present   BLLE no edema or calf tenderness     Labs noted     A/P:  YASSINE on CKD   Acute hypoxic respiratory failure resolved  Acute on chronic systolic heart failure exacerbation   DM with hyperglycemia   HTN  HLD  Asthma  Fetal alcohol syndrome   Intellectual disability  Esophageal thickening    Plan:  Cont to hold Lasix  Repeat BMP noted  Cont to monitor Cr  cont Metoprolol Succinate  Dr. Hamilton following  Monitor renal function, baseline possibly between 1.3-1.6, avoid Nephrotoxic medication  Nephrology following  Will hold off adding GDMT now   Cont Albuterol, spiriva and Duoneb, cont short course of PO prednisone for 5days  Lactulose for constipation  Cont current insulin regimen  Thickening of esophagus- will give outpatient GI follow up. Patient is asymptomatic now, denies any dysphagia, tolerating diet   CM and SW for safe discharge  Spoke with Dr Hearn from group [place]. Updated about above. Patient is not medically stable yet due to YASSINE. Will need stair assessment from PT but most likely will need CARLOS placement. Dr. Hearn informed that patient had genetic study done and was neg for DOWN syndrome. He possibly has fetal alcohol syndrome.  Dr. Hearn [phone]

## 2023-04-17 NOTE — PROGRESS NOTE ADULT - PROBLEM SELECTOR PLAN 1
Patient with advanced systolic heart failure due to severe NICM, EF now 10-15% on recent Echo.   He is having increasing BEARDEN, SOB with performance of ADLs, and functional limitation due to his heart failure    He is minimum Stage C NYHA class III disease (symptoms with minimal exertion).  Cardiology consult requested to optimize full GDMT.  Remainder of management as per primary team. Patient with advanced systolic heart failure due to severe NICM, EF now 10-15% on recent Echo.   He is having increasing BEARDEN, SOB with performance of ADLs, and functional limitation due to his heart failure    He is minimum Stage C NYHA class III disease (symptoms with minimal exertion).  Cardiology consult requested to optimize full GDMT.  Now with worsening renal failure/ATN, with concern for emerging cardiorenal syndrome.  Diuretics discontinued but BUN/Cr remain elevated  Nephrology consult appreciated  PT reevaluation pending (to assess if patient can tolerate stairs)  Remainder of management as per primary team.

## 2023-04-17 NOTE — PROGRESS NOTE ADULT - ASSESSMENT
Patient is a 72yo Male from  [facility] home number with CKD,  h/o intellectual disability, Down Syndrome, asthma, COPD with chronic cough, HTN, DM, BPH, schizophrenia,, bilateral sensorineural hearing loss, who came in to the ED for sore throat and chest pain.  Pt a/w acute on chroni CHF. Pt was diuresed with Lasix.  Hosp cb YASSINE. Nephrology consulted for Elevated serum creatinine.    1. YASSINE- likely due to overdiuresis, for which Lasix 40mg PO daily was discontinued. Scr was improving but started worsening on 4/16. Repeat UA and urine lytes. Renal US with no hydro.   Consider dobutamine gtt to increase forward flow. Initial UA bland with proteinuria. FeUrea low. Continue to hold Enalapril and lasix. Encourage PO intake (defer IVF given h/o CHF with severe global LVSD on TTE). Strict I/Os. Avoid nephrotoxins/ NSAIDs/ RCA. Monitor BMP.  2. CKD-3a- baseline SCr 1.3-1.6. Defer secondary w/u as an outpt. Avoid nephrotoxins  3. HTN 2/2 CKD- BP acceptable. Continue to hold Enalapril. Continue with current anti-hypertensive medications. Monitor BP.  4. BPH- c/w flomax. Neg bladder scan 4/17 (96 ml)     [facility] NEPHROLOGY  Raul Goncalves M.D.  Bennie Carcamo D.O.  Priya Pederson M.D.  Hellen Dietz, MSN, ANP-C  [phone]  Fax: [phone] [place] [address], [address]  [place] [address]

## 2023-04-17 NOTE — PROGRESS NOTE ADULT - PROBLEM SELECTOR PLAN 4
Baseline 1.30s to 1.60s  Cr 1.59>1.71>2.13>1.82  Likely overdiuresis  Hold Lasix  Nephrology onboard   Avoid nephrotoxic agents  Will keep monitoring   Rest as above Baseline 1.30s to 1.60s  Cr 1.59>1.71>2.13>1.82 > 2.85  Likely overdiuresis  Hold Lasix  Nephrology onboard   Avoid nephrotoxic agents  Will keep monitoring   Rest as above  f/u Renal US

## 2023-04-17 NOTE — PROGRESS NOTE ADULT - ASSESSMENT
73 y o with ambulatory at baseline from a [facility] home number 10 w/ PMH of intellectual disability, Fetal alcohol Syndrome, asthma, COPD with chronic cough, HTN, DM,  CKD elevated PSA, BPH, schizophrenia, hepatitis B, carrier, bilateral sensorineural hearing loss, admitted for AHRF secondary to Acute on Chronic Systolic Heart Failure.

## 2023-04-17 NOTE — PROGRESS NOTE ADULT - ASSESSMENT
73 y o with ambulatory at baseline from a [facility] home number 10 w/ PMH of intellectual disability, Down Syndrome, asthma, COPD with chronic cough, HTN, DM,  CKD elevated PSA, BPH, schizophrenia, hepatitis B, carrier, bilateral sensorineural hearing loss, admitted for AHRF secondary to Acute on Chronic Systolic Heart Failure. 73 y o with ambulatory at baseline from a [facility] home number 10 w/ PMH of intellectual disability, Fetal alcohol Syndrome, asthma, COPD with chronic cough, HTN, DM,  CKD elevated PSA, BPH, schizophrenia, hepatitis B, carrier, bilateral sensorineural hearing loss, admitted for AHRF secondary to Acute on Chronic Systolic Heart Failure.

## 2023-04-17 NOTE — PROGRESS NOTE ADULT - SUBJECTIVE AND OBJECTIVE BOX
PATIENT SEEN AND EXAMINED ON :- 4/17/23  DATE OF SERVICE:   4/17/23          Interim events noted,Labs ,Radiological studies and Cardiology tests reviewed .    MR#[id number]  PATIENT NAME:-[facility] COURSE: HPI:  73 y o with ambulatory at baseline from a [facility] number 10 w/ PMH of intellectual disability, Down Syndrome, asthma, COPD with chronic cough, HTN, DM,  CKD elevated PSA, BPH, schizophrenia, hepatitis B, carrier, bilateral sensorineural hearing loss, who came in to the ED for sore throat and chest pain.  Patient states he developed a sore throat and dry cough a few days ago. Then last night had chest pain, located on the left side, w/o radiation, described as sharp. Pt states the pain lasted throughout the night and resolved this morning. Currently denies any fever, chills, nausea vomiting SOB, abdominal pain, PND, orthopnea or change in bowel habits  (08 Apr 2023 18:22)      INTERIM EVENTS:Patient seen at bedside ,interim events noted.      PMH -reviewed admission note, no change since admission  HEART FAILURE: Acute[ ]Chronic[ ] Systolic[ ] Diastolic[ ] Combined Systolic and Diastolic[ ]  CAD[ ] CABG[ ] PCI[ ]  DEVICES[ ] PPM[ ] ICD[ ] ILR[ ]  ATRIAL FIBRILLATION[ ] Paroxysmal[ ] Permanent[ ] CHADS2-[  ]  YASSINE[ ] CKD1[ ] CKD2[ ] CKD3[ ] CKD4[ ] ESRD[ ]  COPD[ ] HTN[ ]   DM[ ] Type1[ ] Type 2[ ]   CVA[ ] Paresis[ ]    AMBULATION: Assisted[ ] Cane/walker[ ] Independent[ ]    MEDICATIONS  (STANDING):  albuterol/ipratropium for Nebulization 3 milliLiter(s) Nebulizer every 6 hours  amantadine 100 milliGRAM(s) Oral every 12 hours  aspirin enteric coated 81 milliGRAM(s) Oral daily  atorvastatin 40 milliGRAM(s) Oral at bedtime  finasteride 5 milliGRAM(s) Oral daily  fluticasone propionate 50 MICROgram(s)/spray Nasal Spray 1 Spray(s) Both Nostrils two times a day  heparin   Injectable 5000 Unit(s) SubCutaneous every 12 hours  insulin glargine Injectable (LANTUS) 5 Unit(s) SubCutaneous at bedtime  insulin lispro (ADMELOG) corrective regimen sliding scale   SubCutaneous at bedtime  insulin lispro (ADMELOG) corrective regimen sliding scale   SubCutaneous three times a day before meals  insulin lispro Injectable (ADMELOG) 4 Unit(s) SubCutaneous three times a day before meals  loratadine 10 milliGRAM(s) Oral daily  memantine 5 milliGRAM(s) Oral two times a day  metoprolol succinate ER 50 milliGRAM(s) Oral daily  montelukast 10 milliGRAM(s) Oral daily  OLANZapine 10 milliGRAM(s) Oral at bedtime  predniSONE   Tablet 40 milliGRAM(s) Oral daily  tamsulosin 0.4 milliGRAM(s) Oral at bedtime  tiotropium 2.5 MICROgram(s) Inhaler 2 Puff(s) Inhalation daily  valproic  acid Syrup 250 milliGRAM(s) Oral <User Schedule>  valproic  acid Syrup 1000 milliGRAM(s) Oral at bedtime    MEDICATIONS  (PRN):  albuterol    90 MICROgram(s) HFA Inhaler 2 Puff(s) Inhalation every 6 hours PRN Shortness of Breath and/or Wheezing  guaiFENesin Oral Liquid (Sugar-Free) 200 milliGRAM(s) Oral every 6 hours PRN Cough            REVIEW OF SYSTEMS:  Constitutional: [ ] fever, [ ]weight loss,  [ ]fatigue [ ]weight gain  Eyes: [ ] visual changes  Respiratory: [ ]shortness of breath;  [ ] cough, [ ]wheezing, [ ]chills, [ ]hemoptysis  Cardiovascular: [ ] chest pain, [ ]palpitations, [ ]dizziness,  [ ]leg swelling[ ]orthopnea[ ]PND  Gastrointestinal: [ ] abdominal pain, [ ]nausea, [ ]vomiting,  [ ]diarrhea [ ]Constipation [ ]Melena  Genitourinary: [ ] dysuria, [ ] hematuria [ ]Reyes  Neurologic: [ ] headaches [ ] tremors[ ]weakness [ ]Paralysis Right[ ] Left[ ]  Skin: [ ] itching, [ ]burning, [ ] rashes  Endocrine: [ ] heat or cold intolerance  Musculoskeletal: [ ] joint pain or swelling; [ ] muscle, back, or extremity pain  Psychiatric: [ ] depression, [ ]anxiety, [ ]mood swings, or [ ]difficulty sleeping  Hematologic: [ ] easy bruising, [ ] bleeding gums    [ ] All remaining systems negative except as per above.   [ ]Unable to obtain.  [x] No change in ROS since admission      Vital Signs Last 24 Hrs  T(C): 36.5 (17 Apr 2023 14:00), Max: 36.5 (17 Apr 2023 14:00)  T(F): 97.7 (17 Apr 2023 14:00), Max: 97.7 (17 Apr 2023 14:00)  HR: 88 (17 Apr 2023 14:00) (88 - 95)  BP: 118/88 (17 Apr 2023 14:00) (117/86 - 151/109)  BP(mean): --  RR: 18 (17 Apr 2023 14:00) (18 - 18)  SpO2: 95% (17 Apr 2023 14:00) (95% - 99%)    Parameters below as of 17 Apr 2023 14:00  Patient On (Oxygen Delivery Method): room air      I&O's Summary    17 Apr 2023 07:01  -  17 Apr 2023 19:55  --------------------------------------------------------  IN: 0 mL / OUT: 96 mL / NET: -96 mL        PHYSICAL EXAM:  General: No acute distress BMI-  HEENT: EOMI, PERRL  Neck: Supple, [ ] JVD  Lungs: Equal air entry bilaterally; [ ] rales [ ] wheezing [ ] rhonchi  Heart: Regular rate and rhythm; [x ] murmur   2/6 [ x] systolic [ ] diastolic [ ] radiation[ ] rubs [ ]  gallops  Abdomen: Nontender, bowel sounds present  Extremities: No clubbing, cyanosis, [ ] edema [ ]Pulses  equal and intact  Nervous system:  Alert & Oriented X3, no focal deficits  Psychiatric: Normal affect  Skin: No rashes or lesions    LABS:  04-17    142  |  108  |  63<H>  ----------------------------<  233<H>  5.1   |  25  |  2.85<H>    Ca    10.1      17 Apr 2023 12:18  Phos  4.4     04-17  Mg     3.0     04-17    TPro  7.5  /  Alb  2.7<L>  /  TBili  0.7  /  DBili  x   /  AST  68<H>  /  ALT  83<H>  /  AlkPhos  107  04-17    Creatinine Trend: 2.85<--, 3.02<--, 2.18<--, 1.82<--, 2.13<--, 1.71<--                        15.7   9.57  )-----------( 193      ( 17 Apr 2023 12:18 )             50.1

## 2023-04-17 NOTE — PROGRESS NOTE ADULT - PROBLEM SELECTOR PLAN 1
ECHO 3/23 showed severely reduced EF 10-15%  NYHA Heart Failure class III stage C  Metoprolol 50 mg PO qd  Continue to hold enalapril and Jardiance for now until renal function back to baseline  Hold adding Spironolactone until renal function back to baseline  Cardiology onboard   No cardiac cath at this moment   PT recommendations noted  Hold Lasix 40 mg PO qd for now due to worsening renal function.   Nephro Dr Pederson onboard  Will keep monitoring. ECHO 3/23 showed severely reduced EF 10-15%  NYHA Heart Failure class III stage C  Metoprolol 50 mg PO qd  Continue to hold enalapril and Jardiance for now until renal function back to baseline  Hold adding Spironolactone until renal function back to baseline  Cardiology onboard   No cardiac cath at this moment   PT recommending CARLOS  Hold Lasix 40 mg PO qd for now due to worsening renal function.   Nephro Dr Pederson onboard  Will keep monitoring.

## 2023-04-18 NOTE — PROGRESS NOTE ADULT - SUBJECTIVE AND OBJECTIVE BOX
[facility] NEPHROLOGY- PROGRESS NOTE    Patient is a 74yo Male from  [facility] home number with CKD,  h/o intellectual disability, Down Syndrome, asthma, COPD with chronic cough, HTN, DM, BPH, schizophrenia,, bilateral sensorineural hearing loss, who came in to the ED for sore throat and chest pain.  Pt a/w acute on chroni CHF. Pt was diuresed with Lasix.  Hosp cb YASSINE. Nephrology consulted for Elevated serum creatinine.    REVIEW OF SYSTEMS:  Gen: no fevers  Cards: no chest pain  Resp: no dyspnea, + cough  GI: no nausea or vomiting or diarrhea  Vascular: no LE edema    No Known Allergies      Hospital Medications: Medications reviewed    VITALS:  T(F): 98.2 (23 @ 05:16), Max: 98.2 (23 @ 05:16)  HR: 88 (23 @ 05:16)  BP: 125/88 (23 @ 05:16)  RR: 18 (23 @ 05:16)  SpO2: 95% (23 @ 07:01)  Wt(kg): --     @ 07:01  -   @ 07:00  --------------------------------------------------------  IN: 0 mL / OUT: 96 mL / NET: -96 mL      PHYSICAL EXAM:    Gen: NAD, calm  Cards: RRR, +S1/S2, no M/G/R  Resp: CTA B/L  GI: soft, NT, + ab distention,  Vascular: no LE edema B/L      LABS:      144  |  108  |  73<H>  ----------------------------<  150<H>  4.8   |  26  |  3.09<H>    Ca    10.1      2023 10:20  Phos  3.9       Mg     2.9         TPro  6.7  /  Alb  2.6<L>  /  TBili  0.7  /  DBili      /  AST  42<H>  /  ALT  83<H>  /  AlkPhos  104      Creatinine Trend: 3.09 <--, 2.85 <--, 3.02 <--, 2.18 <--, 1.82 <--, 2.13 <--, 1.71 <--, 1.59 <--                        16.3   9.99  )-----------( 236      ( 2023 10:20 )             53.2     Urine Studies:  Urinalysis Basic - ( 2023 09:30 )    Color: Yellow / Appearance: Clear / S.020 / pH:   Gluc:  / Ketone: Trace  / Bili: Negative / Urobili: 4 mg/dL   Blood:  / Protein: 30 mg/dL / Nitrite: Negative   Leuk Esterase: Negative / RBC: 0-2 /HPF / WBC 0-2 /HPF   Sq Epi:  / Non Sq Epi:  / Bacteria: Trace /HPF      Sodium, Random Urine: 41 mmol/L ( @ 09:30)  Chloride, Random Urine: 32 mmol/L ( @ 09:30)  Creatinine, Random Urine: 94 mg/dL ( @ 09:30)  Creatinine, Random Urine: 149 mg/dL (04-15 @ 12:06)

## 2023-04-18 NOTE — PROGRESS NOTE ADULT - ASSESSMENT
73 y o with ambulatory at baseline from a [facility] home number 10 w/ PMH of intellectual disability, Fetal alcohol Syndrome, asthma, COPD with chronic cough, HTN, DM,  CKD elevated PSA, BPH, schizophrenia, hepatitis B, carrier, bilateral sensorineural hearing loss, admitted for AHRF secondary to Acute on Chronic Systolic Heart Failure.

## 2023-04-18 NOTE — PROGRESS NOTE ADULT - PROBLEM SELECTOR PLAN 4
Baseline 1.30s to 1.60s  Cr 1.59>1.71>2.13>1.82 > 2.85  Likely overdiuresis  Hold Lasix  Nephrology onboard   Avoid nephrotoxic agents  Will keep monitoring   Rest as above  f/u Renal US Baseline 1.30s to 1.60s  Cr 1.59>1.71>2.13>1.82 > 2.85 > 3  Likely overdiuresis  Hold Lasix  Nephrology onboard   Avoid nephrotoxic agents  Will keep monitoring   Rest as above  Renal US shows no hydro  bladder scan is neg on 4/17  f/u repeat bladder scan  started albumin 25% on 50 ml q8h x3 doses

## 2023-04-18 NOTE — PROGRESS NOTE ADULT - SUBJECTIVE AND OBJECTIVE BOX
PGY-1 Progress Note discussed with attending    PAGER #: [[phone]] TILL 5:00 PM  PLEASE CONTACT ON CALL TEAM:  - On Call Team (Please refer to Red) FROM 5:00 PM - 8:30PM  - Nightfloat Team FROM 8:30 -7:30 AM    CHIEF COMPLAINT & BRIEF HOSPITAL COURSE:    INTERVAL HPI/OVERNIGHT EVENTS:       REVIEW OF SYSTEMS:  CONSTITUTIONAL: No fever, weight loss, or fatigue  RESPIRATORY: No cough, wheezing, chills or hemoptysis; No shortness of breath  CARDIOVASCULAR: No chest pain, palpitations, dizziness, or leg swelling  GASTROINTESTINAL: No abdominal pain. No nausea, vomiting, or hematemesis; No diarrhea or constipation. No melena or hematochezia.  GENITOURINARY: No dysuria or hematuria, urinary frequency  NEUROLOGICAL: No headaches, memory loss, loss of strength, numbness, or tremors  SKIN: No itching, burning, rashes, or lesions     Vital Signs Last 24 Hrs  T(C): 36.8 (18 Apr 2023 05:16), Max: 36.8 (18 Apr 2023 05:16)  T(F): 98.2 (18 Apr 2023 05:16), Max: 98.2 (18 Apr 2023 05:16)  HR: 88 (18 Apr 2023 05:16) (88 - 94)  BP: 125/88 (18 Apr 2023 05:16) (118/88 - 125/88)  BP(mean): --  RR: 18 (18 Apr 2023 05:16) (18 - 18)  SpO2: 95% (18 Apr 2023 07:01) (92% - 98%)    Parameters below as of 18 Apr 2023 07:01  Patient On (Oxygen Delivery Method): room air        PHYSICAL EXAMINATION:  GENERAL: NAD, well built  HEAD:  Atraumatic, Normocephalic  EYES:  conjunctiva and sclera clear  NECK: Supple, No JVD, Normal thyroid  CHEST/LUNG: Clear to auscultation. Clear to percussion bilaterally; No rales, rhonchi, wheezing, or rubs  HEART: Regular rate and rhythm; No murmurs, rubs, or gallops  ABDOMEN: Soft, Nontender, Nondistended; Bowel sounds present  NERVOUS SYSTEM:  Alert & Oriented X3,    EXTREMITIES:  2+ Peripheral Pulses, No clubbing, cyanosis, or edema  SKIN: warm dry                          15.7   9.57  )-----------( 193      ( 17 Apr 2023 12:18 )             50.1     04-17    142  |  108  |  63<H>  ----------------------------<  233<H>  5.1   |  25  |  2.85<H>    Ca    10.1      17 Apr 2023 12:18  Phos  4.4     04-17  Mg     3.0     04-17    TPro  7.5  /  Alb  2.7<L>  /  TBili  0.7  /  DBili  x   /  AST  68<H>  /  ALT  83<H>  /  AlkPhos  107  04-17    LIVER FUNCTIONS - ( 17 Apr 2023 05:11 )  Alb: 2.7 g/dL / Pro: 7.5 g/dL / ALK PHOS: 107 U/L / ALT: 83 U/L DA / AST: 68 U/L / GGT: x                   CAPILLARY BLOOD GLUCOSE      RADIOLOGY & ADDITIONAL TESTS:                   PGY-1 Progress Note discussed with attending    PAGER #: [[phone]] TILL 5:00 PM  PLEASE CONTACT ON CALL TEAM:  - On Call Team (Please refer to Red) FROM 5:00 PM - 8:30PM  - Nightfloat Team FROM 8:30 -7:30 AM    INTERVAL HPI/OVERNIGHT EVENTS:   no acute overnight events, pt. seen and examined at bedside, offers no complaints.      REVIEW OF SYSTEMS:  CONSTITUTIONAL: No fever, weight loss, or fatigue  RESPIRATORY: No cough, wheezing, chills or hemoptysis; No shortness of breath  CARDIOVASCULAR: No chest pain, palpitations, dizziness, or leg swelling  GASTROINTESTINAL: No abdominal pain. No nausea, vomiting, or hematemesis; No diarrhea or constipation. No melena or hematochezia.  GENITOURINARY: No dysuria or hematuria, urinary frequency  NEUROLOGICAL: No headaches, memory loss, loss of strength, numbness, or tremors  SKIN: No itching, burning, rashes, or lesions     Vital Signs Last 24 Hrs  T(C): 36.8 (18 Apr 2023 05:16), Max: 36.8 (18 Apr 2023 05:16)  T(F): 98.2 (18 Apr 2023 05:16), Max: 98.2 (18 Apr 2023 05:16)  HR: 88 (18 Apr 2023 05:16) (88 - 94)  BP: 125/88 (18 Apr 2023 05:16) (118/88 - 125/88)  BP(mean): --  RR: 18 (18 Apr 2023 05:16) (18 - 18)  SpO2: 95% (18 Apr 2023 07:01) (92% - 98%)    Parameters below as of 18 Apr 2023 07:01  Patient On (Oxygen Delivery Method): room air        PHYSICAL EXAMINATION:  GENERAL: NAD, lying in bed comfortably   HEAD:  Atraumatic, Normocephalic  EYES:  Prosthesis noted on the right, Left with conjunctiva and sclera clear, pupil is equal, round, and reactive to light and accommodation.  NECK: Supple, No JVD, trachea is midline, no evidence of thyroid enlargement, no lymphadenopathy or tenderness.  CHEST/LUNG: No rales, rhonchi, minimal expiratory wheezing noted, no rubs  HEART: Regular rate and rhythm; systolic murmur noted on the left lower sternal border, no rubs, or gallops  ABDOMEN: Soft, Nontender, Nondistended; Bowel sounds present  NERVOUS SYSTEM:  Alert & Oriented X2; No focal sensory or motor deficits are noted; Appropriate mood and affect.  EXTREMITIES:  2+ Peripheral Pulses, No clubbing, cyanosis, or edema  SKIN: Warm, dry, and well perfused; Good turgor; No lesions, nodules or rashes are noted.                          15.7   9.57  )-----------( 193      ( 17 Apr 2023 12:18 )             50.1     04-17    142  |  108  |  63<H>  ----------------------------<  233<H>  5.1   |  25  |  2.85<H>    Ca    10.1      17 Apr 2023 12:18  Phos  4.4     04-17  Mg     3.0     04-17    TPro  7.5  /  Alb  2.7<L>  /  TBili  0.7  /  DBili  x   /  AST  68<H>  /  ALT  83<H>  /  AlkPhos  107  04-17    LIVER FUNCTIONS - ( 17 Apr 2023 05:11 )  Alb: 2.7 g/dL / Pro: 7.5 g/dL / ALK PHOS: 107 U/L / ALT: 83 U/L DA / AST: 68 U/L / GGT: x                   CAPILLARY BLOOD GLUCOSE      RADIOLOGY & ADDITIONAL TESTS:

## 2023-04-18 NOTE — PROGRESS NOTE ADULT - ASSESSMENT
73 y o with ambulatory at baseline from a [facility] home number 10 w/ PMH of intellectual disability, Fetal alcohol Syndrome, asthma, COPD with chronic cough, HTN, DM,  CKD elevated PSA, BPH, schizophrenia, hepatitis B, carrier, bilateral sensorineural hearing loss, admitted for AHRF secondary to Acute on Chronic Systolic Heart Failure.

## 2023-04-18 NOTE — PROGRESS NOTE ADULT - PROBLEM SELECTOR PLAN 1
ECHO 3/23 showed severely reduced EF 10-15%  NYHA Heart Failure class III stage C  Metoprolol 50 mg PO qd  Continue to hold enalapril and Jardiance for now until renal function back to baseline  Hold adding Spironolactone until renal function back to baseline  No cardiac cath at this moment   PT recommending CARLOS  Hold Lasix 40 mg PO qd for now due to worsening renal function.   Nephro Dr Pederson onboard  Will keep monitoring.

## 2023-04-18 NOTE — PROGRESS NOTE ADULT - PROBLEM SELECTOR PLAN 1
ECHO 3/23 showed severely reduced EF 10-15%  NYHA Heart Failure class III stage C  Metoprolol 50 mg PO qd  Continue to hold enalapril and Jardiance for now until renal function back to baseline  Hold adding Spironolactone until renal function back to baseline  Cardiology onboard   No cardiac cath at this moment   PT recommending CARLOS  Hold Lasix 40 mg PO qd for now due to worsening renal function.   Nephro Dr Pederson onboard  Will keep monitoring.

## 2023-04-18 NOTE — PROGRESS NOTE ADULT - ASSESSMENT
Patient is a 74yo Male from  [facility] home number with CKD,  h/o intellectual disability, Down Syndrome, asthma, COPD with chronic cough, HTN, DM, BPH, schizophrenia,, bilateral sensorineural hearing loss, who came in to the ED for sore throat and chest pain.  Pt a/w acute on chroni CHF. Pt was diuresed with Lasix.  Hosp cb YASSINE. Nephrology consulted for Elevated serum creatinine.    1. YASSINE- likely due to overdiuresis, for which Lasix 40mg PO daily was discontinued. Scr was improving but started worsening on 4/16. UA with 30 protein with trace ketone; pt likely with poor PO intake. Recc IVF; can consider   albumin 25 % in 50ml q 8hrs x 3 doses. f/u FeUrea. Renal US with no hydro. Bladder scan 4/17 neg.  Continue to hold Enalapril and lasix. Encourage PO intake (pt with h/o CHF with severe global LVSD on TTE). Strict I/Os. Avoid nephrotoxins/ NSAIDs/ RCA. Monitor BMP.  2. CKD-3a- baseline SCr 1.3-1.6. Defer secondary w/u as an outpt. Avoid nephrotoxins  3. HTN 2/2 CKD- BP acceptable. Continue to hold Enalapril. Continue with current anti-hypertensive medications. Monitor BP.  4. BPH- c/w flomax. Neg bladder scan 4/17 (96 ml)     Discussed plan with Dr. Hamilton [facility] NEPHROLOGY  Raul Goncalves M.D.  Bennie Carcamo D.O.  Priya Pederson M.D.  Hellen Dietz, MSN, ANP-C  [phone]  Fax: [phone] [address] [place] [address], [address]  [place] [address]

## 2023-04-18 NOTE — PROGRESS NOTE ADULT - SUBJECTIVE AND OBJECTIVE BOX
PATIENT SEEN AND EXAMINED ON :- 4/18/23  DATE OF SERVICE:  4/18/23           Interim events noted,Labs ,Radiological studies and Cardiology tests reviewed .    MR#[id number]  PATIENT NAME:-[facility] COURSE: HPI:  73 y o with ambulatory at baseline from a [facility] number 10 w/ PMH of intellectual disability, Down Syndrome, asthma, COPD with chronic cough, HTN, DM,  CKD elevated PSA, BPH, schizophrenia, hepatitis B, carrier, bilateral sensorineural hearing loss, who came in to the ED for sore throat and chest pain.  Patient states he developed a sore throat and dry cough a few days ago. Then last night had chest pain, located on the left side, w/o radiation, described as sharp. Pt states the pain lasted throughout the night and resolved this morning. Currently denies any fever, chills, nausea vomiting SOB, abdominal pain, PND, orthopnea or change in bowel habits  (08 Apr 2023 18:22)      INTERIM EVENTS:Patient seen at bedside ,interim events noted.      PMH -reviewed admission note, no change since admission  HEART FAILURE: Acute[ ]Chronic[ ] Systolic[ ] Diastolic[ ] Combined Systolic and Diastolic[ ]  CAD[ ] CABG[ ] PCI[ ]  DEVICES[ ] PPM[ ] ICD[ ] ILR[ ]  ATRIAL FIBRILLATION[ ] Paroxysmal[ ] Permanent[ ] CHADS2-[  ]  YASSINE[ ] CKD1[ ] CKD2[ ] CKD3[ ] CKD4[ ] ESRD[ ]  COPD[ ] HTN[ ]   DM[ ] Type1[ ] Type 2[ ]   CVA[ ] Paresis[ ]    AMBULATION: Assisted[ ] Cane/walker[ ] Independent[ ]    MEDICATIONS  (STANDING):  albumin human 25% IVPB 50 milliLiter(s) IV Intermittent every 8 hours  albuterol/ipratropium for Nebulization 3 milliLiter(s) Nebulizer every 6 hours  amantadine 100 milliGRAM(s) Oral every 12 hours  aspirin enteric coated 81 milliGRAM(s) Oral daily  atorvastatin 40 milliGRAM(s) Oral at bedtime  finasteride 5 milliGRAM(s) Oral daily  fluticasone propionate 50 MICROgram(s)/spray Nasal Spray 1 Spray(s) Both Nostrils two times a day  heparin   Injectable 5000 Unit(s) SubCutaneous every 12 hours  insulin glargine Injectable (LANTUS) 5 Unit(s) SubCutaneous at bedtime  insulin lispro (ADMELOG) corrective regimen sliding scale   SubCutaneous at bedtime  insulin lispro (ADMELOG) corrective regimen sliding scale   SubCutaneous three times a day before meals  insulin lispro Injectable (ADMELOG) 4 Unit(s) SubCutaneous three times a day before meals  loratadine 10 milliGRAM(s) Oral daily  memantine 5 milliGRAM(s) Oral two times a day  metoprolol succinate ER 50 milliGRAM(s) Oral daily  montelukast 10 milliGRAM(s) Oral daily  OLANZapine 10 milliGRAM(s) Oral at bedtime  predniSONE   Tablet 40 milliGRAM(s) Oral daily  tamsulosin 0.8 milliGRAM(s) Oral at bedtime  tiotropium 2.5 MICROgram(s) Inhaler 2 Puff(s) Inhalation daily  valproic  acid Syrup 250 milliGRAM(s) Oral <User Schedule>  valproic  acid Syrup 1000 milliGRAM(s) Oral at bedtime    MEDICATIONS  (PRN):  albuterol    90 MICROgram(s) HFA Inhaler 2 Puff(s) Inhalation every 6 hours PRN Shortness of Breath and/or Wheezing  guaiFENesin Oral Liquid (Sugar-Free) 200 milliGRAM(s) Oral every 6 hours PRN Cough            REVIEW OF SYSTEMS:  Constitutional: [ ] fever, [ ]weight loss,  [ ]fatigue [ ]weight gain  Eyes: [ ] visual changes  Respiratory: [ ]shortness of breath;  [ ] cough, [ ]wheezing, [ ]chills, [ ]hemoptysis  Cardiovascular: [ ] chest pain, [ ]palpitations, [ ]dizziness,  [ ]leg swelling[ ]orthopnea[ ]PND  Gastrointestinal: [ ] abdominal pain, [ ]nausea, [ ]vomiting,  [ ]diarrhea [ ]Constipation [ ]Melena  Genitourinary: [ ] dysuria, [ ] hematuria [ ]Reyes  Neurologic: [ ] headaches [ ] tremors[ ]weakness [ ]Paralysis Right[ ] Left[ ]  Skin: [ ] itching, [ ]burning, [ ] rashes  Endocrine: [ ] heat or cold intolerance  Musculoskeletal: [ ] joint pain or swelling; [ ] muscle, back, or extremity pain  Psychiatric: [ ] depression, [ ]anxiety, [ ]mood swings, or [ ]difficulty sleeping  Hematologic: [ ] easy bruising, [ ] bleeding gums    [ ] All remaining systems negative except as per above.   [ ]Unable to obtain.  [x] No change in ROS since admission      Vital Signs Last 24 Hrs  T(C): 36.7 (18 Apr 2023 15:05), Max: 36.8 (18 Apr 2023 05:16)  T(F): 98 (18 Apr 2023 15:05), Max: 98.2 (18 Apr 2023 05:16)  HR: 81 (18 Apr 2023 15:05) (81 - 94)  BP: 130/86 (18 Apr 2023 15:05) (120/89 - 130/86)  BP(mean): --  RR: 18 (18 Apr 2023 15:05) (18 - 18)  SpO2: 100% (18 Apr 2023 15:05) (92% - 100%)    Parameters below as of 18 Apr 2023 15:05  Patient On (Oxygen Delivery Method): room air      I&O's Summary    17 Apr 2023 07:01  -  18 Apr 2023 07:00  --------------------------------------------------------  IN: 0 mL / OUT: 96 mL / NET: -96 mL        PHYSICAL EXAM:  General: No acute distress BMI-  HEENT: EOMI, PERRL  Neck: Supple, [ ] JVD  Lungs: Equal air entry bilaterally; [ ] rales [ ] wheezing [ ] rhonchi  Heart: Regular rate and rhythm; [x ] murmur   2/6 [ x] systolic [ ] diastolic [ ] radiation[ ] rubs [ ]  gallops  Abdomen: Nontender, bowel sounds present  Extremities: No clubbing, cyanosis, [ ] edema [ ]Pulses  equal and intact  Nervous system:  Alert & Oriented X3, no focal deficits  Psychiatric: Normal affect  Skin: No rashes or lesions    LABS:  04-18    144  |  108  |  73<H>  ----------------------------<  150<H>  4.8   |  26  |  3.09<H>    Ca    10.1      18 Apr 2023 10:20  Phos  3.9     04-18  Mg     2.9     04-18    TPro  6.7  /  Alb  2.6<L>  /  TBili  0.7  /  DBili  x   /  AST  42<H>  /  ALT  83<H>  /  AlkPhos  104  04-18    Creatinine Trend: 3.09<--, 2.85<--, 3.02<--, 2.18<--, 1.82<--, 2.13<--                        16.3   9.99  )-----------( 236      ( 18 Apr 2023 10:20 )             53.2

## 2023-04-18 NOTE — PROGRESS NOTE ADULT - ATTENDING COMMENTS
Patient was seen and examined this afternoon with caretaker at bedside.     Patient is comfortable in bed. SOB has resolved.     Vital Signs Last 24 Hrs  T(C): 36.8 (18 Apr 2023 21:36), Max: 36.8 (18 Apr 2023 05:16)  T(F): 98.2 (18 Apr 2023 21:36), Max: 98.2 (18 Apr 2023 05:16)  HR: 84 (18 Apr 2023 21:36) (81 - 88)  BP: 138/91 (18 Apr 2023 21:36) (125/88 - 138/91)  RR: 18 (18 Apr 2023 21:36) (18 - 18)  SpO2: 98% (18 Apr 2023 21:36) (92% - 100%)room air    P/E:  NAD  AAOx2, no focal deficit    CTABL today  S1S2 WNL, no MRG   Abd soft, non tender, BS present   BLLE no edema or calf tenderness     Labs:                        16.3   9.99  )-----------( 236      ( 18 Apr 2023 10:20 )             53.2   04-18    144  |  108  |  73<H>  ----------------------------<  150<H>  4.8   |  26  |  3.09<H>    Ca    10.1      18 Apr 2023 10:20  Phos  3.9     04-18  Mg     2.9     04-18    TPro  6.7  /  Alb  2.6<L>  /  TBili  0.7  /  DBili  x   /  AST  42<H>  /  ALT  83<H>  /  AlkPhos  104  04-18      A/P:  YASSINE on CKD suspect overdiuresis plus possible obstructive component  Acute hypoxic respiratory failure due to fluid overload resolved  Acute on chronic systolic heart failure exacerbation   DM with hyperglycemia   HTN  HLD  Asthma  Fetal alcohol syndrome with Intellectual disability  Esophageal thickening    Plan:  Cont to hold Lasix for now; Renal follow u  Albumin trial x one day  Cont to monitor Cr closely; Avoid Nephrotoxic meds  Bladder scan this afternoon 330 ml; Encourage OOB to chair and ambulation; will avoid chance in a patient with Intellectual disability  If continues to retain and renal fn elevated will need Urology eval and possible chance  Continue Metoprolol   Crdio f/u; Dr. Hamilton following  Patiejt is not stable for discharge at this time due to YASSINE; d/w Medical Director Emilia Hearn from [facility]   Will hold off adding GDMT now   Cont Albuterol, spiriva and Duoneb, cont short course of PO prednisone for 5 days  Lactulose for constipation  Cont current insulin regimen  Thickening of esophagus- outpatient GI follow up. Patient is asymptomatic now, denies any dysphagia, tolerating diet   CM and SW for safe discharge  Patient will need stair assessment from PT but most likely will need CARLOS placement.   Dr. Hearn informed that patient had genetic study done and was neg for DOWN syndrome. He possibly has fetal alcohol syndrome.  Dr. Hearn [phone]  Discussed with PGy1 and PGY3 and RN

## 2023-04-19 NOTE — PROGRESS NOTE ADULT - PROBLEM SELECTOR PLAN 4
Baseline 1.30s to 1.60s  Cr 1.59>1.71>2.13>1.82 > 2.85 > 3  Likely overdiuresis  Hold Lasix  Nephrology onboard   Avoid nephrotoxic agents  Will keep monitoring   Rest as above  Renal US shows no hydro  bladder scan is neg on 4/17  f/u repeat bladder scan  started albumin 25% on 50 ml q8h x3 doses Baseline 1.30s to 1.60s  Cr 1.59>1.71>2.13>1.82 > 2.85 > 3 > 2.58  Likely overdiuresis  Hold Lasix  Nephrology onboard   Avoid nephrotoxic agents  Will keep monitoring   Rest as above  Renal US shows no hydro  bladder scan is neg on 4/17  repeat bladder scan is neg on 4/19  started albumin 25% on 50 ml q8h x3 doses  improved Scr

## 2023-04-19 NOTE — PROGRESS NOTE ADULT - ASSESSMENT
Patient is a 74yo Male from  [facility] home number with CKD,  h/o intellectual disability, Down Syndrome, asthma, COPD with chronic cough, HTN, DM, BPH, schizophrenia,, bilateral sensorineural hearing loss, who came in to the ED for sore throat and chest pain.  Pt a/w acute on chroni CHF. Pt was diuresed with Lasix.  Hosp cb YASSINE. Nephrology consulted for Elevated serum creatinine.    1. YASSINE- likely due to overdiuresis, for which Lasix 40mg PO daily was discontinued. Scr was improving but started worsening on 4/16. UA with 30 protein with trace ketone; pt likely with poor PO intake. Renal function improving s/p   albumin 25 % in 50ml q 8hrs x 3 doses.  FeUrea 31%; consistent with pre-renal YASSINE. Renal US with no hydro. Bladder scan 4/17 neg, repeat bladder scan today 4/19 with 286ml; monitor for retention.  Continue to hold Enalapril and lasix. Encourage PO intake (pt with h/o CHF with severe global LVSD on TTE). Strict I/Os. Avoid nephrotoxins/ NSAIDs/ RCA. Monitor BMP.  2. CKD-3a- baseline SCr 1.3-1.6. Defer secondary w/u as an outpt. Avoid nephrotoxins  3. HTN 2/2 CKD- BP acceptable. Continue to hold Enalapril. Continue with current anti-hypertensive medications. Monitor BP.  4. BPH- c/w flomax and finasteride      [facility] NEPHROLOGY  Raul Goncalves M.D.  Bennie Carcamo D.O.  Priya Pederson M.D.  Hellen Dietz, MSN, ANP-C  [phone]  Fax: [phone] [address] [place] [address], [address]  [place] [address]

## 2023-04-19 NOTE — PROGRESS NOTE ADULT - SUBJECTIVE AND OBJECTIVE BOX
PATIENT SEEN AND EXAMINED ON :- 4/19/23  DATE OF SERVICE:   4/19/23          Interim events noted,Labs ,Radiological studies and Cardiology tests reviewed .    MR#[id number]  PATIENT NAME:-[facility] COURSE: HPI:  73 y o with ambulatory at baseline from a [facility] number [facility] w/ PMH of intellectual disability, Down Syndrome, asthma, COPD with chronic cough, HTN, DM,  CKD elevated PSA, BPH, schizophrenia, hepatitis B, carrier, bilateral sensorineural hearing loss, who came in to the ED for sore throat and chest pain.  Patient states he developed a sore throat and dry cough a few days ago. Then last night had chest pain, located on the left side, w/o radiation, described as sharp. Pt states the pain lasted throughout the night and resolved this morning. Currently denies any fever, chills, nausea vomiting SOB, abdominal pain, PND, orthopnea or change in bowel habits  (08 Apr 2023 18:22)      INTERIM EVENTS:Patient seen at bedside ,interim events noted.      PMH -reviewed admission note, no change since admission  HEART FAILURE: Acute[ ]Chronic[ ] Systolic[ ] Diastolic[ ] Combined Systolic and Diastolic[ ]  CAD[ ] CABG[ ] PCI[ ]  DEVICES[ ] PPM[ ] ICD[ ] ILR[ ]  ATRIAL FIBRILLATION[ ] Paroxysmal[ ] Permanent[ ] CHADS2-[  ]  YASSINE[ ] CKD1[ ] CKD2[ ] CKD3[ ] CKD4[ ] ESRD[ ]  COPD[ ] HTN[ ]   DM[ ] Type1[ ] Type 2[ ]   CVA[ ] Paresis[ ]    AMBULATION: Assisted[ ] Cane/walker[ ] Independent[ ]    MEDICATIONS  (STANDING):  albuterol/ipratropium for Nebulization 3 milliLiter(s) Nebulizer every 6 hours  amantadine 100 milliGRAM(s) Oral every 12 hours  aspirin enteric coated 81 milliGRAM(s) Oral daily  atorvastatin 40 milliGRAM(s) Oral at bedtime  finasteride 5 milliGRAM(s) Oral daily  fluticasone propionate 50 MICROgram(s)/spray Nasal Spray 1 Spray(s) Both Nostrils two times a day  heparin   Injectable 5000 Unit(s) SubCutaneous every 12 hours  insulin glargine Injectable (LANTUS) 5 Unit(s) SubCutaneous at bedtime  insulin lispro (ADMELOG) corrective regimen sliding scale   SubCutaneous three times a day before meals  insulin lispro (ADMELOG) corrective regimen sliding scale   SubCutaneous at bedtime  insulin lispro Injectable (ADMELOG) 4 Unit(s) SubCutaneous three times a day before meals  loratadine 10 milliGRAM(s) Oral daily  memantine 5 milliGRAM(s) Oral two times a day  metoprolol succinate ER 50 milliGRAM(s) Oral daily  montelukast 10 milliGRAM(s) Oral daily  OLANZapine 10 milliGRAM(s) Oral at bedtime  predniSONE   Tablet 40 milliGRAM(s) Oral daily  tamsulosin 0.8 milliGRAM(s) Oral at bedtime  tiotropium 2.5 MICROgram(s) Inhaler 2 Puff(s) Inhalation daily  valproic  acid Syrup 250 milliGRAM(s) Oral <User Schedule>  valproic  acid Syrup 1000 milliGRAM(s) Oral at bedtime    MEDICATIONS  (PRN):  albuterol    90 MICROgram(s) HFA Inhaler 2 Puff(s) Inhalation every 6 hours PRN Shortness of Breath and/or Wheezing  guaiFENesin Oral Liquid (Sugar-Free) 200 milliGRAM(s) Oral every 6 hours PRN Cough            REVIEW OF SYSTEMS:  Constitutional: [ ] fever, [ ]weight loss,  [ ]fatigue [ ]weight gain  Eyes: [ ] visual changes  Respiratory: [ ]shortness of breath;  [ ] cough, [ ]wheezing, [ ]chills, [ ]hemoptysis  Cardiovascular: [ ] chest pain, [ ]palpitations, [ ]dizziness,  [ ]leg swelling[ ]orthopnea[ ]PND  Gastrointestinal: [ ] abdominal pain, [ ]nausea, [ ]vomiting,  [ ]diarrhea [ ]Constipation [ ]Melena  Genitourinary: [ ] dysuria, [ ] hematuria [ ]Reyes  Neurologic: [ ] headaches [ ] tremors[ ]weakness [ ]Paralysis Right[ ] Left[ ]  Skin: [ ] itching, [ ]burning, [ ] rashes  Endocrine: [ ] heat or cold intolerance  Musculoskeletal: [ ] joint pain or swelling; [ ] muscle, back, or extremity pain  Psychiatric: [ ] depression, [ ]anxiety, [ ]mood swings, or [ ]difficulty sleeping  Hematologic: [ ] easy bruising, [ ] bleeding gums    [ ] All remaining systems negative except as per above.   [ ]Unable to obtain.  [x] No change in ROS since admission      Vital Signs Last 24 Hrs  T(C): 36.2 (19 Apr 2023 14:13), Max: 36.8 (18 Apr 2023 21:36)  T(F): 97.2 (19 Apr 2023 14:13), Max: 98.2 (18 Apr 2023 21:36)  HR: 75 (19 Apr 2023 14:24) (75 - 86)  BP: 122/86 (19 Apr 2023 14:13) (117/87 - 138/91)  BP(mean): --  RR: 18 (19 Apr 2023 14:13) (18 - 18)  SpO2: 93% (19 Apr 2023 14:24) (93% - 98%)    Parameters below as of 19 Apr 2023 14:24  Patient On (Oxygen Delivery Method): room air      I&O's Summary    19 Apr 2023 07:01  -  19 Apr 2023 20:59  --------------------------------------------------------  IN: 0 mL / OUT: 875 mL / NET: -875 mL        PHYSICAL EXAM:  General: No acute distress BMI-  HEENT: EOMI, PERRL  Neck: Supple, [ ] JVD  Lungs: Equal air entry bilaterally; [ ] rales [ ] wheezing [ ] rhonchi  Heart: Regular rate and rhythm; [x ] murmur   2/6 [ x] systolic [ ] diastolic [ ] radiation[ ] rubs [ ]  gallops  Abdomen: Nontender, bowel sounds present  Extremities: No clubbing, cyanosis, [ ] edema [ ]Pulses  equal and intact  Nervous system:  Alert & Oriented X3, no focal deficits  Psychiatric: Normal affect  Skin: No rashes or lesions    LABS:  04-19    146<H>  |  110<H>  |  67<H>  ----------------------------<  110<H>  4.2   |  30  |  2.58<H>    Ca    9.8      19 Apr 2023 06:14  Phos  3.4     04-19  Mg     3.1     04-19    TPro  6.3  /  Alb  2.7<L>  /  TBili  0.6  /  DBili  x   /  AST  26  /  ALT  66<H>  /  AlkPhos  87  04-19    Creatinine Trend: 2.58<--, 3.09<--, 2.85<--, 3.02<--, 2.18<--, 1.82<--                        14.5   7.96  )-----------( 190      ( 19 Apr 2023 06:14 )             46.6         < from: TTE with Doppler (w/Cont) (03.29.23 @ 07:03) >    Patient name: LITZY MARAVILLA  YOB: 1950   Age: 73 (M)   MR#: [id number]  Study Date: 3/29/2023  Location: [address] [place], [place] [address]onographer: Bradley Moyer [facility]  Study quality: Fair  Referring Physician:  SHANIQUE MONTERROSO MD  Blood Pressure: 136/90 mmHg  Height: 157 cm  Weight: 62 kg  BSA: 1.6 m2  ------------------------------------------------------------------------    PROCEDURE: Transthoracic echocardiogram with 2-D, M-Mode  and complete spectral and color flow Doppler.  Given _20_cc agitatedsaline intravenously. Verbal consent  was obtained for injection of ultrasound enhancing agent  following a discussion of risks and benefits. Following  intravenous injection of ultrasound enhancing agent,  harmonic imaging was performed. 2_cc of ultrasound  enhancing agent injected intravenously.  INDICATION: Heart failure, unspecified (I50.9)  HISTORY:  ------------------------------------------------------------------------  DIMENSIONS:  Dimensions:     Normal Values:  LA:     4.2 cm    2.0 - 4.0 cm  Ao:     3.3 cm    2.0 - 3.8 cm  SEPTUM: 0.8 cm    0.6 - 1.2 cm  PWT:    0.8 cm    0.6 - 1.1 cm  LVIDd:  5.4 cm    3.0 - 5.6 cm  LVIDs:  4.8 cm    1.8 - 4.0 cm      Derived Variables:  LVMI: 95 g/m2  RWT: 0.29  Ejection Fraction Visual Estimate: 10-15 %    ------------------------------------------------------------------------  OBSERVATIONS:  Mitral Valve: Normal mitral valve. Trace mitral  regurgitation.  Aortic Root: Normal aortic root.  Aortic Valve: Aortic valve leaflet morphology not well  visualized, opens normally.  Left Atrium: Normal left atrium.  LA volume index = 20  cc/m2.  Left Ventricle: Severe global left ventricular systolic  dysfunction. Ultrasound LV opacification agent was used to  enhance endocardial definition. Normal left ventricular  internal dimensions and wall thicknesses. Grade I diastolic  dysfunction (Impaired relaxation, mild).  Right Heart: Normal right atrium. Normal right ventricular  size and function. There is trace tricuspid regurgitation.  There is trace pulmonic regurgitation.  Pericardium/PleuraNormal pericardium with no pericardial  effusion.  Hemodynamic: Incomplete tricuspid regurgitation jet  precludes accurate assessment of pulmonary artery systolic  pressure. Agitated saline injectionrevealed late bubbles  in the left heart, consistent with transpulmonic shunting.  ------------------------------------------------------------------------  CONCLUSIONS:  1. Trace mitral regurgitation.  2. Normal left ventricular internal dimensions and wall  thicknesses.  3. Severe global left ventricular systolic dysfunction.  Ultrasound LV opacification agent was used to enhance  endocardial definition.  4. Grade I diastolic dysfunction (Impaired relaxation,  mild).  5. Normal right ventricular size and function.  6. Agitated saline injection revealed late bubbles in the  left heart, consistent with transpulmonic shunting.    ------------------------------------------------------------------------  Confirmed on  3/30/2023 - 10:06:13 by Donny Jiménez MD  ------------------------------------------------------------------------    < end of copied text >

## 2023-04-19 NOTE — PROGRESS NOTE ADULT - SUBJECTIVE AND OBJECTIVE BOX
PGY-1 Progress Note discussed with attending    PAGER #: [[phone]] TILL 5:00 PM  PLEASE CONTACT ON CALL TEAM:  - On Call Team (Please refer to Red) FROM 5:00 PM - 8:30PM  - Nightfloat Team FROM 8:30 -7:30 AM    CHIEF COMPLAINT & BRIEF HOSPITAL COURSE:    INTERVAL HPI/OVERNIGHT EVENTS:       REVIEW OF SYSTEMS:  CONSTITUTIONAL: No fever, weight loss, or fatigue  RESPIRATORY: No cough, wheezing, chills or hemoptysis; No shortness of breath  CARDIOVASCULAR: No chest pain, palpitations, dizziness, or leg swelling  GASTROINTESTINAL: No abdominal pain. No nausea, vomiting, or hematemesis; No diarrhea or constipation. No melena or hematochezia.  GENITOURINARY: No dysuria or hematuria, urinary frequency  NEUROLOGICAL: No headaches, memory loss, loss of strength, numbness, or tremors  SKIN: No itching, burning, rashes, or lesions     Vital Signs Last 24 Hrs  T(C): 36.5 (19 Apr 2023 05:49), Max: 36.8 (18 Apr 2023 21:36)  T(F): 97.7 (19 Apr 2023 05:49), Max: 98.2 (18 Apr 2023 21:36)  HR: 86 (19 Apr 2023 05:49) (81 - 86)  BP: 117/87 (19 Apr 2023 05:49) (117/87 - 138/91)  BP(mean): --  RR: 18 (19 Apr 2023 05:49) (18 - 18)  SpO2: 98% (19 Apr 2023 05:49) (98% - 100%)    Parameters below as of 19 Apr 2023 05:49  Patient On (Oxygen Delivery Method): room air        PHYSICAL EXAMINATION:  GENERAL: NAD, well built  HEAD:  Atraumatic, Normocephalic  EYES:  conjunctiva and sclera clear  NECK: Supple, No JVD, Normal thyroid  CHEST/LUNG: Clear to auscultation. Clear to percussion bilaterally; No rales, rhonchi, wheezing, or rubs  HEART: Regular rate and rhythm; No murmurs, rubs, or gallops  ABDOMEN: Soft, Nontender, Nondistended; Bowel sounds present  NERVOUS SYSTEM:  Alert & Oriented X3,    EXTREMITIES:  2+ Peripheral Pulses, No clubbing, cyanosis, or edema  SKIN: warm dry                          14.5   7.96  )-----------( 190      ( 19 Apr 2023 06:14 )             46.6     04-19    146<H>  |  110<H>  |  67<H>  ----------------------------<  110<H>  4.2   |  30  |  2.58<H>    Ca    9.8      19 Apr 2023 06:14  Phos  3.4     04-19  Mg     3.1     04-19    TPro  6.3  /  Alb  2.7<L>  /  TBili  0.6  /  DBili  x   /  AST  26  /  ALT  66<H>  /  AlkPhos  87  04-19    LIVER FUNCTIONS - ( 19 Apr 2023 06:14 )  Alb: 2.7 g/dL / Pro: 6.3 g/dL / ALK PHOS: 87 U/L / ALT: 66 U/L DA / AST: 26 U/L / GGT: x                   CAPILLARY BLOOD GLUCOSE      RADIOLOGY & ADDITIONAL TESTS:                   PGY-1 Progress Note discussed with attending    PAGER #: [[phone]] TILL 5:00 PM  PLEASE CONTACT ON CALL TEAM:  - On Call Team (Please refer to Red) FROM 5:00 PM - 8:30PM  - Nightfloat Team FROM 8:30 -7:30 AM    INTERVAL HPI/OVERNIGHT EVENTS:   no acute overnight events, pt. seen and examined at bedside, offers no complaints.      REVIEW OF SYSTEMS:  CONSTITUTIONAL: No fever, weight loss, or fatigue  RESPIRATORY: No cough, wheezing, chills or hemoptysis; No shortness of breath  CARDIOVASCULAR: No chest pain, palpitations, dizziness, or leg swelling  GASTROINTESTINAL: No abdominal pain. No nausea, vomiting, or hematemesis; No diarrhea or constipation. No melena or hematochezia.  GENITOURINARY: No dysuria or hematuria, urinary frequency  NEUROLOGICAL: No headaches, memory loss, loss of strength, numbness, or tremors  SKIN: No itching, burning, rashes, or lesions     Vital Signs Last 24 Hrs  T(C): 36.5 (19 Apr 2023 05:49), Max: 36.8 (18 Apr 2023 21:36)  T(F): 97.7 (19 Apr 2023 05:49), Max: 98.2 (18 Apr 2023 21:36)  HR: 86 (19 Apr 2023 05:49) (81 - 86)  BP: 117/87 (19 Apr 2023 05:49) (117/87 - 138/91)  BP(mean): --  RR: 18 (19 Apr 2023 05:49) (18 - 18)  SpO2: 98% (19 Apr 2023 05:49) (98% - 100%)    Parameters below as of 19 Apr 2023 05:49  Patient On (Oxygen Delivery Method): room air        PHYSICAL EXAMINATION:  GENERAL: NAD, lying in bed comfortably   HEAD:  Atraumatic, Normocephalic  EYES:  Prosthesis noted on the right, Left with conjunctiva and sclera clear, pupil is equal, round, and reactive to light and accommodation.  NECK: Supple, No JVD, trachea is midline, no evidence of thyroid enlargement, no lymphadenopathy or tenderness.  CHEST/LUNG: No rales, rhonchi, minimal expiratory wheezing noted, no rubs  HEART: Regular rate and rhythm; systolic murmur noted on the left lower sternal border, no rubs, or gallops  ABDOMEN: Soft, Nontender, Nondistended; Bowel sounds present  NERVOUS SYSTEM:  Alert & Oriented X2; No focal sensory or motor deficits are noted; Appropriate mood and affect.  EXTREMITIES:  2+ Peripheral Pulses, No clubbing, cyanosis, or edema  SKIN: Warm, dry, and well perfused; Good turgor; No lesions, nodules or rashes are noted.                        14.5   7.96  )-----------( 190      ( 19 Apr 2023 06:14 )             46.6     04-19    146<H>  |  110<H>  |  67<H>  ----------------------------<  110<H>  4.2   |  30  |  2.58<H>    Ca    9.8      19 Apr 2023 06:14  Phos  3.4     04-19  Mg     3.1     04-19    TPro  6.3  /  Alb  2.7<L>  /  TBili  0.6  /  DBili  x   /  AST  26  /  ALT  66<H>  /  AlkPhos  87  04-19    LIVER FUNCTIONS - ( 19 Apr 2023 06:14 )  Alb: 2.7 g/dL / Pro: 6.3 g/dL / ALK PHOS: 87 U/L / ALT: 66 U/L DA / AST: 26 U/L / GGT: x                   CAPILLARY BLOOD GLUCOSE      RADIOLOGY & ADDITIONAL TESTS:

## 2023-04-19 NOTE — PROGRESS NOTE ADULT - ASSESSMENT
73 y o with ambulatory at baseline from a [facility] home number 10 w/ PMH of intellectual disability, Fetal alcohol Syndrome, asthma, COPD with chronic cough, HTN, DM,  CKD elevated PSA, BPH, schizophrenia, hepatitis B, carrier, bilateral sensorineural hearing loss, admitted for AHRF secondary to Acute on Chronic Systolic Heart Failure.

## 2023-04-19 NOTE — PROGRESS NOTE ADULT - ASSESSMENT
73 y o with ambulatory at baseline from a [facility] home number 10 w/ PMH of intellectual disability, Fetal alcohol Syndrome, asthma, COPD with chronic cough, HTN, DM,  CKD elevated PSA, BPH, schizophrenia, hepatitis B, carrier, bilateral sensorineural hearing loss, admitted for AHRF secondary to Acute on Chronic Systolic Heart Failure.

## 2023-04-19 NOTE — PROGRESS NOTE ADULT - PROBLEM SELECTOR PLAN 1
ECHO 3/23 showed severely reduced EF 10-15%  NYHA Heart Failure class III stage C  Metoprolol 50 mg PO qd  Continue to hold enalapril and Jardiance for now until renal function back to baseline  Hold adding Spironolactone until renal function back to baseline  No cardiac cath at this moment   Hold Lasix 40 mg PO qd for now due to worsening renal function.

## 2023-04-19 NOTE — CHART NOTE - NSCHARTNOTEFT_GEN_A_CORE
Assessment:     Factors impacting intake: [ ] none [ ] nausea  [ ] vomiting [ ] diarrhea [ ] constipation  [ ]chewing problems [ ] swallowing issues  [ ] other:     Diet Presciption: Diet, Pureed:   1000mL Fluid Restriction ([id number])  Moderately Thick Liquids (MODTHICKLIQS)  No Concentrated Potassium (23 @ 16:16)    Intake:     Daily Weight in k.4 (2023 07:14)  Weight in k (2023 07:38)    % Weight Change    Pertinent Medications: MEDICATIONS  (STANDING):  albuterol/ipratropium for Nebulization 3 milliLiter(s) Nebulizer every 6 hours  amantadine 100 milliGRAM(s) Oral every 12 hours  aspirin enteric coated 81 milliGRAM(s) Oral daily  atorvastatin 40 milliGRAM(s) Oral at bedtime  finasteride 5 milliGRAM(s) Oral daily  fluticasone propionate 50 MICROgram(s)/spray Nasal Spray 1 Spray(s) Both Nostrils two times a day  heparin   Injectable 5000 Unit(s) SubCutaneous every 12 hours  insulin glargine Injectable (LANTUS) 5 Unit(s) SubCutaneous at bedtime  insulin lispro (ADMELOG) corrective regimen sliding scale   SubCutaneous three times a day before meals  insulin lispro (ADMELOG) corrective regimen sliding scale   SubCutaneous at bedtime  insulin lispro Injectable (ADMELOG) 4 Unit(s) SubCutaneous three times a day before meals  loratadine 10 milliGRAM(s) Oral daily  memantine 5 milliGRAM(s) Oral two times a day  metoprolol succinate ER 50 milliGRAM(s) Oral daily  montelukast 10 milliGRAM(s) Oral daily  OLANZapine 10 milliGRAM(s) Oral at bedtime  predniSONE   Tablet 40 milliGRAM(s) Oral daily  tamsulosin 0.8 milliGRAM(s) Oral at bedtime  tiotropium 2.5 MICROgram(s) Inhaler 2 Puff(s) Inhalation daily  valproic  acid Syrup 250 milliGRAM(s) Oral <User Schedule>  valproic  acid Syrup 1000 milliGRAM(s) Oral at bedtime    MEDICATIONS  (PRN):  albuterol    90 MICROgram(s) HFA Inhaler 2 Puff(s) Inhalation every 6 hours PRN Shortness of Breath and/or Wheezing  guaiFENesin Oral Liquid (Sugar-Free) 200 milliGRAM(s) Oral every 6 hours PRN Cough    Pertinent Labs:  Na146 mmol/L<H> Glu 110 mg/dL<H> K+ 4.2 mmol/L Cr  2.58 mg/dL<H> BUN 67 mg/dL<H>  Phos 3.4 mg/dL  Alb 2.7 g/dL<L>  Chol 152 mg/dL LDL --    HDL 79 mg/dL Trig 71 mg/dL     CAPILLARY BLOOD GLUCOSE      POCT Blood Glucose.: 163 mg/dL (2023 08:08)  POCT Blood Glucose.: 185 mg/dL (2023 21:42)  POCT Blood Glucose.: 127 mg/dL (2023 16:50)  POCT Blood Glucose.: 157 mg/dL (2023 11:37)      Skin:     Estimated Needs:   [ ] no change since previous assessment  [ ] recalculated:     Previous Nutrition Diagnosis:   [ ] Inadequate Energy Intake [ ]Inadequate Oral Intake [ ] Excessive Energy Intake   [ ] Underweight [ ] Increased Nutrient Needs [ ] Overweight/Obesity  [ ] Swallowing Difficult   [ ] Altered GI Function [ ] Unintended Weight Loss [ ] Food & Nutrition Related Knowledge Deficit [ ] Malnutrition   [ ] Not Ready for Diet/Life Style Changes     Nutrition Diagnosis is [ ] ongoing  [ ] Improving   [ ] resolved [ ] not applicable     New Nutrition Diagnosis: [ ] not applicable       Interventions:   Recommend  [ ] Change Diet To:  [ ] Nutrition Supplement  [ ] Nutrition Support  [ ] Other:     Monitoring and Evaluation:   [ ] PO intake [ x ] Tolerance to diet prescription [ x ] weights [ x ] labs[ x ] follow up per protocol  [ ] other: Assessment:       Nutrition reassessment for follow-up. Chart reviewed, pt visited, alert, verbally responsive, forgetful, but able to answer simple questions, also spoke to staff from Group home at bedside, no nutrition related concerns reported at present; Diet changed to puree/thicken liquid by MD, tolerating well, improving intake per flowsheet: 25-50%-->51-75%-->76 to 100%, 100% intake observed at breakfast today; h/o DM, [id number]=4.8, finger stick acceptable; s/p hyperkalemia, K=6.1-->4,2, Nephrology on the case, on Low K diet per MD; Pending discharge planning to skilled nursing facility for rehab when medically ready per team     Factors impacting intake: [ X ] swallowing issues  [ X ]  change in mental status; difficulty chewing; acute on chronic comorbidities including CHF, Acute on CKD, COPD, Intellectual Disability with Down Syndrome    Diet Prescription:   Diet, Pureed:   1000mL Fluid Restriction ([id number])  Moderately Thick Liquids (MODTHICKLIQS)  No Concentrated Potassium (23 @ 16:16)    Intake: see above     Daily Weight in k.4 (2023 07:14)  Weight in k (2023 07:38)    % Weight Vivas; a bit fluctuated, may due to scale/fluid variance    Pertinent Medications: MEDICATIONS  (STANDING):  albuterol/ipratropium for Nebulization 3 milliLiter(s) Nebulizer every 6 hours  amantadine 100 milliGRAM(s) Oral every 12 hours  aspirin enteric coated 81 milliGRAM(s) Oral daily  atorvastatin 40 milliGRAM(s) Oral at bedtime  finasteride 5 milliGRAM(s) Oral daily  fluticasone propionate 50 MICROgram(s)/spray Nasal Spray 1 Spray(s) Both Nostrils two times a day  heparin   Injectable 5000 Unit(s) SubCutaneous every 12 hours  insulin glargine Injectable (LANTUS) 5 Unit(s) SubCutaneous at bedtime  insulin lispro (ADMELOG) corrective regimen sliding scale   SubCutaneous three times a day before meals  insulin lispro (ADMELOG) corrective regimen sliding scale   SubCutaneous at bedtime  insulin lispro Injectable (ADMELOG) 4 Unit(s) SubCutaneous three times a day before meals  loratadine 10 milliGRAM(s) Oral daily  memantine 5 milliGRAM(s) Oral two times a day  metoprolol succinate ER 50 milliGRAM(s) Oral daily  montelukast 10 milliGRAM(s) Oral daily  OLANZapine 10 milliGRAM(s) Oral at bedtime  predniSONE   Tablet 40 milliGRAM(s) Oral daily  tamsulosin 0.8 milliGRAM(s) Oral at bedtime  tiotropium 2.5 MICROgram(s) Inhaler 2 Puff(s) Inhalation daily  valproic  acid Syrup 250 milliGRAM(s) Oral <User Schedule>  valproic  acid Syrup 1000 milliGRAM(s) Oral at bedtime    MEDICATIONS  (PRN):  albuterol    90 MICROgram(s) HFA Inhaler 2 Puff(s) Inhalation every 6 hours PRN Shortness of Breath and/or Wheezing  guaiFENesin Oral Liquid (Sugar-Free) 200 milliGRAM(s) Oral every 6 hours PRN Cough    Pertinent Labs:  Na146 mmol/L<H> Glu 110 mg/dL<H> K+ 4.2 mmol/L Cr  2.58 mg/dL<H> BUN 67 mg/dL<H>  Phos 3.4 mg/dL  Alb 2.7 g/dL<L>  Chol 152 mg/dL LDL --    HDL 79 mg/dL Trig 71 mg/dL     CAPILLARY BLOOD GLUCOSE    POCT Blood Glucose.: 163 mg/dL (2023 08:08)  POCT Blood Glucose.: 185 mg/dL (2023 21:42)  POCT Blood Glucose.: 127 mg/dL (2023 16:50)  POCT Blood Glucose.: 157 mg/dL (2023 11:37)    Skin: skin intact     Estimated Needs:   [ X ] no change since previous assessment  [ ] recalculated:     Previous Nutrition Diagnosis:   [ X ] Altered Nutrition Related Laboratory Values     Nutrition Diagnosis is [ X ] ongoing  [ ] Improving   [ ] resolved [ ] not applicable     New Nutrition Diagnosis: [ X ] not applicable     Interventions:   Recommend  [ X ] Change Diet To: Puree, Consistent CHO, Low Na, No Concentrated K, Moderately Thicken fluid, 1000 ml fluid restriction as medically feasible   [ ] Nutrition Supplement  [ ] Nutrition Support  [ X ] Other:  Nursing to continue feeding assistance and encouragement, aspiration precaution                     May consider Swallow evaluation as medically feasible                     Pt to be followed by dietitian at [facility] nursing facility when medically ready to be discharged     Monitoring and Evaluation:   [ X ] PO intake [ x ] Tolerance to diet prescription [ x ] weights [ x ] labs[ x ] follow up per protocol  [ ] other:

## 2023-04-19 NOTE — PROGRESS NOTE ADULT - ATTENDING COMMENTS
Patient was seen and examined this morning with housestaff  with caretaker at bedside.     Patient is comfortable in bed. SOB has resolved.   He was able to demonstrate get up from bed and sit up and stand and with assistance able to take few steps then refused stating foot pain; was able to be directed to sit in chair.     Vital Signs Last 24 Hrs  T(C): 36.2 (19 Apr 2023 14:13), Max: 36.8 (18 Apr 2023 21:36)  T(F): 97.2 (19 Apr 2023 14:13), Max: 98.2 (18 Apr 2023 21:36)  HR: 75 (19 Apr 2023 14:24) (75 - 86)  BP: 122/86 (19 Apr 2023 14:13) (117/87 - 138/91)  RR: 18 (19 Apr 2023 14:13) (18 - 18)  SpO2: 93% (19 Apr 2023 14:24) (93% - 98%) room air        P/E:  NAD  AAOx2, no focal deficit    CTABL today  S1S2 WNL, no MRG   Abd soft, non tender, BS present   BLLE no edema or calf tenderness     Labs:                        14.5   7.96  )-----------( 190      ( 19 Apr 2023 06:14 )             46.6   04-19    146<H>  |  110<H>  |  67<H>  ----------------------------<  110<H>  4.2   |  30  |  2.58<H>    Ca    9.8      19 Apr 2023 06:14  Phos  3.4     04-19  Mg     3.1     04-19    TPro  6.3  /  Alb  2.7<L>  /  TBili  0.6  /  DBili  x   /  AST  26  /  ALT  66<H>  /  AlkPhos  87  04-19    A/P:  YASSINE on CKD suspect overdiuresis plus possible obstructive component  Acute hypoxic respiratory failure due to fluid overload resolved  Acute on chronic systolic heart failure exacerbation   DM with hyperglycemia   HTN  HLD  Asthma  Fetal alcohol syndrome with Intellectual disability  Esophageal thickening    Plan:  Cont to hold Lasix for now; Renal follow up d/w Dr. Pederson  Renal function somewhat improved; Cont to monitor Cr closely; Avoid Nephrotoxic meds  Bladder scan today 230 ml; Encourage OOB to chair and ambulation; will avoid chance in a patient with Intellectual disability  If persistent retention and renal fn elevated, may need Urology eval and  chance; for now continue Proscar and increased dose Flomax 0.8mg HS  d/w Medical Director Emilia Hearn from [facility]; discussed clinical status and improving renal fn today; she confirmed patient has a Urologist outpatient;   Continue Metoprolol; Cardio f/u; Dr. Hamilton following  Cont Albuterol, Spiriva and Duoneb, cont short course of PO prednisone for 5 days  Lactulose for constipation  Cont current insulin regimen  Thickening of esophagus- outpatient GI follow up. Patient is asymptomatic now, denies any dysphagia, tolerating diet   CM and SW for safe discharge  Patient will need stair assessment from PT but most likely will need CARLOS placement.   As per Dr. Hearn it may be difficult to manage patient without stair assessment at facility as patient needs to move up and down during the day at [facility].   Dr. Hearn [phone]  Discussed with PGy1 and PGY3 and RN

## 2023-04-19 NOTE — PROGRESS NOTE ADULT - SUBJECTIVE AND OBJECTIVE BOX
[facility] NEPHROLOGY- PROGRESS NOTE    Patient is a 74yo Male from  [facility] home number with CKD,  h/o intellectual disability, Down Syndrome, asthma, COPD with chronic cough, HTN, DM, BPH, schizophrenia,, bilateral sensorineural hearing loss, who came in to the ED for sore throat and chest pain.  Pt a/w acute on chroni CHF. Pt was diuresed with Lasix.  Hosp cb YASSINE. Nephrology consulted for Elevated serum creatinine.    REVIEW OF SYSTEMS:  Gen: no fevers  Cards: no chest pain  Resp: no dyspnea, + cough  GI: no nausea or vomiting or diarrhea  Vascular: no LE edema    No Known Allergies      Hospital Medications: Medications reviewed    VITALS:  T(F): 97.2 (23 @ 14:13), Max: 98.2 (23 @ 21:36)  HR: 75 (23 @ 14:24)  BP: 122/86 (23 @ 14:13)  RR: 18 (23 @ 14:13)  SpO2: 93% (23 @ 14:24)  Wt(kg): --     @ 07:01  -   @ 15:15  --------------------------------------------------------  IN: 0 mL / OUT: 100 mL / NET: -100 mL            PHYSICAL EXAM:    Gen: NAD, calm  Cards: RRR, +S1/S2, no M/G/R  Resp: CTA B/L  GI: soft, NT, + ab distention,  Vascular: no LE edema B/L      LABS:      146<H>  |  110<H>  |  67<H>  ----------------------------<  110<H>  4.2   |  30  |  2.58<H>    Ca    9.8      2023 06:14  Phos  3.4       Mg     3.1         TPro  6.3  /  Alb  2.7<L>  /  TBili  0.6  /  DBili      /  AST  26  /  ALT  66<H>  /  AlkPhos  87      Creatinine Trend: 2.58 <--, 3.09 <--, 2.85 <--, 3.02 <--, 2.18 <--, 1.82 <--, 2.13 <--, 1.71 <--                        14.5   7.96  )-----------( 190      ( 2023 06:14 )             46.6     Urine Studies:  Urinalysis Basic - ( 2023 09:30 )    Color: Yellow / Appearance: Clear / S.020 / pH:   Gluc:  / Ketone: Trace  / Bili: Negative / Urobili: 4 mg/dL   Blood:  / Protein: 30 mg/dL / Nitrite: Negative   Leuk Esterase: Negative / RBC: 0-2 /HPF / WBC 0-2 /HPF   Sq Epi:  / Non Sq Epi:  / Bacteria: Trace /HPF      Sodium, Random Urine: 41 mmol/L ( @ 09:30)  Chloride, Random Urine: 32 mmol/L ( @ 09:30)  Creatinine, Random Urine: 94 mg/dL ( @ 09:30)  Creatinine, Random Urine: 149 mg/dL (04-15 @ 12:06)

## 2023-04-20 NOTE — PROGRESS NOTE ADULT - PROBLEM SELECTOR PLAN 1
ECHO 3/23 showed severely reduced EF 10-15%  NYHA Heart Failure class III stage C  Metoprolol 50 mg PO qd  Continue to hold enalapril and Jardiance for now until renal function back to baseline  Hold adding Spironolactone until renal function back to baseline  Cardiology onboard   No cardiac cath at this moment   PT recommending CARLOS  Hold Lasix 40 mg PO qd for now due to worsening renal function.   Nephro Dr Pederson onboard  Will keep monitoring. ECHO 3/23 showed severely reduced EF 10-15%  NYHA Heart Failure class III stage C  Metoprolol 50 mg PO qd  Continue to hold enalapril and Jardiance for now until renal function back to baseline  Hold adding Spironolactone until renal function back to baseline  Cardiology onboard   No cardiac cath at this moment   PT recommending CARLOS  Hold Lasix 40 mg PO qd for now due to worsening renal function.   Nephro Dr Pederson onboard  Will keep monitoring.  f/u chest xray

## 2023-04-20 NOTE — PROGRESS NOTE ADULT - ASSESSMENT
73 y o with ambulatory at baseline from a [facility] home number 10 w/ PMH of intellectual disability, Fetal alcohol Syndrome, asthma, COPD with chronic cough, HTN, DM,  CKD elevated PSA, BPH, schizophrenia, hepatitis B, carrier, bilateral sensorineural hearing loss, admitted for AHRF secondary to Acute on Chronic Systolic Heart Failure.

## 2023-04-20 NOTE — PROGRESS NOTE ADULT - SUBJECTIVE AND OBJECTIVE BOX
[facility] NEPHROLOGY- PROGRESS NOTE    Patient is a 74yo Male from  [facility] home number with CKD,  h/o intellectual disability, Down Syndrome, asthma, COPD with chronic cough, HTN, DM, BPH, schizophrenia,, bilateral sensorineural hearing loss, who came in to the ED for sore throat and chest pain.  Pt a/w acute on chroni CHF. Pt was diuresed with Lasix.  Hosp cb YASSINE. Nephrology consulted for Elevated serum creatinine.    REVIEW OF SYSTEMS:  Gen: no fevers  Cards: no chest pain  Resp: no dyspnea,   GI: no nausea or vomiting or diarrhea  Vascular: no LE edema    No Known Allergies      Hospital Medications: Medications reviewed    VITALS:  T(F): 98.4 (23 @ 05:26), Max: 98.4 (23 @ 05:26)  HR: 93 (23 @ 05:26)  BP: 130/90 (23 @ 05:26)  RR: 18 (23 @ 05:26)  SpO2: 96% (23 @ 05:26)  Wt(kg): --     @ 07:01  -   @ 07:00  --------------------------------------------------------  IN: 0 mL / OUT: 875 mL / NET: -875 mL        PHYSICAL EXAM:    Gen: NAD, calm  Cards: RRR, +S1/S2, no M/G/R  Resp: CTA B/L  GI: soft, NT, + Mild ab distention,  Vascular: no LE edema B/L      LABS:      145  |  115<H>  |  57<H>  ----------------------------<  182<H>  5.3   |  22  |  2.28<H>    Ca    9.4      2023 08:15  Phos  3.1     -  Mg     2.8         TPro  6.8  /  Alb  2.6<L>  /  TBili  0.7  /  DBili      /  AST  38  /  ALT  68<H>  /  AlkPhos  87      Creatinine Trend: 2.28 <--, 2.58 <--, 3.09 <--, 2.85 <--, 3.02 <--, 2.18 <--, 1.82 <--, 2.13 <--                        15.3   8.29  )-----------( 189      ( 2023 08:55 )             48.9     Urine Studies:  Urinalysis Basic - ( 2023 09:30 )    Color: Yellow / Appearance: Clear / S.020 / pH:   Gluc:  / Ketone: Trace  / Bili: Negative / Urobili: 4 mg/dL   Blood:  / Protein: 30 mg/dL / Nitrite: Negative   Leuk Esterase: Negative / RBC: 0-2 /HPF / WBC 0-2 /HPF   Sq Epi:  / Non Sq Epi:  / Bacteria: Trace /HPF      Sodium, Random Urine: 41 mmol/L ( @ 09:30)  Chloride, Random Urine: 32 mmol/L ( @ 09:30)  Creatinine, Random Urine: 94 mg/dL ( @ 09:30)  Creatinine, Random Urine: 149 mg/dL (04-15 @ 12:06)

## 2023-04-20 NOTE — PROGRESS NOTE ADULT - ASSESSMENT
Patient is a 74yo Male from  [facility] with CKD,  h/o intellectual disability, Down Syndrome, asthma, COPD with chronic cough, HTN, DM, BPH, schizophrenia,, bilateral sensorineural hearing loss, who came in to the ED for sore throat and chest pain.  Pt a/w acute on chroni CHF. Pt was diuresed with Lasix.  Hosp cb YASSINE. Nephrology consulted for Elevated serum creatinine.    1. YASSINE- likely due to overdiuresis, for which Lasix 40mg PO daily was discontinued. Scr was improving off diuretics and s/p albumin 25 % in 50ml q 8hrs x 3 doses.    UA with 30 protein with trace ketone; pt likely with poor PO intake. Renal function improving  FeUrea 31%; consistent with pre-renal YASSINE. Renal US with no hydro. Bladder scan 4/17 neg, however post void intermittent cath 500ml on 4/19. Recc to have pt stand to urinate and then repeat bladder scan;  monitor for retention.  Continue to hold Enalapril and lasix. Will avoid further IVF (pt with h/o CHF with severe global LVSD on TTE). Strict I/Os. Avoid nephrotoxins/ NSAIDs/ RCA. Monitor BMP.  2. CKD-3a- baseline SCr 1.3-1.6. Defer secondary w/u as an outpt. Avoid nephrotoxins  3. HTN 2/2 CKD- BP borderline. Continue to hold Enalapril. Continue with current anti-hypertensive medications. Monitor BP.  4. BPH- c/w flomax and finasteride      [facility] NEPHROLOGY  Raul Goncalves M.D.  ALEXIS Pérez.O.  Priya Pederson M.D.  Hellen Dietz, MSN, ANP-C  [phone]  Fax: [phone] [place] [address], [id number]  [place] [address]

## 2023-04-20 NOTE — CONSULT NOTE ADULT - SUBJECTIVE AND OBJECTIVE BOX
HPI  73 year old male from group home w/ PMH intellectual disability, fetal alcohol syndrome, COPD, DM, CKD, BPH, schizophrenia admitted for acute on chronic systolic heart failure. Admitted since April 8.    Urology consulted for incomplete bladder emptying. Patient is poor historian, but denies suprapubic or flank pain and states he's able to void. On finasteride and flomax at baseline - flomax dose recently doubled to 0.8 mg. Baseline Cr 1.3 to 1.6, and currently 2.28 from 2.58 from 3. Random bladder volumes per US showing voluems of 265 mL, 275 mL, and 286 mL. Intermittent catheterization output was 500. He does see a urologist at his group home.    AVSS. WBC 8.29, Hct 48.9, Cr 2.28. WENDY from April 17 showed bladder volume of 275 (not PVR) and no hydronephrosis or nephrolithiasis bilaterally.      PAST MEDICAL & SURGICAL HISTORY:  Down syndrome      HTN (hypertension)      Hyperlipidemia      Diabetes mellitus      Intellectual disability      No significant past surgical history          MEDICATIONS  (STANDING):  albuterol/ipratropium for Nebulization 3 milliLiter(s) Nebulizer every 6 hours  amantadine 100 milliGRAM(s) Oral every 12 hours  aspirin enteric coated 81 milliGRAM(s) Oral daily  atorvastatin 40 milliGRAM(s) Oral at bedtime  finasteride 5 milliGRAM(s) Oral daily  fluticasone propionate 50 MICROgram(s)/spray Nasal Spray 1 Spray(s) Both Nostrils two times a day  heparin   Injectable 5000 Unit(s) SubCutaneous every 12 hours  insulin glargine Injectable (LANTUS) 5 Unit(s) SubCutaneous at bedtime  insulin lispro (ADMELOG) corrective regimen sliding scale   SubCutaneous three times a day before meals  insulin lispro (ADMELOG) corrective regimen sliding scale   SubCutaneous at bedtime  insulin lispro Injectable (ADMELOG) 4 Unit(s) SubCutaneous three times a day before meals  loratadine 10 milliGRAM(s) Oral daily  memantine 5 milliGRAM(s) Oral two times a day  metoprolol succinate ER 50 milliGRAM(s) Oral daily  montelukast 10 milliGRAM(s) Oral daily  OLANZapine 10 milliGRAM(s) Oral at bedtime  tamsulosin 0.8 milliGRAM(s) Oral at bedtime  tiotropium 2.5 MICROgram(s) Inhaler 2 Puff(s) Inhalation daily  valproic  acid Syrup 250 milliGRAM(s) Oral <User Schedule>  valproic  acid Syrup 1000 milliGRAM(s) Oral at bedtime    MEDICATIONS  (PRN):  albuterol    90 MICROgram(s) HFA Inhaler 2 Puff(s) Inhalation every 6 hours PRN Shortness of Breath and/or Wheezing  guaiFENesin Oral Liquid (Sugar-Free) 200 milliGRAM(s) Oral every 6 hours PRN Cough      FAMILY HISTORY:  No pertinent family history in first degree relatives        Allergies    No Known Allergies    Intolerances        SOCIAL HISTORY:    REVIEW OF SYSTEMS:   Otherwise negative as stated in HPI    Physical Exam  Vital signs  T(C): 36.3 (04-20-23 @ 14:44), Max: 36.9 (04-20-23 @ 05:26)  HR: 81 (04-20-23 @ 14:44)  BP: 118/78 (04-20-23 @ 14:44)  SpO2: 98% (04-20-23 @ 14:44)  Wt(kg): --    Output    OUT:    Post-Void Residual per Intermittent Catheterization (mL): 500 mL    Voided (mL): 375 mL  Total OUT: 875 mL    Total NET: -875 mL          Gen: NAD  Pulm: No respiratory distress  Abd: Soft, nontender, nondistended, no CVA tenderness  : Voiding spontaneously, no palpable bladder      LABS:      04-20 @ 08:55    WBC 8.29  / Hct 48.9  / SCr --       04-20 @ 08:15    WBC --    / Hct --    / SCr 2.28     04-20    145  |  115<H>  |  57<H>  ----------------------------<  182<H>  5.3   |  22  |  2.28<H>    Ca    9.4      20 Apr 2023 08:15  Phos  3.1     04-20  Mg     2.8     04-20    TPro  6.8  /  Alb  2.6<L>  /  TBili  0.7  /  DBili  x   /  AST  38  /  ALT  68<H>  /  AlkPhos  87  04-20            RADIOLOGY:        ACC: [id number] EXAM: US KIDNEY(S) ORDERED BY: MERLIN ARAYA    PROCEDURE DATE: 04/17/2023        INTERPRETATION: CLINICAL INFORMATION: Acute renal insufficiency. Evaluate for urinary retention.    COMPARISON: None available.    TECHNIQUE: Sonography of the kidneys and bladder.    FINDINGS:  Right kidney: 11.1 cm. No hydronephrosis or calculi. Cystic foci identified measuring up to 3.7 cm.    Left kidney: 10.2 cm. No hydronephrosis or calculi. Cystic foci identified measuring up to 8 mm.    Urinary bladder: Bladder volume at time of examination approximates 275 cc. No bladder wall thickening noted. No intraluminal mass. Post void residual is not obtained.    IMPRESSION:  No evidence for bilateral hydronephrosis. HPI  73 year old male from group home w/ PMH intellectual disability, fetal alcohol syndrome, COPD, DM, CKD, BPH, schizophrenia admitted for acute on chronic systolic heart failure. Admitted since April 8.    Urology consulted for incomplete bladder emptying. Patient is poor historian, but denies suprapubic or flank pain and states he's able to void. On finasteride and flomax at baseline - flomax dose recently doubled to 0.8 mg. Baseline Cr 1.3 to 1.6, and currently 2.28 from 2.58 from 3. Random bladder volumes per US showing voluems of 265 mL, 275 mL, and 286 mL. Intermittent catheterization output was 500. He does see a urologist at his group home.    AVSS. WBC 8.29, Hct 48.9, Cr 2.28. WENDY from April 17 showed bladder volume of 275 (not PVR) and no hydronephrosis or nephrolithiasis bilaterally.      PAST MEDICAL & SURGICAL HISTORY:  Down syndrome      HTN (hypertension)      Hyperlipidemia      Diabetes mellitus      Intellectual disability      No significant past surgical history      Family History: No family history of  malignancy  Social History: Does not smoke    ROS: All others negative except as noted above.      MEDICATIONS  (STANDING):  albuterol/ipratropium for Nebulization 3 milliLiter(s) Nebulizer every 6 hours  amantadine 100 milliGRAM(s) Oral every 12 hours  aspirin enteric coated 81 milliGRAM(s) Oral daily  atorvastatin 40 milliGRAM(s) Oral at bedtime  finasteride 5 milliGRAM(s) Oral daily  fluticasone propionate 50 MICROgram(s)/spray Nasal Spray 1 Spray(s) Both Nostrils two times a day  heparin   Injectable 5000 Unit(s) SubCutaneous every 12 hours  insulin glargine Injectable (LANTUS) 5 Unit(s) SubCutaneous at bedtime  insulin lispro (ADMELOG) corrective regimen sliding scale   SubCutaneous three times a day before meals  insulin lispro (ADMELOG) corrective regimen sliding scale   SubCutaneous at bedtime  insulin lispro Injectable (ADMELOG) 4 Unit(s) SubCutaneous three times a day before meals  loratadine 10 milliGRAM(s) Oral daily  memantine 5 milliGRAM(s) Oral two times a day  metoprolol succinate ER 50 milliGRAM(s) Oral daily  montelukast 10 milliGRAM(s) Oral daily  OLANZapine 10 milliGRAM(s) Oral at bedtime  tamsulosin 0.8 milliGRAM(s) Oral at bedtime  tiotropium 2.5 MICROgram(s) Inhaler 2 Puff(s) Inhalation daily  valproic  acid Syrup 250 milliGRAM(s) Oral <User Schedule>  valproic  acid Syrup 1000 milliGRAM(s) Oral at bedtime    MEDICATIONS  (PRN):  albuterol    90 MICROgram(s) HFA Inhaler 2 Puff(s) Inhalation every 6 hours PRN Shortness of Breath and/or Wheezing  guaiFENesin Oral Liquid (Sugar-Free) 200 milliGRAM(s) Oral every 6 hours PRN Cough      FAMILY HISTORY:  No pertinent family history in first degree relatives        Allergies    No Known Allergies    Physical Exam  Vital signs  T(C): 36.3 (04-20-23 @ 14:44), Max: 36.9 (04-20-23 @ 05:26)  HR: 81 (04-20-23 @ 14:44)  BP: 118/78 (04-20-23 @ 14:44)  SpO2: 98% (04-20-23 @ 14:44)  Wt(kg): --    Output    OUT:    Post-Void Residual per Intermittent Catheterization (mL): 500 mL    Voided (mL): 375 mL  Total OUT: 875 mL    Total NET: -875 mL          Gen: NAD  Pulm: No respiratory distress  Abd: Soft, nontender, nondistended, no CVA tenderness  : Voiding spontaneously, no palpable bladder      LABS:      04-20 @ 08:55    WBC 8.29  / Hct 48.9  / SCr --       04-20 @ 08:15    WBC --    / Hct --    / SCr 2.28     04-20    145  |  115<H>  |  57<H>  ----------------------------<  182<H>  5.3   |  22  |  2.28<H>    Ca    9.4      20 Apr 2023 08:15  Phos  3.1     04-20  Mg     2.8     04-20    TPro  6.8  /  Alb  2.6<L>  /  TBili  0.7  /  DBili  x   /  AST  38  /  ALT  68<H>  /  AlkPhos  87  04-20            RADIOLOGY:        ACC: [id number] EXAM: US KIDNEY(S) ORDERED BY: MERLIN ARAYA    PROCEDURE DATE: 04/17/2023        INTERPRETATION: CLINICAL INFORMATION: Acute renal insufficiency. Evaluate for urinary retention.    COMPARISON: None available.    TECHNIQUE: Sonography of the kidneys and bladder.    FINDINGS:  Right kidney: 11.1 cm. No hydronephrosis or calculi. Cystic foci identified measuring up to 3.7 cm.    Left kidney: 10.2 cm. No hydronephrosis or calculi. Cystic foci identified measuring up to 8 mm.    Urinary bladder: Bladder volume at time of examination approximates 275 cc. No bladder wall thickening noted. No intraluminal mass. Post void residual is not obtained.    IMPRESSION:  No evidence for bilateral hydronephrosis.

## 2023-04-20 NOTE — PROGRESS NOTE ADULT - SUBJECTIVE AND OBJECTIVE BOX
PATIENT SEEN AND EXAMINED ON :- 4/20/23  DATE OF SERVICE: 4/20/23            Interim events noted,Labs ,Radiological studies and Cardiology tests reviewed .    MR#[id number]  PATIENT NAME:-[facility] COURSE: HPI:  73 y o with ambulatory at baseline from a [facility] number [facility] w/ PMH of intellectual disability, Down Syndrome, asthma, COPD with chronic cough, HTN, DM,  CKD elevated PSA, BPH, schizophrenia, hepatitis B, carrier, bilateral sensorineural hearing loss, who came in to the ED for sore throat and chest pain.  Patient states he developed a sore throat and dry cough a few days ago. Then last night had chest pain, located on the left side, w/o radiation, described as sharp. Pt states the pain lasted throughout the night and resolved this morning. Currently denies any fever, chills, nausea vomiting SOB, abdominal pain, PND, orthopnea or change in bowel habits  (08 Apr 2023 18:22)      INTERIM EVENTS:Patient seen at bedside ,interim events noted.      PMH -reviewed admission note, no change since admission  HEART FAILURE: Acute[ ]Chronic[ ] Systolic[ ] Diastolic[ ] Combined Systolic and Diastolic[ ]  CAD[ ] CABG[ ] PCI[ ]  DEVICES[ ] PPM[ ] ICD[ ] ILR[ ]  ATRIAL FIBRILLATION[ ] Paroxysmal[ ] Permanent[ ] CHADS2-[  ]  YASSINE[ ] CKD1[ ] CKD2[ ] CKD3[ ] CKD4[ ] ESRD[ ]  COPD[ ] HTN[ ]   DM[ ] Type1[ ] Type 2[ ]   CVA[ ] Paresis[ ]    AMBULATION: Assisted[ ] Cane/walker[ ] Independent[ ]    MEDICATIONS  (STANDING):  albuterol/ipratropium for Nebulization 3 milliLiter(s) Nebulizer every 6 hours  amantadine 100 milliGRAM(s) Oral every 12 hours  aspirin enteric coated 81 milliGRAM(s) Oral daily  atorvastatin 40 milliGRAM(s) Oral at bedtime  finasteride 5 milliGRAM(s) Oral daily  fluticasone propionate 50 MICROgram(s)/spray Nasal Spray 1 Spray(s) Both Nostrils two times a day  heparin   Injectable 5000 Unit(s) SubCutaneous every 12 hours  insulin glargine Injectable (LANTUS) 5 Unit(s) SubCutaneous at bedtime  insulin lispro (ADMELOG) corrective regimen sliding scale   SubCutaneous three times a day before meals  insulin lispro (ADMELOG) corrective regimen sliding scale   SubCutaneous at bedtime  insulin lispro Injectable (ADMELOG) 4 Unit(s) SubCutaneous three times a day before meals  loratadine 10 milliGRAM(s) Oral daily  memantine 5 milliGRAM(s) Oral two times a day  metoprolol succinate ER 50 milliGRAM(s) Oral daily  montelukast 10 milliGRAM(s) Oral daily  OLANZapine 10 milliGRAM(s) Oral at bedtime  tamsulosin 0.8 milliGRAM(s) Oral at bedtime  tiotropium 2.5 MICROgram(s) Inhaler 2 Puff(s) Inhalation daily  valproic  acid Syrup 250 milliGRAM(s) Oral <User Schedule>  valproic  acid Syrup 1000 milliGRAM(s) Oral at bedtime    MEDICATIONS  (PRN):  albuterol    90 MICROgram(s) HFA Inhaler 2 Puff(s) Inhalation every 6 hours PRN Shortness of Breath and/or Wheezing  guaiFENesin Oral Liquid (Sugar-Free) 200 milliGRAM(s) Oral every 6 hours PRN Cough            REVIEW OF SYSTEMS:  Constitutional: [ ] fever, [ ]weight loss,  [ ]fatigue [ ]weight gain  Eyes: [ ] visual changes  Respiratory: [ ]shortness of breath;  [ ] cough, [ ]wheezing, [ ]chills, [ ]hemoptysis  Cardiovascular: [ ] chest pain, [ ]palpitations, [ ]dizziness,  [ ]leg swelling[ ]orthopnea[ ]PND  Gastrointestinal: [ ] abdominal pain, [ ]nausea, [ ]vomiting,  [ ]diarrhea [ ]Constipation [ ]Melena  Genitourinary: [ ] dysuria, [ ] hematuria [ ]Reyes  Neurologic: [ ] headaches [ ] tremors[ ]weakness [ ]Paralysis Right[ ] Left[ ]  Skin: [ ] itching, [ ]burning, [ ] rashes  Endocrine: [ ] heat or cold intolerance  Musculoskeletal: [ ] joint pain or swelling; [ ] muscle, back, or extremity pain  Psychiatric: [ ] depression, [ ]anxiety, [ ]mood swings, or [ ]difficulty sleeping  Hematologic: [ ] easy bruising, [ ] bleeding gums    [ ] All remaining systems negative except as per above.   [ ]Unable to obtain.  [x] No change in ROS since admission      Vital Signs Last 24 Hrs  T(C): 36.3 (20 Apr 2023 21:08), Max: 36.9 (20 Apr 2023 05:26)  T(F): 97.3 (20 Apr 2023 21:08), Max: 98.4 (20 Apr 2023 05:26)  HR: 90 (20 Apr 2023 21:08) (81 - 96)  BP: 138/119 (20 Apr 2023 21:08) (115/90 - 138/119)  BP(mean): --  RR: 19 (20 Apr 2023 21:08) (18 - 19)  SpO2: 99% (20 Apr 2023 21:08) (96% - 99%)    Parameters below as of 20 Apr 2023 21:08  Patient On (Oxygen Delivery Method): room air      I&O's Summary    19 Apr 2023 07:01  -  20 Apr 2023 07:00  --------------------------------------------------------  IN: 0 mL / OUT: 875 mL / NET: -875 mL        PHYSICAL EXAM:  General: No acute distress BMI-  HEENT: EOMI, PERRL  Neck: Supple, [ ] JVD  Lungs: Equal air entry bilaterally; [ ] rales [ ] wheezing [ ] rhonchi  Heart: Regular rate and rhythm; [x ] murmur   2/6 [ x] systolic [ ] diastolic [ ] radiation[ ] rubs [ ]  gallops  Abdomen: Nontender, bowel sounds present  Extremities: No clubbing, cyanosis, [ ] edema [ ]Pulses  equal and intact  Nervous system:  Alert & Oriented X3, no focal deficits  Psychiatric: Normal affect  Skin: No rashes or lesions    LABS:  04-20    145  |  115<H>  |  57<H>  ----------------------------<  182<H>  5.3   |  22  |  2.28<H>    Ca    9.4      20 Apr 2023 08:15  Phos  3.1     04-20  Mg     2.8     04-20    TPro  6.8  /  Alb  2.6<L>  /  TBili  0.7  /  DBili  x   /  AST  38  /  ALT  68<H>  /  AlkPhos  87  04-20    Creatinine Trend: 2.28<--, 2.58<--, 3.09<--, 2.85<--, 3.02<--, 2.18<--                        15.3   8.29  )-----------( 189      ( 20 Apr 2023 08:55 )             48.9

## 2023-04-20 NOTE — CONSULT NOTE ADULT - ASSESSMENT
73M w/ BPH and CKD presenting with acute on chronic systolic heart failure. Urology consulted for incomplete bladder emptying.    - Labs reviewed - Cr 2.28 from 2.58, with baseline 1.3-1.6  - Renal US images reviewed - no hydronephrosis or nephrolithiasis bilaterally  - Continue flomax  - Continue finasteride  - Recommend PVR - please perform immediately after void and document in flowsheets  - Trend Cr  - Patient may have higher baseline PVR which is tolerable. No indication for Reyes catheter at present.    Case discussed with Dr. Antunez 73M w/ BPH and CKD presenting with acute on chronic systolic heart failure. Urology consulted for incomplete bladder emptying.    - Labs reviewed - Cr 2.28 from 2.58, with baseline 1.3-1.6  - Renal US images reviewed - no hydronephrosis or nephrolithiasis bilaterally  - Continue flomax  - Continue finasteride  - Recommend PVR - please perform immediately after void and document in flowsheets  - Trend Cr  - Patient may have higher baseline PVR which is tolerable. No indication for Reyes catheter at present.  - Discussed with house staff

## 2023-04-20 NOTE — PROGRESS NOTE ADULT - SUBJECTIVE AND OBJECTIVE BOX
PGY-1 Progress Note discussed with attending    PAGER #: [[phone]] TILL 5:00 PM  PLEASE CONTACT ON CALL TEAM:  - On Call Team (Please refer to Red) FROM 5:00 PM - 8:30PM  - Nightfloat Team FROM 8:30 -7:30 AM    CHIEF COMPLAINT & BRIEF HOSPITAL COURSE:    INTERVAL HPI/OVERNIGHT EVENTS:       REVIEW OF SYSTEMS:  CONSTITUTIONAL: No fever, weight loss, or fatigue  RESPIRATORY: No cough, wheezing, chills or hemoptysis; No shortness of breath  CARDIOVASCULAR: No chest pain, palpitations, dizziness, or leg swelling  GASTROINTESTINAL: No abdominal pain. No nausea, vomiting, or hematemesis; No diarrhea or constipation. No melena or hematochezia.  GENITOURINARY: No dysuria or hematuria, urinary frequency  NEUROLOGICAL: No headaches, memory loss, loss of strength, numbness, or tremors  SKIN: No itching, burning, rashes, or lesions     Vital Signs Last 24 Hrs  T(C): 36.9 (20 Apr 2023 05:26), Max: 36.9 (20 Apr 2023 05:26)  T(F): 98.4 (20 Apr 2023 05:26), Max: 98.4 (20 Apr 2023 05:26)  HR: 93 (20 Apr 2023 05:26) (75 - 96)  BP: 130/90 (20 Apr 2023 05:26) (115/90 - 130/90)  BP(mean): --  RR: 18 (20 Apr 2023 05:26) (16 - 18)  SpO2: 96% (20 Apr 2023 05:26) (93% - 100%)    Parameters below as of 20 Apr 2023 05:26  Patient On (Oxygen Delivery Method): room air        PHYSICAL EXAMINATION:  GENERAL: NAD, well built  HEAD:  Atraumatic, Normocephalic  EYES:  conjunctiva and sclera clear  NECK: Supple, No JVD, Normal thyroid  CHEST/LUNG: Clear to auscultation. Clear to percussion bilaterally; No rales, rhonchi, wheezing, or rubs  HEART: Regular rate and rhythm; No murmurs, rubs, or gallops  ABDOMEN: Soft, Nontender, Nondistended; Bowel sounds present  NERVOUS SYSTEM:  Alert & Oriented X3,    EXTREMITIES:  2+ Peripheral Pulses, No clubbing, cyanosis, or edema  SKIN: warm dry                          15.3   8.29  )-----------( 189      ( 20 Apr 2023 08:55 )             48.9     04-20    145  |  115<H>  |  57<H>  ----------------------------<  182<H>  5.3   |  22  |  2.28<H>    Ca    9.4      20 Apr 2023 08:15  Phos  3.1     04-20  Mg     2.8     04-20    TPro  6.8  /  Alb  2.6<L>  /  TBili  0.7  /  DBili  x   /  AST  38  /  ALT  68<H>  /  AlkPhos  87  04-20    LIVER FUNCTIONS - ( 20 Apr 2023 08:15 )  Alb: 2.6 g/dL / Pro: 6.8 g/dL / ALK PHOS: 87 U/L / ALT: 68 U/L DA / AST: 38 U/L / GGT: x                   CAPILLARY BLOOD GLUCOSE      RADIOLOGY & ADDITIONAL TESTS:                   PGY-1 Progress Note discussed with attending    PAGER #: [[phone]] TILL 5:00 PM  PLEASE CONTACT ON CALL TEAM:  - On Call Team (Please refer to Red) FROM 5:00 PM - 8:30PM  - Nightfloat Team FROM 8:30 -7:30 AM    INTERVAL HPI/OVERNIGHT EVENTS:   no acute overnight events, pt. seen and examined at bedside, offers no complaints.      REVIEW OF SYSTEMS:  CONSTITUTIONAL: No fever, weight loss, or fatigue  RESPIRATORY: No cough, wheezing, chills or hemoptysis; No shortness of breath  CARDIOVASCULAR: No chest pain, palpitations, dizziness, or leg swelling  GASTROINTESTINAL: No abdominal pain. No nausea, vomiting, or hematemesis; No diarrhea or constipation. No melena or hematochezia.  GENITOURINARY: No dysuria or hematuria, urinary frequency  NEUROLOGICAL: No headaches, memory loss, loss of strength, numbness, or tremors  SKIN: No itching, burning, rashes, or lesions     Vital Signs Last 24 Hrs  T(C): 36.9 (20 Apr 2023 05:26), Max: 36.9 (20 Apr 2023 05:26)  T(F): 98.4 (20 Apr 2023 05:26), Max: 98.4 (20 Apr 2023 05:26)  HR: 93 (20 Apr 2023 05:26) (75 - 96)  BP: 130/90 (20 Apr 2023 05:26) (115/90 - 130/90)  BP(mean): --  RR: 18 (20 Apr 2023 05:26) (16 - 18)  SpO2: 96% (20 Apr 2023 05:26) (93% - 100%)    Parameters below as of 20 Apr 2023 05:26  Patient On (Oxygen Delivery Method): room air        PHYSICAL EXAMINATION:  GENERAL: NAD, lying in bed comfortably   HEAD:  Atraumatic, Normocephalic  EYES:  Prosthesis noted on the right, Left with conjunctiva and sclera clear, pupil is equal, round, and reactive to light and accommodation.  NECK: Supple, No JVD, trachea is midline, no evidence of thyroid enlargement, no lymphadenopathy or tenderness.  CHEST/LUNG: No rales, rhonchi, minimal expiratory wheezing noted, no rubs  HEART: Regular rate and rhythm; systolic murmur noted on the left lower sternal border, no rubs, or gallops  ABDOMEN: Soft, Nontender, Nondistended; Bowel sounds present  NERVOUS SYSTEM:  Alert & Oriented X2; No focal sensory or motor deficits are noted; Appropriate mood and affect.  EXTREMITIES:  2+ Peripheral Pulses, No clubbing, cyanosis, or edema  SKIN: Warm, dry, and well perfused; Good turgor; No lesions, nodules or rashes are noted.                          15.3   8.29  )-----------( 189      ( 20 Apr 2023 08:55 )             48.9     04-20    145  |  115<H>  |  57<H>  ----------------------------<  182<H>  5.3   |  22  |  2.28<H>    Ca    9.4      20 Apr 2023 08:15  Phos  3.1     04-20  Mg     2.8     04-20    TPro  6.8  /  Alb  2.6<L>  /  TBili  0.7  /  DBili  x   /  AST  38  /  ALT  68<H>  /  AlkPhos  87  04-20    LIVER FUNCTIONS - ( 20 Apr 2023 08:15 )  Alb: 2.6 g/dL / Pro: 6.8 g/dL / ALK PHOS: 87 U/L / ALT: 68 U/L DA / AST: 38 U/L / GGT: x                   CAPILLARY BLOOD GLUCOSE      RADIOLOGY & ADDITIONAL TESTS:

## 2023-04-20 NOTE — PROGRESS NOTE ADULT - ASSESSMENT
73 y o with ambulatory at baseline from a [facility] home number 10 w/ PMH of intellectual disability, Fetal alcohol Syndrome, asthma, COPD with chronic cough, HTN, DM,  CKD elevated PSA, BPH, schizophrenia, hepatitis B, carrier, bilateral sensorineural hearing loss, admitted for AHRF secondary to Acute on Chronic Systolic Heart Failure.

## 2023-04-20 NOTE — PROGRESS NOTE ADULT - PROBLEM SELECTOR PLAN 4
Baseline 1.30s to 1.60s  Cr 1.59>1.71>2.13>1.82 > 2.85 > 3 > 2.58  Likely overdiuresis  Hold Lasix  Nephrology onboard   Avoid nephrotoxic agents  Will keep monitoring   Rest as above  Renal US shows no hydro  bladder scan is neg on 4/17  repeat bladder scan is neg on 4/19  started albumin 25% on 50 ml q8h x3 doses  improved Scr Baseline 1.30s to 1.60s  Cr 1.59>1.71>2.13>1.82 > 2.85 > 3 > 2.58 > 2.28  Likely overdiuresis  Hold Lasix  Nephrology onboard   Avoid nephrotoxic agents  Will keep monitoring   Rest as above  Renal US shows no hydro  bladder scan is neg on 4/17  straight cath on 4/19 drained 500 cc  repeat bladder scan  s/p albumin 25% on 50 ml q8h x3 doses  improved Scr

## 2023-04-20 NOTE — PROGRESS NOTE ADULT - ATTENDING COMMENTS
Patient was seen and examined this morning with housestaff  with caretaker at bedside.     Patient is comfortable in bed. SOB has resolved.   He was able to demonstrate get up from bed and sit up and stand and with assistance able to take few steps then refused stating foot pain; was able to be directed to sit in chair.     Vital Signs Last 24 Hrs  T(C): 36.3 (20 Apr 2023 14:44), Max: 36.9 (20 Apr 2023 05:26)  T(F): 97.3 (20 Apr 2023 14:44), Max: 98.4 (20 Apr 2023 05:26)  HR: 81 (20 Apr 2023 14:44) (81 - 96)  BP: 118/78 (20 Apr 2023 14:44) (115/90 - 130/90)  RR: 18 (20 Apr 2023 14:44) (16 - 18)  SpO2: 98% (20 Apr 2023 14:44) (96% - 100%): room air        P/E:  NAD  AAOx2, no focal deficit    CTABL today  S1S2 WNL, no MRG   Abd soft, non tender, BS present   BLLE no edema or calf tenderness     Labs:                        15.3   8.29  )-----------( 189      ( 20 Apr 2023 08:55 )             48.9   04-20    145  |  115<H>  |  57<H>  ----------------------------<  182<H>  5.3   |  22  |  2.28<H>    Ca    9.4      20 Apr 2023 08:15  Phos  3.1     04-20  Mg     2.8     04-20    TPro  6.8  /  Alb  2.6<L>  /  TBili  0.7  /  DBili  x   /  AST  38  /  ALT  68<H>  /  AlkPhos  87  04-20    A/P:  YASSINE on CKD suspect overdiuresis plus possible obstructive component  Acute hypoxic respiratory failure due to fluid overload resolved  Acute on chronic systolic heart failure exacerbation   DM with hyperglycemia   HTN  HLD  Asthma  Fetal alcohol syndrome with Intellectual disability  Esophageal thickening    Plan:  Cont to hold Lasix for now; Renal follow up d/w Dr. Pederson  Renal function somewhat improved; Cont to monitor Cr closely; Avoid Nephrotoxic meds  Bladder scan today 230 ml; Encourage OOB to chair and ambulation; will avoid chance in a patient with Intellectual disability  If persistent retention and renal fn elevated, may need Urology eval and  chance; for now continue Proscar and increased dose Flomax 0.8mg HS  d/w Medical Director Emilia Hearn from [facility]; discussed clinical status and improving renal fn today; she confirmed patient has a Urologist outpatient;   Continue Metoprolol; Cardio f/u; Dr. Hamilton following  Cont Albuterol, Spiriva and Duoneb, cont short course of PO prednisone for 5 days  Lactulose for constipation  Cont current insulin regimen  Thickening of esophagus- outpatient GI follow up. Patient is asymptomatic now, denies any dysphagia, tolerating diet   CM and SW for safe discharge  Patient will need stair assessment from PT but most likely will need CARLOS placement.   As per Dr. Hearn it may be difficult to manage patient without stair assessment at facility as patient needs to move up and down during the day at [facility].   Dr. Hearn [phone]  Discussed with PGy1 and PGY3 and RN Patient was seen and examined this morning with housestaff  with caretaker at bedside.     Patient is comfortable in bed. SOB has resolved.   He was able to demonstrate get up from bed and sit up and stand and with assistance able to take few steps then refused stating foot pain; was able to be directed to sit in chair.     Vital Signs Last 24 Hrs  T(C): 36.3 (20 Apr 2023 14:44), Max: 36.9 (20 Apr 2023 05:26)  T(F): 97.3 (20 Apr 2023 14:44), Max: 98.4 (20 Apr 2023 05:26)  HR: 81 (20 Apr 2023 14:44) (81 - 96)  BP: 118/78 (20 Apr 2023 14:44) (115/90 - 130/90)  RR: 18 (20 Apr 2023 14:44) (16 - 18)  SpO2: 98% (20 Apr 2023 14:44) (96% - 100%): room air        P/E:  NAD  AAOx2, no focal deficit    CTABL today  S1S2 WNL, no MRG   Abd soft, non tender, BS present   BLLE no edema or calf tenderness     Labs:                        15.3   8.29  )-----------( 189      ( 20 Apr 2023 08:55 )             48.9   04-20    145  |  115<H>  |  57<H>  ----------------------------<  182<H>  5.3   |  22  |  2.28<H>    Ca    9.4      20 Apr 2023 08:15  Phos  3.1     04-20  Mg     2.8     04-20    TPro  6.8  /  Alb  2.6<L>  /  TBili  0.7  /  DBili  x   /  AST  38  /  ALT  68<H>  /  AlkPhos  87  04-20    A/P:  YASSINE on CKD suspect overdiuresis plus possible obstructive component  Acute hypoxic respiratory failure due to fluid overload resolved  Acute on chronic systolic heart failure exacerbation   DM with hyperglycemia   HTN  HLD  Asthma  Fetal alcohol syndrome with Intellectual disability  Esophageal thickening    Plan:  Cont to hold Lasix for now; Renal follow up d/w Dr. Pederson  Renal function somewhat improved; Cont to monitor Cr closely; Avoid Nephrotoxic meds  Bladder scan today 230 ml; Encourage OOB to chair and ambulation; will avoid chance in a patient with Intellectual disability  If persistent retention and renal fn elevated, may need Urology eval and  chance; for now continue Proscar and increased dose Flomax 0.8mg HS  d/w Medical Director Emilia Hearn from [facility]; discussed clinical status and improving renal fn today; she confirmed patient has a Urologist outpatient;   Continue Metoprolol; Cardio f/u; Dr. Hamilton following  Cont Albuterol, Spiriva and Duoneb, cont short course of PO prednisone for 5 days  Lactulose for constipation  Cont current insulin regimen  Thickening of esophagus- outpatient GI follow up. Patient is asymptomatic now, denies any dysphagia, tolerating diet   CM and SW for safe discharge  Patient will need stair assessment from PT but most likely will need CARLOS placement.   As per Dr. Hearn it may be difficult to manage patient without stair assessment at facility as patient needs to move up and down during the day at [facility].   Dr. Hearn [phone]  Discussed with PGY1 and PGY3 and RN

## 2023-04-21 NOTE — PROGRESS NOTE ADULT - SUBJECTIVE AND OBJECTIVE BOX
[facility] NEPHROLOGY- PROGRESS NOTE    Patient is a 74yo Male from  [facility] home number with CKD,  h/o intellectual disability, Down Syndrome, asthma, COPD with chronic cough, HTN, DM, BPH, schizophrenia,, bilateral sensorineural hearing loss, who came in to the ED for sore throat and chest pain.  Pt a/w acute on chroni CHF. Pt was diuresed with Lasix.  Hosp cb YASSINE. Nephrology consulted for Elevated serum creatinine.    REVIEW OF SYSTEMS:  Gen: no fevers  Cards: no chest pain  Resp: no dyspnea,   GI: no nausea or vomiting or diarrhea  Vascular: no LE edema    No Known Allergies      Hospital Medications: Medications reviewed    VITALS:  T(F): 98.4 (23 @ 14:51), Max: 98.4 (23 @ 14:51)  HR: 90 (23 @ 14:51)  BP: 136/97 (23 @ 14:51)  RR: 18 (23 @ 14:51)  SpO2: 94% (23 @ 14:51)  Wt(kg): --      PHYSICAL EXAM:    Gen: NAD, calm  Cards: RRR, +S1/S2, no M/G/R  Resp: CTA B/L  GI: soft, NT, + Mild ab distention,  Vascular: no LE edema B/L    LABS:      146<H>  |  113<H>  |  51<H>  ----------------------------<  117<H>  4.2   |  29  |  2.13<H>    Ca    9.3      2023 09:50  Phos  2.2       Mg     2.7         TPro  5.5<L>  /  Alb  2.1<L>  /  TBili  0.5  /  DBili      /  AST  20  /  ALT  52  /  AlkPhos  74      Creatinine Trend: 2.13 <--, 2.28 <--, 2.58 <--, 3.09 <--, 2.85 <--, 3.02 <--, 2.18 <--, 1.82 <--                        13.8   6.33  )-----------( 154      ( 2023 09:50 )             44.3     Urine Studies:  Urinalysis Basic - ( 2023 09:30 )    Color: Yellow / Appearance: Clear / S.020 / pH:   Gluc:  / Ketone: Trace  / Bili: Negative / Urobili: 4 mg/dL   Blood:  / Protein: 30 mg/dL / Nitrite: Negative   Leuk Esterase: Negative / RBC: 0-2 /HPF / WBC 0-2 /HPF   Sq Epi:  / Non Sq Epi:  / Bacteria: Trace /HPF      Sodium, Random Urine: 41 mmol/L ( @ 09:30)  Chloride, Random Urine: 32 mmol/L ( @ 09:30)  Creatinine, Random Urine: 94 mg/dL ( @ 09:30)  Creatinine, Random Urine: 149 mg/dL (04-15 @ 12:06)

## 2023-04-21 NOTE — PROGRESS NOTE ADULT - SUBJECTIVE AND OBJECTIVE BOX
Subjecti  No acute events overnight. Continues to void without difficulty with no complaints of suprapubic pain or dysuria.    Objective    Vital signs  T(F): , Max: 97.4 (04-21-23 @ 05:28)  HR: 89 (04-21-23 @ 05:28)  BP: 120/85 (04-21-23 @ 05:28)  SpO2: 94% (04-21-23 @ 05:28)  Wt(kg): --    Output       Gen: NAD  Pulm: No respiratory distress  Abd: Soft, nontender, nondistended, no CVA tenderness  : Voiding spontaneously, no palpable bladder    Labs      04-21 @ 09:50    WBC 6.33  / Hct 44.3  / SCr 2.13     04-20 @ 08:55    WBC 8.29  / Hct 48.9  / SCr --

## 2023-04-21 NOTE — PROGRESS NOTE ADULT - ATTENDING COMMENTS
Patient was seen and examined this morning  with caretaker at bedside.     Patient is comfortable in bed. SOB has resolved. Patient was again able to demonstrate get up from bed and sit up and stand and ambulated about 50 feet taking a walker later refused stairs. After extensive counseling Patient was seen by PT and he demonstrated ability to climb stairs late this afternoon. Renal fn improving well still elevated Cr to 2.1. Spoke with Medical Director [facility], has difficulty to accept over weekend due to Nursing Staff availability.     Vital Signs Last 24 Hrs  T(C): 36.4 (04-21-23 @ 23:43), Max: 36.9 (04-21-23 @ 14:51)  T(F): 97.6 (04-21-23 @ 23:43), Max: 98.4 (04-21-23 @ 14:51)  HR: 99 (04-21-23 @ 23:43) (89 - 99)  BP: 159/99 (04-21-23 @ 23:43) (120/85 - 159/99)  RR: 18 (04-21-23 @ 23:43) (16 - 18)  SpO2: 100% (04-21-23 @ 23:43) (94% - 100%)    P/E:  Elderly Male comfortable in bed  Pscyh: Intellectual disability  Neuro: AAOx2, no focal deficit    Chest: BLAE+, No wheeze or Rhonchi  CVS: S1S2 WNL,  Abd soft, non tender, BS present   BLLE no edema or calf tenderness     Labs:                        13.8   6.33  )-----------( 154      ( 21 Apr 2023 09:50 )             44.3   04-21    146<H>  |  113<H>  |  51<H>  ----------------------------<  117<H>  4.2   |  29  |  2.13<H>    Ca    9.3      21 Apr 2023 09:50  Phos  2.2     04-21  Mg     2.7     04-21    TPro  5.5<L>  /  Alb  2.1<L>  /  TBili  0.5  /  DBili  x   /  AST  20  /  ALT  52  /  AlkPhos  74  04-21      A/P:  YASSINE on CKD suspect overdiuresis plus possible obstructive component resolving well  Acute hypoxic respiratory failure due to fluid overload resolved  Acute on chronic systolic heart failure stable   DM with hyperglycemia   HTN  HLD  Asthma  Fetal alcohol syndrome with Intellectual disability  Esophageal thickening    Plan:  Cont to hold Lasix for now; Renal follow up;   Renal function somewhat improved; Cont to monitor Cr closely; Avoid Nephrotoxic meds  continue Proscar and increased dose Flomax 0.8mg HS; Urology consult appreciated  Discussed with Caretaker and RN, OOB to chair, Ambulate and let patient void while standing and then get Post void residual  d/w Medical Director Emilia Hearn from [facility]; discussed clinical status and improving renal fn today; As per Medical Director, little hesitant in patient returinging over the weekend due to Nursing staff and unclear dosing of Lasix if patient needs over weekend  I think patient will possibly need Lasix 2 or 3 times a week  Continue Metoprolol; Cardio f/u; Dr. Hamilton following  Cont Albuterol, Spiriva and Duoneb, completed short course of PO prednisone for 5 days  Lactulose for constipation  Cont current insulin regimen  Thickening of esophagus- outpatient GI follow up. Patient is asymptomatic now, denies any dysphagia, tolerating diet   CM and SW for safe discharge  Patient will need stair assessment from PT but most likely will need CARLOS placement.   Dr. Hearn [phone]  Discussed with PGy1 and PGY3 and RN

## 2023-04-21 NOTE — PROGRESS NOTE ADULT - ASSESSMENT
Patient is a 72yo Male from  [facility] home number with CKD,  h/o intellectual disability, Down Syndrome, asthma, COPD with chronic cough, HTN, DM, BPH, schizophrenia,, bilateral sensorineural hearing loss, who came in to the ED for sore throat and chest pain.  Pt a/w acute on chroni CHF. Pt was diuresed with Lasix.  Hosp cb YASSINE. Nephrology consulted for Elevated serum creatinine.    1. YASSINE- likely due to overdiuresis, for which Lasix 40mg PO daily was discontinued. Scr was improving off diuretics and s/p albumin 25 % in 50ml q 8hrs x 3 doses.    UA with 30 protein with trace ketone; pt likely with poor PO intake. Encourage PO intake  FeUrea 31%; consistent with pre-renal YASSINE. Renal US with no hydro. Bladder scan 4/17 neg, however post void intermittent cath 500ml on 4/19. Urology following  Continue to hold Enalapril and lasix. Will avoid further IVF (pt with h/o CHF with severe global LVSD on TTE). Strict I/Os. Avoid nephrotoxins/ NSAIDs/ RCA. Monitor BMP.  2. CKD-3a- baseline SCr 1.3-1.6. Defer secondary w/u as an outpt. Avoid nephrotoxins  3. HTN 2/2 CKD- BP borderline. Continue to hold Enalapril. Continue with current anti-hypertensive medications. Monitor BP.  4. BPH- c/w flomax and finasteride      [facility] NEPHROLOG[facility]  Raul Goncalves M.D.  Bennie Carcamo D.O.  Priya Pederson M.D.  Hellen Dietz, MSN, ANP-C  [phone]  Fax: [phone]    [facility] [place] [address], [address]  [place] [address]

## 2023-04-21 NOTE — PROGRESS NOTE ADULT - ASSESSMENT
73 y o with ambulatory at baseline from a [facility] home number 10 w/ PMH of intellectual disability, Fetal alcohol Syndrome, asthma, COPD with chronic cough, HTN, DM,  CKD elevated PSA, BPH, schizophrenia, hepatitis B, carrier, bilateral sensorineural hearing loss, admitted for AHRF secondary to Acute on Chronic Systolic Heart Failure.

## 2023-04-21 NOTE — PROGRESS NOTE ADULT - PROBLEM SELECTOR PLAN 1
ECHO 3/23 showed severely reduced EF 10-15%  NYHA Heart Failure class III stage C  Metoprolol 50 mg PO qd  Continue to hold enalapril and Jardiance for now until renal function back to baseline  Hold adding Spironolactone until renal function back to baseline  No cardiac cath at this moment   PT recommending CARLOS  Hold Lasix 40 mg PO qd for now due to worsening renal function.     Repeat chest xray on 4/21 showed improved fluid congestion

## 2023-04-21 NOTE — PROGRESS NOTE ADULT - ASSESSMENT
73 y o with ambulatory at baseline from a [facility] home number 10 w/ PMH of intellectual disability, Fetal alcohol Syndrome, asthma, COPD with chronic cough, HTN, DM,  CKD elevated PSA, BPH, schizophrenia, hepatitis B, carrier, bilateral sensorineural hearing loss, admitted for AHRF secondary to Acute on Chronic Systolic Heart Failure.

## 2023-04-21 NOTE — PROGRESS NOTE ADULT - PROBLEM SELECTOR PLAN 1
ECHO 3/23 showed severely reduced EF 10-15%  NYHA Heart Failure class III stage C  Metoprolol 50 mg PO qd  Continue to hold enalapril and Jardiance for now until renal function back to baseline  Hold adding Spironolactone until renal function back to baseline  Cardiology onboard   No cardiac cath at this moment   PT recommending CARLOS  Hold Lasix 40 mg PO qd for now due to worsening renal function.   Nephro Dr Pederson onboard  Will keep monitoring.  f/u chest xray ECHO 3/23 showed severely reduced EF 10-15%  NYHA Heart Failure class III stage C  Metoprolol 50 mg PO qd  Continue to hold enalapril and Jardiance for now until renal function back to baseline  Hold adding Spironolactone until renal function back to baseline  Cardiology onboard   No cardiac cath at this moment   PT recommending CARLOS  Hold Lasix 40 mg PO qd for now due to worsening renal function.   Nephro Dr Pederson onboard  Will keep monitoring.  Repeat chest xray on 4/21 showed improved fluid congestion

## 2023-04-21 NOTE — PROGRESS NOTE ADULT - SUBJECTIVE AND OBJECTIVE BOX
PGY-1 Progress Note discussed with attending    PAGER #: [[phone]] TILL 5:00 PM  PLEASE CONTACT ON CALL TEAM:  - On Call Team (Please refer to Red) FROM 5:00 PM - 8:30PM  - Nightfloat Team FROM 8:30 -7:30 AM    CHIEF COMPLAINT & BRIEF HOSPITAL COURSE:    INTERVAL HPI/OVERNIGHT EVENTS:       REVIEW OF SYSTEMS:  CONSTITUTIONAL: No fever, weight loss, or fatigue  RESPIRATORY: No cough, wheezing, chills or hemoptysis; No shortness of breath  CARDIOVASCULAR: No chest pain, palpitations, dizziness, or leg swelling  GASTROINTESTINAL: No abdominal pain. No nausea, vomiting, or hematemesis; No diarrhea or constipation. No melena or hematochezia.  GENITOURINARY: No dysuria or hematuria, urinary frequency  NEUROLOGICAL: No headaches, memory loss, loss of strength, numbness, or tremors  SKIN: No itching, burning, rashes, or lesions     Vital Signs Last 24 Hrs  T(C): 36.3 (21 Apr 2023 05:28), Max: 36.3 (20 Apr 2023 14:44)  T(F): 97.4 (21 Apr 2023 05:28), Max: 97.4 (21 Apr 2023 05:28)  HR: 89 (21 Apr 2023 05:28) (81 - 90)  BP: 120/85 (21 Apr 2023 05:28) (118/78 - 138/119)  BP(mean): 10 (20 Apr 2023 22:07) (10 - 10)  RR: 18 (21 Apr 2023 05:28) (18 - 19)  SpO2: 94% (21 Apr 2023 05:28) (94% - 99%)    Parameters below as of 21 Apr 2023 05:28  Patient On (Oxygen Delivery Method): room air        PHYSICAL EXAMINATION:  GENERAL: NAD, well built  HEAD:  Atraumatic, Normocephalic  EYES:  conjunctiva and sclera clear  NECK: Supple, No JVD, Normal thyroid  CHEST/LUNG: Clear to auscultation. Clear to percussion bilaterally; No rales, rhonchi, wheezing, or rubs  HEART: Regular rate and rhythm; No murmurs, rubs, or gallops  ABDOMEN: Soft, Nontender, Nondistended; Bowel sounds present  NERVOUS SYSTEM:  Alert & Oriented X3,    EXTREMITIES:  2+ Peripheral Pulses, No clubbing, cyanosis, or edema  SKIN: warm dry                          13.8   6.33  )-----------( 154      ( 21 Apr 2023 09:50 )             44.3     04-20    145  |  115<H>  |  57<H>  ----------------------------<  182<H>  5.3   |  22  |  2.28<H>    Ca    9.4      20 Apr 2023 08:15  Phos  3.1     04-20  Mg     2.8     04-20    TPro  6.8  /  Alb  2.6<L>  /  TBili  0.7  /  DBili  x   /  AST  38  /  ALT  68<H>  /  AlkPhos  87  04-20    LIVER FUNCTIONS - ( 20 Apr 2023 08:15 )  Alb: 2.6 g/dL / Pro: 6.8 g/dL / ALK PHOS: 87 U/L / ALT: 68 U/L DA / AST: 38 U/L / GGT: x                   CAPILLARY BLOOD GLUCOSE      RADIOLOGY & ADDITIONAL TESTS:                   PGY-1 Progress Note discussed with attending    PAGER #: [[phone]] TILL 5:00 PM  PLEASE CONTACT ON CALL TEAM:  - On Call Team (Please refer to Red) FROM 5:00 PM - 8:30PM  - Nightfloat Team FROM 8:30 -7:30 AM    INTERVAL HPI/OVERNIGHT EVENTS:   no acute overnight events, pt. seen and examined at bedside, offers no complaints.      REVIEW OF SYSTEMS:  CONSTITUTIONAL: No fever, weight loss, or fatigue  RESPIRATORY: No cough, wheezing, chills or hemoptysis; No shortness of breath  CARDIOVASCULAR: No chest pain, palpitations, dizziness, or leg swelling  GASTROINTESTINAL: No abdominal pain. No nausea, vomiting, or hematemesis; No diarrhea or constipation. No melena or hematochezia.  GENITOURINARY: No dysuria or hematuria, urinary frequency  NEUROLOGICAL: No headaches, memory loss, loss of strength, numbness, or tremors  SKIN: No itching, burning, rashes, or lesions     Vital Signs Last 24 Hrs  T(C): 36.3 (21 Apr 2023 05:28), Max: 36.3 (20 Apr 2023 14:44)  T(F): 97.4 (21 Apr 2023 05:28), Max: 97.4 (21 Apr 2023 05:28)  HR: 89 (21 Apr 2023 05:28) (81 - 90)  BP: 120/85 (21 Apr 2023 05:28) (118/78 - 138/119)  BP(mean): 10 (20 Apr 2023 22:07) (10 - 10)  RR: 18 (21 Apr 2023 05:28) (18 - 19)  SpO2: 94% (21 Apr 2023 05:28) (94% - 99%)    Parameters below as of 21 Apr 2023 05:28  Patient On (Oxygen Delivery Method): room air        PHYSICAL EXAMINATION:  GENERAL: NAD, lying in bed comfortably   HEAD:  Atraumatic, Normocephalic  EYES:  Prosthesis noted on the right, Left with conjunctiva and sclera clear, pupil is equal, round, and reactive to light and accommodation.  NECK: Supple, No JVD, trachea is midline, no evidence of thyroid enlargement, no lymphadenopathy or tenderness.  CHEST/LUNG: No rales, rhonchi, minimal expiratory wheezing noted, no rubs  HEART: Regular rate and rhythm; systolic murmur noted on the left lower sternal border, no rubs, or gallops  ABDOMEN: Soft, Nontender, Nondistended; Bowel sounds present  NERVOUS SYSTEM:  Alert & Oriented X2; No focal sensory or motor deficits are noted; Appropriate mood and affect.  EXTREMITIES:  2+ Peripheral Pulses, No clubbing, cyanosis, or edema  SKIN: Warm, dry, and well perfused; Good turgor; No lesions, nodules or rashes are noted.                          13.8   6.33  )-----------( 154      ( 21 Apr 2023 09:50 )             44.3     04-20    145  |  115<H>  |  57<H>  ----------------------------<  182<H>  5.3   |  22  |  2.28<H>    Ca    9.4      20 Apr 2023 08:15  Phos  3.1     04-20  Mg     2.8     04-20    TPro  6.8  /  Alb  2.6<L>  /  TBili  0.7  /  DBili  x   /  AST  38  /  ALT  68<H>  /  AlkPhos  87  04-20    LIVER FUNCTIONS - ( 20 Apr 2023 08:15 )  Alb: 2.6 g/dL / Pro: 6.8 g/dL / ALK PHOS: 87 U/L / ALT: 68 U/L DA / AST: 38 U/L / GGT: x                   CAPILLARY BLOOD GLUCOSE      RADIOLOGY & ADDITIONAL TESTS:

## 2023-04-21 NOTE — PROGRESS NOTE ADULT - ASSESSMENT
73M w/ BPH and CKD presenting with acute on chronic systolic heart failure. Urology consulted for incomplete bladder emptying.    - Labs reviewed - Cr 2.13 from 2.28  - Continue flomax  - Continue finasteride  - Follow up on PVR - please perform immediately after void and document in flowsheets  - Trend Cr  - Patient may have higher baseline PVR which is tolerable

## 2023-04-21 NOTE — PROGRESS NOTE ADULT - PROBLEM SELECTOR PLAN 4
Baseline 1.30s to 1.60s  Cr 1.59>1.71>2.13>1.82 > 2.85 > 3 > 2.58 > 2.28  Likely overdiuresis  Hold Lasix  Nephrology onboard   Avoid nephrotoxic agents  Will keep monitoring   Rest as above  Renal US shows no hydro  bladder scan is neg on 4/17  straight cath on 4/19 drained 500 cc  repeat bladder scan  s/p albumin 25% on 50 ml q8h x3 doses  improved Scr Baseline 1.30s to 1.60s  Cr 1.59>1.71>2.13>1.82 > 2.85 > 3 > 2.58 > 2.28 > 2.13  Likely overdiuresis  Hold Lasix  Nephrology onboard   Avoid nephrotoxic agents  Will keep monitoring   Rest as above  Renal US shows no hydro  bladder scan is neg on 4/17  straight cath on 4/19 drained 500 cc  repeat bladder scan  s/p albumin 25% on 50 ml q8h x3 doses  improved Scr  continue to monitor if Scr keep improving consider starting a small dose of lasix 20 mg twice a week on d/c

## 2023-04-21 NOTE — PROGRESS NOTE ADULT - SUBJECTIVE AND OBJECTIVE BOX
PATIENT SEEN AND EXAMINED ON :- 4/21/23  DATE OF SERVICE:   4/21/23          Interim events noted,Labs ,Radiological studies and Cardiology tests reviewed .    MR#[id number]  PATIENT NAME:-[facility] COURSE: HPI:  73 y o with ambulatory at baseline from a [facility] number 10 w/ PMH of intellectual disability, Down Syndrome, asthma, COPD with chronic cough, HTN, DM,  CKD elevated PSA, BPH, schizophrenia, hepatitis B, carrier, bilateral sensorineural hearing loss, who came in to the ED for sore throat and chest pain.  Patient states he developed a sore throat and dry cough a few days ago. Then last night had chest pain, located on the left side, w/o radiation, described as sharp. Pt states the pain lasted throughout the night and resolved this morning. Currently denies any fever, chills, nausea vomiting SOB, abdominal pain, PND, orthopnea or change in bowel habits  (08 Apr 2023 18:22)      INTERIM EVENTS:Patient seen at bedside ,interim events noted.      PMH -reviewed admission note, no change since admission  HEART FAILURE: Acute[ ]Chronic[ ] Systolic[ ] Diastolic[ ] Combined Systolic and Diastolic[ ]  CAD[ ] CABG[ ] PCI[ ]  DEVICES[ ] PPM[ ] ICD[ ] ILR[ ]  ATRIAL FIBRILLATION[ ] Paroxysmal[ ] Permanent[ ] CHADS2-[  ]  YASSINE[ ] CKD1[ ] CKD2[ ] CKD3[ ] CKD4[ ] ESRD[ ]  COPD[ ] HTN[ ]   DM[ ] Type1[ ] Type 2[ ]   CVA[ ] Paresis[ ]    AMBULATION: Assisted[ ] Cane/walker[ ] Independent[ ]    MEDICATIONS  (STANDING):  albuterol/ipratropium for Nebulization 3 milliLiter(s) Nebulizer every 6 hours  amantadine 100 milliGRAM(s) Oral every 12 hours  aspirin enteric coated 81 milliGRAM(s) Oral daily  atorvastatin 40 milliGRAM(s) Oral at bedtime  finasteride 5 milliGRAM(s) Oral daily  fluticasone propionate 50 MICROgram(s)/spray Nasal Spray 1 Spray(s) Both Nostrils two times a day  heparin   Injectable 5000 Unit(s) SubCutaneous every 12 hours  insulin glargine Injectable (LANTUS) 5 Unit(s) SubCutaneous at bedtime  insulin lispro (ADMELOG) corrective regimen sliding scale   SubCutaneous three times a day before meals  insulin lispro (ADMELOG) corrective regimen sliding scale   SubCutaneous at bedtime  insulin lispro Injectable (ADMELOG) 4 Unit(s) SubCutaneous three times a day before meals  loratadine 10 milliGRAM(s) Oral daily  memantine 5 milliGRAM(s) Oral two times a day  metoprolol succinate ER 50 milliGRAM(s) Oral daily  montelukast 10 milliGRAM(s) Oral daily  OLANZapine 10 milliGRAM(s) Oral at bedtime  tamsulosin 0.8 milliGRAM(s) Oral at bedtime  tiotropium 2.5 MICROgram(s) Inhaler 2 Puff(s) Inhalation daily  valproic  acid Syrup 250 milliGRAM(s) Oral <User Schedule>  valproic  acid Syrup 1000 milliGRAM(s) Oral at bedtime    MEDICATIONS  (PRN):  albuterol    90 MICROgram(s) HFA Inhaler 2 Puff(s) Inhalation every 6 hours PRN Shortness of Breath and/or Wheezing  guaiFENesin Oral Liquid (Sugar-Free) 200 milliGRAM(s) Oral every 6 hours PRN Cough            REVIEW OF SYSTEMS:  Constitutional: [ ] fever, [ ]weight loss,  [ ]fatigue [ ]weight gain  Eyes: [ ] visual changes  Respiratory: [ ]shortness of breath;  [ ] cough, [ ]wheezing, [ ]chills, [ ]hemoptysis  Cardiovascular: [ ] chest pain, [ ]palpitations, [ ]dizziness,  [ ]leg swelling[ ]orthopnea[ ]PND  Gastrointestinal: [ ] abdominal pain, [ ]nausea, [ ]vomiting,  [ ]diarrhea [ ]Constipation [ ]Melena  Genitourinary: [ ] dysuria, [ ] hematuria [ ]Reyes  Neurologic: [ ] headaches [ ] tremors[ ]weakness [ ]Paralysis Right[ ] Left[ ]  Skin: [ ] itching, [ ]burning, [ ] rashes  Endocrine: [ ] heat or cold intolerance  Musculoskeletal: [ ] joint pain or swelling; [ ] muscle, back, or extremity pain  Psychiatric: [ ] depression, [ ]anxiety, [ ]mood swings, or [ ]difficulty sleeping  Hematologic: [ ] easy bruising, [ ] bleeding gums    [ ] All remaining systems negative except as per above.   [ ]Unable to obtain.  [x] No change in ROS since admission      Vital Signs Last 24 Hrs  T(C): 36.6 (21 Apr 2023 21:07), Max: 36.9 (21 Apr 2023 14:51)  T(F): 97.9 (21 Apr 2023 21:07), Max: 98.4 (21 Apr 2023 14:51)  HR: 97 (21 Apr 2023 21:07) (89 - 97)  BP: 131/107 (21 Apr 2023 21:07) (120/85 - 136/97)  BP(mean): 10 (20 Apr 2023 22:07) (10 - 10)  RR: 16 (21 Apr 2023 21:07) (16 - 18)  SpO2: 100% (21 Apr 2023 21:07) (94% - 100%)    Parameters below as of 21 Apr 2023 21:07  Patient On (Oxygen Delivery Method): room air      I&O's Summary      PHYSICAL EXAM:  General: No acute distress BMI-  HEENT: EOMI, PERRL  Neck: Supple, [ ] JVD  Lungs: Equal air entry bilaterally; [ ] rales [ ] wheezing [ ] rhonchi  Heart: Regular rate and rhythm; [x ] murmur   2/6 [ x] systolic [ ] diastolic [ ] radiation[ ] rubs [ ]  gallops  Abdomen: Nontender, bowel sounds present  Extremities: No clubbing, cyanosis, [ ] edema [ ]Pulses  equal and intact  Nervous system:  Alert & Oriented X3, no focal deficits  Psychiatric: Normal affect  Skin: No rashes or lesions    LABS:  04-21    146<H>  |  113<H>  |  51<H>  ----------------------------<  117<H>  4.2   |  29  |  2.13<H>    Ca    9.3      21 Apr 2023 09:50  Phos  2.2     04-21  Mg     2.7     04-21    TPro  5.5<L>  /  Alb  2.1<L>  /  TBili  0.5  /  DBili  x   /  AST  20  /  ALT  52  /  AlkPhos  74  04-21    Creatinine Trend: 2.13<--, 2.28<--, 2.58<--, 3.09<--, 2.85<--, 3.02<--                        13.8   6.33  )-----------( 154      ( 21 Apr 2023 09:50 )             44.3

## 2023-04-22 NOTE — PROGRESS NOTE ADULT - ASSESSMENT
A/P:  Grunting cough ?? post viral   YASSINE on CKD suspect overdiuresis plus possible obstructive component now appears prerenal rise in Creatinine  Acute hypoxic respiratory failure due to fluid overload resolved  Acute on chronic systolic heart failure stable   DM with hyperglycemia   HTN  HLD  Asthma  Fetal alcohol syndrome with Intellectual disability  Esophageal thickening    Plan:  Cont to hold Lasix for now; Renal follow up;   Rise in Creatinine mildly; will give gentle hydration x 6 hrs and check BMP AM; Cont to monitor Cr closely; Avoid Nephrotoxic meds  continue Proscar and increased dose Flomax 0.8mg HS; Urology consult appreciated  Discussed with Caretaker and RN again, OOB to chair, Ambulate and let patient void while standing and then get Post void residual immediately  Spoke with  Medical Director Emilia Hearn from [facility]; discussed clinical status and improving renal fn today; As per Medical Director, little hesitant in patient returinging over the weekend due to Nursing staff and unclear dosing of Lasix if patient needs over weekend  If hacking cough persist, will give a trial of steroid tomorrow  Continue Metoprolol; Cardio f/u; Dr. Hamilton following  Cont Albuterol, Spiriva and Duoneb, completed short course of PO prednisone for 5 days  Lactulose for constipation  Cont current insulin regimen  Thickening of esophagus- outpatient GI follow up. Patient is asymptomatic now, denies any dysphagia, tolerating diet   CM and SW for safe discharge  Patient will need stair assessment from PT but most likely will need CARLOS placement.   Dr. Hearn [phone]  Discussed with PGy1 and PGY3 and RN .   A/P:  Grunting cough ?? post viral   YASSINE on CKD suspect overdiuresis plus possible obstructive component now appears prerenal rise in Creatinine  Acute hypoxic respiratory failure due to fluid overload resolved  Acute on chronic systolic heart failure stable    DM with hyperglycemia stable  HTN  HLD  Asthma  Fetal alcohol syndrome with Intellectual disability  Esophageal thickening    Plan:  Cont to hold Lasix for now; Renal follow up;   Rise in Creatinine mildly; will give gentle hydration x 6 hrs and check BMP AM; Cont to monitor Cr closely; Avoid Nephrotoxic meds  continue Proscar and increased dose Flomax 0.8mg HS; Urology consult appreciated; Avoid chance  Patient voided freely today; PVR only low 100s;   Discussed with Caretaker and RN again, OOB to chair, Ambulate and let patient void while standing and then get Post void residual immediately  Spoke with  Medical Director Emilia Hearn from [facility] yesterday; discussed clinical status and improving renal fn today; As per Medical Director, little hesitant in patient returning over the weekend due to Nursing staff and unclear dosing of Lasix if patient needs over weekend  If hacking cough persist, will give a trial of steroid tomorrow; Need ENT eval outpt  Continue Metoprolol; Cardio f/u; Dr. Hamilton following  Cont Albuterol, Spiriva and Duoneb, completed short course of PO prednisone.   Lactulose for constipation  Cont current insulin regimen  Thickening of esophagus- outpatient GI follow up. Patient is asymptomatic now, denies any dysphagia, tolerating diet   CM and SW for safe discharge  Patient passed stair evalaution; able to ambulate with walker as well as climb stairs if he wishes but due to intellectual disability unable to force him but Patient will do much better in his home surroundings with familiar staff   Dr. Hearn [phone]  Discussed with RN .

## 2023-04-22 NOTE — PROGRESS NOTE ADULT - ASSESSMENT
Patient is a 72yo Male from  [facility] home number with CKD,  h/o intellectual disability, Down Syndrome, asthma, COPD with chronic cough, HTN, DM, BPH, schizophrenia,, bilateral sensorineural hearing loss, who came in to the ED for sore throat and chest pain.  Pt a/w acute on chroni CHF. Pt was diuresed with Lasix.  Hosp cb YASSINE. Nephrology consulted for Elevated serum creatinine.    1. YASSINE- likely due to overdiuresis, for which Lasix 40mg PO daily was discontinued. Scr was improving off diuretics and s/p albumin. Pt with mild increase in SCr with hypernatremia; likely due to dehydration. Will d/c 1L FR (since pt only getting 1- 4oz thickened water tid). Pt given D5 1/2NS @ 50ml/hr x 5 hrs.   UA with 30 protein with trace ketone; pt likely with poor PO intake. Encourage PO intake  FeUrea 31%; consistent with pre-renal YASSINE. Renal US with no hydro. Bladder scan 4/17 neg, however post void intermittent cath 500ml on 4/19. Urology following  Continue to hold Enalapril and lasix. Will avoid further IVF (pt with h/o CHF with severe global LVSD on TTE). Strict I/Os. Avoid nephrotoxins/ NSAIDs/ RCA. Monitor BMP.  2. CKD-3a- baseline SCr 1.3-1.6. Defer secondary w/u as an outpt. Avoid nephrotoxins  3. HTN 2/2 CKD- BP borderline. Continue to hold Enalapril. Continue with current anti-hypertensive medications. Monitor BP.  4. BPH- c/w flomax and finasteride      [facility] NEPHROLOGY  Raul Goncalves M.D.  ALEXIS Pérez.O.  Priya Pederson M.D.  Hellen Dietz, MSN, ANP-C  [phone]  Fax: [phone] [place] [address], [address]  [place] [address]

## 2023-04-22 NOTE — PROGRESS NOTE ADULT - SUBJECTIVE AND OBJECTIVE BOX
Patient was seen and examined this morning  with caretaker at bedside.     Patient is comfortable in bed. SOB has resolved. Patient clinically stable except intermittent bouts of dry hacking cough. As per patient his throat irritate. He had a CT Neck last week with no significant abnormality. As per care taker no issues swallowing food.     Vital Signs Last 24 Hrs  T(C): 36.4 (22 Apr 2023 14:26), Max: 36.6 (21 Apr 2023 21:07)  T(F): 97.5 (22 Apr 2023 14:26), Max: 97.9 (21 Apr 2023 21:07)  HR: 88 (22 Apr 2023 14:26) (87 - 99)  BP: 136/98 (22 Apr 2023 14:26) (131/107 - 159/99)  RR: 18 (22 Apr 2023 14:26) (16 - 18)  SpO2: 100% (22 Apr 2023 14:26) (99% - 100%) room air    P/E:  Elderly Male comfortable in bed  Pscyh: Intellectual disability  Neuro: AAOx2, no focal deficit    Chest: BLAE+, No wheeze or Rhonchi  CVS: S1S2 WNL,  Abd soft, non tender, BS present   BLLE no edema or calf tenderness     Labs:                                   15.2   7.83  )-----------( 185      ( 22 Apr 2023 06:11 )             49.1   04-22    147<H>  |  112<H>  |  55<H>  ----------------------------<  130<H>  4.9   |  28  |  2.47<H>    Ca    9.9      22 Apr 2023 06:11  Phos  3.0     04-22  Mg     3.1     04-22  TPro  6.3  /  Alb  2.6<L>  /  TBili  0.6  /  DBili  x   /  AST  21  /  ALT  61<H>  /  AlkPhos  92  04-22    < from: CT Neck Soft Tissue No Cont (04.09.23 @ 20:24) >  FINDINGS:  The examination is mildly to moderately motion degraded.    Evaluation for malignancy is limited without intravenous contrast.    There is no enlargement of the adenoids and tonsils.  There is no thickening of the soft palate/uvula, epiglottis, pharyngeal wall or aryepiglottic folds.  The larynx and upper trachea are grossly unremarkable, within limits of motion artifact.    There is no retropharyngeal edema.    The parotid glands, submandibular glands and thyroid appear grossly unremarkable.    There is no gross cervical lymphadenopathy.    The unopacified cervical vasculature appears unremarkable; no   atherosclerotic calcification is identified.    There is partial straightening of the cervical lordosis.  There are multilevel degenerative changes.  At C2-C3 there is a tiny central disc protrusion.  At C3-C4 and C4-C5 there are disc osteophytecomplexes producing mild spinal canal stenosis.  At C5-C6 there is a disc osteophyte complex producing minimal spinal canal stenosis.  At C6-C7 and C7-T1 there is no significant disc herniation or spinal canal stenosis.    Uncovertebral/facet hypertrophy causes mild to moderate neural foraminal narrowing at C2-C3 on the left, C3-C4 bilaterally, C4-C5 bilaterally, C5-C6 on the left, C6-C7 on the right and C7-T1 on the right.    There appears to be mild circumferential wall thickening in the upper thoracic esophagus.  Small pleural effusions are partially visualized at both lung apices.  There is nonspecific interlobular septal thickening in both lung apices.    Within the visualized brain, there is mild to moderate generalized cerebral volume loss and mild periventricular white matter hypoattenuation compatible with chronic microvascular ischemic disease. A right ocular prosthesis is noted.    IMPRESSION:   The examination is mildly to moderately motion degraded. Evaluation for malignancy is limited without intravenous contrast.  No abnormality is identified in the pharynx, larynx or upper trachea, within limits of noncontrast CT technique.  There is no foreign body, inflammatory change or extrinsic mass effect on the pharynx/larynx or upper trachea.  There appears to be mild circumferential wall thickening in the upper thoracic esophagus.  Upper endoscopy and/or esophagram may be obtained as clinically warranted.  Small pleural effusions are partially visualized bilaterally.    Nonspecific interlobular septal thickening in the lung apices.     Patient was seen and examined this morning  with caretaker at bedside.     Patient is comfortable in bed. SOB has resolved. Patient clinically stable except intermittent bouts of dry hacking cough. As per patient his throat irritate. He had a CT Neck last week with no significant abnormality. As per care taker no issues swallowing food.   Patient does not offer complaints due to intellectual disability    MEDICATIONS  (STANDING):  albuterol/ipratropium for Nebulization 3 milliLiter(s) Nebulizer every 6 hours  amantadine 100 milliGRAM(s) Oral every 12 hours  aspirin enteric coated 81 milliGRAM(s) Oral daily  atorvastatin 40 milliGRAM(s) Oral at bedtime  dextrose 5% + sodium chloride 0.45%. 1000 milliLiter(s) (50 mL/Hr) IV Continuous <Continuous>  finasteride 5 milliGRAM(s) Oral daily  fluticasone propionate 50 MICROgram(s)/spray Nasal Spray 1 Spray(s) Both Nostrils two times a day  heparin   Injectable 5000 Unit(s) SubCutaneous every 12 hours  insulin glargine Injectable (LANTUS) 5 Unit(s) SubCutaneous at bedtime  insulin lispro (ADMELOG) corrective regimen sliding scale   SubCutaneous three times a day before meals  insulin lispro (ADMELOG) corrective regimen sliding scale   SubCutaneous at bedtime  insulin lispro Injectable (ADMELOG) 4 Unit(s) SubCutaneous three times a day before meals  loratadine 10 milliGRAM(s) Oral daily  memantine 5 milliGRAM(s) Oral two times a day  metoprolol succinate ER 50 milliGRAM(s) Oral daily  montelukast 10 milliGRAM(s) Oral daily  OLANZapine 10 milliGRAM(s) Oral at bedtime  tamsulosin 0.8 milliGRAM(s) Oral at bedtime  tiotropium 2.5 MICROgram(s) Inhaler 2 Puff(s) Inhalation daily  valproic  acid Syrup 250 milliGRAM(s) Oral <User Schedule>  valproic  acid Syrup 1000 milliGRAM(s) Oral at bedtime    MEDICATIONS  (PRN):  albuterol    90 MICROgram(s) HFA Inhaler 2 Puff(s) Inhalation every 6 hours PRN Shortness of Breath and/or Wheezing  guaiFENesin Oral Liquid (Sugar-Free) 200 milliGRAM(s) Oral every 6 hours PRN Cough    Vital Signs Last 24 Hrs  T(C): 36.4 (22 Apr 2023 14:26), Max: 36.6 (21 Apr 2023 21:07)  T(F): 97.5 (22 Apr 2023 14:26), Max: 97.9 (21 Apr 2023 21:07)  HR: 88 (22 Apr 2023 14:26) (87 - 99)  BP: 136/98 (22 Apr 2023 14:26) (131/107 - 159/99)  RR: 18 (22 Apr 2023 14:26) (16 - 18)  SpO2: 100% (22 Apr 2023 14:26) (99% - 100%) room air    P/E:  Elderly Male comfortable in bed  Pscyh: Intellectual disability  Neuro: AAOx2, no focal deficit    Chest: BLAE+, No wheeze or Rhonchi  CVS: S1S2 WNL,  Abd soft, non tender, BS present   BLLE no edema or calf tenderness     Labs:                   15.2   7.83  )-----------( 185      ( 22 Apr 2023 06:11 )             49.1   04-22    147<H>  |  112<H>  |  55<H>  ----------------------------<  130<H>  4.9   |  28  |  2.47<H>    Ca    9.9      22 Apr 2023 06:11  Phos  3.0     04-22  Mg     3.1     04-22  TPro  6.3  /  Alb  2.6<L>  /  TBili  0.6  /  DBili  x   /  AST  21  /  ALT  61<H>  /  AlkPhos  92  04-22    CT Neck Soft Tissue No Cont (04.09.23 @ 20:24)   FINDINGS:  The examination is mildly to moderately motion degraded.  Evaluation for malignancy is limited without intravenous contrast.  There is no enlargement of the adenoids and tonsils.  There is no thickening of the soft palate/uvula, epiglottis, pharyngeal wall or aryepiglottic folds.  The larynx and upper trachea are grossly unremarkable, within limits of motion artifact.  There is no retropharyngeal edema.  The parotid glands, submandibular glands and thyroid appear grossly unremarkable.  There is no gross cervical lymphadenopathy.  The unopacified cervical vasculature appears unremarkable; no atherosclerotic calcification is identified.    There is partial straightening of the cervical lordosis.  There are multilevel degenerative changes.  At C2-C3 there is a tiny central disc protrusion.  At C3-C4 and C4-C5 there are disc osteophytecomplexes producing mild spinal canal stenosis.  At C5-C6 there is a disc osteophyte complex producing minimal spinal canal stenosis.  At C6-C7 and C7-T1 there is no significant disc herniation or spinal canal stenosis.    Uncovertebral/facet hypertrophy causes mild to moderate neural foraminal narrowing at C2-C3 on the left, C3-C4 bilaterally, C4-C5 bilaterally, C5-C6 on the left, C6-C7 on the right and C7-T1 on the right.    There appears to be mild circumferential wall thickening in the upper thoracic esophagus.  Small pleural effusions are partially visualized at both lung apices.  There is nonspecific interlobular septal thickening in both lung apices.    Within the visualized brain, there is mild to moderate generalized cerebral volume loss and mild periventricular white matter hypoattenuation compatible with chronic microvascular ischemic disease. A right ocular prosthesis is noted.    IMPRESSION:   The examination is mildly to moderately motion degraded. Evaluation for malignancy is limited without intravenous contrast.  No abnormality is identified in the pharynx, larynx or upper trachea, within limits of noncontrast CT technique.  There is no foreign body, inflammatory change or extrinsic mass effect on the pharynx/larynx or upper trachea.  There appears to be mild circumferential wall thickening in the upper thoracic esophagus.  Upper endoscopy and/or esophagram may be obtained as clinically warranted.  Small pleural effusions are partially visualized bilaterally.    Nonspecific interlobular septal thickening in the lung apices.

## 2023-04-22 NOTE — PROGRESS NOTE ADULT - SUBJECTIVE AND OBJECTIVE BOX
[facility] NEPHROLOGY- PROGRESS NOTE    Patient is a 72yo Male from  [facility] home number with CKD,  h/o intellectual disability, Down Syndrome, asthma, COPD with chronic cough, HTN, DM, BPH, schizophrenia,, bilateral sensorineural hearing loss, who came in to the ED for sore throat and chest pain.  Pt a/w acute on chroni CHF. Pt was diuresed with Lasix.  Hosp cb YASSINE. Nephrology consulted for Elevated serum creatinine.    REVIEW OF SYSTEMS:  Gen: no fevers  Cards: no chest pain  Resp: no dyspnea,   GI: no nausea or vomiting or diarrhea  Vascular: no LE edema    No Known Allergies      Hospital Medications: Medications reviewed    VITALS:  T(F): 97.5 (23 @ 14:26), Max: 97.9 (23 @ 21:07)  HR: 88 (23 @ 14:26)  BP: 136/98 (23 @ 14:26)  RR: 18 (23 @ 14:26)  SpO2: 100% (23 @ 14:26)  Wt(kg): --     @ 07:01  -   @ 18:42  --------------------------------------------------------  IN: 0 mL / OUT: 250 mL / NET: -250 mL          PHYSICAL EXAM:    Gen: NAD, calm  Cards: RRR, +S1/S2, no M/G/R  Resp: CTA B/L  GI: soft, NT, + Mild ab distention,  Vascular: no LE edema B/L    LABS:      147<H>  |  112<H>  |  55<H>  ----------------------------<  130<H>  4.9   |  28  |  2.47<H>    Ca    9.9      2023 06:11  Phos  3.0       Mg     3.1         TPro  6.3  /  Alb  2.6<L>  /  TBili  0.6  /  DBili      /  AST  21  /  ALT  61<H>  /  AlkPhos  92      Creatinine Trend: 2.47 <--, 2.13 <--, 2.28 <--, 2.58 <--, 3.09 <--, 2.85 <--, 3.02 <--, 2.18 <--                        15.2   7.83  )-----------( 185      ( 2023 06:11 )             49.1     Urine Studies:  Urinalysis Basic - ( 2023 09:30 )    Color: Yellow / Appearance: Clear / S.020 / pH:   Gluc:  / Ketone: Trace  / Bili: Negative / Urobili: 4 mg/dL   Blood:  / Protein: 30 mg/dL / Nitrite: Negative   Leuk Esterase: Negative / RBC: 0-2 /HPF / WBC 0-2 /HPF   Sq Epi:  / Non Sq Epi:  / Bacteria: Trace /HPF      Sodium, Random Urine: 41 mmol/L ( @ 09:30)  Chloride, Random Urine: 32 mmol/L ( @ 09:30)  Creatinine, Random Urine: 94 mg/dL ( @ 09:30)

## 2023-04-23 NOTE — PROGRESS NOTE ADULT - ASSESSMENT
Patient is a 74yo Male from  [facility] home number with CKD,  h/o intellectual disability, Down Syndrome, asthma, COPD with chronic cough, HTN, DM, BPH, schizophrenia,, bilateral sensorineural hearing loss, who came in to the ED for sore throat and chest pain.  Pt a/w acute on chroni CHF. Pt was diuresed with Lasix.  Hosp cb [facility]. Nephrology consulted for Elevated serum creatinine.    1. YASSINE- likely due to overdiuresis, for which Lasix 40mg PO daily was discontinued. Scr was improving off diuretics and s/p albumin. Pt now with mild increase despite short course of hypotonic IVF due to mild hypernatremia. f/u repeat CXR. Plan to straight cath post void    mild increase in SCr with hypernatremia; likely due to dehydration. Will d/c 1L FR (since pt only getting 1- 4oz thickened water tid). Pt given D5 1/2NS @ 50ml/hr x 5 hrs.   UA with 30 protein with trace ketone; pt likely with poor PO intake. Encourage PO intake  FeUrea 31%; consistent with pre-renal YASSINE. Renal US with no hydro. Bladder scan 4/17 neg, however post void intermittent cath 500ml on 4/19. Urology following  Continue to hold Enalapril and lasix. Will avoid further IVF (pt with h/o CHF with severe global LVSD on TTE). Strict I/Os. Avoid nephrotoxins/ NSAIDs/ RCA. Monitor BMP.  2. CKD-3a- baseline SCr 1.3-1.6. Defer secondary w/u as an outpt. Avoid nephrotoxins  3. HTN 2/2 CKD- BP borderline. Continue to hold Enalapril. Continue with current anti-hypertensive medications. Monitor BP.  4. BPH- c/w flomax and finasteride      [facility] NEPHROLOGY  Raul Goncalves M.D.  Bennie Carcamo D.O.  Priya Pederson M.D.  Hellen Dietz, MSN, ANP-C  [phone]  Fax: [phone] [place] [address], #[place] [address]   Patient is a 72yo Male from  [facility] home number with CKD,  h/o intellectual disability, Down Syndrome, asthma, COPD with chronic cough, HTN, DM, BPH, schizophrenia,, bilateral sensorineural hearing loss, who came in to the ED for sore throat and chest pain.  Pt a/w acute on chroni CHF. Pt was diuresed with Lasix.  Hosp cb YASSINE. Nephrology consulted for Elevated serum creatinine.    1. YASSINE- likely due to overdiuresis, for which Lasix 40mg PO daily was discontinued. Scr was improving off diuretics and s/p albumin. Pt with worsening renal function despite short course of hypotonic IVF due to mild hypernatremia. f/u repeat CXR. Plan to straight cath post void if no signs of retention; will give albumin 25% in 50ml q 8hrs x3 doses.   UA with 30 protein with trace ketone; pt likely with poor PO intake. Encourage PO intake  FeUrea 31%; consistent with pre-renal YASSINE. Renal US with no hydro. Bladder scan 4/17 neg, however post void intermittent cath variable. Urology following  Continue to hold Enalapril and lasix. Will avoid further IVF (pt with h/o CHF with severe global LVSD on TTE). Strict I/Os. Avoid nephrotoxins/ NSAIDs/ RCA. Monitor BMP.  2. CKD-3a- baseline SCr 1.3-1.6. Defer secondary w/u as an outpt. Avoid nephrotoxins  3. HTN 2/2 CKD- BP acceptable. Continue to hold Enalapril. Continue with current anti-hypertensive medications. Monitor BP.  4. BPH- c/w flomax and finasteride      [facility] NEPHROLOGY  Raul Goncalves M.D.  ALEXIS Pérez.O.  Priya Pederson M.D.  Hellen Dietz, MSN, ANP-C  [phone]  Fax: [phone] [place] [address], [address]  [place] [address]

## 2023-04-23 NOTE — PROGRESS NOTE ADULT - SUBJECTIVE AND OBJECTIVE BOX
[facility] NEPHROLOGY- PROGRESS NOTE    Patient is a 74yo Male from  [facility] home number with CKD,  h/o intellectual disability, Down Syndrome, asthma, COPD with chronic cough, HTN, DM, BPH, schizophrenia,, bilateral sensorineural hearing loss, who came in to the ED for sore throat and chest pain.  Pt a/w acute on chroni CHF. Pt was diuresed with Lasix.  Hosp cb [facility]. Nephrology consulted for Elevated serum creatinine.    REVIEW OF SYSTEMS: Pt dislikes nebulizer  Gen: no fevers  Cards: no chest pain  Resp: no dyspnea,   GI: no nausea or vomiting or diarrhea  Vascular: no LE edema    No Known Allergies      Hospital Medications: Medications reviewed    VITALS:  T(F): 97.3 (23 @ 05:10), Max: 97.5 (23 @ 14:26)  HR: 80 (23 @ 05:10)  BP: 115/86 (23 @ 05:10)  RR: 16 (23 @ 05:10)  SpO2: 94% (23 @ 05:10)  Wt(kg): --     @ 07:01  -   @ 07:00  --------------------------------------------------------  IN: 0 mL / OUT: 250 mL / NET: -250 mL      PHYSICAL EXAM:    Gen: NAD, calm  Cards: RRR, +S1/S2, no M/G/R  Resp: CTA B/L  GI: soft, NT, + Mild ab distention,  Vascular: no LE edema B/L    LABS:      140  |  107  |  60<H>  ----------------------------<  269<H>  5.3   |  30  |  3.02<H>    Ca    8.9      2023 10:34  Phos  4.2       Mg     2.7         TPro  6.0  /  Alb  2.4<L>  /  TBili  0.6  /  DBili      /  AST  48<H>  /  ALT  58  /  AlkPhos  97      Creatinine Trend: 3.02 <--, 2.69 <--, 2.47 <--, 2.13 <--, 2.28 <--, 2.58 <--, 3.09 <--, 2.85 <--, 3.02 <--                        14.8   10.24 )-----------( 189      ( 2023 06:12 )             47.0     Urine Studies:  Urinalysis Basic - ( 2023 09:30 )    Color: Yellow / Appearance: Clear / S.020 / pH:   Gluc:  / Ketone: Trace  / Bili: Negative / Urobili: 4 mg/dL   Blood:  / Protein: 30 mg/dL / Nitrite: Negative   Leuk Esterase: Negative / RBC: 0-2 /HPF / WBC 0-2 /HPF   Sq Epi:  / Non Sq Epi:  / Bacteria: Trace /HPF      Sodium, Random Urine: 41 mmol/L ( @ 09:30)  Chloride, Random Urine: 32 mmol/L ( @ 09:30)  Creatinine, Random Urine: 94 mg/dL ( @ 09:30)         [facility] NEPHROLOGY- PROGRESS NOTE    Patient is a 72yo Male from  [facility] home number with CKD,  h/o intellectual disability, Down Syndrome, asthma, COPD with chronic cough, HTN, DM, BPH, schizophrenia,, bilateral sensorineural hearing loss, who came in to the ED for sore throat and chest pain.  Pt a/w acute on chronic CHF. Pt was diuresed with Lasix.  Hosp cb [facility]. Nephrology consulted for Elevated serum creatinine.    REVIEW OF SYSTEMS: Pt dislikes nebulizer  Gen: no fevers  Cards: no chest pain  Resp: no dyspnea,   GI: no nausea or vomiting or diarrhea  Vascular: no LE edema    No Known Allergies      Hospital Medications: Medications reviewed    VITALS:  T(F): 97.3 (23 @ 05:10), Max: 97.5 (23 @ 14:26)  HR: 80 (23 @ 05:10)  BP: 115/86 (23 @ 05:10)  RR: 16 (23 @ 05:10)  SpO2: 94% (23 @ 05:10)  Wt(kg): --     @ 07:01  -   @ 07:00  --------------------------------------------------------  IN: 0 mL / OUT: 250 mL / NET: -250 mL      PHYSICAL EXAM:    Gen: NAD, calm  Cards: RRR, +S1/S2, no M/G/R  Resp: CTA B/L  GI: soft, NT, + Mild ab distention,  Vascular: no LE edema B/L    LABS:      140  |  107  |  60<H>  ----------------------------<  269<H>  5.3   |  30  |  3.02<H>    Ca    8.9      2023 10:34  Phos  4.2       Mg     2.7         TPro  6.0  /  Alb  2.4<L>  /  TBili  0.6  /  DBili      /  AST  48<H>  /  ALT  58  /  AlkPhos  97      Creatinine Trend: 3.02 <--, 2.69 <--, 2.47 <--, 2.13 <--, 2.28 <--, 2.58 <--, 3.09 <--, 2.85 <--, 3.02 <--                        14.8   10.24 )-----------( 189      ( 2023 06:12 )             47.0     Urine Studies:  Urinalysis Basic - ( 2023 09:30 )    Color: Yellow / Appearance: Clear / S.020 / pH:   Gluc:  / Ketone: Trace  / Bili: Negative / Urobili: 4 mg/dL   Blood:  / Protein: 30 mg/dL / Nitrite: Negative   Leuk Esterase: Negative / RBC: 0-2 /HPF / WBC 0-2 /HPF   Sq Epi:  / Non Sq Epi:  / Bacteria: Trace /HPF      Sodium, Random Urine: 41 mmol/L ( @ 09:30)  Chloride, Random Urine: 32 mmol/L ( @ 09:30)  Creatinine, Random Urine: 94 mg/dL ( @ 09:30)

## 2023-04-23 NOTE — PROGRESS NOTE ADULT - ASSESSMENT
73 y o with ambulatory at baseline from a [facility] home number 10 w/ PMH of intellectual disability, Fetal alcohol Syndrome, asthma, COPD with chronic cough, HTN, DM,  CKD elevated PSA, BPH, schizophrenia, hepatitis B, carrier, bilateral sensorineural hearing loss, admitted for AHRF secondary to Acute on Chronic Systolic Heart Failure.

## 2023-04-23 NOTE — PROGRESS NOTE ADULT - PROBLEM SELECTOR PLAN 1
ECHO 3/23 showed severely reduced EF 10-15%  Metoprolol 50 mg PO qd  Continue to hold enalapril and Jardiance for now until renal function back to baseline  Hold adding Spironolactone until renal function back to baseline  Cardiology onboard   No cardiac cath at this moment

## 2023-04-23 NOTE — PROGRESS NOTE ADULT - ASSESSMENT
73 y o with ambulatory at baseline from a [facility] home number 10 w/ PMH of intellectual disability, Fetal alcohol Syndrome, asthma, COPD with chronic cough, HTN, DM,  CKD elevated PSA, BPH, schizophrenia, hepatitis B, carrier, bilateral sensorineural hearing loss, admitted for AHRF secondary to Acute on Chronic Systolic Heart Failure.

## 2023-04-23 NOTE — PROGRESS NOTE ADULT - SUBJECTIVE AND OBJECTIVE BOX
PATIENT SEEN AND EXAMINED ON :- 4/23/23  DATE OF SERVICE: 4/23/23            Interim events noted,Labs ,Radiological studies and Cardiology tests reviewed .    MR#[id number]  PATIENT NAME:-[facility] COURSE: HPI:  73 y o with ambulatory at baseline from a [facility] number [facility] w/ PMH of intellectual disability, Down Syndrome, asthma, COPD with chronic cough, HTN, DM,  CKD elevated PSA, BPH, schizophrenia, hepatitis B, carrier, bilateral sensorineural hearing loss, who came in to the ED for sore throat and chest pain.  Patient states he developed a sore throat and dry cough a few days ago. Then last night had chest pain, located on the left side, w/o radiation, described as sharp. Pt states the pain lasted throughout the night and resolved this morning. Currently denies any fever, chills, nausea vomiting SOB, abdominal pain, PND, orthopnea or change in bowel habits  (08 Apr 2023 18:22)      INTERIM EVENTS:Patient seen at bedside ,interim events noted.      PMH -reviewed admission note, no change since admission  HEART FAILURE: Acute[ ]Chronic[ ] Systolic[ ] Diastolic[ ] Combined Systolic and Diastolic[ ]  CAD[ ] CABG[ ] PCI[ ]  DEVICES[ ] PPM[ ] ICD[ ] ILR[ ]  ATRIAL FIBRILLATION[ ] Paroxysmal[ ] Permanent[ ] CHADS2-[  ]  YASSINE[ ] CKD1[ ] CKD2[ ] CKD3[ ] CKD4[ ] ESRD[ ]  COPD[ ] HTN[ ]   DM[ ] Type1[ ] Type 2[ ]   CVA[ ] Paresis[ ]    AMBULATION: Assisted[ ] Cane/walker[ ] Independent[ ]    MEDICATIONS  (STANDING):  albumin human 25% IVPB 50 milliLiter(s) IV Intermittent every 8 hours  albuterol/ipratropium for Nebulization 3 milliLiter(s) Nebulizer every 6 hours  amantadine 100 milliGRAM(s) Oral every 12 hours  aspirin enteric coated 81 milliGRAM(s) Oral daily  atorvastatin 40 milliGRAM(s) Oral at bedtime  finasteride 5 milliGRAM(s) Oral daily  fluticasone propionate 50 MICROgram(s)/spray Nasal Spray 1 Spray(s) Both Nostrils two times a day  heparin   Injectable 5000 Unit(s) SubCutaneous every 12 hours  insulin glargine Injectable (LANTUS) 5 Unit(s) SubCutaneous at bedtime  insulin lispro (ADMELOG) corrective regimen sliding scale   SubCutaneous at bedtime  insulin lispro (ADMELOG) corrective regimen sliding scale   SubCutaneous three times a day before meals  insulin lispro Injectable (ADMELOG) 4 Unit(s) SubCutaneous three times a day before meals  memantine 5 milliGRAM(s) Oral two times a day  metoprolol succinate ER 50 milliGRAM(s) Oral daily  OLANZapine 10 milliGRAM(s) Oral at bedtime  tamsulosin 0.8 milliGRAM(s) Oral at bedtime  tiotropium 2.5 MICROgram(s) Inhaler 2 Puff(s) Inhalation daily  valproic  acid Syrup 1000 milliGRAM(s) Oral at bedtime  valproic  acid Syrup 250 milliGRAM(s) Oral <User Schedule>    MEDICATIONS  (PRN):            REVIEW OF SYSTEMS:  Constitutional: [ ] fever, [ ]weight loss,  [ ]fatigue [ ]weight gain  Eyes: [ ] visual changes  Respiratory: [ ]shortness of breath;  [ ] cough, [ ]wheezing, [ ]chills, [ ]hemoptysis  Cardiovascular: [ ] chest pain, [ ]palpitations, [ ]dizziness,  [ ]leg swelling[ ]orthopnea[ ]PND  Gastrointestinal: [ ] abdominal pain, [ ]nausea, [ ]vomiting,  [ ]diarrhea [ ]Constipation [ ]Melena  Genitourinary: [ ] dysuria, [ ] hematuria [ ]Reyes  Neurologic: [ ] headaches [ ] tremors[ ]weakness [ ]Paralysis Right[ ] Left[ ]  Skin: [ ] itching, [ ]burning, [ ] rashes  Endocrine: [ ] heat or cold intolerance  Musculoskeletal: [ ] joint pain or swelling; [ ] muscle, back, or extremity pain  Psychiatric: [ ] depression, [ ]anxiety, [ ]mood swings, or [ ]difficulty sleeping  Hematologic: [ ] easy bruising, [ ] bleeding gums    [ ] All remaining systems negative except as per above.   [ ]Unable to obtain.  [x] No change in ROS since admission      Vital Signs Last 24 Hrs  T(C): 36.4 (23 Apr 2023 14:01), Max: 36.4 (23 Apr 2023 14:01)  T(F): 97.5 (23 Apr 2023 14:01), Max: 97.5 (23 Apr 2023 14:01)  HR: 83 (23 Apr 2023 14:01) (80 - 83)  BP: 118/93 (23 Apr 2023 14:01) (115/86 - 118/93)  BP(mean): --  RR: 18 (23 Apr 2023 14:01) (16 - 18)  SpO2: 94% (23 Apr 2023 14:01) (94% - 94%)    Parameters below as of 23 Apr 2023 14:01  Patient On (Oxygen Delivery Method): room air      I&O's Summary    22 Apr 2023 07:01  -  23 Apr 2023 07:00  --------------------------------------------------------  IN: 0 mL / OUT: 250 mL / NET: -250 mL    23 Apr 2023 07:01  -  23 Apr 2023 21:54  --------------------------------------------------------  IN: 0 mL / OUT: 450 mL / NET: -450 mL        PHYSICAL EXAM:  General: No acute distress BMI-  HEENT: EOMI, PERRL  Neck: Supple, [ ] JVD  Lungs: Equal air entry bilaterally; [ ] rales [ ] wheezing [ ] rhonchi  Heart: Regular rate and rhythm; [x ] murmur   2/6 [ x] systolic [ ] diastolic [ ] radiation[ ] rubs [ ]  gallops  Abdomen: Nontender, bowel sounds present  Extremities: No clubbing, cyanosis, [ ] edema [ ]Pulses  equal and intact  Nervous system:  Alert & Oriented X3, no focal deficits  Psychiatric: Normal affect  Skin: No rashes or lesions    LABS:  04-23    140  |  107  |  60<H>  ----------------------------<  269<H>  5.3   |  30  |  3.02<H>    Ca    8.9      23 Apr 2023 10:34  Phos  4.2     04-23  Mg     2.7     04-23    TPro  6.0  /  Alb  2.4<L>  /  TBili  0.6  /  DBili  x   /  AST  48<H>  /  ALT  58  /  AlkPhos  97  04-23    Creatinine Trend: 3.02<--, 2.69<--, 2.47<--, 2.13<--, 2.28<--, 2.58<--                        14.8   10.24 )-----------( 189      ( 23 Apr 2023 06:12 )             47.0

## 2023-04-23 NOTE — PROGRESS NOTE ADULT - ATTENDING COMMENTS
Patient was seen and examined this morning around 11 AM with caretaker at bedside.    Patient is comfortable in bed. No acute SOB. No more hacking cough or grunting noises today.   Patient does not offer complaints due to intellectual disability but seems has some suprapubic distension.   Creatinine continues to rise despite fluids, good oral intake and euvolemic appearance. Bladder scan >400 cc urine;     P/E:  Elderly Male comfortable in bed  Pscyh: Intellectual disability  Neuro: AAOx2, no focal deficit    Chest: BLAE+, No wheeze or Rhonchi  CVS: S1S2 WNL,  Abd soft, mildly tender suprapubic;  BS present   BLLE no edema or calf tenderness     Labs:                           14.8   10.24 )-----------( 189      ( 23 Apr 2023 06:12 )             47.0   04-23    140  |  107  |  60<H>  ----------------------------<  269<H>  5.3   |  30  |  3.02<H>    Ca    8.9      23 Apr 2023 10:34  Phos  4.2     04-23  Mg     2.7     04-23    TPro  6.0  /  Alb  2.4<L>  /  TBili  0.6  /  DBili  x   /  AST  48<H>  /  ALT  58  /  AlkPhos  97  04-23        A/P:  YASSINE on CKD 3 appears to be Obstructive Uropathy given urinary retention  Grunting cough ?? post viral   YASSINE on CKD suspect overdiuresis plus possible obstructive component now appears prerenal rise in Creatinine  Acute hypoxic respiratory failure due to fluid overload resolved  Acute on chronic systolic heart failure stable    DM with hyperglycemia stable  HTN  HLD  Asthma  Fetal alcohol syndrome with Intellectual disability  Esophageal thickening    Plan:  Given patient inability to void despite standing and using urinal, will give one more straight cath today; 450 CC removed.  Repeat Bladder scan overnight; Urology follow up  Cont to hold Lasix for now; Renal follow up;   Rise in Creatinine mildly; will give gentle hydration x 6 hrs and check BMP AM; Cont to monitor Cr closely; Avoid Nephrotoxic meds  continue Proscar and increased dose Flomax 0.8mg HS; Urology consult appreciated; Avoid chance  Patient voided freely today; PVR only low 100s;   Discussed with Caretaker and RN again, OOB to chair, Ambulate and let patient void while standing and then get Post void residual immediately  Spoke with  Medical Director Emilia Hearn from Group Home yesterday; discussed clinical status and improving renal fn today; As per Medical Director, little hesitant in patient returning over the weekend due to Nursing staff and unclear dosing of Lasix if patient needs over weekend  If hacking cough persist, will give a trial of steroid tomorrow; Need ENT eval outpt  Continue Metoprolol; Cardio f/u; Dr. Hamilton following  Cont Albuterol, Spiriva and Duoneb, completed short course of PO prednisone.   Lactulose for constipation  Cont current insulin regimen  Thickening of esophagus- outpatient GI follow up. Patient is asymptomatic now, denies any dysphagia, tolerating diet   CM and SW for safe discharge  Patient passed stair evalaution; able to ambulate with walker as well as climb stairs if he wishes but due to intellectual disability unable to force him but Patient will do much better in his home surroundings with familiar staff   Dr. Hearn [phone]  Discussed with RN . Patient was seen and examined this morning around 11 AM with caretaker at bedside.    Patient is comfortable in bed. No acute SOB. No more hacking cough or grunting noises today.   Patient does not offer complaints due to intellectual disability but seems has some suprapubic distension.   Creatinine continues to rise despite fluids, good oral intake and euvolemic appearance. Bladder scan >400 cc urine;     P/E:  Elderly Male comfortable in bed  Pscyh: Intellectual disability  Neuro: AAOx2, no focal deficit    Chest: BLAE+, No wheeze or Rhonchi  CVS: S1S2 WNL,  Abd soft, mildly tender suprapubic;  BS present   BLLE no edema or calf tenderness     Labs:                           14.8   10.24 )-----------( 189      ( 23 Apr 2023 06:12 )             47.0   04-23    140  |  107  |  60<H>  ----------------------------<  269<H>  5.3   |  30  |  3.02<H>    Ca    8.9      23 Apr 2023 10:34  Phos  4.2     04-23  Mg     2.7     04-23    TPro  6.0  /  Alb  2.4<L>  /  TBili  0.6  /  DBili  x   /  AST  48<H>  /  ALT  58  /  AlkPhos  97  04-23        A/P:  YASSINE on CKD 3 appears to be Obstructive Uropathy given urinary retention  Grunting cough ?? post viral resolved  YASSINE on CKD suspect overdiuresis plus possible obstructive component now appears prerenal rise in Creatinine  Acute hypoxic respiratory failure due to fluid overload resolved  Acute on chronic systolic heart failure stable    DM with hyperglycemia stable  HTN  HLD  Asthma  Fetal alcohol syndrome with Intellectual disability  Esophageal thickening    Plan:  Given patient inability to void despite standing and using urinal, will give one more straight cath today; 450 CC removed.  Repeat Bladder scan overnight; Urology follow up; d/w Dr. Avalos; agree with Amy if continues to retain  Cont to hold Lasix for now; Renal follow up; d/w Dr. Bg Pond to monitor Cr closely; Avoid Nephrotoxic meds  continue Proscar and increased dose Flomax 0.8mg HS;   Discussed with Caretaker and RN, OOB to chair, Ambulate and let patient void while standing and then get Post void residual immediately  Spoke with  Medical Director Emilia Hearn from [facility] [place]; She will look into the option of if pt can return [facility] home with chance.   If hacking cough recurs Need ENT eval outpt  Continue Metoprolol; Cardio f/u; Dr. Hamilton following  Cont Albuterol, Spiriva and Duoneb, completed short course of PO prednisone.   Lactulose for constipation  Cont current insulin regimen  Thickening of esophagus- outpatient GI follow up. Patient is asymptomatic now, denies any dysphagia, tolerating diet   CM and SW for safe discharge  Patient passed stair evaluation; able to ambulate with walker as well as climb stairs if he wishes but due to intellectual disability unable to force him but Patient will do much better in his home surroundings with familiar staff if able to return  Dr. Hearn [phone]  Discussed with RN.  Discussed with PGY1 DR. Villasenor

## 2023-04-23 NOTE — PROGRESS NOTE ADULT - PROBLEM SELECTOR PLAN 1
ECHO 3/23 showed severely reduced EF 10-15%  NYHA Heart Failure class III stage C  Metoprolol 50 mg PO qd  Continue to hold enalapril and Jardiance for now until renal function back to baseline  Hold adding Spironolactone until renal function back to baseline  Cardiology onboard   No cardiac cath at this moment   PT recommending CARLOS  Hold Lasix 40 mg PO qd for now due to worsening renal function.   Nephro Dr Pederson onboard  Will keep monitoring.  Repeat chest xray on 4/21 showed improved fluid congestion ECHO 3/23 showed severely reduced EF 10-15%  NYHA Heart Failure class III stage C  Metoprolol 50 mg PO qd  Continue to hold enalapril and Jardiance for now until renal function back to baseline  Hold adding Spironolactone until renal function back to baseline  Cardiology onboard   No cardiac cath at this moment   PT recommending CARLOS  Hold Lasix 40 mg PO qd for now due to worsening renal function.   Nephro Dr Pederson onboard  Will keep monitoring.  Repeat chest xray on 4/21 showed improved fluid congestion  f/u repeat xray on 4/23

## 2023-04-23 NOTE — PROGRESS NOTE ADULT - PROBLEM SELECTOR PLAN 4
Baseline 1.30s to 1.60s  Cr 1.59>1.71>2.13>1.82 > 2.85 > 3 > 2.58 > 2.28 > 2.13  Likely overdiuresis  Hold Lasix  Nephrology onboard   Avoid nephrotoxic agents  Will keep monitoring   Rest as above  Renal US shows no hydro  bladder scan is neg on 4/17  straight cath on 4/19 drained 500 cc  repeat bladder scan  s/p albumin 25% on 50 ml q8h x3 doses  improved Scr  continue to monitor if Scr keep improving consider starting a small dose of lasix 20 mg twice a week on d/c Baseline 1.30s to 1.60s  Cr 1.59>1.71>2.13>1.82 > 2.85 > 3 > 2.58 > 2.28 > 2.13 > 3.02  Likely overdiuresis  Hold Lasix  Nephrology onboard   Avoid nephrotoxic agents  Will keep monitoring   Rest as above  Renal US shows no hydro  bladder scan is neg on 4/17  straight cath on 4/19 drained 500 cc  repeat bladder scan  s/p albumin 25% on 50 ml q8h x3 doses  improved Scr  continue to monitor if Scr keep improving consider starting a small dose of lasix 20 mg twice a week on d/c  f/u repeat bladder scan post void

## 2023-04-23 NOTE — PROGRESS NOTE ADULT - SUBJECTIVE AND OBJECTIVE BOX
PGY-1 Progress Note discussed with attending    PAGER #: [[phone]] TILL 5:00 PM  PLEASE CONTACT ON CALL TEAM:  - On Call Team (Please refer to Red) FROM 5:00 PM - 8:30PM  - Nightfloat Team FROM 8:30 -7:30 AM    CHIEF COMPLAINT & BRIEF HOSPITAL COURSE:    INTERVAL HPI/OVERNIGHT EVENTS:       REVIEW OF SYSTEMS:  CONSTITUTIONAL: No fever, weight loss, or fatigue  RESPIRATORY: No cough, wheezing, chills or hemoptysis; No shortness of breath  CARDIOVASCULAR: No chest pain, palpitations, dizziness, or leg swelling  GASTROINTESTINAL: No abdominal pain. No nausea, vomiting, or hematemesis; No diarrhea or constipation. No melena or hematochezia.  GENITOURINARY: No dysuria or hematuria, urinary frequency  NEUROLOGICAL: No headaches, memory loss, loss of strength, numbness, or tremors  SKIN: No itching, burning, rashes, or lesions     Vital Signs Last 24 Hrs  T(C): 36.3 (23 Apr 2023 05:10), Max: 36.4 (22 Apr 2023 14:26)  T(F): 97.3 (23 Apr 2023 05:10), Max: 97.5 (22 Apr 2023 14:26)  HR: 80 (23 Apr 2023 05:10) (80 - 89)  BP: 115/86 (23 Apr 2023 05:10) (115/86 - 136/98)  BP(mean): --  RR: 16 (23 Apr 2023 05:10) (16 - 18)  SpO2: 94% (23 Apr 2023 05:10) (94% - 100%)    Parameters below as of 23 Apr 2023 05:10  Patient On (Oxygen Delivery Method): room air        PHYSICAL EXAMINATION:  GENERAL: NAD, well built  HEAD:  Atraumatic, Normocephalic  EYES:  conjunctiva and sclera clear  NECK: Supple, No JVD, Normal thyroid  CHEST/LUNG: Clear to auscultation. Clear to percussion bilaterally; No rales, rhonchi, wheezing, or rubs  HEART: Regular rate and rhythm; No murmurs, rubs, or gallops  ABDOMEN: Soft, Nontender, Nondistended; Bowel sounds present  NERVOUS SYSTEM:  Alert & Oriented X3,    EXTREMITIES:  2+ Peripheral Pulses, No clubbing, cyanosis, or edema  SKIN: warm dry                          14.8   10.24 )-----------( 189      ( 23 Apr 2023 06:12 )             47.0     04-23    142  |  112<H>  |  58<H>  ----------------------------<  180<H>  5.5<H>   |  24  |  2.69<H>    Ca    9.7      23 Apr 2023 06:12  Phos  4.2     04-23  Mg     2.7     04-23    TPro  6.0  /  Alb  2.4<L>  /  TBili  0.6  /  DBili  x   /  AST  48<H>  /  ALT  58  /  AlkPhos  97  04-23    LIVER FUNCTIONS - ( 23 Apr 2023 06:12 )  Alb: 2.4 g/dL / Pro: 6.0 g/dL / ALK PHOS: 97 U/L / ALT: 58 U/L DA / AST: 48 U/L / GGT: x                   CAPILLARY BLOOD GLUCOSE      RADIOLOGY & ADDITIONAL TESTS:                   PGY-1 Progress Note discussed with attending    PAGER #: [[phone]] TILL 5:00 PM  PLEASE CONTACT ON CALL TEAM:  - On Call Team (Please refer to Red) FROM 5:00 PM - 8:30PM  - Nightfloat Team FROM 8:30 -7:30 AM    INTERVAL HPI/OVERNIGHT EVENTS:   no acute overnight events, pt. seen and examined at bedside, offers no complaints.    REVIEW OF SYSTEMS:  CONSTITUTIONAL: No fever, weight loss, or fatigue  RESPIRATORY: No cough, wheezing, chills or hemoptysis; No shortness of breath  CARDIOVASCULAR: No chest pain, palpitations, dizziness, or leg swelling  GASTROINTESTINAL: No abdominal pain. No nausea, vomiting, or hematemesis; No diarrhea or constipation. No melena or hematochezia.  GENITOURINARY: No dysuria or hematuria, urinary frequency  NEUROLOGICAL: No headaches, memory loss, loss of strength, numbness, or tremors  SKIN: No itching, burning, rashes, or lesions     Vital Signs Last 24 Hrs  T(C): 36.3 (23 Apr 2023 05:10), Max: 36.4 (22 Apr 2023 14:26)  T(F): 97.3 (23 Apr 2023 05:10), Max: 97.5 (22 Apr 2023 14:26)  HR: 80 (23 Apr 2023 05:10) (80 - 89)  BP: 115/86 (23 Apr 2023 05:10) (115/86 - 136/98)  BP(mean): --  RR: 16 (23 Apr 2023 05:10) (16 - 18)  SpO2: 94% (23 Apr 2023 05:10) (94% - 100%)    Parameters below as of 23 Apr 2023 05:10  Patient On (Oxygen Delivery Method): room air        PHYSICAL EXAMINATION:  GENERAL: NAD, lying in bed comfortably   HEAD:  Atraumatic, Normocephalic  EYES:  Prosthesis noted on the right, Left with conjunctiva and sclera clear, pupil is equal, round, and reactive to light and accommodation.  NECK: Supple, No JVD, trachea is midline, no evidence of thyroid enlargement, no lymphadenopathy or tenderness.  CHEST/LUNG: No rales, rhonchi, minimal expiratory wheezing noted, no rubs  HEART: Regular rate and rhythm; systolic murmur noted on the left lower sternal border, no rubs, or gallops  ABDOMEN: Soft, Nontender, Nondistended; Bowel sounds present  NERVOUS SYSTEM:  Alert & Oriented X2; No focal sensory or motor deficits are noted; Appropriate mood and affect.  EXTREMITIES:  2+ Peripheral Pulses, No clubbing, cyanosis, or edema  SKIN: Warm, dry, and well perfused; Good turgor; No lesions, nodules or rashes are noted.                        14.8   10.24 )-----------( 189      ( 23 Apr 2023 06:12 )             47.0     04-23    142  |  112<H>  |  58<H>  ----------------------------<  180<H>  5.5<H>   |  24  |  2.69<H>    Ca    9.7      23 Apr 2023 06:12  Phos  4.2     04-23  Mg     2.7     04-23    TPro  6.0  /  Alb  2.4<L>  /  TBili  0.6  /  DBili  x   /  AST  48<H>  /  ALT  58  /  AlkPhos  97  04-23    LIVER FUNCTIONS - ( 23 Apr 2023 06:12 )  Alb: 2.4 g/dL / Pro: 6.0 g/dL / ALK PHOS: 97 U/L / ALT: 58 U/L DA / AST: 48 U/L / GGT: x                   CAPILLARY BLOOD GLUCOSE      RADIOLOGY & ADDITIONAL TESTS:

## 2023-04-23 NOTE — PROGRESS NOTE ADULT - REASON FOR ADMISSION
AHRF secondary to Acute on Chronic Systolic Heart Failure
Shortness of Breath and Hypoxia
AHRF secondary to Acute on Chronic Systolic Heart Failure

## 2023-04-24 NOTE — PROGRESS NOTE ADULT - PROBLEM SELECTOR PLAN 4
Baseline 1.30s to 1.60s  Cr 1.59>1.71>2.13>1.82 > 2.85 > 3 > 2.58 > 2.28 > 2.13 > 3.02  Likely overdiuresis  Hold Lasix  Nephrology onboard   Avoid nephrotoxic agents  Will keep monitoring   Rest as above  Renal US shows no hydro  bladder scan is neg on 4/17  straight cath on 4/19 drained 500 cc  repeat bladder scan  s/p albumin 25% on 50 ml q8h x3 doses  improved Scr  continue to monitor if Scr keep improving consider starting a small dose of lasix 20 mg twice a week on d/c  f/u repeat bladder scan post void Baseline 1.30s to 1.60s  Cr 1.59>1.71>2.13>1.82 > 2.85 > 3 > 2.58 > 2.28 > 2.13 > 3.02>2.81  Likely overdiuresis  Hold Lasix  Nephrology onboard   Avoid nephrotoxic agents  Will keep monitoring   Rest as above  Renal US shows no hydro  bladder scan is neg on 4/17  straight cath on 4/19 drained 500 cc  repeat bladder scan  s/p albumin 25% on 50 ml q8h x3 doses  improved Scr  continue to monitor if Scr keep improving consider starting a small dose of lasix 20 mg twice a week on d/c  f/u repeat bladder scan post void 4/24 81cc, passed TOV    Continue bladder scan; given mental status/developmental delay; will determine if patient needs chance at facility/group home

## 2023-04-24 NOTE — PROGRESS NOTE ADULT - PROBLEM SELECTOR PLAN 4
Baseline 1.30s to 1.60s  Cr 1.59>1.71>2.13>1.82 > 2.85 > 3 > 2.58 > 2.28 > 2.13 > 3.02>2.81  Likely overdiuresis  Hold Lasix  Nephrology onboard   Avoid nephrotoxic agents  Will keep monitoring   Rest as above  Renal US shows no hydro  bladder scan is neg on 4/17  straight cath on 4/19 drained 500 cc  repeat bladder scan  s/p albumin 25% on 50 ml q8h x3 doses  improved Scr  continue to monitor if Scr keep improving consider starting a small dose of lasix 20 mg twice a week on d/c  f/u repeat bladder scan post void 4/24 81cc, passed TOV    Continue bladder scan; given mental status/developmental delay; will determine if patient needs chance at facility/group home

## 2023-04-24 NOTE — PROGRESS NOTE ADULT - ASSESSMENT
Patient is a 74yo Male from  [facility] home number with CKD,  h/o intellectual disability, Down Syndrome, asthma, COPD with chronic cough, HTN, DM, BPH, schizophrenia,, bilateral sensorineural hearing loss, who came in to the ED for sore throat and chest pain.  Pt a/w acute on chroni CHF. Pt was diuresed with Lasix.  Hosp cb YASSINE. Nephrology consulted for Elevated serum creatinine.    1. YASSINE- likely due to overdiuresis, for which Lasix 40mg PO daily was discontinued. Scr was improving off diuretics and s/p albumin. Pt with worsening renal function despite short course of hypotonic IVF due to mild hypernatremia. However, pt with >300ml urine output on straight cath.  Likely would benefit from chance catheter, though it may be difficult for pt to continue in the long-run at the [facility] facility.    UA with 30 protein with trace ketone; pt likely with poor PO intake. Encourage PO intake  FeUrea 31%; consistent with pre-renal YASSINE. Renal US with no hydro. Bladder scan 4/17 neg, however post void intermittent cath variable. Urology following  Continue to hold Enalapril and lasix. Will avoid further IVF (pt with h/o CHF with severe global LVSD on TTE). Strict I/Os. Avoid nephrotoxins/ NSAIDs/ RCA. Monitor BMP.    2. CKD-3a- baseline SCr 1.3-1.6. Defer secondary w/u as an outpt. Avoid nephrotoxins  3. HTN 2/2 CKD- BP acceptable. Continue to hold Enalapril. Continue with current anti-hypertensive medications. Monitor BP.  4. BPH- c/w flomax and finasteride      [facility] NEPHROLOGY  Raul Goncalves M.D.  DC PérezO.  Priya Pederson M.D.  Hellen Dietz, MSN, ANP-C  [phone]  Fax: [phone] [place] [address], [id number]  [place] [address]

## 2023-04-24 NOTE — PROGRESS NOTE ADULT - ASSESSMENT
73 y o [facility]/ PMH of intellectual disability, Downs Syndrome, asthma, COPD with chronic cough, HTN, DM, NICM with EF of 10-15% by recent Echo (3/29) CKD elevated PSA, BPH, schizophrenia, hepatitis B carrier, bilateral sensorineural hearing loss, who came in to the ED for sore throat and chest pain.   Also previously admitted 3/28 to 3/30 for demand ischemia and CHF exacerbation.  Now admitted with musculoskeletal chest pain and chronic combined systolic/diastolic heart failure.  Group home staff report worsening BEARDEN and ADL/functional limitations due to dyspnea at home.      CHF clinically improved, ambulation improved, off oxygen, discharge remains delayed by continued YASSINE (possibly due to ATN/overdiuresis vs. ? cardiorenal syndrome) and intermittent urinary retention.

## 2023-04-24 NOTE — PROGRESS NOTE ADULT - PROBLEM SELECTOR PLAN 1
ECHO 3/23 showed severely reduced EF 10-15%  NYHA Heart Failure class III stage C  Metoprolol 50 mg PO qd  Continue to hold enalapril and Jardiance for now until renal function back to baseline  Hold adding Spironolactone until renal function back to baseline  Cardiology onboard   No cardiac cath at this moment   PT recommending CARLOS  Hold Lasix 40 mg PO qd for now due to worsening renal function.   Nephro Dr Pederson onboard  Will keep monitoring.  Repeat chest xray on 4/21 showed improved fluid congestion  f/u repeat xray on 4/23 ECHO 3/23 showed severely reduced EF 10-15%  NYHA Heart Failure class III stage C  Metoprolol 50 mg PO qd  Continue to hold enalapril and Jardiance for now until renal function back to baseline  Hold adding Spironolactone until renal function back to baseline  Cardiology onboard   No cardiac cath at this moment   PT recommending CARLOS  Hold Lasix 40 mg PO qd for now due to worsening renal function.   Nephro Dr Pederson onboard  Will keep monitoring.  Repeat chest xray on 4/21 showed improved fluid congestion  f/u repeat xray on 4/23: pending read;

## 2023-04-24 NOTE — PROGRESS NOTE ADULT - SUBJECTIVE AND OBJECTIVE BOX
INTERVAL HPI/OVERNIGHT EVENTS:     PRESSORS: [ ] YES [ ] NO  WHICH:    ANTIBIOTICS:                  DATE STARTED:  ANTIBIOTICS:                  DATE STARTED:  ANTIBIOTICS:                  DATE STARTED:    Antimicrobial:    Cardiovascular:  metoprolol succinate ER 50 milliGRAM(s) Oral daily    Pulmonary:  albuterol/ipratropium for Nebulization 3 milliLiter(s) Nebulizer every 6 hours  guaiFENesin Oral Liquid (Sugar-Free) 100 milliGRAM(s) Oral every 6 hours PRN  montelukast 10 milliGRAM(s) Oral at bedtime  tiotropium 2.5 MICROgram(s) Inhaler 2 Puff(s) Inhalation daily    Hematalogic:  aspirin enteric coated 81 milliGRAM(s) Oral daily  heparin   Injectable 5000 Unit(s) SubCutaneous every 12 hours    Other:  albumin human 25% IVPB 50 milliLiter(s) IV Intermittent every 8 hours  amantadine 100 milliGRAM(s) Oral every 12 hours  atorvastatin 40 milliGRAM(s) Oral at bedtime  finasteride 5 milliGRAM(s) Oral daily  fluticasone propionate 50 MICROgram(s)/spray Nasal Spray 1 Spray(s) Both Nostrils two times a day  insulin glargine Injectable (LANTUS) 5 Unit(s) SubCutaneous at bedtime  insulin lispro (ADMELOG) corrective regimen sliding scale   SubCutaneous at bedtime  insulin lispro (ADMELOG) corrective regimen sliding scale   SubCutaneous three times a day before meals  insulin lispro Injectable (ADMELOG) 4 Unit(s) SubCutaneous three times a day before meals  memantine 5 milliGRAM(s) Oral two times a day  OLANZapine 10 milliGRAM(s) Oral at bedtime  tamsulosin 0.8 milliGRAM(s) Oral at bedtime  valproic  acid Syrup 1000 milliGRAM(s) Oral at bedtime  valproic  acid Syrup 250 milliGRAM(s) Oral <User Schedule>      Drug Dosing Weight  Height (cm): 162.6 (08 Apr 2023 09:14)  Weight (kg): 62.6 (08 Apr 2023 09:14)  BMI (kg/m2): 23.7 (08 Apr 2023 09:14)  BSA (m2): 1.67 (08 Apr 2023 09:14)    CENTRAL LINE: [ ] YES [ ] NO  LOCATION:   DATE INSERTED:  REMOVE: [ ] YES [ ] NO  EXPLAIN:    MURILLO: [ ] YES [ ] NO    DATE INSERTED:  REMOVE:  [ ] YES [ ] NO  EXPLAIN:    A-LINE:  [ ] YES [ ] NO  LOCATION:   DATE INSERTED:  REMOVE:  [ ] YES [ ] NO  EXPLAIN:    PMH/Social Hx/Fam Hx -reviewed admission note, no change since admission  PAST MEDICAL & SURGICAL HISTORY:  Down syndrome      HTN (hypertension)      Hyperlipidemia      Diabetes mellitus      Intellectual disability      No significant past surgical history        Heart faliure: acute [ ] chronic [ ] acute or chronic [ ] diastolic [ ] systolic [ ] combied systolic and diastolic[ ]  YASSINE: ATN[ ] renal medullary necrosis [ ] CKD I [ ]CKDII [ ]CKD III [ ]CKD IV [ ]CKD V [ ]Other pathological lesions [ ]  Abdominal Nutrition Status: malnutrition [ ] cachexia [ ] morbid obesity/BMI=40 [ ] Supplement ordered [___________]     T(C): 36.3 (04-24-23 @ 05:19), Max: 36.8 (04-23-23 @ 21:25)  HR: 82 (04-24-23 @ 05:19)  BP: 125/96 (04-24-23 @ 05:19)  BP(mean): --  ABP: --  ABP(mean): --  RR: 18 (04-24-23 @ 05:19)  SpO2: 94% (04-24-23 @ 05:19)  Wt(kg): --          04-23 @ 07:01  -  04-24 @ 07:00  --------------------------------------------------------  IN: 120 mL / OUT: 450 mL / NET: -330 mL            PHYSICAL EXAM:    GENERAL: [ ]NAD, [ ]well-groomed, [ ]well-developed  HEAD:  [ ]Atraumatic, [ ]Normocephalic  EYES: [ ]EOMI, [ ]PERRLA, [ ]conjunctiva and sclera clear  ENMT: [ ]No tonsillar erythema, exudates, or enlargement; [ ]Moist mucous membranes, [ ]Good dentition, [ ]No lesions  NECK: [ ]Supple, normal appearance, [ ]No JVD; [ ]Normal thyroid; [ ]Trachea midline  NERVOUS SYSTEM:  [ ]Alert & Oriented X3, [ ]Good concentration; [ ]Motor Strength 5/5 B/L upper and lower extremities; [ ]DTRs 2+ intact and symmetric  CHEST/LUNG: [ ]No chest deformity; [ ]Normal percussion bilaterally; [ ]No rales, rhonchi, wheezing; [ ]Crackles at bases  HEART: [ ]Regular rate and rhythm; [ ]No murmurs, rubs, or gallops  ABDOMEN: [ ]Soft, Nontender, Nondistended; [ ]Bowel sounds present  EXTREMITIES:  [ ]2+ Peripheral Pulses, [ ]No clubbing, cyanosis, or edema [ ]Bilat lower extremity edema  LYMPH: [ ]No lymphadenopathy noted  SKIN: [ ]No rashes or lesions; [ ]Good capillary refill      LABS:  CBC Full  -  ( 24 Apr 2023 06:01 )  WBC Count : 7.08 K/uL  RBC Count : 4.72 M/uL  Hemoglobin : 14.8 g/dL  Hematocrit : 48.1 %  Platelet Count - Automated : 175 K/uL  Mean Cell Volume : 101.9 fl  Mean Cell Hemoglobin : 31.4 pg  Mean Cell Hemoglobin Concentration : 30.8 gm/dL  Auto Neutrophil # : 4.21 K/uL  Auto Lymphocyte # : 1.68 K/uL  Auto Monocyte # : 1.03 K/uL  Auto Eosinophil # : 0.09 K/uL  Auto Basophil # : 0.02 K/uL  Auto Neutrophil % : 59.5 %  Auto Lymphocyte % : 23.7 %  Auto Monocyte % : 14.5 %  Auto Eosinophil % : 1.3 %  Auto Basophil % : 0.3 %    04-24    144  |  109<H>  |  60<H>  ----------------------------<  72  4.3   |  27  |  2.81<H>    Ca    10.0      24 Apr 2023 06:01  Phos  3.6     04-24  Mg     3.0     04-24    TPro  6.6  /  Alb  3.0<L>  /  TBili  0.7  /  DBili  x   /  AST  25  /  ALT  53  /  AlkPhos  91  04-24        Culture Results:   Culture in progress (04-22 @ 13:16)      RADIOLOGY & ADDITIONAL STUDIES REVIEWED:      [ ]GOALS OF CARE DISCUSSION WITH PATIENT/FAMILY/PROXY:    CRITICAL CARE TIME SPENT: 35 minutes PGY-1 Progress Note discussed with attending    PAGER #: [[phone]] TILL 5:00 PM  PLEASE CONTACT ON CALL TEAM:  - On Call Team (Please refer to Red) FROM 5:00 PM - 8:30PM  - Nightfloat Team FROM 8:30 -7:30 AM    CHIEF COMPLAINT & BRIEF HOSPITAL COURSE:    INTERVAL HPI/OVERNIGHT EVENTS:   MEDICATIONS  (STANDING):  albumin human 25% IVPB 50 milliLiter(s) IV Intermittent every 8 hours  albuterol/ipratropium for Nebulization 3 milliLiter(s) Nebulizer every 6 hours  amantadine 100 milliGRAM(s) Oral every 12 hours  aspirin enteric coated 81 milliGRAM(s) Oral daily  atorvastatin 40 milliGRAM(s) Oral at bedtime  finasteride 5 milliGRAM(s) Oral daily  fluticasone propionate 50 MICROgram(s)/spray Nasal Spray 1 Spray(s) Both Nostrils two times a day  heparin   Injectable 5000 Unit(s) SubCutaneous every 12 hours  insulin glargine Injectable (LANTUS) 5 Unit(s) SubCutaneous at bedtime  insulin lispro (ADMELOG) corrective regimen sliding scale   SubCutaneous three times a day before meals  insulin lispro (ADMELOG) corrective regimen sliding scale   SubCutaneous at bedtime  insulin lispro Injectable (ADMELOG) 4 Unit(s) SubCutaneous three times a day before meals  memantine 5 milliGRAM(s) Oral two times a day  metoprolol succinate ER 50 milliGRAM(s) Oral daily  montelukast 10 milliGRAM(s) Oral at bedtime  OLANZapine 10 milliGRAM(s) Oral at bedtime  tamsulosin 0.8 milliGRAM(s) Oral at bedtime  tiotropium 2.5 MICROgram(s) Inhaler 2 Puff(s) Inhalation daily  valproic  acid Syrup 1000 milliGRAM(s) Oral at bedtime  valproic  acid Syrup 250 milliGRAM(s) Oral <User Schedule>    MEDICATIONS  (PRN):  guaiFENesin Oral Liquid (Sugar-Free) 100 milliGRAM(s) Oral every 6 hours PRN Cough      REVIEW OF SYSTEMS:  CONSTITUTIONAL: No fever, weight loss, or fatigue  RESPIRATORY: No cough, wheezing, chills or hemoptysis; No shortness of breath  CARDIOVASCULAR: No chest pain, palpitations, dizziness, or leg swelling  GASTROINTESTINAL: No abdominal pain. No nausea, vomiting, or diarrhea.  GENITOURINARY: No dysuria or hematuria, urinary frequency  NEUROLOGICAL: No headaches, memory loss, loss of strength, numbness, or tremors  SKIN: No itching, burning, rashes, or lesions     Vital Signs Last 24 Hrs  T(C): 36.3 (24 Apr 2023 05:19), Max: 36.8 (23 Apr 2023 21:25)  T(F): 97.3 (24 Apr 2023 05:19), Max: 98.2 (23 Apr 2023 21:25)  HR: 82 (24 Apr 2023 05:19) (82 - 90)  BP: 125/96 (24 Apr 2023 05:19) (118/93 - 125/96)  BP(mean): --  RR: 18 (24 Apr 2023 05:19) (18 - 18)  SpO2: 94% (24 Apr 2023 05:19) (94% - 98%)    Parameters below as of 24 Apr 2023 05:19  Patient On (Oxygen Delivery Method): room air        PHYSICAL EXAMINATION:  GENERAL: NAD, lying in bed comfortably   HEAD:  Atraumatic, Normocephalic  EYES:  Prosthesis noted on the right, Left with conjunctiva and sclera clear, pupil is equal, round, and reactive to light and accommodation.  NECK: Supple, No JVD, trachea is midline, no evidence of thyroid enlargement, no lymphadenopathy or tenderness.  CHEST/LUNG: No rales, rhonchi, minimal expiratory wheezing noted, no rubs  HEART: Regular rate and rhythm; systolic murmur noted on the left lower sternal border, no rubs, or gallops  ABDOMEN: Soft, Nontender, Nondistended; Bowel sounds present  NERVOUS SYSTEM:  Alert & Oriented X2; No focal sensory or motor deficits are noted; Appropriate mood and affect.  EXTREMITIES:  2+ Peripheral Pulses, No clubbing, cyanosis, or edema  SKIN: Warm, dry, and well perfused; Good turgor; No lesions, nodules or rashes are noted.                          14.8   7.08  )-----------( 175      ( 24 Apr 2023 06:01 )             48.1     04-24    144  |  109<H>  |  60<H>  ----------------------------<  72  4.3   |  27  |  2.81<H>    Ca    10.0      24 Apr 2023 06:01  Phos  3.6     04-24  Mg     3.0     04-24    TPro  6.6  /  Alb  3.0<L>  /  TBili  0.7  /  DBili  x   /  AST  25  /  ALT  53  /  AlkPhos  91  04-24    LIVER FUNCTIONS - ( 24 Apr 2023 06:01 )  Alb: 3.0 g/dL / Pro: 6.6 g/dL / ALK PHOS: 91 U/L / ALT: 53 U/L DA / AST: 25 U/L / GGT: x                   CAPILLARY BLOOD GLUCOSE      RADIOLOGY & ADDITIONAL TESTS:

## 2023-04-24 NOTE — PROGRESS NOTE ADULT - PROBLEM SELECTOR PLAN 4
Patient with increased functional limitations due to worsening CHF symptoms, in context of Down's syndrome, intellectual disability, presumed dementia, and multiple medical comorbidities.  He is at risk for further physical and functional decline    Recommend OOB to chair  Aggressive PT--assess for ability to do stairs  Continued pureed diet.

## 2023-04-24 NOTE — PROGRESS NOTE ADULT - SUBJECTIVE AND OBJECTIVE BOX
follow up on:  complex medical decision making related to goals of care    [facility] Ger[facility] and Palliative Consult Service:  Aliza Hsu DO: cell ([phone])  Reji Hart MD: cell ([phone])  Joel Weiner NP: cell ([phone])   Michelle Iqbal DNP: cell ([phone])  Kahlil Lucas LMSW: cell ([phone])   Bharti Nogueira NP: via [employer] Teams    Can contact any Palliative Team member via [employer] Teams for consults and questions      OVERNIGHT EVENTS:    Present Symptoms: Mild, Moderate, Severe  Pain:             Location -                               Aggravating factors -             Quality -             Radiation -             Timing-             Severity (0-10 scale):             Minimal acceptable level (0-10 scale):  Fatigue:  Nausea:  Lack of Appetite:   SOB:  Depression:  Anxiety:  Review of Systems: [All others negative or Unable to obtain due to poor mentation]    CPOT:    https://[email or web address]/getattachment/[email or web address]/Critical-Care-Pain-Observation-Tool-(CPOT)  PAIN AD Score:   http://geriatrictoolkit.[email or web address]/painad.pdf (press ctrl +  left click to view)MEDICATIONS  (STANDING):  albuterol/ipratropium for Nebulization 3 milliLiter(s) Nebulizer every 6 hours  amantadine 100 milliGRAM(s) Oral every 12 hours  aspirin enteric coated 81 milliGRAM(s) Oral daily  atorvastatin 40 milliGRAM(s) Oral at bedtime  finasteride 5 milliGRAM(s) Oral daily  fluticasone propionate 50 MICROgram(s)/spray Nasal Spray 1 Spray(s) Both Nostrils two times a day  heparin   Injectable 5000 Unit(s) SubCutaneous every 12 hours  insulin glargine Injectable (LANTUS) 5 Unit(s) SubCutaneous at bedtime  insulin lispro (ADMELOG) corrective regimen sliding scale   SubCutaneous at bedtime  insulin lispro (ADMELOG) corrective regimen sliding scale   SubCutaneous three times a day before meals  insulin lispro Injectable (ADMELOG) 4 Unit(s) SubCutaneous three times a day before meals  memantine 5 milliGRAM(s) Oral two times a day  metoprolol succinate ER 50 milliGRAM(s) Oral daily  montelukast 10 milliGRAM(s) Oral at bedtime  OLANZapine 10 milliGRAM(s) Oral at bedtime  tamsulosin 0.8 milliGRAM(s) Oral at bedtime  tiotropium 2.5 MICROgram(s) Inhaler 2 Puff(s) Inhalation daily  valproic  acid Syrup 1000 milliGRAM(s) Oral at bedtime  valproic  acid Syrup 250 milliGRAM(s) Oral <User Schedule>    MEDICATIONS  (PRN):  guaiFENesin Oral Liquid (Sugar-Free) 100 milliGRAM(s) Oral every 6 hours PRN Cough      PHYSICAL EXAM:  Vital Signs Last 24 Hrs  T(C): 36.2 (24 Apr 2023 21:31), Max: 36.3 (24 Apr 2023 05:19)  T(F): 97.2 (24 Apr 2023 21:31), Max: 97.3 (24 Apr 2023 05:19)  HR: 76 (24 Apr 2023 21:31) (76 - 85)  BP: 108/72 (24 Apr 2023 21:31) (108/72 - 135/94)  BP(mean): --  RR: 16 (24 Apr 2023 21:31) (16 - 18)  SpO2: 91% (24 Apr 2023 21:31) (91% - 95%)    Parameters below as of 24 Apr 2023 21:31  Patient On (Oxygen Delivery Method): room air        General: alert  oriented x ____    lethargic distressed cachexia  verbal nonverbal  unarousable     Palliative Performance Scale/Karnofsky Score:  http://npcrc.org/files/news/palliative_performance_scale_ppsv2.pdf    HEENT: no abnormal lesion, dry mouth  ET tube/trach oral lesions:  Lungs: tachypnea/labored breathing, audible excessive secretions  CV: RRR, S1S2, tachycardia  GI: soft non distended non tender  incontinent               PEG/NG/OG tube  constipation  last BM:   : incontinent  oliguria/anuria  chance  Musculoskeletal: weakness x4 edema x4    ambulatory with assistance   mostly/fully bedbound/wheelchair bound  Skin: no abnormal skin lesions, poor skin turgor, pressure ulcers stage:   Neuro: no deficits, mild cognitive impairment dsyphagia/dysarthria paresis  Oral intake ability: unable/only mouth care, minimal moderate full capability    LABS:                          14.8   7.08  )-----------( 175      ( 24 Apr 2023 06:01 )             48.1     04-24    144  |  109<H>  |  60<H>  ----------------------------<  72  4.3   |  27  |  2.81<H>    Ca    10.0      24 Apr 2023 06:01  Phos  3.6     04-24  Mg     3.0     04-24    TPro  6.6  /  Alb  3.0<L>  /  TBili  0.7  /  DBili  x   /  AST  25  /  ALT  53  /  AlkPhos  91  04-24        RADIOLOGY & ADDITIONAL STUDIES: follow up on:  complex medical decision making related to goals of care    [facility] Geriatric and Palliative Consult Service:  Aliza Hsu DO: cell ([phone])  Reji Hart MD: cell ([phone])  Joel Weiner NP: cell ([phone])   Michelle Iqbal DNP: cell ([phone])  Kahlil Lucas LMSW: cell ([phone])   Bharti Nogueira NP: via [employer] Teams    Can contact any Palliative Team member via [employer] Teams for consults and questions      OVERNIGHT EVENTS: None    Patient examined earlier today with group home aide at bedside.  Denies chest pain, SOB, or other acute complaints  Interim history--passed PT assessment, now able to ambulate with walker and climb stairs, but serum creatinine rising again, possibly due to intermittent urinary retention.      Present Symptoms: Mild, Moderate, Severe  Pain:  None             Location -                               Aggravating factors -             Quality -             Radiation -             Timing-             Severity (0-10 scale):  0/10             Minimal acceptable level (0-10 scale):  Fatigue:  None  Nausea:  Denies  Lack of Appetite: Denies  SOB:  Denies  Depression:  Denies  Anxiety:  Review of Systems:  All other systems reviewed and are negative      MEDICATIONS  (STANDING):  albuterol/ipratropium for Nebulization 3 milliLiter(s) Nebulizer every 6 hours  amantadine 100 milliGRAM(s) Oral every 12 hours  aspirin enteric coated 81 milliGRAM(s) Oral daily  atorvastatin 40 milliGRAM(s) Oral at bedtime  finasteride 5 milliGRAM(s) Oral daily  fluticasone propionate 50 MICROgram(s)/spray Nasal Spray 1 Spray(s) Both Nostrils two times a day  heparin   Injectable 5000 Unit(s) SubCutaneous every 12 hours  insulin glargine Injectable (LANTUS) 5 Unit(s) SubCutaneous at bedtime  insulin lispro (ADMELOG) corrective regimen sliding scale   SubCutaneous at bedtime  insulin lispro (ADMELOG) corrective regimen sliding scale   SubCutaneous three times a day before meals  insulin lispro Injectable (ADMELOG) 4 Unit(s) SubCutaneous three times a day before meals  memantine 5 milliGRAM(s) Oral two times a day  metoprolol succinate ER 50 milliGRAM(s) Oral daily  montelukast 10 milliGRAM(s) Oral at bedtime  OLANZapine 10 milliGRAM(s) Oral at bedtime  tamsulosin 0.8 milliGRAM(s) Oral at bedtime  tiotropium 2.5 MICROgram(s) Inhaler 2 Puff(s) Inhalation daily  valproic  acid Syrup 1000 milliGRAM(s) Oral at bedtime  valproic  acid Syrup 250 milliGRAM(s) Oral <User Schedule>    MEDICATIONS  (PRN):  guaiFENesin Oral Liquid (Sugar-Free) 100 milliGRAM(s) Oral every 6 hours PRN Cough      PHYSICAL EXAM:  Vital Signs Last 24 Hrs  T(C): 36.2 (24 Apr 2023 21:31), Max: 36.3 (24 Apr 2023 05:19)  T(F): 97.2 (24 Apr 2023 21:31), Max: 97.3 (24 Apr 2023 05:19)  HR: 76 (24 Apr 2023 21:31) (76 - 85)  BP: 108/72 (24 Apr 2023 21:31) (108/72 - 135/94)  BP(mean): --  RR: 16 (24 Apr 2023 21:31) (16 - 18)  SpO2: 91% (24 Apr 2023 21:31) (91% - 95%)    Parameters below as of 24 Apr 2023 21:31  Patient On (Oxygen Delivery Method): room air        General: alert  oriented x __3__   comfortable, NAD    Palliative Performance Scale/Karnofsky Score:  40%  http://npcrc.org/files/news/palliative_performance_scale_ppsv2.pdf    HEENT:  EOMI anicteric, pharynx clear, MM moist  Lungs:  clear to ausculation, respirations unlabored  CV: RRR, normal S1 and S2, no murmur  GI: soft non distended non tender  normal bowel sounds  Musculoskeletal:  mild LE weakness B/L   ambulatory with assistance    trace leg edema noted, no focal abnormal joint findings  Skin: no abnormal skin lesions or DU noted  Neuro: no focal deficits  Oral intake ability: full capability, no reported dysphagia, on pureed diet with moderately thickened liquids    LABS:                          14.8   7.08  )-----------( 175      ( 24 Apr 2023 06:01 )             48.1     04-24    144  |  109<H>  |  60<H>  ----------------------------<  72  4.3   |  27  |  2.81<H>    Ca    10.0      24 Apr 2023 06:01  Phos  3.6     04-24  Mg     3.0     04-24    TPro  6.6  /  Alb  3.0<L>  /  TBili  0.7  /  DBili  x   /  AST  25  /  ALT  53  /  AlkPhos  91  04-24        RADIOLOGY & ADDITIONAL STUDIES:

## 2023-04-24 NOTE — PROGRESS NOTE ADULT - PROBLEM SELECTOR PLAN 5
Patient now with advanced systolic HF, in setting of severe NICM, with EF of 10-15%, and increasing BEARDEN/SOB with performance of ADLs resulting in functional limitation.  He is not considered to be a candidate for ICD.  With his increasing symptom burden he may not be able to return to his group home setting and may need to be placed at a higher level of care. There is also concern regarding transitions of care and support for adequate outpatient f/u of his heart failure in his current setting.      Patient has at minimum Stage C NYHA class III heart failure.  He now has worsening renal failure/ATN with concern for possible cardiorenal syndrome (vs intermittent urinary obstruction).  He is at risk for continued progression of disease, further functional decline, recurrent hospitalizations, increasing morbidity, and sudden death.  Patient is LOW THRESHOLD for referral to hospice if his renal failure and/or symptom burden continue to progress.      Patient is under auspices of [facility]. Spoke to manager/supervisor Rina Eller--please see previous [facility] conversation from 4/10.   previously in agreement to begin initial legal review process by [facility] for possible DNR/DNI.  1750-b pre-MOLST checklist placed on chart. Group home medical director Dr. Hearn ([phone]), also shares above concerns regarding patient's current home setting, advance directives, and transitions of care.      Discharge planning for [facility] vs back to group home, depending upon resolution of renal function and if patient can return to group home setting with indwelling Reyes cath (may be difficult to manage at group home).  Per additional discussion with Dr. Hearn, [facility] requesting second outside Cardiology opinion before agreeing to DNR/DNI    Patient remains full code at this time  Palliative care team will continue to follow peripherally.

## 2023-04-24 NOTE — PROGRESS NOTE ADULT - PROBLEM SELECTOR PLAN 1
Patient with advanced systolic heart failure due to severe NICM, EF now 10-15% on recent Echo.   He is having increasing BEARDEN, SOB with performance of ADLs, and functional limitation due to his heart failure    He is minimum Stage C NYHA class III disease (symptoms with minimal exertion).  Cardiology consult requested to optimize full GDMT.  Now with worsening renal failure/ATN, with concern for emerging cardiorenal syndrome vs intermittent urinary obstruction.  Diuretics discontinued but BUN/Cr remains elevated  Nephrology consult appreciated  Passed PT reevaluation--patient now able to climb stairs and ambulate with walker.  Would benefit from CARL[facility].  Remainder of management as per primary team.

## 2023-04-24 NOTE — PROGRESS NOTE ADULT - ASSESSMENT
73 y o with ambulatory at baseline from a [facility] home number 10 w/ PMH of intellectual disability, Fetal alcohol Syndrome, asthma, COPD with chronic cough, HTN, DM,  CKD elevated PSA, BPH, schizophrenia, hepatitis B, carrier, bilateral sensorineural hearing loss, admitted for AHRF secondary to Acute on Chronic Systolic Heart Failure.

## 2023-04-24 NOTE — PROGRESS NOTE ADULT - SUBJECTIVE AND OBJECTIVE BOX
Full note to follow. [facility] NEPHROLOGY- PROGRESS NOTE    Patient is a 72yo Male from  [facility] home number with CKD,  h/o intellectual disability, Down Syndrome, asthma, COPD with chronic cough, HTN, DM, BPH, schizophrenia,, bilateral sensorineural hearing loss, who came in to the ED for sore throat and chest pain.  Pt a/w acute on chronic CHF. Pt was diuresed with Lasix.  Hosp cb YASSINE. Nephrology consulted for Elevated serum creatinine.    REVIEW OF SYSTEMS: Pt dislikes nebulizer  Gen: no fevers  Cards: no chest pain  Resp: no dyspnea,   GI: no nausea or vomiting or diarrhea  Vascular: no LE edema    No Known Allergies      Hospital Medications: Medications reviewed    VITALS:  T(F): 97.2 (23 @ 21:31), Max: 97.3 (23 @ 05:19)  HR: 76 (23 @ 21:31)  BP: 108/72 (23 @ 21:31)  RR: 16 (23 @ 21:31)  SpO2: 91% (23 @ 21:31)  Wt(kg): --     @ 07:01  -   @ 07:00  --------------------------------------------------------  IN: 120 mL / OUT: 450 mL / NET: -330 mL        PHYSICAL EXAM:    Gen: NAD, calm  Cards: RRR, +S1/S2, no M/G/R  Resp: CTA B/L, upper airway sounds seem voluntary and only when pt is paying attention to them  GI: soft, NT, + Mild ab distention,  Vascular: no LE edema B/L        LABS:      144  |  109<H>  |  60<H>  ----------------------------<  72  4.3   |  27  |  2.81<H>    Ca    10.0      2023 06:01  Phos  3.6       Mg     3.0         TPro  6.6  /  Alb  3.0<L>  /  TBili  0.7  /  DBili      /  AST  25  /  ALT  53  /  AlkPhos  91      Creatinine Trend: 2.81 <--, 3.02 <--, 2.69 <--, 2.47 <--, 2.13 <--, 2.28 <--, 2.58 <--, 3.09 <--                        14.8   7.08  )-----------( 175      ( 2023 06:01 )             48.1     Urine Studies:  Urinalysis Basic - ( 2023 09:30 )    Color: Yellow / Appearance: Clear / S.020 / pH:   Gluc:  / Ketone: Trace  / Bili: Negative / Urobili: 4 mg/dL   Blood:  / Protein: 30 mg/dL / Nitrite: Negative   Leuk Esterase: Negative / RBC: 0-2 /HPF / WBC 0-2 /HPF   Sq Epi:  / Non Sq Epi:  / Bacteria: Trace /HPF      Sodium, Random Urine: 41 mmol/L ( @ 09:30)  Chloride, Random Urine: 32 mmol/L ( @ 09:30)  Creatinine, Random Urine: 94 mg/dL ( @ 09:30)    < from: Xray Chest 1 View- PORTABLE-Urgent (Xray Chest 1 View- PORTABLE-Urgent .) (23 @ 10:53) >    ACC: [id number] EXAM:  XR CHEST PORTABLE URGENT 1V   ORDERED BY: YOSVANY LAZARO     PROCEDURE DATE:  2023          INTERPRETATION:  TIME OF EXAM: 2023 at 10:34 AM.    CLINICAL INFORMATION: Congestive heart failure. Evaluate for fluid   overload.    COMPARISON:  2023.    TECHNIQUE:   AP Portable chest x-ray.    INTERPRETATION:    Heart size and the mediastinum cannot be accurately evaluated on this   projection.  Bilateral hazy perihilar opacities are not significantly changed.  No pleural effusion or pneumothorax is seen.  There is osteoarthritic degenerative change of the spine.      IMPRESSION:  No significant change in bilateral hazy perihilar opacities   which can be due to pulmonary vascular congestion.    --- End of Report ---            EMERALD MATA MD; Attending Radiologist  This document has been electronically signed. 2023 11:44AM    < end of copied text >

## 2023-04-24 NOTE — PROGRESS NOTE ADULT - PROBLEM SELECTOR PLAN 1
ECHO 3/23 showed severely reduced EF 10-15%  NYHA Heart Failure class III stage C  Metoprolol 50 mg PO qd  Continue to hold enalapril and Jardiance for now until renal function back to baseline  Hold adding Spironolactone until renal function back to baseline  Cardiology onboard   No cardiac cath at this moment   PT recommending CARLOS  Hold Lasix 40 mg PO qd for now due to worsening renal function.

## 2023-04-24 NOTE — PROGRESS NOTE ADULT - SUBJECTIVE AND OBJECTIVE BOX
PATIENT SEEN AND EXAMINED ON :- 4/24/23  DATE OF SERVICE: 4/24/23            Interim events noted,Labs ,Radiological studies and Cardiology tests reviewed .    MR#[id number]  PATIENT NAME:-[facility] COURSE: HPI:  73 y o with ambulatory at baseline from a [facility] number [facility] w/ PMH of intellectual disability, Down Syndrome, asthma, COPD with chronic cough, HTN, DM,  CKD elevated PSA, BPH, schizophrenia, hepatitis B, carrier, bilateral sensorineural hearing loss, who came in to the ED for sore throat and chest pain.  Patient states he developed a sore throat and dry cough a few days ago. Then last night had chest pain, located on the left side, w/o radiation, described as sharp. Pt states the pain lasted throughout the night and resolved this morning. Currently denies any fever, chills, nausea vomiting SOB, abdominal pain, PND, orthopnea or change in bowel habits  (08 Apr 2023 18:22)      INTERIM EVENTS:Patient seen at bedside ,interim events noted.      PMH -reviewed admission note, no change since admission  HEART FAILURE: Acute[ ]Chronic[ ] Systolic[ ] Diastolic[ ] Combined Systolic and Diastolic[ ]  CAD[ ] CABG[ ] PCI[ ]  DEVICES[ ] PPM[ ] ICD[ ] ILR[ ]  ATRIAL FIBRILLATION[ ] Paroxysmal[ ] Permanent[ ] CHADS2-[  ]  YASSINE[ ] CKD1[ ] CKD2[ ] CKD3[ ] CKD4[ ] ESRD[ ]  COPD[ ] HTN[ ]   DM[ ] Type1[ ] Type 2[ ]   CVA[ ] Paresis[ ]    AMBULATION: Assisted[ ] Cane/walker[ ] Independent[ ]    MEDICATIONS  (STANDING):  albuterol/ipratropium for Nebulization 3 milliLiter(s) Nebulizer every 6 hours  amantadine 100 milliGRAM(s) Oral every 12 hours  aspirin enteric coated 81 milliGRAM(s) Oral daily  atorvastatin 40 milliGRAM(s) Oral at bedtime  finasteride 5 milliGRAM(s) Oral daily  fluticasone propionate 50 MICROgram(s)/spray Nasal Spray 1 Spray(s) Both Nostrils two times a day  heparin   Injectable 5000 Unit(s) SubCutaneous every 12 hours  insulin glargine Injectable (LANTUS) 5 Unit(s) SubCutaneous at bedtime  insulin lispro (ADMELOG) corrective regimen sliding scale   SubCutaneous at bedtime  insulin lispro (ADMELOG) corrective regimen sliding scale   SubCutaneous three times a day before meals  insulin lispro Injectable (ADMELOG) 4 Unit(s) SubCutaneous three times a day before meals  memantine 5 milliGRAM(s) Oral two times a day  metoprolol succinate ER 50 milliGRAM(s) Oral daily  montelukast 10 milliGRAM(s) Oral at bedtime  OLANZapine 10 milliGRAM(s) Oral at bedtime  tamsulosin 0.8 milliGRAM(s) Oral at bedtime  tiotropium 2.5 MICROgram(s) Inhaler 2 Puff(s) Inhalation daily  valproic  acid Syrup 1000 milliGRAM(s) Oral at bedtime  valproic  acid Syrup 250 milliGRAM(s) Oral <User Schedule>    MEDICATIONS  (PRN):  guaiFENesin Oral Liquid (Sugar-Free) 100 milliGRAM(s) Oral every 6 hours PRN Cough            REVIEW OF SYSTEMS:  Constitutional: [ ] fever, [ ]weight loss,  [ ]fatigue [ ]weight gain  Eyes: [ ] visual changes  Respiratory: [ ]shortness of breath;  [ ] cough, [ ]wheezing, [ ]chills, [ ]hemoptysis  Cardiovascular: [ ] chest pain, [ ]palpitations, [ ]dizziness,  [ ]leg swelling[ ]orthopnea[ ]PND  Gastrointestinal: [ ] abdominal pain, [ ]nausea, [ ]vomiting,  [ ]diarrhea [ ]Constipation [ ]Melena  Genitourinary: [ ] dysuria, [ ] hematuria [ ]Reyes  Neurologic: [ ] headaches [ ] tremors[ ]weakness [ ]Paralysis Right[ ] Left[ ]  Skin: [ ] itching, [ ]burning, [ ] rashes  Endocrine: [ ] heat or cold intolerance  Musculoskeletal: [ ] joint pain or swelling; [ ] muscle, back, or extremity pain  Psychiatric: [ ] depression, [ ]anxiety, [ ]mood swings, or [ ]difficulty sleeping  Hematologic: [ ] easy bruising, [ ] bleeding gums    [ ] All remaining systems negative except as per above.   [ ]Unable to obtain.  [x] No change in ROS since admission      Vital Signs Last 24 Hrs  T(C): 36.2 (24 Apr 2023 21:31), Max: 36.3 (24 Apr 2023 05:19)  T(F): 97.2 (24 Apr 2023 21:31), Max: 97.3 (24 Apr 2023 05:19)  HR: 76 (24 Apr 2023 21:31) (76 - 85)  BP: 108/72 (24 Apr 2023 21:31) (108/72 - 135/94)  BP(mean): --  RR: 16 (24 Apr 2023 21:31) (16 - 18)  SpO2: 91% (24 Apr 2023 21:31) (91% - 95%)    Parameters below as of 24 Apr 2023 21:31  Patient On (Oxygen Delivery Method): room air      I&O's Summary    23 Apr 2023 07:01  -  24 Apr 2023 07:00  --------------------------------------------------------  IN: 120 mL / OUT: 450 mL / NET: -330 mL        PHYSICAL EXAM:  General: No acute distress BMI-  HEENT: EOMI, PERRL  Neck: Supple, [ ] JVD  Lungs: Equal air entry bilaterally; [ ] rales [ ] wheezing [ ] rhonchi  Heart: Regular rate and rhythm; [x ] murmur   2/6 [ x] systolic [ ] diastolic [ ] radiation[ ] rubs [ ]  gallops  Abdomen: Nontender, bowel sounds present  Extremities: No clubbing, cyanosis, [ ] edema [ ]Pulses  equal and intact  Nervous system:  Alert & Oriented X3, no focal deficits  Psychiatric: Normal affect  Skin: No rashes or lesions    LABS:  04-24    144  |  109<H>  |  60<H>  ----------------------------<  72  4.3   |  27  |  2.81<H>    Ca    10.0      24 Apr 2023 06:01  Phos  3.6     04-24  Mg     3.0     04-24    TPro  6.6  /  Alb  3.0<L>  /  TBili  0.7  /  DBili  x   /  AST  25  /  ALT  53  /  AlkPhos  91  04-24    Creatinine Trend: 2.81<--, 3.02<--, 2.69<--, 2.47<--, 2.13<--, 2.28<--                        14.8   7.08  )-----------( 175      ( 24 Apr 2023 06:01 )             48.1

## 2023-04-24 NOTE — PROGRESS NOTE ADULT - ASSESSMENT
73 y o with ambulatory at baseline from a [facility] home number 10 w/ PMH of intellectual disability, Fetal alcohol Syndrome, asthma, COPD with chronic cough, HTN, DM,  CKD elevated PSA, BPH, schizophrenia, hepatitis B, carrier, bilateral sensorineural hearing loss, admitted for AHRF secondary to Acute on Chronic Systolic Heart Failure.

## 2023-04-25 NOTE — PROGRESS NOTE ADULT - SUBJECTIVE AND OBJECTIVE BOX
[facility] NEPHROLOGY- PROGRESS NOTE    Patient is a 74yo Male from  [facility] home number with CKD,  h/o intellectual disability, Down Syndrome, asthma, COPD with chronic cough, HTN, DM, BPH, schizophrenia,, bilateral sensorineural hearing loss, who came in to the ED for sore throat and chest pain.  Pt a/w acute on chronic CHF. Pt was diuresed with Lasix.  Hosp cb YASSINE. Nephrology consulted for Elevated serum creatinine.    REVIEW OF SYSTEMS: Pt dislikes nebulizer  Gen: no fevers  Cards: no chest pain  Resp: no dyspnea,   GI: no nausea or vomiting or diarrhea  Vascular: no LE edema    No Known Allergies      Hospital Medications: Medications reviewed    VITALS:  T(F): 97.2 (04-25-23 @ 21:05), Max: 97.3 (04-25-23 @ 05:12)  HR: 96 (04-25-23 @ 21:05)  BP: 127/92 (04-25-23 @ 21:05)  RR: 17 (04-25-23 @ 21:05)  SpO2: 100% (04-25-23 @ 21:05)  Wt(kg): --      PHYSICAL EXAM:  Gen: NAD, calm  Cards: RRR, +S1/S2, no M/G/R  Resp: CTA B/L, upper airway sounds seem voluntary and only when pt is paying attention to them  GI: soft, NT, + Mild ab distention,  : +bladder palpable- obstruced  Vascular: no LE edema B/L      LABS:  04-25    139  |  105  |  64<H>  ----------------------------<  104<H>  5.0   |  25  |  3.30<H>    Ca    9.4      25 Apr 2023 06:01  Phos  4.3     04-25  Mg     3.0     04-25    TPro  6.7  /  Alb  2.9<L>  /  TBili  0.6  /  DBili      /  AST  43<H>  /  ALT  67<H>  /  AlkPhos  108  04-25    Creatinine Trend: 3.30 <--, 2.81 <--, 3.02 <--, 2.69 <--, 2.47 <--, 2.13 <--, 2.28 <--, 2.58 <--                        14.9   7.83  )-----------( 192      ( 25 Apr 2023 06:01 )             48.1

## 2023-04-25 NOTE — PROGRESS NOTE ADULT - PROBLEM SELECTOR PLAN 1
- ECHO 3/23 showed severely reduced EF 10-15%  - NYHA Heart Failure class III stage C  - Continue Metoprolol 50 mg PO qd  - Continue holding enalapril, Spironolactone, Lasix, and Jardiance for now until renal function back to baseline  - No cardiac cath fo rnow

## 2023-04-25 NOTE — PROGRESS NOTE ADULT - ASSESSMENT
73 y o with ambulatory at baseline from a [facility] home number 10 w/ PMH of intellectual disability, Fetal alcohol Syndrome, asthma, COPD with chronic cough, HTN, DM,  CKD elevated PSA, BPH, schizophrenia, hepatitis B, carrier, bilateral sensorineural hearing loss, admitted for AHRF secondary to Acute on Chronic Systolic Heart Failure.

## 2023-04-25 NOTE — PROGRESS NOTE ADULT - PROBLEM SELECTOR PLAN 9
Continue Amantadine   Continue Valproic Acid   Continue Olanzapine   Will keep monitoring for now - Patient on Amantadine, Valproic Acid, Olanzapine at home  - Continue home medications

## 2023-04-25 NOTE — PROGRESS NOTE ADULT - ATTENDING COMMENTS
73M ambulatory at baseline, from [facility], Intellectual disability, Fetal alcohol syndrome, asthma, COPD, HTN, DM, CKD, BPH, schizophrenia, HBV carrier, initially admitted for ADHF now with YASSINE suspected from overdiuresis vs obstructive nephropathy.     ADHF improved  YASSINE on CKD3 suspect obstructive vs pre-renal from overdiuresis  DM  HTN  HLD  asthma/COPD  Fetal alcohol syndrome with intellectual disability    -obstructive nephropahty is complicated by dispo options: group home won't take him back with chance catheter  -c/w flomax 0.8  -encourage ambulation and OOBC  -dc meds that may be contributing to retention: claritin ... c/w zyprexa which is a chronic med  -appreciate nephro recs  -holding lasix for worry of overdiuresis  -dc is pending improvement in Cr

## 2023-04-25 NOTE — PROGRESS NOTE ADULT - ASSESSMENT
73 y o with ambulatory at baseline from a [facility] home number 10 w/ PMH of intellectual disability, Fetal alcohol Syndrome, asthma, COPD with chronic cough, HTN, DM,  CKD elevated PSA, BPH, schizophrenia, hepatitis B, carrier, bilateral sensorineural hearing loss, admitted for AHRF secondary to Acute on Chronic Systolic Heart Failure.

## 2023-04-25 NOTE — PROGRESS NOTE ADULT - SUBJECTIVE AND OBJECTIVE BOX
PATIENT SEEN AND EXAMINED ON :- 4/25/23  DATE OF SERVICE:   4/25/23          Interim events noted,Labs ,Radiological studies and Cardiology tests reviewed .    MR#[id number]  PATIENT NAME:-[facility] COURSE: HPI:  73 y o with ambulatory at baseline from a [facility] number 10 w/ PMH of intellectual disability, Down Syndrome, asthma, COPD with chronic cough, HTN, DM,  CKD elevated PSA, BPH, schizophrenia, hepatitis B, carrier, bilateral sensorineural hearing loss, who came in to the ED for sore throat and chest pain.  Patient states he developed a sore throat and dry cough a few days ago. Then last night had chest pain, located on the left side, w/o radiation, described as sharp. Pt states the pain lasted throughout the night and resolved this morning. Currently denies any fever, chills, nausea vomiting SOB, abdominal pain, PND, orthopnea or change in bowel habits  (08 Apr 2023 18:22)      INTERIM EVENTS:Patient seen at bedside ,interim events noted.      PMH -reviewed admission note, no change since admission  HEART FAILURE: Acute[ ]Chronic[ ] Systolic[ ] Diastolic[ ] Combined Systolic and Diastolic[ ]  CAD[ ] CABG[ ] PCI[ ]  DEVICES[ ] PPM[ ] ICD[ ] ILR[ ]  ATRIAL FIBRILLATION[ ] Paroxysmal[ ] Permanent[ ] CHADS2-[  ]  YASSINE[ ] CKD1[ ] CKD2[ ] CKD3[ ] CKD4[ ] ESRD[ ]  COPD[ ] HTN[ ]   DM[ ] Type1[ ] Type 2[ ]   CVA[ ] Paresis[ ]    AMBULATION: Assisted[ ] Cane/walker[ ] Independent[ ]    MEDICATIONS  (STANDING):  albuterol/ipratropium for Nebulization 3 milliLiter(s) Nebulizer every 6 hours  amantadine 100 milliGRAM(s) Oral every 12 hours  aspirin enteric coated 81 milliGRAM(s) Oral daily  atorvastatin 40 milliGRAM(s) Oral at bedtime  finasteride 5 milliGRAM(s) Oral daily  fluticasone propionate 50 MICROgram(s)/spray Nasal Spray 1 Spray(s) Both Nostrils two times a day  heparin   Injectable 5000 Unit(s) SubCutaneous every 12 hours  insulin glargine Injectable (LANTUS) 5 Unit(s) SubCutaneous at bedtime  insulin lispro (ADMELOG) corrective regimen sliding scale   SubCutaneous at bedtime  insulin lispro (ADMELOG) corrective regimen sliding scale   SubCutaneous three times a day before meals  insulin lispro Injectable (ADMELOG) 4 Unit(s) SubCutaneous three times a day before meals  memantine 5 milliGRAM(s) Oral two times a day  metoprolol succinate ER 50 milliGRAM(s) Oral daily  montelukast 10 milliGRAM(s) Oral at bedtime  OLANZapine 10 milliGRAM(s) Oral at bedtime  tamsulosin 0.8 milliGRAM(s) Oral at bedtime  tiotropium 2.5 MICROgram(s) Inhaler 2 Puff(s) Inhalation daily  valproic  acid Syrup 1000 milliGRAM(s) Oral at bedtime  valproic  acid Syrup 250 milliGRAM(s) Oral <User Schedule>    MEDICATIONS  (PRN):  guaiFENesin Oral Liquid (Sugar-Free) 100 milliGRAM(s) Oral every 6 hours PRN Cough            REVIEW OF SYSTEMS:  Constitutional: [ ] fever, [ ]weight loss,  [ ]fatigue [ ]weight gain  Eyes: [ ] visual changes  Respiratory: [ ]shortness of breath;  [ ] cough, [ ]wheezing, [ ]chills, [ ]hemoptysis  Cardiovascular: [ ] chest pain, [ ]palpitations, [ ]dizziness,  [ ]leg swelling[ ]orthopnea[ ]PND  Gastrointestinal: [ ] abdominal pain, [ ]nausea, [ ]vomiting,  [ ]diarrhea [ ]Constipation [ ]Melena  Genitourinary: [ ] dysuria, [ ] hematuria [ ]Reyes  Neurologic: [ ] headaches [ ] tremors[ ]weakness [ ]Paralysis Right[ ] Left[ ]  Skin: [ ] itching, [ ]burning, [ ] rashes  Endocrine: [ ] heat or cold intolerance  Musculoskeletal: [ ] joint pain or swelling; [ ] muscle, back, or extremity pain  Psychiatric: [ ] depression, [ ]anxiety, [ ]mood swings, or [ ]difficulty sleeping  Hematologic: [ ] easy bruising, [ ] bleeding gums    [ ] All remaining systems negative except as per above.   [ ]Unable to obtain.  [x] No change in ROS since admission      Vital Signs Last 24 Hrs  T(C): 36.2 (25 Apr 2023 13:05), Max: 36.3 (25 Apr 2023 05:12)  T(F): 97.1 (25 Apr 2023 13:05), Max: 97.3 (25 Apr 2023 05:12)  HR: 76 (25 Apr 2023 13:05) (76 - 86)  BP: 110/72 (25 Apr 2023 13:05) (108/72 - 124/94)  BP(mean): --  RR: 16 (25 Apr 2023 13:05) (16 - 16)  SpO2: 95% (25 Apr 2023 13:05) (91% - 97%)    Parameters below as of 25 Apr 2023 13:05  Patient On (Oxygen Delivery Method): room air      I&O's Summary      PHYSICAL EXAM:  General: No acute distress BMI-  HEENT: EOMI, PERRL  Neck: Supple, [ ] JVD  Lungs: Equal air entry bilaterally; [ ] rales [ ] wheezing [ ] rhonchi  Heart: Regular rate and rhythm; [x ] murmur   2/6 [ x] systolic [ ] diastolic [ ] radiation[ ] rubs [ ]  gallops  Abdomen: Nontender, bowel sounds present  Extremities: No clubbing, cyanosis, [ ] edema [ ]Pulses  equal and intact  Nervous system:  Alert & Oriented X3, no focal deficits  Psychiatric: Normal affect  Skin: No rashes or lesions    LABS:  04-25    139  |  105  |  64<H>  ----------------------------<  104<H>  5.0   |  25  |  3.30<H>    Ca    9.4      25 Apr 2023 06:01  Phos  4.3     04-25  Mg     3.0     04-25    TPro  6.7  /  Alb  2.9<L>  /  TBili  0.6  /  DBili  x   /  AST  43<H>  /  ALT  67<H>  /  AlkPhos  108  04-25    Creatinine Trend: 3.30<--, 2.81<--, 3.02<--, 2.69<--, 2.47<--, 2.13<--                        14.9   7.83  )-----------( 192      ( 25 Apr 2023 06:01 )             48.1

## 2023-04-25 NOTE — PROGRESS NOTE ADULT - PROBLEM SELECTOR PLAN 8
CT chest showed Indeterminate left upper lobe 7 mm nodule  Patient needs to follow outpatient for monitoring noncontrast CT chest in 3 months to assess for change  Will keep monitoring for now - CT chest showed Indeterminate left upper lobe 7 mm nodule  - Patient needs to follow outpatient for monitoring noncontrast CT chest in 3 months to assess for change

## 2023-04-25 NOTE — PROGRESS NOTE ADULT - PROBLEM SELECTOR PLAN 3
Duoneb q6  Spiriva  Fluticasone   Albuterol   prednisone 40 mg qd  Rest as above - Continue Duoneb q6, Spiriva, Fluticasone, Albuterol, prednisone 40mg qd  - Rest as above

## 2023-04-25 NOTE — PROGRESS NOTE ADULT - SUBJECTIVE AND OBJECTIVE BOX
PGY-1 Progress Note discussed with attending      PLEASE CONTACT ON CALL TEAM:  - On Call Team (Please refer to Red) FROM 5:00 PM - 8:30PM  - Nightfloat Team FROM 8:30 -7:30 AM    INTERVAL HPI/OVERNIGHT EVENTS:       REVIEW OF SYSTEMS:  CONSTITUTIONAL: No fever, weight loss, or fatigue  RESPIRATORY: No cough, wheezing, chills or hemoptysis; No shortness of breath  CARDIOVASCULAR: No chest pain, palpitations, dizziness, or leg swelling  GASTROINTESTINAL: No abdominal pain. No nausea, vomiting, or hematemesis; No diarrhea or constipation. No melena or hematochezia.  GENITOURINARY: No dysuria or hematuria, urinary frequency  NEUROLOGICAL: No headaches, memory loss, loss of strength, numbness, or tremors  SKIN: No itching, burning, rashes, or lesions     MEDICATIONS  (STANDING):  albuterol/ipratropium for Nebulization 3 milliLiter(s) Nebulizer every 6 hours  amantadine 100 milliGRAM(s) Oral every 12 hours  aspirin enteric coated 81 milliGRAM(s) Oral daily  atorvastatin 40 milliGRAM(s) Oral at bedtime  finasteride 5 milliGRAM(s) Oral daily  fluticasone propionate 50 MICROgram(s)/spray Nasal Spray 1 Spray(s) Both Nostrils two times a day  heparin   Injectable 5000 Unit(s) SubCutaneous every 12 hours  insulin glargine Injectable (LANTUS) 5 Unit(s) SubCutaneous at bedtime  insulin lispro (ADMELOG) corrective regimen sliding scale   SubCutaneous at bedtime  insulin lispro (ADMELOG) corrective regimen sliding scale   SubCutaneous three times a day before meals  insulin lispro Injectable (ADMELOG) 4 Unit(s) SubCutaneous three times a day before meals  memantine 5 milliGRAM(s) Oral two times a day  metoprolol succinate ER 50 milliGRAM(s) Oral daily  montelukast 10 milliGRAM(s) Oral at bedtime  OLANZapine 10 milliGRAM(s) Oral at bedtime  tamsulosin 0.8 milliGRAM(s) Oral at bedtime  tiotropium 2.5 MICROgram(s) Inhaler 2 Puff(s) Inhalation daily  valproic  acid Syrup 1000 milliGRAM(s) Oral at bedtime  valproic  acid Syrup 250 milliGRAM(s) Oral <User Schedule>    MEDICATIONS  (PRN):  guaiFENesin Oral Liquid (Sugar-Free) 100 milliGRAM(s) Oral every 6 hours PRN Cough      Vital Signs Last 24 Hrs  T(C): 36.3 (25 Apr 2023 05:12), Max: 36.3 (25 Apr 2023 05:12)  T(F): 97.3 (25 Apr 2023 05:12), Max: 97.3 (25 Apr 2023 05:12)  HR: 86 (25 Apr 2023 05:12) (76 - 86)  BP: 124/94 (25 Apr 2023 05:12) (108/72 - 135/94)  BP(mean): --  RR: 16 (25 Apr 2023 05:12) (16 - 18)  SpO2: 97% (25 Apr 2023 05:12) (91% - 97%)    Parameters below as of 25 Apr 2023 05:12  Patient On (Oxygen Delivery Method): room air        PHYSICAL EXAMINATION:  GENERAL: NAD, well built  HEAD:  Atraumatic, Normocephalic  EYES:  conjunctiva and sclera clear  NECK: Supple, No JVD, Normal thyroid  CHEST/LUNG: Clear to auscultation. Clear to percussion bilaterally; No rales, rhonchi, wheezing, or rubs  HEART: Regular rate and rhythm; No murmurs, rubs, or gallops  ABDOMEN: Soft, Nontender, Nondistended; Bowel sounds present, no pain or masses on palpation  NERVOUS SYSTEM:  Alert & Oriented X3  : voiding well  EXTREMITIES:  2+ Peripheral Pulses, No clubbing, cyanosis, or edema  SKIN: warm dry                          14.8   7.08  )-----------( 175      ( 24 Apr 2023 06:01 )             48.1     04-24    144  |  109<H>  |  60<H>  ----------------------------<  72  4.3   |  27  |  2.81<H>    Ca    10.0      24 Apr 2023 06:01  Phos  3.6     04-24  Mg     3.0     04-24    TPro  6.6  /  Alb  3.0<L>  /  TBili  0.7  /  DBili  x   /  AST  25  /  ALT  53  /  AlkPhos  91  04-24    LIVER FUNCTIONS - ( 24 Apr 2023 06:01 )  Alb: 3.0 g/dL / Pro: 6.6 g/dL / ALK PHOS: 91 U/L / ALT: 53 U/L DA / AST: 25 U/L / GGT: x                   I&O's Summary    23 Apr 2023 07:01  -  24 Apr 2023 07:00  --------------------------------------------------------  IN: 120 mL / OUT: 450 mL / NET: -330 mL          Culture - Nasopharyngeal (collected 22 Apr 2023 13:16)  Source: .Nasopharyngeal Nasopharyngeal  Final Report (24 Apr 2023 17:12):    No Neisseria. meningitidis isolated.    "Culture was evaluated for N. Meningitidis only."        CAPILLARY BLOOD GLUCOSE      RADIOLOGY & ADDITIONAL TESTS:                   PGY-1 Progress Note discussed with attending      PLEASE CONTACT ON CALL TEAM:  - On Call Team (Please refer to Red) FROM 5:00 PM - 8:30PM  - Nightfloat Team FROM 8:30 -7:30 AM    INTERVAL HPI/OVERNIGHT EVENTS: No acute events overnight.  Patient examined at bedside this AM.  Patient denies acute complaints.  Patient states he would like to go back to his previous facility.      REVIEW OF SYSTEMS:  CONSTITUTIONAL: No fever, weight loss, or fatigue  RESPIRATORY: No cough, wheezing, chills or hemoptysis; No shortness of breath  CARDIOVASCULAR: No chest pain, palpitations, dizziness, or leg swelling  GASTROINTESTINAL: No abdominal pain. No nausea, vomiting, or hematemesis; No diarrhea or constipation. No melena or hematochezia.  GENITOURINARY: No dysuria or hematuria, urinary frequency  NEUROLOGICAL: No headaches, memory loss, loss of strength, numbness, or tremors  SKIN: No itching, burning, rashes, or lesions     MEDICATIONS  (STANDING):  albuterol/ipratropium for Nebulization 3 milliLiter(s) Nebulizer every 6 hours  amantadine 100 milliGRAM(s) Oral every 12 hours  aspirin enteric coated 81 milliGRAM(s) Oral daily  atorvastatin 40 milliGRAM(s) Oral at bedtime  finasteride 5 milliGRAM(s) Oral daily  fluticasone propionate 50 MICROgram(s)/spray Nasal Spray 1 Spray(s) Both Nostrils two times a day  heparin   Injectable 5000 Unit(s) SubCutaneous every 12 hours  insulin glargine Injectable (LANTUS) 5 Unit(s) SubCutaneous at bedtime  insulin lispro (ADMELOG) corrective regimen sliding scale   SubCutaneous at bedtime  insulin lispro (ADMELOG) corrective regimen sliding scale   SubCutaneous three times a day before meals  insulin lispro Injectable (ADMELOG) 4 Unit(s) SubCutaneous three times a day before meals  memantine 5 milliGRAM(s) Oral two times a day  metoprolol succinate ER 50 milliGRAM(s) Oral daily  montelukast 10 milliGRAM(s) Oral at bedtime  OLANZapine 10 milliGRAM(s) Oral at bedtime  tamsulosin 0.8 milliGRAM(s) Oral at bedtime  tiotropium 2.5 MICROgram(s) Inhaler 2 Puff(s) Inhalation daily  valproic  acid Syrup 1000 milliGRAM(s) Oral at bedtime  valproic  acid Syrup 250 milliGRAM(s) Oral <User Schedule>    MEDICATIONS  (PRN):  guaiFENesin Oral Liquid (Sugar-Free) 100 milliGRAM(s) Oral every 6 hours PRN Cough      Vital Signs Last 24 Hrs  T(C): 36.3 (25 Apr 2023 05:12), Max: 36.3 (25 Apr 2023 05:12)  T(F): 97.3 (25 Apr 2023 05:12), Max: 97.3 (25 Apr 2023 05:12)  HR: 86 (25 Apr 2023 05:12) (76 - 86)  BP: 124/94 (25 Apr 2023 05:12) (108/72 - 135/94)  BP(mean): --  RR: 16 (25 Apr 2023 05:12) (16 - 18)  SpO2: 97% (25 Apr 2023 05:12) (91% - 97%)    Parameters below as of 25 Apr 2023 05:12  Patient On (Oxygen Delivery Method): room air        PHYSICAL EXAMINATION:  GENERAL: NAD, lying in bed comfortably   HEAD:  Atraumatic, Normocephalic  EYES:  Prosthesis noted on the right, Left with conjunctiva and sclera clear, pupil is equal, round, and reactive to light and accommodation.  NECK: Supple, No JVD, trachea is midline, no evidence of thyroid enlargement, no lymphadenopathy or tenderness.  CHEST/LUNG: No rales, rhonchi, minimal expiratory wheezing noted, no rubs  HEART: Regular rate and rhythm; systolic murmur noted on the left lower sternal border, no rubs, or gallops  ABDOMEN: Soft, Nontender, Nondistended; Bowel sounds present  NERVOUS SYSTEM:  Alert & Oriented X2; No focal sensory or motor deficits are noted; Appropriate mood and affect.  EXTREMITIES:  2+ Peripheral Pulses, No clubbing, cyanosis, or edema  SKIN: Warm, dry, and well perfused; Good turgor; No lesions, nodules or rashes are noted.                          14.8   7.08  )-----------( 175      ( 24 Apr 2023 06:01 )             48.1     04-24    144  |  109<H>  |  60<H>  ----------------------------<  72  4.3   |  27  |  2.81<H>    Ca    10.0      24 Apr 2023 06:01  Phos  3.6     04-24  Mg     3.0     04-24    TPro  6.6  /  Alb  3.0<L>  /  TBili  0.7  /  DBili  x   /  AST  25  /  ALT  53  /  AlkPhos  91  04-24    LIVER FUNCTIONS - ( 24 Apr 2023 06:01 )  Alb: 3.0 g/dL / Pro: 6.6 g/dL / ALK PHOS: 91 U/L / ALT: 53 U/L DA / AST: 25 U/L / GGT: x                   I&O's Summary    23 Apr 2023 07:01  -  24 Apr 2023 07:00  --------------------------------------------------------  IN: 120 mL / OUT: 450 mL / NET: -330 mL          Culture - Nasopharyngeal (collected 22 Apr 2023 13:16)  Source: .Nasopharyngeal Nasopharyngeal  Final Report (24 Apr 2023 17:12):    No Neisseria. meningitidis isolated.    "Culture was evaluated for N. Meningitidis only."        CAPILLARY BLOOD GLUCOSE      RADIOLOGY & ADDITIONAL TESTS:

## 2023-04-25 NOTE — PROGRESS NOTE ADULT - PROBLEM SELECTOR PLAN 1
ECHO 3/23 showed severely reduced EF 10-15%  NYHA Heart Failure class III stage C  Metoprolol 50 mg PO qd  Continue to hold enalapril and Jardiance for now until renal function back to baseline  Hold adding Spironolactone until renal function back to baseline  Cardiology onboard   No cardiac cath at this moment   PT recommending CARLOS  Hold Lasix 40 mg PO qd for now due to worsening renal function.   Nephro Dr Pederson onboard  Will keep monitoring.  Repeat chest xray on 4/21 showed improved fluid congestion  f/u repeat xray on 4/23: pending read; - ECHO 3/23 showed severely reduced EF 10-15%  - NYHA Heart Failure class III stage C  - Continue Metoprolol 50 mg PO qd  - Continue holding enalapril, Spironolactone, Lasix, and Jardiance for now until renal function back to baseline  - No cardiac cath at the moment   - PT recommending CARLOS   - Will keep monitoring  - repeat CXR 4/23: Noted  - Cardiology Dr. Hamilton Consulted  - Nephro Dr. Pederson consulted

## 2023-04-25 NOTE — PROGRESS NOTE ADULT - PROBLEM SELECTOR PLAN 7
pt takes Januvia and jardiance at home  Lantus 5 units   Lispro 4 u TID before meals + ISS  Monitor BS - pt takes Januvia and jardiance at home  - continue Lantus 5 units   - Lispro 4 u TID before meals + ISS  - Monitor BS ACHS

## 2023-04-25 NOTE — PROGRESS NOTE ADULT - ASSESSMENT
Patient is a 72yo Male from  [facility] home number with CKD,  h/o intellectual disability, Down Syndrome, asthma, COPD with chronic cough, HTN, DM, BPH, schizophrenia,, bilateral sensorineural hearing loss, who came in to the ED for sore throat and chest pain.  Pt a/w acute on chroni CHF. Pt was diuresed with Lasix.  Hosp cb YASSINE. Nephrology consulted for Elevated serum creatinine.    1. YASSINE- initially due to overdiuresis, for which Lasix 40mg PO daily was discontinued. Scr was improving off diuretics and s/p albumin. Pt with worsening renal function despite short course of hypotonic IVF due to mild hypernatremia. However, pt with >300ml urine output on straight cath. Now again with obstruction. D/w primary attending who will have chance catheter placed (or regular straight catheterization at the very least).    UA with 30 protein with trace ketone; pt likely with poor PO intake. Encourage PO intake  FeUrea 31%; consistent with pre-renal YASSINE. Renal US with no hydro. Bladder scan 4/17 neg, however post void intermittent cath variable. Urology following  Continue to hold Enalapril and lasix. Will avoid further IVF (pt with h/o CHF with severe global LVSD on TTE). Strict I/Os. Avoid nephrotoxins/ NSAIDs/ RCA. Monitor BMP.    2. CKD-3a- baseline SCr 1.3-1.6. Defer secondary w/u as an outpt. Avoid nephrotoxins  3. HTN 2/2 CKD- BP acceptable. Continue to hold Enalapril. Continue with current anti-hypertensive medications. Monitor BP.  4. BPH- c/w flomax and finasteride. Now again with obstruction. D/w primary attending who will have chance catheter placed (or regular straight catheterization at the very least).      [facility] NEPHROLOGY  Raul Goncalves M.D.  Bennie Carcamo D.O.  Priya Pederson M.D.  Hellen Dietz, MSN, ANP-C  [phone]  Fax: [phone] [place] [address], [address]  [place] [address]

## 2023-04-26 NOTE — PROGRESS NOTE ADULT - SUBJECTIVE AND OBJECTIVE BOX
PGY-1 Progress Note discussed with attending      PLEASE CONTACT ON CALL TEAM:  - On Call Team (Please refer to Red) FROM 5:00 PM - 8:30PM  - Nightfloat Team FROM 8:30 -7:30 AM    INTERVAL HPI/OVERNIGHT EVENTS:       REVIEW OF SYSTEMS:  CONSTITUTIONAL: No fever, weight loss, or fatigue  RESPIRATORY: No cough, wheezing, chills or hemoptysis; No shortness of breath  CARDIOVASCULAR: No chest pain, palpitations, dizziness, or leg swelling  GASTROINTESTINAL: No abdominal pain. No nausea, vomiting, or hematemesis; No diarrhea or constipation. No melena or hematochezia.  GENITOURINARY: No dysuria or hematuria, urinary frequency  NEUROLOGICAL: No headaches, memory loss, loss of strength, numbness, or tremors  SKIN: No itching, burning, rashes, or lesions     MEDICATIONS  (STANDING):  albuterol/ipratropium for Nebulization 3 milliLiter(s) Nebulizer every 6 hours  amantadine 100 milliGRAM(s) Oral every 12 hours  aspirin enteric coated 81 milliGRAM(s) Oral daily  atorvastatin 40 milliGRAM(s) Oral at bedtime  finasteride 5 milliGRAM(s) Oral daily  fluticasone propionate 50 MICROgram(s)/spray Nasal Spray 1 Spray(s) Both Nostrils two times a day  heparin   Injectable 5000 Unit(s) SubCutaneous every 12 hours  insulin glargine Injectable (LANTUS) 5 Unit(s) SubCutaneous at bedtime  insulin lispro (ADMELOG) corrective regimen sliding scale   SubCutaneous at bedtime  insulin lispro (ADMELOG) corrective regimen sliding scale   SubCutaneous three times a day before meals  insulin lispro Injectable (ADMELOG) 4 Unit(s) SubCutaneous three times a day before meals  melatonin 5 milliGRAM(s) Oral at bedtime  memantine 5 milliGRAM(s) Oral two times a day  metoprolol succinate ER 50 milliGRAM(s) Oral daily  montelukast 10 milliGRAM(s) Oral at bedtime  OLANZapine 10 milliGRAM(s) Oral at bedtime  tamsulosin 0.8 milliGRAM(s) Oral at bedtime  tiotropium 2.5 MICROgram(s) Inhaler 2 Puff(s) Inhalation daily  valproic  acid Syrup 1000 milliGRAM(s) Oral at bedtime  valproic  acid Syrup 250 milliGRAM(s) Oral <User Schedule>    MEDICATIONS  (PRN):  guaiFENesin Oral Liquid (Sugar-Free) 100 milliGRAM(s) Oral every 6 hours PRN Cough      Vital Signs Last 24 Hrs  T(C): 36.7 (26 Apr 2023 05:19), Max: 36.7 (26 Apr 2023 05:19)  T(F): 98.1 (26 Apr 2023 05:19), Max: 98.1 (26 Apr 2023 05:19)  HR: 67 (26 Apr 2023 05:19) (67 - 96)  BP: 129/88 (26 Apr 2023 05:19) (110/72 - 129/88)  BP(mean): --  RR: 18 (26 Apr 2023 05:19) (16 - 18)  SpO2: 98% (26 Apr 2023 05:19) (95% - 100%)    Parameters below as of 26 Apr 2023 05:19  Patient On (Oxygen Delivery Method): room air        PHYSICAL EXAMINATION:  GENERAL: NAD, well built  HEAD:  Atraumatic, Normocephalic  EYES:  conjunctiva and sclera clear  NECK: Supple, No JVD, Normal thyroid  CHEST/LUNG: Clear to auscultation. Clear to percussion bilaterally; No rales, rhonchi, wheezing, or rubs  HEART: Regular rate and rhythm; No murmurs, rubs, or gallops  ABDOMEN: Soft, Nontender, Nondistended; Bowel sounds present, no pain or masses on palpation  NERVOUS SYSTEM:  Alert & Oriented X3  : voiding well  EXTREMITIES:  2+ Peripheral Pulses, No clubbing, cyanosis, or edema  SKIN: warm dry                          14.9   7.83  )-----------( 192      ( 25 Apr 2023 06:01 )             48.1     04-25    139  |  105  |  64<H>  ----------------------------<  104<H>  5.0   |  25  |  3.30<H>    Ca    9.4      25 Apr 2023 06:01  Phos  4.3     04-25  Mg     3.0     04-25    TPro  6.7  /  Alb  2.9<L>  /  TBili  0.6  /  DBili  x   /  AST  43<H>  /  ALT  67<H>  /  AlkPhos  108  04-25    LIVER FUNCTIONS - ( 25 Apr 2023 06:01 )  Alb: 2.9 g/dL / Pro: 6.7 g/dL / ALK PHOS: 108 U/L / ALT: 67 U/L DA / AST: 43 U/L / GGT: x                   I&O's Summary          CAPILLARY BLOOD GLUCOSE      RADIOLOGY & ADDITIONAL TESTS:                   PGY-1 Progress Note discussed with attending      PLEASE CONTACT ON CALL TEAM:  - On Call Team (Please refer to Red) FROM 5:00 PM - 8:30PM  - Nightfloat Team FROM 8:30 -7:30 AM    INTERVAL HPI/OVERNIGHT EVENTS: No acute events overnight, patient requested and received melatonin.  Patient examined at bedside this AM.  Patient denies acute complaints.        REVIEW OF SYSTEMS:  CONSTITUTIONAL: No fever, weight loss, or fatigue  RESPIRATORY: No cough, wheezing, chills or hemoptysis; No shortness of breath  CARDIOVASCULAR: No chest pain, palpitations, dizziness, or leg swelling  GASTROINTESTINAL: No abdominal pain. No nausea, vomiting, or hematemesis; No diarrhea or constipation. No melena or hematochezia.  GENITOURINARY: No dysuria or hematuria, urinary frequency  NEUROLOGICAL: No headaches, memory loss, loss of strength, numbness, or tremors  SKIN: No itching, burning, rashes, or lesions     MEDICATIONS  (STANDING):  albuterol/ipratropium for Nebulization 3 milliLiter(s) Nebulizer every 6 hours  amantadine 100 milliGRAM(s) Oral every 12 hours  aspirin enteric coated 81 milliGRAM(s) Oral daily  atorvastatin 40 milliGRAM(s) Oral at bedtime  finasteride 5 milliGRAM(s) Oral daily  fluticasone propionate 50 MICROgram(s)/spray Nasal Spray 1 Spray(s) Both Nostrils two times a day  heparin   Injectable 5000 Unit(s) SubCutaneous every 12 hours  insulin glargine Injectable (LANTUS) 5 Unit(s) SubCutaneous at bedtime  insulin lispro (ADMELOG) corrective regimen sliding scale   SubCutaneous at bedtime  insulin lispro (ADMELOG) corrective regimen sliding scale   SubCutaneous three times a day before meals  insulin lispro Injectable (ADMELOG) 4 Unit(s) SubCutaneous three times a day before meals  melatonin 5 milliGRAM(s) Oral at bedtime  memantine 5 milliGRAM(s) Oral two times a day  metoprolol succinate ER 50 milliGRAM(s) Oral daily  montelukast 10 milliGRAM(s) Oral at bedtime  OLANZapine 10 milliGRAM(s) Oral at bedtime  tamsulosin 0.8 milliGRAM(s) Oral at bedtime  tiotropium 2.5 MICROgram(s) Inhaler 2 Puff(s) Inhalation daily  valproic  acid Syrup 1000 milliGRAM(s) Oral at bedtime  valproic  acid Syrup 250 milliGRAM(s) Oral <User Schedule>    MEDICATIONS  (PRN):  guaiFENesin Oral Liquid (Sugar-Free) 100 milliGRAM(s) Oral every 6 hours PRN Cough      Vital Signs Last 24 Hrs  T(C): 36.7 (26 Apr 2023 05:19), Max: 36.7 (26 Apr 2023 05:19)  T(F): 98.1 (26 Apr 2023 05:19), Max: 98.1 (26 Apr 2023 05:19)  HR: 67 (26 Apr 2023 05:19) (67 - 96)  BP: 129/88 (26 Apr 2023 05:19) (110/72 - 129/88)  BP(mean): --  RR: 18 (26 Apr 2023 05:19) (16 - 18)  SpO2: 98% (26 Apr 2023 05:19) (95% - 100%)    Parameters below as of 26 Apr 2023 05:19  Patient On (Oxygen Delivery Method): room air        PHYSICAL EXAMINATION:  GENERAL: NAD, lying in bed comfortably   HEAD:  Atraumatic, Normocephalic  EYES:  Prosthesis noted on the right, Left with conjunctiva and sclera clear, pupil is equal, round, and reactive to light and accommodation.  NECK: Supple, No JVD, trachea is midline, no evidence of thyroid enlargement, no lymphadenopathy or tenderness.  CHEST/LUNG: No rales, rhonchi, minimal expiratory wheezing noted, no rubs  HEART: Regular rate and rhythm; systolic murmur noted on the left lower sternal border, no rubs, or gallops  ABDOMEN: Soft, Nontender, Nondistended; Bowel sounds present  NERVOUS SYSTEM:  Alert & Oriented X2; No focal sensory or motor deficits are noted; Appropriate mood and affect.  EXTREMITIES:  2+ Peripheral Pulses, No clubbing, cyanosis, or edema  SKIN: Warm, dry, and well perfused; Good turgor; No lesions, nodules or rashes are noted.                          14.9   7.83  )-----------( 192      ( 25 Apr 2023 06:01 )             48.1     04-25    139  |  105  |  64<H>  ----------------------------<  104<H>  5.0   |  25  |  3.30<H>    Ca    9.4      25 Apr 2023 06:01  Phos  4.3     04-25  Mg     3.0     04-25    TPro  6.7  /  Alb  2.9<L>  /  TBili  0.6  /  DBili  x   /  AST  43<H>  /  ALT  67<H>  /  AlkPhos  108  04-25    LIVER FUNCTIONS - ( 25 Apr 2023 06:01 )  Alb: 2.9 g/dL / Pro: 6.7 g/dL / ALK PHOS: 108 U/L / ALT: 67 U/L DA / AST: 43 U/L / GGT: x                   I&O's Summary          CAPILLARY BLOOD GLUCOSE      RADIOLOGY & ADDITIONAL TESTS:

## 2023-04-26 NOTE — PROGRESS NOTE ADULT - PROBLEM SELECTOR PLAN 2
- Resolved  - Saturating well on RA - ECHO 3/23 showed severely reduced EF 10-15%  - NYHA Heart Failure class III stage C  - Continue Metoprolol 50 mg PO qd  - Continue holding enalapril, Spironolactone, Lasix, and Jardiance for now until renal function back to baseline  - No cardiac cath at the moment   - PT recommending CARLOS   - Will keep monitoring  - repeat CXR 4/23: Noted  - Cardiology Dr. Hamilton Consulted  - Nephro Dr. Pederson consulted

## 2023-04-26 NOTE — PROGRESS NOTE ADULT - PROBLEM SELECTOR PLAN 1
- ECHO 3/23 showed severely reduced EF 10-15%  - NYHA Heart Failure class III stage C  - Continue Metoprolol 50 mg PO qd  - Continue holding enalapril, Spironolactone, Lasix, and Jardiance for now until renal function back to baseline  - No cardiac cath at the moment   - PT recommending CARLOS   - Will keep monitoring  - repeat CXR 4/23: Noted  - Cardiology Dr. Hamilton Consulted  - Nephro Dr. Pederson consulted - Baseline 1.30s to 1.60s  - Cr 3.3 4/25  - Likely 2/2 overdiuresis  - Continue holding Lasix  - Avoid nephrotoxic agents  - Continue monitoring bmp  - Renal US shows no hydro  - Continue bladder scan as indicated  - s/p albumin 25% on 50 ml q8h x3 doses  - Given mental status/developmental delay, will determine if patient would benefit from chance at facility/group home  - Continue to hold Chance cath and straight cath at this time  - D/C Pending improvement of renal function  - Nephrology Consulted

## 2023-04-26 NOTE — PROGRESS NOTE ADULT - ASSESSMENT
73 y o with ambulatory at baseline from a [facility] home number 10 w/ PMH of intellectual disability, Fetal alcohol Syndrome, asthma, COPD with chronic cough, HTN, DM,  CKD elevated PSA, BPH, schizophrenia, hepatitis B, carrier, bilateral sensorineural hearing loss, admitted for AHRF secondary to Acute on Chronic Systolic Heart Failure.

## 2023-04-26 NOTE — PROGRESS NOTE ADULT - PROBLEM SELECTOR PLAN 8
- CT chest showed Indeterminate left upper lobe 7 mm nodule  - Patient needs to follow outpatient for monitoring noncontrast CT chest in 3 months to assess for change

## 2023-04-26 NOTE — PROGRESS NOTE ADULT - PROBLEM SELECTOR PLAN 4
- Baseline 1.30s to 1.60s  - Cr 2.81> 3.3  - Likely 2/2 overdiuresis  - Continue holding Lasix  - Avoid nephrotoxic agents  - Continue monitoring bmp  - Renal US shows no hydro  - Continue bladder scan as indicated  - s/p albumin 25% on 50 ml q8h x3 doses  - Given mental status/developmental delay, will determine if patient would benefit from chance at facility/group home  - Continue to hold Chance cath and straight cath at this time  - Nephrology Consulted - Continue Duoneb q6, Spiriva, Fluticasone, Albuterol, prednisone 40mg qd  - Rest as above

## 2023-04-26 NOTE — PROGRESS NOTE ADULT - ASSESSMENT
Patient is a 72yo Male from  [facility] home number with CKD,  h/o intellectual disability, Down Syndrome, asthma, COPD with chronic cough, HTN, DM, BPH, schizophrenia,, bilateral sensorineural hearing loss, who came in to the ED for sore throat and chest pain.  Pt a/w acute on chroni CHF. Pt was diuresed with Lasix.  Hosp cb YASSINE. Nephrology consulted for Elevated serum creatinine.      4/26/23 Labs (P)     1. YASSINE- initially due to overdiuresis, for which Lasix 40mg PO daily was discontinued. Scr was improving off diuretics and s/p albumin. Pt with worsening renal function despite short course of hypotonic IVF due to mild hypernatremia. However, pt with >300ml urine output on straight cath. On 4/25/23 again with obstruction andD/w primary attending who will have chance catheter placed (or regular straight catheterization at the very least).  Recheck Scr today (P) .   UA with 30 protein with trace ketone; pt likely with poor PO intake. Encourage PO intake  FeUrea 31%; consistent with pre-renal YASSINE. Renal US with no hydro. Bladder scan 4/17 neg, however post void intermittent cath variable. Urology following  Continue to hold Enalapril and lasix. Will avoid further IVF (pt with h/o CHF with severe global LVSD on TTE). Strict I/Os. Avoid nephrotoxins/ NSAIDs/ RCA. Monitor BMP.    2. CKD-3a- baseline SCr 1.3-1.6. Defer secondary w/u as an outpt. Avoid nephrotoxins  3. HTN 2/2 CKD- BP acceptable. Continue to hold Enalapril. Continue with current anti-hypertensive medications. Monitor BP.  4. BPH- c/w flomax and finasteride. On 4/25/23 with obstruction and  D/w primary attending who will have chance catheter placed (or regular straight catheterization at the very least).      [facility] NEPHROLOGY  Raul Goncalves M.D.  Bennie Carcamo D.O.  Priya Pederson M.D.  Hellen Dietz, BRUNO, ANP-C  [phone]  Fax: [phone]    [facility] [place] [address], [address]  [place] [address]   Patient is a 72yo Male from  [facility] home number with CKD,  h/o intellectual disability, Down Syndrome, asthma, COPD with chronic cough, HTN, DM, BPH, schizophrenia,, bilateral sensorineural hearing loss, who came in to the ED for sore throat and chest pain.  Pt a/w acute on chroni CHF. Pt was diuresed with Lasix.  Hosp cb YASSINE. Nephrology consulted for Elevated serum creatinine.      1. YASSINE- initially due to overdiuresis, for which Lasix 40mg PO daily was discontinued. Scr improving. Monitor serial bladder scans for urinary retention.   UA with 30 protein with trace ketone; pt likely with poor PO intake. Encourage PO intake  FeUrea 31%; consistent with pre-renal YASSINE. Renal US with no hydro. Bladder scan 4/17 neg, however post void intermittent cath variable. Urology following  Continue to hold Enalapril and lasix. Will avoid further IVF (pt with h/o CHF with severe global LVSD on TTE). Strict I/Os. Avoid nephrotoxins/ NSAIDs/ RCA. Monitor BMP.  2. CKD-3a- baseline SCr 1.3-1.6. Defer secondary w/u as an outpt. Avoid nephrotoxins  3. HTN 2/2 CKD- BP acceptable. Continue to hold Enalapril. Continue with current anti-hypertensive medications. Monitor BP.  4. BPH- c/w flomax and finasteride.      [facility] NEPHROLOGY  Raul Goncalves M.D.  Bennie Carcamo D.O.  Priya Pederson M.D.  Hellen Dietz, MSN, ANP-C  [phone]  Fax: [phone] [place] [address], [address]  [place] [address]      Attending Attestation:  Agree with above A and P.

## 2023-04-26 NOTE — PROGRESS NOTE ADULT - PROBLEM SELECTOR PLAN 3
- Continue Duoneb q6, Spiriva, Fluticasone, Albuterol, prednisone 40mg qd  - Rest as above - Resolved  - Saturating well on RA

## 2023-04-26 NOTE — PROGRESS NOTE ADULT - PROBLEM SELECTOR PLAN 1
- ECHO 3/23 showed severely reduced EF 10-15%  - NYHA Heart Failure class III stage C  - Continue Metoprolol 50 mg PO qd  - Continue holding enalapril, Spironolactone, Lasix, and Jardiance for now until renal function back to baseline  - No cardiac cath at the moment

## 2023-04-26 NOTE — PROGRESS NOTE ADULT - SUBJECTIVE AND OBJECTIVE BOX
[facility] NEPHROLOGY- PROGRESS NOTE, Follow up     Patient is a 74yo Male from  [facility] home number with CKD,  h/o intellectual disability, Down Syndrome, asthma, COPD with chronic cough, HTN, DM, BPH, schizophrenia,, bilateral sensorineural hearing loss, who came in to the ED for sore throat and chest pain.  Pt a/w acute on chronic CHF. Pt was diuresed with Lasix.  Hosp cb YASSINE. Nephrology consulted for Elevated serum creatinine.    REVIEW OF SYSTEMS: Family/aide at bedside   Gen: no fevers  Cards: no chest pain  Resp: no dyspnea,   GI: no nausea or vomiting or diarrhea   + incontinence   Vascular: no LE edema    Allergy:  No Known Allergies    Hospital Medications:   MEDICATIONS  (STANDING):  albuterol/ipratropium for Nebulization 3 milliLiter(s) Nebulizer every 6 hours  amantadine 100 milliGRAM(s) Oral every 12 hours  aspirin enteric coated 81 milliGRAM(s) Oral daily  atorvastatin 40 milliGRAM(s) Oral at bedtime  finasteride 5 milliGRAM(s) Oral daily  fluticasone propionate 50 MICROgram(s)/spray Nasal Spray 1 Spray(s) Both Nostrils two times a day  heparin   Injectable 5000 Unit(s) SubCutaneous every 12 hours  insulin glargine Injectable (LANTUS) 5 Unit(s) SubCutaneous at bedtime  insulin lispro (ADMELOG) corrective regimen sliding scale   SubCutaneous at bedtime  insulin lispro (ADMELOG) corrective regimen sliding scale   SubCutaneous three times a day before meals  insulin lispro Injectable (ADMELOG) 4 Unit(s) SubCutaneous three times a day before meals  melatonin 5 milliGRAM(s) Oral at bedtime  memantine 5 milliGRAM(s) Oral two times a day  metoprolol succinate ER 50 milliGRAM(s) Oral daily  montelukast 10 milliGRAM(s) Oral at bedtime  OLANZapine 10 milliGRAM(s) Oral at bedtime  tamsulosin 0.8 milliGRAM(s) Oral at bedtime  tiotropium 2.5 MICROgram(s) Inhaler 2 Puff(s) Inhalation daily  valproic  acid Syrup 1000 milliGRAM(s) Oral at bedtime  valproic  acid Syrup 250 milliGRAM(s) Oral <User Schedule>      VITALS:  T(F): 98.1 (04-26-23 @ 05:19), Max: 98.1 (04-26-23 @ 05:19)  HR: 67 (04-26-23 @ 05:19)  BP: 129/88 (04-26-23 @ 05:19)  RR: 18 (04-26-23 @ 05:19)  SpO2: 98% (04-26-23 @ 05:19)  Wt(kg): --    PHYSICAL EXAM:  Gen: NAD, calm  Cards: RRR, +S1/S2, no M/G/R  Resp: CTA B/L, upper airway sounds seem voluntary and only when pt is paying attention to them  GI: soft, NT, + Mild ab distention,  : non palpable bladder   Vascular: no LE edema B/L        LABS: 4/26/23 Labs (P)   04-25    139  |  105  |  64<H>  ----------------------------<  104<H>  5.0   |  25  |  3.30<H>    Ca    9.4      25 Apr 2023 06:01  Mg     3.0     04-25    TPro  6.7  /  Alb  2.9<L>  /  TBili  0.6  /  DBili  x   /  AST  43<H>  /  ALT  67<H>  /  AlkPhos  108  04-25    Creatinine Trend: 3.30 <--, 2.81 <--, 3.02 <--, 2.69 <--, 2.47 <--, 2.13 <--, 2.28 <--                        14.9   7.83  )-----------( 192      ( 25 Apr 2023 06:01 )             48.1     Urine Studies:      RADIOLOGY & ADDITIONAL STUDIES:   [facility] NEPHROLOGY- PROGRESS NOTE, Follow up     Patient is a 74yo Male from  [facility] home number with CKD,  h/o intellectual disability, Down Syndrome, asthma, COPD with chronic cough, HTN, DM, BPH, schizophrenia,, bilateral sensorineural hearing loss, who came in to the ED for sore throat and chest pain.  Pt a/w acute on chronic CHF. Pt was diuresed with Lasix.  Hosp cb [facility]. Nephrology consulted for Elevated serum creatinine.    REVIEW OF SYSTEMS: Family/aide at bedside   Gen: no fevers  Cards: no chest pain  Resp: no dyspnea,   GI: no nausea or vomiting or diarrhea   + incontinence   Vascular: no LE edema    Allergy:  No Known Allergies    Hospital Medications:   MEDICATIONS  (STANDING):  albuterol/ipratropium for Nebulization 3 milliLiter(s) Nebulizer every 6 hours  amantadine 100 milliGRAM(s) Oral every 12 hours  aspirin enteric coated 81 milliGRAM(s) Oral daily  atorvastatin 40 milliGRAM(s) Oral at bedtime  finasteride 5 milliGRAM(s) Oral daily  fluticasone propionate 50 MICROgram(s)/spray Nasal Spray 1 Spray(s) Both Nostrils two times a day  heparin   Injectable 5000 Unit(s) SubCutaneous every 12 hours  insulin glargine Injectable (LANTUS) 5 Unit(s) SubCutaneous at bedtime  insulin lispro (ADMELOG) corrective regimen sliding scale   SubCutaneous at bedtime  insulin lispro (ADMELOG) corrective regimen sliding scale   SubCutaneous three times a day before meals  insulin lispro Injectable (ADMELOG) 4 Unit(s) SubCutaneous three times a day before meals  melatonin 5 milliGRAM(s) Oral at bedtime  memantine 5 milliGRAM(s) Oral two times a day  metoprolol succinate ER 50 milliGRAM(s) Oral daily  montelukast 10 milliGRAM(s) Oral at bedtime  OLANZapine 10 milliGRAM(s) Oral at bedtime  tamsulosin 0.8 milliGRAM(s) Oral at bedtime  tiotropium 2.5 MICROgram(s) Inhaler 2 Puff(s) Inhalation daily  valproic  acid Syrup 1000 milliGRAM(s) Oral at bedtime  valproic  acid Syrup 250 milliGRAM(s) Oral <User Schedule>      VITALS:  T(F): 98.1 (04-26-23 @ 05:19), Max: 98.1 (04-26-23 @ 05:19)  HR: 67 (04-26-23 @ 05:19)  BP: 129/88 (04-26-23 @ 05:19)  RR: 18 (04-26-23 @ 05:19)  SpO2: 98% (04-26-23 @ 05:19)  Wt(kg): --    PHYSICAL EXAM:  Gen: NAD, calm  Cards: RRR, +S1/S2, no M/G/R  Resp: CTA B/L, upper airway sounds seem voluntary and only when pt is paying attention to them  GI: soft, NT, + Mild ab distention,  : non palpable bladder   Vascular: no LE edema B/L        LABS:  04-26    137  |  103  |  66<H>  ----------------------------<  105<H>  4.8   |  24  |  2.99<H>    Ca    9.2      26 Apr 2023 12:20  Phos  3.4     04-26  Mg     2.7     04-26    TPro  6.7  /  Alb  2.9<L>  /  TBili  0.6  /  DBili      /  AST  43<H>  /  ALT  67<H>  /  AlkPhos  108  04-25    Creatinine Trend: 2.99 <--, 3.30 <--, 2.81 <--, 3.02 <--, 2.69 <--, 2.47 <--, 2.13 <--, 2.28 <--                        13.6   8.17  )-----------( 163      ( 26 Apr 2023 12:20 )             42.9     Urine Studies:

## 2023-04-26 NOTE — PROGRESS NOTE ADULT - SUBJECTIVE AND OBJECTIVE BOX
PATIENT SEEN AND EXAMINED ON :- 4/26/23  DATE OF SERVICE:     4/26/23        Interim events noted,Labs ,Radiological studies and Cardiology tests reviewed .    MR#[id number]  PATIENT NAME:-[facility] COURSE: HPI:  73 y o with ambulatory at baseline from a [facility] number [facility] w/ PMH of intellectual disability, Down Syndrome, asthma, COPD with chronic cough, HTN, DM,  CKD elevated PSA, BPH, schizophrenia, hepatitis B, carrier, bilateral sensorineural hearing loss, who came in to the ED for sore throat and chest pain.  Patient states he developed a sore throat and dry cough a few days ago. Then last night had chest pain, located on the left side, w/o radiation, described as sharp. Pt states the pain lasted throughout the night and resolved this morning. Currently denies any fever, chills, nausea vomiting SOB, abdominal pain, PND, orthopnea or change in bowel habits  (08 Apr 2023 18:22)      INTERIM EVENTS:Patient seen at bedside ,interim events noted.      PMH -reviewed admission note, no change since admission  HEART FAILURE: Acute[ ]Chronic[ ] Systolic[ ] Diastolic[ ] Combined Systolic and Diastolic[ ]  CAD[ ] CABG[ ] PCI[ ]  DEVICES[ ] PPM[ ] ICD[ ] ILR[ ]  ATRIAL FIBRILLATION[ ] Paroxysmal[ ] Permanent[ ] CHADS2-[  ]  YASSINE[ ] CKD1[ ] CKD2[ ] CKD3[ ] CKD4[ ] ESRD[ ]  COPD[ ] HTN[ ]   DM[ ] Type1[ ] Type 2[ ]   CVA[ ] Paresis[ ]    AMBULATION: Assisted[ ] Cane/walker[ ] Independent[ ]    MEDICATIONS  (STANDING):  albuterol/ipratropium for Nebulization 3 milliLiter(s) Nebulizer every 6 hours  amantadine 100 milliGRAM(s) Oral every 12 hours  aspirin enteric coated 81 milliGRAM(s) Oral daily  atorvastatin 40 milliGRAM(s) Oral at bedtime  finasteride 5 milliGRAM(s) Oral daily  fluticasone propionate 50 MICROgram(s)/spray Nasal Spray 1 Spray(s) Both Nostrils two times a day  heparin   Injectable 5000 Unit(s) SubCutaneous every 12 hours  insulin glargine Injectable (LANTUS) 5 Unit(s) SubCutaneous at bedtime  insulin lispro (ADMELOG) corrective regimen sliding scale   SubCutaneous at bedtime  insulin lispro (ADMELOG) corrective regimen sliding scale   SubCutaneous three times a day before meals  insulin lispro Injectable (ADMELOG) 4 Unit(s) SubCutaneous three times a day before meals  melatonin 5 milliGRAM(s) Oral at bedtime  memantine 5 milliGRAM(s) Oral two times a day  metoprolol succinate ER 50 milliGRAM(s) Oral daily  montelukast 10 milliGRAM(s) Oral at bedtime  OLANZapine 10 milliGRAM(s) Oral at bedtime  tamsulosin 0.8 milliGRAM(s) Oral at bedtime  tiotropium 2.5 MICROgram(s) Inhaler 2 Puff(s) Inhalation daily  valproic  acid Syrup 1000 milliGRAM(s) Oral at bedtime  valproic  acid Syrup 250 milliGRAM(s) Oral <User Schedule>    MEDICATIONS  (PRN):  guaiFENesin Oral Liquid (Sugar-Free) 100 milliGRAM(s) Oral every 6 hours PRN Cough            REVIEW OF SYSTEMS:  Constitutional: [ ] fever, [ ]weight loss,  [ ]fatigue [ ]weight gain  Eyes: [ ] visual changes  Respiratory: [ ]shortness of breath;  [ ] cough, [ ]wheezing, [ ]chills, [ ]hemoptysis  Cardiovascular: [ ] chest pain, [ ]palpitations, [ ]dizziness,  [ ]leg swelling[ ]orthopnea[ ]PND  Gastrointestinal: [ ] abdominal pain, [ ]nausea, [ ]vomiting,  [ ]diarrhea [ ]Constipation [ ]Melena  Genitourinary: [ ] dysuria, [ ] hematuria [ ]Reyes  Neurologic: [ ] headaches [ ] tremors[ ]weakness [ ]Paralysis Right[ ] Left[ ]  Skin: [ ] itching, [ ]burning, [ ] rashes  Endocrine: [ ] heat or cold intolerance  Musculoskeletal: [ ] joint pain or swelling; [ ] muscle, back, or extremity pain  Psychiatric: [ ] depression, [ ]anxiety, [ ]mood swings, or [ ]difficulty sleeping  Hematologic: [ ] easy bruising, [ ] bleeding gums    [ ] All remaining systems negative except as per above.   [ ]Unable to obtain.  [x] No change in ROS since admission      Vital Signs Last 24 Hrs  T(C): 36.2 (26 Apr 2023 21:13), Max: 36.7 (26 Apr 2023 05:19)  T(F): 97.2 (26 Apr 2023 21:13), Max: 98.1 (26 Apr 2023 05:19)  HR: 79 (26 Apr 2023 21:13) (67 - 79)  BP: 101/80 (26 Apr 2023 21:13) (96/81 - 129/88)  BP(mean): --  RR: 16 (26 Apr 2023 21:13) (16 - 18)  SpO2: 95% (26 Apr 2023 21:13) (95% - 98%)    Parameters below as of 26 Apr 2023 21:13  Patient On (Oxygen Delivery Method): room air      I&O's Summary      PHYSICAL EXAM:  General: No acute distress BMI-  HEENT: EOMI, PERRL  Neck: Supple, [ ] JVD  Lungs: Equal air entry bilaterally; [ ] rales [ ] wheezing [ ] rhonchi  Heart: Regular rate and rhythm; [x ] murmur   2/6 [ x] systolic [ ] diastolic [ ] radiation[ ] rubs [ ]  gallops  Abdomen: Nontender, bowel sounds present  Extremities: No clubbing, cyanosis, [ ] edema [ ]Pulses  equal and intact  Nervous system:  Alert & Oriented X3, no focal deficits  Psychiatric: Normal affect  Skin: No rashes or lesions    LABS:  04-26    137  |  103  |  66<H>  ----------------------------<  105<H>  4.8   |  24  |  2.99<H>    Ca    9.2      26 Apr 2023 12:20  Phos  3.4     04-26  Mg     2.7     04-26    TPro  6.7  /  Alb  2.9<L>  /  TBili  0.6  /  DBili  x   /  AST  43<H>  /  ALT  67<H>  /  AlkPhos  108  04-25    Creatinine Trend: 2.99<--, 3.30<--, 2.81<--, 3.02<--, 2.69<--, 2.47<--                        13.6   8.17  )-----------( 163      ( 26 Apr 2023 12:20 )             42.9   < from: TTE with Doppler (w/Cont) (03.29.23 @ 07:03) >  CONCLUSIONS:  1. Trace mitral regurgitation.  2. Normal left ventricular internal dimensions and wall  thicknesses.  3. Severe global left ventricular systolic dysfunction.  Ultrasound LV opacification agent was used to enhance  endocardial definition.  4. Grade I diastolic dysfunction (Impaired relaxation,  mild).  5. Normal right ventricular size and function.  6. Agitated saline injection revealed late bubbles in the  left heart, consistent with transpulmonic shunting.    ------------------------------------------------------------------------  Confirmed on  3/30/2023 - 10:06:13 by Donny Jiménez MD  ------------------------------------------------------------------------    < end of copied text >

## 2023-04-26 NOTE — PROGRESS NOTE ADULT - PROBLEM SELECTOR PLAN 7
- pt takes Januvia and jardiance at home  - continue Lantus 5 units   - Lispro 4 u TID before meals + ISS  - Monitor BS ACHS

## 2023-04-27 NOTE — PROGRESS NOTE ADULT - PROBLEM SELECTOR PLAN 2
- ECHO 3/23 showed severely reduced EF 10-15%  - NYHA Heart Failure class III stage C  - Continue Metoprolol 50 mg PO qd  - Continue holding enalapril, Spironolactone, Lasix, and Jardiance for now until renal function back to baseline  - No cardiac cath at the moment   - PT recommending CARLOS   - Will keep monitoring  - repeat CXR 4/23: Noted  - Cardiology Dr. Hamilton Consulted  - Nephro Dr. Pederson consulted - ECHO 3/23 showed severely reduced EF 10-15%  - NYHA Heart Failure class III stage C  - Continue Metoprolol 50 mg PO qd  - Continue holding enalapril, Spironolactone, Lasix, and Jardiance for now until renal function back to baseline  - Start Bumax 1mg qd  - No cardiac cath at the moment   - PT recommending CARLOS   - Will keep monitoring  - repeat CXR 4/23: Noted  - Cardiology Dr. Hamilton Consulted  - Nephro Dr. Pederson consulted

## 2023-04-27 NOTE — PROGRESS NOTE ADULT - SUBJECTIVE AND OBJECTIVE BOX
PGY-1 Progress Note discussed with attending      PLEASE CONTACT ON CALL TEAM:  - On Call Team (Please refer to Red) FROM 5:00 PM - 8:30PM  - Nightfloat Team FROM 8:30 -7:30 AM    INTERVAL HPI/OVERNIGHT EVENTS:       REVIEW OF SYSTEMS:  CONSTITUTIONAL: No fever, weight loss, or fatigue  RESPIRATORY: No cough, wheezing, chills or hemoptysis; No shortness of breath  CARDIOVASCULAR: No chest pain, palpitations, dizziness, or leg swelling  GASTROINTESTINAL: No abdominal pain. No nausea, vomiting, or hematemesis; No diarrhea or constipation. No melena or hematochezia.  GENITOURINARY: No dysuria or hematuria, urinary frequency  NEUROLOGICAL: No headaches, memory loss, loss of strength, numbness, or tremors  SKIN: No itching, burning, rashes, or lesions     MEDICATIONS  (STANDING):  albuterol/ipratropium for Nebulization 3 milliLiter(s) Nebulizer every 6 hours  amantadine 100 milliGRAM(s) Oral every 12 hours  aspirin enteric coated 81 milliGRAM(s) Oral daily  atorvastatin 40 milliGRAM(s) Oral at bedtime  buMETAnide 1 milliGRAM(s) Oral daily  finasteride 5 milliGRAM(s) Oral daily  fluticasone propionate 50 MICROgram(s)/spray Nasal Spray 1 Spray(s) Both Nostrils two times a day  heparin   Injectable 5000 Unit(s) SubCutaneous every 12 hours  insulin glargine Injectable (LANTUS) 5 Unit(s) SubCutaneous at bedtime  insulin lispro (ADMELOG) corrective regimen sliding scale   SubCutaneous at bedtime  insulin lispro (ADMELOG) corrective regimen sliding scale   SubCutaneous three times a day before meals  insulin lispro Injectable (ADMELOG) 4 Unit(s) SubCutaneous three times a day before meals  melatonin 5 milliGRAM(s) Oral at bedtime  memantine 5 milliGRAM(s) Oral two times a day  metoprolol succinate ER 50 milliGRAM(s) Oral daily  montelukast 10 milliGRAM(s) Oral at bedtime  OLANZapine 10 milliGRAM(s) Oral at bedtime  tamsulosin 0.8 milliGRAM(s) Oral at bedtime  tiotropium 2.5 MICROgram(s) Inhaler 2 Puff(s) Inhalation daily  valproic  acid Syrup 1000 milliGRAM(s) Oral at bedtime  valproic  acid Syrup 250 milliGRAM(s) Oral <User Schedule>    MEDICATIONS  (PRN):  guaiFENesin Oral Liquid (Sugar-Free) 100 milliGRAM(s) Oral every 6 hours PRN Cough      Vital Signs Last 24 Hrs  T(C): 36.8 (27 Apr 2023 13:40), Max: 36.8 (27 Apr 2023 13:40)  T(F): 98.2 (27 Apr 2023 13:40), Max: 98.2 (27 Apr 2023 13:40)  HR: 55 (27 Apr 2023 13:40) (55 - 83)  BP: 126/114 (27 Apr 2023 13:40) (101/80 - 126/114)  BP(mean): --  RR: 18 (27 Apr 2023 13:40) (16 - 18)  SpO2: 95% (27 Apr 2023 13:40) (94% - 95%)    Parameters below as of 27 Apr 2023 13:40  Patient On (Oxygen Delivery Method): room air        PHYSICAL EXAMINATION:  GENERAL: NAD, well built  HEAD:  Atraumatic, Normocephalic  EYES:  conjunctiva and sclera clear  NECK: Supple, No JVD, Normal thyroid  CHEST/LUNG: Clear to auscultation. Clear to percussion bilaterally; No rales, rhonchi, wheezing, or rubs  HEART: Regular rate and rhythm; No murmurs, rubs, or gallops  ABDOMEN: Soft, Nontender, Nondistended; Bowel sounds present, no pain or masses on palpation  NERVOUS SYSTEM:  Alert & Oriented X3  : voiding well  EXTREMITIES:  2+ Peripheral Pulses, No clubbing, cyanosis, or edema  SKIN: warm dry                          12.7   7.53  )-----------( 132      ( 27 Apr 2023 09:48 )             40.5     04-27    133<L>  |  101  |  69<H>  ----------------------------<  171<H>  4.7   |  25  |  3.14<H>    Ca    9.0      27 Apr 2023 09:48  Phos  4.0     04-27  Mg     2.7     04-27                I&O's Summary          CAPILLARY BLOOD GLUCOSE      RADIOLOGY & ADDITIONAL TESTS:                   PGY-1 Progress Note discussed with attending      PLEASE CONTACT ON CALL TEAM:  - On Call Team (Please refer to Red) FROM 5:00 PM - 8:30PM  - Nightfloat Team FROM 8:30 -7:30 AM    INTERVAL HPI/OVERNIGHT EVENTS: No acute events overnight.  Patient examined at bedside this AM.  Patient denies acute complaints.  Patients SCr uptrended again today despite repeated low Bladder scans.  Will follow up with Nephology regarding potential ATN.       REVIEW OF SYSTEMS:  CONSTITUTIONAL: No fever, weight loss, or fatigue  RESPIRATORY: No cough, wheezing, chills or hemoptysis; No shortness of breath  CARDIOVASCULAR: No chest pain, palpitations, dizziness, or leg swelling  GASTROINTESTINAL: No abdominal pain. No nausea, vomiting, or hematemesis; No diarrhea or constipation. No melena or hematochezia.  GENITOURINARY: No dysuria or hematuria, urinary frequency  NEUROLOGICAL: No headaches, memory loss, loss of strength, numbness, or tremors  SKIN: No itching, burning, rashes, or lesions     MEDICATIONS  (STANDING):  albuterol/ipratropium for Nebulization 3 milliLiter(s) Nebulizer every 6 hours  amantadine 100 milliGRAM(s) Oral every 12 hours  aspirin enteric coated 81 milliGRAM(s) Oral daily  atorvastatin 40 milliGRAM(s) Oral at bedtime  buMETAnide 1 milliGRAM(s) Oral daily  finasteride 5 milliGRAM(s) Oral daily  fluticasone propionate 50 MICROgram(s)/spray Nasal Spray 1 Spray(s) Both Nostrils two times a day  heparin   Injectable 5000 Unit(s) SubCutaneous every 12 hours  insulin glargine Injectable (LANTUS) 5 Unit(s) SubCutaneous at bedtime  insulin lispro (ADMELOG) corrective regimen sliding scale   SubCutaneous at bedtime  insulin lispro (ADMELOG) corrective regimen sliding scale   SubCutaneous three times a day before meals  insulin lispro Injectable (ADMELOG) 4 Unit(s) SubCutaneous three times a day before meals  melatonin 5 milliGRAM(s) Oral at bedtime  memantine 5 milliGRAM(s) Oral two times a day  metoprolol succinate ER 50 milliGRAM(s) Oral daily  montelukast 10 milliGRAM(s) Oral at bedtime  OLANZapine 10 milliGRAM(s) Oral at bedtime  tamsulosin 0.8 milliGRAM(s) Oral at bedtime  tiotropium 2.5 MICROgram(s) Inhaler 2 Puff(s) Inhalation daily  valproic  acid Syrup 1000 milliGRAM(s) Oral at bedtime  valproic  acid Syrup 250 milliGRAM(s) Oral <User Schedule>    MEDICATIONS  (PRN):  guaiFENesin Oral Liquid (Sugar-Free) 100 milliGRAM(s) Oral every 6 hours PRN Cough      Vital Signs Last 24 Hrs  T(C): 36.8 (27 Apr 2023 13:40), Max: 36.8 (27 Apr 2023 13:40)  T(F): 98.2 (27 Apr 2023 13:40), Max: 98.2 (27 Apr 2023 13:40)  HR: 55 (27 Apr 2023 13:40) (55 - 83)  BP: 126/114 (27 Apr 2023 13:40) (101/80 - 126/114)  BP(mean): --  RR: 18 (27 Apr 2023 13:40) (16 - 18)  SpO2: 95% (27 Apr 2023 13:40) (94% - 95%)    Parameters below as of 27 Apr 2023 13:40  Patient On (Oxygen Delivery Method): room air        PHYSICAL EXAMINATION:  GENERAL: NAD, lying in bed comfortably   HEAD:  Atraumatic, Normocephalic  EYES:  Prosthesis noted on the right, Left with conjunctiva and sclera clear, pupil is equal, round, and reactive to light and accommodation.  NECK: Supple, No JVD, trachea is midline, no evidence of thyroid enlargement, no lymphadenopathy or tenderness.  CHEST/LUNG: No rales, rhonchi, minimal expiratory wheezing noted, no rubs  HEART: Regular rate and rhythm; systolic murmur noted on the left lower sternal border, no rubs, or gallops  ABDOMEN: Soft, Nontender, Nondistended; Bowel sounds present  NERVOUS SYSTEM:  Alert & Oriented X2; No focal sensory or motor deficits are noted; Appropriate mood and affect.  EXTREMITIES:  2+ Peripheral Pulses, No clubbing, cyanosis, or edema  SKIN: Warm, dry, and well perfused; Good turgor; No lesions, nodules or rashes are noted.                          12.7   7.53  )-----------( 132      ( 27 Apr 2023 09:48 )             40.5     04-27    133<L>  |  101  |  69<H>  ----------------------------<  171<H>  4.7   |  25  |  3.14<H>    Ca    9.0      27 Apr 2023 09:48  Phos  4.0     04-27  Mg     2.7     04-27                I&O's Summary          CAPILLARY BLOOD GLUCOSE      RADIOLOGY & ADDITIONAL TESTS:

## 2023-04-27 NOTE — CHART NOTE - NSCHARTNOTEFT_GEN_A_CORE
Assessment:   73yMalePatient is a 73y old  Male who presents with a chief complaint of AHRF secondary to Acute on Chronic Systolic Heart Failure (23 Apr 2023 09:35)      Factors impacting intake: [ ] none [ ] nausea  [ ] vomiting [ ] diarrhea [ ] constipation  [ ]chewing problems [ ] swallowing issues  [x ] other: visited patient in room. friend at bedside. reports consuming meals.     Diet Presciption: Diet, Pureed:   Consistent Carbohydrate {No Snacks}  Moderately Thick Liquids (MODTHICKLIQS)  No Concentrated Potassium  Low Sodium (04-22-23 @ 18:44)    Intake: adequate    Current Weight: 61kg(4/22) , 60.4kg(4/14)   % Weight change: weight stable     Pertinent Medications: MEDICATIONS  (STANDING):  albuterol/ipratropium for Nebulization 3 milliLiter(s) Nebulizer every 6 hours  amantadine 100 milliGRAM(s) Oral every 12 hours  aspirin enteric coated 81 milliGRAM(s) Oral daily  atorvastatin 40 milliGRAM(s) Oral at bedtime  buMETAnide 1 milliGRAM(s) Oral daily  finasteride 5 milliGRAM(s) Oral daily  fluticasone propionate 50 MICROgram(s)/spray Nasal Spray 1 Spray(s) Both Nostrils two times a day  heparin   Injectable 5000 Unit(s) SubCutaneous every 12 hours  insulin glargine Injectable (LANTUS) 5 Unit(s) SubCutaneous at bedtime  insulin lispro (ADMELOG) corrective regimen sliding scale   SubCutaneous at bedtime  insulin lispro (ADMELOG) corrective regimen sliding scale   SubCutaneous three times a day before meals  insulin lispro Injectable (ADMELOG) 4 Unit(s) SubCutaneous three times a day before meals  melatonin 5 milliGRAM(s) Oral at bedtime  memantine 5 milliGRAM(s) Oral two times a day  metoprolol succinate ER 50 milliGRAM(s) Oral daily  montelukast 10 milliGRAM(s) Oral at bedtime  OLANZapine 10 milliGRAM(s) Oral at bedtime  tamsulosin 0.8 milliGRAM(s) Oral at bedtime  tiotropium 2.5 MICROgram(s) Inhaler 2 Puff(s) Inhalation daily  valproic  acid Syrup 1000 milliGRAM(s) Oral at bedtime  valproic  acid Syrup 250 milliGRAM(s) Oral <User Schedule>    MEDICATIONS  (PRN):  guaiFENesin Oral Liquid (Sugar-Free) 100 milliGRAM(s) Oral every 6 hours PRN Cough    Pertinent Labs: 04-27 Na133 mmol/L<L> Glu 171 mg/dL<H> K+ 4.7 mmol/L Cr  3.14 mg/dL<H> BUN 69 mg/dL<H> 04-27 Phos 4.0 mg/dL 04-25 Alb 2.9 g/dL<L> 03-29 Chol 152 mg/dL LDL --    HDL 79 mg/dL Trig 71 mg/dL     CAPILLARY BLOOD GLUCOSE      POCT Blood Glucose.: 134 mg/dL (27 Apr 2023 11:18)  POCT Blood Glucose.: 161 mg/dL (27 Apr 2023 07:36)  POCT Blood Glucose.: 185 mg/dL (26 Apr 2023 21:34)  POCT Blood Glucose.: 113 mg/dL (26 Apr 2023 17:12)    Skin: No pressure injury     Estimated Needs:   [x ] no change since previous assessment  [ ] recalculated:     Previous Nutrition Diagnosis:   [ ] Inadequate Energy Intake [ ]Inadequate Oral Intake [ ] Excessive Energy Intake   [ ] Underweight [ ] Increased Nutrient Needs [ ] Overweight/Obesity   [ ] Altered GI Function [ ] Unintended Weight Loss [ ] Food & Nutrition Related Knowledge Deficit [ ] Malnutrition [x] altered nutrition related labs     Nutrition Diagnosis is [ x] ongoing  [ ] resolved [ ] not applicable     New Nutrition Diagnosis: [ ] not applicable       Interventions:   Recommend  [ ] Change Diet To:  [ ] Nutrition Supplement  [ ] Nutrition Support  [x ] Other: provide pureed, carbohydrate consistent , moderately thick liquids, No concentrated k, low sodium, diet    Monitoring and Evaluation:   [ ] PO intake [ x ] Tolerance to diet prescription [ x ] weights [ x ] labs[ x ] follow up per protocol  [ ] other: Assessment:   73yMalePatient is a 73y old  Male who presents with a chief complaint of AHRF secondary to Acute on Chronic Systolic Heart Failure (23 Apr 2023 09:35)      Factors impacting intake: [ ] none [ ] nausea  [ ] vomiting [ ] diarrhea [ ] constipation  [ ]chewing problems [ ] swallowing issues  [x ] other: visited patient in room. friend at bedside. reports consuming meals.     Diet Presciption: Diet, Pureed:   Consistent Carbohydrate {No Snacks}  Moderately Thick Liquids (MODTHICKLIQS)  No Concentrated Potassium  Low Sodium (04-22-23 @ 18:44)    Intake: adequate    Current Weight: 61kg(4/22) , 60.4kg(4/14)   % Weight change: weight stable     Pertinent Medications: MEDICATIONS  (STANDING):  albuterol/ipratropium for Nebulization 3 milliLiter(s) Nebulizer every 6 hours  amantadine 100 milliGRAM(s) Oral every 12 hours  aspirin enteric coated 81 milliGRAM(s) Oral daily  atorvastatin 40 milliGRAM(s) Oral at bedtime  buMETAnide 1 milliGRAM(s) Oral daily  finasteride 5 milliGRAM(s) Oral daily  fluticasone propionate 50 MICROgram(s)/spray Nasal Spray 1 Spray(s) Both Nostrils two times a day  heparin   Injectable 5000 Unit(s) SubCutaneous every 12 hours  insulin glargine Injectable (LANTUS) 5 Unit(s) SubCutaneous at bedtime  insulin lispro (ADMELOG) corrective regimen sliding scale   SubCutaneous at bedtime  insulin lispro (ADMELOG) corrective regimen sliding scale   SubCutaneous three times a day before meals  insulin lispro Injectable (ADMELOG) 4 Unit(s) SubCutaneous three times a day before meals  melatonin 5 milliGRAM(s) Oral at bedtime  memantine 5 milliGRAM(s) Oral two times a day  metoprolol succinate ER 50 milliGRAM(s) Oral daily  montelukast 10 milliGRAM(s) Oral at bedtime  OLANZapine 10 milliGRAM(s) Oral at bedtime  tamsulosin 0.8 milliGRAM(s) Oral at bedtime  tiotropium 2.5 MICROgram(s) Inhaler 2 Puff(s) Inhalation daily  valproic  acid Syrup 1000 milliGRAM(s) Oral at bedtime  valproic  acid Syrup 250 milliGRAM(s) Oral <User Schedule>    MEDICATIONS  (PRN):  guaiFENesin Oral Liquid (Sugar-Free) 100 milliGRAM(s) Oral every 6 hours PRN Cough    Pertinent Labs: 04-27 Na133 mmol/L<L> Glu 171 mg/dL<H> K+ 4.7 mmol/L Cr  3.14 mg/dL<H> BUN 69 mg/dL<H> 04-27 Phos 4.0 mg/dL 04-25 Alb 2.9 g/dL<L> 03-29 Chol 152 mg/dL LDL --    HDL 79 mg/dL Trig 71 mg/dL     CAPILLARY BLOOD GLUCOSE      POCT Blood Glucose.: 134 mg/dL (27 Apr 2023 11:18)  POCT Blood Glucose.: 161 mg/dL (27 Apr 2023 07:36)  POCT Blood Glucose.: 185 mg/dL (26 Apr 2023 21:34)  POCT Blood Glucose.: 113 mg/dL (26 Apr 2023 17:12)    Skin: No pressure injury     Estimated Needs:   [x ] no change since previous assessment  [ ] recalculated:     Previous Nutrition Diagnosis:   [ ] Inadequate Energy Intake [ ]Inadequate Oral Intake [ ] Excessive Energy Intake   [ ] Underweight [ ] Increased Nutrient Needs [ ] Overweight/Obesity   [ ] Altered GI Function [ ] Unintended Weight Loss [ ] Food & Nutrition Related Knowledge Deficit [ ] Malnutrition [x] altered nutrition related labs     Nutrition Diagnosis is [ x] ongoing  [ ] resolved [ ] not applicable     New Nutrition Diagnosis: [ ] not applicable       Interventions:   Recommend  [ ] Change Diet To:  [ ] Nutrition Supplement  [ ] Nutrition Support  [x ] Other: provide pureed, carbohydrate consistent , moderately thick liquids, No concentrated k, low sodium, diet, may consider swallow evaluation as medically feasible     Monitoring and Evaluation:   [ ] PO intake [ x ] Tolerance to diet prescription [ x ] weights [ x ] labs[ x ] follow up per protocol  [ ] other:

## 2023-04-27 NOTE — PROGRESS NOTE ADULT - SUBJECTIVE AND OBJECTIVE BOX
PATIENT SEEN AND EXAMINED ON :- 4/27/23  DATE OF SERVICE:  4/27/23           Interim events noted,Labs ,Radiological studies and Cardiology tests reviewed .    MR#[id number]  PATIENT NAME:-[facility] COURSE: HPI:  73 y o with ambulatory at baseline from a [facility] number [facility] w/ PMH of intellectual disability, Down Syndrome, asthma, COPD with chronic cough, HTN, DM,  CKD elevated PSA, BPH, schizophrenia, hepatitis B, carrier, bilateral sensorineural hearing loss, who came in to the ED for sore throat and chest pain.  Patient states he developed a sore throat and dry cough a few days ago. Then last night had chest pain, located on the left side, w/o radiation, described as sharp. Pt states the pain lasted throughout the night and resolved this morning. Currently denies any fever, chills, nausea vomiting SOB, abdominal pain, PND, orthopnea or change in bowel habits  (08 Apr 2023 18:22)      INTERIM EVENTS:Patient seen at bedside ,interim events noted.      PMH -reviewed admission note, no change since admission  HEART FAILURE: Acute[ ]Chronic[ ] Systolic[ ] Diastolic[ ] Combined Systolic and Diastolic[ ]  CAD[ ] CABG[ ] PCI[ ]  DEVICES[ ] PPM[ ] ICD[ ] ILR[ ]  ATRIAL FIBRILLATION[ ] Paroxysmal[ ] Permanent[ ] CHADS2-[  ]  YASSINE[ ] CKD1[ ] CKD2[ ] CKD3[ ] CKD4[ ] ESRD[ ]  COPD[ ] HTN[ ]   DM[ ] Type1[ ] Type 2[ ]   CVA[ ] Paresis[ ]    AMBULATION: Assisted[ ] Cane/walker[ ] Independent[ ]    MEDICATIONS  (STANDING):  albuterol/ipratropium for Nebulization 3 milliLiter(s) Nebulizer every 6 hours  amantadine 100 milliGRAM(s) Oral every 12 hours  aspirin enteric coated 81 milliGRAM(s) Oral daily  atorvastatin 40 milliGRAM(s) Oral at bedtime  buMETAnide 1 milliGRAM(s) Oral daily  finasteride 5 milliGRAM(s) Oral daily  fluticasone propionate 50 MICROgram(s)/spray Nasal Spray 1 Spray(s) Both Nostrils two times a day  heparin   Injectable 5000 Unit(s) SubCutaneous every 12 hours  insulin glargine Injectable (LANTUS) 5 Unit(s) SubCutaneous at bedtime  insulin lispro (ADMELOG) corrective regimen sliding scale   SubCutaneous at bedtime  insulin lispro (ADMELOG) corrective regimen sliding scale   SubCutaneous three times a day before meals  insulin lispro Injectable (ADMELOG) 4 Unit(s) SubCutaneous three times a day before meals  melatonin 5 milliGRAM(s) Oral at bedtime  memantine 5 milliGRAM(s) Oral two times a day  metoprolol succinate ER 50 milliGRAM(s) Oral daily  montelukast 10 milliGRAM(s) Oral at bedtime  OLANZapine 10 milliGRAM(s) Oral at bedtime  tamsulosin 0.8 milliGRAM(s) Oral at bedtime  tiotropium 2.5 MICROgram(s) Inhaler 2 Puff(s) Inhalation daily  valproic  acid Syrup 1000 milliGRAM(s) Oral at bedtime  valproic  acid Syrup 250 milliGRAM(s) Oral <User Schedule>    MEDICATIONS  (PRN):  guaiFENesin Oral Liquid (Sugar-Free) 100 milliGRAM(s) Oral every 6 hours PRN Cough            REVIEW OF SYSTEMS:  Constitutional: [ ] fever, [ ]weight loss,  [ ]fatigue [ ]weight gain  Eyes: [ ] visual changes  Respiratory: [ ]shortness of breath;  [ ] cough, [ ]wheezing, [ ]chills, [ ]hemoptysis  Cardiovascular: [ ] chest pain, [ ]palpitations, [ ]dizziness,  [ ]leg swelling[ ]orthopnea[ ]PND  Gastrointestinal: [ ] abdominal pain, [ ]nausea, [ ]vomiting,  [ ]diarrhea [ ]Constipation [ ]Melena  Genitourinary: [ ] dysuria, [ ] hematuria [ ]Reyes  Neurologic: [ ] headaches [ ] tremors[ ]weakness [ ]Paralysis Right[ ] Left[ ]  Skin: [ ] itching, [ ]burning, [ ] rashes  Endocrine: [ ] heat or cold intolerance  Musculoskeletal: [ ] joint pain or swelling; [ ] muscle, back, or extremity pain  Psychiatric: [ ] depression, [ ]anxiety, [ ]mood swings, or [ ]difficulty sleeping  Hematologic: [ ] easy bruising, [ ] bleeding gums    [ ] All remaining systems negative except as per above.   [ ]Unable to obtain.  [x] No change in ROS since admission      Vital Signs Last 24 Hrs  T(C): 36.4 (27 Apr 2023 20:59), Max: 36.8 (27 Apr 2023 13:40)  T(F): 97.5 (27 Apr 2023 20:59), Max: 98.2 (27 Apr 2023 13:40)  HR: 78 (27 Apr 2023 20:59) (55 - 83)  BP: 104/93 (27 Apr 2023 20:59) (104/93 - 126/114)  BP(mean): --  RR: 16 (27 Apr 2023 20:59) (16 - 18)  SpO2: 100% (27 Apr 2023 20:59) (94% - 100%)    Parameters below as of 27 Apr 2023 20:59  Patient On (Oxygen Delivery Method): room air      I&O's Summary      PHYSICAL EXAM:  General: No acute distress BMI-  HEENT: EOMI, PERRL  Neck: Supple, [ ] JVD  Lungs: Equal air entry bilaterally; [ ] rales [ ] wheezing [ ] rhonchi  Heart: Regular rate and rhythm; [x ] murmur   2/6 [ x] systolic [ ] diastolic [ ] radiation[ ] rubs [ ]  gallops  Abdomen: Nontender, bowel sounds present  Extremities: No clubbing, cyanosis, [ ] edema [ ]Pulses  equal and intact  Nervous system:  Alert & Oriented X3, no focal deficits  Psychiatric: Normal affect  Skin: No rashes or lesions    LABS:  04-27    133<L>  |  101  |  69<H>  ----------------------------<  171<H>  4.7   |  25  |  3.14<H>    Ca    9.0      27 Apr 2023 09:48  Phos  4.0     04-27  Mg     2.7     04-27      Creatinine Trend: 3.14<--, 2.99<--, 3.30<--, 2.81<--, 3.02<--, 2.69<--                        12.7   7.53  )-----------( 132      ( 27 Apr 2023 09:48 )             40.5       Patient name: LITZY MARAVILLA  YOB: 1950   Age: 73 (M)   MR#: [id number]  Study Date: 3/29/2023  Location: [address] [place], [place] [address]onographer: Bradley Moyer RDCS  Study quality: Fair  Referring Physician:  SHANIQUE MONTERROSO MD  Blood Pressure: 136/90 mmHg  Height: 157 cm  Weight: 62 kg  BSA: 1.6 m2  ------------------------------------------------------------------------    PROCEDURE: Transthoracic echocardiogram with 2-D, M-Mode  and complete spectral and color flow Doppler.  Given _20_cc agitatedsaline intravenously. Verbal consent  was obtained for injection of ultrasound enhancing agent  following a discussion of risks and benefits. Following  intravenous injection of ultrasound enhancing agent,  harmonic imaging was performed. 2_cc of ultrasound  enhancing agent injected intravenously.  INDICATION: Heart failure, unspecified (I50.9)  HISTORY:  ------------------------------------------------------------------------  DIMENSIONS:  Dimensions:     Normal Values:  LA:     4.2 cm    2.0 - 4.0 cm  Ao:     3.3 cm    2.0 - 3.8 cm  SEPTUM: 0.8 cm    0.6 - 1.2 cm  PWT:    0.8 cm    0.6 - 1.1 cm  LVIDd:  5.4 cm    3.0 - 5.6 cm  LVIDs:  4.8 cm    1.8 - 4.0 cm      Derived Variables:  LVMI: 95 g/m2  RWT: 0.29  Ejection Fraction Visual Estimate: 10-15 %    ------------------------------------------------------------------------  OBSERVATIONS:  Mitral Valve: Normal mitral valve. Trace mitral  regurgitation.  Aortic Root: Normal aortic root.  Aortic Valve: Aortic valve leaflet morphology not well  visualized, opens normally.  Left Atrium: Normal left atrium.  LA volume index = 20  cc/m2.  Left Ventricle: Severe global left ventricular systolic  dysfunction. Ultrasound LV opacification agent was used to  enhance endocardial definition. Normal left ventricular  internal dimensions and wall thicknesses. Grade I diastolic  dysfunction (Impaired relaxation, mild).  Right Heart: Normal right atrium. Normal right ventricular  size and function. There is trace tricuspid regurgitation.  There is trace pulmonic regurgitation.  Pericardium/PleuraNormal pericardium with no pericardial  effusion.  Hemodynamic: Incomplete tricuspid regurgitation jet  precludes accurate assessment of pulmonary artery systolic  pressure. Agitated saline injectionrevealed late bubbles  in the left heart, consistent with transpulmonic shunting.  ------------------------------------------------------------------------  CONCLUSIONS:  1. Trace mitral regurgitation.  2. Normal left ventricular internal dimensions and wall  thicknesses.  3. Severe global left ventricular systolic dysfunction.  Ultrasound LV opacification agent was used to enhance  endocardial definition.  4. Grade I diastolic dysfunction (Impaired relaxation,  mild).  5. Normal right ventricular size and function.  6. Agitated saline injection revealed late bubbles in the  left heart, consistent with transpulmonic shunting.    ------------------------------------------------------------------------  Confirmed on  3/30/2023 - 10:06:13 by Donny Jiménez MD  ------------------------------------------------------------------------

## 2023-04-27 NOTE — PROGRESS NOTE ADULT - ATTENDING COMMENTS
73M ambulatory at baseline, from [facility], Intellectual disability, Fetal alcohol syndrome, asthma, COPD, HTN, DM, CKD, BPH, schizophrenia, HBV carrier, initially admitted for ADHF now with YASSINE suspected from overdiuresis vs obstructive nephropathy.     ADHF improved  YASSINE on CKD3 likely ATN 2/2 cardiorenal syndrome exacerbated by overdiuresis  DM  HTN  HLD  asthma/COPD  Fetal alcohol syndrome with intellectual disability    -bladder scans have been relatively low (0-300) so doubt obstructive nephropathy at this point - suspect kidney dysfunction 2/2 ATN from cardiorenal syndrome/overdiuresis ... pt today appears slightly volume up - resume diuresis with bumex 1mg po qd  -appreciate nephro and card recs for outpatient HF regimen - plan for DC soon  -c/w flomax 0.8  -encourage ambulation and OOBC  -dc meds that may be contributing to retention: claritin ... c/w zyprexa which is a chronic med  -dispo: plan for DC to [facility]

## 2023-04-27 NOTE — PROGRESS NOTE ADULT - SUBJECTIVE AND OBJECTIVE BOX
[facility] NEPHROLOGY- PROGRESS NOTE, Follow up     Patient is a 74yo Male from  [facility] home number with CKD,  h/o intellectual disability, Down Syndrome, asthma, COPD with chronic cough, HTN, DM, BPH, schizophrenia,, bilateral sensorineural hearing loss, who came in to the ED for sore throat and chest pain.  Pt a/w acute on chronic CHF. Pt was diuresed with Lasix.  Hosp cb YASSINE. Nephrology consulted for Elevated serum creatinine.    REVIEW OF SYSTEMS: Family/aide at bedside   Gen: no fevers  Cards: no chest pain  Resp: no dyspnea,   GI: no nausea or vomiting or diarrhea   + incontinence   Vascular: no LE edema    Allergy:  No Known Allergies    Hospital Medications:   MEDICATIONS  (STANDING):  albuterol/ipratropium for Nebulization 3 milliLiter(s) Nebulizer every 6 hours  amantadine 100 milliGRAM(s) Oral every 12 hours  aspirin enteric coated 81 milliGRAM(s) Oral daily  atorvastatin 40 milliGRAM(s) Oral at bedtime  finasteride 5 milliGRAM(s) Oral daily  fluticasone propionate 50 MICROgram(s)/spray Nasal Spray 1 Spray(s) Both Nostrils two times a day  heparin   Injectable 5000 Unit(s) SubCutaneous every 12 hours  insulin glargine Injectable (LANTUS) 5 Unit(s) SubCutaneous at bedtime  insulin lispro (ADMELOG) corrective regimen sliding scale   SubCutaneous at bedtime  insulin lispro (ADMELOG) corrective regimen sliding scale   SubCutaneous three times a day before meals  insulin lispro Injectable (ADMELOG) 4 Unit(s) SubCutaneous three times a day before meals  melatonin 5 milliGRAM(s) Oral at bedtime  memantine 5 milliGRAM(s) Oral two times a day  metoprolol succinate ER 50 milliGRAM(s) Oral daily  montelukast 10 milliGRAM(s) Oral at bedtime  OLANZapine 10 milliGRAM(s) Oral at bedtime  tamsulosin 0.8 milliGRAM(s) Oral at bedtime  tiotropium 2.5 MICROgram(s) Inhaler 2 Puff(s) Inhalation daily  valproic  acid Syrup 1000 milliGRAM(s) Oral at bedtime  valproic  acid Syrup 250 milliGRAM(s) Oral <User Schedule>      VITALS:  T(F): 98.2 (04-27-23 @ 13:40), Max: 98.2 (04-27-23 @ 13:40)  HR: 55 (04-27-23 @ 13:40)  BP: 126/114 (04-27-23 @ 13:40)  RR: 18 (04-27-23 @ 13:40)  SpO2: 95% (04-27-23 @ 13:40)  Wt(kg): --      PHYSICAL EXAM:  Gen: NAD, calm  Cards: RRR, +S1/S2, no M/G/R  Resp: CTA B/L, upper airway sounds seem voluntary and only when pt is paying attention to them  GI: soft, NT, + Mild ab distention,  : non palpable bladder   Vascular: + non-pitting UE/LE edema        LABS:  04-27    133<L>  |  101  |  69<H>  ----------------------------<  171<H>  4.7   |  25  |  3.14<H>    Ca    9.0      27 Apr 2023 09:48  Phos  4.0     04-27  Mg     2.7     04-27      Creatinine Trend: 3.14 <--, 2.99 <--, 3.30 <--, 2.81 <--, 3.02 <--, 2.69 <--, 2.47 <--, 2.13 <--                        12.7   7.53  )-----------( 132      ( 27 Apr 2023 09:48 )             40.5     Urine Studies:

## 2023-04-27 NOTE — PROGRESS NOTE ADULT - ASSESSMENT
Patient is a 74yo Male from  [facility] home number with CKD,  h/o intellectual disability, Down Syndrome, asthma, COPD with chronic cough, HTN, DM, BPH, schizophrenia,, bilateral sensorineural hearing loss, who came in to the ED for sore throat and chest pain.  Pt a/w acute on chroni CHF. Pt was diuresed with Lasix.  Hosp cb YASSINE. Nephrology consulted for Elevated serum creatinine.      1. YASSINE- initially due to overdiuresis, for which Lasix 40mg PO daily was discontinued. Scr relatively stable and suspect YASSINE due to ATN and not obstruction given negative bladder scans. Monitor serial bladder scans for urinary retention. Patient appears volume overloaded. Would start bumex 1 mg PO daily.  UA with 30 protein with trace ketone; pt likely with poor PO intake. Encourage PO intake  FeUrea 31%; consistent with pre-renal YASSINE. Renal US with no hydro. Bladder scan 4/17 neg, however post void intermittent cath variable. Urology following  Avoid further IVF (pt with h/o CHF with severe global LVSD on TTE). Strict I/Os. Avoid nephrotoxins/ NSAIDs/ RCA. Monitor BMP.  2. CKD-3a- baseline SCr 1.3-1.6. Defer secondary w/u as an outpt. Avoid nephrotoxins  3. HTN 2/2 CKD- BP acceptable. Continue to hold Enalapril. Continue with current anti-hypertensive medications. Monitor BP.  4. BPH- c/w flomax and finasteride.      [facility] NEPHROLOGY  Raul Goncalves M.D.  Bennie Carcamo D.O.  Priya Pederson M.D.  Hellen Dietz, BRUNO, ANP-C  [phone]  Fax: [phone] [place] [address], [address]  [place] [address]

## 2023-04-27 NOTE — PROGRESS NOTE ADULT - PROBLEM SELECTOR PLAN 1
- ECHO 3/23 showed severely reduced EF 10-15%  - NYHA Heart Failure class III stage C  - Continue Metoprolol 50 mg PO qd  - Continue holding enalapril, Spironolactone, Lasix, and Jardiance for now until renal function back to baseline  - Start Bumax 1mg qd  - No cardiac cath at the moment

## 2023-04-27 NOTE — PROGRESS NOTE ADULT - PROBLEM SELECTOR PLAN 1
- Baseline 1.30s to 1.60s  - Cr 3.3 4/25  - Likely 2/2 overdiuresis  - Continue holding Lasix  - Avoid nephrotoxic agents  - Continue monitoring bmp  - Renal US shows no hydro  - Continue bladder scan as indicated  - s/p albumin 25% on 50 ml q8h x3 doses  - Given mental status/developmental delay, will determine if patient would benefit from chance at facility/group home  - Continue to hold Chance cath and straight cath at this time  - D/C Pending improvement of renal function  - Nephrology Consulted - Baseline 1.30s to 1.60s  - Cr 3.14 4/27  - SCr uptrended today  - Potential ATN   - Discussed with Nephro who agrees with starting Bumax 1mg qd  - Avoid nephrotoxic agents  - Continue monitoring bmp  - Renal US shows no hydro  - Continue bladder scan as indicated  - s/p albumin 25% on 50 ml q8h x3 doses  - Given mental status/developmental delay, will determine if patient would benefit from chance at facility/group home  - Chance was recommended to patient by urology.  patient declined at present.  - D/C Pending improvement of renal function  - Nephrology Consulted  - urology Consulted

## 2023-04-27 NOTE — PROGRESS NOTE ADULT - ASSESSMENT
73M w/ BPH and CKD presenting with acute on chronic systolic heart failure. Urology consulted for incomplete bladder emptying.    - Labs reviewed - Cr 2.99  - Continue flomax  - Continue finasteride  - PVR ranges from <100 to 300 cc  - Trend Cr  - Discussed option to place a chance catheter. Given low PVR, patient does not want a catheter at this time  - Follow up with his urologist as an outpatient for ongoing care for BPH and elevated PSA

## 2023-04-27 NOTE — PROGRESS NOTE ADULT - ASSESSMENT
73 y o with ambulatory at baseline from a [facility] home number 10 w/ PMH of intellectual disability, Fetal alcohol Syndrome, asthma, COPD with chronic cough, HTN, DM,  CKD elevated PSA, BPH, schizophrenia, hepatitis B, carrier, bilateral sensorineural hearing loss, admitted for AHRF secondary to Acute on Chronic Systolic Heart Failure.

## 2023-04-28 NOTE — PROGRESS NOTE ADULT - PROBLEM SELECTOR PROBLEM 8
Lung nodule
Prophylactic measure
Lung nodule
Lung nodule
Prophylactic measure

## 2023-04-28 NOTE — PROGRESS NOTE ADULT - PROBLEM SELECTOR PROBLEM 9
Schizophrenia

## 2023-04-28 NOTE — DISCHARGE NOTE NURSING/CASE MANAGEMENT/SOCIAL WORK - NSDCPEFALRISK_GEN_ALL_CORE
For information on Fall & Injury Prevention, visit: [email or web address] OR  [email or web address] OR  https://www.cdc.gov/steadi/patient.html

## 2023-04-28 NOTE — PROGRESS NOTE ADULT - SUBJECTIVE AND OBJECTIVE BOX
PGY-1 Progress Note discussed with attending      PLEASE CONTACT ON CALL TEAM:  - On Call Team (Please refer to Red) FROM 5:00 PM - 8:30PM  - Nightfloat Team FROM 8:30 -7:30 AM    INTERVAL HPI/OVERNIGHT EVENTS:       REVIEW OF SYSTEMS:  CONSTITUTIONAL: No fever, weight loss, or fatigue  RESPIRATORY: No cough, wheezing, chills or hemoptysis; No shortness of breath  CARDIOVASCULAR: No chest pain, palpitations, dizziness, or leg swelling  GASTROINTESTINAL: No abdominal pain. No nausea, vomiting, or hematemesis; No diarrhea or constipation. No melena or hematochezia.  GENITOURINARY: No dysuria or hematuria, urinary frequency  NEUROLOGICAL: No headaches, memory loss, loss of strength, numbness, or tremors  SKIN: No itching, burning, rashes, or lesions     MEDICATIONS  (STANDING):  albuterol/ipratropium for Nebulization 3 milliLiter(s) Nebulizer every 6 hours  amantadine 100 milliGRAM(s) Oral every 12 hours  aspirin enteric coated 81 milliGRAM(s) Oral daily  atorvastatin 40 milliGRAM(s) Oral at bedtime  buMETAnide 1 milliGRAM(s) Oral daily  finasteride 5 milliGRAM(s) Oral daily  fluticasone propionate 50 MICROgram(s)/spray Nasal Spray 1 Spray(s) Both Nostrils two times a day  heparin   Injectable 5000 Unit(s) SubCutaneous every 12 hours  insulin glargine Injectable (LANTUS) 5 Unit(s) SubCutaneous at bedtime  insulin lispro (ADMELOG) corrective regimen sliding scale   SubCutaneous at bedtime  insulin lispro (ADMELOG) corrective regimen sliding scale   SubCutaneous three times a day before meals  insulin lispro Injectable (ADMELOG) 4 Unit(s) SubCutaneous three times a day before meals  melatonin 5 milliGRAM(s) Oral at bedtime  memantine 5 milliGRAM(s) Oral two times a day  metoprolol succinate ER 50 milliGRAM(s) Oral daily  montelukast 10 milliGRAM(s) Oral at bedtime  OLANZapine 10 milliGRAM(s) Oral at bedtime  tamsulosin 0.8 milliGRAM(s) Oral at bedtime  tiotropium 2.5 MICROgram(s) Inhaler 2 Puff(s) Inhalation daily  valproic  acid Syrup 1000 milliGRAM(s) Oral at bedtime  valproic  acid Syrup 250 milliGRAM(s) Oral <User Schedule>    MEDICATIONS  (PRN):  guaiFENesin Oral Liquid (Sugar-Free) 100 milliGRAM(s) Oral every 6 hours PRN Cough      Vital Signs Last 24 Hrs  T(C): 36.3 (28 Apr 2023 04:40), Max: 36.8 (27 Apr 2023 13:40)  T(F): 97.3 (28 Apr 2023 04:40), Max: 98.2 (27 Apr 2023 13:40)  HR: 85 (28 Apr 2023 04:40) (55 - 85)  BP: 107/94 (28 Apr 2023 04:40) (104/93 - 126/114)  BP(mean): --  RR: 17 (28 Apr 2023 04:40) (16 - 18)  SpO2: 99% (28 Apr 2023 04:40) (95% - 100%)    Parameters below as of 28 Apr 2023 04:40  Patient On (Oxygen Delivery Method): room air        PHYSICAL EXAMINATION:  GENERAL: NAD, well built  HEAD:  Atraumatic, Normocephalic  EYES:  conjunctiva and sclera clear  NECK: Supple, No JVD, Normal thyroid  CHEST/LUNG: Clear to auscultation. Clear to percussion bilaterally; No rales, rhonchi, wheezing, or rubs  HEART: Regular rate and rhythm; No murmurs, rubs, or gallops  ABDOMEN: Soft, Nontender, Nondistended; Bowel sounds present, no pain or masses on palpation  NERVOUS SYSTEM:  Alert & Oriented X3  : voiding well  EXTREMITIES:  2+ Peripheral Pulses, No clubbing, cyanosis, or edema  SKIN: warm dry                          12.7   7.53  )-----------( 132      ( 27 Apr 2023 09:48 )             40.5     04-27    133<L>  |  101  |  69<H>  ----------------------------<  171<H>  4.7   |  25  |  3.14<H>    Ca    9.0      27 Apr 2023 09:48  Phos  4.0     04-27  Mg     2.7     04-27                I&O's Summary          CAPILLARY BLOOD GLUCOSE      RADIOLOGY & ADDITIONAL TESTS:                   PGY-1 Progress Note discussed with attending      PLEASE CONTACT ON CALL TEAM:  - On Call Team (Please refer to Red) FROM 5:00 PM - 8:30PM  - Nightfloat Team FROM 8:30 -7:30 AM    INTERVAL HPI/OVERNIGHT EVENTS: No acute events overnight.  Patient examined at bedside this AM.  Patient denies acute complaints.  Patient for discharge today.      REVIEW OF SYSTEMS:  CONSTITUTIONAL: No fever, weight loss, or fatigue  RESPIRATORY: No cough, wheezing, chills or hemoptysis; No shortness of breath  CARDIOVASCULAR: No chest pain, palpitations, dizziness, or leg swelling  GASTROINTESTINAL: No abdominal pain. No nausea, vomiting, or hematemesis; No diarrhea or constipation. No melena or hematochezia.  GENITOURINARY: No dysuria or hematuria, urinary frequency  NEUROLOGICAL: No headaches, memory loss, loss of strength, numbness, or tremors  SKIN: No itching, burning, rashes, or lesions     MEDICATIONS  (STANDING):  albuterol/ipratropium for Nebulization 3 milliLiter(s) Nebulizer every 6 hours  amantadine 100 milliGRAM(s) Oral every 12 hours  aspirin enteric coated 81 milliGRAM(s) Oral daily  atorvastatin 40 milliGRAM(s) Oral at bedtime  buMETAnide 1 milliGRAM(s) Oral daily  finasteride 5 milliGRAM(s) Oral daily  fluticasone propionate 50 MICROgram(s)/spray Nasal Spray 1 Spray(s) Both Nostrils two times a day  heparin   Injectable 5000 Unit(s) SubCutaneous every 12 hours  insulin glargine Injectable (LANTUS) 5 Unit(s) SubCutaneous at bedtime  insulin lispro (ADMELOG) corrective regimen sliding scale   SubCutaneous at bedtime  insulin lispro (ADMELOG) corrective regimen sliding scale   SubCutaneous three times a day before meals  insulin lispro Injectable (ADMELOG) 4 Unit(s) SubCutaneous three times a day before meals  melatonin 5 milliGRAM(s) Oral at bedtime  memantine 5 milliGRAM(s) Oral two times a day  metoprolol succinate ER 50 milliGRAM(s) Oral daily  montelukast 10 milliGRAM(s) Oral at bedtime  OLANZapine 10 milliGRAM(s) Oral at bedtime  tamsulosin 0.8 milliGRAM(s) Oral at bedtime  tiotropium 2.5 MICROgram(s) Inhaler 2 Puff(s) Inhalation daily  valproic  acid Syrup 1000 milliGRAM(s) Oral at bedtime  valproic  acid Syrup 250 milliGRAM(s) Oral <User Schedule>    MEDICATIONS  (PRN):  guaiFENesin Oral Liquid (Sugar-Free) 100 milliGRAM(s) Oral every 6 hours PRN Cough      Vital Signs Last 24 Hrs  T(C): 36.3 (28 Apr 2023 04:40), Max: 36.8 (27 Apr 2023 13:40)  T(F): 97.3 (28 Apr 2023 04:40), Max: 98.2 (27 Apr 2023 13:40)  HR: 85 (28 Apr 2023 04:40) (55 - 85)  BP: 107/94 (28 Apr 2023 04:40) (104/93 - 126/114)  BP(mean): --  RR: 17 (28 Apr 2023 04:40) (16 - 18)  SpO2: 99% (28 Apr 2023 04:40) (95% - 100%)    Parameters below as of 28 Apr 2023 04:40  Patient On (Oxygen Delivery Method): room air        PHYSICAL EXAMINATION:  GENERAL: NAD, lying in bed comfortably   HEAD:  Atraumatic, Normocephalic  EYES:  Prosthesis noted on the right, Left with conjunctiva and sclera clear, pupil is equal, round, and reactive to light and accommodation.  NECK: Supple, No JVD, trachea is midline, no evidence of thyroid enlargement, no lymphadenopathy or tenderness.  CHEST/LUNG: No rales, rhonchi, minimal expiratory wheezing noted, no rubs  HEART: Regular rate and rhythm; systolic murmur noted on the left lower sternal border, no rubs, or gallops  ABDOMEN: Soft, Nontender, Nondistended; Bowel sounds present  NERVOUS SYSTEM:  Alert & Oriented X2; No focal sensory or motor deficits are noted; Appropriate mood and affect.  EXTREMITIES:  2+ Peripheral Pulses, No clubbing, cyanosis, or edema  SKIN: Warm, dry, and well perfused; Good turgor; No lesions, nodules or rashes are noted.                          12.7   7.53  )-----------( 132      ( 27 Apr 2023 09:48 )             40.5     04-27    133<L>  |  101  |  69<H>  ----------------------------<  171<H>  4.7   |  25  |  3.14<H>    Ca    9.0      27 Apr 2023 09:48  Phos  4.0     04-27  Mg     2.7     04-27                I&O's Summary          CAPILLARY BLOOD GLUCOSE      RADIOLOGY & ADDITIONAL TESTS:

## 2023-04-28 NOTE — PROGRESS NOTE ADULT - TIME BILLING
- Review of records, telemetry, vital signs and daily labs.   - General and cardiovascular physical examination.  - Generation of cardiovascular treatment plan.  - Coordination of care.      Patient was seen and examined by me on 4/17/23,interim events noted,labs and radiology studies reviewed.  Dima Hamilton MD,FACC.  [address] [place], [place] [id number].  [phone]
- Review of records, telemetry, vital signs and daily labs.   - General and cardiovascular physical examination.  - Generation of cardiovascular treatment plan.  - Coordination of care.      Patient was seen and examined by me o 4/26/23n,interim events noted,labs and radiology studies reviewed.  Dima Hamilton MD,FACC.  [address] [place], [place] [id number].  [phone]
- Review of records, telemetry, vital signs and daily labs.   - General and cardiovascular physical examination.  - Generation of cardiovascular treatment plan.  - Coordination of care.      Patient was seen and examined by me on 4/19/23,interim events noted,labs and radiology studies reviewed.  Dima Hamilton MD,FACC.  [address] [place], [place] [id number].  [phone]
- Review of records, telemetry, vital signs and daily labs.   - General and cardiovascular physical examination.  - Generation of cardiovascular treatment plan.  - Coordination of care.      Patient was seen and examined by me on 4/28/23,interim events noted,labs and radiology studies reviewed.  Dima Hamilton MD,FACC.  [address] [place], [place] [id number].  [phone]
- Review of records, telemetry, vital signs and daily labs.   - General and cardiovascular physical examination.  - Generation of cardiovascular treatment plan.  - Coordination of care.      Patient was seen and examined by me on 4/15/23,interim events noted,labs and radiology studies reviewed.  Dima Hamilton MD,FACC.  [address] [place], [place] [id number].  [phone]
- Review of records, telemetry, vital signs and daily labs.   - General and cardiovascular physical examination.  - Generation of cardiovascular treatment plan.  - Coordination of care.      Patient was seen and examined by me on 4/25/23,interim events noted,labs and radiology studies reviewed.  Dima Hamilton MD,FACC.  [address] [place], [place] [id number].  [phone]
- Review of records, telemetry, vital signs and daily labs.   - General and cardiovascular physical examination.  - Generation of cardiovascular treatment plan.  - Coordination of care.      Patient was seen and examined by me on 4/12/23,interim events noted,labs and radiology studies reviewed.  Dima Hamilton MD,FACC.  [address] [place], [place] [id number].  [phone]
- Review of records, telemetry, vital signs and daily labs.   - General and cardiovascular physical examination.  - Generation of cardiovascular treatment plan.  - Coordination of care.      Patient was seen and examined by me on 4/20/23,interim events noted,labs and radiology studies reviewed.  Dmia Hamilton MD,FACC.  [address] [place], [place] [id number].  [phone]
- Review of records, telemetry, vital signs and daily labs.   - General and cardiovascular physical examination.  - Generation of cardiovascular treatment plan.  - Coordination of care.      Patient was seen and examined by me onm 4/18/23,interim events noted,labs and radiology studies reviewed.  Dima Hamilton MD,FACC.  [address] [place], [place] [id number].  [phone]
- Review of records, telemetry, vital signs and daily labs.   - General and cardiovascular physical examination.  - Generation of cardiovascular treatment plan.  - Coordination of care.      Patient was seen and examined by me on 4/13/23,interim events noted,labs and radiology studies reviewed.  Dima Hamilton MD,FACC.  [address] [place], [place] [id number].  [phone]
- Review of records, telemetry, vital signs and daily labs.   - General and cardiovascular physical examination.  - Generation of cardiovascular treatment plan.  - Coordination of care.      Patient was seen and examined by me on 4/27/23interim events noted,labs and radiology studies reviewed.  Dima Hamilton MD,FACC.  [address] [place], [place] [id number].  [phone]
- Review of records, telemetry, vital signs and daily labs.   - General and cardiovascular physical examination.  - Generation of cardiovascular treatment plan.  - Coordination of care.      Patient was seen and examined by me on 4/14/23,interim events noted,labs and radiology studies reviewed.  Dima Hamilton MD,FACC.  [address] [place], [place] [id number].  [phone]
- Review of records, telemetry, vital signs and daily labs.   - General and cardiovascular physical examination.  - Generation of cardiovascular treatment plan.  - Coordination of care.      Patient was seen and examined by me on 4/16/23,interim events noted,labs and radiology studies reviewed.  Dima Hamilton MD,FACC.  [address] [place], [place] [id number].  [phone]
- Review of records, telemetry, vital signs and daily labs.   - General and cardiovascular physical examination.  - Generation of cardiovascular treatment plan.  - Coordination of care.      Patient was seen and examined by me on 4/21/23,interim events noted,labs and radiology studies reviewed.  Dima Hamilton MD,FACC.  [address] [place], [place] [id number].  [phone]
- Review of records, telemetry, vital signs and daily labs.   - General and cardiovascular physical examination.  - Generation of cardiovascular treatment plan.  - Coordination of care.      Patient was seen and examined by me on 4/23/23,interim events noted,labs and radiology studies reviewed.  Dima Hamilton MD,FACC.  [address] [place], [place] [id number].  [phone]
- Review of records, telemetry, vital signs and daily labs.   - General and cardiovascular physical examination.  - Generation of cardiovascular treatment plan.  - Coordination of care.      Patient was seen and examined by me on 4/24/23,interim events noted,labs and radiology studies reviewed.  Dima Hamilton MD,FACC.  [address] [place], [place] [id number].  [phone]

## 2023-04-28 NOTE — PROGRESS NOTE ADULT - PROBLEM SELECTOR PLAN 1
- Baseline 1.30s to 1.60s  - Cr 3.14 4/27  - SCr uptrended today  - Potential ATN   - Discussed with Nephro who agrees with starting Bumax 1mg qd  - Avoid nephrotoxic agents  - Continue monitoring bmp  - Renal US shows no hydro  - Continue bladder scan as indicated  - s/p albumin 25% on 50 ml q8h x3 doses  - Given mental status/developmental delay, will determine if patient would benefit from chance at facility/group home  - Chance was recommended to patient by urology.  patient declined at present.  - D/C Pending improvement of renal function  - Nephrology Consulted  - urology Consulted

## 2023-04-28 NOTE — PROGRESS NOTE ADULT - SUBJECTIVE AND OBJECTIVE BOX
PATIENT SEEN AND EXAMINED ON :- 4/28/23  DATE OF SERVICE:     4/28/23        Interim events noted,Labs ,Radiological studies and Cardiology tests reviewed .    MR#[id number]  PATIENT NAME:-[facility] COURSE: HPI:  73 y o with ambulatory at baseline from a [facility] number [facility] w/ PMH of intellectual disability, Down Syndrome, asthma, COPD with chronic cough, HTN, DM,  CKD elevated PSA, BPH, schizophrenia, hepatitis B, carrier, bilateral sensorineural hearing loss, who came in to the ED for sore throat and chest pain.  Patient states he developed a sore throat and dry cough a few days ago. Then last night had chest pain, located on the left side, w/o radiation, described as sharp. Pt states the pain lasted throughout the night and resolved this morning. Currently denies any fever, chills, nausea vomiting SOB, abdominal pain, PND, orthopnea or change in bowel habits  (08 Apr 2023 18:22)      INTERIM EVENTS:Patient seen at bedside ,interim events noted.      PMH -reviewed admission note, no change since admission  HEART FAILURE: Acute[ ]Chronic[ ] Systolic[ ] Diastolic[ ] Combined Systolic and Diastolic[ ]  CAD[ ] CABG[ ] PCI[ ]  DEVICES[ ] PPM[ ] ICD[ ] ILR[ ]  ATRIAL FIBRILLATION[ ] Paroxysmal[ ] Permanent[ ] CHADS2-[  ]  YASSINE[ ] CKD1[ ] CKD2[ ] CKD3[ ] CKD4[ ] ESRD[ ]  COPD[ ] HTN[ ]   DM[ ] Type1[ ] Type 2[ ]   CVA[ ] Paresis[ ]    AMBULATION: Assisted[ ] Cane/walker[ ] Independent[ ]    MEDICATIONS  (STANDING):  albuterol/ipratropium for Nebulization 3 milliLiter(s) Nebulizer every 6 hours  amantadine 100 milliGRAM(s) Oral every 12 hours  aspirin enteric coated 81 milliGRAM(s) Oral daily  atorvastatin 40 milliGRAM(s) Oral at bedtime  buMETAnide 1 milliGRAM(s) Oral daily  dextrose (GLUCOSE) Chewable 16 Gram(s) Chew once  dextrose 5%. 1000 milliLiter(s) (100 mL/Hr) IV Continuous <Continuous>  dextrose 5%. 1000 milliLiter(s) (50 mL/Hr) IV Continuous <Continuous>  finasteride 5 milliGRAM(s) Oral daily  fluticasone propionate 50 MICROgram(s)/spray Nasal Spray 1 Spray(s) Both Nostrils two times a day  glucagon  Injectable 1 milliGRAM(s) IntraMuscular once  heparin   Injectable 5000 Unit(s) SubCutaneous every 12 hours  insulin glargine Injectable (LANTUS) 5 Unit(s) SubCutaneous at bedtime  insulin lispro (ADMELOG) corrective regimen sliding scale   SubCutaneous at bedtime  insulin lispro (ADMELOG) corrective regimen sliding scale   SubCutaneous three times a day before meals  insulin lispro Injectable (ADMELOG) 4 Unit(s) SubCutaneous three times a day before meals  melatonin 5 milliGRAM(s) Oral at bedtime  memantine 5 milliGRAM(s) Oral two times a day  metoprolol succinate ER 50 milliGRAM(s) Oral daily  montelukast 10 milliGRAM(s) Oral at bedtime  OLANZapine 10 milliGRAM(s) Oral at bedtime  tamsulosin 0.8 milliGRAM(s) Oral at bedtime  tiotropium 2.5 MICROgram(s) Inhaler 2 Puff(s) Inhalation daily  valproic  acid Syrup 1000 milliGRAM(s) Oral at bedtime  valproic  acid Syrup 250 milliGRAM(s) Oral <User Schedule>    MEDICATIONS  (PRN):  guaiFENesin Oral Liquid (Sugar-Free) 100 milliGRAM(s) Oral every 6 hours PRN Cough            REVIEW OF SYSTEMS:  Constitutional: [ ] fever, [ ]weight loss,  [ ]fatigue [ ]weight gain  Eyes: [ ] visual changes  Respiratory: [ ]shortness of breath;  [ ] cough, [ ]wheezing, [ ]chills, [ ]hemoptysis  Cardiovascular: [ ] chest pain, [ ]palpitations, [ ]dizziness,  [ ]leg swelling[ ]orthopnea[ ]PND  Gastrointestinal: [ ] abdominal pain, [ ]nausea, [ ]vomiting,  [ ]diarrhea [ ]Constipation [ ]Melena  Genitourinary: [ ] dysuria, [ ] hematuria [ ]Reyes  Neurologic: [ ] headaches [ ] tremors[ ]weakness [ ]Paralysis Right[ ] Left[ ]  Skin: [ ] itching, [ ]burning, [ ] rashes  Endocrine: [ ] heat or cold intolerance  Musculoskeletal: [ ] joint pain or swelling; [ ] muscle, back, or extremity pain  Psychiatric: [ ] depression, [ ]anxiety, [ ]mood swings, or [ ]difficulty sleeping  Hematologic: [ ] easy bruising, [ ] bleeding gums    [ ] All remaining systems negative except as per above.   [ ]Unable to obtain.  [x] No change in ROS since admission      Vital Signs Last 24 Hrs  T(C): 36.4 (28 Apr 2023 13:06), Max: 36.4 (28 Apr 2023 13:06)  T(F): 97.5 (28 Apr 2023 13:06), Max: 97.5 (28 Apr 2023 13:06)  HR: 79 (28 Apr 2023 13:06) (79 - 85)  BP: 105/83 (28 Apr 2023 13:06) (105/83 - 107/94)  BP(mean): --  RR: 16 (28 Apr 2023 13:06) (16 - 17)  SpO2: 100% (28 Apr 2023 13:06) (99% - 100%)    Parameters below as of 28 Apr 2023 13:06  Patient On (Oxygen Delivery Method): nasal cannula  O2 Flow (L/min): 2    I&O's Summary      PHYSICAL EXAM:  General: No acute distress BMI-  HEENT: EOMI, PERRL  Neck: Supple, [ ] JVD  Lungs: Equal air entry bilaterally; [ ] rales [ ] wheezing [ ] rhonchi  Heart: Regular rate and rhythm; [x ] murmur   2/6 [ x] systolic [ ] diastolic [ ] radiation[ ] rubs [ ]  gallops  Abdomen: Nontender, bowel sounds present  Extremities: No clubbing, cyanosis, [ ] edema [ ]Pulses  equal and intact  Nervous system:  Alert & Oriented X3, no focal deficits  Psychiatric: Normal affect  Skin: No rashes or lesions    LABS:  04-28    134<L>  |  103  |  66<H>  ----------------------------<  95  4.8   |  26  |  2.95<H>    Ca    9.4      28 Apr 2023 07:00  Phos  3.7     04-28  Mg     2.8     04-28      Creatinine Trend: 2.95<--, 3.14<--, 2.99<--, 3.30<--, 2.81<--, 3.02<--                        13.6   7.45  )-----------( 136      ( 28 Apr 2023 07:00 )             42.4       < from: TTE with Doppler (w/Cont) (03.29.23 @ 07:03) >    Patient name: LITZY MARAVILLA  YOB: 1950   Age: 73 (M)   MR#: [id number]  Study Date: 3/29/2023  Location: [address] [place], [place] [address]onographer: Bradley Moyer [facility]  Study quality: Fair  Referring Physician:  SHANIQUE MONTERROSO MD  Blood Pressure: 136/90 mmHg  Height: 157 cm  Weight: 62 kg  BSA: 1.6 m2  ------------------------------------------------------------------------    PROCEDURE: Transthoracic echocardiogram with 2-D, M-Mode  and complete spectral and color flow Doppler.  Given _20_cc agitatedsaline intravenously. Verbal consent  was obtained for injection of ultrasound enhancing agent  following a discussion of risks and benefits. Following  intravenous injection of ultrasound enhancing agent,  harmonic imaging was performed. 2_cc of ultrasound  enhancing agent injected intravenously.  INDICATION: Heart failure, unspecified (I50.9)  HISTORY:  ------------------------------------------------------------------------  DIMENSIONS:  Dimensions:     Normal Values:  LA:     4.2 cm    2.0 - 4.0 cm  Ao:     3.3 cm    2.0 - 3.8 cm  SEPTUM: 0.8 cm    0.6 - 1.2 cm  PWT:    0.8 cm    0.6 - 1.1 cm  LVIDd:  5.4 cm    3.0 - 5.6 cm  LVIDs:  4.8 cm    1.8 - 4.0 cm      Derived Variables:  LVMI: 95 g/m2  RWT: 0.29  Ejection Fraction Visual Estimate: 10-15 %    ------------------------------------------------------------------------  OBSERVATIONS:  Mitral Valve: Normal mitral valve. Trace mitral  regurgitation.  Aortic Root: Normal aortic root.  Aortic Valve: Aortic valve leaflet morphology not well  visualized, opens normally.  Left Atrium: Normal left atrium.  LA volume index = 20  cc/m2.  Left Ventricle: Severe global left ventricular systolic  dysfunction. Ultrasound LV opacification agent was used to  enhance endocardial definition. Normal left ventricular  internal dimensions and wall thicknesses. Grade I diastolic  dysfunction (Impaired relaxation, mild).  Right Heart: Normal right atrium. Normal right ventricular  size and function. There is trace tricuspid regurgitation.  There is trace pulmonic regurgitation.  Pericardium/PleuraNormal pericardium with no pericardial  effusion.  Hemodynamic: Incomplete tricuspid regurgitation jet  precludes accurate assessment of pulmonary artery systolic  pressure. Agitated saline injectionrevealed late bubbles  in the left heart, consistent with transpulmonic shunting.  ------------------------------------------------------------------------  CONCLUSIONS:  1. Trace mitral regurgitation.  2. Normal left ventricular internal dimensions and wall  thicknesses.  3. Severe global left ventricular systolic dysfunction.  Ultrasound LV opacification agent was used to enhance  endocardial definition.  4. Grade I diastolic dysfunction (Impaired relaxation,  mild).  5. Normal right ventricular size and function.  6. Agitated saline injection revealed late bubbles in the  left heart, consistent with transpulmonic shunting.    ------------------------------------------------------------------------  Confirmed on  3/30/2023 - 10:06:13 by Donny Jiménez MD  ------------------------------------------------------------------------    < end of copied text >

## 2023-04-28 NOTE — PROGRESS NOTE ADULT - PROVIDER SPECIALTY LIST ADULT
Internal Medicine
Internal Medicine
Nephrology
Hospitalist
Nephrology
Palliative Care
Urology
Urology
Internal Medicine
Nephrology
Cardiology
Internal Medicine
Cardiology
Internal Medicine
Cardiology
Cardiology
Internal Medicine
Cardiology
Internal Medicine
Palliative Care
Cardiology
Internal Medicine
Internal Medicine
Palliative Care
Internal Medicine
Internal Medicine
Cardiology
Internal Medicine
Cardiology
Internal Medicine

## 2023-04-28 NOTE — PROGRESS NOTE ADULT - ATTENDING COMMENTS
73M ambulatory at baseline, from [facility], Intellectual disability, Fetal alcohol syndrome, asthma, COPD, HTN, DM, CKD, BPH, schizophrenia, HBV carrier, initially admitted for ADHF ccb YASSINE now ATN.    ADHF improved  YASSINE/ATN on CKD3 2/2 cardiorenal syndrome exacerbated by overdiuresis  DM  HTN  HLD  asthma/COPD  Fetal alcohol syndrome with intellectual disability    -bladder scans have been relatively low (0-300) so doubt obstructive nephropathy at this point - suspect kidney dysfunction 2/2 ATN from cardiorenal syndrome/overdiuresis ... pt today appears slightly volume up - resume diuresis with bumex 1mg po qd  -appreciate nephro and card recs for outpatient HF regimen - plan for DC soon  -c/w flomax 0.8  -encourage ambulation and OOBC  -dc meds that may be contributing to retention: claritin ... c/w zyprexa which is a chronic med  -dispo: plan for DC to [facility]

## 2023-04-28 NOTE — DISCHARGE NOTE NURSING/CASE MANAGEMENT/SOCIAL WORK - PATIENT PORTAL LINK FT
You can access the FollowMyHealth Patient Portal offered by [facility] by registering at the following website: [email or web address]. By joining [facility]’s FollowMyHealth portal, you will also be able to view your health information using other applications (apps) compatible with our system.

## 2023-04-28 NOTE — PROGRESS NOTE ADULT - ASSESSMENT
73 y o with ambulatory at baseline from a [facility] home number 10 w/ PMH of intellectual disability, Fetal alcohol Syndrome, asthma, COPD with chronic cough, HTN, DM,  CKD elevated PSA, BPH, schizophrenia, hepatitis B, carrier, bilateral sensorineural hearing loss, admitted for AHRF secondary to Acute on Chronic Systolic Heart Failure.

## 2023-04-28 NOTE — PROGRESS NOTE ADULT - ASSESSMENT
73 y o with ambulatory at baseline from a [facility] home number 10 w/ PMH of intellectual disability, Fetal alcohol Syndrome, asthma, COPD with chronic cough, HTN, DM,  CKD elevated PSA, BPH, schizophrenia, hepatitis B, carrier, bilateral sensorineural hearing loss, admitted for AHRF secondary to Acute on Chronic Systolic Heart Failure.

## 2023-04-28 NOTE — PROGRESS NOTE ADULT - PROBLEM SELECTOR PLAN 2
- ECHO 3/23 showed severely reduced EF 10-15%  - NYHA Heart Failure class III stage C  - Continue Metoprolol 50 mg PO qd  - Continue holding enalapril, Spironolactone, Lasix, and Jardiance for now until renal function back to baseline  - Start Bumax 1mg qd  - No cardiac cath at the moment   - PT recommending [facility]   - Will keep monitoring  - repeat CXR 4/23: Noted  - Cardiology Dr. Hamilton Consulted  - Nephro Dr. Pederson consulted

## 2023-05-02 NOTE — ED PROVIDER NOTE - QTC
"Subjective   Reason for Visit: Papa Zhao is an 82 y.o. male here for a Medicare Wellness visit.     Past Medical, Surgical, and Family History reviewed and updated in chart.    Reviewed all medications by prescribing practitioner or clinical pharmacist (such as prescriptions, OTCs, herbal therapies and supplements) and documented in the medical record.    HPI  Colon done 2015.  Diabetes Type II - Takes and tolerates medications. No hypoglycemia. Counseled on diet with low carbohydrate intake, weight loss and increased activity levels/exercise.  Hypertension - Takes medication without side effects. No CP/SOB/Palpitations. Follows a no added salt diet. Counseled diet, activity and weight loss.  Hyperlipidemia - Takes and tolerates medications without fatigue and myalgias.  Afib sees cardio  Patient Care Team:  Nicola Mendosa MD as PCP - General  Nicola Mendosa MD as PCP - [employer] PCP     Review of Systems   Constitutional:  Negative for fatigue.   Eyes:  Negative for visual disturbance.   Respiratory:  Negative for cough, chest tightness and shortness of breath.    Cardiovascular:  Negative for chest pain and palpitations.   Gastrointestinal:  Negative for abdominal pain, constipation and diarrhea.   Skin:  Negative for rash.   Neurological:  Negative for headaches.       Objective   Vitals:  /60   Pulse 70   Ht 1.791 m (5' 10.5\")   Wt 104 kg (230 lb 1.6 oz)   SpO2 98%   BMI 32.55 kg/m²       Physical Exam  Constitutional:       Appearance: Normal appearance.   HENT:      Head: Normocephalic and atraumatic.   Cardiovascular:      Rate and Rhythm: Normal rate and regular rhythm.      Heart sounds: Normal heart sounds.   Pulmonary:      Effort: Pulmonary effort is normal.      Breath sounds: Normal breath sounds.   Skin:     General: Skin is warm and dry.   Neurological:      General: No focal deficit present.      Mental Status: He is alert and oriented to person, place, and time. "      Gait: Gait normal.   Psychiatric:         Mood and Affect: Mood normal.         Behavior: Behavior normal.         Thought Content: Thought content normal.         Judgment: Judgment normal.         Assessment/Plan   Problem List Items Addressed This Visit          Circulatory    Hypertension, benign    Relevant Medications    amLODIPine (Norvasc) 10 mg tablet    losartan-hydrochlorothiazide (Hyzaar) 100-25 mg tablet    Atrial fibrillation (CMS/HCC)    Overview     Formatting of this note might be different from the original. 2010 PAF had been relatively controlled with Propafenone.D/C per EP 2022 after PCI.  oral anticoagulation. Following with EP. Last Assessment & Plan: Formatting of this note might be different from the original. Asymptomatic.         Relevant Medications    amLODIPine (Norvasc) 10 mg tablet    apixaban (Eliquis) 5 mg tablet    clopidogrel (Plavix) 75 mg tablet       Endocrine/Metabolic    Controlled type 2 diabetes mellitus without complication, without long-term current use of insulin (CMS/HCC)    Relevant Medications    metFORMIN (Glucophage) 1,000 mg tablet    pioglitazone (Actos) 30 mg tablet    Other Relevant Orders    Comprehensive Metabolic Panel    Hemoglobin A1C       Other    Mixed hyperlipidemia    Relevant Medications    atorvastatin (Lipitor) 40 mg tablet    clopidogrel (Plavix) 75 mg tablet     Other Visit Diagnoses       Routine general medical examination at health care facility    -  Primary                  325

## 2023-05-04 NOTE — ED ADULT NURSE NOTE - CHIEF COMPLAINT QUOTE
bib/ems notification for altered mental status , pt from [facility]   ems reports initial call is for Hyper K+ pt became more altered en route

## 2023-05-04 NOTE — ED ADULT NURSE REASSESSMENT NOTE - NS ED NURSE REASSESS COMMENT FT1
Pt more alert, A&O2, verbal, disorientated to place/time. Caregiver at bedside. Elevated potassium protocol given as per order. No acute distress noted, denies chest pain, no SOB noted.

## 2023-05-04 NOTE — ED ADULT NURSE REASSESSMENT NOTE - NS ED NURSE REASSESS COMMENT FT1
Pt resting in bed, responds to verbal/tactile stimuli. CT scan completed, 2L NC O2 maintained. 16FR Reyes catheter maintained as well, patent. Pt admitted to medicine service, awaiting floor bed. No acute distress noted, no s/s of chest pain noted, no SOB noted. Caregiver at bedside. 1646pm---Pt resting in bed, responds to verbal/tactile stimuli, A&OX0. CT scan completed, 2L NC O2 maintained. 16FR Reyes catheter maintained as well, patent. Pt admitted to medicine service, awaiting floor bed. No acute distress noted, no s/s of chest pain noted, no SOB noted. Caregiver at bedside.

## 2023-05-04 NOTE — H&P ADULT - NSICDXPASTMEDICALHX_GEN_ALL_CORE_FT
PAST MEDICAL HISTORY:  Chronic HFrEF (heart failure with reduced ejection fraction)     Diabetes mellitus     Down syndrome     HTN (hypertension)     Hyperlipidemia     Intellectual disability

## 2023-05-04 NOTE — H&P ADULT - HISTORY OF PRESENT ILLNESS
73 year old male with Intellectual disability, Downs Syndrome, [facility]/ PMHx COPD with chronic cough, hx of non ischemic cardiomyopathy with non-obstructive CAD on CCTA (2021), HFrEF, HTN, DM, CKD elevated PSA, BPH, schizophrenia, hepatitis B, carrier, bilateral sensorineural hearing loss, was brought in from the facility for who came in to the ED for sore throat and chest pain, BIBEMS for altered mental status and respiratory distress. Per care worker from NH at bedside, he is usually alert and conversant, but this morning they found him lethargic appearing and not responsive. With EMS he was minimally responsive and appeared SOB, O2 sat as 79%, he received 200cc IVF and O2 via NRB, and was brought here. Per EMS they had checked his labs and noted he was hyperkalemic to 9.5 - unclear what was done for this. History and ROS is limited as patient is unresponsive. Per documentation he is full code 73 year old male with Intellectual disability, Downs Syndrome, [facility]/ PMHx COPD with chronic cough, hx of non ischemic cardiomyopathy with non-obstructive CAD on CCTA (2021), HFrEF, HTN, DM, CKD elevated PSA, BPH, schizophrenia, hepatitis B, carrier, bilateral sensorineural hearing loss, was brought in from the facility for who came in to the ED for sore throat and chest pain, BIBEMS for altered mental status and respiratory distress. Per care worker from NH at bedside, he is usually alert and conversant, but this morning they found him lethargic appearing and not responsive. With EMS he was minimally responsive and appeared SOB, O2 sat as 79%, he received 200cc IVF and O2 via NRB, and was brought here. Per EMS they had checked his labs and noted he was hyperkalemic to 9.5 - unclear what was done for this. Per documentation he is full code. Patient AAOx3, is not sure why he is in the hospital, denies acute complaints including fevre/chills, headache, dizziness, chest pain, palpitations cough, SOB, n/v/d/c, dysuria or leg swelling. NKDA. 73 year old male with Intellectual disability, Downs Syndrome, [facility]/ PMHx COPD with chronic cough, hx of non ischemic cardiomyopathy with non-obstructive CAD on CCTA (2021), HFrEF, HTN, DM, CKD elevated PSA, BPH, schizophrenia, hepatitis B, carrier, bilateral sensorineural hearing loss, was brought in from the facility for who came in to the ED for sore throat and chest pain, BIBEMS for altered mental status and respiratory distress. Per care worker from NH at bedside, he is usually alert and conversant, but this morning they found him lethargic appearing and not responsive. With EMS he was minimally responsive and appeared SOB, O2 sat as 79%, he received 200cc IVF and O2 via NRB, and was brought here. Per EMS they had checked his labs and noted he was hyperkalemic to 9.5 - unclear what was done for this. Per documentation he is full code. Patient AAOx3, is not sure why he is in the hospital, denies acute complaints including fevre/chills, headache, dizziness, chest pain, palpitations cough, SOB, n/v/d/c, dysuria or leg swelling. NKDA.  Of note pt was discharged 5 days ago from [facility] after admission for decompensated CHF. 73 year old male with Intellectual disability, Downs Syndrome, [facility]/ PMHx COPD with chronic cough, hx of non ischemic cardiomyopathy with non-obstructive CAD on CCTA (2021), HFrEF, HTN, DM, CKD elevated PSA, BPH, schizophrenia, hepatitis B, carrier, bilateral sensorineural hearing loss, was brought in from the facility for who came in to the ED for sore throat and chest pain, BIBEMS for altered mental status and respiratory distress. Per care worker from NH at bedside, he is usually alert and conversant, but this morning they found him lethargic appearing and not responsive. With EMS he was minimally responsive and appeared SOB, O2 sat as 79%, he received 200cc IVF and O2 via NRB, and was brought here. Per EMS they had checked his labs and noted he was hyperkalemic to 9.5 - unclear what was done for this. Per documentation he is full code. Patient AAOx3, is not sure why he is in the hospital, denies acute complaints including fever/chills, headache, dizziness, chest pain, palpitations cough, SOB, n/v/d/c, dysuria or leg swelling. NKDA.  Of note pt was discharged 5 days ago from [facility] after admission for decompensated CHF.

## 2023-05-04 NOTE — H&P ADULT - PROBLEM SELECTOR PLAN 7
pt appears euvolemic on exam   c/w metolazone and metoprolol CXR with worsening infiltrates but pt appears euvolemic on exam   c/w metolazone and metoprolol c/w olnanzapine and valproic acid   hold klonipin for confusion

## 2023-05-04 NOTE — ED PROVIDER NOTE - PROGRESS NOTE DETAILS
Labs with hemolyzed K - repeat sample  sent.  Trop elevated but EKG similar to prior. BNP very elevated, patient satting well on room air although he was placed on NC for support. Labs with hemolyzed K - repeat sample  sent.  Trop elevated but EKG similar to prior. BNP very elevated, patient satting well on room air although he was placed on NC for support. Urine + for UTI. Zosyn written. Will admit.

## 2023-05-04 NOTE — H&P ADULT - NS ATTEST RISK PROBLEM GEN_ALL_CORE FT
Metabolic Encephalopathy due to UTI, Renal Failure, Hyperkalemia    Admit to hospital  Reviewed: CMP, CBC, PT, PTT  Discussed with Dr. Pederson  CXR: with pulmonary edema  EKG no peaked T-waves, similar to previous  Order: BMP, CBC  Additional History from Nursing Home Aide

## 2023-05-04 NOTE — ED ADULT NURSE NOTE - OBJECTIVE STATEMENT
Pt BIB EMS as NOTIFICATION for AMS and SOB from NH. Caregiver at bedside, upon assessment, responds to tactile stimuli. Pt is A&OX0, placed on cardiac monitor, on 15L non-rebreather O2 as placed by EMS. Pt has a hx of intellectual disability. Skin intact, generalized swelling noted. EKG completed. MD Acosta at bedside.

## 2023-05-04 NOTE — H&P ADULT - ASSESSMENT
73 year old male, from group home with PMHx downs syndrome, intelectual disability, COPD with chronic cough, hx of non ischemic cardiomyopathy with non-obstructive CAD on CCTA (2021), HFrEF, HTN, DM, CKD elevated PSA, BPH, schizophrenia, hepatitis B, carrier, bilateral sensorineural hearing loss 73 year old male, from group home with PMHx downs syndrome, intelectual disability, COPD with chronic cough, hx of non ischemic cardiomyopathy with non-obstructive CAD on CCTA (2021), HFrEF, HTN, DM, CKD elevated PSA, BPH, schizophrenia, hepatitis B, carrier, bilateral sensorineural hearing loss adm for AMS 2/2 UTI, hyperkalemia and YASSINE on CKD.

## 2023-05-04 NOTE — ED PROVIDER NOTE - OBJECTIVE STATEMENT
73-year-old male hx of intellectual disability, Fetal alcohol Syndrome, asthma, COPD with chronic cough, HTN, DM,  CKD elevated PSA, BPH, schizophrenia, hepatitis B, carrier, bilateral sensorineural hearing loss, BIBEMS for altered mental status and respiratory distress. Per care worker from NH at bedside, he is usually alert and conversant, but this morning they found him lethargic appearing and not responsive. With EMS he was minimally responsive and appeared SOB, O2 sat as 79%, he received 200cc IVF and O2 via NRB, and was brought here. Per EMS they had checked his labs and noted he was hyperkalemic to 9.5 - unclear what was done for this. History and ROS is limited as patient is unresponsive. Per documentation he is full code.

## 2023-05-04 NOTE — H&P ADULT - PROBLEM SELECTOR PLAN 5
hold enalapril for YASSINE and hyperK+  c/w metoprolol pt on janumet and januvia in facility  started on SSI

## 2023-05-04 NOTE — ED PROVIDER NOTE - CLINICAL SUMMARY MEDICAL DECISION MAKING FREE TEXT BOX
73-year-old male hx of intellectual disability, Fetal alcohol Syndrome, asthma, COPD with chronic cough, HTN, DM,  CKD elevated PSA, BPH, schizophrenia, hepatitis B, carrier, bilateral sensorineural hearing loss, BIBEMS for altered mental status and respiratory distress. Sepsis workup initiated. Will attempt to wean off oxygen. Placed on cardiac monitor. Anticipate re-admission.

## 2023-05-04 NOTE — H&P ADULT - PROBLEM SELECTOR PLAN 2
s/p insulin dextrose and CaGlu in ED   f/u rpt BMP  started on lokelma K+ 6.3  EKG with T-wave abnormality (seen prior) not peaked   s/p insulin dextrose and CaGlu in ED   f/u rpt BMP  started on lokelma  Likley in setting of worsening CKD   Nephro Bg consulted K+ 6.3  EKG with T-wave abnormality (seen prior) not peaked   s/p insulin dextrose and CaGlu in ED   f/u rpt BMP  started on lokelma  Likely in setting of worsening CKD, urinary retention  Nephro Pederson consulted pt p/w AMS  afebrile, normotensive, WBC wnl  CTH nonacute  UA grossly+  chance placed in ED and pt mental status improved to baseline  started on cefepime (renally dosed) as pt recently hospitalized and at risk for resistant organism  ID Dr. Goncalves consulted

## 2023-05-04 NOTE — H&P ADULT - PROBLEM SELECTOR PLAN 6
c/w olnanzapine and valproic acid   hold klonipin for confusion hold enalapril for YASSINE and hyperK+  c/w metoprolol

## 2023-05-04 NOTE — ED ADULT TRIAGE NOTE - CHIEF COMPLAINT QUOTE
bib/ems notification for altered mental status , pt from [facility]   ems reports initial call is for Hyper K+ pt became more altered en route

## 2023-05-04 NOTE — PATIENT PROFILE ADULT - FALL HARM RISK - HARM RISK INTERVENTIONS

## 2023-05-04 NOTE — H&P ADULT - PROBLEM SELECTOR PLAN 4
pt on janumet and januvia in facility  started on SSI pt on janumet and januvia in facility  started on SSI  f/u A1c Cr 4.55, prev 2.95  started on renal diet  hold nephrotoxins  plan as above  Nephro Bg consulted

## 2023-05-04 NOTE — PROVIDER CONTACT NOTE (CRITICAL VALUE NOTIFICATION) - ASSESSMENT
Pt resting in bed, no acute distress noted, on cardiac monitor, no s/s of chest pain noted, no SOB noted.

## 2023-05-04 NOTE — H&P ADULT - PROBLEM SELECTOR PLAN 8
c/w aspirin and statin (home med) CXR with worsening infiltrates but pt appears euvolemic on exam   c/w metolazone and metoprolol

## 2023-05-04 NOTE — H&P ADULT - PROBLEM SELECTOR PLAN 1
pt p/w AMS   UA grossly positive   started on cefepime (renally dosed) pt p/w AMS  afebrile, normotensive, WBC wnl  CTH nonacute  UA grossly+  chance placed in ED and pt woke up  started on cefepime (renally dosed)  ID Dr. Goncalves consulted pt p/w AMS  afebrile, normotensive, WBC wnl  CTH nonacute  UA grossly+  chance placed in ED and pt mental status improved to baseline  started on cefepime (renally dosed) as pt recently hospitalized and at risk for resistant organism  ID Dr. Goncalves consulted patient with period of confusion  likely due to UTI, possible relation to renal failre  improved after placement of chance  hold klonopin  continue to monitor

## 2023-05-04 NOTE — H&P ADULT - ATTENDING COMMENTS
72yo M PMHx of FAS, COPD, HFrEF, HTN, DM2, Chronic Kidney Disease who presented to the hospital for hyperkalemia. History unclear, but spoke with aide, Azalia who was with patient, who said patient was in usual state of health this morning. Patient had blood work that showed hyperkalemia and EMS was called. When EMS arrived, patient was noted to not be very responsive, eyes open but not following commands or speaking. Patient remained altered until an hour after hospital presentation where he started interacting closer to his baseline. In the ED, patient afebrile, WBC normal. Labs showed worsening renal function. Reyes placed draining cloudy urine with positive WBCs. CXR with some pulmonary edema on my read. EKG without peaked T-waves. Potassium 6.9. Patient interactive and at baseline per aide.    #Metabolic Encephalopathy  #Hyperkalemia  #Urinary Tract Infection likely due to urinary retention  #YASSINE on Chronic Kidney Disease Stage 4  #Chronic Systolic Congestive Heart Failure  #Type 2 DM with Nephropathy    -cefepime for UTI  -given insulin, dextrose, calcium gluconate. Give Lokelma  -Nephrology consult  -Monitor UO, hold enalapril  -SSI  -telemetry monitoring  -continue on home bumex

## 2023-05-04 NOTE — H&P ADULT - PROBLEM SELECTOR PLAN 3
Nephwilmer storey consulted  started on renal diet  hold nephrotoxins started on renal diet  hold nephrotoxins  Nephro storey consulted Cr 4.55, prev 2.95  started on renal diet  hold nephrotoxins  Nephro Bg consulted Cr 4.55, prev 2.95  started on renal diet  hold nephrotoxins  plan as above  Nephro Bg consulted K+ 6.3  EKG with T-wave abnormality (seen prior) not peaked   s/p insulin dextrose and CaGlu in ED   f/u rpt BMP  started on lokelma  Likely in setting of worsening CKD, urinary retention  Nephro Pederson consulted

## 2023-05-04 NOTE — ED ADULT NURSE NOTE - ED STAT RN HANDOFF DETAILS
Report given to DUGLAS MEIER Pt resting in bed, caregiver at bedside. No acute distress noted, denies chest pain, no SOB noted.

## 2023-05-04 NOTE — ED ADULT NURSE NOTE - NSIMPLEMENTINTERV_GEN_ALL_ED
Implemented All Fall with Harm Risk Interventions:  [place] to call system. Call bell, personal items and telephone within reach. Instruct patient to call for assistance. Room bathroom lighting operational. Non-slip footwear when patient is off stretcher. Physically safe environment: no spills, clutter or unnecessary equipment. Stretcher in lowest position, wheels locked, appropriate side rails in place. Provide visual cue, wrist band, yellow gown, etc. Monitor gait and stability. Monitor for mental status changes and reorient to person, place, and time. Review medications for side effects contributing to fall risk. Reinforce activity limits and safety measures with patient and family. Provide visual clues: red socks.

## 2023-05-04 NOTE — H&P ADULT - NSHPPHYSICALEXAM_GEN_ALL_CORE
T(C): 36.3 (05-04-23 @ 18:04), Max: 36.6 (05-04-23 @ 12:48)  HR: 72 (05-04-23 @ 18:04) (71 - 80)  BP: 124/92 (05-04-23 @ 18:04) (100/87 - 124/92)  RR: 20 (05-04-23 @ 18:04) (18 - 20)  SpO2: 100% (05-04-23 @ 18:04) (99% - 100%)    GENERAL: patient appears well, NAD, pleasant  HEAD: normocephalic, atraumatic  EYES: sclera clear, no exudates  ENMT: oropharynx clear without erythema, no exudates, moist mucous membranes  NECK: supple, soft, no thyromegaly noted  LUNGS: clear to auscultation bilaterally, no wheezing, rhonchi or rales appreciated  HEART: S1/S2, regular rate and rhythm, no murmurs noted, no lower extremity edema  GASTROINTESTINAL: abdomen is soft, nondistended, nontender, normoactive bowel sounds, no palpable masses  INTEGUMENT: good skin turgor, no lesions noted  MUSCULOSKELETAL: no clubbing or cyanosis, no obvious deformity  NEUROLOGIC: AAO x3, CNII-XII intact, no obvious sensorimotor deficits noted

## 2023-05-05 NOTE — CONSULT NOTE ADULT - PROBLEM SELECTOR RECOMMENDATION 9
Patient with advanced systolic heart failure due to severe NICM, EF now 10-15% on recent Echo.   Prior to previous admission in April 2023, patient was having increasing BEARDEN, SOB with performance of ADLs, and functional limitation due to his heart failure    He is minimum Stage C NYHA class III disease (symptoms with minimal exertion) and has significant risk for increased CV morbidity and mortality  Now readmitted with worsening YASSINE on CKD, with BNP of 63,000 and CXR showing increased bilateral infiltrates    Continue management as per primary team  Spoke to Dr. Mason--Cardiology consult/second opinion requested to optimize full GDMT and for disease prognostication.

## 2023-05-05 NOTE — DISCHARGE NOTE PROVIDER - HOSPITAL COURSE
73 year old male, from group home with PMHx downs syndrome, intellectual disability, COPD with chronic cough, hx of non ischemic cardiomyopathy with non-obstructive CAD on CCTA (2021), HFrEF, HTN, DM, CKD elevated PSA, BPH, schizophrenia, hepatitis B, carrier, bilateral sensorineural hearing loss adm for AMS 2/2 UTI, hyperkalemia and YASSINE on CKD.    For acute encephalopathy, patient had a CTH that showed no acute abnormalities. Patient was found to have an UTI and was started on cefepime. AMS likely 2/2 UTI, which has now resolved. For UTI, patient was continued ton cefepime and urine culture showed XXXXXX. Patient will be discharged on XXXX    Patient was also last admitted one week prior to admission, and at that time was found to have ATN and was discharged with creatinine 2.9. Patient presented now with creatinine 4.1 and concern for cardio renal. Pt was continued on home dose of Bumex 1mg. Nephrology was consulted and recommend XXXX    For CHF, patient had an echo 3/23 that showed EF 10-15%. Had an elevated BNP and CXR showed yosef opacities. Patient was continued on home dose Bumex 1mg.      73 year old male, from group home with PMHx downs syndrome, intellectual disability, COPD with chronic cough, hx of non ischemic cardiomyopathy with non-obstructive CAD on CCTA (2021), HFrEF, HTN, DM, CKD elevated PSA, BPH, schizophrenia, hepatitis B, carrier, bilateral sensorineural hearing loss adm for AMS 2/2 UTI, hyperkalemia and YASSINE on CKD.    For acute encephalopathy, patient had a CTH that showed no acute abnormalities. Patient was found to have an UTI and was started on cefepime. AMS likely 2/2 UTI, which has now resolved. For UTI, patient was continued ton cefepime and urine culture showed pan- sensitive E.coli. Pt. was started on CEFEPIME 5/4, and then switched to CEFTRIAXONE 1G QD ON 5/8, TO COMPLETE A TOTAL 7 DAY COURSE.     Patient was also last admitted one week prior to admission, and at that time was found to have ATN and was discharged with creatinine 2.9  (pt refused chance during last admission). Patient presented now with creatinine 4.1 and concern for cardio renal syndrome vs. obstruction. Chance catheter was placed, and pt's creatinine downtrended to 2.  Pt was continued on home dose of Bumex 1mg.     For CHF, patient had an echo 3/23 that showed EF 10-15%. Had an elevated BNP and CXR showed yosef opacities. Patient was continued on home dose Bumex 1mg. Pt maintained euvolemic volume status during this admission.     PT recommended that the pt. go back to [facility].   Patient is stable for discharge per attending and is advised to follow up with PCP as outpatient  Please refer to patient's complete medical chart with documents for a full hospital course, for this is only a brief summary.      73 year old male, from group home with PMHx downs syndrome, intellectual disability, COPD with chronic cough, hx of non ischemic cardiomyopathy with non-obstructive CAD on CCTA (2021), HFrEF, HTN, DM, CKD elevated PSA, BPH, schizophrenia, hepatitis B, carrier, bilateral sensorineural hearing loss adm for AMS 2/2 UTI, hyperkalemia and YASSINE on CKD.    For acute encephalopathy, patient had a CTH that showed no acute abnormalities. Patient was found to have an UTI and was started on cefepime. AMS likely 2/2 UTI, which has now resolved. For UTI, patient was continued ton cefepime and urine culture showed pan- sensitive E.coli. Pt. was started on CEFEPIME 5/4, and then switched to CEFTRIAXONE 1G QD ON 5/8, TO COMPLETE A TOTAL 7 DAY COURSE.     Patient was also last admitted one week prior to admission, and at that time was found to have ATN and was discharged with creatinine 2.9  (pt refused chance during last admission). Patient presented now with creatinine 4.1 and concern for cardio renal syndrome vs. obstruction. Chance catheter was placed, and pt's creatinine downtrended to 2.  Pt was continued on home dose of Bumex 1mg.     For CHF, patient had an echo 3/23 that showed EF 10-15%. Had an elevated BNP and CXR showed yosef opacities. Patient was continued on home dose Bumex 1mg. Dr. Jiménez cardiologist saw him this admission, and recommended starting Imdur 30 mg PO daily and Hydralazine 10 mg PO TID.     PT recommended that the pt. go back to [facility].   Patient is stable for discharge per attending and is advised to follow up with PCP as outpatient  Please refer to patient's complete medical chart with documents for a full hospital course, for this is only a brief summary.      73 year old male, from group home with PMHx downs syndrome, intellectual disability, COPD with chronic cough, hx of non ischemic cardiomyopathy with non-obstructive CAD on CCTA (2021), HFrEF, HTN, DM, CKD elevated PSA, BPH, schizophrenia, hepatitis B, carrier, bilateral sensorineural hearing loss admitted for AMS 2/2 UTI, hyperkalemia and YASSINE on CKD.    For acute encephalopathy, patient had a CTH that showed no acute abnormalities. Patient was found to have an UTI and was started on cefepime. AMS likely 2/2 UTI, which has now resolved. For UTI, patient was continued ton cefepime and urine culture showed pan- sensitive E.coli. Pt. was started on CEFEPIME 5/4, and then switched to CEFTRIAXONE 1G QD ON 5/8, TO COMPLETE A TOTAL 7 DAY COURSE OF ANTIBIOTIC.     Patient was also last admitted one week prior to admission, and at that time was found to have ATN and was discharged with creatinine 2.9  (pt refused chance during last admission). Patient presented now with creatinine 4.1 and concern for cardio renal syndrome vs. obstruction. Chance catheter was placed, and pt's creatinine downtrended, YASSINE resolved and currently at his new baseline SCr of 2.1.  Pt was continued on home dose of Bumex 1mg.     For CHF, patient had an echo 3/23 that showed EF 10-15%. Had an elevated BNP and CXR showed yosef opacities. Patient was continued on home dose Bumex 1mg. Dr. Jiménez cardiologist saw him this admission, and recommended starting Imdur 30 mg PO daily and Hydralazine 10 mg PO TID. Pt is  a Class III stage C systolic heart failure however given his inability to consent on his own he is not an ICD candidate .     PT recommended that the pt. go back to [facility]. Patient is stable for discharge per attending and is advised to follow up with PCP as outpatient. Please refer to patient's complete medical chart with documents for a full hospital course, for this is only a brief summary.

## 2023-05-05 NOTE — PROGRESS NOTE ADULT - PROBLEM SELECTOR PLAN 6
RESOLVED  K+ 6.3, Likely in setting of worsening CKD, urinary retention  EKG with T-wave abnormality (seen prior) not peaked   s/p insulin dextrose and CaGlu in ED   C/w [facility]    Nephro Dr. Pederson consulted

## 2023-05-05 NOTE — CONSULT NOTE ADULT - PROBLEM SELECTOR RECOMMENDATION 4
Unclear etiology, possibly related to worsening ATN (due to diuresis during previous hospital admission) vs post-obstructive renal failure.  Now with Reyes catheter placed.  Current renal failure complicated by UTI, on IV antibiotics.  Given context of severe systolic HF with NICM, there is also concern for possible cardiorenal syndrome, which would further negatively impact patient's poor prognosis.    Nephrology input appreciated  Follow renal function closely  Remainder of management as per primary team.

## 2023-05-05 NOTE — CONSULT NOTE ADULT - PROBLEM SELECTOR RECOMMENDATION 3
With associated intellectual disability, schizophrenia, and likely dementia    Continue home meds, including Namenda, olanzapine, valproic acid 250 mg AM/1000 mg QHS, and amantadine.  Patient was on PRN clonazepam for anxiety in the past, please consider resuming if medically indicated.    Supportive care, frequent reorientation.

## 2023-05-05 NOTE — CONSULT NOTE ADULT - PROBLEM SELECTOR RECOMMENDATION 5
Clinical evidence indicates that the patient has Severe protein calorie malnutrition/ 3rd degree    In context of     Chronic Illness (>1 month)--worsening systolic HF with recent prolonged hospitalization    Energy/Food intake <50% of estimated energy requirement >5 days  Weight loss: Moderate - severe   Body Fat loss: Severe   with increased temporal wasting on this admission, + LE muscle atrophy noted  Muscle mass loss: Severe, now with albumin of 1.9  Fluid Accumulation: Severe (Fluid overload, worsening CHF with bilateral pleural effusions)   Strength: weakened severe (mostly bedbound, requiring increased assistance with ADLs)    Recommend:   Continue pureed diet with thickened liquids, aspiration precautions, keep HOB elevated at all times.    trial of NG tube feeds vs PEG tube feeds Clinical evidence indicates that the patient has Severe protein calorie malnutrition/ 3rd degree    In context of     Chronic Illness (>1 month)--worsening systolic HF with recent prolonged hospitalization    Energy/Food intake <50% of estimated energy requirement >5 days  Weight loss: Moderate - severe   Body Fat loss: Severe   with increased temporal wasting on this admission, + LE muscle atrophy noted  Muscle mass loss: Severe, now with albumin of 1.9  Fluid Accumulation: Severe (Fluid overload, worsening CHF with bilateral pleural effusions)   Strength: weakened severe (mostly bedbound, requiring increased assistance with ADLs)    Recommend:   Continue pureed diet with thickened liquids, aspiration precautions, keep HOB elevated at all times.

## 2023-05-05 NOTE — CONSULT NOTE ADULT - ASSESSMENT
73 year old male with Intellectual disability, Downs Syndrome, previously from [facility] home number 10 w/ PMHx COPD with chronic cough, hx of non ischemic cardiomyopathy with non-obstructive CAD on CCTA (2021), HFrEF (EF 10-15% per recent Echo), HTN, DM, CKD elevated PSA, BPH, schizophrenia, ? dementia (on home memantine), hepatitis B carrier, and bilateral sensorineural hearing loss.  Per old chart, patient previously seen by EP and deemed not to be a candidate for ICD placement.  Known to our palliative care service.  Previously admitted 3/28 to 3/30 for demand ischemia and CHF exacerbation, then with prolonged hospitalization from 4/8 to 4/28 for chest pain (likely musculoskeletal) and decompensated CHF, with group [place] staff reporting that patient was having worsening BEARDEN and ADL/functional limitations due to dyspnea at home.  Most recent hospitalization complicated by worsening YASSINE on CKD, thought to due to ATN/overdiuresis/? intermittent urinary retention (vs CRS); patient discharged to [facility] on 4/28.  [facility] for altered mental status and respiratory distress. Per care worker from NH at bedside, he is usually alert and conversant, but this morning they found him lethargic appearing and not responsive. With EMS he was minimally responsive and appeared SOB, O2 sat as 79%, he received 200cc IVF and O2 via NRB, and was brought here. Per EMS they had checked his labs and noted he was hyperkalemic to 9.5 - unclear what was done for this. Per documentation he is full code. Patient AAOx3, is not sure why he is in the hospital, denies acute complaints including fever/chills, headache, dizziness, chest pain, palpitations cough, SOB, n/v/d/c, dysuria or leg swelling. NKDA.  (04 May 2023 18:27)    Initial ER workup notable for K of 6.3, BUN/Cr of 80/4.21, and BNP of 63,000.  Reyes catheter inserted, patient noted to have cloudy urine.  Patient was admitted for metabolic encephalopathy, UTI, YASSINE on CND (likely due to post-obstructive renal failure) and acute on chronic systolic HF.  Started on IV antibiotics (cefepime). Full Nephrology consult pending.  Of note albumin now 1.9 (was 3.0 at time of previous hospital discharge)

## 2023-05-05 NOTE — DISCHARGE NOTE PROVIDER - CARE PROVIDER_API CALL
Gale Nation  [facility]  [address] [place] [place] [address]  Phone: [phone]  Fax: [phone]  Follow Up Time:

## 2023-05-05 NOTE — CONSULT NOTE ADULT - SUBJECTIVE AND OBJECTIVE BOX
Consult to: Discuss complex medical decision making related to goals of care    [facility] and Palliative Consult Service:  Aliza Hsu DO: cell ([phone])  Reji Hart MD: cell ([phone])  Joel Weiner NP: cell ([phone])   Michelle Iqbal DNP: cell ([phone])  Kahlil Lucas LMSW: cell ([phone])   Bharti Nogueira NP: via [employer] Teams    Can contact any Palliative Team member via [employer] Teams for consults and questions      HPI:  73 year old male with Intellectual disability, Downs Syndrome, from [facility] home [facility] w/ PMHx COPD with chronic cough, hx of non ischemic cardiomyopathy with non-obstructive CAD on CCTA (), HFrEF, HTN, DM, CKD elevated PSA, BPH, schizophrenia, hepatitis B, carrier, bilateral sensorineural hearing loss, was brought in from the facility for who came in to the ED for sore throat and chest pain, BIBEMS for altered mental status and respiratory distress. Per care worker from NH at bedside, he is usually alert and conversant, but this morning they found him lethargic appearing and not responsive. With EMS he was minimally responsive and appeared SOB, O2 sat as 79%, he received 200cc IVF and O2 via NRB, and was brought here. Per EMS they had checked his labs and noted he was hyperkalemic to 9.5 - unclear what was done for this. Per documentation he is full code. Patient AAOx3, is not sure why he is in the hospital, denies acute complaints including fever/chills, headache, dizziness, chest pain, palpitations cough, SOB, n/v/d/c, dysuria or leg swelling. NKDA.  Of note pt was discharged 5 days ago from [facility] after admission for decompensated CHF. (04 May 2023 18:27)      PAST MEDICAL & SURGICAL HISTORY:  Down syndrome      HTN (hypertension)      Hyperlipidemia      Diabetes mellitus      Intellectual disability      Chronic HFrEF (heart failure with reduced ejection fraction)      Chronic obstructive pulmonary disease (COPD)      CAD (coronary artery disease)      BPH (benign prostatic hyperplasia)      No significant past surgical history          SOCIAL HISTORY:    Admitted from:  home  (with HHA)           assisted living          CARLOS       LTC   [ none ] Substance abuse, [ none ] Tobacco hx, [ none ] Alcohol hx, [ none ] Home Opioid Hx    FAMILY HISTORY:  No pertinent family history in first degree relatives     unable to obtain from patient due to poor mentation, family unable to give information, see H&P for history  Baseline ADLs (prior to admission):    Allergies    No Known Allergies    Intolerances      Present Symptoms: Mild, Moderate, Severe  Pain:             Location -                               Aggravating factors -             Quality -             Radiation -             Timing-             Severity (0-10 scale):             Minimal acceptable level (0-10 scale):  Fatigue:  Nausea:  Lack of Appetite:   SOB:  Depression:  Anxiety:  Review of Systems: [All others negative or Unable to obtain due to poor mentation]    CPOT:    https://www.[email or web address]/getattachment/[email or web address]/Critical-Care-Pain-Observation-Tool-(CPOT)  PAIN AD Score:   http://geriatrictoolkit.[email or web address]/painad.pdf (press ctrl +  left click to view)      MEDICATIONS  (STANDING):  amantadine 100 milliGRAM(s) Oral two times a day  aspirin  chewable 81 milliGRAM(s) Oral daily  atorvastatin 40 milliGRAM(s) Oral at bedtime  buMETAnide 1 milliGRAM(s) Oral daily  cefepime   IVPB 1000 milliGRAM(s) IV Intermittent every 12 hours  finasteride 5 milliGRAM(s) Oral daily  fluticasone propionate 50 MICROgram(s)/spray Nasal Spray 1 Spray(s) Both Nostrils two times a day  heparin   Injectable 5000 Unit(s) SubCutaneous every 8 hours  insulin lispro (ADMELOG) corrective regimen sliding scale   SubCutaneous three times a day before meals  lactulose Syrup 20 Gram(s) Oral at bedtime  melatonin 5 milliGRAM(s) Oral at bedtime  memantine 5 milliGRAM(s) Oral two times a day  metoprolol succinate ER 25 milliGRAM(s) Oral daily  montelukast 10 milliGRAM(s) Oral at bedtime  multivitamin 1 Tablet(s) Oral daily  OLANZapine 10 milliGRAM(s) Oral at bedtime  sodium zirconium cyclosilicate 10 Gram(s) Oral three times a day  tamsulosin 0.4 milliGRAM(s) Oral at bedtime  tiotropium 2.5 MICROgram(s) Inhaler 2 Puff(s) Inhalation daily  valproic  acid Syrup 1000 milliGRAM(s) Oral at bedtime  valproic  acid Syrup 250 milliGRAM(s) Oral daily    MEDICATIONS  (PRN):  acetaminophen     Tablet .. 650 milliGRAM(s) Oral every 6 hours PRN Temp greater or equal to 38C (100.4F), Mild Pain (1 - 3)      PHYSICAL EXAM:  Vital Signs Last 24 Hrs  T(C): 36.3 (05 May 2023 19:24), Max: 36.3 (05 May 2023 05:26)  T(F): 97.4 (05 May 2023 19:24), Max: 97.4 (05 May 2023 05:26)  HR: 81 (05 May 2023 19:24) (76 - 81)  BP: 118/79 (05 May 2023 19:24) (108/76 - 118/79)  BP(mean): --  RR: 18 (05 May 2023 19:24) (17 - 19)  SpO2: 100% (05 May 2023 19:24) (97% - 100%)    Parameters below as of 05 May 2023 19:24  Patient On (Oxygen Delivery Method): nasal cannula  O2 Flow (L/min): 2      General: alert  oriented x ____    lethargic distressed cachexia  nonverbal  unarousable verbal    Palliative Performance Scale/Karnofsky Score:  http://npcrc.org/files/news/palliative_performance_scale_ppsv2.pdf    HEENT: no abnormal lesion, dry mouth  ET tube/trach oral lesions:  Lungs: tachypnea/labored breathing, audible excessive secretions  CV: RRR, S1S2, tachycardia  GI: soft non distended non tender  incontinent               PEG/NG/OG tube  constipation  last BM:   : incontinent  oliguria/anuria  chance  Musculoskeletal: weakness x4 edema x4    ambulatory with assistance   mostly/fully bedbound/wheelchair bound  Skin: no abnormal skin lesions, poor skin turgor, pressure ulcer stage:   Neuro: no deficits, mild cognitive impairment dsyphagia/dysarthria paresis  Oral intake ability: unable/only mouth care, minimal moderate full capability    LABS:                        12.0   7.75  )-----------( 189      ( 05 May 2023 07:15 )             39.2         143  |  111<H>  |  77<H>  ----------------------------<  144<H>  5.3   |  27  |  4.10<H>    Ca    10.0      05 May 2023 07:15  Phos  5.2     05-05  Mg     3.3     05-05    TPro  5.6<L>  /  Alb  1.9<L>  /  TBili  0.5  /  DBili  x   /  AST  35  /  ALT  79<H>  /  AlkPhos  95  05-05    Urinalysis Basic - ( 04 May 2023 12:47 )    Color: Yellow / Appearance: very cloudy / S.015 / pH: x  Gluc: x / Ketone: Negative  / Bili: Negative / Urobili: 4 mg/dL   Blood: x / Protein: 100 mg/dL / Nitrite: Negative   Leuk Esterase: Moderate / RBC: 10-25 /HPF / WBC >50 /HPF   Sq Epi: x / Non Sq Epi: x / Bacteria: Many /HPF        RADIOLOGY & ADDITIONAL STUDIES:     Consult to: Discuss complex medical decision making related to goals of care    [facility] Geriatric and Palliative Consult Service:  Aliza Hsu DO: cell ([phone])  Reji Hart MD: cell ([phone])  Joel Weiner NP: cell ([phone])   Michelle Rasheed DNP: cell ([phone])  Kahlil Lucas LMSW: cell ([phone])   Bharti Nogueira NP: via [employer] Teams    Can contact any Palliative Team member via [employer] Teams for consults and questions      HPI:  73 year old male with Intellectual disability, Downs Syndrome, previously from [facility] number [facility] w/ PMHx COPD with chronic cough, hx of non ischemic cardiomyopathy with non-obstructive CAD on CCTA (), HFrEF (EF 10-15% per recent Echo), HTN, DM, CKD elevated PSA, BPH, schizophrenia, ? dementia (on home memantine), hepatitis B carrier, and bilateral sensorineural hearing loss.  Per old chart, patient previously seen by EP and deemed not to be a candidate for ICD placement.  Known to our palliative care service.  Previously admitted 3/28 to 3/30 for demand ischemia and CHF exacerbation, then with prolonged hospitalization from  to  for chest pain (likely musculoskeletal) and decompensated CHF, with group home staff reporting that patient was having worsening BEARDEN and ADL/functional limitations due to dyspnea at home.  Most recent hospitalization complicated by worsening YASSINE on CKD, thought to due to ATN/overdiuresis/? intermittent urinary retention (vs CRS); patient [facility] .  [facility] altered mental status and respiratory distress. Per care worker from NH at bedside, he is usually alert and conversant, but this morning they found him lethargic appearing and not responsive. With EMS he was minimally responsive and appeared SOB, O2 sat as 79%, he received 200cc IVF and O2 via NRB, and was brought here. Per EMS they had checked his labs and noted he was hyperkalemic to 9.5 - unclear what was done for this. Per documentation he is full code. Patient AAOx3, is not sure why he is in the hospital, denies acute complaints including fever/chills, headache, dizziness, chest pain, palpitations cough, SOB, n/v/d/c, dysuria or leg swelling. NKDA.  (04 May 2023 18:27)    Initial ER workup notable for K of 6.3, BUN/Cr of 80/4.21, and BNP of 63,000.  Reyes catheter inserted, patient noted to have cloudy urine.  Patient was admitted for metabolic encephalopathy, UTI, YASSINE on CND (likely due to post-obstructive renal failure) and acute on chronic systolic HF.    Patient examined at beside this afternoon.  Presently alert and oriented x 2-3, appears slightly anxious.  Denies chest pain or SOB presently.  Otherwise has no acute complaints.      PAST MEDICAL & SURGICAL HISTORY:  Down syndrome      HTN (hypertension)      Hyperlipidemia      Diabetes mellitus      Intellectual disability      Chronic HFrEF (heart failure with reduced ejection fraction)      Chronic obstructive pulmonary disease (COPD)      CAD (coronary artery disease)      BPH (benign prostatic hyperplasia)      No significant past surgical history          SOCIAL HISTORY:    Admitted from:   [facility]).  Patient previously resident of [facility] (medical director--Dr. Emilia Hearn ([phone])  [ none ] Substance abuse, [ none ] Tobacco hx, [ none ] Alcohol hx, [ none ] Home Opioid Hx    Of note patient is under the auspices of [employer] ([employer]) as 1750-b guardian.  Had attempted to obtain MOLST orders for DNR/DNI during previous admission, but CAB refused, requesting additional cardiology opinion.      FAMILY HISTORY:  No pertinent family history in first degree relatives    Unable to obtain from patient due to poor mentation, no family available to give information, see H&P for history  Baseline ADLs (prior to admission): currently ambulates with walker, able to feed self, requiring minor assistance with ADLs.     Allergies    No Known Allergies    Intolerances      Present Symptoms: Mild, Moderate, Severe  Pain:  Denies             Location -                               Aggravating factors -             Quality -             Radiation -             Timing-             Severity (0-10 scale):  0/10             Minimal acceptable level (0-10 scale):  Fatigue:  Denies  Nausea:  Denies  Lack of Appetite:   Denies  SOB:  Denies  Depression:  Denies  Anxiety:  Review of Systems:   All other systems reviewed and are negative      MEDICATIONS  (STANDING):  amantadine 100 milliGRAM(s) Oral two times a day  aspirin  chewable 81 milliGRAM(s) Oral daily  atorvastatin 40 milliGRAM(s) Oral at bedtime  buMETAnide 1 milliGRAM(s) Oral daily  cefepime   IVPB 1000 milliGRAM(s) IV Intermittent every 12 hours  finasteride 5 milliGRAM(s) Oral daily  fluticasone propionate 50 MICROgram(s)/spray Nasal Spray 1 Spray(s) Both Nostrils two times a day  heparin   Injectable 5000 Unit(s) SubCutaneous every 8 hours  insulin lispro (ADMELOG) corrective regimen sliding scale   SubCutaneous three times a day before meals  lactulose Syrup 20 Gram(s) Oral at bedtime  melatonin 5 milliGRAM(s) Oral at bedtime  memantine 5 milliGRAM(s) Oral two times a day  metoprolol succinate ER 25 milliGRAM(s) Oral daily  montelukast 10 milliGRAM(s) Oral at bedtime  multivitamin 1 Tablet(s) Oral daily  OLANZapine 10 milliGRAM(s) Oral at bedtime  sodium zirconium cyclosilicate 10 Gram(s) Oral three times a day  tamsulosin 0.4 milliGRAM(s) Oral at bedtime  tiotropium 2.5 MICROgram(s) Inhaler 2 Puff(s) Inhalation daily  valproic  acid Syrup 1000 milliGRAM(s) Oral at bedtime  valproic  acid Syrup 250 milliGRAM(s) Oral daily    MEDICATIONS  (PRN):  acetaminophen     Tablet .. 650 milliGRAM(s) Oral every 6 hours PRN Temp greater or equal to 38C (100.4F), Mild Pain (1 - 3)      PHYSICAL EXAM:  Vital Signs Last 24 Hrs  T(C): 36.3 (05 May 2023 19:24), Max: 36.3 (05 May 2023 05:26)  T(F): 97.4 (05 May 2023 19:24), Max: 97.4 (05 May 2023 05:26)  HR: 81 (05 May 2023 19:24) (76 - 81)  BP: 118/79 (05 May 2023 19:24) (108/76 - 118/79)  BP(mean): --  RR: 18 (05 May 2023 19:24) (17 - 19)  SpO2: 100% (05 May 2023 19:24) (97% - 100%)    Parameters below as of 05 May 2023 19:24  Patient On (Oxygen Delivery Method): nasal cannula  O2 Flow (L/min): 2      General: alert  oriented x _2-3___    appears weak, slightly anxious, + moderate temporal wasting (increased from previous).  Otherwise in NAD    Palliative Performance Scale/Karnofsky Score: 40%  http://[place].org/files/news/palliative_performance_scale_ppsv2.pdf    HEENT: EOMI, anicteric, pharynx clear, MM moist  Lungs: transmitted rhonchi noted throughout lung fields, no rales, respirations unlabored  CV:  RSR, normal S1 and S2, no murmur  GI: soft non distended non tender, normal bowel sounds   : + Reyes cath in place with clear yellow urine  Musculoskeletal:  mild LE weakness, mostly bedbound (ambulates with assistance), 1+ edema of bilateral LE noted  Skin: no abnormal skin lesions or DU noted  Neuro: no focal deficits, + cognitive impairment (due to intellectual disability/Downs)  Oral intake ability: full capability, on pureed diet with thickened liquids (chronic), no reported dysphagia      LABS:                        12.0   7.75  )-----------( 189      ( 05 May 2023 07:15 )             39.2     05-05    143  |  111<H>  |  77<H>  ----------------------------<  144<H>  5.3   |  27  |  4.10<H>    Ca    10.0      05 May 2023 07:15  Phos  5.2     05-05  Mg     3.3     05-05    TPro  5.6<L>  /  Alb  1.9<L>  /  TBili  0.5  /  DBili  x   /  AST  35  /  ALT  79<H>  /  AlkPhos  95  05-05    Urinalysis Basic - ( 04 May 2023 12:47 )    Color: Yellow / Appearance: very cloudy / S.015 / pH: x  Gluc: x / Ketone: Negative  / Bili: Negative / Urobili: 4 mg/dL   Blood: x / Protein: 100 mg/dL / Nitrite: Negative   Leuk Esterase: Moderate / RBC: 10-25 /HPF / WBC >50 /HPF   Sq Epi: x / Non Sq Epi: x / Bacteria: Many /HPF        RADIOLOGY & ADDITIONAL STUDIES:    ACC: [id number] EXAM:  XR CHEST PORTABLE IMMED 1V   ORDERED BY: SHANDA DENSON     PROCEDURE DATE:  2023          INTERPRETATION:  AP semierect chest on May 4, 2023 at 12:33 PM. Patient   has respiratory distress.    Heart is enlarged.    There are increasing roughly symmetric upper lobe infiltrates compared to   .    IMPRESSION: Increasing bilateral upper lobe infiltrates.    --- End of Report ---      ACC: [id number] EXAM:  CT BRAIN   ORDERED BY: SHANDA DENSON     PROCEDURE DATE:  2023          INTERPRETATION:  CLINICAL STATEMENT: Pain.    TECHNIQUE: CT of the head was performed without IV contrast.    COMPARISON: None.    FINDINGS:  Limited study due to motion    There is moderate diffuse parenchymal volume loss.    There are areas of low attenuation in the periventricular white matter   likely related to mild chronic microvascular ischemic changes.    There is no acute intracranial hemorrhage, parenchymal mass, mass effect   or midline shift. There is no acute territorial infarct. There is no   hydrocephalus. Incidental septation in the right lateral ventricle noted.   Partial empty sella    The cranium is intact. Right orbital postsurgical changes noted. Mild   mucosal thickening paranasal sinuses        IMPRESSION:  Limited study due to motion    No acute intracranial hemorrhage or acute territorial infarct.  If   symptoms persist, follow-up MRI exam recommended.    --- End of Report ---

## 2023-05-05 NOTE — CONSULT NOTE ADULT - ASSESSMENT
Patient is a 72yo Male from  [facility] home number with CKD,  h/o intellectual disability, Down Syndrome, asthma, COPD with chronic cough, HTN, DM, BPH, schizophrenia,, bilateral sensorineural hearing loss, recently admitted to [facility] (4/8-4/28) for decompensated HF and YASSINE presents with AMS and respiratory distress and found to be hyperkalemic to 9.5 as an outpt. s/ chance placement in ER with cloudy urine. Pt a/w acute on chronic CHF, UTI, hyperkalemia and YASSINE on CKD.     1. YASSINE- in the setting of UTI vs CHF. Pt with rales on exam. CXR with increasing b/l upper lobe infiltrates. Check CT chest. c/w Bumex 1mg PO daily. Monitor urine o/p  UA active in the setting of infection. Check urine lytes. Check Renal US.   (pt with h/o CHF with severe global LVSD on TTE). Strict I/Os. Avoid nephrotoxins/ NSAIDs/ RCA. Monitor BMP.  2. CKD-3a- baseline SCr 1.3-1.6. However pt was recently admitted with YASSINE and d/c with SCr 2.95 on 4/28/23. Defer secondary w/u as an outpt. Avoid nephrotoxins  3. HTN 2/2 CKD- BP acceptable. Continue to hold Enalapril.  Monitor BP.  4. Hyperkalemia- resolved with medical management; Lokelma. c/w low potassium diet. Monitor serum potassium  5. UTI- +UA. Pt on Cefepime. UCx and BCx pending  6. BPH- c/w flomax and finasteride. Pt now with [facility] NEPHROLOGY  Raul Goncalves M.D.  Bennie Carcamo D.O.  Priya Pederson M.D.  Hellen Dietz, MSN, ANP-C  [phone]  Fax: [phone] [place] [address], [address]  [place] [address]

## 2023-05-05 NOTE — CONSULT NOTE ADULT - ASSESSMENT
HPI:  73 year old male with Intellectual disability, Downs Syndrome, [facility]/ PMHx COPD with chronic cough, hx of non ischemic cardiomyopathy with non-obstructive CAD on CCTA (), HFrEF, HTN, DM, CKD elevated PSA, BPH, schizophrenia, hepatitis B, carrier, bilateral sensorineural hearing loss, was brought in from the facility for who came in to the ED for sore throat and chest pain, BIBEMS for altered mental status and respiratory distress. Per care worker from NH at bedside, he is usually alert and conversant, but this morning they found him lethargic appearing and not responsive. With EMS he was minimally responsive and appeared SOB, O2 sat as 79%, he received 200cc IVF and O2 via NRB, and was brought here. Per EMS they had checked his labs and noted he was hyperkalemic to 9.5 - unclear what was done for this. Per documentation he is full code. Patient AAOx3, is not sure why he is in the hospital, denies acute complaints including fever/chills, headache, dizziness, chest pain, palpitations cough, SOB, n/v/d/c, dysuria or leg swelling. NKDA.  Of note pt was discharged 5 days ago from [facility] after admission for decompensated CHF. (04 May 2023 18:27)      Allergies    No Known Allergies    Intolerances      PAST MEDICAL & SURGICAL HISTORY:  Down syndrome      HTN (hypertension)      Hyperlipidemia      Diabetes mellitus      Intellectual disability      Chronic HFrEF (heart failure with reduced ejection fraction)      Chronic obstructive pulmonary disease (COPD)      CAD (coronary artery disease)      BPH (benign prostatic hyperplasia)      No significant past surgical history        SOCIAL HISTORY: [ ] smoking [ ] alcohol [ ] IVDU  FAMILY HISTORY:  No pertinent family history in first degree relatives     no pertinent related medical history    REVIEW OF SYSTEMS:  CONSTITUTIONAL:  No fevers or chills  EYES/ENT:  No visual changes;  No vertigo or throat pain   NECK:  No neck pain or stiffness  RESPIRATORY:  No cough, wheezing, hemoptysis; No shortness of breath  CARDIOVASCULAR:  No chest pain or palpitations  GASTROINTESTINAL:  No abdominal or epigastric pain. No nausea, vomiting, or hematemesis; No diarrhea or constipation. No melena or hematochezia.  GENITOURINARY:  No dysuria, frequency or hematuria  NEUROLOGICAL:  No HA, no numbness or LE weakness  MSK: no back pain, no joint pain  SKIN:  No itching, no skin rash    PHYSICAL EXAM:  VITALS: Vital Signs Last 24 Hrs  T(C): 36.1 (05 May 2023 13:43), Max: 36.3 (04 May 2023 15:00)  T(F): 97 (05 May 2023 13:43), Max: 97.4 (04 May 2023 18:04)  HR: 81 (05 May 2023 13:43) (71 - 81)  BP: 108/76 (05 May 2023 13:43) (101/78 - 124/92)  RR: 17 (05 May 2023 13:43) (17 - 20)  SpO2: 100% (05 May 2023 13:43) (94% - 100%)    Parameters below as of 05 May 2023 13:43  Patient On (Oxygen Delivery Method): nasal cannula  O2 Flow (L/min): 2    Gen: AOx2-3, back to baseline mentally, NAD  HEAD:  Atraumatic, Normocephalic  EYES: PERRLA, conjunctiva and sclera clear  ENT: Moist mucous membranes  NECK: Supple, No JVD  CV: S1+S2 normal, no murmur  Resp: Clear bilat, no resp distress, no crackles/wheezes  Abd: Soft, nontender, +BS  Ext: No LE edema, no cyanosis, LE pulses present  : + Reyes with yellow urine  IV/Skin: No thrombophlebitis, no skin rash  Msk: No low back pain, no arthralgias, no joint swelling  Neuro: AAOx2-3, intellectually challenged pt but follows commands, pleasant and conversant no new focal signs   Psych: no anxiety, no delusional ideas, no suicidal ideation    LABS/DIAGNOSTIC TESTS:                        12.0   7.75  )-----------( 189      ( 05 May 2023 07:15 )             39.2     WBC Count: 7.75 K/uL ( @ 07:15)  WBC Count: 8.13 K/uL ( @ 12:20)        143  |  111<H>  |  77<H>  ----------------------------<  144<H>  5.3   |  27  |  4.10<H>    Ca    10.0      05 May 2023 07:15  Phos  5.2     05-05  Mg     3.3     05-05    TPro  5.6<L>  /  Alb  1.9<L>  /  TBili  0.5  /  DBili  x   /  AST  35  /  ALT  79<H>  /  AlkPhos  95  05-05    Urinalysis Basic - ( 04 May 2023 12:47 )    Color: Yellow / Appearance: very cloudy / S.015 / pH: x  Gluc: x / Ketone: Negative  / Bili: Negative / Urobili: 4 mg/dL   Blood: x / Protein: 100 mg/dL / Nitrite: Negative   Leuk Esterase: Moderate / RBC: 10-25 /HPF / WBC >50 /HPF   Sq Epi: x / Non Sq Epi: x / Bacteria: Many /HPF    CULTURES:   Culture - Nasopharyngeal (collected 23 @ 13:16)  Source: .Nasopharyngeal Nasopharyngeal  Final Report (23 @ 17:12):    No Neisseria. meningitidis isolated.    "Culture was evaluated for N. Meningitidis only."    Rapid RVP Result: NotDetec ( @ 12:11)    RADIOLOGY: < from: Xray Chest 1 View-PORTABLE IMMEDIATE (23 @ 13:13) >  IMPRESSION: Increasing bilateral upper lobe infiltrates.    ANTIBIOTICS:  cefepime   IVPB 1000 every 12 hours    IMPRESSION:  74 yo male from [facility] with h/o Intelectual disability, Fetal Alcohol Syndrome, asthma, SOPD, HTN, DM, CKD, BPH, schizophrenia, Hepatitis B, BL sensorineural hearing loss BIBEMS for AMS and hyperkalemia from Group Home, lethargic and with SOB, O2sat 79% as per EMS.   K 9.5 per EMS.  Recently d/c from [facility]  after tx for CHF exacerbation.  Noted in acute renal failure with elevated BUN and hyperkalemia.  Reyes placed in ED with evidence of retention.  CXR report reported with increasing upper lobes infiltrates but the clinical picture is more consistent with congestion     -Acute Metabolic Encephalopathy(Uremic)  -UTI- Obstructive uropathy(BPH and urinary retention) Reyes placed  -YASSINE on CKD 4  -DM with nephropathy  -CHF- CXR with areas consistent for pulm edema  -BPH     PLAN:  Continue cefepime 1g q12 hrs  Consider TOV  Consider Urology eval( as per records of previous admission he was evaluated for urine retention as well)  Follow up cultures  Monitor renal function and electrolytes  Discussed with medicine attending    Please reach ID with any questions or concerns  Dr. Gris Verde  Available in Teams               HPI:  73 year old male with Intellectual disability, Downs Syndrome[facility]/ PMHx COPD with chronic cough, hx of non ischemic cardiomyopathy with non-obstructive CAD on CCTA (), HFrEF, HTN, DM, CKD elevated PSA, BPH, schizophrenia, hepatitis B, carrier, bilateral sensorineural hearing loss, was brought in from the facility for who came in to the ED for sore throat and chest pain, BIBEMS for altered mental status and respiratory distress. Per care worker from NH at bedside, he is usually alert and conversant, but this morning they found him lethargic appearing and not responsive. With EMS he was minimally responsive and appeared SOB, O2 sat as 79%, he received 200cc IVF and O2 via NRB, and was brought here. Per EMS they had checked his labs and noted he was hyperkalemic to 9.5 - unclear what was done for this. Per documentation he is full code. Patient AAOx3, is not sure why he is in the hospital, denies acute complaints including fever/chills, headache, dizziness, chest pain, palpitations cough, SOB, n/v/d/c, dysuria or leg swelling. NKDA.  [facility]. (04 May 2023 18:27)    Allergies: No Known Allergies    PAST MEDICAL & SURGICAL HISTORY:  Down syndrome  HTN (hypertension)  Hyperlipidemia  Diabetes mellitus  Intellectual disability  Chronic HFrEF (heart failure with reduced ejection fraction)  Chronic obstructive pulmonary disease (COPD)  CAD (coronary artery disease)  BPH (benign prostatic hyperplasia)  No significant past surgical history    SOCIAL HISTORY: non smoker, no toxic habits, lives at a Group Home  No pertinent family history in first degree relatives /no pertinent related medical history    REVIEW OF SYSTEMS:  CONSTITUTIONAL:  No fevers or chills  EYES/ENT:  No visual changes;  No vertigo or throat pain   NECK:  No neck pain or stiffness  RESPIRATORY:  No cough, wheezing, hemoptysis; No shortness of breath  CARDIOVASCULAR:  No chest pain or palpitations  GASTROINTESTINAL:  No abdominal or epigastric pain. No nausea, vomiting, or hematemesis; No diarrhea or constipation. No melena or hematochezia.  GENITOURINARY:  No dysuria, frequency or hematuria  NEUROLOGICAL:  No HA, no numbness or LE weakness  MSK: no back pain, no joint pain  SKIN:  No itching, no skin rash    PHYSICAL EXAM:  VITALS: Vital Signs Last 24 Hrs  T(C): 36.1 (05 May 2023 13:43), Max: 36.3 (04 May 2023 15:00)  T(F): 97 (05 May 2023 13:43), Max: 97.4 (04 May 2023 18:04)  HR: 81 (05 May 2023 13:43) (71 - 81)  BP: 108/76 (05 May 2023 13:43) (101/78 - 124/92)  RR: 17 (05 May 2023 13:43) (17 - 20)  SpO2: 100% (05 May 2023 13:43) (94% - 100%)    Gen: AOx2-3, back to baseline mentally, NAD  HEAD:  Atraumatic, Normocephalic  EYES: PERRLA, conjunctiva and sclera clear  ENT: Moist mucous membranes  NECK: Supple, No JVD  CV: S1+S2 normal, no murmur  Resp: Coarse breath sounds, no crackles  Abd: Soft, nontender, +BS  Ext: No LE edema, no cyanosis, LE pulses present  : + Reyes with yellow urine  IV/Skin: No thrombophlebitis, no skin rash  Msk: No low back pain, no arthralgias, no joint swelling  Neuro: AAOx2-3, intellectually challenged pt but follows commands, pleasant and conversant no new focal signs   Psych: no anxiety, no delusional ideas, no suicidal ideation    LABS/DIAGNOSTIC TESTS:                        12.0   7.75  )-----------( 189      ( 05 May 2023 07:15 )             39.2     WBC Count: 7.75 K/uL ( @ 07:15)  WBC Count: 8.13 K/uL ( @ 12:20)        143  |  111<H>  |  77<H>  ----------------------------<  144<H>  5.3   |  27  |  4.10<H>    Ca    10.0      05 May 2023 07:15  Phos  5.2     05-  Mg     3.3     -    TPro  5.6<L>  /  Alb  1.9<L>  /  TBili  0.5  /  DBili  x   /  AST  35  /  ALT  79<H>  /  AlkPhos  95      Urinalysis Basic - ( 04 May 2023 12:47 )    Color: Yellow / Appearance: very cloudy / S.015 / pH: x  Gluc: x / Ketone: Negative  / Bili: Negative / Urobili: 4 mg/dL   Blood: x / Protein: 100 mg/dL / Nitrite: Negative   Leuk Esterase: Moderate / RBC: 10-25 /HPF / WBC >50 /HPF   Sq Epi: x / Non Sq Epi: x / Bacteria: Many /HPF    CULTURES:   Culture - Nasopharyngeal (collected 23 @ 13:16)  Source: .Nasopharyngeal Nasopharyngeal  Final Report (23 @ 17:12):    No Neisseria. meningitidis isolated.    "Culture was evaluated for N. Meningitidis only."    Rapid RVP Result: NotDetec ( @ 12:11)    RADIOLOGY: < from: Xray Chest 1 View-PORTABLE IMMEDIATE (23 @ 13:13) >  IMPRESSION: Increasing bilateral upper lobe infiltrates.    ANTIBIOTICS:  cefepime   IVPB 1000 every 12 hours    IMPRESSION:  72 yo male from [facility] with h/o Intelectual disability, Fetal Alcohol Syndrome, asthma, SOPD, HTN, DM, CKD, BPH, schizophrenia, Hepatitis B, BL sensorineural hearing loss BIBEMS for AMS and hyperkalemia from Group Home, lethargic and with SOB, O2sat 79% as per EMS.   K 9.5 per EMS.  [facility] for [facility]F exacerbation.  Noted in acute renal failure with elevated BUN and hyperkalemia.  Reyes placed in ED with evidence of retention.  CXR report reported with increasing upper lobes infiltrates but the clinical picture is more consistent with congestion     -Acute Metabolic Encephalopathy(Uremic)  -UTI- Obstructive uropathy(BPH and urinary retention) Reyes placed  -YASSINE on CKD 4  -DM with nephropathy  -ARF likely due to fluid overload / renal failure +/-CHF (CXR with areas consistent for pulm edema)  -BPH     PLAN:  Continue cefepime 1g q12 hrs  Consider TOV  Consider Urology eval( as per records of previous admission he was evaluated for urine retention as well)  Follow up cultures  Monitor renal function and electrolytes  Discussed with medicine attending    Please reach ID with any questions or concerns  Dr. Gris Verde  Available in Teams

## 2023-05-05 NOTE — CONSULT NOTE ADULT - PROBLEM SELECTOR RECOMMENDATION 6
Patient with increased functional limitations due to worsening CHF symptoms, in context of Down's syndrome, intellectual disability, presumed dementia, and multiple medical comorbidities, aggravated by recent prolonged hospitalization for CHF. Now has Reyes catheter placed for urinary obstruction.  He is at risk for further physical and functional decline    Recommend OOB to chair  Aggressive PT to prevent further deconditioning  Continued pureed diet.

## 2023-05-05 NOTE — PROGRESS NOTE ADULT - SUBJECTIVE AND OBJECTIVE BOX
PGY-1 Progress Note discussed with attending    PAGER #: [phone]] TILL 5:00 PM  PLEASE CONTACT ON CALL TEAM:  - On Call Team (Please refer to Red) FROM 5:00 PM - 8:30PM  - Nightfloat Team FROM 8:30 -7:30 AM    INTERVAL HPI/OVERNIGHT EVENTS: Patient seen and examined at bedside. No acute events overnight. Continue to complain of SOB. No other complaints.     MEDICATIONS  (STANDING):  amantadine 100 milliGRAM(s) Oral two times a day  aspirin  chewable 81 milliGRAM(s) Oral daily  atorvastatin 40 milliGRAM(s) Oral at bedtime  buMETAnide 1 milliGRAM(s) Oral daily  cefepime   IVPB 1000 milliGRAM(s) IV Intermittent every 12 hours  finasteride 5 milliGRAM(s) Oral daily  fluticasone propionate 50 MICROgram(s)/spray Nasal Spray 1 Spray(s) Both Nostrils two times a day  heparin   Injectable 5000 Unit(s) SubCutaneous every 8 hours  insulin lispro (ADMELOG) corrective regimen sliding scale   SubCutaneous three times a day before meals  lactulose Syrup 20 Gram(s) Oral at bedtime  melatonin 5 milliGRAM(s) Oral at bedtime  memantine 5 milliGRAM(s) Oral two times a day  metoprolol succinate ER 25 milliGRAM(s) Oral daily  montelukast 10 milliGRAM(s) Oral at bedtime  multivitamin 1 Tablet(s) Oral daily  OLANZapine 10 milliGRAM(s) Oral at bedtime  sodium zirconium cyclosilicate 10 Gram(s) Oral three times a day  tamsulosin 0.4 milliGRAM(s) Oral at bedtime  tiotropium 2.5 MICROgram(s) Inhaler 2 Puff(s) Inhalation daily  valproic  acid Syrup 250 milliGRAM(s) Oral daily  valproic  acid Syrup 1000 milliGRAM(s) Oral at bedtime    MEDICATIONS  (PRN):  acetaminophen     Tablet .. 650 milliGRAM(s) Oral every 6 hours PRN Temp greater or equal to 38C (100.4F), Mild Pain (1 - 3)      REVIEW OF SYSTEMS:  CONSTITUTIONAL: No fever, weight loss, or fatigue  RESPIRATORY: No cough, wheezing, chills or hemoptysis; + shortness of breath  CARDIOVASCULAR: No chest pain, palpitations, dizziness + leg swelling  GASTROINTESTINAL: No abdominal pain. No nausea, vomiting, or hematemesis; No diarrhea or constipation. No melena or hematochezia.  GENITOURINARY: No dysuria or hematuria, urinary frequency  NEUROLOGICAL: No headaches, memory loss, loss of strength, numbness, or tremors  SKIN: No itching, burning, rashes, or lesions     Vital Signs Last 24 Hrs  T(C): 36.3 (05 May 2023 05:26), Max: 36.6 (04 May 2023 12:48)  T(F): 97.4 (05 May 2023 05:26), Max: 97.9 (04 May 2023 12:48)  HR: 76 (05 May 2023 05:26) (71 - 80)  BP: 114/82 (05 May 2023 05:26) (100/87 - 124/92)  BP(mean): --  RR: 19 (05 May 2023 05:26) (18 - 20)  SpO2: 97% (05 May 2023 05:26) (94% - 100%)    Parameters below as of 05 May 2023 05:26  Patient On (Oxygen Delivery Method): nasal cannula  O2 Flow (L/min): 2      PHYSICAL EXAMINATION:  GENERAL: Mild distress, obese male, laying in bed  HEAD:  Atraumatic, Normocephalic  EYES:  conjunctiva and sclera clear  NECK: Supple, No JVD  CHEST/LUNG: Right lower lobe rhonchi and crackles. Lungs with diffuse rhonchi , clearer in upper lobes  HEART: Regular rate and rhythm; No murmurs, rubs, or gallops  ABDOMEN: Soft, Nontender, Nondistended; Bowel sounds present  NERVOUS SYSTEM:  Alert & Oriented X3    EXTREMITIES:  2+ Peripheral Pulses. 2+ PE B/L LE  SKIN: warm dry                          12.0   7.75  )-----------( 189      ( 05 May 2023 07:15 )             39.2     05-05    143  |  111<H>  |  77<H>  ----------------------------<  144<H>  5.3   |  27  |  4.10<H>    Ca    10.0      05 May 2023 07:15  Phos  5.2     05-05  Mg     3.3     05-05    TPro  5.6<L>  /  Alb  1.9<L>  /  TBili  0.5  /  DBili  x   /  AST  35  /  ALT  79<H>  /  AlkPhos  95  05-05    LIVER FUNCTIONS - ( 05 May 2023 07:15 )  Alb: 1.9 g/dL / Pro: 5.6 g/dL / ALK PHOS: 95 U/L / ALT: 79 U/L DA / AST: 35 U/L / GGT: x               PT/INR - ( 04 May 2023 12:20 )   PT: 14.8 sec;   INR: 1.24 ratio         PTT - ( 04 May 2023 12:20 )  PTT:30.5 sec    CAPILLARY BLOOD GLUCOSE      RADIOLOGY & ADDITIONAL TESTS:

## 2023-05-05 NOTE — PHARMACOTHERAPY INTERVENTION NOTE - COMMENTS
Patient is from [facility] and its medical record was used to update the outside medication record.
Weight check  Main ED
Cefepime for UTI, day #2, as no pseudomonas and no cultures need to justyfy use; recommended to switch to ceftriaxine

## 2023-05-05 NOTE — DISCHARGE NOTE PROVIDER - NSDCMRMEDTOKEN_GEN_ALL_CORE_FT
acetaminophen 325 mg oral tablet: 2 tab(s) orally once a day as needed for  mild pain  albuterol 2.5 mg/3 mL (0.083%) inhalation solution: 3 milliliter(s) by nebulizer every 4 hours as needed for  shortness of breath  albuterol 90 mcg/inh inhalation aerosol: 2 puff(s) inhaled 4 times a day as needed for wheezing  amantadine 100 mg oral tablet: 1 tab(s) orally 2 times a day  aspirin 81 mg oral tablet, chewable: 1 tab(s) orally once a day  atorvastatin 40 mg oral tablet: 1 tab(s) orally once a day (in the evening)  bumetanide 1 mg oral tablet: 1 tab(s) orally once a day  clonazePAM 0.5 mg oral tablet: 1 tab(s) orally once a day (in the morning)  enalapril 20 mg oral tablet: 1 tab(s) orally once a day  finasteride 5 mg oral tablet: 1 tab(s) orally once a day  fluticasone 50 mcg/inh nasal spray: 1 spray(s) nasal 2 times a day  Guaifed- mg-600 mg oral tablet, extended release: 1 tab(s) orally every 12 hours for COPD  ibuprofen 600 mg oral tablet: 1 tab(s) orally every 8 hours as needed for  moderate pain  ipratropium-albuterol 0.5 mg-2.5 mg/3 mL inhalation solution: 3 milliliter(s) inhaled every 6 hours  Januvia 100 mg oral tablet: 1 tab(s) orally once a day  Jardiance 10 mg oral tablet: 1 tab(s) orally once a day  lactulose 10 g/15 mL oral syrup: 30 milliliter(s) orally once a day (at bedtime)  Melatonin 5 mg oral tablet: 1 tab(s) orally once a day (at bedtime)  memantine 10 mg oral tablet: 0.5 tab(s) orally 2 times a day  metoprolol succinate 25 mg oral tablet, extended release: 1 tab(s) orally once a day  montelukast 10 mg oral tablet: 1 tab(s) orally once a day (at bedtime)  Multiple Vitamins oral tablet: 1 tab(s) orally once a day  OLANZapine 5 mg oral tablet: 2 tab(s) orally once a day (at bedtime)  oxyCODONE 5 mg oral tablet: 1 tab(s) orally every 6 hours as needed for  severe pain  tamsulosin 0.4 mg oral capsule: 1 cap(s) orally once a day  tiotropium 2.5 mcg/inh inhalation aerosol: 2 puff(s) inhaled once a day  tiZANidine 2 mg oral tablet: 1 tab(s) orally every 8 hours as needed  valproic acid 250 mg/5 mL oral liquid: 5 milliliter(s) orally once a day (in the morning)  valproic acid 250 mg/5 mL oral liquid: 20 milliliter(s) orally once a day (at bedtime)   acetaminophen 325 mg oral tablet: 2 tab(s) orally once a day as needed for  mild pain  albuterol 2.5 mg/3 mL (0.083%) inhalation solution: 3 milliliter(s) by nebulizer every 4 hours as needed for  shortness of breath  albuterol 90 mcg/inh inhalation aerosol: 2 puff(s) inhaled 4 times a day as needed for wheezing  amantadine 100 mg oral tablet: 1 tab(s) orally 2 times a day  aspirin 81 mg oral tablet, chewable: 1 tab(s) orally once a day  atorvastatin 40 mg oral tablet: 1 tab(s) orally once a day (in the evening)  bumetanide 1 mg oral tablet: 1 tab(s) orally once a day  cefpodoxime 200 mg oral tablet: 1 tab(s) orally 2 times a day  clonazePAM 0.5 mg oral tablet: 1 tab(s) orally once a day (in the morning)  finasteride 5 mg oral tablet: 1 tab(s) orally once a day  fluticasone 50 mcg/inh nasal spray: 1 spray(s) nasal 2 times a day  Guaifed- mg-600 mg oral tablet, extended release: 1 tab(s) orally every 12 hours for COPD  hydrALAZINE 10 mg oral tablet: 1 tab(s) orally 3 times a day  ibuprofen 600 mg oral tablet: 1 tab(s) orally every 8 hours as needed for  moderate pain  ipratropium-albuterol 0.5 mg-2.5 mg/3 mL inhalation solution: 3 milliliter(s) inhaled every 6 hours  isosorbide mononitrate 30 mg oral tablet, extended release: 1 tab(s) orally once a day  Januvia 100 mg oral tablet: 1 tab(s) orally once a day  Jardiance 10 mg oral tablet: 1 tab(s) orally once a day  lactulose 10 g/15 mL oral syrup: 30 milliliter(s) orally once a day (at bedtime)  Melatonin 5 mg oral tablet: 1 tab(s) orally once a day (at bedtime)  memantine 10 mg oral tablet: 0.5 tab(s) orally 2 times a day  metoprolol succinate 25 mg oral tablet, extended release: 1 tab(s) orally once a day  montelukast 10 mg oral tablet: 1 tab(s) orally once a day (at bedtime)  Multiple Vitamins oral tablet: 1 tab(s) orally once a day  OLANZapine 5 mg oral tablet: 2 tab(s) orally once a day (at bedtime)  oxyCODONE 5 mg oral tablet: 1 tab(s) orally every 6 hours as needed for  severe pain  tamsulosin 0.4 mg oral capsule: 1 cap(s) orally once a day  tiotropium 2.5 mcg/inh inhalation aerosol: 2 puff(s) inhaled once a day  tiZANidine 2 mg oral tablet: 1 tab(s) orally every 8 hours as needed  valproic acid 250 mg/5 mL oral liquid: 5 milliliter(s) orally once a day (in the morning)  valproic acid 250 mg/5 mL oral liquid: 20 milliliter(s) orally once a day (at bedtime)   HYDROmorphone: 0.5 milligram(s) intravenous every 2 hours

## 2023-05-05 NOTE — DISCHARGE NOTE PROVIDER - NSDCCPCAREPLAN_GEN_ALL_CORE_FT
PRINCIPAL DISCHARGE DIAGNOSIS  Diagnosis: Acute UTI  Assessment and Plan of Treatment:       SECONDARY DISCHARGE DIAGNOSES  Diagnosis: Hyperkalemia  Assessment and Plan of Treatment:     Diagnosis: Acute kidney injury superimposed on CKD  Assessment and Plan of Treatment: You were noted to have elevated creatinine levels upon admission, which is likely due to decreased blood flow to the kidneys in the setting. For now, we recommend holding your spironolactone to prevent further kidney injury. Due to the nature of the kidney injury, it will take some time for your kidneys to fully recover. Please make sure to follow up with the outpatient Nephrologist provided.    Diagnosis: Chronic HFrEF (heart failure with reduced ejection fraction)  Assessment and Plan of Treatment: Your heart pumping function was found to be severely reduced from  with an Ejection fraction of ~15% (normal heart pump function ranges above 55%) and Grade I Diastolic dysfunction. You have been evaluated on your prior admissions to not be a candidate for AICD placement. You are strongly recommended to continue taking your heart failure medications - Metoprolol, Enalapril and continue taking Bumex 1mg every day as prescrbied. Please stop taking spironolactone and lasix upon hospital discharge and have a follow up with your primary care provider or cardiologist within 1 week of hospital discharge.    Diagnosis: HTN (hypertension)  Assessment and Plan of Treatment:     Diagnosis: Diabetes mellitus  Assessment and Plan of Treatment:     Diagnosis: COPD without exacerbation  Assessment and Plan of Treatment:     Diagnosis: Schizophrenia  Assessment and Plan of Treatment:     Diagnosis: Down's syndrome  Assessment and Plan of Treatment:      PRINCIPAL DISCHARGE DIAGNOSIS  Diagnosis: Acute UTI  Assessment and Plan of Treatment: You were found to have a urinary tract infection (caused by a bacteria called E. Coli) , which was treated with antibiotics. You were treated with CEFEPIME initially, but once your urine cultures came back positive for E. Coli came back sensative to many antibiotics, you were switched to CEFTRIAXONE. You are complete a 7 DAY COURSE of antibiotic. You will need to finish 2 MORE DAYS OF ORAL XXXXX.      SECONDARY DISCHARGE DIAGNOSES  Diagnosis: Hyperkalemia  Assessment and Plan of Treatment: You were found to have an elevated potassium upon admission. You were given medications to lower your potassium, and your potassium lowered appropriately. You electrocardiogram was found to be within normal limits. Please follow up with your PCP within 1 week of discharge.    Diagnosis: Acute kidney injury superimposed on CKD  Assessment and Plan of Treatment: You were noted to have elevated creatinine levels upon admission, which is likely due to your inability to properly let out your urine. A chance catheter was placed, and your kidney function improved. Please make sure to follow up with the outpatient Nephrologist provided.    Diagnosis: Chronic HFrEF (heart failure with reduced ejection fraction)  Assessment and Plan of Treatment: Your heart pumping function was found to be severely reduced from  with an Ejection fraction of ~15% (normal heart pump function ranges above 55%) and Grade I Diastolic dysfunction. You have been evaluated on your prior admissions to not be a candidate for AICD placement. You are strongly recommended to continue taking your heart failure medications - Metoprolol, Enalapril and continue taking Bumex 1mg every day as prescrbied. Please stop taking spironolactone and lasix upon hospital discharge and have a follow up with your primary care provider or cardiologist within 1 week of hospital discharge.    Diagnosis: HTN (hypertension)  Assessment and Plan of Treatment: You have a history of Hypertension.    On this admission, your Blood Pressure was adequately controlled with ENALAPRIL AND METOPROLOL. YOUR ENALAPRIL WAS ON HOLD DUE TO YOUR ACUTE KIDNEY INJURY.   Your blood pressure target is 120-140/80-90, maintain healthy lifestyle, low salt diet, avoid fatty food, weight loss, exercise regularly or stay active as tolerated 30 mins X 3 times per week.  Notify your doctor if you have any of the following symptoms:   (Dizziness, Lightheadedness, Blurry vision, Headache, Chest pain, Shortness of breath.)  Please continue taking your home medications and follow-up with your PCP in 1 week from discharge to adjust medications as needed.    Diagnosis: Diabetes mellitus  Assessment and Plan of Treatment: You have Type 2 diabetes and were found to have a Hemoglobin A1c of 6.8% which shows a well controlled glucose levels in your body over the past 3 months. You are advised to eat foods that are low glycemic index which include beans and complex carbs, and to avoid high GI foods including white rice and potatoes. Uncontrolled sugars can lead to blindness, kidney failure, and contribute to diseases such as heart attacks and strokes. You are advised to continue taking your home medications for diabetes and keep your Primary Care Physician appointments in order to monitor and change medications as necessary    Diagnosis: COPD without exacerbation  Assessment and Plan of Treatment: You have a history of COPD which is a condition of your lungs which can make it difficult for you to breath.  You were treated with inhalers and tolerated this well.  Your shortness of breath that brought you into the hospital was likely due to your Heart Failure.  Please continue to use your inhalers as prescribed.  Please follow up with your pcp and a pulmonologist within 1 week from discharge.    Diagnosis: CAD (coronary artery disease)  Assessment and Plan of Treatment: You have history of coronary artery disease which is a narrowing of the arteries of your heart caused by a buildup of plaque made of cholesterol. The narrowing decreased the amount of blood flow to your heart causing chest pain, shortness of breath, sweating.   You are taking ASA, STATIN as home medications.  Please continue to take your medications as prescribed.  Please follow with your PCP/Cardiologist in a week.    Diagnosis: BPH (benign prostatic hyperplasia)  Assessment and Plan of Treatment: You have history of BPH (benign prostatic hyperplasia) which means that you have an age associated prostate gland enlargement that can cause difficulties with urination and is not considered to be a precurosr to prostate cancer. You were continued on your HOME medications TAMSULOSIN and FINASTERIDE during your hospital stay.  Please continue to take your HOME medications and  follow up with your PCP in a week from discharge.    Diagnosis: Intellectual disability  Assessment and Plan of Treatment: You have a history of Intellectual Disability, on amatadine and memantine at home. You are advised to continue taking your home medications and see your PCP within 1 week of discharge.    Diagnosis: Schizophrenia  Assessment and Plan of Treatment: You have a history of Schizophrenia, on Valproic Acid and Olanzapine at home. You are advised to continue taking your home medications and see your PCP within 1 week of discharge.     PRINCIPAL DISCHARGE DIAGNOSIS  Diagnosis: Acute UTI  Assessment and Plan of Treatment: You were found to have a urinary tract infection (caused by a bacteria called E. Coli) , which was treated with antibiotics. You were treated with CEFEPIME initially, but once your urine cultures came back positive for E. Coli came back sensative to many antibiotics, you were switched to CEFTRIAXONE. You are complete a 7 DAY COURSE of antibiotic. You will need to finish 2 MORE DAYS OF ORAL CEFPODOXIME 200MG BID.      SECONDARY DISCHARGE DIAGNOSES  Diagnosis: Hyperkalemia  Assessment and Plan of Treatment: You were found to have an elevated potassium upon admission. You were given medications to lower your potassium, and your potassium lowered appropriately. You electrocardiogram was found to be within normal limits. Please follow up with your PCP within 1 week of discharge.    Diagnosis: Acute kidney injury superimposed on CKD  Assessment and Plan of Treatment: You were noted to have elevated creatinine levels upon admission, which is likely due to your inability to properly let out your urine. A chance catheter was placed, and your kidney function improved. Please make sure to follow up with the outpatient Nephrologist provided.    Diagnosis: Chronic HFrEF (heart failure with reduced ejection fraction)  Assessment and Plan of Treatment: Your heart pumping function was found to be severely reduced from  with an Ejection fraction of ~15% (normal heart pump function ranges above 55%) and Grade I Diastolic dysfunction. You have been evaluated on your prior admissions to not be a candidate for AICD placement. You are strongly recommended to continue taking your heart failure medications - Metoprolol, IMDUR, and continue taking Bumex 1mg every day as prescrbied. Please stop taking spironolactone and lasix upon hospital discharge and have a follow up with your primary care provider or cardiologist within 1 week of hospital discharge.    Diagnosis: HTN (hypertension)  Assessment and Plan of Treatment: You have a history of Hypertension.    On this admission, your Blood Pressure was adequately controlled with ENALAPRIL AND METOPROLOL. YOUR ENALAPRIL WAS ON HOLD DUE TO YOUR ACUTE KIDNEY INJURY. YOU WERE STARTED ON HYDRALAZINE 10MG THREE TIMES A DAY, AND IMDUR 30MG DAILY.   Your blood pressure target is 120-140/80-90, maintain healthy lifestyle, low salt diet, avoid fatty food, weight loss, exercise regularly or stay active as tolerated 30 mins X 3 times per week.  Notify your doctor if you have any of the following symptoms:   (Dizziness, Lightheadedness, Blurry vision, Headache, Chest pain, Shortness of breath.)  Please continue taking your home medications and follow-up with your PCP in 1 week from discharge to adjust medications as needed.    Diagnosis: Diabetes mellitus  Assessment and Plan of Treatment: You have Type 2 diabetes and were found to have a Hemoglobin A1c of 6.8% which shows a well controlled glucose levels in your body over the past 3 months. You are advised to eat foods that are low glycemic index which include beans and complex carbs, and to avoid high GI foods including white rice and potatoes. Uncontrolled sugars can lead to blindness, kidney failure, and contribute to diseases such as heart attacks and strokes. You are advised to continue taking your home medications for diabetes and keep your Primary Care Physician appointments in order to monitor and change medications as necessary    Diagnosis: COPD without exacerbation  Assessment and Plan of Treatment: You have a history of COPD which is a condition of your lungs which can make it difficult for you to breath.  You were treated with inhalers and tolerated this well.  Your shortness of breath that brought you into the hospital was likely due to your Heart Failure.  Please continue to use your inhalers as prescribed.  Please follow up with your pcp and a pulmonologist within 1 week from discharge.    Diagnosis: CAD (coronary artery disease)  Assessment and Plan of Treatment: You have history of coronary artery disease which is a narrowing of the arteries of your heart caused by a buildup of plaque made of cholesterol. The narrowing decreased the amount of blood flow to your heart causing chest pain, shortness of breath, sweating.   You are taking ASA, STATIN as home medications.  Please continue to take your medications as prescribed.  Please follow with your PCP/Cardiologist in a week.    Diagnosis: BPH (benign prostatic hyperplasia)  Assessment and Plan of Treatment: You have history of BPH (benign prostatic hyperplasia) which means that you have an age associated prostate gland enlargement that can cause difficulties with urination and is not considered to be a precurosr to prostate cancer. You were continued on your HOME medications TAMSULOSIN and FINASTERIDE during your hospital stay.  Please continue to take your HOME medications and  follow up with your PCP in a week from discharge.    Diagnosis: Intellectual disability  Assessment and Plan of Treatment: You have a history of Intellectual Disability, on amatadine and memantine at home. You are advised to continue taking your home medications and see your PCP within 1 week of discharge.    Diagnosis: Schizophrenia  Assessment and Plan of Treatment: You have a history of Schizophrenia, on Valproic Acid and Olanzapine at home. You are advised to continue taking your home medications and see your PCP within 1 week of discharge.     PRINCIPAL DISCHARGE DIAGNOSIS  Diagnosis: Acute UTI  Assessment and Plan of Treatment: You were found to have a urinary tract infection (caused by a bacteria called E. Coli) , which was treated with antibiotics. You were treated with CEFEPIME initially, but once your urine cultures came back, you were switched to CEFTRIAXONE. You are complete a 7 DAY COURSE of antibiotic. You will need to finish 2 MORE DAYS OF ORAL CEFPODOXIME 200MG BID.      SECONDARY DISCHARGE DIAGNOSES  Diagnosis: HTN (hypertension)  Assessment and Plan of Treatment: You have a history of Hypertension.    On this admission, your Blood Pressure was adequately controlled with ENALAPRIL AND METOPROLOL. YOUR ENALAPRIL WAS ON HOLD DUE TO YOUR ACUTE KIDNEY INJURY. YOU WERE STARTED ON HYDRALAZINE 10MG THREE TIMES A DAY, AND IMDUR 30MG DAILY.   Your blood pressure target is 120-140/80-90, maintain healthy lifestyle, low salt diet, avoid fatty food, weight loss, exercise regularly or stay active as tolerated 30 mins X 3 times per week.  Notify your doctor if you have any of the following symptoms:   (Dizziness, Lightheadedness, Blurry vision, Headache, Chest pain, Shortness of breath.)  Please continue taking your home medications and follow-up with your PCP in 1 week from discharge to adjust medications as needed.    Diagnosis: Diabetes mellitus  Assessment and Plan of Treatment: You have Type 2 diabetes and were found to have a Hemoglobin A1c of 6.8% which shows a well controlled glucose levels in your body over the past 3 months. You are advised to eat foods that are low glycemic index which include beans and complex carbs, and to avoid high GI foods including white rice and potatoes. Uncontrolled sugars can lead to blindness, kidney failure, and contribute to diseases such as heart attacks and strokes. You are advised to continue taking your home medications for diabetes and keep your Primary Care Physician appointments in order to monitor and change medications as necessary    Diagnosis: Chronic HFrEF (heart failure with reduced ejection fraction)  Assessment and Plan of Treatment: Your heart pumping function was found to be severely reduced from  with an Ejection fraction of ~15% (normal heart pump function ranges above 55%) and Grade I Diastolic dysfunction. You have been evaluated on your prior admissions to not be a candidate for AICD placement. You are strongly recommended to continue taking your heart failure medications - Metoprolol, IMDUR, and continue taking Bumex 1mg every day as prescrbied. Please stop taking spironolactone and lasix upon hospital discharge and have a follow up with your primary care provider or cardiologist within 1 week of hospital discharge.    Diagnosis: COPD without exacerbation  Assessment and Plan of Treatment: You have a history of COPD which is a condition of your lungs which can make it difficult for you to breath.  You were treated with inhalers and tolerated this well.  Your shortness of breath that brought you into the hospital was likely due to your Heart Failure.  Please continue to use your inhalers as prescribed.  Please follow up with your pcp and a pulmonologist within 1 week from discharge.    Diagnosis: Hyperkalemia  Assessment and Plan of Treatment: You were found to have an elevated potassium upon admission. You were given medications to lower your potassium, and your potassium lowered appropriately. You electrocardiogram was found to be within normal limits. Please follow up with your PCP within 1 week of discharge.    Diagnosis: Acute kidney injury superimposed on CKD  Assessment and Plan of Treatment: You were noted to have elevated creatinine levels upon admission, which is likely due to your inability to properly let out your urine. A chance catheter was placed, and your kidney function improved. Upon dischage you will need to continue with the chance catheter to provent worsening of your renal function. You should have regular follow up with urology.  Please make sure to follow up with the outpatient Nephrologist provided.    Diagnosis: CAD (coronary artery disease)  Assessment and Plan of Treatment: You have history of coronary artery disease which is a narrowing of the arteries of your heart caused by a buildup of plaque made of cholesterol. The narrowing decreased the amount of blood flow to your heart causing chest pain, shortness of breath, sweating.   You are taking ASA, STATIN as home medications.  Please continue to take your medications as prescribed.  Please follow with your PCP/Cardiologist in a week.    Diagnosis: BPH (benign prostatic hyperplasia)  Assessment and Plan of Treatment: You have history of BPH (benign prostatic hyperplasia) which means that you have an age associated prostate gland enlargement that can cause difficulties with urination and is not considered to be a precurosr to prostate cancer. You were continued on your HOME medications TAMSULOSIN and FINASTERIDE during your hospital stay.  Please continue to take your HOME medications and  follow up with your PCP in a week from discharge.    Diagnosis: Intellectual disability  Assessment and Plan of Treatment: You have a history of Intellectual Disability, on amatadine and memantine at home. You are advised to continue taking your home medications and see your PCP within 1 week of discharge.    Diagnosis: Schizophrenia  Assessment and Plan of Treatment: You have a history of Schizophrenia, on Valproic Acid and Olanzapine at home. You are advised to continue taking your home medications and see your PCP within 1 week of discharge.

## 2023-05-05 NOTE — PROGRESS NOTE ADULT - PROBLEM SELECTOR PLAN 1
pt p/w AMS  afebrile, normotensive, WBC wnl  CTH nonacute  UA grossly+  chance placed in ED and pt mental status improved to baseline  started on cefepime (renally dosed) as pt recently hospitalized and at risk for resistant organism  - C/w Cefepime (f/u ID recs)  ID Dr. Goncalves consulted

## 2023-05-05 NOTE — CONSULT NOTE ADULT - PROBLEM SELECTOR RECOMMENDATION 7
Patient now with advanced systolic HF, in setting of severe NICM, with EF of 10-15%, and increasing BEARDEN/SOB with performance of ADLs resulting in functional limitation.  He is not considered to be a candidate for ICD.  With his increasing symptom burden he was not able to return to his group home setting and was recently discharged to [facility].  He now returns with worsening YASSINE on CKD possibly due to urinary retention/post obstructive renal failure, and presumed CHF exacerbation.       Patient has at minimum Stage C NYHA class III heart failure.  He now has worsening renal failure/ATN with concern for possible cardiorenal syndrome (vs  urinary obstruction).  He is at risk for continued progression of disease, further functional decline, recurrent hospitalizations, increasing morbidity, and sudden death.  Patient is LOW THRESHOLD for referral to hospice if his renal failure and/or symptom burden continue to progress.    Spoke to Dr. Emilia Haern medical director for [facility] this afternoon.   She is in agreement that patient is at increased risk for CV morbidity and mortality with significant adverse effects on his health status and quality of life, and in agreement that patient should have orders for DNR and DNI in place.  Patient is under auspices of [facility] as [id number] guardian. Patient's CAB  refused to endorse MOLST during last admission, requested additional Cardiology input    Please obtain second opinion from Cardiology to optimize GDMT and for prognostication.  Primary attending Dr. Mason is aware.  I will continue attempts to collaborate with CAB and provide them with a clinical update    Patient remains Full Code at this time  Remainder of management as per Nephrology, Cardiology, and primary team  Palliative Care team will continue to follow.

## 2023-05-05 NOTE — DISCHARGE NOTE PROVIDER - CARE PROVIDERS DIRECT ADDRESSES
I called patient and let her know that the provider had made out a prescription for Nucynta as her insurance denied the Butrans patch. I put patients prescription at the  for her to .      Anya Vazquez [facility] [place]       [email or web address] Point Medical CenterCloudy DaysThe Hospital of Central ConnecticutSegmint.[facility]

## 2023-05-05 NOTE — PROGRESS NOTE ADULT - ATTENDING COMMENTS
This is a late entry, patient was seen and examined on 05/05     73 year old male, from group home with PMHx ?downs syndrome, intellectual disability, COPD with chronic cough, hx of non ischemic cardiomyopathy with non-obstructive CAD on CCTA (2021), HFrEF, HTN, DM, CKD elevated PSA, BPH, schizophrenia, hepatitis B, carrier, bilateral sensorineural hearing loss adm for AMS 2/2 UTI, hyperkalemia and YASSINE on CKD    Labs cbc, bmp, mg, PO4 lactate     PE as above     A/P:  A/P:  #Post-obstructive YASSINE  #YASSINE on CKD stage 3   #Acute Complicated  UTI  #Hyperkalemia- resolved   #Acute metabolic encephalopathy- resolved   # Chronic systolic HF   #HTN  #DM  #DVT ppx     Plan:  -Pt's SCr is improving, will c/w chance catheter. Dr Pederson to follow This is a late entry, patient was seen and examined on 05/05     73 year old male, from group home with PMHx ?downs syndrome, intellectual disability, COPD with chronic cough, hx of non ischemic cardiomyopathy with non-obstructive CAD on CCTA (2021), HFrEF, HTN, DM, CKD elevated PSA, BPH, schizophrenia, hepatitis B, carrier, bilateral sensorineural hearing loss adm for AMS 2/2 UTI, hyperkalemia and YASSINE on CKD    Labs cbc, bmp, mg, PO4 lactate     PE as above     A/P:  A/P:  #Post-obstructive YASSINE  #YASSINE on CKD stage 3   #Acute Complicated  UTI  #Hyperkalemia- resolved   #Acute metabolic encephalopathy- resolved   # Chronic systolic HF   #HTN  #DM  #DVT ppx     Plan:  -Pt's SCr is improving, will c/w chance catheter. Dr Pederson to follow  -Pt w/ UTI, c/w cefepime, awaiting urine cx. ID to follow   -Patient w/ mild le swelling, per discussion w/ Dr Pederson will c/w PO bumex as patient gets dry fast and develops yassine.   -C/w hydralazine, imdur- BP stable

## 2023-05-05 NOTE — DISCHARGE NOTE PROVIDER - NSDCCAREPROVSEEN_GEN_ALL_CORE_FT
Husam, Aisha Mason, Lynsey Pederson, Priya Jiménez, Vanda Rao Husam, Aisha Mason, Lynsey Pederson, Priya Jiménez, Donny Goncalves, Vanda Ca, Radha MONTERROSO

## 2023-05-05 NOTE — PROGRESS NOTE ADULT - ASSESSMENT
73 year old male, from group home with PMHx downs syndrome, intelectual disability, COPD with chronic cough, hx of non ischemic cardiomyopathy with non-obstructive CAD on CCTA (2021), HFrEF, HTN, DM, CKD elevated PSA, BPH, schizophrenia, hepatitis B, carrier, bilateral sensorineural hearing loss adm for AMS 2/2 UTI, hyperkalemia and YASSINE on CKD.

## 2023-05-05 NOTE — CONSULT NOTE ADULT - SUBJECTIVE AND OBJECTIVE BOX
[facility] NEPHROLOGY- CONSULTATION NOTE    Patient is a 72yo Male [facility],  h/o intellectual disability, Down Syndrome, asthma, COPD with chronic cough, HTN, DM, BPH, schizophrenia,, bilateral sensorineural hearing loss, recently admitted to [facility] (-) for decompensated HF and YASSINE presents with AMS and respiratory distress and found to be hyperkalemic to 9.5 as an outpt. s/ chance placement in ER with cloudy urine. Pt a/w acute on chronic CHF, UTI, hyperkalemia and YASSINE on CKD.     Unable to obtain history from patient. Pt c/o SOB with cough. Denies any chest pain.     PAST MEDICAL & SURGICAL HISTORY:  Down syndrome      HTN (hypertension)      Hyperlipidemia      Diabetes mellitus      Intellectual disability      No significant past surgical history        No Known Allergies    Home Medications Reviewed  Hospital Medications: Reviewed        REVIEW OF SYSTEMS: Limited due to mental status; Pt c/o SOB with cough. Denies any chest pain.     VITALS:  T(F): 97 (23 @ 13:43), Max: 97.4 (23 @ 18:04)  HR: 81 (23 @ 13:43)  BP: 108/76 (23 @ 13:43)  RR: 17 (23 @ 13:43)  SpO2: 100% (23 @ 13:43)  Wt(kg): --     @ 07:01  -   @ 07:00  --------------------------------------------------------  IN: 0 mL / OUT: 1000 mL / NET: -1000 mL        Weight (kg): 60.328 ( @ 18:10)      PHYSICAL EXAM:  Gen: Mild lethargy  HEENT: anicteric  Neck: no JVD  Cards: RRR, +S1/S2, no M/G/R  Resp: bibasilar rales Rt >left  GI: soft, NT ND  : +chance clear yellow urine  Extremities: trace LE edema R>L  Derm: no rashes    LABS:      143  |  111<H>  |  77<H>  ----------------------------<  144<H>  5.3   |  27  |  4.10<H>    Ca    10.0      05 May 2023 07:15  Phos  5.2     05-05  Mg     3.3     05-05    TPro  5.6<L>  /  Alb  1.9<L>  /  TBili  0.5  /  DBili      /  AST  35  /  ALT  79<H>  /  AlkPhos  95  05-05    Creatinine Trend: 4.10 <--, 4.38 <--, 4.21 <--, 4.55 <--                        12.0   7.75  )-----------( 189      ( 05 May 2023 07:15 )             39.2     Urine Studies:  Urinalysis Basic - ( 04 May 2023 12:47 )    Color: Yellow / Appearance: very cloudy / S.015 / pH:   Gluc:  / Ketone: Negative  / Bili: Negative / Urobili: 4 mg/dL   Blood:  / Protein: 100 mg/dL / Nitrite: Negative   Leuk Esterase: Moderate / RBC: 10-25 /HPF / WBC >50 /HPF   Sq Epi:  / Non Sq Epi:  / Bacteria: Many /HPF      < from: Xray Chest 1 View-PORTABLE IMMEDIATE (23 @ 13:13) >    ACC: [id number] EXAM:  XR CHEST PORTABLE IMMED 1V   ORDERED BY: SHANDA DENSON     PROCEDURE DATE:  2023          INTERPRETATION:  AP semierect chest on May 4, 2023 at 12:33 PM. Patient   has respiratory distress.    Heart is enlarged.    There are increasing roughly symmetric upper lobe infiltrates compared to   .    IMPRESSION: Increasing bilateral upper lobe infiltrates.    --- End of Report ---      < end of copied text >  < from: CT Head No Cont (23 @ 16:45) >    ACC: [id number] EXAM:  CT BRAIN   ORDERED BY: SHANDA DENSON     PROCEDURE DATE:  2023          < end of copied text >  < from: CT Head No Cont (23 @ 16:45) >    IMPRESSION:  Limited study due to motion    No acute intracranial hemorrhage or acute territorial infarct.  If   symptoms persist, follow-up MRI exam recommended.    --- End of Report ---      < end of copied text >                     no

## 2023-05-06 NOTE — PROGRESS NOTE ADULT - PROBLEM SELECTOR PLAN 6
RESOLVED  K+ 6.3, Likely in setting of worsening CKD, urinary retention  EKG with T-wave abnormality (seen prior) not peaked   s/p insulin dextrose and CaGlu in ED   Nephro Dr. Pederson

## 2023-05-06 NOTE — CONSULT NOTE ADULT - SUBJECTIVE AND OBJECTIVE BOX
Date of service: 05/06/23    Requesting Physician : Dr. Mason     Reason for Consultation: Cardiomypathy     HISTORY OF PRESENT ILLNESS: HPI:  73 year old male with Intellectual disability, Downs Syndrome, from [facility] home number 10 w/ PMHx COPD with chronic cough, hx of non ischemic cardiomyopathy with non-obstructive CAD on CCTA (2021), HFrEF, HTN, DM, CKD elevated PSA, BPH, schizophrenia, hepatitis B, carrier, bilateral sensorineural hearing loss, was brought in from the facility for who came in to the ED for sore throat, chest pain, mental status changes, and respiratory distress. Per care worker from NH at bedside, he is usually alert and conversant, but this morning they found him lethargic appearing and not responsive. With EMS he was minimally responsive and appeared SOB with O2 sat at 79%. He was admitted for further workup.  Currently, the patient denies any chest pain or dyspnea.  He denies any orthopnea or LE edema.       PAST MEDICAL & SURGICAL HISTORY:  Down syndrome      HTN (hypertension)      Hyperlipidemia      Diabetes mellitus      Intellectual disability      Chronic HFrEF (heart failure with reduced ejection fraction)      Chronic obstructive pulmonary disease (COPD)      CAD (coronary artery disease)      BPH (benign prostatic hyperplasia)      No significant past surgical history              MEDICATIONS:  MEDICATIONS  (STANDING):  amantadine 100 milliGRAM(s) Oral two times a day  aspirin  chewable 81 milliGRAM(s) Oral daily  atorvastatin 40 milliGRAM(s) Oral at bedtime  buMETAnide 1 milliGRAM(s) Oral daily  cefepime   IVPB 1000 milliGRAM(s) IV Intermittent every 12 hours  finasteride 5 milliGRAM(s) Oral daily  fluticasone propionate 50 MICROgram(s)/spray Nasal Spray 1 Spray(s) Both Nostrils two times a day  heparin   Injectable 5000 Unit(s) SubCutaneous every 8 hours  insulin lispro (ADMELOG) corrective regimen sliding scale   SubCutaneous three times a day before meals  lactulose Syrup 20 Gram(s) Oral at bedtime  melatonin 5 milliGRAM(s) Oral at bedtime  memantine 5 milliGRAM(s) Oral two times a day  metoprolol succinate ER 25 milliGRAM(s) Oral daily  montelukast 10 milliGRAM(s) Oral at bedtime  multivitamin 1 Tablet(s) Oral daily  OLANZapine 10 milliGRAM(s) Oral at bedtime  tamsulosin 0.4 milliGRAM(s) Oral at bedtime  tiotropium 2.5 MICROgram(s) Inhaler 2 Puff(s) Inhalation daily  valproic  acid Syrup 250 milliGRAM(s) Oral daily  valproic  acid Syrup 1000 milliGRAM(s) Oral at bedtime      Allergies    No Known Allergies    Intolerances        FAMILY HISTORY:  No pertinent family history in first degree relatives      Non-contributary for premature coronary disease or sudden cardiac death    SOCIAL HISTORY:    [x ] Non-smoker  [ ] Smoker  [ ] Alcohol      REVIEW OF SYSTEMS:  [x ]chest pain  [ x ]shortness of breath  [  ]palpitations  [  ]syncope  [ ]near syncope [ ]upper extremity weakness   [ ] lower extremity weakness  [  ]diplopia  [ x ]altered mental status   [  ]fevers  [ ]chills [ ]nausea  [ ]vomitting  [  ]dysphagia    [ ]abdominal pain  [ ]melena  [ ]BRBPR    [  ]epistaxis  [  ]rash    [ ]lower extremity edema        [x ] All others negative	  [ ] Unable to obtain    PHYSICAL EXAM:  T(C): 36.7 (05-06-23 @ 05:00), Max: 36.7 (05-06-23 @ 05:00)  HR: 81 (05-06-23 @ 05:00) (81 - 81)  BP: 119/64 (05-06-23 @ 05:00) (108/76 - 119/64)  RR: 18 (05-06-23 @ 05:00) (17 - 18)  SpO2: 97% (05-06-23 @ 05:00) (97% - 100%)  Wt(kg): --  I&O's Summary    05 May 2023 07:01  -  06 May 2023 07:00  --------------------------------------------------------  IN: 0 mL / OUT: 1001 mL / NET: -1001 mL        	  Lymphatic: No lymphadenopathy , + edema in LE bilaterally   Cardiovascular: Normal S1 S2, No JVD, No murmurs , Peripheral pulses palpable 2+ bilaterally  Respiratory: Lungs clear to auscultation, normal effort 	  Gastrointestinal:  Soft, Non-tender, + BS	  Skin: No rashes, No ecchymoses, No cyanosis, warm to touch  Musculoskeletal: Normal range of motion, normal strength  Psychiatry: unable to assess       TELEMETRY: 	    ECG:  SR, lateral TWI 	  RADIOLOGY:  OTHER:     DIAGNOSTIC TESTING:  [ ] Echocardiogram: < from: TTE with Doppler (w/Cont) (03.29.23 @ 07:03) >  CONCLUSIONS:  1. Trace mitral regurgitation.  2. Normal left ventricular internal dimensions and wall  thicknesses.  3. Severe global left ventricular systolic dysfunction.  Ultrasound LV opacification agent was used to enhance  endocardial definition.  4. Grade I diastolic dysfunction (Impaired relaxation,  mild).  5. Normal right ventricular size and function.  6. Agitated saline injection revealed late bubbles in the  left heart, consistent with transpulmonic shunting.    < end of copied text >    [ ]  Catheterization:  [ ] Stress Test:    	  	  LABS:	 	    CARDIAC MARKERS:                              12.4   6.11  )-----------( 196      ( 06 May 2023 06:10 )             40.4     05-06    148<H>  |  113<H>  |  70<H>  ----------------------------<  126<H>  4.0   |  30  |  2.97<H>    Ca    9.8      06 May 2023 06:10  Phos  4.1     05-06  Mg     3.1     05-06    TPro  5.7<L>  /  Alb  1.9<L>  /  TBili  0.6  /  DBili  x   /  AST  32  /  ALT  76<H>  /  AlkPhos  92  05-06    proBNP:   Lipid Profile:   HgA1c:   TSH:     ASSESSMENT/PLAN: 73 year old male with Intellectual disability, Downs Syndrome, [facility] w/ PMHx COPD with chronic cough, hx of non ischemic cardiomyopathy with non-obstructive CAD on CCTA (2021), HFrEF, HTN, DM, CKD elevated PSA, BPH, schizophrenia, hepatitis B, carrier, bilateral sensorineural hearing loss, was brought in from the facility for who came in to the ED for sore throat, chest pain, mental status changes, and respiratory distress.    1.  Chest pain   - pt. currently denies any chest pain   - troponin elevation may be secondary to underlying renal disease   - check TTE  - trend CE including CPK   - monitor on tele     2.  Dyspnea   - pt. with elevated pBNP and volume overload on exam   - currently on bumex   - keep O > I if ok with renal given orly   - follow up renal   - check TTE     Obtain goc    Further workup pending above

## 2023-05-06 NOTE — PROGRESS NOTE ADULT - SUBJECTIVE AND OBJECTIVE BOX
[facility] NEPHROLOGY- PROGRESS NOTE    Patient is a 72yo Male from  [facility] home number with CKD,  h/o intellectual disability, Down Syndrome, asthma, COPD with chronic cough, HTN, DM, BPH, schizophrenia,, bilateral sensorineural hearing loss, recently admitted to [facility] (-) for decompensated HF and YASSINE presents with AMS and respiratory distress and found to be hyperkalemic to 9.5 as an outpt. s/ chance placement in ER with cloudy urine. Pt a/w acute on chronic CHF, UTI, hyperkalemia and YASSINE on CKD.     Hospital Medications: Medications reviewed.  REVIEW OF SYSTEMS:  Limited due to mental status; +dry cough. SOB resolved Denies any chest pain.     VITALS:  T(F): 97.4 (23 @ 14:26), Max: 98.1 (23 @ 05:00)  HR: 80 (23 @ 14:26)  BP: 113/73 (23 @ 14:26)  RR: 17 (23:26)  SpO2: 99% (23:26)  Wt(kg): --  Height (cm): 162.6 ( @ 12:00), 162.6 (:14)  Weight (kg): 60.328 ( @ 18:10), 62.6 (:14)  BMI (kg/m2): 22.8 ( @ 18:10), 23.7 ( @ 12:00), 23.7 (:14)  BSA (m2): 1.64 ( 18:10), 1.67 ( @ 12:00), 1.67 ( 09:14)     @ 07:01  -   @ 07:00  --------------------------------------------------------  IN: 0 mL / OUT: 1001 mL / NET: -1001 mL      PHYSICAL EXAM:  Gen: NAD  HEENT: anicteric  Neck: no JVD  Cards: RRR, +S1/S2, no M/G/R  Resp: +mild rales at right base; clear left side  GI: soft, NT ND  : +chance clear yellow urine  Extremities: trace LE edema R>L    LABS:      148<H>  |  113<H>  |  70<H>  ----------------------------<  126<H>  4.0   |  30  |  2.97<H>    Ca    9.8      06 May 2023 06:10  Phos  4.1       Mg     3.1         TPro  5.7<L>  /  Alb  1.9<L>  /  TBili  0.6  /  DBili      /  AST  32  /  ALT  76<H>  /  AlkPhos  92      Creatinine Trend: 2.97 <--, 4.10 <--, 4.38 <--, 4.21 <--, 4.55 <--                        12.4   6.11  )-----------( 196      ( 06 May 2023 06:10 )             40.4     Urine Studies:  Urinalysis Basic - ( 04 May 2023 12:47 )    Color: Yellow / Appearance: very cloudy / S.015 / pH:   Gluc:  / Ketone: Negative  / Bili: Negative / Urobili: 4 mg/dL   Blood:  / Protein: 100 mg/dL / Nitrite: Negative   Leuk Esterase: Moderate / RBC: 10-25 /HPF / WBC >50 /HPF   Sq Epi:  / Non Sq Epi:  / Bacteria: Many /HPF      Sodium, Random Urine: 9 mmol/L ( @ 18:35)  Chloride, Random Urine: <10 mmol/L ( @ 18:35)  Creatinine, Random Urine: 96 mg/dL ( @ 18:35)    RADIOLOGY & ADDITIONAL STUDIES:

## 2023-05-06 NOTE — CONSULT NOTE ADULT - CONSULT REASON
Cardiomyopathy
Elevated serum creatinine.
Complex medical decision making in the context of heart failure, acute on chronic renal failure, UTI, and intellectual disability with multiple medical comorbidities.
UTI

## 2023-05-06 NOTE — PROGRESS NOTE ADULT - ASSESSMENT
Patient is a 72yo Male from  [facility] home number with CKD,  h/o intellectual disability, Down Syndrome, asthma, COPD with chronic cough, HTN, DM, BPH, schizophrenia,, bilateral sensorineural hearing loss, recently admitted to [facility] (4/8[facility]4/28) for decompensated HF and YASSINE presents with AMS and respiratory distress and found to be hyperkalemic to 9.5 as an outpt. s/ chance placement in ER with cloudy urine. Pt a/w acute on chronic CHF, UTI, hyperkalemia and YASSINE on CKD.     1. YASSINE- in the setting of UTI vs CHF. Pt with rales on exam. CXR with increasing b/l upper lobe infiltrates. Check CT chest. Renal function improving. c/w Bumex 1mg PO daily, would recc to convert to PO in 1-2 days; will defer to Cards. Monitor urine o/p  UA active in the setting of infection. FeUrea 44%; inconclusive. Check Renal US.   (pt with h/o CHF with severe global LVSD on TTE). Strict I/Os. Avoid nephrotoxins/ NSAIDs/ RCA. Monitor BMP.  2. CKD-3a- baseline SCr 1.3-1.6. However pt was recently admitted with YASSINE and d/c with SCr 2.95 on 4/28/23. Defer secondary w/u as an outpt. Avoid nephrotoxins  3. HTN 2/2 CKD- BP acceptable. Continue to hold Enalapril.  Monitor BP.  4. Hyperkalemia- resolved with medical management; Lokelma. c/w low potassium diet. Monitor serum potassium  5. UTI- +UA. Pt on Cefepime. UCx Ecoli. BCx NG  6. BPH- c/w flomax and finasteride. Pt now with chance (placed in ER)      [facility] NEPHROLOGY  Raul Goncalves M.D.  Bennie Carcamo D.O.  Priya Pederson M.D.  Hellen Dietz, BRUNO, ANP-C  [phone]  Fax: [phone]    [address] [place] [address], #[place] [address]

## 2023-05-06 NOTE — PROGRESS NOTE ADULT - SUBJECTIVE AND OBJECTIVE BOX
PGY-1 Progress Note discussed with attending    PAGER #: [[phone]] TILL 5:00 PM  PLEASE CONTACT ON CALL TEAM:  - On Call Team (Please refer to Red) FROM 5:00 PM - 8:30PM  - Nightfloat Team FROM 8:30 -7:30 AM    INTERVAL HPI/OVERNIGHT EVENTS:   Patient seen and examined at bedside. No acute events overnight. Patient AAOx3. No new concerns at this time.     ROS  REVIEW OF SYSTEMS:  CONSTITUTIONAL: No fever, weight loss, or fatigue  RESPIRATORY: No cough, wheezing, chills or hemoptysis; no dyspnea today  CARDIOVASCULAR: No chest pain, palpitations, dizziness,  + edema lower ext  GASTROINTESTINAL: No abdominal pain. No nausea, vomiting, or hematemesis; No diarrhea or constipation. No melena or hematochezia.  GENITOURINARY: No dysuria or hematuria, urinary frequency  NEUROLOGICAL: No headaches, memory loss, loss of strength, numbness, or tremors  SKIN: No itching, burning, rashes, or lesions     acetaminophen     Tablet .. 650 milliGRAM(s) Oral every 6 hours PRN  amantadine 100 milliGRAM(s) Oral two times a day  aspirin  chewable 81 milliGRAM(s) Oral daily  atorvastatin 40 milliGRAM(s) Oral at bedtime  buMETAnide 1 milliGRAM(s) Oral daily  cefepime   IVPB 1000 milliGRAM(s) IV Intermittent every 12 hours  finasteride 5 milliGRAM(s) Oral daily  fluticasone propionate 50 MICROgram(s)/spray Nasal Spray 1 Spray(s) Both Nostrils two times a day  heparin   Injectable 5000 Unit(s) SubCutaneous every 8 hours  insulin lispro (ADMELOG) corrective regimen sliding scale   SubCutaneous three times a day before meals  lactulose Syrup 20 Gram(s) Oral at bedtime  melatonin 5 milliGRAM(s) Oral at bedtime  memantine 5 milliGRAM(s) Oral two times a day  metoprolol succinate ER 25 milliGRAM(s) Oral daily  montelukast 10 milliGRAM(s) Oral at bedtime  multivitamin 1 Tablet(s) Oral daily  OLANZapine 10 milliGRAM(s) Oral at bedtime  tamsulosin 0.4 milliGRAM(s) Oral at bedtime  tiotropium 2.5 MICROgram(s) Inhaler 2 Puff(s) Inhalation daily  valproic  acid Syrup 1000 milliGRAM(s) Oral at bedtime  valproic  acid Syrup 250 milliGRAM(s) Oral daily      Vital Signs Last 24 Hrs  T(C): 36.7 (06 May 2023 05:00), Max: 36.7 (06 May 2023 05:00)  T(F): 98.1 (06 May 2023 05:00), Max: 98.1 (06 May 2023 05:00)  HR: 81 (06 May 2023 05:00) (81 - 81)  BP: 119/64 (06 May 2023 05:00) (108/76 - 119/64)  BP(mean): --  RR: 18 (06 May 2023 05:00) (17 - 18)  SpO2: 97% (06 May 2023 05:00) (97% - 100%)    Parameters below as of 06 May 2023 05:00  Patient On (Oxygen Delivery Method): nasal cannula  O2 Flow (L/min): 2      PHYSICAL EXAMINATION:  GENERAL: NAD, wearing 2L NC  HEAD:  Atraumatic, Normocephalic  EYES:  Right eye prosthesis, left Conjunctiva and sclera clear, pupil round, and reactive to light and accommodation.  NECK: Supple, No JVD, trachea is midline, no evidence of thyroid enlargement, no lymphadenopathy or tenderness.  CHEST/LUNG: No rales, minimal rhonchi noted bilaterally, minimal expiratory wheezing, no rubs  HEART: Regular rate and rhythm; No murmurs, rubs, or gallops  ABDOMEN: Soft, Nontender, Nondistended; Bowel sounds present  NERVOUS SYSTEM:  Alert & Oriented X3  EXTREMITIES:  2+ Peripheral Pulses, No clubbing, No cyanosis, pitting edema +2 noted on lower ext bilaterally   SKIN: Warm, dry, and well perfused; Good turgor; No rashes are noted.                          12.4   6.11  )-----------( 196      ( 06 May 2023 06:10 )             40.4     05-06    148<H>  |  113<H>  |  70<H>  ----------------------------<  126<H>  4.0   |  30  |  2.97<H>    Ca    9.8      06 May 2023 06:10  Phos  4.1     05-06  Mg     3.1     05-06    TPro  5.7<L>  /  Alb  1.9<L>  /  TBili  0.6  /  DBili  x   /  AST  32  /  ALT  76<H>  /  AlkPhos  92  05-06    LIVER FUNCTIONS - ( 06 May 2023 06:10 )  Alb: 1.9 g/dL / Pro: 5.7 g/dL / ALK PHOS: 92 U/L / ALT: 76 U/L DA / AST: 32 U/L / GGT: x               PT/INR - ( 04 May 2023 12:20 )   PT: 14.8 sec;   INR: 1.24 ratio         PTT - ( 04 May 2023 12:20 )  PTT:30.5 sec    CAPILLARY BLOOD GLUCOSE      RADIOLOGY & ADDITIONAL TESTS:  < from: CT Head No Cont (05.04.23 @ 16:45) >  IMPRESSION:  Limited study due to motion    No acute intracranial hemorrhage or acute territorial infarct.  If   symptoms persist, follow-up MRI exam recommended.    < end of copied text >  < from: Xray Chest 1 View-PORTABLE IMMEDIATE (05.04.23 @ 13:13) >    IMPRESSION: Increasing bilateral upper lobe infiltrates.    < end of copied text >

## 2023-05-06 NOTE — PROGRESS NOTE ADULT - PROBLEM SELECTOR PLAN 1
pt p/w AMS  afebrile, normotensive, WBC wnl  CTH nonacute  UA grossly+  chance placed in ED and pt mental status improved to baseline  Continue Cefepime (renally dosed) as pt recently hospitalized and at risk for resistant organism  ID Dr. Goncalves consulted  F/U ID

## 2023-05-07 NOTE — PROGRESS NOTE ADULT - PROBLEM SELECTOR PLAN 1
pt p/w AMS  afebrile, normotensive, WBC wnl  CTH nonacute  UA grossly+  chance placed in ED and pt mental status improved to baseline  Continue Cefepime (renally dosed) as pt recently hospitalized and at risk for resistant organism  - Pending sensitivities  ID Dr. Goncalves consulted

## 2023-05-07 NOTE — PROGRESS NOTE ADULT - SUBJECTIVE AND OBJECTIVE BOX
Left message asked her to return my call.  Want to clarify if any change in her symptoms or medications.   [facility] NEPHROLOGY- PROGRESS NOTE    Patient is a 72[facility] Male [facility],  h/o intellectual disability, Down Syndrome, asthma, COPD with chronic cough, HTN, DM, BPH, schizophrenia,, bilateral sensorineural hearing loss, recently admitted to [facility] (-) for decompensated HF and YASSINE presents with AMS and respiratory distress and found to be hyperkalemic to 9.5 as an outpt. s/ chance placement in ER with cloudy urine. Pt a/w acute on chronic CHF, UTI, hyperkalemia and YASSINE on CKD.     Hospital Medications: Medications reviewed.  REVIEW OF SYSTEMS:  Limited due to mental status; +dry cough. SOB resolved Denies any chest pain.     VITALS:  T(F): 98.2 (23 @ 13:43), Max: 98.2 (23 @ 13:43)  HR: 99 (23 @ 13:43)  BP: 127/84 (23 @ 13:43)  RR: 20 (23 @ 13:43)  SpO2: 98% (23 @ 13:43)  Wt(kg): --        PHYSICAL EXAM:  Gen: NAD  HEENT: anicteric  Neck: no JVD  Cards: RRR, +S1/S2, no M/G/R  Resp: CTA b/l  GI: soft, NT ND  : +chance clear yellow urine  Extremities: No LE edema b/l    LABS:      148<H>  |  114<H>  |  57<H>  ----------------------------<  172<H>  3.9   |  31  |  2.23<H>    Ca    9.7      07 May 2023 07:01  Phos  2.7       Mg     2.9         TPro  6.3  /  Alb  1.9<L>  /  TBili  0.6  /  DBili      /  AST  28  /  ALT  67<H>  /  AlkPhos  93      Creatinine Trend: 2.23 <--, 2.97 <--, 4.10 <--, 4.38 <--, 4.21 <--, 4.55 <--                        13.1   6.43  )-----------( 201      ( 07 May 2023 07:01 )             42.6     Urine Studies:  Urinalysis Basic - ( 04 May 2023 12:47 )    Color: Yellow / Appearance: very cloudy / S.015 / pH:   Gluc:  / Ketone: Negative  / Bili: Negative / Urobili: 4 mg/dL   Blood:  / Protein: 100 mg/dL / Nitrite: Negative   Leuk Esterase: Moderate / RBC: 10-25 /HPF / WBC >50 /HPF   Sq Epi:  / Non Sq Epi:  / Bacteria: Many /HPF      Sodium, Random Urine: 9 mmol/L ( @ 18:35)  Chloride, Random Urine: <10 mmol/L ( @ 18:35)  Creatinine, Random Urine: 96 mg/dL ( @ 18:35)

## 2023-05-07 NOTE — PROGRESS NOTE ADULT - SUBJECTIVE AND OBJECTIVE BOX
no events overnight, ROS -     acetaminophen     Tablet .. 650 milliGRAM(s) Oral every 6 hours PRN  amantadine 100 milliGRAM(s) Oral two times a day  aspirin  chewable 81 milliGRAM(s) Oral daily  atorvastatin 40 milliGRAM(s) Oral at bedtime  buMETAnide 1 milliGRAM(s) Oral daily  cefepime   IVPB 1000 milliGRAM(s) IV Intermittent every 12 hours  finasteride 5 milliGRAM(s) Oral daily  fluticasone propionate 50 MICROgram(s)/spray Nasal Spray 1 Spray(s) Both Nostrils two times a day  heparin   Injectable 5000 Unit(s) SubCutaneous every 8 hours  insulin lispro (ADMELOG) corrective regimen sliding scale   SubCutaneous three times a day before meals  lactulose Syrup 20 Gram(s) Oral at bedtime  melatonin 5 milliGRAM(s) Oral at bedtime  memantine 5 milliGRAM(s) Oral two times a day  metoprolol succinate ER 25 milliGRAM(s) Oral daily  montelukast 10 milliGRAM(s) Oral at bedtime  multivitamin 1 Tablet(s) Oral daily  OLANZapine 10 milliGRAM(s) Oral at bedtime  tamsulosin 0.4 milliGRAM(s) Oral at bedtime  tiotropium 2.5 MICROgram(s) Inhaler 2 Puff(s) Inhalation daily  valproic  acid Syrup 1000 milliGRAM(s) Oral at bedtime  valproic  acid Syrup 250 milliGRAM(s) Oral daily                            13.1   6.43  )-----------( 201      ( 07 May 2023 07:01 )             42.6       Hemoglobin: 13.1 g/dL (05-07 @ 07:01)  Hemoglobin: 12.4 g/dL (05-06 @ 06:10)  Hemoglobin: 12.0 g/dL (05-05 @ 07:15)  Hemoglobin: 12.9 g/dL (05-04 @ 12:20)      05-07    148<H>  |  114<H>  |  57<H>  ----------------------------<  172<H>  3.9   |  31  |  2.23<H>    Ca    9.7      07 May 2023 07:01  Phos  2.7     05-07  Mg     2.9     05-07    TPro  6.3  /  Alb  1.9<L>  /  TBili  0.6  /  DBili  x   /  AST  28  /  ALT  67<H>  /  AlkPhos  93  05-07    Creatinine Trend: 2.23<--, 2.97<--, 4.10<--, 4.38<--, 4.21<--, 4.55<--    COAGS:           T(C): 36.7 (05-07-23 @ 05:42), Max: 36.7 (05-06-23 @ 20:42)  HR: 92 (05-07-23 @ 05:42) (80 - 92)  BP: 131/91 (05-07-23 @ 05:42) (113/73 - 131/91)  RR: 18 (05-07-23 @ 05:42) (17 - 18)  SpO2: 94% (05-07-23 @ 05:42) (94% - 100%)  Wt(kg): --    I&O's Summary    	  Lymphatic: No lymphadenopathy , + edema in LE bilaterally   Cardiovascular: Normal S1 S2, No JVD, No murmurs , Peripheral pulses palpable 2+ bilaterally  Respiratory: Lungs clear to auscultation, normal effort 	  Gastrointestinal:  Soft, Non-tender, + BS	  Skin: No rashes, No ecchymoses, No cyanosis, warm to touch  Musculoskeletal: Normal range of motion, normal strength  Psychiatry: unable to assess       TELEMETRY: 	 NSR          RADIOLOGY:  OTHER:     DIAGNOSTIC TESTING:  [ ] Echocardiogram: < from: TTE with Doppler (w/Cont) (03.29.23 @ 07:03) >  CONCLUSIONS:  1. Trace mitral regurgitation.  2. Normal left ventricular internal dimensions and wall  thicknesses.  3. Severe global left ventricular systolic dysfunction.  Ultrasound LV opacification agent was used to enhance  endocardial definition.  4. Grade I diastolic dysfunction (Impaired relaxation,  mild).  5. Normal right ventricular size and function.  6. Agitated saline injection revealed late bubbles in the  left heart, consistent with transpulmonic shunting.    < end of copied text >    [ ]  Catheterization:  [ ] Stress Test:        ASSESSMENT/PLAN: 73 year old male with Intellectual disability, Downs Syndrome, from [facility] w/ PMHx COPD with chronic cough, hx of non ischemic cardiomyopathy with non-obstructive CAD on CCTA (2021), HFrEF, HTN, DM, CKD elevated PSA, BPH, schizophrenia, hepatitis B, carrier, bilateral sensorineural hearing loss, was brought in from the facility for who came in to the ED for sore throat, chest pain, mental status changes, and respiratory distress.    1.  Chest pain   - pt. currently denies any chest pain   - troponin elevation may be secondary to underlying renal disease   - check TTE  - trend CE including CPK   - monitor on tele     2.  Dyspnea   - pt. with elevated pBNP and volume overload on exam   - currently on bumex   - keep O > I if ok with renal given orly   - follow up renal   - check TTE

## 2023-05-07 NOTE — PROGRESS NOTE ADULT - ASSESSMENT
Patient is a 72yo [facility],  h/o intellectual disability, Down Syndrome, asthma, COPD with chronic cough, HTN, DM, BPH, schizophrenia,, bilateral sensorineural hearing loss, recently admitted to [facility] (4/8-4/28) for decompensated HF and YASSINE presents with AMS and respiratory distress and found to be hyperkalemic to 9.5 as an outpt. s/ chance placement in ER with cloudy urine. Pt a/w acute on chronic CHF, UTI, hyperkalemia and YASSINE on CKD.     1. YASSINE- in the setting of UTI vs CHF. Pt with rales on exam. CXR with increasing b/l upper lobe infiltrates. Renal function improving. Volume status improved on Bumex 1mg PO daily. Will defer diuretics to Cards. Monitor urine o/p  UA active in the setting of infection. FeUrea 44%; inconclusive. Check Renal US.   (pt with h/o CHF with severe global LVSD on TTE). Strict I/Os. Avoid nephrotoxins/ NSAIDs/ RCA. Monitor BMP.  2. CKD-3a- baseline SCr 1.3-1.6. However pt was recently admitted with YASSINE and d/c with SCr 2.95 on 4/28/23. Defer secondary w/u as an outpt. Avoid nephrotoxins  3. HTN 2/2 CKD- BP borderline. Continue to hold Enalapril.  Monitor BP.  4. Hyperkalemia- resolved with medical management; Lokelma. c/w low potassium diet. Monitor serum potassium  5. UTI- +UA. Pt on Cefepime. UCx Ecoli. BCx NG  6. BPH- c/w flomax and finasteride. Pt now with chance (placed in ER)      [facility] NEPHROLOGY  Raul Goncalves M.D.  DC PérezO.  Priya Pederson M.D.  Hellen Dietz, MSN, ANP-C  [phone]  Fax: [phone]    [facility] [place] [address], [address]  [place] [id number]

## 2023-05-07 NOTE — PROGRESS NOTE ADULT - SUBJECTIVE AND OBJECTIVE BOX
PGY-1 Progress Note discussed with attending    PAGER #: [[phone]] TILL 5:00 PM  PLEASE CONTACT ON CALL TEAM:  - On Call Team (Please refer to Red) FROM 5:00 PM - 8:30PM  - Nightfloat Team FROM 8:30 -7:30 AM    INTERVAL HPI/OVERNIGHT EVENTS:   MEDICATIONS  (STANDING):  amantadine 100 milliGRAM(s) Oral two times a day  aspirin  chewable 81 milliGRAM(s) Oral daily  atorvastatin 40 milliGRAM(s) Oral at bedtime  buMETAnide 1 milliGRAM(s) Oral daily  cefepime   IVPB 1000 milliGRAM(s) IV Intermittent every 12 hours  finasteride 5 milliGRAM(s) Oral daily  fluticasone propionate 50 MICROgram(s)/spray Nasal Spray 1 Spray(s) Both Nostrils two times a day  heparin   Injectable 5000 Unit(s) SubCutaneous every 8 hours  insulin lispro (ADMELOG) corrective regimen sliding scale   SubCutaneous three times a day before meals  lactulose Syrup 20 Gram(s) Oral at bedtime  melatonin 5 milliGRAM(s) Oral at bedtime  memantine 5 milliGRAM(s) Oral two times a day  metoprolol succinate ER 25 milliGRAM(s) Oral daily  montelukast 10 milliGRAM(s) Oral at bedtime  multivitamin 1 Tablet(s) Oral daily  OLANZapine 10 milliGRAM(s) Oral at bedtime  tamsulosin 0.4 milliGRAM(s) Oral at bedtime  tiotropium 2.5 MICROgram(s) Inhaler 2 Puff(s) Inhalation daily  valproic  acid Syrup 250 milliGRAM(s) Oral daily  valproic  acid Syrup 1000 milliGRAM(s) Oral at bedtime    MEDICATIONS  (PRN):  acetaminophen     Tablet .. 650 milliGRAM(s) Oral every 6 hours PRN Temp greater or equal to 38C (100.4F), Mild Pain (1 - 3)      REVIEW OF SYSTEMS:  CONSTITUTIONAL: No fever, weight loss, or fatigue  RESPIRATORY: No cough, wheezing, chills or hemoptysis; No shortness of breath  CARDIOVASCULAR: No chest pain, palpitations, dizziness, or leg swelling  GASTROINTESTINAL: No abdominal pain. No nausea, vomiting, or hematemesis; No diarrhea or constipation. No melena or hematochezia.  GENITOURINARY: No dysuria or hematuria, urinary frequency  NEUROLOGICAL: No headaches, memory loss, loss of strength, numbness, or tremors  SKIN: No itching, burning, rashes, or lesions     Vital Signs Last 24 Hrs  T(C): 36.7 (07 May 2023 05:42), Max: 36.7 (06 May 2023 20:42)  T(F): 98.1 (07 May 2023 05:42), Max: 98.1 (06 May 2023 20:42)  HR: 92 (07 May 2023 05:42) (80 - 92)  BP: 131/91 (07 May 2023 05:42) (113/73 - 131/91)  BP(mean): --  RR: 18 (07 May 2023 05:42) (17 - 18)  SpO2: 94% (07 May 2023 05:42) (94% - 100%)    Parameters below as of 07 May 2023 05:42  Patient On (Oxygen Delivery Method): nasal cannula  O2 Flow (L/min): 2      PHYSICAL EXAMINATION:  GENERAL: NAD, well built  HEAD:  Atraumatic, Normocephalic  EYES:  conjunctiva and sclera clear  NECK: Supple, No JVD  CHEST/LUNG: Clear to auscultation. Clear to percussion bilaterally; No rales, rhonchi, wheezing, or rubs  HEART: Regular rate and rhythm; No murmurs, rubs, or gallops  ABDOMEN: Soft, Nontender, Nondistended; Bowel sounds present  NERVOUS SYSTEM:  Alert & Oriented X3    EXTREMITIES:  2+ Peripheral Pulses, No clubbing, cyanosis, or edema  SKIN: warm dry                          13.1   6.43  )-----------( 201      ( 07 May 2023 07:01 )             42.6     05-07    148<H>  |  114<H>  |  57<H>  ----------------------------<  172<H>  3.9   |  31  |  2.23<H>    Ca    9.7      07 May 2023 07:01  Phos  2.7     05-07  Mg     2.9     05-07    TPro  6.3  /  Alb  1.9<L>  /  TBili  0.6  /  DBili  x   /  AST  28  /  ALT  67<H>  /  AlkPhos  93  05-07    LIVER FUNCTIONS - ( 07 May 2023 07:01 )  Alb: 1.9 g/dL / Pro: 6.3 g/dL / ALK PHOS: 93 U/L / ALT: 67 U/L DA / AST: 28 U/L / GGT: x                   CAPILLARY BLOOD GLUCOSE      RADIOLOGY & ADDITIONAL TESTS:                   PGY-1 Progress Note discussed with attending    PAGER #: [[phone]] TILL 5:00 PM  PLEASE CONTACT ON CALL TEAM:  - On Call Team (Please refer to Red) FROM 5:00 PM - 8:30PM  - Nightfloat Team FROM 8:30 -7:30 AM    INTERVAL HPI/OVERNIGHT EVENTS: Patient seen and examined at bedside. No acute events overnight. No new complaints.     MEDICATIONS  (STANDING):  amantadine 100 milliGRAM(s) Oral two times a day  aspirin  chewable 81 milliGRAM(s) Oral daily  atorvastatin 40 milliGRAM(s) Oral at bedtime  buMETAnide 1 milliGRAM(s) Oral daily  cefepime   IVPB 1000 milliGRAM(s) IV Intermittent every 12 hours  finasteride 5 milliGRAM(s) Oral daily  fluticasone propionate 50 MICROgram(s)/spray Nasal Spray 1 Spray(s) Both Nostrils two times a day  heparin   Injectable 5000 Unit(s) SubCutaneous every 8 hours  insulin lispro (ADMELOG) corrective regimen sliding scale   SubCutaneous three times a day before meals  lactulose Syrup 20 Gram(s) Oral at bedtime  melatonin 5 milliGRAM(s) Oral at bedtime  memantine 5 milliGRAM(s) Oral two times a day  metoprolol succinate ER 25 milliGRAM(s) Oral daily  montelukast 10 milliGRAM(s) Oral at bedtime  multivitamin 1 Tablet(s) Oral daily  OLANZapine 10 milliGRAM(s) Oral at bedtime  tamsulosin 0.4 milliGRAM(s) Oral at bedtime  tiotropium 2.5 MICROgram(s) Inhaler 2 Puff(s) Inhalation daily  valproic  acid Syrup 250 milliGRAM(s) Oral daily  valproic  acid Syrup 1000 milliGRAM(s) Oral at bedtime    MEDICATIONS  (PRN):  acetaminophen     Tablet .. 650 milliGRAM(s) Oral every 6 hours PRN Temp greater or equal to 38C (100.4F), Mild Pain (1 - 3)      REVIEW OF SYSTEMS:  CONSTITUTIONAL: No fever, weight loss, or fatigue  RESPIRATORY: No cough, wheezing, chills or hemoptysis; No shortness of breath  CARDIOVASCULAR: No chest pain, palpitations, dizziness, or leg swelling  GASTROINTESTINAL: No abdominal pain. No nausea, vomiting, or hematemesis; No diarrhea or constipation. No melena or hematochezia.  GENITOURINARY: No dysuria or hematuria, urinary frequency  NEUROLOGICAL: No headaches, memory loss, loss of strength, numbness, or tremors  SKIN: No itching, burning, rashes, or lesions     Vital Signs Last 24 Hrs  T(C): 36.7 (07 May 2023 05:42), Max: 36.7 (06 May 2023 20:42)  T(F): 98.1 (07 May 2023 05:42), Max: 98.1 (06 May 2023 20:42)  HR: 92 (07 May 2023 05:42) (80 - 92)  BP: 131/91 (07 May 2023 05:42) (113/73 - 131/91)  BP(mean): --  RR: 18 (07 May 2023 05:42) (17 - 18)  SpO2: 94% (07 May 2023 05:42) (94% - 100%)    Parameters below as of 07 May 2023 05:42  Patient On (Oxygen Delivery Method): nasal cannula  O2 Flow (L/min): 2      PHYSICAL EXAMINATION:  GENERAL: NAD, nasal cannula in place  HEAD:  Atraumatic, Normocephalic  EYES:  Right eye prosthesis, left Conjunctiva and sclera clear, pupil round, and reactive to light and accommodation.  NECK: Supple, No JVD, trachea is midline, no evidence of thyroid enlargement, no lymphadenopathy or tenderness.  CHEST/LUNG: No rales, minimal rhonchi noted bilaterally, minimal expiratory wheezing, no rubs  HEART: Regular rate and rhythm; No murmurs, rubs, or gallops  ABDOMEN: Soft, Nontender, Nondistended; Bowel sounds present  NERVOUS SYSTEM:  Alert & Oriented X3  EXTREMITIES:  2+ Peripheral Pulses, No clubbing, No cyanosis, pitting edema +1 noted on lower ext bilaterally   SKIN: Warm, dry, and well perfused; Good turgor; No rashes are noted.                          13.1   6.43  )-----------( 201      ( 07 May 2023 07:01 )             42.6     05-07    148<H>  |  114<H>  |  57<H>  ----------------------------<  172<H>  3.9   |  31  |  2.23<H>    Ca    9.7      07 May 2023 07:01  Phos  2.7     05-07  Mg     2.9     05-07    TPro  6.3  /  Alb  1.9<L>  /  TBili  0.6  /  DBili  x   /  AST  28  /  ALT  67<H>  /  AlkPhos  93  05-07    LIVER FUNCTIONS - ( 07 May 2023 07:01 )  Alb: 1.9 g/dL / Pro: 6.3 g/dL / ALK PHOS: 93 U/L / ALT: 67 U/L DA / AST: 28 U/L / GGT: x                   CAPILLARY BLOOD GLUCOSE      RADIOLOGY & ADDITIONAL TESTS:                   1 Principal Discharge DX:	Gingivostomatitis   Principal Discharge DX:	Gingivostomatitis  Secondary Diagnosis:	Dehydration

## 2023-05-07 NOTE — PROGRESS NOTE ADULT - PROBLEM SELECTOR PLAN 3
CXR with worsening infiltrates - Swelling b/l LE 2+ pitting edema  - C/w Bumex and metoprolol  - No indication for repeat echo, pt had one 3/2023  Dr. Jiménez Cardio consulted

## 2023-05-07 NOTE — PROGRESS NOTE ADULT - NS ATTEND AMEND GEN_ALL_CORE FT
Patient seen and examined.  Agree with above.   Volume status/le edema improved today  Follow up renal   Check TTE    Delbert Burt MD

## 2023-05-08 NOTE — PROGRESS NOTE ADULT - ASSESSMENT
Subjective: NAD, afebrile, clinically stable, no N/V or diarrhea, no abd pain, voiding well + chance     REVIEW OF SYSTEMS:  CONSTITUTIONAL:  No fevers or chills  EYES/ENT:  No visual changes;  No vertigo or throat pain   NECK:  No neck pain or stiffness  RESPIRATORY:  No cough, wheezing, hemoptysis; No shortness of breath  CARDIOVASCULAR:  No chest pain or palpitations  GASTROINTESTINAL:  No abdominal or epigastric pain. No nausea, vomiting, or hematemesis; No diarrhea or constipation. No melena or hematochezia.  GENITOURINARY:  No dysuria, frequency or hematuria  NEUROLOGICAL:  No numbness or weakness  MSK: no back pain, no joint pain  SKIN:  No itching, rashes    PE:  Vital Signs Last 24 Hrs  T(C): 36.8 (08 May 2023 13:26), Max: 36.8 (08 May 2023 13:26)  T(F): 98.2 (08 May 2023 13:26), Max: 98.2 (08 May 2023 13:26)  HR: 99 (08 May 2023 13:26) (92 - 104)  BP: 132/89 (08 May 2023 13:26) (132/89 - 151/94)  RR: 18 (08 May 2023 13:26) (18 - 19)  SpO2: 95% (08 May 2023 13:26) (95% - 99%)    Parameters below as of 08 May 2023 13:26 Patient On (Oxygen Delivery Method): nasal cannula O2 Flow (L/min): 2    Gen: AOx3, NAD, non-toxic, pleasant  HEAD:  Atraumatic, Normocephalic  EYES: PERRLA, conjunctiva and sclera clear  ENT: Moist mucous membranes  NECK: Supple, No JVD  CV: S1+S2 normal, nontachycardic  Resp: Clear bilat, no resp distress, no crackles/wheezes  Abd: Soft, nontender, +BS  Ext: No LE edema, no cyanosis, pulses +  : + Chance with yellow urine, no dysuria, voiding well with Chance cath  IV/Skin: No thrombophlebitis  Msk: No low back pain, no arthralgias, no joint swelling  Neuro: AAOx3. No sensory deficits, no motor deficits  Psych: no anxiety, no delusional ideas, no suicidal ideation    LABS/DIAGNOSTIC TESTS:                        12.5   6.50  )-----------( 195      ( 08 May 2023 06:53 )             40.9     WBC Count: 6.50 K/uL (05-08 @ 06:53)  WBC Count: 6.43 K/uL (05-07 @ 07:01)  WBC Count: 6.11 K/uL (05-06 @ 06:10)    05-08    148<H>  |  116<H>  |  50<H>  ----------------------------<  157<H>  3.8   |  29  |  2.08<H>    Ca    9.5      08 May 2023 06:53  Phos  2.5     05-08  Mg     3.0     05-08    TPro  6.3  /  Alb  1.9<L>  /  TBili  0.5  /  DBili  x   /  AST  37  /  ALT  72<H>  /  AlkPhos  88  05-08    CULTURES:   Culture - Urine (collected 05-04-23 @ 12:47)  Source: Clean Catch Clean Catch (Midstream)  Final Report (05-07-23 @ 14:28):    >100,000 CFU/ml Escherichia coli  Organism: Escherichia coli (05-07-23 @ 14:28)  Organism: Escherichia coli (05-07-23 @ 14:28)      Method Type: JUSTA      -  Amikacin: S <=16      -  Amoxicillin/Clavulanic Acid: S <=8/4      -  Ampicillin: S <=8 These ampicillin results predict results for amoxicillin      -  Ampicillin/Sulbactam: S <=4/2 Enterobacter, Klebsiella aerogenes, Citrobacter, and Serratia may develop resistance during prolonged therapy (3-4 days)      -  Aztreonam: S <=4      -  Cefazolin: S <=2 For uncomplicated UTI with K. pneumoniae, E. coli, or P. mirablis: JUSTA <=16 is sensitive and JUSTA >=32 is resistant. This also predicts results for oral agents cefaclor, cefdinir, cefpodoxime, cefprozil, cefuroxime axetil, cephalexin and locarbef for uncomplicated UTI. Note that some isolates may be susceptible to these agents while testing resistant to cefazolin.      -  Cefepime: S <=2      -  Cefoxitin: S <=8      -  Ceftriaxone: S <=1 Enterobacter, Klebsiella aerogenes, Citrobacter, and Serratia may develop resistance during prolonged therapy      -  Cefuroxime: S <=4      -  Ciprofloxacin: S <=0.25      -  Ertapenem: S <=0.5      -  Gentamicin: S <=2      -  Imipenem: S <=1      -  Levofloxacin: S <=0.5      -  Meropenem: S <=1      -  Nitrofurantoin: S <=32 Should not be used to treat pyelonephritis      -  Piperacillin/Tazobactam: S <=8      -  Tobramycin: S <=2      -  Trimethoprim/Sulfamethoxazole: S <=0.5/9.5    Culture - Blood (collected 05-04-23 @ 12:25)  Source: .Blood Blood-Peripheral  Preliminary Report (05-05-23 @ 19:02):    No growth to date.    Culture - Blood (collected 05-04-23 @ 12:20)  Source: .Blood Blood-Peripheral  Preliminary Report (05-05-23 @ 19:02):    No growth to date.    Culture - Nasopharyngeal (collected 04-22-23 @ 13:16)  Source: .Nasopharyngeal Nasopharyngeal  Final Report (04-24-23 @ 17:12):    No Neisseria. meningitidis isolated.    "Culture was evaluated for N. Meningitidis only."    Rapid RVP Result: NotDetec (05-04 @ 12:11)    RADIOLOGY: < from: US Kidney and Bladder (05.08.23 @ 11:01) > Right kidney: 11.2 cm. No renal mass, hydronephrosis or calculi. Cysts, measuring 4.7 cm and 1.1 cm respectively.  Left kidney: 11.2 cm. No renal mass, hydronephrosis or calculi. Subcentimeter cyst.  Urinary bladder: Collapsed with Chance.    IMPRESSION: No hydronephrosis bilaterally    IMPRESSION:  72 yo male from Group Home with h/o Intelectual disability, Fetal Alcohol Syndrome, asthma, SOPD, HTN, DM, CKD, BPH, schizophrenia, Hepatitis B, BL sensorineural hearing loss BIBEMS for AMS and hyperkalemia from Group Home, lethargic and with SOB, O2sat 79% as per EMS.   Recently d/c from [facility] 4/28 after tx for CHF exacerbation, at the time Chance placed for urinary retention and sent back to NH after TOV.   Chance placed in ED with evidence of retention with improvement of his symptoms.   CXR report reported with increasing upper lobes infiltrates but the clinical picture is more consistent with congestion.  Urine cx + sensitive E coli  Blood cx NGTD    -Acute Encephalopathy- resolved  -UTI- Obstructive uropathy(BPH and urinary retention) Chance placed(improving)  -YASSINE on CKD 4  -DM with nephropathy  -ARF likely due to fluid overload / renal failure +/-CHF (CXR with areas consistent for pulm edema), improved, pt on RA  -BPH     PLAN:  Continue ceftriaxone 1g q24 hrs IV (abx de escalated after urine cx reported) once pt ready for discharge can transition to cefpodoxime 200 mg q12 hrs PO to complete 7 days of therapy  As per Urology pt failure TOV repeatedly, now recommending to keep chance indefinitely , pt will continue to follow with Urology OP.  Renal US reviewed (no hydronephrosis)  Monitor renal function, avoid nephrotoxic drugs  Discussed with medicine attending  ID will sign off    Please reach ID with any questions or concerns  Dr. Gris Verde  Available in Teams

## 2023-05-08 NOTE — PROGRESS NOTE ADULT - PROBLEM SELECTOR PLAN 7
Patient now with advanced systolic HF, in setting of severe NICM, with EF of 10-15%, and increasing BEARDEN/SOB with performance of ADLs resulting in functional limitation.  He is not considered to be a candidate for ICD.  With his increasing symptom burden he was not able to return to his group home setting and was recently discharged to [facility].  He now returns with worsening YASSINE on CKD possibly due to urinary retention/post obstructive renal failure, and presumed CHF exacerbation.       Patient has at minimum Stage C NYHA class III heart failure.  He now has worsening renal failure/ATN with concern for possible cardiorenal syndrome (vs  urinary obstruction).  He is at risk for continued progression of disease, further functional decline, recurrent hospitalizations, increasing morbidity, and sudden death.  Patient is LOW THRESHOLD for referral to hospice if his renal failure and/or symptom burden continue to progress.    Spoke to Dr. Emilia Hearn medical director for [facility] this afternoon.   She is in agreement that patient is at increased risk for CV morbidity and mortality with significant adverse effects on his health status and quality of life, and in agreement that patient should have orders for DNR and DNI in place.  Patient is under auspices of [facility] as 1750-b guardian. Patient's CAB  refused to endorse MOLST during last admission, requested additional Cardiology input    Please obtain second opinion from Cardiology to optimize GDMT and for prognostication.  Primary attending Dr. Mason is aware. Patient now with advanced systolic HF, in setting of severe NICM, with EF of 10-15%, and increasing BEARDEN/SOB with performance of ADLs resulting in functional limitation.  He is not considered to be a candidate for ICD.  With his increasing symptom burden he was not able to return to his group home setting and was recently discharged to [facility].  He now returns with worsening YASSINE on CKD possibly due to urinary retention/post obstructive renal failure, and presumed CHF exacerbation.       Patient has at minimum Stage C NYHA class III heart failure.  He now has worsening renal failure/ATN with concern for possible cardiorenal syndrome (vs  urinary obstruction).  He is at risk for continued progression of disease, further functional decline, recurrent hospitalizations, increasing morbidity, and sudden death.  Patient is LOW THRESHOLD for referral to hospice if his renal failure and/or symptom burden continue to progress.    Spoke to Dr. Emilia Hearn medical director for [facility] on 5/5.   She is in agreement that patient is at increased risk for CV morbidity and mortality with significant adverse effects on his health status and quality of life, and in agreement that patient should have orders for DNR and DNI in place.  Patient is under auspices of [facility] as 1750-b guardian.     Spoke to  Joana Daphnie at [facility] (), updated her on patient's condition.  In agreement to begin new discussion regarding possible MOLST orders for DNR/DNI.  Will collaborate with [facility] and Dr. Hearn regarding pre-MOLST checklist and additional paperwork required.    Please obtain second opinion from Cardiology to optimize GDMT and for prognostication.      Patient remains Full Code at this time  Further management as per Cardiology, Nephrology, and primary team  Palliative care team will continue to follow.

## 2023-05-08 NOTE — PROGRESS NOTE ADULT - SUBJECTIVE AND OBJECTIVE BOX
follow up on:  complex medical decision making related to goals of care    [facility] Geriatric and Palliative Consult Service:  Aliza Aracelis DO: cell ([phone])  Reji Hart MD: cell ([phone])  Joel Weiner NP: cell ([phone])   Michelle Iqbal DNP: cell ([phone])  Kahlil Lucas LMSW: cell ([phone])   Bharti Nogueira NP: via [employer] Teams    Can contact any Palliative Team member via [employer] Teams for consults and questions      OVERNIGHT EVENTS:  None  Patient examined at bedside this afternoon after returning from renal sonogram.  Offers no acute complaints.  Denies chest pain, SOB, nausea, or vomiting.  Appears comfortable.  No other acute complaints.    Present Symptoms: Mild, Moderate, Severe  Pain:             Location -   Denies                            Aggravating factors -             Quality -             Radiation -             Timing-             Severity (0-10 scale):  0/10             Minimal acceptable level (0-10 scale):  Fatigue:  mild  Nausea:  denies  Lack of Appetite:   denies  SOB:  denies presently  Depression:  Anxiety:  Review of Systems:  All other systems reviewed and are negative.      MEDICATIONS  (STANDING):  amantadine 100 milliGRAM(s) Oral two times a day  aspirin  chewable 81 milliGRAM(s) Oral daily  atorvastatin 40 milliGRAM(s) Oral at bedtime  buMETAnide 1 milliGRAM(s) Oral daily  finasteride 5 milliGRAM(s) Oral daily  fluticasone propionate 50 MICROgram(s)/spray Nasal Spray 1 Spray(s) Both Nostrils two times a day  heparin   Injectable 5000 Unit(s) SubCutaneous every 8 hours  insulin lispro (ADMELOG) corrective regimen sliding scale   SubCutaneous three times a day before meals  lactulose Syrup 20 Gram(s) Oral at bedtime  melatonin 5 milliGRAM(s) Oral at bedtime  memantine 5 milliGRAM(s) Oral two times a day  metoprolol succinate ER 25 milliGRAM(s) Oral daily  montelukast 10 milliGRAM(s) Oral at bedtime  multivitamin 1 Tablet(s) Oral daily  OLANZapine 10 milliGRAM(s) Oral at bedtime  tamsulosin 0.4 milliGRAM(s) Oral at bedtime  tiotropium 2.5 MICROgram(s) Inhaler 2 Puff(s) Inhalation daily  valproic  acid Syrup 1000 milliGRAM(s) Oral at bedtime  valproic  acid Syrup 250 milliGRAM(s) Oral daily    MEDICATIONS  (PRN):  acetaminophen     Tablet .. 650 milliGRAM(s) Oral every 6 hours PRN Temp greater or equal to 38C (100.4F), Mild Pain (1 - 3)      PHYSICAL EXAM:  Vital Signs Last 24 Hrs  T(C): 36.3 (08 May 2023 21:16), Max: 36.8 (08 May 2023 13:26)  T(F): 97.3 (08 May 2023 21:16), Max: 98.2 (08 May 2023 13:26)  HR: 100 (08 May 2023 21:16) (94 - 104)  BP: 129/94 (08 May 2023 21:16) (129/94 - 151/94)  BP(mean): --  RR: 18 (08 May 2023 21:16) (18 - 19)  SpO2: 100% (08 May 2023 21:16) (95% - 100%)    Parameters below as of 08 May 2023 21:16  Patient On (Oxygen Delivery Method): nasal cannula  O2 Flow (L/min): 2      General: alert  oriented x _2-3___  (at baseline)  + mild temporal wasting, calm and pleasant, NAD    Palliative Performance Scale/Karnofsky Score:  40%  http://npcrc.org/files/news/palliative_performance_scale_ppsv2.pdf    HEENT: EOMI anicteric, pharynx clear, MM moist  Lungs: Scattered transmitted rhonchi, otherwise clear to auscultation, respirations remain unlabored  CV:  tachycardic, normal S1 and S2, no murmur  GI: soft non distended non tender  normal bowel sounds  : incontinent  + Reyes cath in place, draining clear yellow urine  Musculoskeletal: weakness x4 in all extremities, mostly bedbound, ambulates with assistance, no edema  Skin: no abnormal skin lesions or DU notedf  Neuro: no focal deficits  Oral intake ability:  full capability, on pureed diet (chronic), no reported dysphagia    LABS:                          12.5   6.50  )-----------( 195      ( 08 May 2023 06:53 )             40.9     05-08    148<H>  |  116<H>  |  50<H>  ----------------------------<  157<H>  3.8   |  29  |  2.08<H>    Ca    9.5      08 May 2023 06:53  Phos  2.5     05-08  Mg     3.0     05-08    TPro  6.3  /  Alb  1.9<L>  /  TBili  0.5  /  DBili  x   /  AST  37  /  ALT  72<H>  /  AlkPhos  88  05-08        RADIOLOGY & ADDITIONAL STUDIES:

## 2023-05-08 NOTE — PROGRESS NOTE ADULT - PROBLEM SELECTOR PLAN 1
pt p/w AMS  afebrile, normotensive, WBC wnl  CTH nonacute  UA grossly+  chance placed in ED and pt mental status improved to baseline  - UCx EColi pan-sensitive   - switched from cefepime to ctx (5/8- 5/16)   ID Dr. Goncalves consulted

## 2023-05-08 NOTE — PROGRESS NOTE ADULT - ATTENDING COMMENTS
73 year old male, from group home with PMHx ?downs syndrome, intellectual disability, COPD with chronic cough, hx of non ischemic cardiomyopathy with non-obstructive CAD on CCTA (2021), HFrEF, HTN, DM, CKD elevated PSA, BPH, schizophrenia, hepatitis B, carrier, bilateral sensorineural hearing loss adm for AMS 2/2 UTI, hyperkalemia and YASSINE on CKD.      Patient was seen and examined, he feels well, denies any complaints.     Labs reviewed- cbc, bmp, lfts     PE as above   LE: no edema     A/P:  #Post-obstructive YASSINE  #YASSINE on CKD stage 3   #Acute Complicated  UTI  #Hyperkalemia- resolved   #Acute metabolic encephalopathy- resolved   # Chronic systolic HF   #HTN  #DM  #DVT ppx     Plan:  -Pt's SCr has improved s/p Reyes cathter. Renal sonogram w/ no hydronephrosis. Patient needs to be discharged w/ Reyes cathter. Dr. Pederson following   -Pt is euvolemic at this time, c/w bumex po-home dose   -Urine cx returned E.coli, pansensitive, DC cefepime, switch abx to Rocephin- Can DC w/ cefpodoxime to complete course.   -Appreciate cardiology recs   -Pt is medically stable for DC. Spoke w/ Dr. Hearn -Medical director at patient's Group home @ [phone] and informed her about patient's current hospital stay and plan. PT recommended CARLOS, CM made aware and is working on DC process.

## 2023-05-08 NOTE — PROGRESS NOTE ADULT - SUBJECTIVE AND OBJECTIVE BOX
C A R D I O L O G Y  **********************************     DATE OF SERVICE: 05-08-23    Patient denies chest pain or shortness of breath.   Review of systems otherwise (-)  	  MEDICATIONS:  MEDICATIONS  (STANDING):  amantadine 100 milliGRAM(s) Oral two times a day  aspirin  chewable 81 milliGRAM(s) Oral daily  atorvastatin 40 milliGRAM(s) Oral at bedtime  buMETAnide 1 milliGRAM(s) Oral daily  finasteride 5 milliGRAM(s) Oral daily  fluticasone propionate 50 MICROgram(s)/spray Nasal Spray 1 Spray(s) Both Nostrils two times a day  heparin   Injectable 5000 Unit(s) SubCutaneous every 8 hours  insulin lispro (ADMELOG) corrective regimen sliding scale   SubCutaneous three times a day before meals  lactulose Syrup 20 Gram(s) Oral at bedtime  melatonin 5 milliGRAM(s) Oral at bedtime  memantine 5 milliGRAM(s) Oral two times a day  metoprolol succinate ER 25 milliGRAM(s) Oral daily  montelukast 10 milliGRAM(s) Oral at bedtime  multivitamin 1 Tablet(s) Oral daily  OLANZapine 10 milliGRAM(s) Oral at bedtime  tamsulosin 0.4 milliGRAM(s) Oral at bedtime  tiotropium 2.5 MICROgram(s) Inhaler 2 Puff(s) Inhalation daily  valproic  acid Syrup 1000 milliGRAM(s) Oral at bedtime  valproic  acid Syrup 250 milliGRAM(s) Oral daily      LABS:	 	    CARDIAC MARKERS:                              12.5   6.50  )-----------( 195      ( 08 May 2023 06:53 )             40.9     Hemoglobin: 12.5 g/dL (05-08 @ 06:53)  Hemoglobin: 13.1 g/dL (05-07 @ 07:01)  Hemoglobin: 12.4 g/dL (05-06 @ 06:10)  Hemoglobin: 12.0 g/dL (05-05 @ 07:15)  Hemoglobin: 12.9 g/dL (05-04 @ 12:20)      05-08    148<H>  |  116<H>  |  50<H>  ----------------------------<  157<H>  3.8   |  29  |  2.08<H>    Ca    9.5      08 May 2023 06:53  Phos  2.5     05-08  Mg     3.0     05-08    TPro  6.3  /  Alb  1.9<L>  /  TBili  0.5  /  DBili  x   /  AST  37  /  ALT  72<H>  /  AlkPhos  88  05-08    Creatinine Trend: 2.08<--, 2.23<--, 2.97<--, 4.10<--, 4.38<--, 4.21<--        PHYSICAL EXAM:  T(C): 36.8 (05-08-23 @ 13:26), Max: 36.8 (05-08-23 @ 13:26)  HR: 99 (05-08-23 @ 13:26) (92 - 104)  BP: 132/89 (05-08-23 @ 13:26) (132/89 - 151/94)  RR: 18 (05-08-23 @ 13:26) (18 - 19)  SpO2: 95% (05-08-23 @ 13:26) (95% - 99%)  Wt(kg): --  I&O's Summary    07 May 2023 07:01  -  08 May 2023 07:00  --------------------------------------------------------  IN: 0 mL / OUT: 900 mL / NET: -900 mL    08 May 2023 07:01  -  08 May 2023 13:50  --------------------------------------------------------  IN: 0 mL / OUT: 500 mL / NET: -500 mL        Weight (kg): 70.8 (05-08 @ 05:11)    HEENT:  (-)icterus (-)pallor  CV: N S1 S2 1/6 BRENTON (+)2 Pulses B/l  Resp:  Clear to ausculatation B/L, normal effort  GI: (+) BS Soft, NT, ND  Lymph:  (-)Edema, (-)obvious lymphadenopathy  Skin: Warm to touch, Normal turgor  Psych: Appropriate mood and affect        ASSESSMENT/PLAN: 	73y  Male male with Intellectual disability, Downs Syndrome, from [facility] home number [facility] w/ PMHx COPD with chronic cough, hx of non ischemic cardiomyopathy with non-obstructive CAD on CCTA (2021), HFrEF, HTN, DM, CKD elevated PSA, BPH, schizophrenia, hepatitis B, carrier, bilateral sensorineural hearing loss, was brought in from the facility for who came in to the ED for sore throat, chest pain, mental status changes, and respiratory distress.    1.  Chest pain   - pt. currently denies any chest pain   - troponin elevation may be secondary to underlying renal disease     2.  Dyspnea   - pt. with elevated pBNP and volume overload on exam   - currently on bumex   - keep O > I if ok with renal given orly   - follow up renal   - will start afterload reducers  - start Imdur 30 mg PO daily  - would benefit from CHF eval at [facility] or PEGGY Jiménez MD, [facility]  BEEPER [phone]

## 2023-05-08 NOTE — PROGRESS NOTE ADULT - ASSESSMENT
Patient is a 72yo Male from  [facility] home number with CKD,  h/o intellectual disability, Down Syndrome, asthma, COPD with chronic cough, HTN, DM, BPH, schizophrenia,, bilateral sensorineural hearing loss, recently admitted to [facility] (4/8-4/28) for decompensated HF and YASSINE presents with AMS and respiratory distress and found to be hyperkalemic to 9.5 as an outpt. s/ chance placement in ER with cloudy urine. Pt a/w acute on chronic CHF, UTI, hyperkalemia and YASSINE on CKD.     1. YASSINE- in the setting of UTI vs CHF.  CXR with increasing b/l upper lobe infiltrates. Renal function improving. Volume status improved on Bumex 1mg PO daily. Will defer diuretics to Cards. Monitor urine o/p  UA active in the setting of infection. FeUrea 44%; inconclusive. Renal US with no hydro.   (pt with h/o CHF with severe global LVSD on TTE). Strict I/Os. Avoid nephrotoxins/ NSAIDs/ RCA. Monitor BMP.  2. CKD-3a- baseline SCr 1.3-1.6. However pt was recently admitted with YASSINE and d/c with SCr 2.95 on 4/28/23. Defer secondary w/u as an outpt. Avoid nephrotoxins  3. HTN 2/2 CKD- BP borderline. Continue to hold Enalapril.  Monitor BP.  4. Hyperkalemia- resolved with medical management; Lokelma. c/w low potassium diet. Monitor serum potassium  5. UTI- +UA. Pt on Ceftriaxone. UCx Ecoli. BCx NG  6. BPH- c/w flomax and finasteride. Pt now with chance (placed in ER)      [facility] NEPHROLOGY  Raul Goncalves M.D.  Bennie Carcamo D.O.  Priya Pederson M.D.  Hellen Dietz, MSN, ANP-C  [phone]  Fax: [phone] [place] [address], [address]  [place] [address]

## 2023-05-08 NOTE — PROGRESS NOTE ADULT - SUBJECTIVE AND OBJECTIVE BOX
PGY-1 Progress Note discussed with attending    PLEASE CONTACT ON CALL TEAM:  - On Call Team (Please refer to Red) FROM 5:00 PM - 8:30PM  - Nightfloat Team FROM 8:30 -7:30 AM    CHIEF COMPLAINT & BRIEF HOSPITAL COURSE: 73 year old male, from group home with PMHx downs syndrome, intelectual disability, COPD with chronic cough, hx of non ischemic cardiomyopathy with non-obstructive CAD on CCTA (2021), HFrEF, HTN, DM, CKD elevated PSA, BPH, schizophrenia, hepatitis B, carrier, bilateral sensorineural hearing loss adm for AMS 2/2 UTI, hyperkalemia and YASSINE on CKD.    INTERVAL HPI/OVERNIGHT EVENTS: No acute events overnight. Denies chest pain, sob, fevers, chills, n/v/d/constipation. On 2L NC, working with PT today. Medically optimized, for d/c back to rehab.      MEDICATIONS  (STANDING):  amantadine 100 milliGRAM(s) Oral two times a day  aspirin  chewable 81 milliGRAM(s) Oral daily  atorvastatin 40 milliGRAM(s) Oral at bedtime  buMETAnide 1 milliGRAM(s) Oral daily  finasteride 5 milliGRAM(s) Oral daily  fluticasone propionate 50 MICROgram(s)/spray Nasal Spray 1 Spray(s) Both Nostrils two times a day  heparin   Injectable 5000 Unit(s) SubCutaneous every 8 hours  insulin lispro (ADMELOG) corrective regimen sliding scale   SubCutaneous three times a day before meals  lactulose Syrup 20 Gram(s) Oral at bedtime  melatonin 5 milliGRAM(s) Oral at bedtime  memantine 5 milliGRAM(s) Oral two times a day  metoprolol succinate ER 25 milliGRAM(s) Oral daily  montelukast 10 milliGRAM(s) Oral at bedtime  multivitamin 1 Tablet(s) Oral daily  OLANZapine 10 milliGRAM(s) Oral at bedtime  tamsulosin 0.4 milliGRAM(s) Oral at bedtime  tiotropium 2.5 MICROgram(s) Inhaler 2 Puff(s) Inhalation daily  valproic  acid Syrup 1000 milliGRAM(s) Oral at bedtime  valproic  acid Syrup 250 milliGRAM(s) Oral daily    MEDICATIONS  (PRN):  acetaminophen     Tablet .. 650 milliGRAM(s) Oral every 6 hours PRN Temp greater or equal to 38C (100.4F), Mild Pain (1 - 3)      REVIEW OF SYSTEMS:  CONSTITUTIONAL: No fever, weight loss, or fatigue  RESPIRATORY: No cough, wheezing, chills or hemoptysis; No shortness of breath  CARDIOVASCULAR: No chest pain, palpitations, dizziness, or leg swelling  GASTROINTESTINAL: No abdominal pain. No nausea, vomiting, or hematemesis; No diarrhea or constipation. No melena or hematochezia.  GENITOURINARY: No dysuria or hematuria, urinary frequency  NEUROLOGICAL: No headaches, memory loss, loss of strength, numbness, or tremors  SKIN: No itching, burning, rashes, or lesions     Vital Signs Last 24 Hrs  T(C): 36.8 (08 May 2023 13:26), Max: 36.8 (08 May 2023 13:26)  T(F): 98.2 (08 May 2023 13:26), Max: 98.2 (08 May 2023 13:26)  HR: 99 (08 May 2023 13:26) (92 - 104)  BP: 132/89 (08 May 2023 13:26) (132/89 - 151/94)  BP(mean): --  RR: 18 (08 May 2023 13:26) (18 - 19)  SpO2: 95% (08 May 2023 13:26) (95% - 99%)    Parameters below as of 08 May 2023 13:26  Patient On (Oxygen Delivery Method): nasal cannula  O2 Flow (L/min): 2      PHYSICAL EXAMINATION:  GENERAL: NAD, well built  HEAD:  Atraumatic, Normocephalic  EYES:  conjunctiva and sclera clear  NECK: Supple, No JVD, Normal thyroid  CHEST/LUNG: Clear to auscultation. Clear to percussion bilaterally; No rales, rhonchi, wheezing, or rubs  HEART: Regular rate and rhythm; No murmurs, rubs, or gallops  ABDOMEN: Soft, Nontender, Nondistended; Bowel sounds present  NERVOUS SYSTEM:  Alert & Oriented X2, no focal neuro defecits     EXTREMITIES:  2+ Peripheral Pulses, No clubbing, cyanosis, or edema  SKIN: warm dry                          12.5   6.50  )-----------( 195      ( 08 May 2023 06:53 )             40.9     05-08    148<H>  |  116<H>  |  50<H>  ----------------------------<  157<H>  3.8   |  29  |  2.08<H>    Ca    9.5      08 May 2023 06:53  Phos  2.5     05-08  Mg     3.0     05-08    TPro  6.3  /  Alb  1.9<L>  /  TBili  0.5  /  DBili  x   /  AST  37  /  ALT  72<H>  /  AlkPhos  88  05-08    LIVER FUNCTIONS - ( 08 May 2023 06:53 )  Alb: 1.9 g/dL / Pro: 6.3 g/dL / ALK PHOS: 88 U/L / ALT: 72 U/L DA / AST: 37 U/L / GGT: x                   CAPILLARY BLOOD GLUCOSE      RADIOLOGY & ADDITIONAL TESTS:

## 2023-05-08 NOTE — PROGRESS NOTE ADULT - SUBJECTIVE AND OBJECTIVE BOX
[facility] NEPHROLOGY- PROGRESS NOTE    Patient is a 74yo Male from  [facility] home number with CKD,  h/o intellectual disability, Down Syndrome, asthma, COPD with chronic cough, HTN, DM, BPH, schizophrenia,, bilateral sensorineural hearing loss, recently admitted to [facility] (-) for decompensated HF and YASSINE presents with AMS and respiratory distress and found to be hyperkalemic to 9.5 as an outpt. s/ chance placement in ER with cloudy urine. Pt a/w acute on chronic CHF, UTI, hyperkalemia and YASSINE on CKD.     Hospital Medications: Medications reviewed.  REVIEW OF SYSTEMS:  Limited due to mental status; +dry cough. SOB resolved Denies any chest pain.     VITALS:  T(F): 98.2 (23 @ 13:26), Max: 98.2 (23 @ 13:26)  HR: 99 (23 @ 13:26)  BP: 132/89 (23 @ 13:26)  RR: 18 (23 @ 13:26)  SpO2: 95% (23 @ 13:26)  Wt(kg): --     @ 07:01  -   @ 07:00  --------------------------------------------------------  IN: 0 mL / OUT: 900 mL / NET: -900 mL     @ 07:01  -   @ 14:38  --------------------------------------------------------  IN: 0 mL / OUT: 500 mL / NET: -500 mL        Weight (kg): 70.8 ( @ 05:11)    PHYSICAL EXAM:  Gen: NAD  HEENT: anicteric  Neck: no JVD  Cards: RRR, +S1/S2, no M/G/R  Resp: CTA b/l  GI: soft, NT ND  : +chance clear yellow urine  Extremities: No LE edema b/l    LABS:      148<H>  |  116<H>  |  50<H>  ----------------------------<  157<H>  3.8   |  29  |  2.08<H>    Ca    9.5      08 May 2023 06:53  Phos  2.5       Mg     3.0         TPro  6.3  /  Alb  1.9<L>  /  TBili  0.5  /  DBili      /  AST  37  /  ALT  72<H>  /  AlkPhos  88      Creatinine Trend: 2.08 <--, 2.23 <--, 2.97 <--, 4.10 <--, 4.38 <--, 4.21 <--, 4.55 <--                        12.5   6.50  )-----------( 195      ( 08 May 2023 06:53 )             40.9     Urine Studies:  Urinalysis Basic - ( 04 May 2023 12:47 )    Color: Yellow / Appearance: very cloudy / S.015 / pH:   Gluc:  / Ketone: Negative  / Bili: Negative / Urobili: 4 mg/dL   Blood:  / Protein: 100 mg/dL / Nitrite: Negative   Leuk Esterase: Moderate / RBC: 10-25 /HPF / WBC >50 /HPF   Sq Epi:  / Non Sq Epi:  / Bacteria: Many /HPF      Sodium, Random Urine: 9 mmol/L ( @ 18:35)  Chloride, Random Urine: <10 mmol/L ( @ 18:35)  Creatinine, Random Urine: 96 mg/dL ( @ 18:35)    < from: US Kidney and Bladder (23 @ 11:01) >    ACC: [id number] EXAM:  US KIDNEYS AND BLADDER   ORDERED BY: MERLIN ARAYA     PROCEDURE DATE:  2023          INTERPRETATION:  CLINICAL INFORMATION: Acute kidney injury    COMPARISON: 2023    TECHNIQUE: Sonography of the kidneys and bladder.    FINDINGS:  Right kidney: 11.2 cm. No renal mass, hydronephrosis or calculi. Cysts,   measuring 4.7 cm and 1.1 cm respectively.    Left kidney: 11.2 cm. No renal mass, hydronephrosis or calculi.   Subcentimeter cyst.    Urinary bladder: Collapsed with Chance.    IMPRESSION:  No hydronephrosis bilaterally        --- End of Report ---      < end of copied text >

## 2023-05-08 NOTE — PHYSICAL THERAPY INITIAL EVALUATION ADULT - DIAGNOSIS, PT EVAL
Patient presents w/ weakness to both LE that limits his ability to negotiate steps and ambulate safely and (I).

## 2023-05-08 NOTE — PROGRESS NOTE ADULT - ASSESSMENT
73 year old male with Intellectual disability, Downs Syndrome, previously from [facility] home number 10 w/ PMHx COPD with chronic cough, hx of non ischemic cardiomyopathy with non-obstructive CAD on CCTA (2021), HFrEF (EF 10-15% per recent Echo), HTN, DM, CKD elevated PSA, BPH, schizophrenia, ? dementia (on home memantine), hepatitis B carrier, and bilateral sensorineural hearing loss.  Per old chart, patient previously seen by EP and deemed not to be a candidate for ICD placement.  Known to our palliative care service.  Previously admitted 3/28 to 3/30 for demand ischemia and CHF exacerbation, then with prolonged hospitalization from 4/8 to 4/28 for chest pain (likely musculoskeletal) and decompensated CHF, with group [place] staff reporting that patient was having worsening BEARDEN and ADL/functional limitations due to dyspnea at home.  Most recent hospitalization complicated by worsening YASSINE on CKD, thought to due to ATN/overdiuresis/? intermittent urinary retention (vs CRS); patient discharged to [facility] on 4/28.  Now returns to [facility] on 4/4 for altered mental status and respiratory distress, found unresponsive by NH staff.  With EMS he was minimally responsive and appeared SOB, O2 sat as 79%, he received 200cc IVF and O2 via NRB, and was brought here. Per documentation he is full code. Patient AAOx3, is not sure why he is in the hospital, denies acute complaints including fever/chills, headache, dizziness, chest pain, palpitations cough, SOB, n/v/d/c, dysuria or leg swelling. NKDA.  (04 May 2023 18:27)    Initial ER workup notable for K of 6.3, BUN/Cr of 80/4.21, and BNP of 63,000.  Reyes catheter inserted, patient noted to have cloudy urine.  Patient was admitted for metabolic encephalopathy, UTI, YASSINE on CKD (likely due to post-obstructive renal failure) and acute on chronic systolic HF.  Started on IV antibiotics (cefepime). Renal function now much improved post Reyes insertion (BUN/Cr down to 50/2.08).  Urine culture + for > 100 K E Coli, pan-sensitive.

## 2023-05-08 NOTE — PROGRESS NOTE ADULT - PROBLEM SELECTOR PLAN 4
Unclear etiology, possibly related to worsening ATN (due to diuresis during previous hospital admission) vs post-obstructive renal failure.  Now with Reyes catheter placed.  Current renal failure complicated by UTI, on IV antibiotics.  Given context of severe systolic HF with NICM, there is also concern for possible cardiorenal syndrome, which would further negatively impact patient's poor prognosis.    Nephrology input appreciated  Follow renal function closely  Remainder of management as per primary team. Unclear etiology, possibly related to worsening ATN (due to diuresis during previous hospital admission) vs post-obstructive renal failure.  Improved after Reyes catheter placement.  Current renal failure complicated by UTI, on IV antibiotics.  Given context of severe systolic HF with NICM, there is also remote concern for possible cardiorenal syndrome, which would further negatively impact patient's poor prognosis.    Nephrology input appreciated  Continue IV antibiotics   Remainder of management as per primary team.

## 2023-05-08 NOTE — PHYSICAL THERAPY INITIAL EVALUATION ADULT - GENERAL OBSERVATIONS, REHAB EVAL
Patient received in supine, AxOx2-3, w/ IV line and chance catheter; patient w/  Intellectual disability.

## 2023-05-08 NOTE — PROGRESS NOTE ADULT - PROBLEM SELECTOR PLAN 1
[facility] to [facility], EF now 10-15% on recent Echo.   Prior to previous admission in April 2023, patient was having increasing BEARDEN, SOB with performance of ADLs, and functional limitation due to his heart failure    He is minimum Stage C NYHA class III disease (symptoms with minimal exertion) and has significant risk for increased CV morbidity and mortality  Now readmitted with worsening YASSINE on CKD, with BNP of 63,000 and CXR showing increased bilateral infiltrates    Continue management as per primary team  Spoke to Dr. Mason--Cardiology consult/second opinion requested to optimize full GDMT and for disease prognostication. Patient with advanced systolic heart failure due to severe NICM, EF now 10-15% on recent Echo.   Prior to previous admission in April 2023, patient was having increasing BEARDEN, SOB with performance of ADLs, and functional limitation due to his heart failure    He is minimum Stage C NYHA class III disease (symptoms with minimal exertion) and has significant risk for increased CV morbidity and mortality  Now readmitted with worsening YASSINE on CKD, with BNP of 63,000 and CXR showing increased bilateral infiltrates    Continue management as per primary team  Cardiology consult/second opinion requested to optimize full GDMT and for disease prognostication.

## 2023-05-09 NOTE — PROGRESS NOTE ADULT - PROBLEM SELECTOR PLAN 3
With associated intellectual disability, schizophrenia, and likely dementia    Continue home meds, including Namenda, olanzapine, valproic acid 250 mg AM/1000 mg QHS, and amantadine.  Patient was on PRN clonazepam for anxiety in the past, please consider resuming if medically indicated.    Patient now medically appropriate for hospice, please consider D/C of Namenda and all unnecessary meds not important for comfort or symptom control (i.e multivitamins) With associated schizophrenia, and likely dementia  Clarified with Dr. Emilia Hearn from [facility]--patient with previous chromosomal analysis in 2011 that was negative for trisomy.  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      Continue home meds, including Namenda, olanzapine, valproic acid 250 mg AM/1000 mg QHS, and amantadine.  Patient was on PRN clonazepam for anxiety in the past, please consider resuming if medically indicated.    Patient now medically appropriate for hospice, please consider D/C of Namenda and all unnecessary meds not important for comfort or symptom control (i.e multivitamins)

## 2023-05-09 NOTE — PROGRESS NOTE ADULT - PROBLEM SELECTOR PROBLEM 4
Hyperkalemia
Acute kidney injury superimposed on CKD
Acute kidney injury superimposed on CKD
Diabetes mellitus

## 2023-05-09 NOTE — PROGRESS NOTE ADULT - ASSESSMENT
[facility] 74[facility],  h/o intellectual disability, Down Syndrome, asthma, COPD with chronic cough, HTN, DM, BPH, schizophrenia,, bilateral sensorineural hearing loss, recently admitted to [facility] (4/8-4/28) for decompensated HF and YASSINE presents with AMS and respiratory distress and found to be hyperkalemic to 9.5 as an outpt. s/ chance placement in ER with cloudy urine. Pt a/w acute on chronic CHF, UTI, hyperkalemia and YASSINE on CKD.     1. YASSINE- in the setting of UTI vs CHF.  CXR with increasing b/l upper lobe infiltrates. Renal function stablized. Volume status improved on Bumex 1mg PO daily. Will defer diuretics to Cards. Monitor urine o/p  UA active in the setting of infection. FeUrea 44%; inconclusive. Renal US with no hydro. Hypophospahtemia- pt given Kphos PO; will d/c renal diet.  (pt with h/o CHF with severe global LVSD on TTE). Strict I/Os. Avoid nephrotoxins/ NSAIDs/ RCA. Monitor BMP.  2. CKD-3a- baseline SCr 1.3-1.6. However pt was recently admitted with YASSINE and d/c with SCr 2.95 on 4/28/23. Defer secondary w/u as an outpt. Avoid nephrotoxins  3. HTN 2/2 CKD- BP borderline. Continue to hold Enalapril.  Monitor BP.  4. Hyperkalemia- resolved with medical management; Lokelma. c/w low potassium diet. Monitor serum potassium  5. UTI- +UA. Pt on Ceftriaxone. UCx Ecoli. BCx NG  6. BPH- c/w flomax and finasteride. Pt with chance (placed in ER)      [facility] NEPHROLOGY  Raul Goncalves M.D.  Bennie Carcamo D.O.  Priya Pederson M.D.  Hellen Dietz, BRUNO, ANP-C  [phone]  Fax: [phone]    [address] [place] [address], #[place] [address]

## 2023-05-09 NOTE — PROGRESS NOTE ADULT - PROBLEM SELECTOR PROBLEM 3
No adenopathy or splenomegaly. No cervical or inguinal lymphadenopathy. Down's syndrome Intellectual disability

## 2023-05-09 NOTE — PROGRESS NOTE ADULT - PROBLEM SELECTOR PROBLEM 12
Intellectual disability

## 2023-05-09 NOTE — PROGRESS NOTE ADULT - PROBLEM SELECTOR PLAN 11
c/w aspirin and statin (home med)

## 2023-05-09 NOTE — PROGRESS NOTE ADULT - SUBJECTIVE AND OBJECTIVE BOX
follow up on:  complex medical decision making related to goals of care    [facility] Geriatric and Palliative Consult Service:  Aliza Hsu DO: cell ([phone])  Reji Hart MD: cell ([phone])  Joel Weiner NP: cell ([phone])   Michelle Iqbal DNP: cell ([phone])  Kahlil Lucas LMSW: cell ([phone])   Bharti Nogueira NP: via [employer] Teams    Can contact any Palliative Team member via [employer] Teams for consults and questions      OVERNIGHT EVENTS: None  Patient resting comfortably in bed.  Denies pain, chest pain, or SOB recently.  Remains on oxygen via NC intermittently.  No other acute complaints reported  PT and Cardiology consults read and appreciated.    Present Symptoms: Mild, Moderate, Severe  Pain:             Location -   Denies pain currently                        Aggravating factors -             Quality -             Radiation -             Timing-             Severity (0-10 scale):  0/10             Minimal acceptable level (0-10 scale):  Fatigue:  Mild   Nausea:  Denies  Lack of Appetite:  Denies  SOB:  Denies currently  Depression:  Anxiety:  Review of Systems:  All other systems reviewed and are negative      MEDICATIONS  (STANDING):  amantadine 100 milliGRAM(s) Oral two times a day  aspirin  chewable 81 milliGRAM(s) Oral daily  atorvastatin 40 milliGRAM(s) Oral at bedtime  buMETAnide 1 milliGRAM(s) Oral daily  cefTRIAXone   IVPB 1000 milliGRAM(s) IV Intermittent every 24 hours  finasteride 5 milliGRAM(s) Oral daily  fluticasone propionate 50 MICROgram(s)/spray Nasal Spray 1 Spray(s) Both Nostrils two times a day  heparin   Injectable 5000 Unit(s) SubCutaneous every 8 hours  hydrALAZINE 10 milliGRAM(s) Oral three times a day  insulin lispro (ADMELOG) corrective regimen sliding scale   SubCutaneous three times a day before meals  isosorbide   mononitrate ER Tablet (IMDUR) 30 milliGRAM(s) Oral daily  lactulose Syrup 20 Gram(s) Oral at bedtime  melatonin 5 milliGRAM(s) Oral at bedtime  memantine 5 milliGRAM(s) Oral two times a day  metoprolol succinate ER 25 milliGRAM(s) Oral daily  montelukast 10 milliGRAM(s) Oral at bedtime  multivitamin 1 Tablet(s) Oral daily  OLANZapine 10 milliGRAM(s) Oral at bedtime  potassium phosphate / sodium phosphate Powder (PHOS-NaK) 1 Packet(s) Oral two times a day  tamsulosin 0.4 milliGRAM(s) Oral at bedtime  tiotropium 2.5 MICROgram(s) Inhaler 2 Puff(s) Inhalation daily  valproic  acid Syrup 250 milliGRAM(s) Oral daily  valproic  acid Syrup 1000 milliGRAM(s) Oral at bedtime    MEDICATIONS  (PRN):  acetaminophen     Tablet .. 650 milliGRAM(s) Oral every 6 hours PRN Temp greater or equal to 38C (100.4F), Mild Pain (1 - 3)      PHYSICAL EXAM:  Vital Signs Last 24 Hrs  T(C): 36.2 (09 May 2023 14:18), Max: 36.6 (09 May 2023 05:45)  T(F): 97.2 (09 May 2023 14:18), Max: 97.9 (09 May 2023 05:45)  HR: 96 (09 May 2023 14:18) (96 - 100)  BP: 131/83 (09 May 2023 14:18) (128/85 - 131/83)  BP(mean): --  RR: 18 (09 May 2023 14:18) (17 - 18)  SpO2: 93% (09 May 2023 14:18) (93% - 100%)    Parameters below as of 09 May 2023 14:18  Patient On (Oxygen Delivery Method): nasal cannula        General: alert  oriented x __2__   + temporal wasting, appears slightly weak, NAD    Palliative Performance Scale/Karnofsky Score:  40%  http://npcrc.org/files/news/palliative_performance_scale_[email or web address].pdf    HEENT: EOMI, anicteric, pharynx clear, MM moist  Lungs: clear to auscultation, respirations unlabored  CV:  tachycardic, normal S1 and S2, + I/VI systolic murmur  GI: soft non distended non tender  normal bowel sounds  : incontinent   + Reyes catheter in place  Musculoskeletal: weakness of bilateral LE, now ambulating only a few steps with extensive assist, no edema noted  Skin: no abnormal skin lesions or DU noted  Neuro: no focal deficits  Oral intake ability: full capability on pureed diet (chronic), no reported dysphagia    LABS:                          13.5   8.26  )-----------( 198      ( 09 May 2023 06:57 )             42.9     05-09    145  |  111<H>  |  47<H>  ----------------------------<  191<H>  4.5   |  28  |  2.14<H>    Ca    10.1      09 May 2023 06:57  Phos  2.1     05-09  Mg     2.9     05-09    TPro  6.8  /  Alb  2.2<L>  /  TBili  0.5  /  DBili  x   /  AST  32  /  ALT  71<H>  /  AlkPhos  92  05-09        RADIOLOGY & ADDITIONAL STUDIES:

## 2023-05-09 NOTE — PROGRESS NOTE ADULT - ASSESSMENT
73 year old male with Intellectual disability, Downs Syndrome, previously from [facility] home number 10 w/ PMHx COPD with chronic cough, hx of non ischemic cardiomyopathy with non-obstructive CAD on CCTA (2021), HFrEF (EF 10-15% per recent Echo), HTN, DM, CKD elevated PSA, BPH, schizophrenia, ? dementia (on home memantine), hepatitis B carrier, and bilateral sensorineural hearing loss.  Per old chart, patient previously seen by EP and deemed not to be a candidate for ICD placement.  Known to our palliative care service.  Previously admitted 3/28 to 3/30 for demand ischemia and CHF exacerbation, then with prolonged hospitalization from 4/8 to 4/28 for chest pain (likely musculoskeletal) and decompensated CHF, with group [place] staff reporting that patient was having worsening BEARDEN and ADL/functional limitations due to dyspnea at home.  Most recent hospitalization complicated by worsening YASSINE on CKD, thought to due to ATN/overdiuresis/? intermittent urinary retention (vs CRS); patient discharged to [facility] on 4/28.  Now returns to [facility] on 4/4 for altered mental status and respiratory distress, found unresponsive by NH staff.  With EMS he was minimally responsive and appeared SOB, O2 sat as 79%, he received 200cc IVF and O2 via NRB, and was brought here. Per documentation he is full code. Patient AAOx3, is not sure why he is in the hospital, denies acute complaints including fever/chills, headache, dizziness, chest pain, palpitations cough, SOB, n/v/d/c, dysuria or leg swelling. NKDA.  (04 May 2023 18:27)    Initial ER workup notable for K of 6.3, BUN/Cr of 80/4.21, and BNP of 63,000.  Reyes catheter inserted, patient noted to have cloudy urine.  Patient was admitted for metabolic encephalopathy, UTI, YASSINE on CKD (likely due to post-obstructive renal failure) and acute on chronic systolic HF.  Started on IV antibiotics (cefepime). Renal function now much improved post Reyes insertion (BUN/Cr down to 50/2.08).  Urine culture + for > 100 K E Coli, pan-sensitive, antibiotics now changed to IV Rocephin   73 year old male with Intellectual disability, ? Downs vs fetal alcohol syndrome, previously from [facility] home number 10 w/ PMHx COPD with chronic cough, hx of non ischemic cardiomyopathy with non-obstructive CAD on CCTA (2021), HFrEF (EF 10-15% per recent Echo), HTN, DM, CKD elevated PSA, BPH, schizophrenia, ? dementia (on home memantine), hepatitis B carrier, and bilateral sensorineural hearing loss.  Per old chart, patient previously seen by EP and deemed not to be a candidate for ICD placement.  Known to our palliative care service.  Previously admitted 3/28 to 3/30 for demand ischemia and CHF exacerbation, then with prolonged hospitalization from 4/8 to 4/28 for chest pain (likely musculoskeletal) and decompensated CHF, with group home staff reporting that patient was having worsening BEARDEN and ADL/functional limitations due to dyspnea at home.  Most recent hospitalization complicated by worsening YASSINE on CKD, thought to due to ATN/overdiuresis/? intermittent urinary retention (vs CRS); patient discharged to [facility] on 4/28.  Now returns to [facility] on 4/4 for altered mental status and respiratory distress, found unresponsive by NH staff.  With EMS he was minimally responsive and appeared SOB, O2 sat as 79%, he received 200cc IVF and O2 via NRB, and was brought here. Per documentation he is full code. Patient AAOx3, is not sure why he is in the hospital, denies acute complaints including fever/chills, headache, dizziness, chest pain, palpitations cough, SOB, n/v/d/c, dysuria or leg swelling. NKDA.  (04 May 2023 18:27)    Initial ER workup notable for K of 6.3, BUN/Cr of 80/4.21, and BNP of 63,000.  Reyes catheter inserted, patient noted to have cloudy urine.  Patient was admitted for metabolic encephalopathy, UTI, YASSINE on CKD (likely due to post-obstructive renal failure) and acute on chronic systolic HF.  Started on IV antibiotics (cefepime). Renal function now much improved post Reyes insertion (BUN/Cr down to 50/2.08).  Urine culture + for > 100 K E Coli, pan-sensitive, antibiotics now changed to IV Rocephin

## 2023-05-09 NOTE — DISCHARGE NOTE NURSING/CASE MANAGEMENT/SOCIAL WORK - PATIENT PORTAL LINK FT
You can access the FollowMyHealth Patient Portal offered by [facility] by registering at the following website: [email or web address]. By joining [employer]’s FollowMyHealth portal, you will also be able to view your health information using other applications (apps) compatible with our system.

## 2023-05-09 NOTE — PROGRESS NOTE ADULT - ATTENDING COMMENTS
73 year old male, from group home with PMHx ?downs syndrome, intellectual disability, COPD with chronic cough, hx of non ischemic cardiomyopathy with non-obstructive CAD on CCTA (2021), HFrEF, HTN, DM, CKD elevated PSA, BPH, schizophrenia, hepatitis B, carrier, bilateral sensorineural hearing loss adm for AMS 2/2 UTI, hyperkalemia and YASSINE on CKD.      Alert man in NAD  Vital Signs Last 24 Hrs  T(C): 36.2 (09 May 2023 14:18), Max: 36.6 (09 May 2023 05:45)  T(F): 97.2 (09 May 2023 14:18), Max: 97.9 (09 May 2023 05:45)  HR: 96 (09 May 2023 14:18) (96 - 100)  BP: 131/83 (09 May 2023 14:18) (128/85 - 131/83)  BP(mean): --  RR: 18 (09 May 2023 14:18) (17 - 18)  SpO2: 93% (09 May 2023 14:18) (93% - 100%)    Parameters below as of 09 May 2023 14:18  Patient On (Oxygen Delivery Method): nasal cannula  No JVD  Lungs, decreased bs  cor, RRR  Abdomen, edematous  Neurological, alert, baseline mental status, moves all extremities, non-focal    < from: TTE with Doppler (w/Cont) (03.29.23 @ 07:03) >    CONCLUSIONS:  1. Trace mitral regurgitation.  2. Normal left ventricular internal dimensions and wall  thicknesses.  3. Severe global left ventricular systolic dysfunction.  Ultrasound LV opacification agent was used to enhance  endocardial definition.  4. Grade I diastolic dysfunction (Impaired relaxation,  mild).  5. Normal right ventricular size and function.  6. Agitated saline injection revealed late bubbles in the  left heart, consistent with transpulmonic shunting.    < end of copied text >                            13.5   8.26  )-----------( 198      ( 09 May 2023 06:57 )             42.9   05-09    145  |  111<H>  |  47<H>  ----------------------------<  191<H>  4.5   |  28  |  2.14<H>    Ca    10.1      09 May 2023 06:57  Phos  2.1     05-09  Mg     2.9     05-09    TPro  6.8  /  Alb  2.2<L>  /  TBili  0.5  /  DBili  x   /  AST  32  /  ALT  71<H>  /  AlkPhos  92  05-09    IMP:    #Post-obstructive YASSINE, resolving with placement of Reyes  #YASSINE on CKD stage 3   #Acute Complicated  UTI  #Hyperkalemia- resolved with relief of obstruction   #Acute metabolic encephalopathy- resolved   #Chronic systolic HF, non-obstructive cardiomyopathy  #HTN  #DM    I had a TEAMS meeting with Palliative Care, Dr. Hearn, [facility], and other team members to discuss goals of care and plans for CARLOS.    AICD is contra-indicated because patient will not comprehend the purpose of possible shocks.     Plan:   Patient needs to be discharged w/ Reyes cathter. Dr. Pederson following   -Pt is euvolemic at this time, c/w bumex po-home dose   -Urine cx returned E.coli, pansensitive,  DC w/ cefpodoxime to complete course.   -Appreciate cardiology recs   -Pt is medically stable for DC. Spoke w/ Dr. Hearn -Medical director at patient's Group home @ [phone] and informed her about patient's current hospital stay and plan. PT recommended CARLOS, CM made aware and is working on DC process.

## 2023-05-09 NOTE — PROGRESS NOTE ADULT - PROBLEM SELECTOR PROBLEM 6
Hyperkalemia
Intellectual disability
Physical debility
Hyperkalemia
Physical debility
Hyperkalemia

## 2023-05-09 NOTE — PROGRESS NOTE ADULT - PROBLEM SELECTOR PLAN 7
See above.  Patient now with advanced systolic HF, in setting of severe NICM, with EF of 10-15%, and increasing BEARDEN/SOB with performance of ADLs resulting in functional limitation.  He is not considered to be a candidate for ICD.  Returns with worsening YASSINE on CKD III due to urinary retention/post obstructive renal failure, CHF exacerbation, UTI.  Likely will require indwelling Reyes catheter for the foreseeable future, further increasing his medical debility.  Albumin 2.2.      Patient has at minimum Stage C NYHA class III heart failure.  He now has worsening renal failure/ATN with concern for possible cardiorenal syndrome (vs  urinary obstruction).  He is at risk for continued progression of disease, further functional decline, recurrent hospitalizations, increasing morbidity, and sudden death.  He is medically appropriate for hospice.    See [facility] discussion as noted above--CAB, [facility] medical director (Dr. Hearn), and members of patient's care team all in agreement to begin proceedings to change patient's code status to DNR/DNI.  Also in agreement to begin consideration of hospice services.        Patient remains Full Code at this time  Further optimization of GDMT as per Cardiology, and management as per primary team  Discharge planning for return to [facility]  Palliative care team will continue to follow. See above.  Patient now with advanced systolic HF, in setting of severe NICM, with EF of 10-15%, and increasing BEARDEN/SOB with performance of ADLs resulting in functional limitation.  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  He is not considered to be a candidate for ICD.  Returns with worsening YASSINE on CKD III due to urinary retention/post obstructive renal failure, CHF exacerbation, UTI.  Likely will require indwelling Reyes catheter for the foreseeable future, further increasing his medical debility.  Albumin 2.2.      Patient has at minimum Stage C NYHA class III heart failure.  He now has worsening renal failure/ATN with concern for possible cardiorenal syndrome (vs  urinary obstruction).  He is at risk for continued progression of disease, further functional decline, recurrent hospitalizations, increasing morbidity, and sudden death.  He is medically appropriate for hospice.    See [facility] discussion as noted above--CAB, [facility] medical director (Dr. Hearn), and members of patient's care team all in agreement to begin proceedings to change patient's code status to DNR/DNI.  Also in agreement to begin consideration of hospice services.        Patient remains Full Code at this time  Further optimization of GDMT as per Cardiology, and management as per primary team  Discharge planning for return to [facility]  Palliative care team will continue to follow.

## 2023-05-09 NOTE — PROGRESS NOTE ADULT - PROBLEM SELECTOR PROBLEM 2
Acute kidney injury superimposed on CKD
Acute kidney injury superimposed on CKD
Nonischemic cardiomyopathy
Nonischemic cardiomyopathy
UTI (urinary tract infection)
Acute kidney injury superimposed on CKD

## 2023-05-09 NOTE — PROGRESS NOTE ADULT - PROBLEM SELECTOR PROBLEM 7
HTN (hypertension)
Encounter for palliative care
HTN (hypertension)
Encounter for palliative care
HTN (hypertension)
Metabolic encephalopathy
HTN (hypertension)
HTN (hypertension)

## 2023-05-09 NOTE — PROGRESS NOTE ADULT - PROBLEM SELECTOR PLAN 1
Patient with advanced systolic heart failure due to severe NICM, EF now 10-15% on recent Echo.   Prior to previous admission in April 2023, patient was having increasing BEARDEN, SOB with performance of ADLs, and functional limitation due to his heart failure.  With his increasing symptom burden he was not able to return to his group home setting and was recently discharged to [facility].  He now returns with worsening YASSINE on CKD due to urinary retention/post obstructive renal failure, CHF exacerbation, and encephalopathy 2/2 UTI.      Patient has at minimum Stage C NYHA class III heart failure.  He now has worsening YASSINE on CKD due to urinary obstruction,  with remote concern for possible cardiorenal syndrome.  He is at risk for continued progression of disease, further functional decline, recurrent infections and hospitalization, increasing morbidity, and sudden death.  He is not a candidate for AICD placement (class III contraindication, see below).  His overall clinical and functional status are likely to be further accelerated by his renal failure and worsening physical debility related to indwelling Reyes cath.  He also has severe protein calorie malnutrition with albumin of 2.2.  Given this presence of severe heart failure/NICM and worsening CKD III in the context of recent functional decline and multiple medical comorbidities, patient is medically appropriate for hospice with likely prognosis in the range of 6-12 months.    See [facility] discussion above--CAB now in agreement to begin approval hospice for DNR, DNI, and possible hospice.     Cardiology input from Dr. Jiménez appreciated--patient unlikely to survive cardiac arrest, CPR would be futile.  Will optimize GDMT  Remainder of management as per primary medical team

## 2023-05-09 NOTE — PROGRESS NOTE ADULT - PROBLEM SELECTOR PLAN 10
c/w olnanzapine and valproic acid   hold [facility]
c/w Olanzapine and valproic acid   hold [facility]
c/w olnanzapine and valproic acid   hold klonipin for confusion
c/w olnanzapine and valproic acid   hold [facility]
c/w Olanzapine and valproic acid   hold [facility]

## 2023-05-09 NOTE — PROGRESS NOTE ADULT - PROBLEM SELECTOR PLAN 7
hold enalapril for YASSINE and hyperK+  c/w metoprolol  started on HYDRALAZINE 10MG TID and IMDUR 30MG QD by cardiologist

## 2023-05-09 NOTE — PROGRESS NOTE ADULT - PROBLEM/PLAN-7
DISPLAY PLAN FREE TEXT
endurance decreased/impaired balance/impaired postural control/decreased strength

## 2023-05-09 NOTE — PROGRESS NOTE ADULT - SUBJECTIVE AND OBJECTIVE BOX
PGY-1 Progress Note discussed with attending    PLEASE CONTACT ON CALL TEAM:  - On Call Team (Please refer to Red) FROM 5:00 PM - 8:30PM  - Nightfloat Team FROM 8:30 -7:30 AM    INTERVAL HPI/OVERNIGHT EVENTS: No acute events overnight. Pt. was diaphoretic this AM, but this resolved, and pt was afebrile. Pt. states that his breathing improved. for d/c to [facility] today.     MEDICATIONS  (STANDING):  amantadine 100 milliGRAM(s) Oral two times a day  aspirin  chewable 81 milliGRAM(s) Oral daily  atorvastatin 40 milliGRAM(s) Oral at bedtime  buMETAnide 1 milliGRAM(s) Oral daily  cefTRIAXone   IVPB 1000 milliGRAM(s) IV Intermittent every 24 hours  finasteride 5 milliGRAM(s) Oral daily  fluticasone propionate 50 MICROgram(s)/spray Nasal Spray 1 Spray(s) Both Nostrils two times a day  heparin   Injectable 5000 Unit(s) SubCutaneous every 8 hours  hydrALAZINE 10 milliGRAM(s) Oral three times a day  insulin lispro (ADMELOG) corrective regimen sliding scale   SubCutaneous three times a day before meals  isosorbide   mononitrate ER Tablet (IMDUR) 30 milliGRAM(s) Oral daily  lactulose Syrup 20 Gram(s) Oral at bedtime  melatonin 5 milliGRAM(s) Oral at bedtime  memantine 5 milliGRAM(s) Oral two times a day  metoprolol succinate ER 25 milliGRAM(s) Oral daily  montelukast 10 milliGRAM(s) Oral at bedtime  multivitamin 1 Tablet(s) Oral daily  OLANZapine 10 milliGRAM(s) Oral at bedtime  potassium phosphate / sodium phosphate Powder (PHOS-NaK) 1 Packet(s) Oral two times a day  tamsulosin 0.4 milliGRAM(s) Oral at bedtime  tiotropium 2.5 MICROgram(s) Inhaler 2 Puff(s) Inhalation daily  valproic  acid Syrup 250 milliGRAM(s) Oral daily  valproic  acid Syrup 1000 milliGRAM(s) Oral at bedtime    MEDICATIONS  (PRN):  acetaminophen     Tablet .. 650 milliGRAM(s) Oral every 6 hours PRN Temp greater or equal to 38C (100.4F), Mild Pain (1 - 3)      REVIEW OF SYSTEMS:  CONSTITUTIONAL: No fever, weight loss, or fatigue  RESPIRATORY: No cough, wheezing, chills or hemoptysis; No shortness of breath  CARDIOVASCULAR: No chest pain, palpitations, dizziness, or leg swelling  GASTROINTESTINAL: No abdominal pain. No nausea, vomiting, or hematemesis; No diarrhea or constipation. No melena or hematochezia.  GENITOURINARY: No dysuria or hematuria, urinary frequency  NEUROLOGICAL: No headaches, memory loss, loss of strength, numbness, or tremors  SKIN: No itching, burning, rashes, or lesions     Vital Signs Last 24 Hrs  T(C): 36.2 (09 May 2023 14:18), Max: 36.6 (09 May 2023 05:45)  T(F): 97.2 (09 May 2023 14:18), Max: 97.9 (09 May 2023 05:45)  HR: 96 (09 May 2023 14:18) (94 - 100)  BP: 131/83 (09 May 2023 14:18) (128/85 - 134/85)  BP(mean): --  RR: 18 (09 May 2023 14:18) (17 - 18)  SpO2: 93% (09 May 2023 14:18) (93% - 100%)    Parameters below as of 09 May 2023 14:18  Patient On (Oxygen Delivery Method): nasal cannula        PHYSICAL EXAMINATION:  GENERAL: NAD, well built, was on 2 L NC, changed to RA.   HEAD:  Atraumatic, Normocephalic  EYES:  conjunctiva and sclera clear  NECK: Supple, No JVD, Normal thyroid  CHEST/LUNG: Clear to auscultation. Clear to percussion bilaterally; No rales, rhonchi, wheezing, or rubs  HEART: Regular rate and rhythm; No murmurs, rubs, or gallops  ABDOMEN: Soft, Nontender, Nondistended; Bowel sounds present  NERVOUS SYSTEM:  Alert & Oriented X2, no focal neuro defecits, intellectual disability noted.    EXTREMITIES:  2+ Peripheral Pulses, No clubbing, cyanosis, or edema  SKIN: warm dry                          13.5   8.26  )-----------( 198      ( 09 May 2023 06:57 )             42.9     05-09    145  |  111<H>  |  47<H>  ----------------------------<  191<H>  4.5   |  28  |  2.14<H>    Ca    10.1      09 May 2023 06:57  Phos  2.1     05-09  Mg     2.9     05-09    TPro  6.8  /  Alb  2.2<L>  /  TBili  0.5  /  DBili  x   /  AST  32  /  ALT  71<H>  /  AlkPhos  92  05-09    LIVER FUNCTIONS - ( 09 May 2023 06:57 )  Alb: 2.2 g/dL / Pro: 6.8 g/dL / ALK PHOS: 92 U/L / ALT: 71 U/L DA / AST: 32 U/L / GGT: x                   CAPILLARY BLOOD GLUCOSE      RADIOLOGY & ADDITIONAL TESTS:

## 2023-05-09 NOTE — PROGRESS NOTE ADULT - TIME BILLING
Physical exam, collaboration with Dr. Jiménez (Cardiology), Dr. Hearn ([facility] medical director), conference with patient's care team from [facility] as above, and documentation within the medical record

## 2023-05-09 NOTE — PROGRESS NOTE ADULT - PROBLEM SELECTOR PROBLEM 1
Acute on chronic systolic heart failure
UTI (urinary tract infection)
UTI (urinary tract infection)
Diabetes mellitus
UTI (urinary tract infection)
UTI (urinary tract infection)
Acute on chronic systolic heart failure
UTI (urinary tract infection)

## 2023-05-09 NOTE — DISCHARGE NOTE NURSING/CASE MANAGEMENT/SOCIAL WORK - NSDCPEFALRISK_GEN_ALL_CORE
For information on Fall & Injury Prevention, visit: [email or web address] OR  [email or web address] OR  https://www.cdc.gov/steadi/patient.html

## 2023-05-09 NOTE — PROGRESS NOTE ADULT - SUBJECTIVE AND OBJECTIVE BOX
C A R D I O L O G Y  **********************************     DATE OF SERVICE: 05-09-23    Patient denies chest pain or shortness of breath.   Review of symptoms otherwise negative.    acetaminophen     Tablet .. 650 milliGRAM(s) Oral every 6 hours PRN  amantadine 100 milliGRAM(s) Oral two times a day  aspirin  chewable 81 milliGRAM(s) Oral daily  atorvastatin 40 milliGRAM(s) Oral at bedtime  buMETAnide 1 milliGRAM(s) Oral daily  cefTRIAXone   IVPB 1000 milliGRAM(s) IV Intermittent every 24 hours  finasteride 5 milliGRAM(s) Oral daily  fluticasone propionate 50 MICROgram(s)/spray Nasal Spray 1 Spray(s) Both Nostrils two times a day  heparin   Injectable 5000 Unit(s) SubCutaneous every 8 hours  insulin lispro (ADMELOG) corrective regimen sliding scale   SubCutaneous three times a day before meals  isosorbide   mononitrate ER Tablet (IMDUR) 30 milliGRAM(s) Oral daily  lactulose Syrup 20 Gram(s) Oral at bedtime  melatonin 5 milliGRAM(s) Oral at bedtime  memantine 5 milliGRAM(s) Oral two times a day  metoprolol succinate ER 25 milliGRAM(s) Oral daily  montelukast 10 milliGRAM(s) Oral at bedtime  multivitamin 1 Tablet(s) Oral daily  OLANZapine 10 milliGRAM(s) Oral at bedtime  potassium phosphate / sodium phosphate Powder (PHOS-NaK) 1 Packet(s) Oral two times a day  tamsulosin 0.4 milliGRAM(s) Oral at bedtime  tiotropium 2.5 MICROgram(s) Inhaler 2 Puff(s) Inhalation daily  valproic  acid Syrup 1000 milliGRAM(s) Oral at bedtime  valproic  acid Syrup 250 milliGRAM(s) Oral daily                            13.5   8.26  )-----------( 198      ( 09 May 2023 06:57 )             42.9       Hemoglobin: 13.5 g/dL (05-09 @ 06:57)  Hemoglobin: 12.5 g/dL (05-08 @ 06:53)  Hemoglobin: 13.1 g/dL (05-07 @ 07:01)  Hemoglobin: 12.4 g/dL (05-06 @ 06:10)  Hemoglobin: 12.0 g/dL (05-05 @ 07:15)      05-09    145  |  111<H>  |  47<H>  ----------------------------<  191<H>  4.5   |  28  |  2.14<H>    Ca    10.1      09 May 2023 06:57  Phos  2.1     05-09  Mg     2.9     05-09    TPro  6.8  /  Alb  2.2<L>  /  TBili  0.5  /  DBili  x   /  AST  32  /  ALT  71<H>  /  AlkPhos  92  05-09    Creatinine Trend: 2.14<--, 2.08<--, 2.23<--, 2.97<--, 4.10<--, 4.38<--    COAGS:           T(C): 36.6 (05-09-23 @ 09:12), Max: 36.6 (05-09-23 @ 05:45)  HR: 99 (05-09-23 @ 09:12) (94 - 100)  BP: 130/96 (05-09-23 @ 09:12) (128/85 - 134/85)  RR: 17 (05-09-23 @ 09:12) (17 - 18)  SpO2: 97% (05-09-23 @ 09:12) (97% - 100%)  Wt(kg): --    I&O's Summary    08 May 2023 07:01  -  09 May 2023 07:00  --------------------------------------------------------  IN: 0 mL / OUT: 702 mL / NET: -702 mL        HEENT:  (-)icterus (-)pallor  CV: N S1 S2 1/6 BRENTON (+)2 Pulses B/l  Resp:  Clear to ausculatation B/L, normal effort  GI: (+) BS Soft, NT, ND  Lymph:  (-)Edema, (-)obvious lymphadenopathy  Skin: Warm to touch, Normal turgor  Psych: Appropriate mood and affect        ASSESSMENT/PLAN: 	73y  Male male with Intellectual disability, Downs Syndrome, from [facility] home number [facility] w/ PMHx COPD with chronic cough, hx of non ischemic cardiomyopathy with non-obstructive CAD on CCTA (2021), HFrEF, HTN, DM, CKD elevated PSA, BPH, schizophrenia, hepatitis B, carrier, bilateral sensorineural hearing loss, was brought in from the facility for who came in to the ED for sore throat, chest pain, mental status changes, and respiratory distress.    1.  Chest pain   - pt. currently denies any chest pain   - troponin elevation may be secondary to underlying renal disease     2.  Dyspnea / CHF  - pt. with elevated pBNP and volume overload on exam   - currently on bumex   - keep O > I if ok with renal given orly   - follow up renal   - will start afterload reducers  - start Imdur 30 mg PO daily  - would benefit from CHF eval at [facility] or [facility] upon D/C   - Start Hydralazine 10 mg PO TID  - Patient is Class III stage C systolic heart failure however given his inability to consent on his own he is not an ICD candidate     3. GOC  - Given the patient's severe cardiomyopathy and other comorbid conditions, the probability of him having a meaningful survival should he suffer a cardiac arrest is quite low and resuscitation efforts maybe futile.  Palliative f/u appreciated.    Donny Jiménez MD, [facility]  BEEPER [phone]      Donny Jiménez MD, [facility]  BEEPER [phone]

## 2023-05-09 NOTE — PROGRESS NOTE ADULT - PROBLEM SELECTOR PLAN 1
pt p/w AMS  afebrile, normotensive, WBC wnl  CTH nonacute  UA grossly+  chance placed in ED and pt mental status improved to baseline  - UCx EColi pan-sensitive   - switched from cefepime to ctx (5/8- 5/16) , d/c'd on cefpodoxime 200mg BID   ID Dr. Goncalves consulted

## 2023-05-09 NOTE — PROGRESS NOTE ADULT - PROBLEM SELECTOR PLAN 9
c/w finasteride and tamsulosin (home meds)

## 2023-05-09 NOTE — PROGRESS NOTE ADULT - PROBLEM SELECTOR PLAN 2
As above, EF of 10-15%.  As per notes from last admission, patient previously seen by EP and deemed not to be a candidate for ICD placement    Further management as per Cardiology and primary team.
DOWNTRENDING  Cr 4.55, prev 2.95, H/o BPH , refused Reyes on discharge last admission  FeNa 0.2% Pre renal - however symptoms improved after Reyes - likely obstructive etiology  started on renal diet  hold nephrotoxins  - C/w Reyes - will need Reyes on DC  - F/u Renal US    Nephro Dr. Pederson
Cr 4.55, prev 2.95, H/o BPH , refused Reyes on discharge last admission  started on renal diet  hold nephrotoxins  Possible Cardiorenal syndrome  - C/w Reyes  - F/u urine halley    Nephro Dr. Pederson consulted
DOWNTRENDING  Cr 4.55 on admission, prev 2.95, H/o BPH , refused Chance on discharge last admission  Cr 5/08- 2.08, downtrending appropriately after chance  FeNa 0.2% Pre renal - however symptoms improved after Chance - likely obstructive etiology  started on renal diet  hold nephrotoxins  - C/w Chance - will need Chance on DC    Nephro Dr. Pederson
As above, EF of 10-15%.  As per notes from last admission, patient previously seen by EP and deemed not to be a candidate for ICD placement.  Also per Cardiology (Dr. Jiménez) patient is not a candidate for AICD placement due to lack of decisional making capacity (ACC/AHA class III contraindication).    Continue supportive care  Further management as per Cardiology and primary team.
DOWNTRENDING  Cr 4.55 on admission, prev 2.95, H/o BPH , refused Chance on discharge last admission  Cr 5/08- 2.08, downtrending appropriately after chance  FeNa 0.2% Pre renal - however symptoms improved after Chance - likely obstructive etiology  started on renal diet  hold nephrotoxins  - C/w Chance - will need Chance on DC    Nephro Dr. Pederson
Cr 4.55, prev 2.95, H/o BPH , refused Reyes on discharge last admission  started on renal diet  hold nephrotoxins  C/w Reyes  FeNa 0.2% Pre renal   Nephro Dr. Pederson   Improving

## 2023-05-09 NOTE — PROGRESS NOTE ADULT - PROVIDER SPECIALTY LIST ADULT
Cardiology
Internal Medicine
Cardiology
Cardiology
Internal Medicine
Internal Medicine
Nephrology
Infectious Disease
Palliative Care
Internal Medicine
Internal Medicine
Palliative Care

## 2023-05-09 NOTE — PROGRESS NOTE ADULT - CONVERSATION DETAILS
Video teleconference held this afternoon x 40 minutes with multiple members of patient's community care team, including Rina Eller ( from [facility]), Dr. Emilia Hearn (local [facility] medical director), patient's CAB advocate, supervising nurse Zhang from patient's group home, and additional personnel, to review prognosis, ACP, goals of care, and treatment preferences.  Dr. Aliza Hsu also participated in meeting.  Patient's recent clinical course reviewed, including his recurrent admissions for CHF and his current admission for YASSINE on CKD with post-obstructive renal failure, acute on chronic CHF, and UTI.  Discussed that patient has had a significant functional decline over the last 2 months, and now will require indwelling Reyes catheter for the foreseeable future.  Also reviewed input/recommendations from second Cardiology opinion (Dr. Jiménez).  Dr. Jiménez in agreement that patient continues to have Stage C NYHA class III heart failure,  We discussed that patient was unable to tolerate cardiac cath previously and likely would be unable to tolerate AICD placement.  Also discussed that  AICD placement is a ACC/AHA class III contraindication in patients who lack medical decision making capacity (and therefore would not understand the rationale for ICD firing).  We discussed that Dr. Jiménez, Dr. Hearn, and I are all in agreement that patient would be unlikely to survive and/or return to an acceptable functional outcome in case of cardiac arrest, even with CPR, and that burdens/harms of CPR and intubation including negative impact on quality of life would likely outweigh any short term survival benefit.  Discussed that intubation would put patient at significant risk of chronic ventilatory dependence, requiring tracheostomy and PEG placement.    We also discussed patient's increasing symptom burden and ADL dependence related to his NICM and advanced heart failure.  His overall clinical status is further aggravated by his worsening CKD with likely long term Reyes catheter dependence, and ongoing dependence on oxygen, which are likely to further increase/accelerate his medical debility.  Discussed that patient is at heightened risk for further decline, including recurrent infections, encephalopathy, and rehospitalization, as well as worsening of his CHF symptoms. [facility] advised that patient is medically appropriate for hospice with a general prognosis in the range of 6-12 months.      Patient to be discharged back to [facility].  Dr. Hearn, Rina, and remainder of team expressed consensus that change in patient's code status to DNR/DNI should be formally presented to the [facility] executive board.  They also are in agreement that hospice should be considered.  Dr. Hsu and I to complete and forward the necessary paperwork to CAB and Dr. Hearn to begin the approval process.

## 2023-05-09 NOTE — PROGRESS NOTE ADULT - PROBLEM SELECTOR PLAN 4
Unclear etiology, possibly related to worsening ATN (due to diuresis during previous hospital admission) vs post-obstructive renal failure.  Improved after Reyes catheter placement.  Current renal failure complicated by UTI, on IV antibiotics. Now has progressed to CKD stage III.  Remote concern for cardiorenal syndrome      Nephrology input appreciated  In context of advanced systolic heart failure and NICM, patient now hospice appropriate.  See above.

## 2023-05-09 NOTE — PROGRESS NOTE ADULT - PROBLEM SELECTOR PLAN 6
Patient with increased functional limitations due to worsening CHF symptoms, in context of Down's syndrome, intellectual disability, presumed dementia, and multiple medical comorbidities, aggravated by recent prolonged hospitalization for CHF. Now has Reyes catheter placed for urinary obstruction, along with increasing dependence on oxygen via NC, both of which are likely to further accelerate patient's debility.      Recommend OOB to chair  Aggressive PT to prevent further deconditioning  Continued pureed diet. Awake/Alert/Cooperative Patient with increased functional limitations due to worsening CHF symptoms, in context intellectual disability, presumed dementia, and multiple medical comorbidities, aggravated by recent prolonged hospitalization for CHF. Now has Reyes catheter placed for urinary obstruction, along with increasing dependence on oxygen via NC, both of which are likely to further accelerate patient's debility.      Recommend OOB to chair  Aggressive PT to prevent further deconditioning  Continued pureed diet.

## 2023-05-09 NOTE — PROGRESS NOTE ADULT - PROBLEM SELECTOR PLAN 5
Clinical evidence indicates that the patient has Severe protein calorie malnutrition/ 3rd degree    In context of     Chronic Illness (>1 month)--worsening systolic HF with recent prolonged hospitalization    Energy/Food intake <50% of estimated energy requirement >5 days  Weight loss: Moderate - severe   Body Fat loss: Severe   with increased temporal wasting on this admission, + LE muscle atrophy noted  Muscle mass loss: Severe, now with albumin of 1.9  Fluid Accumulation: Severe (Fluid overload, worsening CHF with bilateral pleural effusions)   Strength: weakened severe (mostly bedbound, requiring increased assistance with ADLs)    Recommend:   Continue pureed diet with thickened liquids, aspiration precautions, keep HOB elevated at all times.
RESOLVED  patient with period of confusion  likely due to UTI, possible relation to renal failure  improved after placement of Reyes  hold Klonopin  continue to monitor
RESOLVED  patient with period of confusion  likely due to UTI, possible relation to renal failure  improved after placement of Reyes  hold Klonopin  continue to monitor
Clinical evidence indicates that the patient has Severe protein calorie malnutrition/ 3rd degree    In context of     Chronic Illness (>1 month)--worsening systolic HF with recent prolonged hospitalization    Energy/Food intake <50% of estimated energy requirement >5 days  Weight loss: Moderate - severe   Body Fat loss: Severe   with increased temporal wasting on this admission, + LE muscle atrophy noted  Muscle mass loss: Severe, now with albumin of 1.9  Fluid Accumulation: Severe (Fluid overload, worsening CHF with bilateral pleural effusions)   Strength: weakened severe (mostly bedbound, requiring increased assistance with ADLs)    Recommend:   Continue pureed diet with thickened liquids, aspiration precautions, keep HOB elevated at all times.
RESOLVED  patient with period of confusion  likely due to UTI, possible relation to renal failure  improved after placement of Reyes  hold Klonopin  continue to monitor

## 2023-05-09 NOTE — PROGRESS NOTE ADULT - SUBJECTIVE AND OBJECTIVE BOX
[facility] NEPHROLOGY- PROGRESS NOTE    Patient is a 72yo Male from  [facility] home number with CKD,  h/o intellectual disability, Down Syndrome, asthma, COPD with chronic cough, HTN, DM, BPH, schizophrenia,, bilateral sensorineural hearing loss, recently admitted to [facility] (-) for decompensated HF and YASSINE presents with AMS and respiratory distress and found to be hyperkalemic to 9.5 as an outpt. s/ chance placement in ER with cloudy urine. Pt a/w acute on chronic CHF, UTI, hyperkalemia and YASSINE on CKD.     Hospital Medications: Medications reviewed.  REVIEW OF SYSTEMS:  Limited due to mental status; +dry cough. Denies SOB. Denies any chest pain.     VITALS:  T(F): 97.8 (23 @ 09:12), Max: 98.2 (23 @ 13:26)  HR: 99 (23 @ 09:12)  BP: 130/96 (23 @ 09:12)  RR: 17 (23 @ 09:12)  SpO2: 97% (23 @ 09:12)  Wt(kg): --     @ 07:01  -   @ 07:00  --------------------------------------------------------  IN: 0 mL / OUT: 702 mL / NET: -702 mL      PHYSICAL EXAM:  Gen: NAD  HEENT: anicteric  Neck: no JVD  Cards: RRR, +S1/S2, no M/G/R  Resp: CTA b/l  GI: soft, NT ND  : +chance clear yellow urine  Extremities: No LE edema b/l    LABS:      145  |  111<H>  |  47<H>  ----------------------------<  191<H>  4.5   |  28  |  2.14<H>    Ca    10.1      09 May 2023 06:57  Phos  2.1       Mg     2.9         TPro  6.8  /  Alb  2.2<L>  /  TBili  0.5  /  DBili      /  AST  32  /  ALT  71<H>  /  AlkPhos  92      Creatinine Trend: 2.14 <--, 2.08 <--, 2.23 <--, 2.97 <--, 4.10 <--, 4.38 <--, 4.21 <--, 4.55 <--                        13.5   8.26  )-----------( 198      ( 09 May 2023 06:57 )             42.9     Urine Studies:  Urinalysis Basic - ( 04 May 2023 12:47 )    Color: Yellow / Appearance: very cloudy / S.015 / pH:   Gluc:  / Ketone: Negative  / Bili: Negative / Urobili: 4 mg/dL   Blood:  / Protein: 100 mg/dL / Nitrite: Negative   Leuk Esterase: Moderate / RBC: 10-25 /HPF / WBC >50 /HPF   Sq Epi:  / Non Sq Epi:  / Bacteria: Many /HPF      Sodium, Random Urine: 9 mmol/L ( @ 18:35)  Chloride, Random Urine: <10 mmol/L ( @ 18:35)  Creatinine, Random Urine: 96 mg/dL ( @ 18:35)

## 2023-05-09 NOTE — PROGRESS NOTE ADULT - PROBLEM SELECTOR PROBLEM 8
COPD without exacerbation

## 2023-05-11 PROBLEM — I50.22 CHRONIC SYSTOLIC (CONGESTIVE) HEART FAILURE: Chronic | Status: ACTIVE | Noted: 2023-01-01

## 2023-05-11 PROBLEM — I25.10 ATHEROSCLEROTIC HEART DISEASE OF NATIVE CORONARY ARTERY WITHOUT ANGINA PECTORIS: Chronic | Status: ACTIVE | Noted: 2023-01-01

## 2023-05-11 PROBLEM — J44.9 CHRONIC OBSTRUCTIVE PULMONARY DISEASE, UNSPECIFIED: Chronic | Status: ACTIVE | Noted: 2023-01-01

## 2023-05-11 PROBLEM — N40.0 BENIGN PROSTATIC HYPERPLASIA WITHOUT LOWER URINARY TRACT SYMPTOMS: Chronic | Status: ACTIVE | Noted: 2023-01-01

## 2023-05-11 NOTE — ED PROVIDER NOTE - PHYSICAL EXAMINATION
Exam:  General: Patient non toxic appearing, vital signs within normal limits  HEENT: airway patent with moist mucous membranes  Cardiac: RRR S1/S2 with strong peripheral pulses  : small blood at urethral meatus

## 2023-05-11 NOTE — ED PROVIDER NOTE - PATIENT PORTAL LINK FT
You can access the FollowMyHealth Patient Portal offered by [facility] by registering at the following website: [email or web address]. By joining [email or web address]’s FollowMyHealth portal, you will also be able to view your health information using other applications (apps) compatible with our system.

## 2023-05-11 NOTE — ED PROVIDER NOTE - NSFOLLOWUPINSTRUCTIONS_ED_ALL_ED_FT
Thank you for choosing [facility] for your health care.    Samir was seen in the emergency room for bleeding from his penis after removing the Reyes catheter on his own.  The treatment for this is to have the Reyes catheter replaced allow the lining of the urinary tract to heal while the urine is diverted through the catheter.  Please try to make sure that he does not remove the catheter again on his own.  Please return to the emergency room if he does.  Also you can return to the emergency room for any further emergent or concerning medical issues.

## 2023-05-11 NOTE — ED ADULT NURSE NOTE - CHIEF COMPLAINT QUOTE
biba sent from [facility] bloody urine with clots . ems reports per staff pt pulled chance catheter out

## 2023-05-11 NOTE — ED ADULT TRIAGE NOTE - CHIEF COMPLAINT QUOTE
biba sent from [facility] bloody urine with clots . ems reports per staff pt pulled chance catheter out

## 2023-05-11 NOTE — ED PROVIDER NOTE - PROGRESS NOTE DETAILS
Reyes placed by nursing staff, initially bloody then cleared consistent with urethral bleeding.  Will discharge.

## 2023-05-11 NOTE — ED PROVIDER NOTE - CLINICAL SUMMARY MEDICAL DECISION MAKING FREE TEXT BOX
Patient presenting with ureteral bleeding after self discontinuation of chance catheter, suspect likely secondary to ureteral trauma from pulling out chance, will replace chance in Emergency Department and monitor for evidence of ongoing bleeding

## 2023-05-11 NOTE — ED ADULT NURSE NOTE - OBJECTIVE STATEMENT
Pt sent from NH s/p pulled out chance cath. Pt nonverbal, breathing via 3LNC, Pt noted dry blood to penis and diaper.

## 2023-05-11 NOTE — ED PROVIDER NOTE - OBJECTIVE STATEMENT
Patient with baseline intellectual disability unable to give history.  Was just discharged yesterday from [facility] and per review of prior records he was admitted for an obstructive uropathy complicated by his chronic CHF and other comorbidities.  Per aide at bedside he pulled the Reyes catheter that he was discharged without and they have noted since that point that he has been having bleeding from his penis but has not urinated normally.  Patient is unable to provide any reliable history

## 2023-05-11 NOTE — ED PROVIDER NOTE - NSICDXPASTMEDICALHX_GEN_ALL_CORE_FT
PAST MEDICAL HISTORY:  BPH (benign prostatic hyperplasia)     CAD (coronary artery disease)     Chronic HFrEF (heart failure with reduced ejection fraction)     Chronic obstructive pulmonary disease (COPD)     Diabetes mellitus     Down syndrome     HTN (hypertension)     Hyperlipidemia     Intellectual disability

## 2023-05-11 NOTE — ED ADULT NURSE NOTE - NSFALLHARMRISKINTERV_ED_ALL_ED
Assistance OOB with selected safe patient handling equipment if applicable/Communicate risk of Fall with Harm to all staff, patient, and family/Provide patient with walking aids/Provide visual cue: red socks, yellow wristband, yellow gown, etc/Reinforce activity limits and safety measures with patient and family/Toileting schedule using arm’s reach rule for commode and bathroom/Bed in lowest position, wheels locked, appropriate side rails in place/Call bell, personal items and telephone in reach/Instruct patient to call for assistance before getting out of bed/chair/stretcher/Non-slip footwear applied when patient is off stretcher/[place] to call system/Physically safe environment - no spills, clutter or unnecessary equipment/Purposeful Proactive Rounding/Room/bathroom lighting operational, light cord in reach

## 2023-05-12 NOTE — ED PROVIDER NOTE - CLINICAL SUMMARY MEDICAL DECISION MAKING FREE TEXT BOX
Patient sent for evaluation of blood clots, from urethral meatus after pulling Reyes catheter yesterday clots appreciate exam we will consult urology

## 2023-05-12 NOTE — ED PROVIDER NOTE - CADM POA PRESS ULCER
Patient is called and informed of normal test results and to resume her medications.  Writer also informs her that a short fill of her medications will be sent for one weeks worth and she will need a refill depending on her blood pressure reading on Monday.  She is agreeable with this plan and will  medication today.    Medication sent to confirmed pharmacy and note made on BP nurse visit.   No

## 2023-05-12 NOTE — ED PROVIDER NOTE - PROGRESS NOTE DETAILS
bladder scan with only 40ml of urine. pt seen by urology, chance cath in bladder (confirmed with ultrasound).  Bleeding secondary to local trauma.   clearing patient.  labs show YASSINE with creat of 4.9.  Potassium 5.7 not hemolyzed.  pt given lokelma, pt to be admitted.

## 2023-05-12 NOTE — H&P ADULT - NSHPPHYSICALEXAM_GEN_ALL_CORE
PHYSICAL EXAM:  GENERAL: NAD, speaks in full sentences, no signs of respiratory distress  HEAD:  Atraumatic, Normocephalic  EYES: EOMI, PERRLA, conjunctiva and sclera clear  NECK: Supple,  CHEST/LUNG: b/l end expiratory wheezes on auscultation, no crackles or rhonchi noted.   HEART: Regular rate and rhythm; No murmurs;   ABDOMEN: Soft, Nontender, Nondistended; Bowel sounds present; No guarding  EXTREMITIES:  2+ Peripheral Pulses, No edema  PSYCH: AAOx1-2 (baseline)  Genitourinary: blood smeared around urethral area with no active bleeding noted.   NEUROLOGY: non-focal  SKIN: No rashes or lesions

## 2023-05-12 NOTE — H&P ADULT - PROBLEM SELECTOR PLAN 7
C/w hydralazine and BB with parameters. C/w hydralazine, isosorbide mononitrate and BB with parameters.

## 2023-05-12 NOTE — H&P ADULT - PROBLEM SELECTOR PLAN 4
- Leukocytosis of 16k on admission,   - Likely in setting of traumatic Reyes removal, pain and hematuria.   - Infectious cause less likely as patient is afebrile, hemodynamically stable and no signs of infection.   - F/U UA, Ucx.

## 2023-05-12 NOTE — H&P ADULT - PROBLEM SELECTOR PLAN 2
- Likely in setting of pulling out Reyes traumatically leading to hematuria and clots.   - Signs of periurethral trauma with blood and clots seen on exam.   - Plan to c/w Reyes with PRN irrigation.   - Monitor H/H   - Urology following.

## 2023-05-12 NOTE — H&P ADULT - NSICDXPASTMEDICALHX_GEN_ALL_CORE_FT
PAST MEDICAL HISTORY:  BPH (benign prostatic hyperplasia)     CAD (coronary artery disease)     Chronic HFrEF (heart failure with reduced ejection fraction)     Chronic obstructive pulmonary disease (COPD)     Diabetes mellitus     Fetal alcohol syndrome     HTN (hypertension)     Hyperlipidemia     Intellectual disability

## 2023-05-12 NOTE — CONSULT NOTE ADULT - ATTENDING COMMENTS
74 y/o male a/w YASSINE with urethral trauma  VSS, afebrile, elevated Cr    blood clots most likely 2/2 trauma    Plan   - continue chance catheter, irrigate prn   - consider pt restraints to prevent pulling of catheter / IV line  - monitor urine output   - Trend Cr  - remainder of care as per primary team

## 2023-05-12 NOTE — CONSULT NOTE ADULT - SUBJECTIVE AND OBJECTIVE BOX
UROLOGY CONSULT   72 y/o male with past medical history of down syndrome, HTN, COPD, BPH with chronic chance catheter presented to the ED due to bleeding around meatus and difficulty urinating following self discontinuation of chance catheter. Urology consulted to review proper chance placement and evaluation of large blood clots noted around penile meatus. Pt seen and examined at bedside. Pt is poor historian, unable to provide history. Pt denies abdominal/suprapubic discomfort. As per chart, pt is known to tug on chance catheter and has pulled it out in the past. Pt was discharged from [facility] yesterday (5/11) after replacement was properly inserted.        PAST MEDICAL & SURGICAL HISTORY:  Down syndrome  HTN (hypertension)  Hyperlipidemia  Diabetes mellitus  Intellectual disability  Chronic HFrEF (heart failure with reduced ejection fraction)  Chronic obstructive pulmonary disease (COPD)  CAD (coronary artery disease)  BPH (benign prostatic hyperplasia)  No significant past surgical history    MEDICATIONS  (STANDING):    MEDICATIONS  (PRN):      Allergies    No Known Allergies    Intolerances        Vital Signs Last 24 Hrs  T(C): 36.7 (12 May 2023 11:52), Max: 36.7 (12 May 2023 11:52)  T(F): 98.1 (12 May 2023 11:52), Max: 98.1 (12 May 2023 11:52)  HR: 78 (12 May 2023 11:52) (78 - 78)  BP: 104/70 (12 May 2023 11:52) (104/70 - 104/70)  BP(mean): --  RR: 18 (12 May 2023 11:52) (18 - 18)  SpO2: 99% (12 May 2023 11:52) (99% - 99%)        Physical:  Gen: A&Ox3. NAD  Resp: Unlabored breathing. Equal chest rise bilaterally.  Abd: Soft ND, NT to palpation.   : No suprapubic tenderness to palpation. Normal male genitalia. 12fr chance in place, minimal drainage in chance bag. Large blood clots noted around the meatus and on chance catheter. Bedside sono confirmed placement of chance in bladder, no clots on US. Chance irrigated well at bedside.         I&O's Detail      LABS:                        12.3   16.04 )-----------( 212      ( 12 May 2023 13:48 )             39.6              05-12    141  |  109<H>  |  83<H>  ----------------------------<  191<H>  5.7<H>   |  28  |  4.87<H>    Ca    9.6      12 May 2023 15:15

## 2023-05-12 NOTE — ED PROVIDER NOTE - OBJECTIVE STATEMENT
73-year-old male history of Down syndrome hypertension hyperlipidemia diabetes BPH CAD COPD brought to ED for Reyes catheter issues.  Patient presented to ED for pulling Reyes catheter yesterday.  Reyes catheter was subsequently reinserted.  Patient was discharged after drainage from Reyes catheter.  Today the patient began passing blood clots around the catheter.  Reyes catheter itself is draining clear urine.  Collateral info obtained from staff member at bedside.

## 2023-05-12 NOTE — ED ADULT NURSE REASSESSMENT NOTE - NS ED NURSE REASSESS COMMENT FT1
Kassandra Joy, pt on bed not in distress , admitted due to hyperkalemia ,  await for bed,will cont., care. , with chance in placed draining well, oxygen given  cont per nasal cannula.

## 2023-05-12 NOTE — H&P ADULT - PROBLEM SELECTOR PLAN 3
- P/w K of 6.1.   - Likely in setting of YASSINE on CKD.   - S/p 1 dose of Lokelma.   - F/U repeat BMP.   - Nephrology consulted. - P/w K of 6.1.   - Likely in setting of YASSINE on CKD.   - S/p 1 dose of Lokelma.   - No significant EKG changes. Admit to medicine.   - F/U repeat BMP.   - Nephrology consulted.

## 2023-05-12 NOTE — H&P ADULT - ATTENDING COMMENTS
Patient was seen and examined at bedside. denies any complains. Unable to provide any context of hospital admission. History as above     ICU Vital Signs Last 24 Hrs  T(C): 36.7 (12 May 2023 11:52), Max: 36.7 (12 May 2023 11:52)  T(F): 98.1 (12 May 2023 11:52), Max: 98.1 (12 May 2023 11:52)  HR: 78 (12 May 2023 11:52) (78 - 78)  BP: 104/70 (12 May 2023 11:52) (104/70 - 104/70)  BP(mean): --  ABP: --  ABP(mean): --  RR: 18 (12 May 2023 11:52) (18 - 18)  SpO2: 99% (12 May 2023 11:52) (99% - 99%)    O2 Parameters below as of 12 May 2023 11:52  Patient On (Oxygen Delivery Method): nasal cannula  O2 Flow (L/min): 5      P/E:  NAD  AAOx1-2, no new focal deficit   CTABL  S1S2 WNL  Abd soft, non tender, BS present   BLLE no edema or calf tenderness   Blood around urethral meatus with mild swelling , pink urine in Chance bag, no supra pubic tenderness    Labs noted     A/P  YASSINE possibly due to obstructive uropathy   Hyperkalemia due to YASSINE  Chronic systolic and diastolic Heart failure  DM with hyperglycemia   HTN  HLD  Asthma  Fetal alcohol syndrome   Intellectual disability  Esophageal thickening    Plan:  Monitor renal function if improves with chance better draining, avoid IVF  Cont Chance, irrigate PRN  Monitor urine output   Send UA  Monitor Hb  Cont tamsulosin and Finasteride   Cont home dose Bumex  Repeat BMP at 6pm, lokelma given in ER   cont home meds for BP  Nephrology consult  Cont Albuterol, spiriva and Duoneb  Cont ISS  Hold Memantine and Amantadine, cont clonazepam BID  NH paper shows Valproic acid given only daily but patient was on BID dosing in hospital, unable to verify dosing with NH. Cont BID, will night dose decreased to 500mg, increase slowly to home dose   Rest of the management as above  Discussed the plan with MAR  Palliative care on board for [facility] Patient was seen and examined at bedside. denies any complains. Unable to provide any context of hospital admission. History as above     ICU Vital Signs Last 24 Hrs  T(C): 36.7 (12 May 2023 11:52), Max: 36.7 (12 May 2023 11:52)  T(F): 98.1 (12 May 2023 11:52), Max: 98.1 (12 May 2023 11:52)  HR: 78 (12 May 2023 11:52) (78 - 78)  BP: 104/70 (12 May 2023 11:52) (104/70 - 104/70)  BP(mean): --  ABP: --  ABP(mean): --  RR: 18 (12 May 2023 11:52) (18 - 18)  SpO2: 99% (12 May 2023 11:52) (99% - 99%)    O2 Parameters below as of 12 May 2023 11:52  Patient On (Oxygen Delivery Method): nasal cannula  O2 Flow (L/min): 5      P/E:  NAD  AAOx1-2, no new focal deficit   CTABL  S1S2 WNL  Abd soft, non tender, BS present   BLLE no edema or calf tenderness   Blood around urethral meatus with mild swelling , pink urine in Chance bag, no supra pubic tenderness    Labs noted     A/P  YASSINE possibly due to obstructive uropathy   Hyperkalemia due to YASSINE  Chronic systolic and diastolic Heart failure  DM with hyperglycemia   HTN  HLD  Asthma  Fetal alcohol syndrome   Intellectual disability  Esophageal thickening    Plan:  Monitor renal function if improves with chance better draining, avoid IVF  Cont Chance, irrigate PRN  Monitor urine output   Send UA  Monitor Hb  Cont tamsulosin and Finasteride   Cont home dose Bumex  Repeat BMP at 6pm, lokelma given in ER   NO EKG change   cont home meds for BP  Nephrology consult  Cont Albuterol, spiriva and Duoneb  Cont ISS  Hold Memantine and Amantadine, cont clonazepam BID  NH paper shows Valproic acid given only daily but patient was on BID dosing in hospital, unable to verify dosing with NH. Cont BID, will night dose decreased to 500mg, increase slowly to home dose   Rest of the management as above  Discussed the plan with MAR  Palliative care on board for [facility] Patient was seen and examined at bedside. denies any complains. Unable to provide any context of hospital admission. History as above     ICU Vital Signs Last 24 Hrs  T(C): 36.7 (12 May 2023 11:52), Max: 36.7 (12 May 2023 11:52)  T(F): 98.1 (12 May 2023 11:52), Max: 98.1 (12 May 2023 11:52)  HR: 78 (12 May 2023 11:52) (78 - 78)  BP: 104/70 (12 May 2023 11:52) (104/70 - 104/70)  BP(mean): --  ABP: --  ABP(mean): --  RR: 18 (12 May 2023 11:52) (18 - 18)  SpO2: 99% (12 May 2023 11:52) (99% - 99%)    O2 Parameters below as of 12 May 2023 11:52  Patient On (Oxygen Delivery Method): nasal cannula  O2 Flow (L/min): 5      P/E:  NAD  AAOx1-2, no new focal deficit   CTABL  S1S2 WNL  Abd soft, non tender, BS present   BLLE no edema or calf tenderness   Blood around urethral meatus with mild swelling , pink urine in Chance bag, no supra pubic tenderness    Labs noted     A/P  YASSINE possibly due to obstructive uropathy   Hyperkalemia due to YASSINE  Chronic systolic and diastolic Heart failure  DM with hyperglycemia   HTN  HLD  Asthma  Fetal alcohol syndrome   Intellectual disability  Esophageal thickening    Plan:  Monitor renal function if improves with chance better draining, avoid IVF  Cont Chance, irrigate PRN  Monitor urine output   Send UA  Monitor Hb  Cont tamsulosin and Finasteride   Cont home dose Bumex  Repeat BMP at 6pm, lokelma given in ER   Obtain EKG, admit to tele if EKG changes   cont home meds for BP  Nephrology consult  Cont Albuterol, spiriva and Duoneb  Cont ISS  Hold Memantine and Amantadine, cont clonazepam BID  NH paper shows Valproic acid given only daily but patient was on BID dosing in hospital, unable to verify dosing with NH. Cont BID, will night dose decreased to 500mg, increase slowly to home dose   Rest of the management as above  Discussed the plan with MAR  Palliative care on board for [facility]

## 2023-05-12 NOTE — H&P ADULT - ASSESSMENT
Patient is a 73 year old male with Intellectual disability 2/2 to fetal alcohol syndrome (does not have Downs syndrome) from [facility] w/ PMx COPD with chronic cough, hx of non ischemic cardiomyopathy with non-obstructive CAD on CCTA (2021), HFrEF (EF 10-15% on echo in 3/23), HTN, DM, CKD elevated PSA, BPH, schizophrenia, hepatitis B, carrier, bilateral sensorineural hearing loss. Patient is admitted for YASSINE and hematuria likely 2/2 traumatic removal of Reyes.

## 2023-05-12 NOTE — CONSULT NOTE ADULT - ASSESSMENT
74 y/o male a/w YASSINE with urethral trauma  VSS, afebrile, elevated Cr    blood clots most likely 2/2 trauma    Plan   - continue chance catheter, irrigate prn   - consider pt restraints to prevent pulling of catheter / IV line  - monitor urine output   - Trend Cr  - remainder of care as per primary team   - discussed with attending and agrees

## 2023-05-12 NOTE — H&P ADULT - HISTORY OF PRESENT ILLNESS
Patient is a 73 year old male with Intellectual disability 2/2 to fetal alcohol syndrome (does not have Downs syndrome) from [facility] w/ PMHx COPD with chronic cough, hx of non ischemic cardiomyopathy with non-obstructive CAD on CCTA (2021), HFrEF (EF 10-15% on echo in 3/23), HTN, DM, CKD elevated PSA, BPH, schizophrenia, hepatitis B, carrier, bilateral sensorineural hearing loss who has had multiple recent admissions to  hospitals due to CHF exacerbation. Patient was recently discharged from the hospital on 05/12/2023. Patient was admitted for UTI and ATN. Patient was discharged s/p Reyes placement. Patient returned to ED yesterday as patient had pulled his Reyes and there was jarred-uretheral bleeding. Patient's Reyes catheter was changed, confirmed that it was draining properly and patient was discharged. Patient returned to ED today as patient had jarred-uretheral clots however the Reyes was still draining but has tommy hematuria that was noted in the Reyes bag. Patient denies any fevers, chills, headache, lightheadedness, chest pain, palpitations, shortness of breath, cough, abdominal pain, nausea, vomiting, diarrhea, constipation, melena, or hematochezia. Patient also denies any penile pain, discomfort or tenderness. Patient denies any dysuria or any other urinary symptoms at this time.

## 2023-05-12 NOTE — H&P ADULT - PROBLEM SELECTOR PLAN 1
- Cr of 4.90 on admission.   - On discharge 05/09 Cr of 2.14.   - Likely obstructive in setting of pulling out Reyes traumatically leading to hematuria and clots.   - C/w Reyes placement with PRN irrigation.   - F/U BMP.   - Avoid nephrotoxic agents.   - Avoid IV fluids as much as possible due to sevrely reduced EF.   - Nephro consulted. -

## 2023-05-13 NOTE — CONSULT NOTE ADULT - PROBLEM SELECTOR RECOMMENDATION 7
As above.  Patient now with advanced systolic HF, in setting of severe NICM, with EF of 10-15%, and increasing BEARDEN/SOB with performance of ADLs resulting in functional limitation.  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  He is not a candidate for ICD.  Now with recurrent YASSINE on CKD III due to urinary retention/post obstructive renal failure, returns with worsening renal functional and hematuria/clots due to traumatic pulling of Reyes.  Likely to require long-term indwelling Reyes catheter, further adding to his medical debility.  Has emerging protein-calorie malnutrition with albumin of 2.2.    Patient has Stage C NYHA class III heart failure and worsening YASSINE on CKD.  Given his multiple medical comorbidities, he is at risk for continued progression of disease, further functional decline, recurrent hospitalizations, increasing morbidity, and sudden death.  He is medically appropriate for hospice with a prognosis generally in the range of 6 to 12 months.    Patient is under auspices of [employer] ([employer]) as his [id number]-b guardian.  Per [facility] discussion during previous hospital admission, CAB, [facility] medical director (Dr. Hearn), and members of patient's care team all in agreement to begin proceedings to change patient's code status to DNR/DNI and for consideration of hospice.  Meeting with CAB executive board to change code status is still pending--will attempt to facilitate.    DO NOT DISCHARGE THIS PATIENT UNTIL DNR/DNI STATUS HAS BEEN CLARIFIED WITH CAB.    Patient remains Full Code at this time  Further optimization of GDMT and remainder of medical management as per Nephrology and primary team  Palliative care team will continue to follow.

## 2023-05-13 NOTE — PROGRESS NOTE ADULT - ASSESSMENT
YASSINE possibly due to obstructive uropathy   Hyperkalemia due to YASSINE  Chronic systolic and diastolic Heart failure  DM with hyperglycemia   HTN  HLD  Asthma  Fetal alcohol syndrome   Intellectual disability  Esophageal thickening    Plan:  Cont Reyes  Monitor urine output   Nephrology consult appreciated  Monitor Hb  Cont tamsulosin and Finasteride   Cont home dose Bumex, agree with Dr. Carcamo to challenge with 2mg if output does not improve  No EKG changes  cont home meds for BP  Cont Albuterol, spiriva and Duoneb  Cont ISS  Agree with changing Clonazepam to PRN, decrease evening dose of valproic acid   Palliative care on board for GOC. YASSINE possibly due to obstructive uropathy   Hyperkalemia due to YASSINE  Chronic systolic and diastolic Heart failure  DM with hyperglycemia   HTN  HLD  Asthma  Fetal alcohol syndrome   Intellectual disability  Esophageal thickening    Plan:  Cont Reyes  Monitor urine output   Nephrology consult appreciated  Monitor Hb  Cont tamsulosin and Finasteride   Cont home dose Bumex, agree with Dr. Carcamo to challenge with 2mg if output does not improve  CXR does not look volume overloaded   No EKG changes  cont home meds for BP  Cont Albuterol, spiriva and Duoneb  Cont ISS  Agree with changing Clonazepam to PRN, decrease evening dose of valproic acid   Palliative care on board for GOC.

## 2023-05-13 NOTE — CONSULT NOTE ADULT - PROBLEM SELECTOR RECOMMENDATION 2
As above, EF of 10-15%.  Per Cardiology patient is not a candidate for AICD placement due to lack of decisional making capacity (ACC/AHA class III contraindication).    Continue supportive care

## 2023-05-13 NOTE — CONSULT NOTE ADULT - SUBJECTIVE AND OBJECTIVE BOX
Consult to: Discuss complex medical decision making related to goals of care    [facility] Geriatric and Palliative Consult Service:  Aliza Aracelis DO: cell ([phone])  Reji Hart MD: cell ([phone])  Joel Weiner NP: cell ([phone])   Michelle Rasheed DNP: cell ([phone])  Kahlil Lance SW: cell ([phone])   Bharti Nogueira NP: via [employer]    Can contact any Palliative Team member via [employer] Teams for consults and questions      HPI:  Patient is a 73 year old male with Intellectual disability 2/2 to fetal alcohol syndrome (does not have Downs syndrome) from [facility] w/ PMHx COPD with chronic cough, hx of non ischemic cardiomyopathy with non-obstructive CAD on CCTA (), HFrEF (EF 10-15% on echo in 3/23), HTN, DM, CKD elevated PSA, BPH, schizophrenia, hepatitis B, carrier, bilateral sensorineural hearing loss who has had multiple recent admissions to  hospitals due to CHF exacerbation. Patient was recently discharged from the hospital on 2023. Patient was admitted for UTI and ATN. Patient was discharged s/p Chance placement. Patient returned to ED yesterday as patient had pulled his Chance and there was jarred-uretheral bleeding. Patient's Chance catheter was changed, confirmed that it was draining properly and patient was discharged. Patient returned to ED today as patient had jarred-uretheral clots however the Chance was still draining but has tommy hematuria that was noted in the Chance bag. Patient denies any fevers, chills, headache, lightheadedness, chest pain, palpitations, shortness of breath, cough, abdominal pain, nausea, vomiting, diarrhea, constipation, melena, or hematochezia. Patient also denies any penile pain, discomfort or tenderness. Patient denies any dysuria or any other urinary symptoms at this time.  (12 May 2023 18:13)      PAST MEDICAL & SURGICAL HISTORY:  HTN (hypertension)      Hyperlipidemia      Diabetes mellitus      Intellectual disability      Chronic HFrEF (heart failure with reduced ejection fraction)      Chronic obstructive pulmonary disease (COPD)      CAD (coronary artery disease)      BPH (benign prostatic hyperplasia)      Fetal alcohol syndrome      No significant past surgical history          SOCIAL HISTORY:    Admitted from:  home  (with [facility])           assisted living          [facility]   [ none ] Substance abuse, [ none ] Tobacco hx, [ none ] Alcohol hx, [ none ] Home Opioid Hx    FAMILY HISTORY:  No pertinent family history in first degree relatives     unable to obtain from patient due to poor mentation, family unable to give information, see H&P for history  Baseline ADLs (prior to admission):    Allergies    No Known Allergies    Intolerances      Present Symptoms: Mild, Moderate, Severe  Pain:             Location -                               Aggravating factors -             Quality -             Radiation -             Timing-             Severity (0-10 scale):             Minimal acceptable level (0-10 scale):  Fatigue:  Nausea:  Lack of Appetite:   SOB:  Depression:  Anxiety:  Review of Systems: [All others negative or Unable to obtain due to poor mentation]    CPOT:    https://www.[email or web address]/getattachment/[email or web address]/Critical-Care-Pain-Observation-Tool-(CPOT)  PAIN AD Score:   http://geriatrictoolkit.[email or web address]/painad.pdf (press ctrl +  left click to view)      MEDICATIONS  (STANDING):  albumin human 25% IVPB 50 milliLiter(s) IV Intermittent every 6 hours  albuterol    90 MICROgram(s) HFA Inhaler 2 Puff(s) Inhalation every 6 hours  aspirin  chewable 81 milliGRAM(s) Oral daily  atorvastatin 40 milliGRAM(s) Oral at bedtime  buMETAnide 1 milliGRAM(s) Oral daily  dextrose 5%. 1000 milliLiter(s) (100 mL/Hr) IV Continuous <Continuous>  dextrose 5%. 1000 milliLiter(s) (50 mL/Hr) IV Continuous <Continuous>  dextrose 50% Injectable 25 Gram(s) IV Push once  dextrose 50% Injectable 12.5 Gram(s) IV Push once  dextrose 50% Injectable 25 Gram(s) IV Push once  finasteride 5 milliGRAM(s) Oral daily  fluticasone propionate 50 MICROgram(s)/spray Nasal Spray 1 Spray(s) Both Nostrils two times a day  glucagon  Injectable 1 milliGRAM(s) IntraMuscular once  guaiFENesin ER 1200 milliGRAM(s) Oral every 12 hours  hydrALAZINE 10 milliGRAM(s) Oral three times a day  insulin lispro (ADMELOG) corrective regimen sliding scale   SubCutaneous three times a day before meals  insulin lispro (ADMELOG) corrective regimen sliding scale   SubCutaneous at bedtime  isosorbide   mononitrate ER Tablet (IMDUR) 30 milliGRAM(s) Oral daily  lactulose Syrup 20 Gram(s) Oral at bedtime  melatonin 5 milliGRAM(s) Oral at bedtime  metoprolol tartrate 12.5 milliGRAM(s) Oral two times a day  montelukast 10 milliGRAM(s) Oral at bedtime  multivitamin 1 Tablet(s) Oral daily  OLANZapine 10 milliGRAM(s) Oral at bedtime  tamsulosin 0.4 milliGRAM(s) Oral at bedtime  tiotropium 2.5 MICROgram(s) Inhaler 2 Puff(s) Inhalation daily  valproic  acid Syrup 250 milliGRAM(s) Oral daily  valproic  acid Syrup 500 milliGRAM(s) Oral at bedtime    MEDICATIONS  (PRN):  acetaminophen     Tablet .. 650 milliGRAM(s) Oral every 6 hours PRN Temp greater or equal to 38C (100.4F), Mild Pain (1 - 3)  clonazePAM  Tablet 0.5 milliGRAM(s) Oral two times a day PRN Anixety/agitation  dextrose Oral Gel 15 Gram(s) Oral once PRN Blood Glucose LESS THAN 70 milliGRAM(s)/deciliter  oxyCODONE    IR 5 milliGRAM(s) Oral every 6 hours PRN for severe pain      PHYSICAL EXAM:  Vital Signs Last 24 Hrs  T(C): 36.3 (13 May 2023 14:10), Max: 36.9 (12 May 2023 19:10)  T(F): 97.4 (13 May 2023 14:10), Max: 98.4 (12 May 2023 19:10)  HR: 81 (13 May 2023 14:10) (73 - 83)  BP: 110/72 (13 May 2023 14:10) (110/72 - 151/85)  BP(mean): --  RR: 20 (13 May 2023 14:10) (18 - 20)  SpO2: 99% (13 May 2023 14:10) (95% - 100%)    Parameters below as of 13 May 2023 14:10  Patient On (Oxygen Delivery Method): nasal cannula  O2 Flow (L/min): 3      General: alert  oriented x ____    lethargic distressed cachexia  nonverbal  unarousable verbal    Palliative Performance Scale/Karnofsky Score:  http://npcrc.org/files/news/palliative_performance_scale_ppsv2.pdf    HEENT: no abnormal lesion, dry mouth  ET tube/trach oral lesions:  Lungs: tachypnea/labored breathing, audible excessive secretions  CV: RRR, S1S2, tachycardia  GI: soft non distended non tender  incontinent               PEG/NG/OG tube  constipation  last BM:   : incontinent  oliguria/anuria  chance  Musculoskeletal: weakness x4 edema x4    ambulatory with assistance   mostly/fully bedbound/wheelchair bound  Skin: no abnormal skin lesions, poor skin turgor, pressure ulcer stage:   Neuro: no deficits, mild cognitive impairment dsyphagia/dysarthria paresis  Oral intake ability: unable/only mouth care, minimal moderate full capability    LABS:                        11.3   13.06 )-----------( 198      ( 13 May 2023 06:53 )             36.1     05-13    142  |  109<H>  |  91<H>  ----------------------------<  204<H>  5.3   |  25  |  5.16<H>    Ca    9.5      13 May 2023 06:53  Phos  5.4       Mg     3.2           Urinalysis Basic - ( 12 May 2023 21:35 )    Color: Yellow / Appearance: Slightly Turbid / S.015 / pH: x  Gluc: x / Ketone: Trace  / Bili: Small / Urobili: 4 mg/dL   Blood: x / Protein: 100 mg/dL / Nitrite: Negative   Leuk Esterase: Moderate / RBC: >50 /HPF / WBC 6-10 /HPF   Sq Epi: x / Non Sq Epi: x / Bacteria: Moderate /HPF        RADIOLOGY & ADDITIONAL STUDIES:     Consult to: Discuss complex medical decision making related to goals of care    [facility] Geriatric and Palliative Consult Service:  Aliza Park DO: cell ([phone])  Reji Hart MD: cell ([phone])  Joel Weiner NP: cell ([phone])   Michelle Rasheed DNP: cell ([phone])  Kahlil Lucas SW: cell ([phone])   Bharti Nogueira NP: via [employer] Teams    Can contact any Palliative Team member via [employer] Teams for consults and questions      HPI:  Patient is a 73 year old male with 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  Most recently admitted to [facility]  to  for YASSINE due to post-obstructive renal failure, UTI, and possible CHF exacerbation (BNP 35032 at admission).  Renal function improved post insertion of Reyes catheter. Discharged back to [facility] late evening .  Patient returned to ED on  as patient had pulled his Reyes and there was jarred-uretheral bleeding. Patient's Reyes catheter was changed, confirmed that it was draining properly and patient was discharged. Patient returned to ED today  as patient had jarred-uretheral clots however the Reyes was still draining but has tommy hematuria that was noted in the Reyes bag. Patient denies any fevers, chills, headache, lightheadedness, chest pain, palpitations, shortness of breath, cough, abdominal pain, nausea, vomiting, diarrhea, constipation, melena, or hematochezia. Patient also denies any penile pain, discomfort or tenderness. Patient denies any dysuria or any other urinary symptoms at this time.  (12 May 2023 18:13)    In ER Creatinine noted to be 4.90 (increased from 2/14 three days prior).  Patient was admitted for worsening YASSINE on CKD and hematuria, likely obstructive in setting of pulling out Reyes traumatically leading to hematuria and clots.     Patient examined earlier today with aide at bedside.  Alert and oriented x 1, very lethargic, but easily arousable.  Denies pain, chest pain, or SOB currently.  Reports nose feeling clogged (has hx of allergic rhinitis--not a new complaint).  Otherwise has no acute complaints.      PAST MEDICAL & SURGICAL HISTORY:  HTN (hypertension)      Hyperlipidemia      Diabetes mellitus      Intellectual disability      Chronic HFrEF (heart failure with reduced ejection fraction)      Chronic obstructive pulmonary disease (COPD)      CAD (coronary artery disease)      BPH (benign prostatic hyperplasia)      Fetal alcohol syndrome      No significant past surgical history          SOCIAL HISTORY:    Admitted from: [facility]     [ none ] Substance abuse, [ none ] Tobacco hx, [ none ] Alcohol hx, [ none ] Home Opioid Hx    FAMILY HISTORY:  No pertinent family history in first degree relatives    Unable to obtain from patient due to poor mentation, no family available to give information, see H&P for history  Baseline ADLs (prior to admission):  Currently in [facility] at [facility], ambulating with walker.  Recent functional decline.  Prior to multiple admissions for CHF (starting in late March), patient was resident of [facility], walking on his own, required minor assistance with ADLs (bathing/dressing).  At baseline he is alert and oriented x 3, conversant, can answer simple questions.    Allergies    No Known Allergies    Intolerances      Present Symptoms: Mild, Moderate, Severe  Pain:             Location -  Denies pain currently                             Aggravating factors -             Quality -             Radiation -             Timing-             Severity (0-10 scale):  0/10             Minimal acceptable level (0-10 scale):  0/10  Fatigue:  Nausea:  Lack of Appetite:   SOB:  Depression:  Anxiety:  Review of Systems:  Otherwise unable to obtain due to poor mentation/lethargy      MEDICATIONS  (STANDING):  albumin human 25% IVPB 50 milliLiter(s) IV Intermittent every 6 hours  albuterol    90 MICROgram(s) HFA Inhaler 2 Puff(s) Inhalation every 6 hours  aspirin  chewable 81 milliGRAM(s) Oral daily  atorvastatin 40 milliGRAM(s) Oral at bedtime  buMETAnide 1 milliGRAM(s) Oral daily  dextrose 5%. 1000 milliLiter(s) (100 mL/Hr) IV Continuous <Continuous>  dextrose 5%. 1000 milliLiter(s) (50 mL/Hr) IV Continuous <Continuous>  dextrose 50% Injectable 25 Gram(s) IV Push once  dextrose 50% Injectable 12.5 Gram(s) IV Push once  dextrose 50% Injectable 25 Gram(s) IV Push once  finasteride 5 milliGRAM(s) Oral daily  fluticasone propionate 50 MICROgram(s)/spray Nasal Spray 1 Spray(s) Both Nostrils two times a day  glucagon  Injectable 1 milliGRAM(s) IntraMuscular once  guaiFENesin ER 1200 milliGRAM(s) Oral every 12 hours  hydrALAZINE 10 milliGRAM(s) Oral three times a day  insulin lispro (ADMELOG) corrective regimen sliding scale   SubCutaneous three times a day before meals  insulin lispro (ADMELOG) corrective regimen sliding scale   SubCutaneous at bedtime  isosorbide   mononitrate ER Tablet (IMDUR) 30 milliGRAM(s) Oral daily  lactulose Syrup 20 Gram(s) Oral at bedtime  melatonin 5 milliGRAM(s) Oral at bedtime  metoprolol tartrate 12.5 milliGRAM(s) Oral two times a day  montelukast 10 milliGRAM(s) Oral at bedtime  multivitamin 1 Tablet(s) Oral daily  OLANZapine 10 milliGRAM(s) Oral at bedtime  tamsulosin 0.4 milliGRAM(s) Oral at bedtime  tiotropium 2.5 MICROgram(s) Inhaler 2 Puff(s) Inhalation daily  valproic  acid Syrup 250 milliGRAM(s) Oral daily  valproic  acid Syrup 500 milliGRAM(s) Oral at bedtime    MEDICATIONS  (PRN):  acetaminophen     Tablet .. 650 milliGRAM(s) Oral every 6 hours PRN Temp greater or equal to 38C (100.4F), Mild Pain (1 - 3)  clonazePAM  Tablet 0.5 milliGRAM(s) Oral two times a day PRN Anixety/agitation  dextrose Oral Gel 15 Gram(s) Oral once PRN Blood Glucose LESS THAN 70 milliGRAM(s)/deciliter  oxyCODONE    IR 5 milliGRAM(s) Oral every 6 hours PRN for severe pain      PHYSICAL EXAM:  Vital Signs Last 24 Hrs  T(C): 36.3 (13 May 2023 14:10), Max: 36.9 (12 May 2023 19:10)  T(F): 97.4 (13 May 2023 14:10), Max: 98.4 (12 May 2023 19:10)  HR: 81 (13 May 2023 14:10) (73 - 83)  BP: 110/72 (13 May 2023 14:10) (110/72 - 151/85)  BP(mean): --  RR: 20 (13 May 2023 14:10) (18 - 20)  SpO2: 99% (13 May 2023 14:10) (95% - 100%)    Parameters below as of 13 May 2023 14:10  Patient On (Oxygen Delivery Method): nasal cannula  O2 Flow (L/min): 3      General: alert  oriented x __1__    lethargic but arousable, mild temporal wasting, otherwise NAD    Palliative Performance Scale/Karnofsky Score:  40%  http://npcrc.org/files/news/palliative_performance_scale_ppsv2.pdf    HEENT:  EOMI anicteric, pharynx clear, MM moist  Lungs:  tachypneic, lungs clear to auscultation, respirations minimally labored  CV: RRR,  normal S1 and S2, no murmur  GI: soft non distended non tender, normal bowel sounds  : incontinent  + Reyes cath in place, full  exam deferred  Musculoskeletal: weakness x4 in all extremities, ambulatory with assistance, mostly bedbound, trace edema of legs bilaterally  Skin: no abnormal skin lesions or DU noted  Neuro: no focal deficits,  ++ lethargic, +  mild cognitive impairment  Oral intake ability: moderate capability, on pureed diet with moderately thickened liquids (chronically), no reports of overt dusphagia    LABS:                        11.3   13.06 )-----------( 198      ( 13 May 2023 06:53 )             36.1     05-13    142  |  109<H>  |  91<H>  ----------------------------<  204<H>  5.3   |  25  |  5.16<H>    Ca    9.5      13 May 2023 06:53  Phos  5.4     05-13  Mg     3.2     05-13      Urinalysis Basic - ( 12 May 2023 21:35 )    Color: Yellow / Appearance: Slightly Turbid / S.015 / pH: x  Gluc: x / Ketone: Trace  / Bili: Small / Urobili: 4 mg/dL   Blood: x / Protein: 100 mg/dL / Nitrite: Negative   Leuk Esterase: Moderate / RBC: >50 /HPF / WBC 6-10 /HPF   Sq Epi: x / Non Sq Epi: x / Bacteria: Moderate /HPF        RADIOLOGY & ADDITIONAL STUDIES:    ACC: [id number] EXAM:  US KIDNEYS AND BLADDER   ORDERED BY: MERLIN ARAYA     PROCEDURE DATE:  2023          INTERPRETATION:  CLINICAL INFORMATION: Acute kidney injury    COMPARISON: 2023    TECHNIQUE: Sonography of the kidneys and bladder.    FINDINGS:  Right kidney: 11.2 cm. No renal mass, hydronephrosis or calculi. Cysts,   measuring 4.7 cm and 1.1 cm respectively.    Left kidney: 11.2 cm. No renal mass, hydronephrosis or calculi.   Subcentimeter cyst.    Urinary bladder: Collapsed with Reyes.    IMPRESSION:  No hydronephrosis bilaterally        --- End of Report ---     Consult to: Discuss complex medical decision making related to goals of care    [facility] Geriatric and Palliative Consult Service:  Aliza Park DO: cell ([phone])  Reji Hart MD: cell ([phone])  Joel Weiner NP: cell ([phone])   Michelle Rasheed DNP: cell ([phone])  Kahlil Lucas SW: cell ([phone])   Bharti Nogueira NP: via [employer] Teams    Can contact any Palliative Team member via [employer] Teams for consults and questions      HPI:  Patient is a 73 year old male with 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  Most recently admitted to [facility]  to  for YASSINE due to post-obstructive renal failure, UTI, and possible CHF exacerbation (BNP 64688 at admission).  Renal function improved post insertion of Reyes catheter. Discharged back to [facility] late evening .  Patient returned to ED on  as patient had pulled his Reyes and there was jarred-uretheral bleeding. Patient's Reyes catheter was changed, confirmed that it was draining properly and patient was discharged. Patient returned to ED today  as patient had jarred-uretheral clots however the Reyes was still draining but has tommy hematuria that was noted in the Reyes bag. Patient denies any fevers, chills, headache, lightheadedness, chest pain, palpitations, shortness of breath, cough, abdominal pain, nausea, vomiting, diarrhea, constipation, melena, or hematochezia. Patient also denies any penile pain, discomfort or tenderness. Patient denies any dysuria or any other urinary symptoms at this time.  (12 May 2023 18:13)    In ER Creatinine noted to be 4.90 (increased from 2/14 three days prior).  Patient was admitted for worsening YASSINE on CKD and hematuria, likely obstructive in setting of pulling out Reyes traumatically leading to hematuria and clots.     Patient examined earlier today with aide at bedside.  Alert and oriented x 1, very lethargic, but easily arousable.  Denies pain, chest pain, or SOB currently.  Reports nose feeling clogged (has hx of allergic rhinitis--not a new complaint).  Otherwise has no acute complaints.      PAST MEDICAL & SURGICAL HISTORY:  HTN (hypertension)      Hyperlipidemia      Diabetes mellitus      Intellectual disability      Chronic HFrEF (heart failure with reduced ejection fraction)      Chronic obstructive pulmonary disease (COPD)      CAD (coronary artery disease)      BPH (benign prostatic hyperplasia)      Fetal alcohol syndrome      No significant past surgical history          SOCIAL HISTORY:    Admitted from: [facility]     [ none ] Substance abuse, [ none ] Tobacco hx, [ none ] Alcohol hx, [ none ] Home Opioid Hx    FAMILY HISTORY:  No pertinent family history in first degree relatives    Unable to obtain from patient due to poor mentation, no family available to give information, see H&P for history  Baseline ADLs (prior to admission):  Currently in [facility] at [facility], ambulating with walker.  Recent functional decline.  Prior to multiple admissions for CHF (starting in late March), patient was resident of [facility], walking on his own, required minor assistance with ADLs (bathing/dressing).  At baseline he is alert and oriented x 3, conversant, can answer simple questions.    Allergies    No Known Allergies    Intolerances      Present Symptoms: Mild, Moderate, Severe  Pain:             Location -  Denies pain currently                             Aggravating factors -             Quality -             Radiation -             Timing-             Severity (0-10 scale):  0/10             Minimal acceptable level (0-10 scale):  0/10  Fatigue:  Nausea:  Lack of Appetite:   SOB:  Depression:  Anxiety:  Review of Systems:  Otherwise unable to obtain due to poor mentation/lethargy      MEDICATIONS  (STANDING):  albumin human 25% IVPB 50 milliLiter(s) IV Intermittent every 6 hours  albuterol    90 MICROgram(s) HFA Inhaler 2 Puff(s) Inhalation every 6 hours  aspirin  chewable 81 milliGRAM(s) Oral daily  atorvastatin 40 milliGRAM(s) Oral at bedtime  buMETAnide 1 milliGRAM(s) Oral daily  dextrose 5%. 1000 milliLiter(s) (100 mL/Hr) IV Continuous <Continuous>  dextrose 5%. 1000 milliLiter(s) (50 mL/Hr) IV Continuous <Continuous>  dextrose 50% Injectable 25 Gram(s) IV Push once  dextrose 50% Injectable 12.5 Gram(s) IV Push once  dextrose 50% Injectable 25 Gram(s) IV Push once  finasteride 5 milliGRAM(s) Oral daily  fluticasone propionate 50 MICROgram(s)/spray Nasal Spray 1 Spray(s) Both Nostrils two times a day  glucagon  Injectable 1 milliGRAM(s) IntraMuscular once  guaiFENesin ER 1200 milliGRAM(s) Oral every 12 hours  hydrALAZINE 10 milliGRAM(s) Oral three times a day  insulin lispro (ADMELOG) corrective regimen sliding scale   SubCutaneous three times a day before meals  insulin lispro (ADMELOG) corrective regimen sliding scale   SubCutaneous at bedtime  isosorbide   mononitrate ER Tablet (IMDUR) 30 milliGRAM(s) Oral daily  lactulose Syrup 20 Gram(s) Oral at bedtime  melatonin 5 milliGRAM(s) Oral at bedtime  metoprolol tartrate 12.5 milliGRAM(s) Oral two times a day  montelukast 10 milliGRAM(s) Oral at bedtime  multivitamin 1 Tablet(s) Oral daily  OLANZapine 10 milliGRAM(s) Oral at bedtime  tamsulosin 0.4 milliGRAM(s) Oral at bedtime  tiotropium 2.5 MICROgram(s) Inhaler 2 Puff(s) Inhalation daily  valproic  acid Syrup 250 milliGRAM(s) Oral daily  valproic  acid Syrup 500 milliGRAM(s) Oral at bedtime    MEDICATIONS  (PRN):  acetaminophen     Tablet .. 650 milliGRAM(s) Oral every 6 hours PRN Temp greater or equal to 38C (100.4F), Mild Pain (1 - 3)  clonazePAM  Tablet 0.5 milliGRAM(s) Oral two times a day PRN Anixety/agitation  dextrose Oral Gel 15 Gram(s) Oral once PRN Blood Glucose LESS THAN 70 milliGRAM(s)/deciliter  oxyCODONE    IR 5 milliGRAM(s) Oral every 6 hours PRN for severe pain      PHYSICAL EXAM:  Vital Signs Last 24 Hrs  T(C): 36.3 (13 May 2023 14:10), Max: 36.9 (12 May 2023 19:10)  T(F): 97.4 (13 May 2023 14:10), Max: 98.4 (12 May 2023 19:10)  HR: 81 (13 May 2023 14:10) (73 - 83)  BP: 110/72 (13 May 2023 14:10) (110/72 - 151/85)  BP(mean): --  RR: 20 (13 May 2023 14:10) (18 - 20)  SpO2: 99% (13 May 2023 14:10) (95% - 100%)    Parameters below as of 13 May 2023 14:10  Patient On (Oxygen Delivery Method): nasal cannula  O2 Flow (L/min): 3      General: alert  oriented x __1__    lethargic but arousable, mild temporal wasting, otherwise NAD    Palliative Performance Scale/Karnofsky Score:  40%  http://npcrc.org/files/news/palliative_performance_scale_ppsv2.pdf    HEENT:  EOMI anicteric, pharynx clear, MM moist  Lungs:  tachypneic, lungs clear to auscultation, respirations minimally labored  CV: RRR,  normal S1 and S2, no murmur  GI: soft non distended non tender, normal bowel sounds  : incontinent  + Reyes cath in place, full  exam deferred  Musculoskeletal: weakness x4 in all extremities, ambulatory with assistance, mostly bedbound, trace edema of legs bilaterally  Skin: no abnormal skin lesions or DU noted  Neuro: no focal deficits,  ++ lethargic, +  mild cognitive impairment  Oral intake ability: moderate capability, on pureed diet with moderately thickened liquids (chronically), no reports of overt dusphagia    LABS:                        11.3   13.06 )-----------( 198      ( 13 May 2023 06:53 )             36.1     05-13    142  |  109<H>  |  91<H>  ----------------------------<  204<H>  5.3   |  25  |  5.16<H>    Ca    9.5      13 May 2023 06:53  Phos  5.4     05-13  Mg     3.2     05-13    Albumin, Serum: 2.2 g/dL (23 @ 06:57)        Urinalysis Basic - ( 12 May 2023 21:35 )    Color: Yellow / Appearance: Slightly Turbid / S.015 / pH: x  Gluc: x / Ketone: Trace  / Bili: Small / Urobili: 4 mg/dL   Blood: x / Protein: 100 mg/dL / Nitrite: Negative   Leuk Esterase: Moderate / RBC: >50 /HPF / WBC 6-10 /HPF   Sq Epi: x / Non Sq Epi: x / Bacteria: Moderate /HPF        RADIOLOGY & ADDITIONAL STUDIES:    ACC: [id number] EXAM:  US KIDNEYS AND BLADDER   ORDERED BY: MERLIN ARAYA     PROCEDURE DATE:  2023          INTERPRETATION:  CLINICAL INFORMATION: Acute kidney injury    COMPARISON: 2023    TECHNIQUE: Sonography of the kidneys and bladder.    FINDINGS:  Right kidney: 11.2 cm. No renal mass, hydronephrosis or calculi. Cysts,   measuring 4.7 cm and 1.1 cm respectively.    Left kidney: 11.2 cm. No renal mass, hydronephrosis or calculi.   Subcentimeter cyst.    Urinary bladder: Collapsed with Reyes.    IMPRESSION:  No hydronephrosis bilaterally        --- End of Report ---

## 2023-05-13 NOTE — CONSULT NOTE ADULT - PROBLEM SELECTOR RECOMMENDATION 9
Prior to previous admission in early April 2023, patient was having increasing BEARDEN, SOB with performance of ADLs, and functional limitation due to his heart failure.  With his increasing symptom burden he was not able to return to his group home setting and was recently discharged to [facility].  Symptom burden now exacerbated by recurrent YASSINE on CKD with need for chronic indwelling Reyes catheter    Patient has Stage C NYHA class III heart failure.  He now has worsening YASSINE on CKD due to urinary obstruction,  with remote concern for possible cardiorenal syndrome.  He is at risk for continued progression of disease, further functional decline, recurrent infections and hospitalization, increasing morbidity, and sudden death.  Not a candidate for AICD placement (class III contraindication).  Per previously Cardiology consult from Dr. Jiménez, CPR likely would be medically futile given his underlying heart failure and NICM.      Patient is medically appropriate for hospice.  Patient under auspices of [facility]--discussions have begun with [facility] and local [facility] medical director Dr. Emilia Hearn for initiation of MOLST DNR/DNI orders.      Continue optimization of GDMT  Remainder of management as per primary medical team.

## 2023-05-13 NOTE — CONSULT NOTE ADULT - PROBLEM SELECTOR RECOMMENDATION 3
With associated schizophrenia, and likely dementia  Clarified with Dr. Emilia Hearn from [facility]--patient with previous chromosomal analysis in 2011 that was negative for trisomy.  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    Continue home meds, including olanzapine and valproic acid (NB patient's previous dose of Valproate was 250 mg AM and 1000 mg QHS).    Given patient's overall functional decline/worsening renal status with risk for increased delirium, and given that he is medically appropriate for hospice, agree with D/C of Namenda and amantadine (both meds have little to offer at this point).  Patient very lethargic--would change clonazepam back to 0.5 mg BID PRN    Frequent reorientation/supportive care to prevent further delirium.

## 2023-05-13 NOTE — CONSULT NOTE ADULT - PROBLEM SELECTOR RECOMMENDATION 5
Clinical evidence indicates that the patient has Severe protein calorie malnutrition/ 3rd degree    In context of     Chronic Illness (>1 month)--worsening systolic HF with recent prolonged hospitalization, aggravated by worsening CKD and functional decline    Energy/Food intake <50% of estimated energy requirement >5 days  Weight loss: Moderate - severe   Body Fat loss: Severe   with increased temporal wasting on this admission, + LE muscle atrophy noted  Muscle mass loss: Severe, now with albumin of 2.2  Fluid Accumulation: Severe (Fluid overload, worsening CHF with bilateral pleural effusions)   Strength: weakened severe (mostly bedbound, requiring increased assistance with ADLs)    Recommend:   Continue pureed diet with thickened liquids, aspiration precautions, keep HOB elevated at all times.

## 2023-05-13 NOTE — CONSULT NOTE ADULT - ASSESSMENT
73y Male with history of intellectual disability, BPH, chance for urinary retention, CHF presents with gross hematuria. Nephrology consulted for elevated Scr.    1) YASSINE: Abrupt increase in Scr over relatively short period of time suspect due to retention/ATN. Scr increasing with poor UO despite chance being replaced. Please check renal US to ensure chance in correct position. UA active likely due to trauma (would not expect GN to cause such an acute rise in Scr in a matter of days). Check urine urea and urine creatinine. Trial of IV albumin to help increase forward flow. Avoid nephrotoxins.    2) CKD-3a: Baseline Scr 1.3-1.6 with resolving YASSINE on prior admission and Scr decreased to 2.1 on discharge. Monitor electrolytes.    3) HTN with CKD: BP acceptable. Continue with current medications. Monitor BP.    4) LE edema: Patient appears relatively euvolemic. Continue with bumex 1 mg PO daily but will likely need to increase to 2 mg PO daily if UO suboptimal given advanced renal failure. TTE with severe global LVSD. Strict I/O's. Monitor UO.    5) Hyperphosphatemia: Change diet to low phosphorus. Monitor serum calcium and phosphorus.      [facility] NEPHROLOGY  Raul Goncalves M.D.  Bennie Carcamo D.O.  Priya Pederson M.D.  Hellen Dietz, MSN, ANP-C    Telephone: [phone]  Facsimile: [phone]    [address] [place] [address]  
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 dementia (on home memantine), hepatitis B carrier, and bilateral sensorineural hearing loss.  Not a candidate for ICD placement due to lack of decision making capacity (AHA class III contraindication).  He is well known to our palliative care service.  Previously admitted 3/28 to 3/30 for demand ischemia and CHF exacerbation, then with prolonged hospitalization from 4/8 to 4/28 for chest pain (likely musculoskeletal) and decompensated CHF, complicated by worsening YASSINE on CKD, originally thought to be due to ATN/overdiuresis/? intermittent urinary retention (vs CRS); patient discharged to [facility] on 4/28.  Then readmitted to [facility] 5/4 to 5/9 for altered mental status and respiratory distress, found unresponsive by NH staff; found to have worsening YASSINE on CKD with BNP of 63,000.  Admitted at that time for post obstructive renal failure, UTI, and possible HF exacerbation, improved after insertion of Reyes cath, discharged back to [facility] on 5/9.      Patient pulled out Reyes 5/11, replaced in ER and sent back to [facility]  Returned 5/12 with hematuria in Reyes bag and jarred-urethral clots, Cr back up to 4.90, admitted for hematuria and YASSINE on CKD

## 2023-05-13 NOTE — PROGRESS NOTE ADULT - SUBJECTIVE AND OBJECTIVE BOX
73y old  Male who presents with a chief complaint of Hematuria 2/2 traumatic Reyes removal. (13 May 2023 16:55)    Patient was seen and examined at bedside   More tired then yesterday     INTERVAL HPI/OVERNIGHT EVENTS:  T(C): 36.3 (23 @ 14:10), Max: 36.4 (23 @ 00:54)  HR: 81 (23 @ 14:10) (73 - 83)  BP: 110/72 (23 @ 14:10) (110/72 - 151/85)  RR: 20 (23 @ 14:10) (18 - 20)  SpO2: 99% (23 @ 14:10) (98% - 100%)  Wt(kg): --  I&O's Summary      REVIEW OF SYSTEMS: denies fever, chills, SOB, palpitations, chest pain, abdominal pain, nausea, vomiting, diarrhea, constipation, dizziness; complains of stuffed nose     MEDICATIONS  (STANDING):  albumin human 25% IVPB 50 milliLiter(s) IV Intermittent every 6 hours  albuterol    90 MICROgram(s) HFA Inhaler 2 Puff(s) Inhalation every 6 hours  aspirin  chewable 81 milliGRAM(s) Oral daily  atorvastatin 40 milliGRAM(s) Oral at bedtime  buMETAnide 1 milliGRAM(s) Oral daily  dextrose 5%. 1000 milliLiter(s) (100 mL/Hr) IV Continuous <Continuous>  dextrose 5%. 1000 milliLiter(s) (50 mL/Hr) IV Continuous <Continuous>  dextrose 50% Injectable 25 Gram(s) IV Push once  dextrose 50% Injectable 12.5 Gram(s) IV Push once  dextrose 50% Injectable 25 Gram(s) IV Push once  finasteride 5 milliGRAM(s) Oral daily  fluticasone propionate 50 MICROgram(s)/spray Nasal Spray 1 Spray(s) Both Nostrils two times a day  glucagon  Injectable 1 milliGRAM(s) IntraMuscular once  guaiFENesin ER 1200 milliGRAM(s) Oral every 12 hours  hydrALAZINE 10 milliGRAM(s) Oral three times a day  insulin lispro (ADMELOG) corrective regimen sliding scale   SubCutaneous three times a day before meals  insulin lispro (ADMELOG) corrective regimen sliding scale   SubCutaneous at bedtime  isosorbide   mononitrate ER Tablet (IMDUR) 30 milliGRAM(s) Oral daily  lactulose Syrup 20 Gram(s) Oral at bedtime  melatonin 5 milliGRAM(s) Oral at bedtime  metoprolol tartrate 12.5 milliGRAM(s) Oral two times a day  montelukast 10 milliGRAM(s) Oral at bedtime  multivitamin 1 Tablet(s) Oral daily  OLANZapine 10 milliGRAM(s) Oral at bedtime  tamsulosin 0.4 milliGRAM(s) Oral at bedtime  tiotropium 2.5 MICROgram(s) Inhaler 2 Puff(s) Inhalation daily  valproic  acid Syrup 250 milliGRAM(s) Oral daily  valproic  acid Syrup 250 milliGRAM(s) Oral at bedtime    MEDICATIONS  (PRN):  acetaminophen     Tablet .. 650 milliGRAM(s) Oral every 6 hours PRN Temp greater or equal to 38C (100.4F), Mild Pain (1 - 3)  clonazePAM  Tablet 0.5 milliGRAM(s) Oral two times a day PRN Anixety/agitation  dextrose Oral Gel 15 Gram(s) Oral once PRN Blood Glucose LESS THAN 70 milliGRAM(s)/deciliter  oxyCODONE    IR 5 milliGRAM(s) Oral every 6 hours PRN for severe pain      PHYSICAL EXAM:  NAD  AAOx1-2, no new focal deficit   CTABL  S1S2 WNL  Abd soft, non tender, BS present   BLLE no edema or calf tenderness   Reyes draining clear urine       LABS:                        11.3   13. )-----------( 198      ( 13 May 2023 06:53 )             36.1     -    142  |  109<H>  |  91<H>  ----------------------------<  204<H>  5.3   |  25  |  5.16<H>    Ca    9.5      13 May 2023 06:53  Phos  5.4       Mg     3.2             Urinalysis Basic - ( 12 May 2023 21:35 )    Color: Yellow / Appearance: Slightly Turbid / S.015 / pH: x  Gluc: x / Ketone: Trace  / Bili: Small / Urobili: 4 mg/dL   Blood: x / Protein: 100 mg/dL / Nitrite: Negative   Leuk Esterase: Moderate / RBC: >50 /HPF / WBC 6-10 /HPF   Sq Epi: x / Non Sq Epi: x / Bacteria: Moderate /HPF      CAPILLARY BLOOD GLUCOSE      POCT Blood Glucose.: 185 mg/dL (13 May 2023 17:03)  POCT Blood Glucose.: 161 mg/dL (13 May 2023 11:39)  POCT Blood Glucose.: 186 mg/dL (13 May 2023 08:21)  POCT Blood Glucose.: 223 mg/dL (12 May 2023 22:53)      Labs noted. I personally interpreted lab results (CBC, metabolic panel, UA) and incorporated in my assessment/plan as below. Discussed with Dr. Hart

## 2023-05-13 NOTE — CONSULT NOTE ADULT - SUBJECTIVE AND OBJECTIVE BOX
[facility] NEPHROLOGY- CONSULTATION NOTE    73y Male with history of below presents with gross hematuria. Nephrology consulted for elevated Scr.    Patient known from recent admission at [facility] where he was evaluated for YASSINE on CKD-3a due to infection versus CRS. Scr had been improving and decreased to 2.1 on discharge. Patient subsequently removed his chance for which he presented to ER and chance was replaced. However, patient now returns today for gross hematuria and found to have YASSINE on CKD on admission.    REVIEW OF SYSTEMS: Unable to obtain due to mental status.    No Known Allergies      Home Medications Reviewed  Hospital Medications:   MEDICATIONS  (STANDING):  albuterol    90 MICROgram(s) HFA Inhaler 2 Puff(s) Inhalation every 6 hours  aspirin  chewable 81 milliGRAM(s) Oral daily  atorvastatin 40 milliGRAM(s) Oral at bedtime  buMETAnide 1 milliGRAM(s) Oral daily  dextrose 5%. 1000 milliLiter(s) (100 mL/Hr) IV Continuous <Continuous>  dextrose 5%. 1000 milliLiter(s) (50 mL/Hr) IV Continuous <Continuous>  dextrose 50% Injectable 25 Gram(s) IV Push once  dextrose 50% Injectable 12.5 Gram(s) IV Push once  dextrose 50% Injectable 25 Gram(s) IV Push once  finasteride 5 milliGRAM(s) Oral daily  fluticasone propionate 50 MICROgram(s)/spray Nasal Spray 1 Spray(s) Both Nostrils two times a day  glucagon  Injectable 1 milliGRAM(s) IntraMuscular once  guaiFENesin ER 1200 milliGRAM(s) Oral every 12 hours  hydrALAZINE 10 milliGRAM(s) Oral three times a day  insulin lispro (ADMELOG) corrective regimen sliding scale   SubCutaneous at bedtime  insulin lispro (ADMELOG) corrective regimen sliding scale   SubCutaneous three times a day before meals  isosorbide   mononitrate ER Tablet (IMDUR) 30 milliGRAM(s) Oral daily  lactulose Syrup 20 Gram(s) Oral at bedtime  melatonin 5 milliGRAM(s) Oral at bedtime  metoprolol tartrate 12.5 milliGRAM(s) Oral two times a day  montelukast 10 milliGRAM(s) Oral at bedtime  multivitamin 1 Tablet(s) Oral daily  OLANZapine 10 milliGRAM(s) Oral at bedtime  tamsulosin 0.4 milliGRAM(s) Oral at bedtime  tiotropium 2.5 MICROgram(s) Inhaler 2 Puff(s) Inhalation daily  valproic  acid Syrup 250 milliGRAM(s) Oral daily  valproic  acid Syrup 500 milliGRAM(s) Oral at bedtime      PAST MEDICAL & SURGICAL HISTORY:  HTN (hypertension)      Hyperlipidemia      Diabetes mellitus      Intellectual disability      Chronic HFrEF (heart failure with reduced ejection fraction)      Chronic obstructive pulmonary disease (COPD)      CAD (coronary artery disease)      BPH (benign prostatic hyperplasia)      Fetal alcohol syndrome      No significant past surgical history          FAMILY HISTORY:  No pertinent family history in first degree relatives        SOCIAL HISTORY:  Denies toxic substance use     VITALS:  T(F): 97.4 (23 @ 14:10), Max: 98.4 (23 @ 19:10)  HR: 81 (23 @ 14:10)  BP: 110/72 (23 @ 14:10)  RR: 20 (23 @ 14:10)  SpO2: 99% (23 @ 14:10)  Wt(kg): --        PHYSICAL EXAM:  Gen: NAD, calm  HEENT: MMM  Neck: no JVD  Cards: RRR, +S1/S2, no M/G/R  Resp: CTA B/L  GI: soft, NT, + ab distention, NABS  : no CVA tenderness, +_foley with dark yellow urine  Vascular: trace LE edema B/L  Derm: no rashes  Neuro: non-focal    LABS:      142  |  109<H>  |  91<H>  ----------------------------<  204<H>  5.3   |  25  |  5.16<H>    Ca    9.5      13 May 2023 06:53  Phos  5.4       Mg     3.2           Creatinine Trend: 5.16 <--, 4.87 <--, 4.90 <--, 2.14 <--, 2.08 <--, 2.23 <--                        11.3   13.06 )-----------( 198      ( 13 May 2023 06:53 )             36.1     Urine Studies:  Urinalysis Basic - ( 12 May 2023 21:35 )    Color: Yellow / Appearance: Slightly Turbid / S.015 / pH:   Gluc:  / Ketone: Trace  / Bili: Small / Urobili: 4 mg/dL   Blood:  / Protein: 100 mg/dL / Nitrite: Negative   Leuk Esterase: Moderate / RBC: >50 /HPF / WBC 6-10 /HPF   Sq Epi:  / Non Sq Epi:  / Bacteria: Moderate /HPF          RADIOLOGY & ADDITIONAL STUDIES:

## 2023-05-13 NOTE — PATIENT PROFILE ADULT - NSFALLSECTIONLABEL_GEN_A_CORE
Consent (Scalp)/Introductory Paragraph: The rationale for Mohs was explained to the patient and consent was obtained. The risks, benefits and alternatives to therapy were discussed in detail. Specifically, the risks of changes in hair growth pattern secondary to repair, infection, scarring, bleeding, prolonged wound healing, incomplete removal, allergy to anesthesia, nerve injury and recurrence were addressed. Prior to the procedure, the treatment site was clearly identified and confirmed by the patient. All components of Universal Protocol/PAUSE Rule completed. .

## 2023-05-13 NOTE — CONSULT NOTE ADULT - PROBLEM SELECTOR RECOMMENDATION 6
Patient with increased functional limitations due to worsening CHF symptoms, in context intellectual disability, presumed dementia, and multiple medical comorbidities, aggravated by recent prolonged hospitalization for CHF. Now has Reyes catheter placed for urinary obstruction, along with increasing dependence on oxygen via NC, both of which are likely to further accelerate patient's debility.      Recommend OOB to chair  Aggressive PT to prevent further deconditioning  Continued pureed diet.

## 2023-05-13 NOTE — CONSULT NOTE ADULT - CONSULT REASON
Elevated Scr
Complex medical decision making in patient with intellectual disability, advanced systolic HF, worsening CKD, and recurrent hospital admissions with ongoing functional decline.

## 2023-05-13 NOTE — CONSULT NOTE ADULT - PROBLEM SELECTOR RECOMMENDATION 4
Most likely due to  post-obstructive renal failure (in setting of BPH), with possible component of ATN.  ?? remote concern for cardiorenal syndrome during previous admissions.  Initially improved after Reyes catheter placement, but now readmitted with worsening Cr up to 4.90, likely obstructive in setting of patient pulling out Reyes traumatically leading to hematuria and clots.     Concern for worsening baseline CKD in context of of advanced systolic heart failure and NICM, with concern for increased debility, morbidity, and recurrent hospital admissions.  Patient now hospice appropriate.    Further medical management as per Nephrology and primary team

## 2023-05-14 NOTE — PROGRESS NOTE ADULT - SUBJECTIVE AND OBJECTIVE BOX
[facility] NEPHROLOGY- PROGRESS NOTE    73y Male with history of intellectual disability, BPH, chance for urinary retention, CHF presents with gross hematuria. Nephrology consulted for elevated Scr.    REVIEW OF SYSTEMS: Unable to obtain due to mental status.    No Known Allergies      Hospital Medications: Medications reviewed    VITALS:  T(F): 97.7 (23 @ 13:55), Max: 97.9 (23 @ 20:36)  HR: 82 (23 @ 13:55)  BP: 107/68 (23 @ 13:55)  RR: 20 (23 13:55)  SpO2: 99% (23 13:55)  Wt(kg): --  Height (cm): 162.6 ( 11:52), 162.6 ( 14:38), 162.6 ( 12:00)  Weight (kg): 67.6 (:38), 70.8 (:11)  BMI (kg/m2): 25.6 ( 11:52), 25.6 ( 14:38), 26.8 (:11)  BSA (m2): 1.73 ( 11:52), 1.73 ( 14:38), 1.76 ( 05:11)    13 @ 07:01  -   @ 07:00  --------------------------------------------------------  IN: 0 mL / OUT: 775 mL / NET: -775 mL     @ 07:01  -   @ 17:26  --------------------------------------------------------  IN: 0 mL / OUT: 500 mL / NET: -500 mL        PHYSICAL EXAM:    Gen: NAD, calm  Cards: RRR, +S1/S2, no M/G/R  Resp: Decreased BS @ bases B/L  GI: soft, NT, + ab distention, NABS  : + chance with yellow urine  Vascular: trace LE edema B/L    LABS:      146<H>  |  111<H>  |  96<H>  ----------------------------<  136<H>  4.7   |  29  |  5.01<H>    Ca    9.6      14 May 2023 06:47  Phos  4.2       Mg     3.3           Creatinine Trend: 5.01 <--, 5.16 <--, 4.87 <--, 4.90 <--, 2.14 <--, 2.08 <--                        10.6   9.96  )-----------( 179      ( 14 May 2023 06:47 )             34.2     Urine Studies:  Urinalysis Basic - ( 12 May 2023 21:35 )    Color: Yellow / Appearance: Slightly Turbid / S.015 / pH:   Gluc:  / Ketone: Trace  / Bili: Small / Urobili: 4 mg/dL   Blood:  / Protein: 100 mg/dL / Nitrite: Negative   Leuk Esterase: Moderate / RBC: >50 /HPF / WBC 6-10 /HPF   Sq Epi:  / Non Sq Epi:  / Bacteria: Moderate /HPF      Creatinine, Random Urine: 94 mg/dL ( @ 17:50)      RADIOLOGY & ADDITIONAL STUDIES:            < from: Xray Chest 1 View- PORTABLE-Urgent (Xray Chest 1 View- PORTABLE-Urgent .) (23 @ 12:51) >  IMPRESSION:    Mild pulmonary vascular congestion.    --- End of Report ---    < end of copied text >

## 2023-05-14 NOTE — PROGRESS NOTE ADULT - SUBJECTIVE AND OBJECTIVE BOX
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 w/ PMHx COPD with chronic cough, hx of non ischemic cardiomyopathy with non-obstructive CAD on CCTA (2021), HFrEF (EF 10-15% on echo in 3/23), HTN, DM, CKD elevated PSA, BPH, schizophrenia, hepatitis B, carrier, bilateral sensorineural hearing loss. Patient is admitted for YASSINE and hematuria likely 2/2 traumatic removal of Reyes.     INTERVAL HPI/OVERNIGHT EVENTS: No acute events overnight. Pt. states that he feels short of breath, which has been the case for him these past few months, and he is getting weaker and weaker each admission. Valproate dosing at night was decreased due to increased drowsiness last night 5/13.     MEDICATIONS  (STANDING):  albumin human 25% IVPB 50 milliLiter(s) IV Intermittent every 6 hours  albuterol    90 MICROgram(s) HFA Inhaler 2 Puff(s) Inhalation every 6 hours  aspirin  chewable 81 milliGRAM(s) Oral daily  atorvastatin 40 milliGRAM(s) Oral at bedtime  buMETAnide 1 milliGRAM(s) Oral daily  dextrose 5%. 1000 milliLiter(s) (50 mL/Hr) IV Continuous <Continuous>  dextrose 5%. 1000 milliLiter(s) (100 mL/Hr) IV Continuous <Continuous>  dextrose 50% Injectable 25 Gram(s) IV Push once  dextrose 50% Injectable 12.5 Gram(s) IV Push once  dextrose 50% Injectable 25 Gram(s) IV Push once  finasteride 5 milliGRAM(s) Oral daily  fluticasone propionate 50 MICROgram(s)/spray Nasal Spray 1 Spray(s) Both Nostrils two times a day  glucagon  Injectable 1 milliGRAM(s) IntraMuscular once  guaiFENesin ER 1200 milliGRAM(s) Oral every 12 hours  hydrALAZINE 10 milliGRAM(s) Oral three times a day  insulin lispro (ADMELOG) corrective regimen sliding scale   SubCutaneous three times a day before meals  insulin lispro (ADMELOG) corrective regimen sliding scale   SubCutaneous at bedtime  isosorbide   mononitrate ER Tablet (IMDUR) 30 milliGRAM(s) Oral daily  lactulose Syrup 20 Gram(s) Oral at bedtime  melatonin 5 milliGRAM(s) Oral at bedtime  metoprolol tartrate 12.5 milliGRAM(s) Oral two times a day  montelukast 10 milliGRAM(s) Oral at bedtime  multivitamin 1 Tablet(s) Oral daily  OLANZapine 10 milliGRAM(s) Oral at bedtime  tamsulosin 0.4 milliGRAM(s) Oral at bedtime  tiotropium 2.5 MICROgram(s) Inhaler 2 Puff(s) Inhalation daily  valproic  acid Syrup 250 milliGRAM(s) Oral at bedtime  valproic  acid Syrup 250 milliGRAM(s) Oral daily    MEDICATIONS  (PRN):  acetaminophen     Tablet .. 650 milliGRAM(s) Oral every 6 hours PRN Temp greater or equal to 38C (100.4F), Mild Pain (1 - 3)  clonazePAM  Tablet 0.5 milliGRAM(s) Oral two times a day PRN Anixety/agitation  dextrose Oral Gel 15 Gram(s) Oral once PRN Blood Glucose LESS THAN 70 milliGRAM(s)/deciliter      REVIEW OF SYSTEMS:  CONSTITUTIONAL: No fever, weight loss, or fatigue  RESPIRATORY: (+) cough, sob, No wheezing, chills or hemoptysis  CARDIOVASCULAR: No chest pain, palpitations, dizziness, or leg swelling  GASTROINTESTINAL: No abdominal pain. No nausea, vomiting, or hematemesis; No diarrhea or constipation. No melena or hematochezia.  GENITOURINARY: No dysuria or hematuria, urinary frequency  NEUROLOGICAL: No headaches, memory loss, loss of strength, numbness, or tremors  SKIN: No itching, burning, rashes, or lesions     Vital Signs Last 24 Hrs  T(C): 36.5 (14 May 2023 13:55), Max: 36.6 (13 May 2023 20:36)  T(F): 97.7 (14 May 2023 13:55), Max: 97.9 (13 May 2023 20:36)  HR: 82 (14 May 2023 13:55) (77 - 82)  BP: 107/68 (14 May 2023 13:55) (106/62 - 127/78)  BP(mean): --  RR: 20 (14 May 2023 13:55) (17 - 20)  SpO2: 99% (14 May 2023 13:55) (99% - 100%)    Parameters below as of 14 May 2023 13:55  Patient On (Oxygen Delivery Method): nasal cannula  O2 Flow (L/min): 3      PHYSICAL EXAMINATION:  GENERAL: NAD, NC in place,  HEAD:  Atraumatic, Normocephalic  EYES:  conjunctiva and sclera clear  NECK: Supple, No JVD, Normal thyroid  CHEST/LUNG: (+) mild inspiratory crackles auscultated at the bases. Clear to percussion bilaterally; No rales, rhonchi, wheezing, or rubs  HEART: Regular rate and rhythm; No murmurs, rubs, or gallops  ABDOMEN: Soft, Nontender, Nondistended; Bowel sounds present  NERVOUS SYSTEM:  Alert & Oriented X1-2 (baseline), no focal neuro deficits   EXTREMITIES:  2+ Peripheral Pulses, No clubbing, cyanosis, or edema  SKIN: warm dry                          10.6   9.96  )-----------( 179      ( 14 May 2023 06:47 )             34.2     05-14    146<H>  |  111<H>  |  96<H>  ----------------------------<  136<H>  4.7   |  29  |  5.01<H>    Ca    9.6      14 May 2023 06:47  Phos  4.2     05-14  Mg     3.3     05-14                CAPILLARY BLOOD GLUCOSE      RADIOLOGY & ADDITIONAL TESTS:

## 2023-05-14 NOTE — PROGRESS NOTE ADULT - PROBLEM SELECTOR PLAN 1
- Cr of 4.90 on admission. > Cr 5 today  - On discharge 05/09 Cr of 2.14.   - Likely obstructive in setting of pulling out Reyes traumatically leading to hematuria and clots. Hematuria resolved.   - C/w Reyes placement with PRN irrigation.   - F/U BMP.   - Avoid nephrotoxic agents.   - Avoid IV fluids as much as possible due to severely reduced EF.   - Nephro consulted. - albumin trial 2 days

## 2023-05-14 NOTE — PROGRESS NOTE ADULT - ASSESSMENT
73y Male with history of intellectual disability, BPH, chance for urinary retention, CHF presents with gross hematuria. Nephrology consulted for elevated Scr.    1) YASSINE: Abrupt increase in Scr over relatively short period of time suspect due to retention/ATN. Scr without improvement despite replacement of chance but non-oliguric with UO improving. Continue with trial of IV albumin. Please check renal US to ensure chance in correct position. UA active likely due to trauma (would not expect GN to cause such an acute rise in Scr in a matter of days). FeUrea low. Avoid nephrotoxins.    2) CKD-3a: Baseline Scr 1.3-1.6 with resolving YASSINE on prior admission and Scr decreased to 2.1 on discharge. Monitor electrolytes.    3) HTN with CKD: BP acceptable. Continue with current medications. Monitor BP.    4) LE edema: With mild congestion/LE edema. Continue with bumex 1 mg PO daily but will likely need to increase to 2 mg PO daily if UO suboptimal given advanced renal failure. TTE with severe global LVSD. Strict I/O's. Monitor UO.    5) Hyperphosphatemia: Resolved. Continue with low phosphorus diet. Monitor serum calcium and phosphorus.      [facility] NEPHROLOGY  Raul Goncalves M.D.  Bennie Carcamo D.O.  Priya Pederson M.D.  Hellen Dietz, MSN, ANP-C    Telephone: [phone]  Facsimile: [phone]    [address] [place] [address]

## 2023-05-14 NOTE — PROGRESS NOTE ADULT - ASSESSMENT
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 w/ PMx COPD with chronic cough, hx of non ischemic cardiomyopathy with non-obstructive CAD on CCTA (2021), HFrEF (EF 10-15% on echo in 3/23), HTN, DM, CKD elevated PSA, BPH, schizophrenia, hepatitis B, carrier, bilateral sensorineural hearing loss. Patient is admitted for YASSINE and hematuria likely 2/2 traumatic removal of Reyes.

## 2023-05-14 NOTE — PROGRESS NOTE ADULT - PROBLEM SELECTOR PLAN 5
- Not in exacerbation.   - Echo 3/23: EF 10-15%.   - C/w bumex, BB, statin.  - f/u palliative care discussion w/ Dr. Hart

## 2023-05-15 NOTE — PROGRESS NOTE ADULT - SUBJECTIVE AND OBJECTIVE BOX
PGY-1 Progress Note discussed with attending    PLEASE CONTACT ON CALL TEAM:  - On Call Team (Please refer to Red) FROM 5:00 PM - 8:30PM  - Nightfloat Team FROM 8:30 -7:30 AM    INTERVAL HPI/OVERNIGHT EVENTS: No acute events overnight. Went for renal US today. On RA, states that his breathing and his cough improved today compared to yesterday. Denies any other complaints.     MEDICATIONS  (STANDING):  albuterol    90 MICROgram(s) HFA Inhaler 2 Puff(s) Inhalation every 6 hours  aspirin  chewable 81 milliGRAM(s) Oral daily  atorvastatin 40 milliGRAM(s) Oral at bedtime  buMETAnide 1 milliGRAM(s) Oral daily  dextrose 5%. 1000 milliLiter(s) (100 mL/Hr) IV Continuous <Continuous>  dextrose 5%. 1000 milliLiter(s) (50 mL/Hr) IV Continuous <Continuous>  dextrose 50% Injectable 25 Gram(s) IV Push once  dextrose 50% Injectable 12.5 Gram(s) IV Push once  dextrose 50% Injectable 25 Gram(s) IV Push once  finasteride 5 milliGRAM(s) Oral daily  fluticasone propionate 50 MICROgram(s)/spray Nasal Spray 1 Spray(s) Both Nostrils two times a day  glucagon  Injectable 1 milliGRAM(s) IntraMuscular once  guaiFENesin ER 1200 milliGRAM(s) Oral every 12 hours  heparin   Injectable 5000 Unit(s) SubCutaneous every 12 hours  hydrALAZINE 10 milliGRAM(s) Oral three times a day  insulin lispro (ADMELOG) corrective regimen sliding scale   SubCutaneous three times a day before meals  insulin lispro (ADMELOG) corrective regimen sliding scale   SubCutaneous at bedtime  insulin lispro Injectable (ADMELOG) 2 Unit(s) SubCutaneous three times a day before meals  isosorbide   mononitrate ER Tablet (IMDUR) 30 milliGRAM(s) Oral daily  lactulose Syrup 20 Gram(s) Oral at bedtime  melatonin 5 milliGRAM(s) Oral at bedtime  metoprolol tartrate 12.5 milliGRAM(s) Oral two times a day  montelukast 10 milliGRAM(s) Oral at bedtime  multivitamin 1 Tablet(s) Oral daily  OLANZapine 10 milliGRAM(s) Oral at bedtime  tamsulosin 0.4 milliGRAM(s) Oral at bedtime  tiotropium 2.5 MICROgram(s) Inhaler 2 Puff(s) Inhalation daily  valproic  acid Syrup 250 milliGRAM(s) Oral daily  valproic  acid Syrup 250 milliGRAM(s) Oral at bedtime    MEDICATIONS  (PRN):  acetaminophen     Tablet .. 650 milliGRAM(s) Oral every 6 hours PRN Temp greater or equal to 38C (100.4F), Mild Pain (1 - 3)  dextrose Oral Gel 15 Gram(s) Oral once PRN Blood Glucose LESS THAN 70 milliGRAM(s)/deciliter      REVIEW OF SYSTEMS:  CONSTITUTIONAL: No fever, weight loss, or fatigue  RESPIRATORY: No cough, wheezing, chills or hemoptysis; No shortness of breath  CARDIOVASCULAR: No chest pain, palpitations, dizziness, or leg swelling  GASTROINTESTINAL: No abdominal pain. No nausea, vomiting, or hematemesis; No diarrhea or constipation. No melena or hematochezia.  GENITOURINARY: No dysuria or hematuria, urinary frequency  NEUROLOGICAL: No headaches, memory loss, loss of strength, numbness, or tremors  SKIN: No itching, burning, rashes, or lesions     Vital Signs Last 24 Hrs  T(C): 36.9 (15 May 2023 11:57), Max: 36.9 (15 May 2023 11:57)  T(F): 98.4 (15 May 2023 11:57), Max: 98.4 (15 May 2023 11:57)  HR: 87 (15 May 2023 11:57) (80 - 87)  BP: 124/88 (15 May 2023 11:57) (107/68 - 136/78)  BP(mean): --  RR: 18 (15 May 2023 11:57) (18 - 20)  SpO2: 95% (15 May 2023 11:57) (95% - 100%)    Parameters below as of 15 May 2023 11:57  Patient On (Oxygen Delivery Method): nasal cannula  O2 Flow (L/min): 3      PHYSICAL EXAMINATION:  GENERAL: NAD, well built  HEAD:  Atraumatic, Normocephalic  EYES:  conjunctiva and sclera clear  NECK: Supple, No JVD, Normal thyroid  CHEST/LUNG: Clear to auscultation. Clear to percussion bilaterally; No rales, rhonchi, wheezing, or rubs  HEART: Regular rate and rhythm; No murmurs, rubs, or gallops  ABDOMEN: Soft, Nontender, Nondistended; Bowel sounds present  NERVOUS SYSTEM:  Alert & Oriented X3,    EXTREMITIES:  2+ Peripheral Pulses, No clubbing, cyanosis, or edema  SKIN: warm dry                          10.6   7.98  )-----------( 172      ( 15 May 2023 06:01 )             35.5     05-15    144  |  111<H>  |  84<H>  ----------------------------<  170<H>  4.6   |  28  |  4.27<H>    Ca    10.0      15 May 2023 06:01  Phos  3.6     05-15  Mg     3.4     05-15                CAPILLARY BLOOD GLUCOSE      RADIOLOGY & ADDITIONAL TESTS:

## 2023-05-15 NOTE — PROGRESS NOTE ADULT - ASSESSMENT
73 year old male with Intellectual disability 2/2 fetal alcohol syndrome, [facility], now at [facility], w/ PMHx of COPD with chronic cough, hx of non ischemic cardiomyopathy with non-obstructive CAD on CCTA (2021), HFrEF (EF 10-15% per recent Echo), HTN, DM, CKD elevated PSA, BPH, schizophrenia, ? dementia (on home memantine), hepatitis B carrier, and bilateral sensorineural hearing loss.  Not a candidate for ICD placement due to lack of decision making capacity (AHA class III contraindication).  He is well known to our palliative care service.  Previously admitted 3/28 to 3/30 for demand ischemia and CHF exacerbation, then with prolonged hospitalization from 4/8 to 4/28 for chest pain (likely musculoskeletal) and decompensated CHF, complicated by worsening YASSINE on CKD, originally thought to be due to ATN/overdiuresis/? intermittent urinary retention (vs CRS); patient discharged to [facility] on 4/28.  Then [facility] 5/9 for altered mental status and respiratory distress, found unresponsive by NH staff; found to have worsening YASSINE on CKD with BNP of 63,000.  Admitted at that time for post obstructive renal failure, UTI, and possible HF exacerbation, improved after insertion of Reyes cath, discharged back to [facility] on 5/9.      Patient pulled out Reyes 5/11, replaced in ER and sent back to [facility]  Returned 5/12 with hematuria in Reyes bag and jarred-urethral clots, Cr back up to 4.90, admitted for hematuria and YASSINE on CKD

## 2023-05-15 NOTE — PROGRESS NOTE ADULT - SUBJECTIVE AND OBJECTIVE BOX
[facility] NEPHROLOGY- PROGRESS NOTE    73y Male with history of intellectual disability, BPH, chance for urinary retention, CHF presents with gross hematuria. Nephrology consulted for elevated Scr.    REVIEW OF SYSTEMS: Limited due to mental status. Denies any SOB or chest pain    No Known Allergies      Hospital Medications: Medications reviewed    VITALS:  T(F): 98.4 (05-15-23 @ 11:57), Max: 98.4 (05-15-23 @ 11:57)  HR: 87 (05-15-23 @ 11:57)  BP: 124/88 (05-15-23 @ 11:57)  RR: 18 (05-15-23 @ 11:57)  SpO2: 95% (05-15-23 @ 11:57)  Wt(kg): --     @ 07:01  -  05-15 @ 07:00  --------------------------------------------------------  IN: 0 mL / OUT: 1400 mL / NET: -1400 mL    05-15 @ 07:01  -  05-15 @ 15:14  --------------------------------------------------------  IN: 0 mL / OUT: 600 mL / NET: -600 mL      PHYSICAL EXAM:    Gen: NAD, calm  Cards: RRR, +S1/S2, no M/G/R  Resp: Decreased BS @ bases B/L  GI: soft, NT, + ab distention, NABS  : + chance with yellow urine  Vascular: trace LE edema B/L    LABS:  05-15    144  |  111<H>  |  84<H>  ----------------------------<  170<H>  4.6   |  28  |  4.27<H>    Ca    10.0      15 May 2023 06:01  Phos  3.6     05-15  Mg     3.4     05-15      Creatinine Trend: 4.27 <--, 5.01 <--, 5.16 <--, 4.87 <--, 4.90 <--, 2.14 <--                        10.6   7.98  )-----------( 172      ( 15 May 2023 06:01 )             35.5     Urine Studies:  Urinalysis Basic - ( 12 May 2023 21:35 )    Color: Yellow / Appearance: Slightly Turbid / S.015 / pH:   Gluc:  / Ketone: Trace  / Bili: Small / Urobili: 4 mg/dL   Blood:  / Protein: 100 mg/dL / Nitrite: Negative   Leuk Esterase: Moderate / RBC: >50 /HPF / WBC 6-10 /HPF   Sq Epi:  / Non Sq Epi:  / Bacteria: Moderate /HPF      Creatinine, Random Urine: 94 mg/dL ( @ 17:50)     [facility] NEPHROLOGY- PROGRESS NOTE    73y Male with history of intellectual disability, BPH, chance for urinary retention, CHF presents with gross hematuria. Nephrology consulted for elevated Scr.    REVIEW OF SYSTEMS: Limited due to mental status. Denies any SOB or chest pain    No Known Allergies      Hospital Medications: Medications reviewed    VITALS:  T(F): 98.4 (05-15-23 @ 11:57), Max: 98.4 (05-15-23 @ 11:57)  HR: 87 (05-15-23 @ 11:57)  BP: 124/88 (05-15-23 @ 11:57)  RR: 18 (05-15-23 @ 11:57)  SpO2: 95% (05-15-23 @ 11:57)  Wt(kg): --     @ 07:01  -  05-15 @ 07:00  --------------------------------------------------------  IN: 0 mL / OUT: 1400 mL / NET: -1400 mL    05-15 @ 07:01  -  05-15 @ 15:14  --------------------------------------------------------  IN: 0 mL / OUT: 600 mL / NET: -600 mL      PHYSICAL EXAM:    Gen: NAD, calm  Cards: RRR, +S1/S2, no M/G/R  Resp: Decreased BS @ bases B/L  GI: soft, NT, + ab distention, NABS  : + chance with yellow urine  Vascular: trace LE edema B/L    LABS:  05-15    144  |  111<H>  |  84<H>  ----------------------------<  170<H>  4.6   |  28  |  4.27<H>    Ca    10.0      15 May 2023 06:01  Phos  3.6     05-15  Mg     3.4     05-15      Creatinine Trend: 4.27 <--, 5.01 <--, 5.16 <--, 4.87 <--, 4.90 <--, 2.14 <--                        10.6   7.98  )-----------( 172      ( 15 May 2023 06:01 )             35.5     Urine Studies:  Urinalysis Basic - ( 12 May 2023 21:35 )    Color: Yellow / Appearance: Slightly Turbid / S.015 / pH:   Gluc:  / Ketone: Trace  / Bili: Small / Urobili: 4 mg/dL   Blood:  / Protein: 100 mg/dL / Nitrite: Negative   Leuk Esterase: Moderate / RBC: >50 /HPF / WBC 6-10 /HPF   Sq Epi:  / Non Sq Epi:  / Bacteria: Moderate /HPF      Creatinine, Random Urine: 94 mg/dL ( @ 17:50)    < from: US Renal (05.15.23 @ 09:17) >    ACC: [id number] EXAM:  US KIDNEY(S)   ORDERED BY: TEMITOPE BOWENS     PROCEDURE DATE:  05/15/2023          INTERPRETATION:  CLINICAL INFORMATION: Acute kidney injury    COMPARISON: 2023    TECHNIQUE: Sonography of the kidneys and bladder.    FINDINGS:  Right kidney: 11.8 cm. No renal mass, hydronephrosis or calculi. Cysts   measuring 3.5 x 3.2 x 2.7 cm and 1.2 x 0.9 x 1.2 cm.    Left kidney: 9.4 cm. No renal mass, hydronephrosis or calculi. 1.3 x 1.4   x 1.2 cm cyst.    Urinary bladder: Partially decompressed around Chance catheter.    IMPRESSION:  No hydronephrosis.        --- End of Report ---    < end of copied text >

## 2023-05-15 NOTE — PROGRESS NOTE ADULT - PROBLEM SELECTOR PLAN 1
- Cr of 4.90 on admission. > 5 > 4.7 today downtrending   - On discharge 05/09 Cr of 2.14.   - Likely obstructive in setting of pulling out Reyes traumatically leading to hematuria and clots. Hematuria resolved.   - C/w Reyes placement with PRN irrigation.   - F/U BMP.   - Avoid nephrotoxic agents.   - Avoid IV fluids as much as possible due to severely reduced EF.   - Nephro consulted. - albumin trial 2 days, urine output improving today

## 2023-05-15 NOTE — PROGRESS NOTE ADULT - ASSESSMENT
73y Male with history of intellectual disability, BPH, chance for urinary retention, CHF presents with gross hematuria. Nephrology consulted for elevated Scr.    1) YASSINE: Abrupt increase in Scr over relatively short period of time suspect due to retention/ATN. Scr without improvement despite replacement of chance but non-oliguric with UO improving. Renal function now improving s/p IV albumin. Please check renal US to ensure chance in correct position. UA active likely due to trauma (would not expect GN to cause such an acute rise in Scr in a matter of days). FeUrea low. Avoid nephrotoxins.    2) CKD-3a: Baseline Scr 1.3-1.6 with resolving YASSINE on prior admission and Scr decreased to 2.1 on discharge. Monitor electrolytes.    3) HTN with CKD: BP acceptable. Continue with current medications. Monitor BP.    4) LE edema: With mild congestion/LE edema. Continue with bumex 1 mg PO daily. TTE with severe global LVSD. Strict I/O's. Monitor UO.    5) Hyperphosphatemia: Resolved. Continue with low phosphorus diet. Monitor serum calcium and phosphorus.      [facility] NEPHROLOGY  Raul Goncalves M.D.  Bennie Carcamo D.O.  Priya Pederson M.D.  Hellen Dietz, MSN, ANP-C    Telephone: [phone]  Facsimile: [phone]    [address] [place] [address]   73y Male with history of intellectual disability, BPH, chance for urinary retention, CHF presents with gross hematuria. Nephrology consulted for elevated Scr.    1) YASSINE: Abrupt increase in Scr over relatively short period of time suspect due to retention/ATN. Scr without improvement despite replacement of chance but non-oliguric with UO improving. Renal function now improving s/p IV albumin. Renal US with no hydro. UA active likely due to trauma (would not expect GN to cause such an acute rise in Scr in a matter of days). FeUrea low. Avoid nephrotoxins.    2) CKD-3a: Baseline Scr 1.3-1.6 with resolving YASSINE on prior admission and Scr decreased to 2.1 on discharge. Monitor electrolytes.    3) HTN with CKD: BP acceptable. Continue with current medications. Monitor BP.    4) LE edema: With mild congestion/LE edema. Continue with bumex 1 mg PO daily. TTE with severe global LVSD. Strict I/O's. Monitor UO.    5) Hyperphosphatemia: Resolved. Continue with low phosphorus diet. Monitor serum calcium and phosphorus.      [facility] NEPHROLOGY  Raul Goncalves M.D.  DC PérezO.  Priya Pederson M.D.  Hellen Dietz, MSN, ANP-C    Telephone: [phone]  Facsimile: [phone]    [address] [place] [address]

## 2023-05-15 NOTE — PROGRESS NOTE ADULT - PROBLEM SELECTOR PLAN 7
As above.  Patient now with advanced systolic HF, in setting of severe NICM, with EF of 10-15%, and increasing BEARDEN/SOB with performance of ADLs resulting in functional limitation.  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  He is not a candidate for ICD.  Now with recurrent YASSINE on CKD III due to urinary retention/post obstructive renal failure, returns with worsening renal functional and hematuria/clots due to traumatic pulling of Reyes.  Likely to require long-term indwelling Reyes catheter, further adding to his medical debility.  Has emerging protein-calorie malnutrition with albumin of 2.2.    Patient has Stage C NYHA class III heart failure and worsening YASSINE on CKD.  Given his multiple medical comorbidities, he is at risk for continued progression of disease, further functional decline, recurrent hospitalizations, increasing morbidity, and sudden death.  He is medically appropriate for hospice with a prognosis generally in the range of 6 to 12 months.    Patient is under auspices of [employer] ([employer]s) as his 1750-b guardian.  Per [facility] discussion during previous hospital admission, CAB, [facility] medical director (Dr. Hearn), and members of patient's care team all were in agreement to begin process of changing patient's code status to DNR/DNI and for consideration of hospice.  Meeting of CAB executive board to finalize code status is still pending at this time.    Please do not discharge patient until final status of DNR/DNI request has been clarified with CAB.    Patient remains Full Code at this time  Further optimization of GDMT and remainder of medical management as per Nephrology and primary team  Spoke to Dr. Hearn--she is now requesting hospice at facility other than current [facility])  Palliative care team will continue to follow. As above.  Patient now with advanced systolic HF, in setting of severe NICM, with EF of 10-15%, and increasing BEARDEN/SOB with performance of ADLs resulting in functional limitation.  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  He is not a candidate for ICD.  Now with recurrent YASSINE on CKD III due to urinary retention/post obstructive renal failure, returns with worsening renal functional and hematuria/clots due to traumatic pulling of Reyes.  Likely to require long-term indwelling Reyes catheter, further adding to his medical debility.  Has emerging protein-calorie malnutrition with albumin of 2.2.    Patient has Stage C NYHA class III heart failure and worsening YASSINE on CKD.  Given his multiple medical comorbidities, he is at risk for continued progression of disease, further functional decline, recurrent hospitalizations, increasing morbidity, and sudden death.  He is medically appropriate for hospice with a prognosis generally in the range of 6 to 12 months.    Patient is under auspices of [employer] ([employer]) as his 1750-b guardian.  Per [facility] discussion during previous hospital admission, CAB, [facility] medical director (Dr. Hearn), and members of patient's care team all were in agreement to begin process of changing patient's code status to DNR/DNI and for consideration of hospice.  Meeting of CAB executive board to finalize code status is still pending at this time.    Please do not discharge patient until final status of DNR/DNI request has been clarified with CAB.    Patient remains Full Code at this time  Further optimization of GDMT and remainder of medical management as per Nephrology and primary team  Spoke to Dr. Hearn--she is now requesting hospice at facility other than current [facility]   Palliative care team will continue to follow.

## 2023-05-15 NOTE — PROGRESS NOTE ADULT - SUBJECTIVE AND OBJECTIVE BOX
follow up on:  complex medical decision making related to goals of care    [facility] Pall[facility] Consult [facility]:  Aliza Hsu DO: cell ([phone])  Reji Hart MD: cell ([phone])  Joel Weiner NP: cell ([phone])   Michelle Iqbal DNP: cell ([phone])  Kahlil Lucas LMSW: cell ([phone])   Bharti Nogueira NP: via [employer]    Can contact any Palliative Team member via [employer] for consults and questions      OVERNIGHT EVENTS:  None  Patient examined at bedside.  More awake today, appears more comfortable, less anxious.  Presently only alert and oriented x 1-2.  Denies chest pain, SOB, palpitations, nausea, or vomiting.  Still c/o nasal congestion and rhinitis symptoms (chronic, w/o change).  Otherwise has no acute complaints.      Present Symptoms: Mild, Moderate, Severe  Pain:             Location -  Denies                          Aggravating factors -             Quality -             Radiation -             Timing-             Severity (0-10 scale):  0/10             Minimal acceptable level (0-10 scale):  Fatigue:  Denies  Nausea:  Denies  Lack of Appetite:  Denies  SOB:  Denies  Depression:  Anxiety:  Review of Systems:  All other systems reviewed and are negative.      MEDICATIONS  (STANDING):  albuterol    90 MICROgram(s) HFA Inhaler 2 Puff(s) Inhalation every 6 hours  aspirin  chewable 81 milliGRAM(s) Oral daily  atorvastatin 40 milliGRAM(s) Oral at bedtime  buMETAnide 1 milliGRAM(s) Oral daily  dextrose 5%. 1000 milliLiter(s) (100 mL/Hr) IV Continuous <Continuous>  dextrose 5%. 1000 milliLiter(s) (50 mL/Hr) IV Continuous <Continuous>  dextrose 50% Injectable 25 Gram(s) IV Push once  dextrose 50% Injectable 12.5 Gram(s) IV Push once  dextrose 50% Injectable 25 Gram(s) IV Push once  finasteride 5 milliGRAM(s) Oral daily  fluticasone propionate 50 MICROgram(s)/spray Nasal Spray 1 Spray(s) Both Nostrils two times a day  glucagon  Injectable 1 milliGRAM(s) IntraMuscular once  guaiFENesin ER 1200 milliGRAM(s) Oral every 12 hours  heparin   Injectable 5000 Unit(s) SubCutaneous every 12 hours  hydrALAZINE 10 milliGRAM(s) Oral three times a day  insulin lispro (ADMELOG) corrective regimen sliding scale   SubCutaneous at bedtime  insulin lispro (ADMELOG) corrective regimen sliding scale   SubCutaneous three times a day before meals  insulin lispro Injectable (ADMELOG) 2 Unit(s) SubCutaneous three times a day before meals  isosorbide   mononitrate ER Tablet (IMDUR) 30 milliGRAM(s) Oral daily  lactulose Syrup 20 Gram(s) Oral at bedtime  melatonin 5 milliGRAM(s) Oral at bedtime  metoprolol tartrate 12.5 milliGRAM(s) Oral two times a day  montelukast 10 milliGRAM(s) Oral at bedtime  multivitamin 1 Tablet(s) Oral daily  OLANZapine 10 milliGRAM(s) Oral at bedtime  tamsulosin 0.4 milliGRAM(s) Oral at bedtime  tiotropium 2.5 MICROgram(s) Inhaler 2 Puff(s) Inhalation daily  valproic  acid Syrup 250 milliGRAM(s) Oral at bedtime  valproic  acid Syrup 250 milliGRAM(s) Oral daily    MEDICATIONS  (PRN):  acetaminophen     Tablet .. 650 milliGRAM(s) Oral every 6 hours PRN Temp greater or equal to 38C (100.4F), Mild Pain (1 - 3)  dextrose Oral Gel 15 Gram(s) Oral once PRN Blood Glucose LESS THAN 70 milliGRAM(s)/deciliter      PHYSICAL EXAM:  Vital Signs Last 24 Hrs  T(C): 36.4 (15 May 2023 21:50), Max: 36.9 (15 May 2023 11:57)  T(F): 97.5 (15 May 2023 21:50), Max: 98.4 (15 May 2023 11:57)  HR: 90 (15 May 2023 21:50) (83 - 90)  BP: 137/91 (15 May 2023 21:50) (124/88 - 137/91)  BP(mean): --  RR: 18 (15 May 2023 21:50) (18 - 18)  SpO2: 100% (15 May 2023 21:50) (95% - 100%)    Parameters below as of 15 May 2023 21:50  Patient On (Oxygen Delivery Method): room air        General: alert  oriented x __1-2__    mild temporal wasting, NAD.  More confused compared to prior baseline    Palliative Performance Scale/Karnofsky Score: 30%  http://npcrc.org/files/news/palliative_performance_scale_ppsv2.pdf    HEENT: EOMI, anicteric, pharynx clear, MM moist  Lungs:  clear to auscultation, respirations unlabored  CV: RRR,  normal S1 and S2, no murmur  GI: soft non distended non tender  + normal bowel sounds  : incontinent  + Reyes in place with mostly yellow urine, rare flecks of hematuria noted  Musculoskeletal: weakness x4 in all extremities, now mostly bedbound, 1+ LE edema bilaterally  Skin: no abnormal skin lesions or DU noted  Neuro: no focal deficits, + moderate cognitive impairment  Oral intake ability:  moderate capability, on pureed diet with thickened liquids      LABS:                          10.6   7.98  )-----------( 172      ( 15 May 2023 06:01 )             35.5     05-15    144  |  111<H>  |  84<H>  ----------------------------<  170<H>  4.6   |  28  |  4.27<H>    Ca    10.0      15 May 2023 06:01  Phos  3.6     05-15  Mg     3.4     05-15          RADIOLOGY & ADDITIONAL STUDIES:

## 2023-05-15 NOTE — PROGRESS NOTE ADULT - ASSESSMENT
Patient is a 73 year old male with Intellectual disability 2/2 to fetal alcohol syndrome (does not have Downs syndrome) from [facility] w/ PMx COPD with chronic cough, hx of non ischemic cardiomyopathy with non-obstructive CAD on CCTA (2021), HFrEF (EF 10-15% on echo in 3/23), HTN, DM, CKD elevated PSA, BPH, schizophrenia, hepatitis B, carrier, bilateral sensorineural hearing loss. Patient is admitted for YASSINE and hematuria likely 2/2 traumatic removal of Reyes.

## 2023-05-15 NOTE — PROGRESS NOTE ADULT - PROBLEM SELECTOR PLAN 6
Patient with increased functional limitations due to worsening CHF symptoms, in context intellectual disability, presumed dementia, and multiple medical comorbidities, aggravated by recent prolonged hospitalization for CHF. Now has Reyes catheter placed for urinary obstruction, along with increasing dependence on oxygen via NC, both of which are likely to further accelerate patient's debility.  Also with worsening confusion/mental status compared to previous baseline (now only alert and oriented x 1-2).      Recommend OOB to chair  Aggressive PT to prevent further deconditioning  Continued pureed diet.

## 2023-05-16 NOTE — PROGRESS NOTE ADULT - ASSESSMENT
Patient is a 73 year old male with Intellectual disability 2/2 to fetal alcohol syndrome (does not have Downs syndrome) from [facility] w/ PMx COPD with chronic cough, hx of non ischemic cardiomyopathy with non-obstructive CAD on CCTA (2021), HFrEF (EF 10-15% on echo in 3/23), HTN, DM, CKD elevated PSA, BPH, schizophrenia, hepatitis B, carrier, bilateral sensorineural hearing loss. Patient is admitted for YASSINE and hematuria likely 2/2 traumatic removal of Reyes.

## 2023-05-16 NOTE — PROGRESS NOTE ADULT - ASSESSMENT
73y Male with history of intellectual disability, BPH, chance for urinary retention, CHF presents with gross hematuria. Nephrology consulted for elevated Scr.    1) YASSINE: Abrupt increase in Scr over relatively short period of time suspect due to retention/ATN. Scr without improvement despite replacement of chance but non-oliguric with UO improving. Renal function now improving s/p IV albumin. Renal US with no hydro. UA active likely due to trauma (would not expect GN to cause such an acute rise in Scr in a matter of days). FeUrea low. Avoid nephrotoxins.    2) CKD-3a: Baseline Scr 1.3-1.6 with resolving YASSINE on prior admission and Scr decreased to 2.1 on discharge. Monitor electrolytes.    3) HTN with CKD: BP acceptable. Continue with current medications. Monitor BP.    4) LE edema: With mild congestion/LE edema. Continue with bumex 1 mg PO daily. TTE with severe global LVSD. Strict I/O's. Monitor UO.    5) Hyperphosphatemia: Resolved. Continue with low phosphorus diet. Monitor serum calcium and phosphorus.      [facility] NEPHROLOGY  Raul Goncalves M.D.  DC PérezO.  Priya Pederson M.D.  Hellen Dietz, MSN, ANP-C    Telephone: [phone]  Facsimile: [phone]    [address] [place] [address]

## 2023-05-16 NOTE — PROGRESS NOTE ADULT - PROBLEM SELECTOR PLAN 1
- Cr of 4.90 on admission. > 5 > 4.7 today downtrending   - On discharge 05/09 Cr of 2.14.   - Likely obstructive in setting of pulling out Reyes traumatically leading to hematuria and clots. Hematuria resolved.   - C/w Reyes placement with PRN irrigation.   - F/U BMP.   - Avoid nephrotoxic agents.   - Avoid IV fluids as much as possible due to severely reduced EF.   - Nephro consulted. - albumin trial 2 days completed yesterday

## 2023-05-16 NOTE — PROGRESS NOTE ADULT - SUBJECTIVE AND OBJECTIVE BOX
[facility] NEPHROLOGY- PROGRESS NOTE    73y Male with history of intellectual disability, BPH, chance for urinary retention, CHF presents with gross hematuria. Nephrology consulted for elevated Scr.    REVIEW OF SYSTEMS: Limited due to mental status. Denies any SOB or chest pain    No Known Allergies      Hospital Medications: Medications reviewed    VITALS:  T(F): 97.3 (23 @ 05:23), Max: 97.5 (05-15-23 @ 21:50)  HR: 97 (23 @ 05:23)  BP: 148/99 (23 @ 05:23)  RR: 18 (23 @ 05:23)  SpO2: 99% (23 @ 05:23)  Wt(kg): --    05-15 @ 07:01  -   @ 07:00  --------------------------------------------------------  IN: 0 mL / OUT: 1100 mL / NET: -1100 mL      PHYSICAL EXAM:    Gen: NAD, calm  Cards: RRR, +S1/S2, no M/G/R  Resp: Decreased BS @ bases B/L  GI: soft, NT, + ab distention, NABS  : + chance with yellow urine  Vascular: trace LE edema B/L    LABS:      145  |  112<H>  |  77<H>  ----------------------------<  196<H>  4.6   |  30  |  3.76<H>    Ca    9.7      16 May 2023 05:47  Phos  3.5       Mg     3.2         TPro  7.0  /  Alb  3.3<L>  /  TBili  1.0  /  DBili      /  AST  33  /  ALT  75<H>  /  AlkPhos  99      Creatinine Trend: 3.76 <--, 4.27 <--, 5.01 <--, 5.16 <--, 4.87 <--, 4.90 <--                        10.9   8.71  )-----------( 207      ( 16 May 2023 05:47 )             35.8     Urine Studies:  Urinalysis Basic - ( 12 May 2023 21:35 )    Color: Yellow / Appearance: Slightly Turbid / S.015 / pH:   Gluc:  / Ketone: Trace  / Bili: Small / Urobili: 4 mg/dL   Blood:  / Protein: 100 mg/dL / Nitrite: Negative   Leuk Esterase: Moderate / RBC: >50 /HPF / WBC 6-10 /HPF   Sq Epi:  / Non Sq Epi:  / Bacteria: Moderate /HPF      Creatinine, Random Urine: 94 mg/dL ( @ 17:50)

## 2023-05-17 NOTE — PROGRESS NOTE ADULT - CONVERSATION DETAILS
Written letter received from CAB dated 5/16--  CAB now in agreement with DNR, DNI, comfort care (as defined by MOLST orders), Hospice/Palliative care, and determine use of limitation of antibiotics when infection occurs    I spoke to Dr. Hearn medical director at [facility], and Hellen Patino Esq.,  at [facility] ([facility] [employer]).  Both [facility] and [facility] provided notification that they do not object to CAB requests for DNR, DNI, and Hospice.    1750-b pre-MOLST checklist completed and forwarded to Dr. Hearn and [facility].  Checklist approved    MOLST orders completed for DNR, DNI, comfort car,e and determine use vs limitation of antibiotics.  MOLST, pre MOLST checklist, and required notifications placed on chart.

## 2023-05-17 NOTE — PROGRESS NOTE ADULT - SUBJECTIVE AND OBJECTIVE BOX
[facility] NEPHROLOGY- PROGRESS NOTE    73y Male with history of intellectual disability, BPH, chance for urinary retention, CHF presents with gross hematuria. Nephrology consulted for elevated Scr.    REVIEW OF SYSTEMS: Limited due to mental status. Denies any SOB or chest pain    No Known Allergies      Hospital Medications: Medications reviewed    VITALS:  T(F): 97.4 (23 @ 13:02), Max: 97.5 (23 @ 21:28)  HR: 97 (23 @ 13:02)  BP: 136/97 (23 @ 13:02)  RR: 20 (23 @ 13:02)  SpO2: 98% (23 @ 13:02)  Wt(kg): --     @ 07:01  -   @ 07:00  --------------------------------------------------------  IN: 0 mL / OUT: 400 mL / NET: -400 mL     @ 07:01  -   @ 18:28  --------------------------------------------------------  IN: 0 mL / OUT: 300 mL / NET: -300 mL        PHYSICAL EXAM:    Gen: NAD, calm  HEENT: +dry MM  Cards: RRR, +S1/S2, no M/G/R  Resp: Decreased BS @ bases B/L  GI: soft, NT, + ab distention, NABS  : + chance with yellow urine  Vascular: trace LE edema B/L    LABS:      147<H>  |  114<H>  |  77<H>  ----------------------------<  74  4.5   |  25  |  3.62<H>    Ca    10.0      17 May 2023 05:44  Phos  3.7       Mg     3.3         TPro  7.4  /  Alb  3.2<L>  /  TBili  1.1  /  DBili      /  AST  43<H>  /  ALT  76<H>  /  AlkPhos  114      Creatinine Trend: 3.62 <--, 3.76 <--, 4.27 <--, 5.01 <--, 5.16 <--, 4.87 <--, 4.90 <--                        11.6   9.40  )-----------( 232      ( 17 May 2023 05:44 )             38.0     Urine Studies:  Urinalysis Basic - ( 12 May 2023 21:35 )    Color: Yellow / Appearance: Slightly Turbid / S.015 / pH:   Gluc:  / Ketone: Trace  / Bili: Small / Urobili: 4 mg/dL   Blood:  / Protein: 100 mg/dL / Nitrite: Negative   Leuk Esterase: Moderate / RBC: >50 /HPF / WBC 6-10 /HPF   Sq Epi:  / Non Sq Epi:  / Bacteria: Moderate /HPF      Creatinine, Random Urine: 94 mg/dL ( @ 17:50)

## 2023-05-17 NOTE — PROGRESS NOTE ADULT - SUBJECTIVE AND OBJECTIVE BOX
follow up on:  complex medical decision making related to goals of care    [facility] Geriatric and Palliative Consult Service:  Aliza Hsu DO: cell ([phone])  Reji Hart MD: cell ([phone])  Joel Weiner NP: cell ([phone])   Michelle Iqbal DNP: cell ([phone])  Kahlil Lucas LMSW: cell ([phone])   Bharti Nogueira NP: via [employer]    Can contact any Palliative Team member via [employer] Teams for consults and questions      OVERNIGHT EVENTS:    Present Symptoms: Mild, Moderate, Severe  Pain:             Location -                               Aggravating factors -             Quality -             Radiation -             Timing-             Severity (0-10 scale):             Minimal acceptable level (0-10 scale):  Fatigue:  Nausea:  Lack of Appetite:   SOB:  Depression:  Anxiety:  Review of Systems: [All others negative or Unable to obtain due to poor mentation]    CPOT:    https://www.[email or web address]/getattachment/[email or web address]/Critical-Care-Pain-Observation-Tool-(CPOT)  PAIN AD Score:   [email or web address] (press ctrl +  left click to view)MEDICATIONS  (STANDING):  albuterol    90 MICROgram(s) HFA Inhaler 2 Puff(s) Inhalation every 6 hours  aspirin  chewable 81 milliGRAM(s) Oral daily  atorvastatin 40 milliGRAM(s) Oral at bedtime  buMETAnide 1 milliGRAM(s) Oral daily  dextrose 5%. 1000 milliLiter(s) (50 mL/Hr) IV Continuous <Continuous>  dextrose 5%. 1000 milliLiter(s) (100 mL/Hr) IV Continuous <Continuous>  dextrose 50% Injectable 25 Gram(s) IV Push once  dextrose 50% Injectable 12.5 Gram(s) IV Push once  dextrose 50% Injectable 25 Gram(s) IV Push once  finasteride 5 milliGRAM(s) Oral daily  fluticasone propionate 50 MICROgram(s)/spray Nasal Spray 1 Spray(s) Both Nostrils two times a day  glucagon  Injectable 1 milliGRAM(s) IntraMuscular once  guaiFENesin ER 1200 milliGRAM(s) Oral every 12 hours  heparin   Injectable 5000 Unit(s) SubCutaneous every 12 hours  hydrALAZINE 10 milliGRAM(s) Oral three times a day  insulin lispro (ADMELOG) corrective regimen sliding scale   SubCutaneous three times a day before meals  insulin lispro (ADMELOG) corrective regimen sliding scale   SubCutaneous at bedtime  insulin lispro Injectable (ADMELOG) 6 Unit(s) SubCutaneous three times a day before meals  isosorbide   mononitrate ER Tablet (IMDUR) 30 milliGRAM(s) Oral daily  lactulose Syrup 20 Gram(s) Oral at bedtime  melatonin 5 milliGRAM(s) Oral at bedtime  metoprolol tartrate 12.5 milliGRAM(s) Oral two times a day  montelukast 10 milliGRAM(s) Oral at bedtime  multivitamin 1 Tablet(s) Oral daily  OLANZapine 10 milliGRAM(s) Oral at bedtime  tamsulosin 0.4 milliGRAM(s) Oral at bedtime  tiotropium 2.5 MICROgram(s) Inhaler 2 Puff(s) Inhalation daily  valproic  acid Syrup 250 milliGRAM(s) Oral daily  valproic  acid Syrup 250 milliGRAM(s) Oral at bedtime    MEDICATIONS  (PRN):  acetaminophen     Tablet .. 650 milliGRAM(s) Oral every 6 hours PRN Temp greater or equal to 38C (100.4F), Mild Pain (1 - 3)  dextrose Oral Gel 15 Gram(s) Oral once PRN Blood Glucose LESS THAN 70 milliGRAM(s)/deciliter      PHYSICAL EXAM:  Vital Signs Last 24 Hrs  T(C): 36.3 (17 May 2023 21:23), Max: 36.3 (17 May 2023 05:47)  T(F): 97.4 (17 May 2023 21:23), Max: 97.4 (17 May 2023 13:02)  HR: 88 (17 May 2023 21:23) (88 - 97)  BP: 130/85 (17 May 2023 21:23) (130/85 - 136/97)  BP(mean): --  RR: 18 (17 May 2023 21:23) (18 - 20)  SpO2: 98% (17 May 2023 21:23) (95% - 98%)    Parameters below as of 17 May 2023 21:23  Patient On (Oxygen Delivery Method): nasal cannula  O2 Flow (L/min): 2      General: alert  oriented x ____    lethargic distressed cachexia  verbal nonverbal  unarousable     Palliative Performance Scale/Karnofsky Score:  http://npcrc.org/files/news/palliative_performance_scale_ppsv2.pdf    HEENT: no abnormal lesion, dry mouth  ET tube/trach oral lesions:  Lungs: tachypnea/labored breathing, audible excessive secretions  CV: RRR, S1S2, tachycardia  GI: soft non distended non tender  incontinent               PEG/NG/OG tube  constipation  last BM:   : incontinent  oliguria/anuria  chance  Musculoskeletal: weakness x4 edema x4    ambulatory with assistance   mostly/fully bedbound/wheelchair bound  Skin: no abnormal skin lesions, poor skin turgor, pressure ulcers stage:   Neuro: no deficits, mild cognitive impairment dsyphagia/dysarthria paresis  Oral intake ability: unable/only mouth care, minimal moderate full capability    LABS:                          11.6   9.40  )-----------( 232      ( 17 May 2023 05:44 )             38.0     05-17    147<H>  |  114<H>  |  77<H>  ----------------------------<  74  4.5   |  25  |  3.62<H>    Ca    10.0      17 May 2023 05:44  Phos  3.7     05-17  Mg     3.3     05-17    TPro  7.4  /  Alb  3.2<L>  /  TBili  1.1  /  DBili  x   /  AST  43<H>  /  ALT  76<H>  /  AlkPhos  114  05-17        RADIOLOGY & ADDITIONAL STUDIES: follow up on:  complex medical decision making related to goals of care    [facility] Geriatric and Palliative Consult Service:  Aliza Hsu DO: cell ([phone])  Reji Hart MD: cell ([phone])  Joel Weiner NP: cell ([phone])   Michelle Iqbal DNP: cell ([phone])  Kahlil Lucas LMSW: cell ([phone])   Bharti Nogueira NP: via [employer] Teams    Can contact any Palliative Team member via [employer] Teams for consults and questions      OVERNIGHT EVENTS:  None  Patient examined earlier today with home aide at bedside.  Patient currently back on oxygen via NC (aide reports patient gets more agitated/feels more out of breath when off O2).  Remains alert and oriented x 1-2 with some confusion (? new baseline).  Denies pain, chest pain, or SOB currently.  Otherwise has no acute complaints.    Present Symptoms: Mild, Moderate, Severe  Pain:             Location -   Denies                            Aggravating factors -             Quality -             Radiation -             Timing-             Severity (0-10 scale):  0/10             Minimal acceptable level (0-10 scale):  Fatigue:  Nausea:  Denies  Lack of Appetite: Denies  SOB:  Denies  Depression:  Anxiety:  Review of Systems: All other systems reviewed and are negative      MEDICATIONS  (STANDING):  albuterol    90 MICROgram(s) HFA Inhaler 2 Puff(s) Inhalation every 6 hours  aspirin  chewable 81 milliGRAM(s) Oral daily  atorvastatin 40 milliGRAM(s) Oral at bedtime  buMETAnide 1 milliGRAM(s) Oral daily  dextrose 5%. 1000 milliLiter(s) (50 mL/Hr) IV Continuous <Continuous>  dextrose 5%. 1000 milliLiter(s) (100 mL/Hr) IV Continuous <Continuous>  dextrose 50% Injectable 25 Gram(s) IV Push once  dextrose 50% Injectable 12.5 Gram(s) IV Push once  dextrose 50% Injectable 25 Gram(s) IV Push once  finasteride 5 milliGRAM(s) Oral daily  fluticasone propionate 50 MICROgram(s)/spray Nasal Spray 1 Spray(s) Both Nostrils two times a day  glucagon  Injectable 1 milliGRAM(s) IntraMuscular once  guaiFENesin ER 1200 milliGRAM(s) Oral every 12 hours  heparin   Injectable 5000 Unit(s) SubCutaneous every 12 hours  hydrALAZINE 10 milliGRAM(s) Oral three times a day  insulin lispro (ADMELOG) corrective regimen sliding scale   SubCutaneous three times a day before meals  insulin lispro (ADMELOG) corrective regimen sliding scale   SubCutaneous at bedtime  insulin lispro Injectable (ADMELOG) 6 Unit(s) SubCutaneous three times a day before meals  isosorbide   mononitrate ER Tablet (IMDUR) 30 milliGRAM(s) Oral daily  lactulose Syrup 20 Gram(s) Oral at bedtime  melatonin 5 milliGRAM(s) Oral at bedtime  metoprolol tartrate 12.5 milliGRAM(s) Oral two times a day  montelukast 10 milliGRAM(s) Oral at bedtime  multivitamin 1 Tablet(s) Oral daily  OLANZapine 10 milliGRAM(s) Oral at bedtime  tamsulosin 0.4 milliGRAM(s) Oral at bedtime  tiotropium 2.5 MICROgram(s) Inhaler 2 Puff(s) Inhalation daily  valproic  acid Syrup 250 milliGRAM(s) Oral daily  valproic  acid Syrup 250 milliGRAM(s) Oral at bedtime    MEDICATIONS  (PRN):  acetaminophen     Tablet .. 650 milliGRAM(s) Oral every 6 hours PRN Temp greater or equal to 38C (100.4F), Mild Pain (1 - 3)  dextrose Oral Gel 15 Gram(s) Oral once PRN Blood Glucose LESS THAN 70 milliGRAM(s)/deciliter      PHYSICAL EXAM:  Vital Signs Last 24 Hrs  T(C): 36.3 (17 May 2023 21:23), Max: 36.3 (17 May 2023 05:47)  T(F): 97.4 (17 May 2023 21:23), Max: 97.4 (17 May 2023 13:02)  HR: 88 (17 May 2023 21:23) (88 - 97)  BP: 130/85 (17 May 2023 21:23) (130/85 - 136/97)  BP(mean): --  RR: 18 (17 May 2023 21:23) (18 - 20)  SpO2: 98% (17 May 2023 21:23) (95% - 98%)    Parameters below as of 17 May 2023 21:23  Patient On (Oxygen Delivery Method): nasal cannula  O2 Flow (L/min): 2      General: alert  oriented x __1-2   mild temporal wasting, NAD    Palliative Performance Scale/Karnofsky Score: 30%  http://npcrc.org/files/news/palliative_performance_scale_ppsv2.pdf    HEENT: EOMI anicteric, pharynx clear MM moist  Lungs: mild transmitted upper airway rhonchi, otherwise clear to auscultation.  No rales.  Respirations unlabored  CV: RRR,  normal S1 and S2, no murmur  GI: soft non distended non tender normal bowel sounds   : incontinent  + Reyes cath in place with yellow urine, no further hematuria noted  Musculoskeletal: weakness x4 in all extremities, mostly bedbound, no edema noted  Skin: no abnormal skin lesions, poor skin turgor, pressure ulcers stage:   Neuro: no focal deficits, +  cognitive impairment   Oral intake ability:  moderate capability, on pureed diet with thickened liquids, no reports of dysphagia    LABS:                          11.6   9.40  )-----------( 232      ( 17 May 2023 05:44 )             38.0     05-17    147<H>  |  114<H>  |  77<H>  ----------------------------<  74  4.5   |  25  |  3.62<H>    Ca    10.0      17 May 2023 05:44  Phos  3.7     05-17  Mg     3.3     05-17    TPro  7.4  /  Alb  3.2<L>  /  TBili  1.1  /  DBili  x   /  AST  43<H>  /  ALT  76<H>  /  AlkPhos  114  05-17        RADIOLOGY & ADDITIONAL STUDIES:

## 2023-05-17 NOTE — PROGRESS NOTE ADULT - SUBJECTIVE AND OBJECTIVE BOX
PGY-1 Progress Note discussed with attending    PLEASE CONTACT ON CALL TEAM:  - On Call Team (Please refer to Red) FROM 5:00 PM - 8:30PM  - Nightfloat Team FROM 8:30 -7:30 AM    INTERVAL HPI/OVERNIGHT EVENTS: No acute events overnight. On 2l NC this AM, taken off. States that he has no difficulty breathing.     MEDICATIONS  (STANDING):  albuterol    90 MICROgram(s) HFA Inhaler 2 Puff(s) Inhalation every 6 hours  aspirin  chewable 81 milliGRAM(s) Oral daily  atorvastatin 40 milliGRAM(s) Oral at bedtime  buMETAnide 1 milliGRAM(s) Oral daily  dextrose 5%. 1000 milliLiter(s) (100 mL/Hr) IV Continuous <Continuous>  dextrose 5%. 1000 milliLiter(s) (50 mL/Hr) IV Continuous <Continuous>  dextrose 50% Injectable 25 Gram(s) IV Push once  dextrose 50% Injectable 12.5 Gram(s) IV Push once  dextrose 50% Injectable 25 Gram(s) IV Push once  finasteride 5 milliGRAM(s) Oral daily  fluticasone propionate 50 MICROgram(s)/spray Nasal Spray 1 Spray(s) Both Nostrils two times a day  glucagon  Injectable 1 milliGRAM(s) IntraMuscular once  guaiFENesin ER 1200 milliGRAM(s) Oral every 12 hours  heparin   Injectable 5000 Unit(s) SubCutaneous every 12 hours  hydrALAZINE 10 milliGRAM(s) Oral three times a day  insulin lispro (ADMELOG) corrective regimen sliding scale   SubCutaneous three times a day before meals  insulin lispro (ADMELOG) corrective regimen sliding scale   SubCutaneous at bedtime  insulin lispro Injectable (ADMELOG) 2 Unit(s) SubCutaneous three times a day before meals  isosorbide   mononitrate ER Tablet (IMDUR) 30 milliGRAM(s) Oral daily  lactulose Syrup 20 Gram(s) Oral at bedtime  melatonin 5 milliGRAM(s) Oral at bedtime  metoprolol tartrate 12.5 milliGRAM(s) Oral two times a day  montelukast 10 milliGRAM(s) Oral at bedtime  multivitamin 1 Tablet(s) Oral daily  OLANZapine 10 milliGRAM(s) Oral at bedtime  tamsulosin 0.4 milliGRAM(s) Oral at bedtime  tiotropium 2.5 MICROgram(s) Inhaler 2 Puff(s) Inhalation daily  valproic  acid Syrup 250 milliGRAM(s) Oral daily  valproic  acid Syrup 250 milliGRAM(s) Oral at bedtime    MEDICATIONS  (PRN):  acetaminophen     Tablet .. 650 milliGRAM(s) Oral every 6 hours PRN Temp greater or equal to 38C (100.4F), Mild Pain (1 - 3)  dextrose Oral Gel 15 Gram(s) Oral once PRN Blood Glucose LESS THAN 70 milliGRAM(s)/deciliter      REVIEW OF SYSTEMS:  CONSTITUTIONAL: No fever, weight loss, or fatigue  RESPIRATORY: No cough, wheezing, chills or hemoptysis; No shortness of breath  CARDIOVASCULAR: No chest pain, palpitations, dizziness, or leg swelling  GASTROINTESTINAL: No abdominal pain. No nausea, vomiting, or hematemesis; No diarrhea or constipation. No melena or hematochezia.  GENITOURINARY: No dysuria or hematuria, urinary frequency  NEUROLOGICAL: No headaches, memory loss, loss of strength, numbness, or tremors  SKIN: No itching, burning, rashes, or lesions     Vital Signs Last 24 Hrs  T(C): 36.9 (15 May 2023 11:57), Max: 36.9 (15 May 2023 11:57)  T(F): 98.4 (15 May 2023 11:57), Max: 98.4 (15 May 2023 11:57)  HR: 87 (15 May 2023 11:57) (80 - 87)  BP: 124/88 (15 May 2023 11:57) (107/68 - 136/78)  BP(mean): --  RR: 18 (15 May 2023 11:57) (18 - 20)  SpO2: 95% (15 May 2023 11:57) (95% - 100%)    Parameters below as of 15 May 2023 11:57  Patient On (Oxygen Delivery Method): nasal cannula  O2 Flow (L/min): 3      PHYSICAL EXAMINATION:  GENERAL: NAD, well built  HEAD:  Atraumatic, Normocephalic  EYES:  conjunctiva and sclera clear  NECK: Supple, No JVD, Normal thyroid  CHEST/LUNG: Clear to auscultation. Clear to percussion bilaterally; No rales, rhonchi, wheezing, or rubs  HEART: Regular rate and rhythm; No murmurs, rubs, or gallops  ABDOMEN: Soft, Nontender, Nondistended; Bowel sounds present  NERVOUS SYSTEM:  Alert & Oriented X3,  no focal neuro deficits   EXTREMITIES:  2+ Peripheral Pulses, No clubbing, cyanosis, or edema  SKIN: warm dry                          10.6   7.98  )-----------( 172      ( 15 May 2023 06:01 )             35.5     05-15    144  |  111<H>  |  84<H>  ----------------------------<  170<H>  4.6   |  28  |  4.27<H>    Ca    10.0      15 May 2023 06:01  Phos  3.6     05-15  Mg     3.4     05-15                CAPILLARY BLOOD GLUCOSE      RADIOLOGY & ADDITIONAL TESTS:

## 2023-05-17 NOTE — PROGRESS NOTE ADULT - ASSESSMENT
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 Patient is admitted for YASSINE and hematuria likely 2/2 traumatic removal of Reyes.

## 2023-05-17 NOTE — PROGRESS NOTE ADULT - ASSESSMENT
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 dementia (on home memantine), hepatitis B carrier, and bilateral sensorineural hearing loss.  Not a candidate for ICD placement due to lack of decision making capacity (AHA class III contraindication).  He is well known to our palliative care service.  Previously admitted 3/28 to 3/30 for demand ischemia and CHF exacerbation, then with prolonged hospitalization from 4/8 to 4/28 for chest pain (likely musculoskeletal) and decompensated CHF, complicated by worsening YASSINE on CKD, originally thought to be due to ATN/overdiuresis/? intermittent urinary retention (vs CRS); patient discharged to [facility] on 4/28.  Then readmitted to [facility] 5/4 to 5/9 for altered mental status and respiratory distress, found unresponsive by NH staff; found to have worsening YASSINE on CKD with BNP of 63,000.  Admitted at that time for post obstructive renal failure, UTI, and possible HF exacerbation, improved after insertion of Reyes cath, discharged back to [facility] on 5/9.      Patient pulled out Reyes 5/11, replaced in ER and sent back to [facility]  Returned 5/12 with hematuria in Reyes bag and jarred-urethral clots, Cr back up to 4.90, admitted for hematuria and YASSINE on CKD III.  Creatinine slowly improved, but may not return to previous baseline.

## 2023-05-17 NOTE — PROGRESS NOTE ADULT - PROBLEM SELECTOR PLAN 6
Patient with increased functional limitations due to worsening CHF symptoms, in context intellectual disability, presumed dementia, and multiple medical comorbidities, aggravated by recent prolonged hospitalization for CHF. Now has Reyes catheter placed for urinary obstruction, along with increasing dependence on oxygen via NC, both of which are likely to further accelerate patient's debility.  Also with worsening confusion/mental status compared to previous baseline (now only alert and oriented x 1-2).      Recommend OOB to chair  Aggressive PT to prevent further deconditioning as tolerated  Continued pureed diet.

## 2023-05-17 NOTE — PROGRESS NOTE ADULT - PROBLEM SELECTOR PLAN 1
Prior to previous admission in early April 2023, patient was having increasing BEARDEN, SOB with performance of ADLs, and functional limitation due to his heart failure.  With his increasing symptom burden he was not able to return to his group home setting and was recently discharged to [facility].  Symptom burden now exacerbated by recurrent YASSINE on CKD with need for chronic indwelling Reyes catheter.  Also question of possible cardiorenal syndrome.    Patient has Stage C NYHA class III heart failure now complicated by worsening YASSINE on CKD and further clinical debility.  He is at risk for continued progression of disease, further functional decline, recurrent infections and hospitalization, increasing morbidity, and sudden death.  Not a candidate for AICD placement (class III contraindication).  Per previous Cardiology consult from Dr. Jiménez, CPR likely would be medically futile given his underlying heart failure and NICM.      Patient is medically appropriate for hospice.  Patient under auspices of CAB; CAB now in written agreement with DNR, DNI, comfort oriented care (as based on MOLST), and hospice.  OPWDD and MHLS with no objection.  See GOC discussion as noted above  MOLST orders completed    Continue optimization of GDMT  Remainder of management as per primary medical team.

## 2023-05-17 NOTE — PROGRESS NOTE ADULT - PROBLEM SELECTOR PLAN 7
As above.  Patient now with advanced systolic HF, in setting of severe NICM, with EF of 10-15%, and increasing BEARDEN/SOB with performance of ADLs resulting in functional limitation.  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  He is not a candidate for ICD.  Now with recurrent YASSINE on CKD III due to urinary retention/post obstructive renal failure, returns with worsening renal functional and hematuria/clots due to traumatic pulling of Reyes.  Likely to require long-term indwelling Reyes catheter, further adding to his medical debility.  Now trending towards CKD stage IV, ? concern for component of cardiorenal syndrome.  He also has emerging protein-calorie malnutrition with albumin of 2.2.    Patient has Stage C NYHA class III heart failure and worsening YASSINE on CKD.  Given his multiple medical comorbidities, he is at risk for continued progression of disease, further functional decline, recurrent hospitalizations, increasing morbidity, and sudden death.  He is medically appropriate for hospice with a prognosis generally in the range of 6 to 12 months.    Patient is under auspices of [employer] (CABs) as his 1750-b guardian.    See [facility] discussion above.  CAB now in agreement with DNR. DNI, comfort care per MOLST orders (with use vs limitation of antibiotics to be determined at point of infection), and hospice.   OPWDD and MHLS with no objection  MOLST completed with orders for DNR/DNI    Further optimization of GDMT and remainder of medical management as per Nephrology and primary team  Spoke to Dr. Hearn--she is now requesting hospice at facility other than current [facility]     Palliative care team will continue to follow.

## 2023-05-17 NOTE — PROGRESS NOTE ADULT - PROBLEM SELECTOR PLAN 1
- Cr of 4.90 on admission. > 5 > 4.7 today downtrending   - On discharge 05/09 Cr of 2.14.   - Likely obstructive in setting of pulling out Reyes traumatically leading to hematuria and clots. Hematuria resolved.   - C/w Reyes placement with PRN irrigation.   - F/U BMP.   - Avoid nephrotoxic agents.   - Avoid IV fluids as much as possible due to severely reduced EF.   - Nephro consulted. - albumin trial 2 days completed yesterday - Cr of 4.90 on admission. > 5 > 4.7 > 3.62 today downtrending   - On discharge 05/09 Cr of 2.14.   - Likely obstructive in setting of pulling out Reyes traumatically leading to hematuria and clots. Hematuria resolved.   - C/w Reyes placement with PRN irrigation.   - F/U BMP.   - Avoid nephrotoxic agents.   - Avoid IV fluids as much as possible due to severely reduced EF.   - Nephro consulted. - albumin trial 2 days completed 5/15

## 2023-05-17 NOTE — PROGRESS NOTE ADULT - ASSESSMENT
73y Male with history of intellectual disability, BPH, chance for urinary retention, CHF presents with gross hematuria. Nephrology consulted for elevated Scr.    1) YASSINE: Abrupt increase in Scr over relatively short period of time suspect due to retention/ATN. Scr without improvement despite replacement of chance but non-oliguric with UO improving. Renal function now improving s/p IV albumin. If worsening SCr, recc to hold Bumex.  Renal US with no hydro. UA active likely due to trauma (would not expect GN to cause such an acute rise in Scr in a matter of days). FeUrea low. Avoid nephrotoxins.    2) CKD-3a: Baseline Scr 1.3-1.6 with resolving YASSINE on prior admission and Scr decreased to 2.1 on discharge. Monitor electrolytes.    3) HTN with CKD: BP acceptable. Continue with current medications. Monitor BP.    4) LE edema: With mild congestion/LE edema. Continue with bumex 1 mg PO daily. TTE with severe global LVSD. Strict I/O's. Monitor UO.    5) Hyperphosphatemia: Resolved. Continue with low phosphorus diet. Monitor serum calcium and phosphorus.      [facility] NEPHROLOGY  Raul Goncalves M.D.  Bennie Carcamo D.O.  Priya Pederson M.D.  Hellen Dietz, MSN, ANP-C    Telephone: [phone]  Facsimile: [phone]    [address] [place] [address]

## 2023-05-18 NOTE — DIETITIAN INITIAL EVALUATION ADULT - OTHER INFO
Pt visited. Pt seen for LOS. D/W Aide ( BRADY) . Per Aide ot is fed  since he eats fast. NKFA reported. Pt with Good appetite. Bed scale 153 LB.  HT 64 inches. Labs noted. A1c 6.8.

## 2023-05-18 NOTE — DIETITIAN INITIAL EVALUATION ADULT - PERTINENT LABORATORY DATA
05-18    145  |  110<H>  |  79<H>  ----------------------------<  120<H>  4.8   |  28  |  3.58<H>    Ca    9.9      18 May 2023 08:20  Phos  4.0     05-18  Mg     3.3     05-18    TPro  7.2  /  Alb  2.8<L>  /  TBili  0.9  /  DBili  x   /  AST  39  /  ALT  71<H>  /  AlkPhos  120  05-18  POCT Blood Glucose.: 129 mg/dL (05-18-23 @ 12:41)  A1C with Estimated Average Glucose Result: 6.8 % (03-29-23 @ 06:12)

## 2023-05-18 NOTE — PROGRESS NOTE ADULT - SUBJECTIVE AND OBJECTIVE BOX
PGY-1 Progress Note discussed with attending    PAGER #: [[phone]] TILL 5:00 PM  PLEASE CONTACT ON CALL TEAM:  - On Call Team (Please refer to Red) FROM 5:00 PM - 8:30PM  - Nightfloat Team FROM 8:30 -7:30 AM    CHIEF COMPLAINT & BRIEF HOSPITAL COURSE:    INTERVAL HPI/OVERNIGHT EVENTS:   MEDICATIONS  (STANDING):  albuterol    90 MICROgram(s) HFA Inhaler 2 Puff(s) Inhalation every 6 hours  aspirin  chewable 81 milliGRAM(s) Oral daily  atorvastatin 40 milliGRAM(s) Oral at bedtime  buMETAnide 1 milliGRAM(s) Oral daily  dextrose 5%. 1000 milliLiter(s) (100 mL/Hr) IV Continuous <Continuous>  dextrose 5%. 1000 milliLiter(s) (50 mL/Hr) IV Continuous <Continuous>  dextrose 50% Injectable 25 Gram(s) IV Push once  dextrose 50% Injectable 12.5 Gram(s) IV Push once  dextrose 50% Injectable 25 Gram(s) IV Push once  finasteride 5 milliGRAM(s) Oral daily  fluticasone propionate 50 MICROgram(s)/spray Nasal Spray 1 Spray(s) Both Nostrils two times a day  glucagon  Injectable 1 milliGRAM(s) IntraMuscular once  guaiFENesin ER 1200 milliGRAM(s) Oral every 12 hours  heparin   Injectable 5000 Unit(s) SubCutaneous every 12 hours  hydrALAZINE 10 milliGRAM(s) Oral three times a day  insulin lispro (ADMELOG) corrective regimen sliding scale   SubCutaneous three times a day before meals  insulin lispro (ADMELOG) corrective regimen sliding scale   SubCutaneous at bedtime  insulin lispro Injectable (ADMELOG) 6 Unit(s) SubCutaneous three times a day before meals  isosorbide   mononitrate ER Tablet (IMDUR) 30 milliGRAM(s) Oral daily  lactulose Syrup 20 Gram(s) Oral at bedtime  melatonin 5 milliGRAM(s) Oral at bedtime  metoprolol tartrate 12.5 milliGRAM(s) Oral two times a day  montelukast 10 milliGRAM(s) Oral at bedtime  multivitamin 1 Tablet(s) Oral daily  OLANZapine 10 milliGRAM(s) Oral at bedtime  tamsulosin 0.4 milliGRAM(s) Oral at bedtime  tiotropium 2.5 MICROgram(s) Inhaler 2 Puff(s) Inhalation daily  valproic  acid Syrup 250 milliGRAM(s) Oral at bedtime  valproic  acid Syrup 250 milliGRAM(s) Oral daily    MEDICATIONS  (PRN):  acetaminophen     Tablet .. 650 milliGRAM(s) Oral every 6 hours PRN Temp greater or equal to 38C (100.4F), Mild Pain (1 - 3)  dextrose Oral Gel 15 Gram(s) Oral once PRN Blood Glucose LESS THAN 70 milliGRAM(s)/deciliter      REVIEW OF SYSTEMS:  CONSTITUTIONAL: No fever, weight loss, or fatigue  RESPIRATORY: No cough, wheezing, chills or hemoptysis; No shortness of breath  CARDIOVASCULAR: No chest pain, palpitations, dizziness, or leg swelling  GASTROINTESTINAL: No abdominal pain. No nausea, vomiting, or hematemesis; No diarrhea or constipation. No melena or hematochezia.  GENITOURINARY: No dysuria or hematuria, urinary frequency  NEUROLOGICAL: No headaches, memory loss, loss of strength, numbness, or tremors  SKIN: No itching, burning, rashes, or lesions     Vital Signs Last 24 Hrs  T(C): 36.7 (18 May 2023 05:07), Max: 36.7 (18 May 2023 05:07)  T(F): 98.1 (18 May 2023 05:07), Max: 98.1 (18 May 2023 05:07)  HR: 86 (18 May 2023 05:07) (86 - 97)  BP: 122/86 (18 May 2023 05:07) (122/86 - 136/97)  BP(mean): --  RR: 18 (18 May 2023 05:07) (18 - 20)  SpO2: 100% (18 May 2023 05:07) (98% - 100%)    Parameters below as of 18 May 2023 05:07  Patient On (Oxygen Delivery Method): nasal cannula  O2 Flow (L/min): 2      PHYSICAL EXAMINATION:  GENERAL: NAD, well built  HEAD:  Atraumatic, Normocephalic  EYES:  conjunctiva and sclera clear  NECK: Supple, No JVD, Normal thyroid  CHEST/LUNG: Clear to auscultation. Clear to percussion bilaterally; No rales, rhonchi, wheezing, or rubs  HEART: Regular rate and rhythm; No murmurs, rubs, or gallops  ABDOMEN: Soft, Nontender, Nondistended; Bowel sounds present  NERVOUS SYSTEM:  Alert & Oriented X3,    EXTREMITIES:  2+ Peripheral Pulses, No clubbing, cyanosis, or edema  SKIN: warm dry                          11.5   8.25  )-----------( 235      ( 18 May 2023 08:20 )             37.2     05-18    145  |  110<H>  |  79<H>  ----------------------------<  120<H>  4.8   |  28  |  3.58<H>    Ca    9.9      18 May 2023 08:20  Phos  4.0     05-18  Mg     3.3     05-18    TPro  7.2  /  Alb  2.8<L>  /  TBili  0.9  /  DBili  x   /  AST  39  /  ALT  71<H>  /  AlkPhos  120  05-18    LIVER FUNCTIONS - ( 18 May 2023 08:20 )  Alb: 2.8 g/dL / Pro: 7.2 g/dL / ALK PHOS: 120 U/L / ALT: 71 U/L DA / AST: 39 U/L / GGT: x                   CAPILLARY BLOOD GLUCOSE      RADIOLOGY & ADDITIONAL TESTS:                   PGY-1 Progress Note discussed with attending    PLEASE CONTACT ON CALL TEAM:  - On Call Team (Please refer to Red) FROM 5:00 PM - 8:30PM  - Nightfloat Team FROM 8:30 -7:30 AM    INTERVAL HPI/OVERNIGHT EVENTS: No acute events overnight. Denies chest pain, fevers, chills. Pt. states that he is having difficulty breathing this AM. On 2 L NC. Not hypoxic on RA or on NC, however, pt is more comfortable with the NC on. Ate breakfast, friend at bedside.     MEDICATIONS  (STANDING):  albuterol    90 MICROgram(s) HFA Inhaler 2 Puff(s) Inhalation every 6 hours  aspirin  chewable 81 milliGRAM(s) Oral daily  atorvastatin 40 milliGRAM(s) Oral at bedtime  buMETAnide 1 milliGRAM(s) Oral daily  dextrose 5%. 1000 milliLiter(s) (100 mL/Hr) IV Continuous <Continuous>  dextrose 5%. 1000 milliLiter(s) (50 mL/Hr) IV Continuous <Continuous>  dextrose 50% Injectable 25 Gram(s) IV Push once  dextrose 50% Injectable 12.5 Gram(s) IV Push once  dextrose 50% Injectable 25 Gram(s) IV Push once  finasteride 5 milliGRAM(s) Oral daily  fluticasone propionate 50 MICROgram(s)/spray Nasal Spray 1 Spray(s) Both Nostrils two times a day  glucagon  Injectable 1 milliGRAM(s) IntraMuscular once  guaiFENesin ER 1200 milliGRAM(s) Oral every 12 hours  heparin   Injectable 5000 Unit(s) SubCutaneous every 12 hours  hydrALAZINE 10 milliGRAM(s) Oral three times a day  insulin lispro (ADMELOG) corrective regimen sliding scale   SubCutaneous three times a day before meals  insulin lispro (ADMELOG) corrective regimen sliding scale   SubCutaneous at bedtime  insulin lispro Injectable (ADMELOG) 6 Unit(s) SubCutaneous three times a day before meals  isosorbide   mononitrate ER Tablet (IMDUR) 30 milliGRAM(s) Oral daily  lactulose Syrup 20 Gram(s) Oral at bedtime  melatonin 5 milliGRAM(s) Oral at bedtime  metoprolol tartrate 12.5 milliGRAM(s) Oral two times a day  montelukast 10 milliGRAM(s) Oral at bedtime  multivitamin 1 Tablet(s) Oral daily  OLANZapine 10 milliGRAM(s) Oral at bedtime  tamsulosin 0.4 milliGRAM(s) Oral at bedtime  tiotropium 2.5 MICROgram(s) Inhaler 2 Puff(s) Inhalation daily  valproic  acid Syrup 250 milliGRAM(s) Oral at bedtime  valproic  acid Syrup 250 milliGRAM(s) Oral daily    MEDICATIONS  (PRN):  acetaminophen     Tablet .. 650 milliGRAM(s) Oral every 6 hours PRN Temp greater or equal to 38C (100.4F), Mild Pain (1 - 3)  dextrose Oral Gel 15 Gram(s) Oral once PRN Blood Glucose LESS THAN 70 milliGRAM(s)/deciliter      REVIEW OF SYSTEMS:  CONSTITUTIONAL: No fever, weight loss, or fatigue  RESPIRATORY: No cough, wheezing, chills or hemoptysis; (+) shortness of breath  CARDIOVASCULAR: No chest pain, palpitations, dizziness, or leg swelling  GASTROINTESTINAL: No abdominal pain. No nausea, vomiting, or hematemesis; No diarrhea or constipation. No melena or hematochezia.  GENITOURINARY: No dysuria or hematuria, urinary frequency  NEUROLOGICAL: No headaches, memory loss, loss of strength, numbness, or tremors  SKIN: No itching, burning, rashes, or lesions     Vital Signs Last 24 Hrs  T(C): 36.7 (18 May 2023 05:07), Max: 36.7 (18 May 2023 05:07)  T(F): 98.1 (18 May 2023 05:07), Max: 98.1 (18 May 2023 05:07)  HR: 86 (18 May 2023 05:07) (86 - 97)  BP: 122/86 (18 May 2023 05:07) (122/86 - 136/97)  BP(mean): --  RR: 18 (18 May 2023 05:07) (18 - 20)  SpO2: 100% (18 May 2023 05:07) (98% - 100%)    Parameters below as of 18 May 2023 05:07  Patient On (Oxygen Delivery Method): nasal cannula  O2 Flow (L/min): 2      PHYSICAL EXAMINATION:  GENERAL: NAD, well built  HEAD:  Atraumatic, Normocephalic  EYES:  conjunctiva and sclera clear  NECK: Supple, No JVD, Normal thyroid  CHEST/LUNG: Clear to auscultation. Clear to percussion bilaterally; No rales, rhonchi, wheezing, or rubs  HEART: Regular rate and rhythm; No murmurs, rubs, or gallops  ABDOMEN: Soft, Nontender, Nondistended; Bowel sounds present  NERVOUS SYSTEM:  Alert & Oriented X3,    EXTREMITIES:  2+ Peripheral Pulses, No clubbing, cyanosis, or edema  SKIN: warm dry                          11.5   8.25  )-----------( 235      ( 18 May 2023 08:20 )             37.2     05-18    145  |  110<H>  |  79<H>  ----------------------------<  120<H>  4.8   |  28  |  3.58<H>    Ca    9.9      18 May 2023 08:20  Phos  4.0     05-18  Mg     3.3     05-18    TPro  7.2  /  Alb  2.8<L>  /  TBili  0.9  /  DBili  x   /  AST  39  /  ALT  71<H>  /  AlkPhos  120  05-18    LIVER FUNCTIONS - ( 18 May 2023 08:20 )  Alb: 2.8 g/dL / Pro: 7.2 g/dL / ALK PHOS: 120 U/L / ALT: 71 U/L DA / AST: 39 U/L / GGT: x                   CAPILLARY BLOOD GLUCOSE      RADIOLOGY & ADDITIONAL TESTS:

## 2023-05-18 NOTE — PROGRESS NOTE ADULT - ASSESSMENT
73y Male with history of intellectual disability, BPH, chance for urinary retention, CHF presents with gross hematuria. Nephrology consulted for elevated Scr.    1) YASSINE: Abrupt increase in Scr over relatively short period of time suspect due to retention/ATN. Scr without improvement despite replacement of chance but non-oliguric with UO improving. Renal function now improving s/p IV albumin. If worsening SCr, recc to hold Bumex.  Renal US with no hydro. UA active likely due to trauma (would not expect GN to cause such an acute rise in Scr in a matter of days). FeUrea low. Avoid nephrotoxins.    2) CKD-3a: Baseline Scr 1.3-1.6 with resolving YASSINE on prior admission and Scr decreased to 2.1 on discharge. Monitor electrolytes.    3) HTN with CKD: BP acceptable. Continue with current medications. Monitor BP.    4) LE edema: With mild congestion/LE edema; resolving. Continue with bumex 1 mg PO daily. TTE with severe global LVSD. Strict I/O's. Monitor UO.    5) Hyperphosphatemia: Resolved. Continue with low phosphorus diet. Monitor serum calcium and phosphorus.      [facility] NEPHROLOGY  Raul Goncalves M.D.  Bennie Carcamo D.O.  Priya Pederson M.D.  Hellen Dietz, MSN, ANP-C    Telephone: [phone]  Facsimile: [phone]    [id number] [place] [address]

## 2023-05-18 NOTE — PROGRESS NOTE ADULT - ASSESSMENT
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 Patient is admitted for YASSINE and hematuria likely 2/2 traumatic removal of Reyes.  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 Patient is admitted for YASSINE and hematuria likely 2/2 traumatic removal of Chance. Pt's chance was replaced, and obstruction removed, Cr has appropriately improved. Cr of 4.90 on admission. > 5 > 4.7 > 3.62 >3.58 today downtrending. Pt. was given albumin trial 2 days completed 5/15, that improved his urine output. C/w bumex, BB, statin, albuterol, spiriva, montelukast, home meds. CXR 5/17 shows improvement of vascular congestion compared to admission.

## 2023-05-18 NOTE — DIETITIAN INITIAL EVALUATION ADULT - PROBLEM SELECTOR PLAN 3
- P/w K of 6.1.   - Likely in setting of YASSINE on CKD.   - S/p 1 dose of Lokelma.   - No significant EKG changes. Admit to medicine.   - F/U repeat BMP.   - Nephrology consulted.

## 2023-05-18 NOTE — PROGRESS NOTE ADULT - PROBLEM SELECTOR PLAN 5
- Not in exacerbation.   - Echo 3/23: EF 10-15%.   - C/w bumex, BB, statin.  - f/u palliative care discussion w/ Dr. Hart - Not in exacerbation.   - Echo 3/23: EF 10-15%.   - C/w bumex, BB, statin.  CXR 5/17 shows improvement of vascular congestion compared to admission.

## 2023-05-18 NOTE — PROGRESS NOTE ADULT - PROBLEM SELECTOR PLAN 1
- Cr of 4.90 on admission. > 5 > 4.7 > 3.62 today downtrending   - On discharge 05/09 Cr of 2.14.   - Likely obstructive in setting of pulling out Reyes traumatically leading to hematuria and clots. Hematuria resolved.   - C/w Reyes placement with PRN irrigation.   - F/U BMP.   - Avoid nephrotoxic agents.   - Avoid IV fluids as much as possible due to severely reduced EF.   - Nephro consulted. - albumin trial 2 days completed 5/15 - Cr of 4.90 on admission. > 5 > 4.7 > 3.62 >3.58 today downtrending   - On discharge 05/09 Cr of 2.14.   - Likely obstructive in setting of pulling out Reyes traumatically leading to hematuria and clots. Hematuria resolved.   - C/w Reyes placement with PRN irrigation.   - F/U BMP.   - Avoid nephrotoxic agents.   - Avoid IV fluids as much as possible due to severely reduced EF.   - Nephro consulted. - albumin trial 2 days completed 5/15

## 2023-05-18 NOTE — PROGRESS NOTE ADULT - SUBJECTIVE AND OBJECTIVE BOX
[facility] NEPHROLOGY- PROGRESS NOTE    73y Male with history of intellectual disability, BPH, chance for urinary retention, CHF presents with gross hematuria. Nephrology consulted for elevated Scr.    REVIEW OF SYSTEMS: Limited due to mental status. Denies any SOB or chest pain    No Known Allergies      Hospital Medications: Medications reviewed    VITALS:  T(F): 98.1 (23 @ 05:07), Max: 98.1 (23 @ 05:07)  HR: 86 (23 @ 05:07)  BP: 122/86 (23 @ 05:07)  RR: 18 (23 @ 05:07)  SpO2: 100% (23 @ 05:07)  Wt(kg): --     @ 07:01  -   @ 07:00  --------------------------------------------------------  IN: 0 mL / OUT: 780 mL / NET: -780 mL        PHYSICAL EXAM:    Gen: NAD, calm  Cards: RRR, +S1/S2, no M/G/R  Resp: CTA b/l  GI: soft, NT, + ab distention, NABS  : + chance with yellow urine  Vascular: trace ankle edema B/L    LABS:      145  |  110<H>  |  79<H>  ----------------------------<  120<H>  4.8   |  28  |  3.58<H>    Ca    9.9      18 May 2023 08:20  Phos  4.0       Mg     3.3         TPro  7.2  /  Alb  2.8<L>  /  TBili  0.9  /  DBili      /  AST  39  /  ALT  71<H>  /  AlkPhos  120      Creatinine Trend: 3.58 <--, 3.62 <--, 3.76 <--, 4.27 <--, 5.01 <--, 5.16 <--, 4.87 <--, 4.90 <--                        11.5   8.25  )-----------( 235      ( 18 May 2023 08:20 )             37.2     Urine Studies:  Urinalysis Basic - ( 12 May 2023 21:35 )    Color: Yellow / Appearance: Slightly Turbid / S.015 / pH:   Gluc:  / Ketone: Trace  / Bili: Small / Urobili: 4 mg/dL   Blood:  / Protein: 100 mg/dL / Nitrite: Negative   Leuk Esterase: Moderate / RBC: >50 /HPF / WBC 6-10 /HPF   Sq Epi:  / Non Sq Epi:  / Bacteria: Moderate /HPF      Creatinine, Random Urine: 94 mg/dL ( @ 17:50)

## 2023-05-18 NOTE — DIETITIAN INITIAL EVALUATION ADULT - PROBLEM/PLAN-9
DISPLAY PLAN FREE TEXT

## 2023-05-18 NOTE — DIETITIAN INITIAL EVALUATION ADULT - PERTINENT MEDS FT
MEDICATIONS  (STANDING):  albuterol    90 MICROgram(s) HFA Inhaler 2 Puff(s) Inhalation every 6 hours  aspirin  chewable 81 milliGRAM(s) Oral daily  atorvastatin 40 milliGRAM(s) Oral at bedtime  buMETAnide 1 milliGRAM(s) Oral daily  dextrose 5%. 1000 milliLiter(s) (100 mL/Hr) IV Continuous <Continuous>  dextrose 5%. 1000 milliLiter(s) (50 mL/Hr) IV Continuous <Continuous>  dextrose 50% Injectable 25 Gram(s) IV Push once  dextrose 50% Injectable 12.5 Gram(s) IV Push once  dextrose 50% Injectable 25 Gram(s) IV Push once  finasteride 5 milliGRAM(s) Oral daily  fluticasone propionate 50 MICROgram(s)/spray Nasal Spray 1 Spray(s) Both Nostrils two times a day  glucagon  Injectable 1 milliGRAM(s) IntraMuscular once  guaiFENesin ER 1200 milliGRAM(s) Oral every 12 hours  heparin   Injectable 5000 Unit(s) SubCutaneous every 12 hours  hydrALAZINE 10 milliGRAM(s) Oral three times a day  insulin lispro (ADMELOG) corrective regimen sliding scale   SubCutaneous at bedtime  insulin lispro (ADMELOG) corrective regimen sliding scale   SubCutaneous three times a day before meals  insulin lispro Injectable (ADMELOG) 6 Unit(s) SubCutaneous three times a day before meals  isosorbide   mononitrate ER Tablet (IMDUR) 30 milliGRAM(s) Oral daily  lactulose Syrup 20 Gram(s) Oral at bedtime  melatonin 5 milliGRAM(s) Oral at bedtime  metoprolol tartrate 12.5 milliGRAM(s) Oral two times a day  montelukast 10 milliGRAM(s) Oral at bedtime  multivitamin 1 Tablet(s) Oral daily  OLANZapine 10 milliGRAM(s) Oral at bedtime  tamsulosin 0.4 milliGRAM(s) Oral at bedtime  tiotropium 2.5 MICROgram(s) Inhaler 2 Puff(s) Inhalation daily  valproic  acid Syrup 250 milliGRAM(s) Oral daily  valproic  acid Syrup 250 milliGRAM(s) Oral at bedtime    MEDICATIONS  (PRN):  acetaminophen     Tablet .. 650 milliGRAM(s) Oral every 6 hours PRN Temp greater or equal to 38C (100.4F), Mild Pain (1 - 3)  dextrose Oral Gel 15 Gram(s) Oral once PRN Blood Glucose LESS THAN 70 milliGRAM(s)/deciliter

## 2023-05-19 NOTE — PROGRESS NOTE ADULT - ASSESSMENT
73 year old male with Intellectual disability 2/2 fetal alcohol syndrome, [facility] now at [facility], w/ PMHx of COPD with chronic cough, hx of non ischemic cardiomyopathy with non-obstructive CAD on CCTA (2021), HFrEF (EF 10-15% per recent Echo), HTN, DM, CKD elevated PSA, BPH, schizophrenia, ? dementia (on home memantine), hepatitis B carrier, and bilateral sensorineural hearing loss.  Not a candidate for ICD placement due to lack of decision making capacity (AHA class III contraindication).  He is well known to our palliative care service.  Previously admitted 3/28 to 3/30 for demand ischemia and CHF exacerbation, then with prolonged hospitalization from 4/8 to 4/28 for chest pain (likely musculoskeletal) and decompensated CHF, complicated by worsening YASSINE on CKD, originally thought to be due to ATN/overdiuresis/? intermittent urinary retention (vs CRS); patient discharged to [facility] on 4/28.  Then readmitted to [facility] 5/4 to 5/9 for altered mental status and respiratory distress, found unresponsive by NH staff; found to have worsening YASSINE on CKD with BNP of 63,000.  Admitted at that time for post obstructive renal failure, UTI, and possible HF exacerbation, improved after insertion of Reyes cath, discharged back to [facility] on 5/9.      Patient pulled out Reyes 5/11, replaced in ER and sent back to [facility]  Returned 5/12 with hematuria in Reyse bag and jarred-urethral clots, Cr back up to 4.90, admitted for hematuria and YASSINE on CKD III.  Creatinine slowly improving, patient remains intermittently dyspneic.

## 2023-05-19 NOTE — PROGRESS NOTE ADULT - SUBJECTIVE AND OBJECTIVE BOX
PGY-1 Progress Note discussed with attending    PLEASE CONTACT ON CALL TEAM:  - On Call Team (Please refer to Red) FROM 5:00 PM - 8:30PM  - Nightfloat Team FROM 8:30 -7:30 AM    INTERVAL HPI/OVERNIGHT EVENTS: No acute events overnight. Denies chest pain, fevers, chills. Pt. states that his breathing improved compared to yesterday. NC removed. Ate breakfast, friend at bedside.     MEDICATIONS  (STANDING):  albuterol    90 MICROgram(s) HFA Inhaler 2 Puff(s) Inhalation every 6 hours  aspirin  chewable 81 milliGRAM(s) Oral daily  atorvastatin 40 milliGRAM(s) Oral at bedtime  buMETAnide 1 milliGRAM(s) Oral daily  dextrose 5%. 1000 milliLiter(s) (100 mL/Hr) IV Continuous <Continuous>  dextrose 5%. 1000 milliLiter(s) (50 mL/Hr) IV Continuous <Continuous>  dextrose 50% Injectable 25 Gram(s) IV Push once  dextrose 50% Injectable 12.5 Gram(s) IV Push once  dextrose 50% Injectable 25 Gram(s) IV Push once  finasteride 5 milliGRAM(s) Oral daily  fluticasone propionate 50 MICROgram(s)/spray Nasal Spray 1 Spray(s) Both Nostrils two times a day  glucagon  Injectable 1 milliGRAM(s) IntraMuscular once  guaiFENesin ER 1200 milliGRAM(s) Oral every 12 hours  heparin   Injectable 5000 Unit(s) SubCutaneous every 12 hours  hydrALAZINE 10 milliGRAM(s) Oral three times a day  insulin lispro (ADMELOG) corrective regimen sliding scale   SubCutaneous three times a day before meals  insulin lispro (ADMELOG) corrective regimen sliding scale   SubCutaneous at bedtime  insulin lispro Injectable (ADMELOG) 6 Unit(s) SubCutaneous three times a day before meals  isosorbide   mononitrate ER Tablet (IMDUR) 30 milliGRAM(s) Oral daily  lactulose Syrup 20 Gram(s) Oral at bedtime  melatonin 5 milliGRAM(s) Oral at bedtime  metoprolol tartrate 12.5 milliGRAM(s) Oral two times a day  montelukast 10 milliGRAM(s) Oral at bedtime  multivitamin 1 Tablet(s) Oral daily  OLANZapine 10 milliGRAM(s) Oral at bedtime  tamsulosin 0.4 milliGRAM(s) Oral at bedtime  tiotropium 2.5 MICROgram(s) Inhaler 2 Puff(s) Inhalation daily  valproic  acid Syrup 250 milliGRAM(s) Oral at bedtime  valproic  acid Syrup 250 milliGRAM(s) Oral daily    MEDICATIONS  (PRN):  acetaminophen     Tablet .. 650 milliGRAM(s) Oral every 6 hours PRN Temp greater or equal to 38C (100.4F), Mild Pain (1 - 3)  dextrose Oral Gel 15 Gram(s) Oral once PRN Blood Glucose LESS THAN 70 milliGRAM(s)/deciliter      REVIEW OF SYSTEMS:  CONSTITUTIONAL: No fever, weight loss, or fatigue  RESPIRATORY: No cough, wheezing, chills or hemoptysis; (+) shortness of breath  CARDIOVASCULAR: No chest pain, palpitations, dizziness, or leg swelling  GASTROINTESTINAL: No abdominal pain. No nausea, vomiting, or hematemesis; No diarrhea or constipation. No melena or hematochezia.  GENITOURINARY: No dysuria or hematuria, urinary frequency  NEUROLOGICAL: No headaches, memory loss, loss of strength, numbness, or tremors  SKIN: No itching, burning, rashes, or lesions     Vital Signs Last 24 Hrs  T(C): 36.7 (18 May 2023 05:07), Max: 36.7 (18 May 2023 05:07)  T(F): 98.1 (18 May 2023 05:07), Max: 98.1 (18 May 2023 05:07)  HR: 86 (18 May 2023 05:07) (86 - 97)  BP: 122/86 (18 May 2023 05:07) (122/86 - 136/97)  BP(mean): --  RR: 18 (18 May 2023 05:07) (18 - 20)  SpO2: 100% (18 May 2023 05:07) (98% - 100%)    Parameters below as of 18 May 2023 05:07  Patient On (Oxygen Delivery Method): nasal cannula  O2 Flow (L/min): 2      PHYSICAL EXAMINATION:  GENERAL: NAD, well built  HEAD:  Atraumatic, Normocephalic  EYES:  conjunctiva and sclera clear  NECK: Supple, No JVD, Normal thyroid  CHEST/LUNG: Clear to auscultation. Clear to percussion bilaterally; No rales, rhonchi, wheezing, or rubs  HEART: Regular rate and rhythm; No murmurs, rubs, or gallops  ABDOMEN: Soft, Nontender, Nondistended; Bowel sounds present  NERVOUS SYSTEM:  Alert & Oriented X1-2, no focal neuro deficits     EXTREMITIES:  2+ Peripheral Pulses, No clubbing, cyanosis, (+) +1 pitting edema in b/l LE   SKIN: warm dry                          11.5   8.25  )-----------( 235      ( 18 May 2023 08:20 )             37.2     05-18    145  |  110<H>  |  79<H>  ----------------------------<  120<H>  4.8   |  28  |  3.58<H>    Ca    9.9      18 May 2023 08:20  Phos  4.0     05-18  Mg     3.3     05-18    TPro  7.2  /  Alb  2.8<L>  /  TBili  0.9  /  DBili  x   /  AST  39  /  ALT  71<H>  /  AlkPhos  120  05-18    LIVER FUNCTIONS - ( 18 May 2023 08:20 )  Alb: 2.8 g/dL / Pro: 7.2 g/dL / ALK PHOS: 120 U/L / ALT: 71 U/L DA / AST: 39 U/L / GGT: x                   CAPILLARY BLOOD GLUCOSE      RADIOLOGY & ADDITIONAL TESTS:

## 2023-05-19 NOTE — PROGRESS NOTE ADULT - PROBLEM SELECTOR PLAN 7
As above.  Patient now with advanced systolic HF, in setting of severe NICM, with EF of 10-15%, and increasing BEARDEN/SOB with performance of ADLs resulting in functional limitation.  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  He is not a candidate for ICD.  Now with recurrent YASSINE on CKD III due to urinary retention/post obstructive renal failure, returns with worsening renal functional and hematuria/clots due to traumatic pulling of Reyes.  Likely to require long-term indwelling Reyes catheter, further adding to his medical debility.  Now trending towards CKD stage IV, ? concern for component of cardiorenal syndrome.  He also has emerging protein-calorie malnutrition with albumin of 2.2.    Patient has Stage C NYHA class III heart failure and worsening YASSINE on CKD.  Given his multiple medical comorbidities, he is at risk for continued progression of disease, further functional decline, recurrent hospitalizations, increasing morbidity, and sudden death.  He is medically appropriate for hospice with a prognosis generally in the range of 6 to 12 months, if not sooner.    Patient declining with increasing dyspnea--will start IV Dilaudid 0.3 mg Q 4 hrs PRN    Patient is under auspices of [employer] (CABs) as his 1750-b guardian.    CAB now in agreement with DNR. DNI, comfort care per MOLST orders (with use vs limitation of antibiotics to be determined at point of infection), and hospice.   OPWDD and MHLS with no objection  MOLST completed with orders for DNR/DNI    Discharge planning meeting held with Dr. Dhiraj CHANEY, CAB, and patient's care team from the community.  All parties in agreement with LTC with hospice in new facility--see care coordination notes from  for further details     Remainder of medical management as per Nephrology and primary team  Palliative care team will continue to follow.

## 2023-05-19 NOTE — PROGRESS NOTE ADULT - PROBLEM SELECTOR PLAN 1
Prior to previous admission in early April 2023, patient was having increasing BEARDEN, SOB with performance of ADLs, and functional limitation due to his heart failure.  With his increasing symptom burden he was not able to return to his group home setting and was recently discharged to [facility].  Symptom burden now exacerbated by recurrent YASSINE on CKD with need for chronic indwelling Reyes catheter.  Also question of possible cardiorenal syndrome.    Patient has Stage C NYHA class III heart failure now complicated by worsening YASSINE on CKD and further clinical debility.  He is at risk for continued progression of disease, further functional decline, recurrent infections and hospitalization, increasing morbidity, and sudden death.  Not a candidate for AICD placement (class III contraindication).  Per previous Cardiology consult from Dr. Jiménez, CPR likely would be medically futile given his underlying heart failure and NICM.      Patient is medically appropriate for hospice.  Patient under auspices of CAB; CAB now in written agreement with DNR, DNI, comfort oriented care (as based on MOLST), and hospice.  OPWDD and MHLS with no objection.  See previous C discussion on 5/17  MOLST orders completed    Continue optimization of GDMT  Will add IV Dilaudid 0.3 mg Q 4 PRN dyspnea/labored breathing--Dr. Mason aware.  Remainder of management as per primary medical team.

## 2023-05-19 NOTE — PROGRESS NOTE ADULT - ASSESSMENT
Patient is a 73 year old male with Intellectual disability 2/2 to fetal alcohol syndrome (does not have Downs syndrome) from [facility] w/ PMHx COPD with chronic cough, hx of non ischemic cardiomyopathy with non-obstructive CAD on CCTA (2021), HFrEF (EF 10-15% on echo in 3/23), HTN, DM, CKD elevated PSA, BPH, schizophrenia, hepatitis B, carrier, bilateral sensorineural hearing loss. Patient is admitted for YASSINE and hematuria likely 2/2 traumatic removal of Chance. Pt's chance was replaced, and obstruction removed, Cr has appropriately improved. Cr of 4.90 on admission. > 5 > 4.7 > 3.62 >3.58 today downtrending. Pt. was given albumin trial 2 days completed 5/15, that improved his urine output. C/w bumex, BB, statin, albuterol, spiriva, montelukast, home meds. CXR 5/17 shows improvement of vascular congestion compared to admission.

## 2023-05-19 NOTE — PROGRESS NOTE ADULT - PROBLEM SELECTOR PLAN 1
- Cr of 4.90 on admission. > 5 > 4.7 > 3.62 >3.58 today downtrending   - On discharge 05/09 Cr of 2.14.   - Likely obstructive in setting of pulling out Reyes traumatically leading to hematuria and clots. Hematuria resolved.   - C/w Reyes placement with PRN irrigation.   - F/U BMP.   - Avoid nephrotoxic agents.   - Avoid IV fluids as much as possible due to severely reduced EF.   - Nephro consulted. - albumin trial 2 days completed 5/15

## 2023-05-19 NOTE — PROGRESS NOTE ADULT - SUBJECTIVE AND OBJECTIVE BOX
[facility] NEPHROLOGY- PROGRESS NOTE    73y Male with history of intellectual disability, BPH, chance for urinary retention, CHF presents with gross hematuria. Nephrology consulted for elevated Scr.    REVIEW OF SYSTEMS: Limited due to mental status. Denies any SOB or chest pain    No Known Allergies      Hospital Medications: Medications reviewed    VITALS:  T(F): 97.4 (23 @ 11:57), Max: 97.4 (23 @ 05:15)  HR: 101 (23 @ 11:57)  BP: 132/87 (23 @ 11:57)  RR: 18 (23 @ 11:57)  SpO2: 99% (23 @ 11:57)  Wt(kg): --     @ 07:01  -   @ 07:00  --------------------------------------------------------  IN: 0 mL / OUT: 1200 mL / NET: -1200 mL     @ 07:01  -   @ 19:00  --------------------------------------------------------  IN: 0 mL / OUT: 300 mL / NET: -300 mL      PHYSICAL EXAM:    Gen: NAD, calm  Cards: RRR, +S1/S2, no M/G/R  Resp: CTA b/l  GI: soft, NT, + ab distention, NABS  : + chance with yellow urine  Vascular: trace ankle edema B/L    LABS:      144  |  110<H>  |  70<H>  ----------------------------<  142<H>  4.4   |  28  |  3.03<H>    Ca    10.0      19 May 2023 08:05  Phos  3.6       Mg     3.1         TPro  7.2  /  Alb  2.8<L>  /  TBili  0.9  /  DBili      /  AST  39  /  ALT  71<H>  /  AlkPhos  120  -18    Creatinine Trend: 3.03 <--, 3.58 <--, 3.62 <--, 3.76 <--, 4.27 <--, 5.01 <--, 5.16 <--                        12.3   12.66 )-----------( 234      ( 19 May 2023 08:05 )             41.2     Urine Studies:  Urinalysis Basic - ( 12 May 2023 21:35 )    Color: Yellow / Appearance: Slightly Turbid / S.015 / pH:   Gluc:  / Ketone: Trace  / Bili: Small / Urobili: 4 mg/dL   Blood:  / Protein: 100 mg/dL / Nitrite: Negative   Leuk Esterase: Moderate / RBC: >50 /HPF / WBC 6-10 /HPF   Sq Epi:  / Non Sq Epi:  / Bacteria: Moderate /HPF      Creatinine, Random Urine: 94 mg/dL ( @ 17:50)

## 2023-05-19 NOTE — PROGRESS NOTE ADULT - SUBJECTIVE AND OBJECTIVE BOX
follow up on:  complex medical decision making related to goals of care    [facility] Geriatric and Palliative Consult Service:  Aliza Hsu DO: cell ([phone])  Reji Hart MD: cell ([phone])  Joel Weiner NP: cell ([phone])   Michelle Iqbal DNP: cell [phone])  Kahlil Lucas LMSW: cell ([phone])   Bharti Nogueira NP: via [employer] Teams    Can contact any Palliative Team member via [employer] Teams for consults and questions      OVERNIGHT EVENTS:  None  Patient examined today with home aide beside.  Remains alert and oriented x 1-2 with ongoing confusion.  Appears more SOB/acutely dyspneic.  Denies pain, chest pain, or palpitations.  Denies nausea, vomiting, or constipation.  Otherwise has no acute complaints.      Present Symptoms: Mild, Moderate, Severe  Pain:             Location -   Denies                            Aggravating factors -             Quality -             Radiation -             Timing-             Severity (0-10 scale): 0/10             Minimal acceptable level (0-10 scale):  Fatigue:  MIld  Nausea:  Denies  Lack of Appetite:   Denies  SOB:  Mild to moderate  Depression:  Anxiety:  Review of Systems:  All other systems reviewed and are negative.      MEDICATIONS  (STANDING):  albuterol/ipratropium for Nebulization 3 milliLiter(s) Nebulizer every 6 hours  aspirin  chewable 81 milliGRAM(s) Oral daily  atorvastatin 40 milliGRAM(s) Oral at bedtime  buMETAnide 1 milliGRAM(s) Oral daily  dextrose 5%. 1000 milliLiter(s) (50 mL/Hr) IV Continuous <Continuous>  dextrose 5%. 1000 milliLiter(s) (100 mL/Hr) IV Continuous <Continuous>  dextrose 50% Injectable 25 Gram(s) IV Push once  dextrose 50% Injectable 12.5 Gram(s) IV Push once  dextrose 50% Injectable 25 Gram(s) IV Push once  finasteride 5 milliGRAM(s) Oral daily  fluticasone propionate 50 MICROgram(s)/spray Nasal Spray 1 Spray(s) Both Nostrils two times a day  glucagon  Injectable 1 milliGRAM(s) IntraMuscular once  guaiFENesin ER 1200 milliGRAM(s) Oral every 12 hours  heparin   Injectable 5000 Unit(s) SubCutaneous every 12 hours  hydrALAZINE 10 milliGRAM(s) Oral three times a day  insulin lispro (ADMELOG) corrective regimen sliding scale   SubCutaneous at bedtime  insulin lispro (ADMELOG) corrective regimen sliding scale   SubCutaneous three times a day before meals  insulin lispro Injectable (ADMELOG) 6 Unit(s) SubCutaneous three times a day before meals  isosorbide   mononitrate ER Tablet (IMDUR) 30 milliGRAM(s) Oral daily  lactulose Syrup 20 Gram(s) Oral at bedtime  melatonin 5 milliGRAM(s) Oral at bedtime  metoprolol tartrate 12.5 milliGRAM(s) Oral two times a day  montelukast 10 milliGRAM(s) Oral at bedtime  multivitamin 1 Tablet(s) Oral daily  tamsulosin 0.4 milliGRAM(s) Oral at bedtime  valproic  acid Syrup 250 milliGRAM(s) Oral daily  valproic  acid Syrup 250 milliGRAM(s) Oral at bedtime    MEDICATIONS  (PRN):  acetaminophen     Tablet .. 650 milliGRAM(s) Oral every 6 hours PRN Temp greater or equal to 38C (100.4F), Mild Pain (1 - 3)  dextrose Oral Gel 15 Gram(s) Oral once PRN Blood Glucose LESS THAN 70 milliGRAM(s)/deciliter  HYDROmorphone  Injectable 0.3 milliGRAM(s) IV Push every 4 hours PRN Shortness of breath or labored breathing      PHYSICAL EXAM:  Vital Signs Last 24 Hrs  T(C): 36.2 (20 May 2023 11:57), Max: 36.2 (19 May 2023 20:28)  T(F): 97.2 (20 May 2023 11:57), Max: 97.2 (19 May 2023 20:28)  HR: 97 (20 May 2023 11:57) (91 - 98)  BP: 123/86 (20 May 2023 11:57) (115/69 - 143/94)  BP(mean): --  RR: 20 (20 May 2023 11:57) (18 - 120)  SpO2: 99% (20 May 2023 11:57) (98% - 100%)    Parameters below as of 20 May 2023 11:57  Patient On (Oxygen Delivery Method): room air        General: alert  oriented x _1-2___   mild temporal wasting, appears in mild respiratory distress    Palliative Performance Scale/Karnofsky Score:  30%  http://npcrc.org/files/news/palliative_performance_scale_ppsv2.pdf    HEENT: EOMI anicteric, pharynx clear, MM moist  Lungs: tachypneic with slightly labored breathing, otherwise clear to auscultation  CV:  sl tachycardic, normal S1 and S2, no murmur  GI: soft non distended non tender normal bowel sounds  Last BM:  today (5/19)  : incontinent  + Reyes cath in place  Musculoskeletal: weakness x4 in all extremities, now mostly bedbound, trace LE edema bilaterally  Skin: no abnormal skin lesions or DU noted  Neuro: no focal deficits, + cognitive impairment  Oral intake ability:  moderate capability, on pureed diet with thickened liquids    LABS:                          11.1   10.69 )-----------( 252      ( 20 May 2023 06:46 )             36.6     05-20    142  |  111<H>  |  70<H>  ----------------------------<  86  4.2   |  28  |  2.81<H>    Ca    9.8      20 May 2023 06:46  Phos  3.7     05-20  Mg     2.9     05-20          RADIOLOGY & ADDITIONAL STUDIES:

## 2023-05-19 NOTE — PROGRESS NOTE ADULT - PROBLEM SELECTOR PLAN 5
- Not in exacerbation.   - Echo 3/23: EF 10-15%.   - C/w bumex, BB, statin.  CXR 5/17 shows improvement of vascular congestion compared to admission.

## 2023-05-19 NOTE — PROGRESS NOTE ADULT - ASSESSMENT
73y Male with history of intellectual disability, BPH, chance for urinary retention, CHF presents with gross hematuria. Nephrology consulted for elevated Scr.    1) YASSINE: Abrupt increase in Scr over relatively short period of time suspect due to retention/ATN. Scr without improvement despite replacement of chance but non-oliguric with UO improving. Renal function now improving s/p IV albumin. If worsening SCr, recc to hold Bumex.  Renal US with no hydro. UA active likely due to trauma (would not expect GN to cause such an acute rise in Scr in a matter of days). FeUrea low. Avoid nephrotoxins.    2) CKD-3a: Baseline Scr 1.3-1.6 with resolving YASSINE on prior admission and Scr decreased to 2.1 on discharge. Monitor electrolytes.    3) HTN with CKD: BP acceptable. Continue with current medications. Monitor BP.    4) LE edema: With mild congestion/LE edema; resolving. Continue with bumex 1 mg PO daily. TTE with severe global LVSD. Strict I/O's. Monitor UO.    5) Hyperphosphatemia: Resolved. Continue with low phosphorus diet. Monitor serum calcium and phosphorus.      [facility] NEPHROLOGY  Raul Goncalves M.D.  Bennie Carcamo D.O.  Priya Pederson M.D.  Hellen Dietz, MSN, ANP-C    Telephone: [phone]  Facsimile: [phone]    [address] [place] [address]

## 2023-05-20 NOTE — PROGRESS NOTE ADULT - ATTENDING COMMENTS
Patient was seen and examined at bedside. Same mental status as yesterday     ICU Vital Signs Last 24 Hrs  T(C): 36.5 (14 May 2023 13:55), Max: 36.6 (13 May 2023 20:36)  T(F): 97.7 (14 May 2023 13:55), Max: 97.9 (13 May 2023 20:36)  HR: 82 (14 May 2023 13:55) (77 - 82)  BP: 107/68 (14 May 2023 13:55) (106/62 - 127/78)  BP(mean): --  ABP: --  ABP(mean): --  RR: 20 (14 May 2023 13:55) (17 - 20)  SpO2: 99% (14 May 2023 13:55) (99% - 100%)    O2 Parameters below as of 14 May 2023 13:55  Patient On (Oxygen Delivery Method): nasal cannula  O2 Flow (L/min): 3      P/E:  NAD, tired looking   AAOx1-2, no new focal deficit   CTABL  S1S2 WNL  Abd soft, non tender, BS present   BLLE no edema or calf tenderness   Reyes draining clear urine     Labs noted     A/P:  YASSINE possibly due to obstructive uropathy vs ATN  Hyperkalemia due to YASSINE resolved   Chronic systolic and diastolic Heart failure  DM with hyperglycemia   HTN  HLD  Asthma  Fetal alcohol syndrome   Intellectual disability  Esophageal thickening    Plan:  Cont Reyes  Monitor urine output 770ml yesterday  Nephrology consult appreciated  Monitor Hb  Cont tamsulosin and Finasteride   Cont home dose Bumex and albumin day2  CXR does not look volume overloaded   No EKG changes  cont home meds for BP  Cont Albuterol, spiriva and Duoneb  Cont ISS  DC Clonazepam, cont current dose of valproic acid, send valproic acid level  Palliative care on board for [facility].
Patient was seen and examined at bedside. more alert then yesterday. Reports SOB has improved     ICU Vital Signs Last 24 Hrs  T(C): 36.9 (15 May 2023 11:57), Max: 36.9 (15 May 2023 11:57)  T(F): 98.4 (15 May 2023 11:57), Max: 98.4 (15 May 2023 11:57)  HR: 87 (15 May 2023 11:57) (81 - 87)  BP: 124/88 (15 May 2023 11:57) (124/88 - 136/78)  BP(mean): --  ABP: --  ABP(mean): --  RR: 18 (15 May 2023 11:57) (18 - 18)  SpO2: 95% (15 May 2023 11:57) (95% - 100%)    O2 Parameters below as of 15 May 2023 11:57  Patient On (Oxygen Delivery Method): nasal cannula  O2 Flow (L/min): 3    P/E:  NAD, tired looking   AAOx1-2, no new focal deficit   Few crepitations BL  S1S2 WNL  Abd soft, non tender, BS present   BLLE no edema or calf tenderness   Reyes draining clear urine     Labs noted     A/P:  YASSINE possibly due to obstructive uropathy vs ATN  Hyperkalemia due to YASSINE resolved   Chronic systolic and diastolic Heart failure  DM with hyperglycemia   HTN  HLD  Asthma  Fetal alcohol syndrome   Intellectual disability  Esophageal thickening    Plan:  Cont Reyes  Monitor urine output   Urology following   Nephrology consult appreciated  Monitor Hb  Cont tamsulosin and Finasteride   Cont home dose Bumex   No EKG changes  cont home meds for BP  Cont Albuterol, spiriva and Duoneb  Cont ISS  DC Clonazepam, cont current dose of valproic acid, follow up valproic acid level  Palliative care on board for [facility].
Patient is a 73 year old male with Intellectual disability 2/2 to fetal alcohol syndrome (does not have Downs syndrome) from [facility] w/ PMHx COPD with chronic cough, hx of non ischemic cardiomyopathy with non-obstructive CAD on CCTA (2021), HFrEF (EF 10-15% on echo in 3/23), HTN, DM, CKD elevated PSA, BPH, schizophrenia, hepatitis B, carrier, bilateral sensorineural hearing loss. Patient is admitted for YASSINE and hematuria likely 2/2 traumatic removal of Reyes and urinary retention.     Pt seen and examined, no new complaints.      Labs reviewed- cbc, bmp, lfts     PE as above     A/P:  #YASSINE- suspect ATN vs retention   #CKD 3a  #HTN  #Hyperphosphatemia   #Hyperkalemia due to YASSINE- resolved   #Chronic systolic and diastolic Heart failure  #DM with hyperglycemia   #Asthma  #Fetal alcohol syndrome   #Intellectual disability  #Esophageal thickening    Plan:  -Pt's Scr is improving slowly, appreciate nephro recs. Would c/w Reyes cathter.   -C/w bumex , coreg, off acei./arb given yassine   -C/w valproic acid , f/u levels   -Pt stable off clonazepam   -C/w tamsulosin, finasteride
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 Patient is admitted for YASSINE and hematuria likely 2/2 traumatic removal of Reyes and urinary retention.     Pt seen and examined, he intermittently gets short of breath.     Labs reviewed- cbc, bmp reviewed     PE as above     A/P:  #YASSIEN- suspect ATN  w/ combination of urinary retention   #CKD 3a  #HTN  #Hyperphosphatemia   #Hyperkalemia due to YASSINE- resolved   #Chronic systolic and diastolic Heart failure  #DM with hyperglycemia   #Asthma  #Fetal alcohol syndrome   #Intellectual disability  #Esophageal thickening    Plan:
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/ PMHx COPD with chronic cough, hx of non ischemic cardiomyopathy with non-obstructive CAD on CCTA (2021), HFrEF (EF 10-15% on echo in 3/23), HTN, DM, CKD elevated PSA, BPH, schizophrenia, hepatitis B, carrier, bilateral sensorineural hearing loss. Patient is admitted for YASSINE and hematuria likely 2/2 traumatic removal of Reyes and urinary retention.     Pt seen and examined, appeared in respiratory distress earlier this am. He is also less responsive from his usual.     Labs reviewed- cbc, bmp reviewed     PE as above     A/P:  #Dyspnea   #YASSINE- suspect ATN  w/ combination of urinary retention   #CKD 3a  #HTN  #Hyperphosphatemia   #Hyperkalemia due to YASSINE- resolved   #Chronic systolic and diastolic Heart failure  #DM with hyperglycemia   #Asthma  #Fetal alcohol syndrome   #Intellectual disability  #Esophageal thickening    Plan:   -Patient w/ intermittent worsening dyspnea- could be multifactorial. Repeat chest X-ray w/ mild congestive changes- grossly unchanged from prior. No copious secretions noted w/ bedside suctioning. Gave Duoneb Rx along w/ Bumex IV x1. Advised RN to give Dilaudid prn to help w/ respiratory distress after which symptoms improved. No hypoxia noted.   -He is progressively getting more weaker and lethargic, DC bedtime Zyprexa. He won't be able to tolerate PO meds at this time anyways and will need to be re-assessed. Spoke Ms. RN Ms. Carrion   -D/w Dr churchill from palliative care services, if symptoms continue to worse may qualify for IPU. Also spoke w/ patient's [facility] home medical director Dhiraj Daly and updated her about current health status.

## 2023-05-20 NOTE — PROGRESS NOTE ADULT - PROBLEM SELECTOR PLAN 5
- Not in exacerbation.   - Echo 3/23: EF 10-15%.   - C/w bumex, BB, statin.  CXR 5/17 shows improvement of vascular congestion compared to admission. - Not in exacerbation.   - Echo 3/23: EF 10-15%.   - C/w bumex, BB, statin.  CXR 5/17 shows improvement of vascular congestion compared to admission   5/20 pt is more respiratory distress compared to previous days. Given duoneb x1, dilaudid x1, and his distress improved. Pending CXR to evaluate for fluid status.

## 2023-05-20 NOTE — PROGRESS NOTE ADULT - ASSESSMENT
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 Patient is admitted for YASSINE and hematuria likely 2/2 traumatic removal of Chance. Pt's chance was replaced, and obstruction removed, Cr has appropriately improved. Cr of 4.90 on admission. > 5 > 4.7 > 3.62 >3.58 today downtrending. Pt. was given albumin trial 2 days completed 5/15, that improved his urine output. C/w bumex, BB, statin, albuterol, spiriva, montelukast, home meds. CXR 5/17 shows improvement of vascular congestion compared to admission.  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 Patient is admitted for YASSINE and hematuria likely 2/2 traumatic removal of Chance. Pt's chance was replaced, and obstruction removed, Cr has appropriately improved. Cr of 4.90 on admission. > 5 > 4.7 > 3.62 >3.58 today downtrending. Pt. was given albumin trial 2 days completed 5/15, that improved his urine output. C/w bumex, BB, statin, albuterol, spiriva, montelukast, home meds. CXR 5/17 shows improvement of vascular congestion compared to admission. 5/20 pt is more respiratory distress compared to previous days. Given duoneb x1, dilaudid x1, and his distress improved. Pending CXR to evaluate for fluid status.

## 2023-05-20 NOTE — PROGRESS NOTE ADULT - PROBLEM SELECTOR PLAN 1
- Cr of 4.90 on admission. > 5 > 4.7 > 3.62 >3.58 today downtrending   - On discharge 05/09 Cr of 2.14.   - Likely obstructive in setting of pulling out Reyes traumatically leading to hematuria and clots. Hematuria resolved.   - C/w Reyes placement with PRN irrigation.   - F/U BMP.   - Avoid nephrotoxic agents.   - Avoid IV fluids as much as possible due to severely reduced EF.   - Nephro consulted. - albumin trial 2 days completed 5/15 - Cr of 4.90 on admission. > 5 > 4.7 > 3.62 >3.58 today downtrending   - On discharge 05/09 Cr of 2.14.   - Likely obstructive in setting of pulling out Reyes traumatically leading to hematuria and clots. Hematuria resolved.   - C/w Reyes placement with PRN irrigation.   - F/U BMP.   - Avoid nephrotoxic agents.   - Avoid IV fluids as much as possible due to severely reduced EF.   - Nephro consulted. - albumin trial 2 days completed 5/15  -  5/20 pt is more respiratory distress compared to previous days. Given duoneb x1, dilaudid x1, and his distress improved. Pending CXR to evaluate for fluid status.

## 2023-05-20 NOTE — PROGRESS NOTE ADULT - SUBJECTIVE AND OBJECTIVE BOX
PGY-1 Progress Note discussed with attending    PAGER #: [[phone]] TILL 5:00 PM  PLEASE CONTACT ON CALL TEAM:  - On Call Team (Please refer to Red) FROM 5:00 PM - 8:30PM  - Nightfloat Team FROM 8:30 -7:30 AM    CHIEF COMPLAINT & BRIEF HOSPITAL COURSE:    INTERVAL HPI/OVERNIGHT EVENTS:   MEDICATIONS  (STANDING):  albuterol    90 MICROgram(s) HFA Inhaler 2 Puff(s) Inhalation every 6 hours  aspirin  chewable 81 milliGRAM(s) Oral daily  atorvastatin 40 milliGRAM(s) Oral at bedtime  buMETAnide 1 milliGRAM(s) Oral daily  dextrose 5%. 1000 milliLiter(s) (100 mL/Hr) IV Continuous <Continuous>  dextrose 5%. 1000 milliLiter(s) (50 mL/Hr) IV Continuous <Continuous>  dextrose 50% Injectable 25 Gram(s) IV Push once  dextrose 50% Injectable 12.5 Gram(s) IV Push once  dextrose 50% Injectable 25 Gram(s) IV Push once  finasteride 5 milliGRAM(s) Oral daily  fluticasone propionate 50 MICROgram(s)/spray Nasal Spray 1 Spray(s) Both Nostrils two times a day  glucagon  Injectable 1 milliGRAM(s) IntraMuscular once  guaiFENesin ER 1200 milliGRAM(s) Oral every 12 hours  heparin   Injectable 5000 Unit(s) SubCutaneous every 12 hours  hydrALAZINE 10 milliGRAM(s) Oral three times a day  insulin lispro (ADMELOG) corrective regimen sliding scale   SubCutaneous at bedtime  insulin lispro (ADMELOG) corrective regimen sliding scale   SubCutaneous three times a day before meals  insulin lispro Injectable (ADMELOG) 6 Unit(s) SubCutaneous three times a day before meals  isosorbide   mononitrate ER Tablet (IMDUR) 30 milliGRAM(s) Oral daily  lactulose Syrup 20 Gram(s) Oral at bedtime  melatonin 5 milliGRAM(s) Oral at bedtime  metoprolol tartrate 12.5 milliGRAM(s) Oral two times a day  montelukast 10 milliGRAM(s) Oral at bedtime  multivitamin 1 Tablet(s) Oral daily  OLANZapine 2.5 milliGRAM(s) Oral at bedtime  tamsulosin 0.4 milliGRAM(s) Oral at bedtime  tiotropium 2.5 MICROgram(s) Inhaler 2 Puff(s) Inhalation daily  valproic  acid Syrup 250 milliGRAM(s) Oral daily  valproic  acid Syrup 250 milliGRAM(s) Oral at bedtime    MEDICATIONS  (PRN):  acetaminophen     Tablet .. 650 milliGRAM(s) Oral every 6 hours PRN Temp greater or equal to 38C (100.4F), Mild Pain (1 - 3)  dextrose Oral Gel 15 Gram(s) Oral once PRN Blood Glucose LESS THAN 70 milliGRAM(s)/deciliter  HYDROmorphone  Injectable 0.3 milliGRAM(s) IV Push every 4 hours PRN Shortness of breath or labored breathing      REVIEW OF SYSTEMS:  CONSTITUTIONAL: No fever, weight loss, or fatigue  RESPIRATORY: No cough, wheezing, chills or hemoptysis; No shortness of breath  CARDIOVASCULAR: No chest pain, palpitations, dizziness, or leg swelling  GASTROINTESTINAL: No abdominal pain. No nausea, vomiting, or hematemesis; No diarrhea or constipation. No melena or hematochezia.  GENITOURINARY: No dysuria or hematuria, urinary frequency  NEUROLOGICAL: No headaches, memory loss, loss of strength, numbness, or tremors  SKIN: No itching, burning, rashes, or lesions     Vital Signs Last 24 Hrs  T(C): 36.2 (20 May 2023 05:23), Max: 36.3 (19 May 2023 11:57)  T(F): 97.2 (20 May 2023 05:23), Max: 97.4 (19 May 2023 11:57)  HR: 98 (20 May 2023 05:23) (91 - 101)  BP: 135/84 (20 May 2023 05:23) (132/87 - 143/94)  BP(mean): --  RR: 20 (20 May 2023 05:23) (18 - 20)  SpO2: 100% (20 May 2023 05:23) (98% - 100%)    Parameters below as of 20 May 2023 05:23  Patient On (Oxygen Delivery Method): room air        PHYSICAL EXAMINATION:  GENERAL: NAD, well built  HEAD:  Atraumatic, Normocephalic  EYES:  conjunctiva and sclera clear  NECK: Supple, No JVD, Normal thyroid  CHEST/LUNG: Clear to auscultation. Clear to percussion bilaterally; No rales, rhonchi, wheezing, or rubs  HEART: Regular rate and rhythm; No murmurs, rubs, or gallops  ABDOMEN: Soft, Nontender, Nondistended; Bowel sounds present  NERVOUS SYSTEM:  Alert & Oriented X3,    EXTREMITIES:  2+ Peripheral Pulses, No clubbing, cyanosis, or edema  SKIN: warm dry                          11.1   10.69 )-----------( 252      ( 20 May 2023 06:46 )             36.6     05-20    142  |  111<H>  |  70<H>  ----------------------------<  86  4.2   |  28  |  2.81<H>    Ca    9.8      20 May 2023 06:46  Phos  3.7     05-20  Mg     2.9     05-20    TPro  7.2  /  Alb  2.8<L>  /  TBili  0.9  /  DBili  x   /  AST  39  /  ALT  71<H>  /  AlkPhos  120  05-18    LIVER FUNCTIONS - ( 18 May 2023 08:20 )  Alb: 2.8 g/dL / Pro: 7.2 g/dL / ALK PHOS: 120 U/L / ALT: 71 U/L DA / AST: 39 U/L / GGT: x                   CAPILLARY BLOOD GLUCOSE      RADIOLOGY & ADDITIONAL TESTS:                   PGY-1 Progress Note discussed with attending    PLEASE CONTACT ON CALL TEAM:  - On Call Team (Please refer to Red) FROM 5:00 PM - 8:30PM  - Nightfloat Team FROM 8:30 -7:30 AM    INTERVAL HPI/OVERNIGHT EVENTS: No acute events overnight. Upon my evaluation this morning, pt appeared in respiratory distress, not hypoxic (saturating ), but moaning, in more distress compared to yesterday. Given duoneb x1, dilaudid x1, and his distress improved. Pending CXR to evaluate for fluid status.     MEDICATIONS  (STANDING):  albuterol    90 MICROgram(s) HFA Inhaler 2 Puff(s) Inhalation every 6 hours  aspirin  chewable 81 milliGRAM(s) Oral daily  atorvastatin 40 milliGRAM(s) Oral at bedtime  buMETAnide 1 milliGRAM(s) Oral daily  dextrose 5%. 1000 milliLiter(s) (100 mL/Hr) IV Continuous <Continuous>  dextrose 5%. 1000 milliLiter(s) (50 mL/Hr) IV Continuous <Continuous>  dextrose 50% Injectable 25 Gram(s) IV Push once  dextrose 50% Injectable 12.5 Gram(s) IV Push once  dextrose 50% Injectable 25 Gram(s) IV Push once  finasteride 5 milliGRAM(s) Oral daily  fluticasone propionate 50 MICROgram(s)/spray Nasal Spray 1 Spray(s) Both Nostrils two times a day  glucagon  Injectable 1 milliGRAM(s) IntraMuscular once  guaiFENesin ER 1200 milliGRAM(s) Oral every 12 hours  heparin   Injectable 5000 Unit(s) SubCutaneous every 12 hours  hydrALAZINE 10 milliGRAM(s) Oral three times a day  insulin lispro (ADMELOG) corrective regimen sliding scale   SubCutaneous at bedtime  insulin lispro (ADMELOG) corrective regimen sliding scale   SubCutaneous three times a day before meals  insulin lispro Injectable (ADMELOG) 6 Unit(s) SubCutaneous three times a day before meals  isosorbide   mononitrate ER Tablet (IMDUR) 30 milliGRAM(s) Oral daily  lactulose Syrup 20 Gram(s) Oral at bedtime  melatonin 5 milliGRAM(s) Oral at bedtime  metoprolol tartrate 12.5 milliGRAM(s) Oral two times a day  montelukast 10 milliGRAM(s) Oral at bedtime  multivitamin 1 Tablet(s) Oral daily  OLANZapine 2.5 milliGRAM(s) Oral at bedtime  tamsulosin 0.4 milliGRAM(s) Oral at bedtime  tiotropium 2.5 MICROgram(s) Inhaler 2 Puff(s) Inhalation daily  valproic  acid Syrup 250 milliGRAM(s) Oral daily  valproic  acid Syrup 250 milliGRAM(s) Oral at bedtime    MEDICATIONS  (PRN):  acetaminophen     Tablet .. 650 milliGRAM(s) Oral every 6 hours PRN Temp greater or equal to 38C (100.4F), Mild Pain (1 - 3)  dextrose Oral Gel 15 Gram(s) Oral once PRN Blood Glucose LESS THAN 70 milliGRAM(s)/deciliter  HYDROmorphone  Injectable 0.3 milliGRAM(s) IV Push every 4 hours PRN Shortness of breath or labored breathing      REVIEW OF SYSTEMS: unable to assess, pt was not responding to questioning today     Vital Signs Last 24 Hrs  T(C): 36.2 (20 May 2023 05:23), Max: 36.3 (19 May 2023 11:57)  T(F): 97.2 (20 May 2023 05:23), Max: 97.4 (19 May 2023 11:57)  HR: 98 (20 May 2023 05:23) (91 - 101)  BP: 135/84 (20 May 2023 05:23) (132/87 - 143/94)  BP(mean): --  RR: 20 (20 May 2023 05:23) (18 - 20)  SpO2: 100% (20 May 2023 05:23) (98% - 100%)    Parameters below as of 20 May 2023 05:23  Patient On (Oxygen Delivery Method): room air        PHYSICAL EXAMINATION:  GENERAL: in respiratory distress (improved after intervention)  HEAD:  Atraumatic, Normocephalic  EYES:  conjunctiva and sclera clear  NECK: Supple, No JVD, Normal thyroid  CHEST/LUNG: (+) mild inspiratory crackles, and expiratory wheezes.  HEART: Regular rhythm, tachycardia to 100s; No murmurs, rubs, or gallops  ABDOMEN: Soft, Nontender, Nondistended; Bowel sounds present  NERVOUS SYSTEM:  Alert & Oriented X0 upon questioning today,    EXTREMITIES:  2+ Peripheral Pulses, No clubbing, cyanosis, or edema  SKIN: warm dry                          11.1   10.69 )-----------( 252      ( 20 May 2023 06:46 )             36.6     05-20    142  |  111<H>  |  70<H>  ----------------------------<  86  4.2   |  28  |  2.81<H>    Ca    9.8      20 May 2023 06:46  Phos  3.7     05-20  Mg     2.9     05-20    TPro  7.2  /  Alb  2.8<L>  /  TBili  0.9  /  DBili  x   /  AST  39  /  ALT  71<H>  /  AlkPhos  120  05-18    LIVER FUNCTIONS - ( 18 May 2023 08:20 )  Alb: 2.8 g/dL / Pro: 7.2 g/dL / ALK PHOS: 120 U/L / ALT: 71 U/L DA / AST: 39 U/L / GGT: x                   CAPILLARY BLOOD GLUCOSE      RADIOLOGY & ADDITIONAL TESTS:

## 2023-05-20 NOTE — PROGRESS NOTE ADULT - PROBLEM SELECTOR PLAN 6
- Not in exacerbation.   - C/w albuterol, tiotropium, montelukast.  - Start Duoneb Q6. - Not in exacerbation.   - C/w albuterol, tiotropium, montelukast.  - Start Duoneb Q6.  -  5/20 pt is more respiratory distress compared to previous days. Given duoneb x1, dilaudid x1, and his distress improved. Pending CXR to evaluate for fluid status.

## 2023-05-20 NOTE — PROGRESS NOTE ADULT - SUBJECTIVE AND OBJECTIVE BOX
[facility] NEPHROLOGY- PROGRESS NOTE    73y Male with history of intellectual disability, BPH, chance for urinary retention, CHF presents with gross hematuria. Nephrology consulted for elevated Scr.    REVIEW OF SYSTEMS: Unable to obtain due to mental status    No Known Allergies      Hospital Medications: Medications reviewed    VITALS:  T(F): 97.2 (05-20-23 @ 11:57), Max: 97.2 (05-19-23 @ 20:28)  HR: 97 (05-20-23 @ 11:57)  BP: 123/86 (05-20-23 @ 11:57)  RR: 120 (05-20-23 @ 11:57)  SpO2: 99% (05-20-23 @ 11:57)  Wt(kg): --    05-19 @ 07:01  -  05-20 @ 07:00  --------------------------------------------------------  IN: 0 mL / OUT: 900 mL / NET: -900 mL    05-20 @ 07:01  -  05-20 @ 16:52  --------------------------------------------------------  IN: 0 mL / OUT: 200 mL / NET: -200 mL        PHYSICAL EXAM:    Gen: NAD, calm  Cards: RRR, +S1/S2, no M/G/R  Resp: Coarse BS  GI: soft, NT, + ab distention, NABS  : + chance with yellow urine  Vascular: trace-+1 ankle edema B/L    LABS:  05-20    142  |  111<H>  |  70<H>  ----------------------------<  86  4.2   |  28  |  2.81<H>    Ca    9.8      20 May 2023 06:46  Phos  3.7     05-20  Mg     2.9     05-20      Creatinine Trend: 2.81 <--, 3.03 <--, 3.58 <--, 3.62 <--, 3.76 <--, 4.27 <--, 5.01 <--                        11.1   10.69 )-----------( 252      ( 20 May 2023 06:46 )             36.6     Urine Studies:    Creatinine, Random Urine: 94 mg/dL (05-13 @ 17:50)

## 2023-05-21 NOTE — CHART NOTE - NSCHARTNOTEFT_GEN_A_CORE
Was paged by RN that patient was noted to be shaking in his sleep twice. On evaluation at bedside patient is asleep, NAD but arousable. Does not seem to be post-ictal and has no complains. Patient is currently NPO. Patient did not get his daily dose of valrproic acid as patient is NPO. Valrpoic acid ordered as IV after confirming dose from pharmacy.     Primary team will be notified.

## 2023-05-21 NOTE — PROGRESS NOTE ADULT - SUBJECTIVE AND OBJECTIVE BOX
[facility] NEPHROLOGY- PROGRESS NOTE    73y Male with history of intellectual disability, BPH, chance for urinary retention, CHF presents with gross hematuria. Nephrology consulted for elevated Scr.    REVIEW OF SYSTEMS: Unable to obtain due to mental status    No Known Allergies      Hospital Medications: Medications reviewed    VITALS:  T(F): 98.2 (05-21-23 @ 12:07), Max: 98.2 (05-21-23 @ 12:07)  HR: 68 (05-21-23 @ 12:07)  BP: 132/90 (05-21-23 @ 12:07)  RR: 20 (05-21-23 @ 12:07)  SpO2: 100% (05-21-23 @ 12:07)  Wt(kg): --    05-20 @ 07:01  -  05-21 @ 07:00  --------------------------------------------------------  IN: 0 mL / OUT: 1600 mL / NET: -1600 mL        PHYSICAL EXAM:    Gen: Obtunded with gurgling noises  Cards: RRR, +S1/S2, no M/G/R  Resp: Coarse BS  GI: soft, NT, + ab distention, NABS  : + chance with yellow urine  Vascular: +1 LE edema B/L    LABS:  05-21    144  |  111<H>  |  72<H>  ----------------------------<  109<H>  5.0   |  28  |  3.04<H>    Ca    10.2      21 May 2023 08:07  Phos  5.7     05-21  Mg     3.0     05-21      Creatinine Trend: 3.04 <--, 2.81 <--, 3.03 <--, 3.58 <--, 3.62 <--, 3.76 <--, 4.27 <--                        11.0   6.99  )-----------( 253      ( 21 May 2023 08:07 )             37.2     Urine Studies:      < from: Xray Chest 1 View- PORTABLE-Urgent (Xray Chest 1 View- PORTABLE-Urgent .) (05.20.23 @ 13:13) >    ACC: [id number] EXAM:  XR CHEST PORTABLE URGENT 1V   ORDERED BY: RONEN SHERIDAN     PROCEDURE DATE:  05/20/2023          INTERPRETATION:  HISTORY: Admitting Dxs: N17.9 ACUTE KIDNEY FAILURE,   UNSPECIFIED;  respiratory distress;  TECHNIQUE: Portable frontal view of the chest, 1 view.  COMPARISON: 5/13/2023.  FINDINGS/  IMPRESSION:    HEART:  Enlarged  LUNGS: free of consolidation,effusion, or pneumothorax.  BONES: degenerative changes      --- End of Report ---            < end of copied text >

## 2023-05-21 NOTE — PROGRESS NOTE ADULT - ASSESSMENT
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 Patient is admitted for YASSINE and hematuria likely 2/2 traumatic removal of Reyes and urinary retention.         A/P:  #AHRF  #Dyspnea    #YASSINE- suspect ATN  w/ combination of urinary retention   #CKD 3a  #HTN  #Hyperphosphatemia   #Hyperkalemia due to YASSINE- resolved   #Chronic systolic and diastolic Heart failure  #DM with hyperglycemia   #Asthma  #Fetal alcohol syndrome   #Intellectual disability  #Esophageal thickening    Plan:   -Patient continues to have worsening dyspnea- noted w/ copious secretions which he isn't able to clear. Patient has progressively declined in last 48 hours and seems to qualify IPU.   -C/w Dilaudid for comfort, frequent suctioning that helps him temporarily. Start Robinul prn   -D/w Dr Hart from palliative care service as well, advised to start scopolamine patch as well.   -Repeat chest x-ray w/ no significant changes. s/p IV bumex on 5/20- had good UO after that but no improvement in symptoms. Give IV bumex as needed   -Keep NPO, DC PO meds   -Switch valproic acid to IV   -C/w Reyes cathter.

## 2023-05-21 NOTE — PROGRESS NOTE ADULT - SUBJECTIVE AND OBJECTIVE BOX
Patient is a 73y old  Male who presents with a chief complaint of Hematuria 2/2 traumatic Reyes removal. (21 May 2023 13:32)      INTERVAL HPI/OVERNIGHT EVENTS: no events noted overnight.    MEDICATIONS  (STANDING):  albuterol/ipratropium for Nebulization 3 milliLiter(s) Nebulizer every 6 hours  aspirin  chewable 81 milliGRAM(s) Oral daily  atorvastatin 40 milliGRAM(s) Oral at bedtime  buMETAnide 1 milliGRAM(s) Oral daily  dextrose 5%. 1000 milliLiter(s) (100 mL/Hr) IV Continuous <Continuous>  dextrose 5%. 1000 milliLiter(s) (50 mL/Hr) IV Continuous <Continuous>  dextrose 50% Injectable 25 Gram(s) IV Push once  dextrose 50% Injectable 12.5 Gram(s) IV Push once  dextrose 50% Injectable 25 Gram(s) IV Push once  finasteride 5 milliGRAM(s) Oral daily  fluticasone propionate 50 MICROgram(s)/spray Nasal Spray 1 Spray(s) Both Nostrils two times a day  glucagon  Injectable 1 milliGRAM(s) IntraMuscular once  guaiFENesin ER 1200 milliGRAM(s) Oral every 12 hours  heparin   Injectable 5000 Unit(s) SubCutaneous every 12 hours  hydrALAZINE 10 milliGRAM(s) Oral three times a day  insulin lispro (ADMELOG) corrective regimen sliding scale   SubCutaneous every 6 hours  insulin lispro Injectable (ADMELOG) 6 Unit(s) SubCutaneous three times a day before meals  isosorbide   mononitrate ER Tablet (IMDUR) 30 milliGRAM(s) Oral daily  lactulose Syrup 20 Gram(s) Oral at bedtime  melatonin 5 milliGRAM(s) Oral at bedtime  metoprolol tartrate 12.5 milliGRAM(s) Oral two times a day  montelukast 10 milliGRAM(s) Oral at bedtime  multivitamin 1 Tablet(s) Oral daily  scopolamine 1 mG/72 Hr(s) Patch 1 Patch Transdermal every 72 hours  tamsulosin 0.4 milliGRAM(s) Oral at bedtime  valproate sodium  IVPB 250 milliGRAM(s) IV Intermittent daily  valproate sodium  IVPB 250 milliGRAM(s) IV Intermittent at bedtime    MEDICATIONS  (PRN):  acetaminophen     Tablet .. 650 milliGRAM(s) Oral every 6 hours PRN Temp greater or equal to 38C (100.4F), Mild Pain (1 - 3)  dextrose Oral Gel 15 Gram(s) Oral once PRN Blood Glucose LESS THAN 70 milliGRAM(s)/deciliter  glycopyrrolate Injectable 0.2 milliGRAM(s) IV Push every 4 hours PRN secretions  HYDROmorphone  Injectable 0.3 milliGRAM(s) IV Push every 4 hours PRN Shortness of breath or labored breathing      __________________________________________________  REVIEW OF SYSTEMS:    CONSTITUTIONAL: No fever,   EYES: no acute visual disturbances  NECK: No pain or stiffness  RESPIRATORY: No cough; No shortness of breath  CARDIOVASCULAR: No chest pain, no palpitations  GASTROINTESTINAL: No pain. No nausea or vomiting; No diarrhea   NEUROLOGICAL: No headache or numbness, no tremors  MUSCULOSKELETAL: No joint pain, no muscle pain  GENITOURINARY: no dysuria, no frequency, no hesitancy  PSYCHIATRY: no depression , no anxiety  ALL OTHER  ROS negative        Vital Signs Last 24 Hrs  T(C): 36.8 (21 May 2023 12:07), Max: 36.8 (21 May 2023 12:07)  T(F): 98.2 (21 May 2023 12:07), Max: 98.2 (21 May 2023 12:07)  HR: 68 (21 May 2023 12:07) (68 - 113)  BP: 132/90 (21 May 2023 12:07) (132/90 - 164/114)  BP(mean): --  RR: 20 (21 May 2023 12:07) (20 - 25)  SpO2: 100% (21 May 2023 12:07) (99% - 100%)    Parameters below as of 21 May 2023 12:07  Patient On (Oxygen Delivery Method): nasal cannula  O2 Flow (L/min): 2      ________________________________________________  PHYSICAL EXAM:  GENERAL: NAD  HEENT: Normocephalic;  conjunctivae and sclerae clear; moist mucous membranes;   NECK : supple  CHEST/LUNG: Clear to auscultation bilaterally with good air entry   HEART: S1 S2  regular; no murmurs, gallops or rubs  ABDOMEN: Soft, Nontender, Nondistended; Bowel sounds present  EXTREMITIES: no cyanosis; no edema; no calf tenderness  SKIN: warm and dry; no rash  NERVOUS SYSTEM:  Awake and alert; Oriented  to place, person and time ; no new deficits    _________________________________________________  LABS:                        11.0   6.99  )-----------( 253      ( 21 May 2023 08:07 )             37.2     05-21    144  |  111<H>  |  72<H>  ----------------------------<  109<H>  5.0   |  28  |  3.04<H>    Ca    10.2      21 May 2023 08:07  Phos  5.7     05-21  Mg     3.0     05-21          CAPILLARY BLOOD GLUCOSE      POCT Blood Glucose.: 115 mg/dL (21 May 2023 11:43)  POCT Blood Glucose.: 108 mg/dL (21 May 2023 06:31)  POCT Blood Glucose.: 98 mg/dL (21 May 2023 00:11)  POCT Blood Glucose.: 145 mg/dL (20 May 2023 17:21)  POCT Blood Glucose.: 57 mg/dL (20 May 2023 16:46)        RADIOLOGY & ADDITIONAL TESTS:    Imaging Personally Reviewed:  YES    Consultant(s) Notes Reviewed:   YES    Care Discussed with Consultants : YES     Plan of care was discussed with patient and /or primary care giver; all questions and concerns were addressed and care was aligned with patient's wishes.         Patient is a 73y old  Male who presents with a chief complaint of Hematuria 2/2 traumatic Reyes removal. (21 May 2023 13:32)      INTERVAL HPI/OVERNIGHT EVENTS: Patient was again noted to be in respiratory distress earlier this morning. He is more lethargic and had copious secretions  which he was not able to clear.     MEDICATIONS  (STANDING):  albuterol/ipratropium for Nebulization 3 milliLiter(s) Nebulizer every 6 hours  aspirin  chewable 81 milliGRAM(s) Oral daily  atorvastatin 40 milliGRAM(s) Oral at bedtime  buMETAnide 1 milliGRAM(s) Oral daily  dextrose 5%. 1000 milliLiter(s) (100 mL/Hr) IV Continuous <Continuous>  dextrose 5%. 1000 milliLiter(s) (50 mL/Hr) IV Continuous <Continuous>  dextrose 50% Injectable 25 Gram(s) IV Push once  dextrose 50% Injectable 12.5 Gram(s) IV Push once  dextrose 50% Injectable 25 Gram(s) IV Push once  finasteride 5 milliGRAM(s) Oral daily  fluticasone propionate 50 MICROgram(s)/spray Nasal Spray 1 Spray(s) Both Nostrils two times a day  glucagon  Injectable 1 milliGRAM(s) IntraMuscular once  guaiFENesin ER 1200 milliGRAM(s) Oral every 12 hours  heparin   Injectable 5000 Unit(s) SubCutaneous every 12 hours  hydrALAZINE 10 milliGRAM(s) Oral three times a day  insulin lispro (ADMELOG) corrective regimen sliding scale   SubCutaneous every 6 hours  insulin lispro Injectable (ADMELOG) 6 Unit(s) SubCutaneous three times a day before meals  isosorbide   mononitrate ER Tablet (IMDUR) 30 milliGRAM(s) Oral daily  lactulose Syrup 20 Gram(s) Oral at bedtime  melatonin 5 milliGRAM(s) Oral at bedtime  metoprolol tartrate 12.5 milliGRAM(s) Oral two times a day  montelukast 10 milliGRAM(s) Oral at bedtime  multivitamin 1 Tablet(s) Oral daily  scopolamine 1 mG/72 Hr(s) Patch 1 Patch Transdermal every 72 hours  tamsulosin 0.4 milliGRAM(s) Oral at bedtime  valproate sodium  IVPB 250 milliGRAM(s) IV Intermittent daily  valproate sodium  IVPB 250 milliGRAM(s) IV Intermittent at bedtime    MEDICATIONS  (PRN):  acetaminophen     Tablet .. 650 milliGRAM(s) Oral every 6 hours PRN Temp greater or equal to 38C (100.4F), Mild Pain (1 - 3)  dextrose Oral Gel 15 Gram(s) Oral once PRN Blood Glucose LESS THAN 70 milliGRAM(s)/deciliter  glycopyrrolate Injectable 0.2 milliGRAM(s) IV Push every 4 hours PRN secretions  HYDROmorphone  Injectable 0.3 milliGRAM(s) IV Push every 4 hours PRN Shortness of breath or labored breathing      __________________________________________________  REVIEW OF SYSTEMS:    unable to obtain given mental status       Vital Signs Last 24 Hrs  T(C): 36.8 (21 May 2023 12:07), Max: 36.8 (21 May 2023 12:07)  T(F): 98.2 (21 May 2023 12:07), Max: 98.2 (21 May 2023 12:07)  HR: 68 (21 May 2023 12:07) (68 - 113)  BP: 132/90 (21 May 2023 12:07) (132/90 - 164/114)  BP(mean): --  RR: 20 (21 May 2023 12:07) (20 - 25)  SpO2: 100% (21 May 2023 12:07) (99% - 100%)    Parameters below as of 21 May 2023 12:07  Patient On (Oxygen Delivery Method): nasal cannula  O2 Flow (L/min): 2      ________________________________________________  PHYSICAL EXAM:  GENERAL: in moderate respiratory distress   HEENT: Normocephalic;  conjunctivae and sclerae clear; moist mucous membranes;   NECK : supple  CHEST/LUNG: congested   HEART: S1 S2  regular; no murmurs, gallops or rubs  ABDOMEN: Soft, Nontender, Nondistended; Bowel sounds present  EXTREMITIES: no cyanosis; edema 1-2+  SKIN: warm and dry; no rash  NERVOUS SYSTEM:  lethargic but arousebale, not answering any questions, non focal exam- moving all extremities     _________________________________________________  LABS:                        11.0   6.99  )-----------( 253      ( 21 May 2023 08:07 )             37.2     05-21    144  |  111<H>  |  72<H>  ----------------------------<  109<H>  5.0   |  28  |  3.04<H>    Ca    10.2      21 May 2023 08:07  Phos  5.7     05-21  Mg     3.0     05-21          CAPILLARY BLOOD GLUCOSE      POCT Blood Glucose.: 115 mg/dL (21 May 2023 11:43)  POCT Blood Glucose.: 108 mg/dL (21 May 2023 06:31)  POCT Blood Glucose.: 98 mg/dL (21 May 2023 00:11)  POCT Blood Glucose.: 145 mg/dL (20 May 2023 17:21)  POCT Blood Glucose.: 57 mg/dL (20 May 2023 16:46)        RADIOLOGY & ADDITIONAL TESTS:    Imaging Personally Reviewed:  YES    Consultant(s) Notes Reviewed:   YES    Care Discussed with Consultants : YES     Plan of care was discussed with patient and /or primary care giver; all questions and concerns were addressed and care was aligned with patient's wishes.

## 2023-05-21 NOTE — PROGRESS NOTE ADULT - ASSESSMENT
73y Male with history of intellectual disability, BPH, chance for urinary retention, CHF presents with gross hematuria. Nephrology consulted for elevated Scr.    1) YASSINE: Abrupt increase in Scr over relatively short period of time suspect due to retention/ATN. Scr without improvement despite replacement of chance but non-oliguric with UO improving. Renal function was improving s/p IV albumin, now with worsening renal function s/p Bumex 1mg IV x1 5/20 due to respiratory distress; CXR with no effusion. Renal US with no hydro. UA active likely due to trauma (would not expect GN to cause such an acute rise in Scr in a matter of days). FeUrea low. Avoid nephrotoxins.    2) CKD-3a: Baseline Scr 1.3-1.6 with resolving YASSINE on prior admission and Scr decreased to 2.1 on discharge. Monitor electrolytes.    3) HTN with CKD: BP acceptable. Recc to switch to IV Metoprolol since pt is NPO. Monitor BP.    4) LE edema: With mild congestion/LE edema; resolving. can d/c bumex 1 mg PO daily and give IV bumex as needed. Check CXR. TTE with severe global LVSD. Strict I/O's. Monitor UO.    5) Hyperphosphatemia: elevated. Pt now NPO. No need for phos binders.  Monitor serum calcium and phosphorus.    Poor overall prognosis    [facility] NEPHROLOGY  Raul Goncalves M.D.  Bennie Carcamo D.O.  Priya Pederson M.D.  Hellen Dietz, MSN, ANP-C    Telephone: [phone]  Facsimile: [phone]    [address] [place] [address]

## 2023-05-22 NOTE — PROGRESS NOTE ADULT - PROBLEM SELECTOR PLAN 1
Prior to previous admission in early April 2023, patient was having increasing BEARDEN, SOB with performance of ADLs, and functional limitation due to his heart failure.  With his increasing symptom burden he was not able to return to his group home setting and was recently discharged to [facility].  Symptom burden now exacerbated by recurrent YASSINE on CKD with need for chronic indwelling Reyes catheter.  Also question of possible cardiorenal syndrome.    Patient has Stage C NYHA class III heart failure now complicated by worsening YASSINE on CKD and further clinical debility.  He is at risk for continued progression of disease, further functional decline, recurrent infections and hospitalization, increasing morbidity, and sudden death.  Not a candidate for AICD placement (class III contraindication).  Per previous Cardiology consult from Dr. Jiménez, CPR likely would be medically futile given his underlying heart failure and NICM.      Patient is medically appropriate for hospice.  Patient under auspices of CAB; CAB now in written agreement with DNR, DNI, comfort oriented care (as based on MOLST), and hospice.  OPWDD and MHLS with no objection.  See previous C discussion on 5/17    Patient now appears to be actively dying.  IPU appropriate.  Will transition to total comfort care.    Change IV Dilaudid to 0.5 mg  Q2 hrs  PRN dyspnea/labored breathing  Continue scopolamine patch, will change IV glycopyrrolate to 0.4 mg Q 6 hrs ATC  Change IV lorazepam to 1 mg Q 1 hour PRN agitation--may need Ativan drip.

## 2023-05-22 NOTE — PROGRESS NOTE ADULT - ASSESSMENT
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 Patient is admitted for YASSINE and hematuria likely 2/2 traumatic removal of Chance. Pt's chance was replaced, and obstruction removed, Cr has appropriately improved. Cr of 4.90 on admission. > 5 > 4.7 > 3.62 >3.58 today downtrending. Pt. was given albumin trial 2 days completed 5/15, that improved his urine output. C/w bumex, BB, statin, albuterol, spiriva, montelukast, home meds. CXR 5/17 shows improvement of vascular congestion compared to admission. 5/20 pt is more respiratory distress compared to previous days. Given duoneb x1, dilaudid x1, and his distress improved. No significant changes in CXR. Pt worsening in respiratory distress over the weekend, likely for IPU. started on robinul, dilaudid. ativan, scopolamine.

## 2023-05-22 NOTE — PROGRESS NOTE ADULT - PROBLEM SELECTOR PROBLEM 3
Hyperkalemia
Intellectual disability
Hyperkalemia
Intellectual disability
Hyperkalemia
Intellectual disability
Hyperkalemia
Intellectual disability
Hyperkalemia
Hyperkalemia

## 2023-05-22 NOTE — PROGRESS NOTE ADULT - PROBLEM SELECTOR PROBLEM 4
Leukocytosis
Acute kidney injury superimposed on CKD
Leukocytosis
Acute kidney injury superimposed on CKD
Leukocytosis
Leukocytosis

## 2023-05-22 NOTE — PROGRESS NOTE ADULT - PROBLEM SELECTOR PROBLEM 2
Nonischemic cardiomyopathy
Hematuria
Nonischemic cardiomyopathy
Hematuria
Nonischemic cardiomyopathy
Hematuria
Nonischemic cardiomyopathy
Hematuria

## 2023-05-22 NOTE — PROGRESS NOTE ADULT - ASSESSMENT
73y Male with history of intellectual disability, BPH, chance for urinary retention, CHF presents with gross hematuria. Nephrology consulted for elevated Scr.    1) YASSINE: Abrupt increase in Scr over relatively short period of time suspect due to retention/ATN. Scr without improvement despite replacement of chance but non-oliguric with UO improving. Renal function was improving s/p IV albumin, now with worsening renal function s/p Bumex 1mg IV x1 5/20 due to respiratory distress; CXR with no effusion. Renal US with no hydro. UA active likely due to trauma (would not expect GN to cause such an acute rise in Scr in a matter of days). FeUrea low. Avoid nephrotoxins.    2) CKD-3a: Baseline Scr 1.3-1.6 with resolving YASSINE on prior admission and Scr decreased to 2.1 on discharge. Monitor electrolytes.    3) HTN with CKD: BP acceptable. Recc to give IV Metoprolol since pt is NPO. Monitor BP.    4) LE edema: With mild congestion/LE edema; resolving. can d/c bumex 1 mg PO daily and give IV bumex as needed.  TTE with severe global LVSD. Strict I/O's. Monitor UO.    5) Hyperphosphatemia: elevated. Pt now NPO. No need for phos binders.  Monitor serum calcium and phosphorus.    Poor overall prognosis    [facility] NEPHROLOGY  Raul Goncalves M.D.  DC PérezO.  Priya Pederson M.D.  Hellen Dietz, MSN, ANP-C    Telephone: [phone]  Facsimile: [phone]    [address] [place] [address]

## 2023-05-22 NOTE — PROGRESS NOTE ADULT - PROBLEM SELECTOR PLAN 12
from palliative note:   "Patient is under auspices of [employer] ([employer]) as his 1750-b guardian.  Per [facility] discussion during previous hospital admission, CAB, OPD medical director (Dr. Hearn), and members of patient's care team all in agreement to begin proceedings to change patient's code status to DNR/DNI and for consideration of hospice.  Meeting with CAB executive board to change code status is still pending--will attempt to facilitate.    DO NOT DISCHARGE THIS PATIENT UNTIL DNR/DNI STATUS HAS BEEN CLARIFIED WITH CAB."
from palliative note:   "Patient is under auspices of [employer] ([employer]) as his 1750-b guardian.  Per [facility] discussion during previous hospital admission, CAB, OPD medical director (Dr. Hearn), and members of patient's care team all in agreement to begin proceedings to change patient's code status to DNR/DNI and for consideration of hospice.  Meeting with CAB executive board to change code status is still pending--will attempt to facilitate.    DO NOT DISCHARGE THIS PATIENT UNTIL DNR/DNI STATUS HAS BEEN CLARIFIED WITH CAB."
from palliative note:   "Patient is under auspices of [employer] ([employer]) as his 1750-b guardian.  Per [facility] discussion during previous hospital admission, CAB, OPD medical director (Dr. Hearn), and members of patient's care team all in agreement to begin proceedings to change patient's code status to DNR/DNI and for consideration of hospice.  Meeting with CAB executive board to change code status is still pending--will attempt to facilitate.    DO NOT DISCHARGE THIS PATIENT UNTIL DNR/DNI STATUS HAS BEEN CLARIFIED WITH CAB."
from palliative note:   "Patient is under auspices of [employer] ([employer]) as his 1750-b guardian.  Per [facility] discussion during previous hospital admission, CAB, OPD medical director (Dr. Hearn), and members of patient's care team all in agreement to begin proceedings to change patient's code status to DNR/DNI and for consideration of hospice.  Meeting with CAB executive board to change code status is still pending--will attempt to facilitate.    DO NOT DISCHARGE THIS PATIENT UNTIL DNR/DNI STATUS HAS BEEN CLARIFIED WITH CAB."
Hold in setting of hematuria.
from palliative note:   "Patient is under auspices of [employer] ([employer]) as his 1750-b guardian.  Per [facility] discussion during previous hospital admission, CAB, OPD medical director (Dr. Hearn), and members of patient's care team all in agreement to begin proceedings to change patient's code status to DNR/DNI and for consideration of hospice.  Meeting with CAB executive board to change code status is still pending--will attempt to facilitate.    DO NOT DISCHARGE THIS PATIENT UNTIL DNR/DNI STATUS HAS BEEN CLARIFIED WITH CAB."
from palliative note:   "Patient is under auspices of [employer] ([employer]) as his 1750-b guardian.  Per [facility] discussion during previous hospital admission, CAB, OPD medical director (Dr. Hearn), and members of patient's care team all in agreement to begin proceedings to change patient's code status to DNR/DNI and for consideration of hospice.  Meeting with CAB executive board to change code status is still pending--will attempt to facilitate.    DO NOT DISCHARGE THIS PATIENT UNTIL DNR/DNI STATUS HAS BEEN CLARIFIED WITH CAB."
from palliative note:   "Patient is under auspices of [employer] ([employer]) as his 1750-b guardian.  Per [facility] discussion during previous hospital admission, CAB, OPD medical director (Dr. Hearn), and members of patient's care team all in agreement to begin proceedings to change patient's code status to DNR/DNI and for consideration of hospice.  Meeting with CAB executive board to change code status is still pending--will attempt to facilitate.    DO NOT DISCHARGE THIS PATIENT UNTIL DNR/DNI STATUS HAS BEEN CLARIFIED WITH CAB."

## 2023-05-22 NOTE — PROGRESS NOTE ADULT - PROBLEM SELECTOR PLAN 10
C/w olanzapine.   Hold memantine and amantadine.
d/c meds
C/w olanzapine.   Hold memantine and amantadine.

## 2023-05-22 NOTE — PROGRESS NOTE ADULT - PROBLEM SELECTOR PROBLEM 6
Physical debility
Chronic obstructive pulmonary disease (COPD)
Physical debility
Chronic obstructive pulmonary disease (COPD)
Physical debility
Physical debility
Chronic obstructive pulmonary disease (COPD)

## 2023-05-22 NOTE — PROGRESS NOTE ADULT - PROBLEM SELECTOR PLAN 1
- Cr of 4.90 on admission. > 5 > 4.7 > 3.62 >3.58 today downtrending   - On discharge 05/09 Cr of 2.14.   - Likely obstructive in setting of pulling out Reyes traumatically leading to hematuria and clots. Hematuria resolved.   - C/w Reyes placement with PRN irrigation.   - F/U BMP.   - Avoid nephrotoxic agents.   - Avoid IV fluids as much as possible due to severely reduced EF.   - Nephro consulted. - albumin trial 2 days completed 5/15  -  5/20 pt is more respiratory distress compared to previous days. Given duoneb x1, dilaudid x1, and his distress improved. No significant changes in CXR.   - 5/21&22- Pt worsening in respiratory distress over the weekend, likely for IPU. started on robinul, dilaudid. ativan, scopolamine.   - Dr. Hart palliative on board

## 2023-05-22 NOTE — PROGRESS NOTE ADULT - PROBLEM SELECTOR PLAN 2
- Likely in setting of pulling out Reyes traumatically leading to hematuria and clots. RESOLVED   - Signs of periurethral trauma with blood and clots seen on exam.   - Plan to c/w Reyes with PRN irrigation.   - Monitor H/H   - Urology following.
As above, EF of 10-15%.  Per Cardiology patient is not a candidate for AICD placement due to lack of decisional making capacity (ACC/AHA class III contraindication).    Continue supportive care.
- Likely in setting of pulling out Reyes traumatically leading to hematuria and clots. RESOLVED   - Signs of periurethral trauma with blood and clots seen on exam.   - Plan to c/w Reyes with PRN irrigation.   - Monitor H/H   - Urology following.
As above, EF of 10-15%.  Per Cardiology patient is not a candidate for AICD placement due to lack of decisional making capacity (ACC/AHA class III contraindication).    Continue supportive care.
As above, EF of 10-15%.  Per Cardiology patient is not a candidate for AICD placement due to lack of decisional making capacity (ACC/AHA class III contraindication).    Continue supportive care.
As above, EF of 10-15%.  Per Cardiology patient is not a candidate for AICD placement due to lack of decisional making capacity (ACC/AHA class III contraindication).    Patient now with terminal respiratory failure and secretions--will transition to IPU
- Likely in setting of pulling out Reyes traumatically leading to hematuria and clots. RESOLVED   - Signs of periurethral trauma with blood and clots seen on exam.   - Plan to c/w Reyes with PRN irrigation.   - Monitor H/H   - Urology following.

## 2023-05-22 NOTE — PROGRESS NOTE ADULT - SUBJECTIVE AND OBJECTIVE BOX
[facility] NEPHROLOGY- PROGRESS NOTE    73y Male with history of intellectual disability, BPH, chance for urinary retention, CHF presents with gross hematuria. Nephrology consulted for elevated Scr.    REVIEW OF SYSTEMS: Unable to obtain due to mental status    No Known Allergies      Hospital Medications: Medications reviewed    VITALS:  T(F): 98 (05-22-23 @ 11:43), Max: 99.2 (05-21-23 @ 21:20)  HR: 115 (05-22-23 @ 11:43)  BP: 123/69 (05-22-23 @ 11:43)  RR: 20 (05-22-23 @ 11:43)  SpO2: 98% (05-22-23 @ 11:43)  Wt(kg): --    05-21 @ 07:01  -  05-22 @ 07:00  --------------------------------------------------------  IN: 0 mL / OUT: 600 mL / NET: -600 mL    05-22 @ 07:01  -  05-22 @ 12:14  --------------------------------------------------------  IN: 0 mL / OUT: 400 mL / NET: -400 mL        PHYSICAL EXAM:    Gen: Obtunded with mild gurgling noises  Cards: RRR, +S1/S2, no M/G/R  Resp: Coarse BS  GI: soft, NT, + ab distention, NABS  : + chance with yellow urine  Vascular: +1 LE edema B/L    LABS:  05-21    144  |  111<H>  |  72<H>  ----------------------------<  109<H>  5.0   |  28  |  3.04<H>    Ca    10.2      21 May 2023 08:07  Phos  5.7     05-21  Mg     3.0     05-21      Creatinine Trend: 3.04 <--, 2.81 <--, 3.03 <--, 3.58 <--, 3.62 <--, 3.76 <--                        11.0   6.99  )-----------( 253      ( 21 May 2023 08:07 )             37.2     Urine Studies:

## 2023-05-22 NOTE — PROGRESS NOTE ADULT - PROBLEM SELECTOR PROBLEM 1
Acute kidney injury superimposed on CKD
Chronic systolic heart failure
Acute kidney injury superimposed on CKD
Chronic systolic heart failure
Acute kidney injury superimposed on CKD
Chronic systolic heart failure
Acute kidney injury superimposed on CKD
Chronic systolic heart failure
Acute kidney injury superimposed on CKD
Acute kidney injury superimposed on CKD

## 2023-05-22 NOTE — PROGRESS NOTE ADULT - PROBLEM SELECTOR PLAN 5
- Not in exacerbation.   - Echo 3/23: EF 10-15%.   - C/w bumex, BB, statin.  CXR 5/17 shows improvement of vascular congestion compared to admission   5/20 pt is more respiratory distress compared to previous days. Given duoneb x1, dilaudid x1, and his distress improved.  - 5/21&22- Pt worsening in respiratory distress over the weekend, likely for IPU. started on robinul, dilaudid. ativan, scopolamine.   - Dr. Hart palliative on board

## 2023-05-22 NOTE — PROGRESS NOTE ADULT - PROBLEM SELECTOR PROBLEM 12
Palliative care encounter
Prophylactic measure
Palliative care encounter
Palliative care encounter

## 2023-05-22 NOTE — PROGRESS NOTE ADULT - PROBLEM SELECTOR PLAN 7
As above.  Patient now with advanced systolic HF, in setting of severe NICM, with EF of 10-15%, and increasing BEARDEN/SOB with performance of ADLs resulting in functional limitation.  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  He is not a candidate for ICD.  Now with recurrent YASSINE on CKD III due to urinary retention/post obstructive renal failure, returns with worsening renal functional and hematuria/clots due to traumatic pulling of Reyes.  Likely to require long-term indwelling Reyes catheter, further adding to his medical debility.  Now trending towards CKD stage IV, ? concern for component of cardiorenal syndrome.  He also has emerging protein-calorie malnutrition with albumin of 2.2.    Patient has Stage C NYHA class III heart failure and worsening YASSINE on CKD.  He now has developed terminal respiratory secretions, worsening anxiety/agitation (? terminal delirium), and impending respiratory failure.  He is hospice appropriate, now with likely prognosis of days.  IPU level of care indicated for management of above symptoms at EOL likely to require ongoing titration of multiple parenteral medications.    Dr. Hearn from [facility] and [facility] made aware of rapid change in patient's status, and both in agreement with IPU.  Patient is under auspices of [employer] ([facility]) as his 1750-b guardian.  CAB now in agreement with DNR. DNI, comfort care, and hospice.  [facility] and [facility] with no objection.  [facility]  orders for DNR/DNI in place.    IV Dilaudid/IV lorazepam/glycopyrrolate/scopolamine patch as above  Discussed with primary team (Dr. Mason)  Palliative care team will continue to follow.

## 2023-05-22 NOTE — PROGRESS NOTE ADULT - SUBJECTIVE AND OBJECTIVE BOX
follow up on:  complex medical decision making related to goals of care    [facility] Geriatric and Palliative Consult Service:  Aliza Hsu DO: cell ([phone])  Reji Hart MD: cell ([phone])  Joel Weiner NP: cell ([phone])   Michelle Iqbal DNP: cell ([phone])  Kahlil Lucas LMSW: cell ([phone])   Bharti Nogueira NP: via [employer] Teams    Can contact any Palliative Team member via [employer] Teams for consults and questions      OVERNIGHT EVENTS:  Patient with rapid decompensation over the last 48 hours, with recurrent dyspnea and worsening anxiety.  Started on scopolamine patch and IV glycopyrrolate yesterday 5/21 for severe respiratory congestion and secretions.    Required IV lorazepam 0.5 mg x 2 within last 24 hours  Required IV Dilaudid 0.3 mg x 4 on 5/21, x 1 so far today    Patient examined with home aide at bedside.  Unresponsive, appears anxious > SOB, shaking throughout his body, in obvious distress.  (confirmed with Dr. Hearn, no known hx of seizure disorder).      Present Symptoms: Mild, Moderate, Severe  Pain:             Location -    Unable to verbalize, unresponsive                           Aggravating factors -             Quality -             Radiation -             Timing-             Severity (0-10 scale):             Minimal acceptable level (0-10 scale):  Fatigue:  Nausea:  Lack of Appetite:   SOB:  Depression:  Anxiety:  Review of Systems: Unable to obtain due to poor mentation    CPOT:  4  [email or web address]/Critical-Care-Pain-Observation-Tool-(CPOT)      MEDICATIONS  (STANDING):  albuterol/ipratropium for Nebulization 3 milliLiter(s) Nebulizer every 6 hours  glucagon  Injectable 1 milliGRAM(s) IntraMuscular once  glycopyrrolate Injectable 0.4 milliGRAM(s) IV Push every 6 hours  scopolamine 1 mG/72 Hr(s) Patch 1 Patch Transdermal every 72 hours  valproate sodium  IVPB 250 milliGRAM(s) IV Intermittent at bedtime  valproate sodium  IVPB 250 milliGRAM(s) IV Intermittent daily    MEDICATIONS  (PRN):  HYDROmorphone  Injectable 0.5 milliGRAM(s) IV Push every 2 hours PRN Tachypnea (RR > 22) or labored breathing  LORazepam   Injectable 1 milliGRAM(s) IV Push every 1 hour PRN Agitation      PHYSICAL EXAM:  Vital Signs Last 24 Hrs  T(C): 36.4 (22 May 2023 20:34), Max: 36.9 (22 May 2023 05:27)  T(F): 97.6 (22 May 2023 20:34), Max: 98.4 (22 May 2023 05:27)  HR: 116 (22 May 2023 20:34) (115 - 120)  BP: 137/90 (22 May 2023 20:34) (123/69 - 137/90)  BP(mean): --  RR: 18 (22 May 2023 20:34) (18 - 22)  SpO2: 99% (22 May 2023 20:34) (94% - 99%)    Parameters below as of 22 May 2023 20:34  Patient On (Oxygen Delivery Method): nasal cannula  O2 Flow (L/min): 2      General: alert  oriented x __0__    lethargic, minimally responsive, ++ anxious/tremulous throughout his body, + temporal wasting    Palliative Performance Scale/Karnofsky Score:  10%  http://npcrc.org/files/news/palliative_performance_scale_ppsv2.pdf    HEENT:  Anicteric, pharynx clear  Lungs: respirations mildly labored, + copious audible secretions/congestion noted  CV:  tachycardic, normal S1 and S2, no murmur  GI: soft non distended non tender  + normal bowel sounds  : incontinent   + Reyes cath in place  Musculoskeletal: weakness x4  in all extremities, + shaking/tremors  bedbound, no edema noted  Skin: no abnormal skin lesions or DU noted,  + poor skin turgor   Neuro: no focal deficits, + tremors  Oral intake ability: unable/mouth care only (due to worsening congestion), now NPO    LABS:                          11.0   6.99  )-----------( 253      ( 21 May 2023 08:07 )             37.2     05-21    144  |  111<H>  |  72<H>  ----------------------------<  109<H>  5.0   |  28  |  3.04<H>    Ca    10.2      21 May 2023 08:07  Phos  5.7     05-21  Mg     3.0     05-21          RADIOLOGY & ADDITIONAL STUDIES:

## 2023-05-22 NOTE — PROGRESS NOTE ADULT - PROBLEM SELECTOR PLAN 6
- Not in exacerbation.   - C/w albuterol, tiotropium, montelukast.  - Start Duoneb Q6.  -  5/20 pt is more respiratory distress compared to previous days. Given duoneb x1, dilaudid x1, and his distress improved  - 5/21&22- Pt worsening in respiratory distress over the weekend, likely for IPU. started on robinul, dilaudid. ativan, scopolamine.   - Dr. Hart palliative on board

## 2023-05-22 NOTE — PROGRESS NOTE ADULT - PROBLEM SELECTOR PLAN 8
On Jardiance and Januvia at home.   Start sliding scale in hospital.
On Jardiance and Januvia at home.   - will d/c sliding scale
On Jardiance and Januvia at home.   Start sliding scale in hospital.

## 2023-05-22 NOTE — PROGRESS NOTE ADULT - PROBLEM SELECTOR PLAN 3
With associated schizophrenia, and likely dementia  Clarified with Dr. Emilia Hearn from [facility]--patient with previous chromosomal analysis in 2011 that was negative for trisomy.  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    Continue home meds, including olanzapine and valproic acid (NB patient's previous dose of Valproate was 250 mg AM and 1000 mg QHS).    Given patient's overall functional decline/worsening renal status with risk for increased delirium, and given that he is medically appropriate for hospice, agree with D/C of Namenda and amantadine (both meds have little to offer at this point).  Clonazepam now discontinued due to patient lethargy.    Frequent reorientation/supportive care to prevent further delirium.
With associated schizophrenia, and likely dementia  Clarified with Dr. Emilia Hearn from [facility]--patient with previous chromosomal analysis in 2011 that was negative for trisomy.  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    Patient now appears to be actively dying, will transfer to hospice IPU  Unable to swallow--will discontinue all meds not designed for comfort  For now, continue IV Depakote 250 mg BID--low threshold to D/C once admitted to IPU
- P/w K of 6.1. RESOLVED   - Likely in setting of YASSINE on CKD.   - S/p 1 dose of Lokelma.   - No significant EKG changes. Admit to medicine.   - F/U repeat BMP.   - Nephrology consulted.
With associated schizophrenia, and likely dementia  Clarified with Dr. Emilia Hearn from [facility]--patient with previous chromosomal analysis in 2011 that was negative for trisomy.  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    Continue home meds, including olanzapine and valproic acid (NB patient's previous dose of Valproate was 250 mg AM and 1000 mg QHS).    Given patient's overall functional decline/worsening renal status with risk for increased delirium, and given that he is medically appropriate for hospice, agree with D/C of Namenda and amantadine (both meds have little to offer at this point).  Clonazepam now discontinued due to patient lethargy.    Frequent reorientation/supportive care to prevent further delirium.
With associated schizophrenia, and likely dementia  Clarified with Dr. Emilia Hearn from [facility]--patient with previous chromosomal analysis in 2011 that was negative for trisomy.  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    Continue home meds, including olanzapine and valproic acid (NB patient's previous dose of Valproate was 250 mg AM and 1000 mg QHS).    Given patient's overall functional decline/worsening renal status with risk for increased delirium, and given that he is medically appropriate for hospice, agree with D/C of Namenda and amantadine (both meds have little to offer at this point).  Clonazepam now discontinued due to patient lethargy.    Frequent reorientation/supportive care to prevent further delirium.
- P/w K of 6.1. RESOLVED   - Likely in setting of YASSINE on CKD.   - S/p 1 dose of Lokelma.   - No significant EKG changes. Admit to medicine.   - F/U repeat BMP.   - Nephrology consulted.

## 2023-05-22 NOTE — PROGRESS NOTE ADULT - PROBLEM SELECTOR PLAN 4
- Leukocytosis of 16k on admission, > 9.9 5/14, RESOLVED  - Likely in setting of traumatic Reyes removal, pain and hematuria.   - Infectious cause less likely as patient is afebrile, hemodynamically stable and no signs of infection.   cultures negative
Most likely due to  post-obstructive renal failure (in setting of BPH), with possible component of ATN.  ?? remote concern for cardiorenal syndrome during previous admissions.  Initially improved after Reyes catheter placement, but now readmitted with worsening Cr up to 4.90, likely obstructive in setting of patient pulling out Reyes traumatically leading to hematuria and clots.     Concern for worsening baseline CKD in context of of advanced systolic heart failure and NICM, with concern for increased debility, morbidity, and recurrent hospital admissions.  Patient now hospice appropriate.    Further medical management as per Nephrology and primary team.
- Leukocytosis of 16k on admission, > 9.9 5/14, RESOLVED  - Likely in setting of traumatic Reyes removal, pain and hematuria.   - Infectious cause less likely as patient is afebrile, hemodynamically stable and no signs of infection.   - F/U UA, Ucx.
Most likely due to  post-obstructive renal failure (in setting of BPH), with possible component of ATN.  ?? remote concern for cardiorenal syndrome during previous admissions.  Initially improved after Reyes catheter placement, but now readmitted with worsening Cr up to 4.90, likely obstructive in setting of patient pulling out Reyes traumatically leading to hematuria and clots.     Concern for worsening baseline CKD (towards CKD IV) in context of of advanced systolic heart failure and NICM, with concern for increased debility, morbidity, infections, and recurrent hospital admissions.  I am still concerned about possible component of cardiorenal syndrome in addition to post-obstructive renal failure.  Patient now hospice appropriate.  He will likely require long-term indwelling Foely catheter.    Further medical management as per Nephrology and primary team.
- Leukocytosis of 16k on admission, > 9.9 5/14, RESOLVED  - Likely in setting of traumatic Reyes removal, pain and hematuria.   - Infectious cause less likely as patient is afebrile, hemodynamically stable and no signs of infection.   - F/U UA, Ucx.
Most likely due to  post-obstructive renal failure (in setting of BPH), with possible component of ATN.  ?? remote concern for cardiorenal syndrome during previous admissions.  Initially improved after Reyes catheter placement, but now readmitted with worsening Cr up to 4.90, likely obstructive in setting of patient pulling out Reyes traumatically leading to hematuria and clots.     Concern for worsening baseline CKD (towards CKD IV) in context of of advanced systolic heart failure and NICM, with concern for increased debility, morbidity, infections, and recurrent hospital admissions.  Patient now hospice appropriate, appears to be actively dying, to be transferred to hospice IPU.    Management as above
- Leukocytosis of 16k on admission, > 9.9 5/14, RESOLVED  - Likely in setting of traumatic Reyes removal, pain and hematuria.   - Infectious cause less likely as patient is afebrile, hemodynamically stable and no signs of infection.   - F/U UA, Ucx.
- Leukocytosis of 16k on admission, > 9.9 5/14, RESOLVED  - Likely in setting of traumatic Reyes removal, pain and hematuria.   - Infectious cause less likely as patient is afebrile, hemodynamically stable and no signs of infection.   - F/U UA, Ucx.
Most likely due to  post-obstructive renal failure (in setting of BPH), with possible component of ATN.  ?? remote concern for cardiorenal syndrome during previous admissions.  Initially improved after Reyes catheter placement, but now readmitted with worsening Cr up to 4.90, likely obstructive in setting of patient pulling out Reyes traumatically leading to hematuria and clots.     Concern for worsening baseline CKD (towards CKD IV) in context of of advanced systolic heart failure and NICM, with concern for increased debility, morbidity, infections, and recurrent hospital admissions.  I am still concerned about possible component of cardiorenal syndrome in addition to post-obstructive renal failure.  Patient now hospice appropriate.  He will likely require long-term indwelling Reyes catheter.    Further medical management as per Nephrology and primary team.
- Leukocytosis of 16k on admission, > 9.9 5/14, RESOLVED  - Likely in setting of traumatic Reyes removal, pain and hematuria.   - Infectious cause less likely as patient is afebrile, hemodynamically stable and no signs of infection.   - F/U UA, Ucx.

## 2023-05-22 NOTE — PROGRESS NOTE ADULT - PROBLEM SELECTOR PROBLEM 7
Encounter for palliative care
Encounter for palliative care
Hypertension
Hypertension
Encounter for palliative care
Hypertension
Hypertension
Encounter for palliative care
Hypertension

## 2023-05-22 NOTE — PROGRESS NOTE ADULT - PROBLEM SELECTOR PLAN 6
Patient with increased functional limitations due to worsening CHF symptoms, in context intellectual disability, presumed dementia, and multiple medical comorbidities, aggravated by recent prolonged hospitalization for CHF. Now has Reyes catheter placed for urinary obstruction, along with increasing dependence on oxygen via NC, both of which are likely to further accelerate patient's debility.  Also with worsening confusion/mental status compared to previous baseline (now only alert and oriented x 1-2).      Patient now being transitioned to hospice IPU

## 2023-05-22 NOTE — PROGRESS NOTE ADULT - PROBLEM SELECTOR PLAN 11
- HX OF SEIZURES on valproic acid  - dosing decreased to 250mg 5/13 due to increased lethargy   - will confirm with NH in the AM dosing of valproic acid
- HX OF SEIZURES on valproic acid  - c/w valproate

## 2023-05-22 NOTE — PROGRESS NOTE ADULT - ASSESSMENT
73 year old male with Intellectual disability 2/2 fetal alcohol syndrome, [facility], now at [facility], w/ PMHx of COPD with chronic cough, hx of non ischemic cardiomyopathy with non-obstructive CAD on CCTA (2021), HFrEF (EF 10-15% per recent Echo), HTN, DM, CKD elevated PSA, BPH, schizophrenia, ? dementia (on home memantine), hepatitis B carrier, and bilateral sensorineural hearing loss.  Not a candidate for ICD placement due to lack of decision making capacity (AHA class III contraindication).  He is well known to our palliative care service.  Previously admitted 3/28 to 3/30 for demand ischemia and CHF exacerbation, then with prolonged hospitalization from 4/8 to 4/28 for chest pain (likely musculoskeletal) and decompensated CHF, complicated by worsening YASSINE on CKD, originally thought to be due to ATN/overdiuresis/? intermittent urinary retention (vs CRS); patient discharged to [facility] on 4/28.  Then readmitted to [facility] 5/4 to 5/9 for altered mental status and respiratory distress, found unresponsive by NH staff; found to have worsening YASSINE on CKD with BNP of 63,000.  Admitted at that time for post obstructive renal failure, UTI, and possible HF exacerbation, improved after insertion of Reyes cath, discharged back to [facility] on 5/9.      Patient pulled out Reyes 5/11, replaced in ER and sent back to [facility]  Returned 5/12 with hematuria in Reyes bag and jarred-urethral clots, Cr back up to 4.90, admitted for hematuria and YASSINE on CKD III.  Creatinine slowly improving, patient remains intermittently dyspneic.    5/22-- Now with rapid decline over past 48 hours, with worsening dyspnea, likely terminal respiratory secretions, and worsening anxiety/? terminal delirium  Spoke to Dr. Hearn from [facility]--patient to be transferred to hospice IPU for EOL symptom management.  CAB also aware and in agreement.

## 2023-05-22 NOTE — PROGRESS NOTE ADULT - SUBJECTIVE AND OBJECTIVE BOX
PGY-1 Progress Note discussed with attending    PLEASE CONTACT ON CALL TEAM:  - On Call Team (Please refer to Red) FROM 5:00 PM - 8:30PM  - Nightfloat Team FROM 8:30 -7:30 AM    INTERVAL HPI/OVERNIGHT EVENTS: Pt. still in respiratory distress, occasionally pulling out his nasal cannula, not responding to questioning. Started on medications to make him more comfortable. Likely for IPU.,     MEDICATIONS  (STANDING):  albuterol/ipratropium for Nebulization 3 milliLiter(s) Nebulizer every 6 hours  dextrose 5%. 1000 milliLiter(s) (50 mL/Hr) IV Continuous <Continuous>  dextrose 5%. 1000 milliLiter(s) (100 mL/Hr) IV Continuous <Continuous>  dextrose 50% Injectable 25 Gram(s) IV Push once  dextrose 50% Injectable 12.5 Gram(s) IV Push once  dextrose 50% Injectable 25 Gram(s) IV Push once  glucagon  Injectable 1 milliGRAM(s) IntraMuscular once  glycopyrrolate Injectable 0.4 milliGRAM(s) IV Push every 6 hours  heparin   Injectable 5000 Unit(s) SubCutaneous every 12 hours  insulin lispro (ADMELOG) corrective regimen sliding scale   SubCutaneous every 6 hours  scopolamine 1 mG/72 Hr(s) Patch 1 Patch Transdermal every 72 hours  valproate sodium  IVPB 250 milliGRAM(s) IV Intermittent daily  valproate sodium  IVPB 250 milliGRAM(s) IV Intermittent at bedtime    MEDICATIONS  (PRN):  dextrose Oral Gel 15 Gram(s) Oral once PRN Blood Glucose LESS THAN 70 milliGRAM(s)/deciliter  HYDROmorphone  Injectable 0.5 milliGRAM(s) IV Push every 2 hours PRN Tachypnea (RR > 22) or labored breathing  LORazepam   Injectable 1 milliGRAM(s) IV Push every 1 hour PRN Agitation      REVIEW OF SYSTEMS: unable to obtain due to mental status     Vital Signs Last 24 Hrs  T(C): 36.7 (22 May 2023 11:43), Max: 37.3 (21 May 2023 21:20)  T(F): 98 (22 May 2023 11:43), Max: 99.2 (21 May 2023 21:20)  HR: 115 (22 May 2023 11:43) (85 - 120)  BP: 123/69 (22 May 2023 11:43) (123/69 - 136/71)  BP(mean): --  RR: 20 (22 May 2023 11:43) (18 - 22)  SpO2: 98% (22 May 2023 11:43) (94% - 98%)    Parameters below as of 22 May 2023 11:43  Patient On (Oxygen Delivery Method): nasal cannula  O2 Flow (L/min): 2      PHYSICAL EXAMINATION:  GENERAL: (+) in acute respiratory distress  HEAD:  Atraumatic, Normocephalic  EYES:  conjunctiva and sclera clear  NECK: Supple, No JVD, Normal thyroid  CHEST/LUNG :(+) inspiratory crackles auscultated, very congested compared to prior exams  HEART: Regular rate and rhythm; No murmurs, rubs, or gallops  ABDOMEN: Soft, Nontender, Nondistended; Bowel sounds present  NERVOUS SYSTEM:  Alert & Oriented X0    EXTREMITIES:  2+ Peripheral Pulses, No clubbing, cyanosis, or edema  SKIN: warm dry                          11.0   6.99  )-----------( 253      ( 21 May 2023 08:07 )             37.2     05-21    144  |  111<H>  |  72<H>  ----------------------------<  109<H>  5.0   |  28  |  3.04<H>    Ca    10.2      21 May 2023 08:07  Phos  5.7     05-21  Mg     3.0     05-21                CAPILLARY BLOOD GLUCOSE      RADIOLOGY & ADDITIONAL TESTS:

## 2023-05-22 NOTE — PROGRESS NOTE ADULT - PROBLEM SELECTOR PLAN 5
Clinical evidence indicates that the patient has Severe protein calorie malnutrition/ 3rd degree    In context of     Chronic Illness (>1 month)--worsening systolic HF with recent prolonged hospitalization, aggravated by worsening CKD and functional decline    Energy/Food intake <50% of estimated energy requirement >5 days  Weight loss: Moderate - severe   Body Fat loss: Severe   with increased temporal wasting on this admission, + LE muscle atrophy noted  Muscle mass loss: Severe, now with albumin of 2.2  Fluid Accumulation: Severe (Fluid overload, worsening CHF with bilateral pleural effusions)   Strength: weakened severe (mostly bedbound, requiring increased assistance with ADLs)    Recommend:   Patient now NPO, with terminal respiratory failure/secretions  Keep HOB elevated at all times

## 2023-05-23 PROBLEM — Q86.0 FETAL ALCOHOL SYNDROME (DYSMORPHIC): Chronic | Status: ACTIVE | Noted: 2023-01-01

## 2023-05-23 NOTE — DISCHARGE NOTE PROVIDER - CARE PROVIDER_API CALL
Mamadou Hart)  Family Medicine; [facility] Medicine  [address] [place], [place] [address]  Phone: [phone]  Fax: [phone]  Follow Up Time:

## 2023-05-23 NOTE — H&P ADULT - PROBLEM SELECTOR PLAN 4
Eligible for continued inpatient hospice care admission due to ongoing active symptom management with IV medications  patient is unstable for discharge home or nursing home, acute symptom management cannot be managed outside of inpatient hospice unit at this time  Bowel regimen with Dulcolax suppository PRN  for constipation  Tylenol suppository PRN for fever  oral hygiene  Comfort care  Palliative/Hospice education and counseling to [facility] team/aide

## 2023-05-23 NOTE — PROGRESS NOTE ADULT - ASSESSMENT
73y Male with history of intellectual disability, BPH, chance for urinary retention, CHF presents with gross hematuria. Nephrology consulted for elevated Scr.    1) YASSINE: Abrupt increase in Scr over relatively short period of time suspect due to retention/ATN. Scr without improvement despite replacement of chance but non-oliguric with UO improving. Renal function was improving s/p IV albumin, now with worsening renal function s/p Bumex 1mg IV x1 5/20 due to respiratory distress; CXR with no effusion. Renal US with no hydro. UA active likely due to trauma (would not expect GN to cause such an acute rise in Scr in a matter of days). FeUrea low. Avoid nephrotoxins.    2) CKD-3a: Baseline Scr 1.3-1.6 with resolving YASSINE on prior admission and Scr decreased to 2.1 on discharge. Monitor electrolytes.    3) HTN with CKD: BP acceptable. Recc to give IV Metoprolol since pt is NPO. Monitor BP.    4) LE edema: With mild congestion/LE edema; resolving. can d/c bumex 1 mg PO daily and give IV bumex as needed.  TTE with severe global LVSD. Strict I/O's. Monitor UO.    5) Hyperphosphatemia: elevated. Pt now NPO. No need for phos binders.  Monitor serum calcium and phosphorus.    Poor overall prognosis. Pt IPU appropriate; Palliative following     [facility] NEPHROLOGY  Raul Goncalves M.D.  Bennie Carcamo D.O.  Priya Pederson M.D.  Hellen Dietz, MSN, ANP-C    Telephone: [phone]  Facsimile: [phone]    [address] [place] [address]

## 2023-05-23 NOTE — DISCHARGE NOTE PROVIDER - NSDCMRMEDTOKEN_GEN_ALL_CORE_FT
acetaminophen 325 mg oral tablet: 2 tab(s) orally once a day as needed for  mild pain  albuterol 2.5 mg/3 mL (0.083%) inhalation solution: 3 milliliter(s) by nebulizer every 4 hours as needed for  shortness of breath  albuterol 90 mcg/inh inhalation aerosol: 2 puff(s) inhaled 4 times a day as needed for wheezing  amantadine 100 mg oral tablet: 1 tab(s) orally 2 times a day  aspirin 81 mg oral tablet, chewable: 1 tab(s) orally once a day  atorvastatin 40 mg oral tablet: 1 tab(s) orally once a day (in the evening)  bumetanide 1 mg oral tablet: 1 tab(s) orally once a day  clonazePAM 0.5 mg oral tablet: 1 tab(s) orally once a day (in the morning)  finasteride 5 mg oral tablet: 1 tab(s) orally once a day  fluticasone 50 mcg/inh nasal spray: 1 spray(s) nasal 2 times a day  Guaifed- mg-600 mg oral tablet, extended release: 1 tab(s) orally every 12 hours for COPD  hydrALAZINE 10 mg oral tablet: 1 tab(s) orally 3 times a day  ibuprofen 600 mg oral tablet: 1 tab(s) orally every 8 hours as needed for  moderate pain  ipratropium-albuterol 0.5 mg-2.5 mg/3 mL inhalation solution: 3 milliliter(s) inhaled every 6 hours  isosorbide mononitrate 30 mg oral tablet, extended release: 1 tab(s) orally once a day  Januvia 100 mg oral tablet: 1 tab(s) orally once a day  Jardiance 10 mg oral tablet: 1 tab(s) orally once a day  lactulose 10 g/15 mL oral syrup: 30 milliliter(s) orally once a day (at bedtime)  Melatonin 5 mg oral tablet: 1 tab(s) orally once a day (at bedtime)  memantine 10 mg oral tablet: 0.5 tab(s) orally 2 times a day  metoprolol succinate 25 mg oral tablet, extended release: 1 tab(s) orally once a day  montelukast 10 mg oral tablet: 1 tab(s) orally once a day (at bedtime)  Multiple Vitamins oral tablet: 1 tab(s) orally once a day  OLANZapine 5 mg oral tablet: 2 tab(s) orally once a day (at bedtime)  oxyCODONE 5 mg oral tablet: 1 tab(s) orally every 6 hours as needed for  severe pain  tamsulosin 0.4 mg oral capsule: 1 cap(s) orally once a day  tiotropium 2.5 mcg/inh inhalation aerosol: 2 puff(s) inhaled once a day  tiZANidine 2 mg oral tablet: 1 tab(s) orally every 8 hours as needed  valproic acid 250 mg/5 mL oral liquid: 5 milliliter(s) orally once a day (in the morning)  valproic acid 250 mg/5 mL oral liquid: 20 milliliter(s) orally once a day (at bedtime)   albuterol 2.5 mg/3 mL (0.083%) inhalation solution: 3 milliliter(s) by nebulizer every 4 hours as needed for  shortness of breath  albuterol 90 mcg/inh inhalation aerosol: 2 puff(s) inhaled 4 times a day as needed for wheezing  amantadine 100 mg oral tablet: 1 tab(s) orally 2 times a day  atorvastatin 40 mg oral tablet: 1 tab(s) orally once a day (in the evening)  bumetanide 1 mg oral tablet: 1 tab(s) orally once a day  fluticasone 50 mcg/inh nasal spray: 1 spray(s) nasal 2 times a day  Guaifed- mg-600 mg oral tablet, extended release: 1 tab(s) orally every 12 hours for COPD  hydrALAZINE 10 mg oral tablet: 1 tab(s) orally 3 times a day  HYDROmorphone: 0.5 milligram(s) intravenous every 2 hours  ipratropium-albuterol 0.5 mg-2.5 mg/3 mL inhalation solution: 3 milliliter(s) inhaled every 6 hours  Januvia 100 mg oral tablet: 1 tab(s) orally once a day  Jardiance 10 mg oral tablet: 1 tab(s) orally once a day  lactulose 10 g/15 mL oral syrup: 30 milliliter(s) orally once a day (at bedtime)  Melatonin 5 mg oral tablet: 1 tab(s) orally once a day (at bedtime)  memantine 10 mg oral tablet: 0.5 tab(s) orally 2 times a day  metoprolol succinate 25 mg oral tablet, extended release: 1 tab(s) orally once a day  montelukast 10 mg oral tablet: 1 tab(s) orally once a day (at bedtime)  Multiple Vitamins oral tablet: 1 tab(s) orally once a day  OLANZapine 5 mg oral tablet: 2 tab(s) orally once a day (at bedtime)  tiotropium 2.5 mcg/inh inhalation aerosol: 2 puff(s) inhaled once a day  tiZANidine 2 mg oral tablet: 1 tab(s) orally every 8 hours as needed  valproic acid 250 mg/5 mL oral liquid: 5 milliliter(s) orally once a day (in the morning)  valproic acid 250 mg/5 mL oral liquid: 20 milliliter(s) orally once a day (at bedtime)   HYDROmorphone: 0.5 milligram(s) intravenous every 2 hours

## 2023-05-23 NOTE — PATIENT PROFILE ADULT - FUNCTIONAL ASSESSMENT - BASIC MOBILITY 6.
4-calculated by average/Not able to assess (calculate score using [facility] averaging method)

## 2023-05-23 NOTE — PROGRESS NOTE ADULT - PROVIDER SPECIALTY LIST ADULT
Nephrology
Palliative Care
Internal Medicine
Nephrology
Palliative Care
Internal Medicine
Nephrology
Internal Medicine
Palliative Care
Internal Medicine
Palliative Care
Internal Medicine

## 2023-05-23 NOTE — H&P ADULT - PROBLEM SELECTOR PLAN 1
due to progressive terminal state in setting of end-stage chf, now unarousable and not able to maintain secretions    lasix 40mg IVP x1 now and 20mg IVP daily    NPO, no IVFs  oral care  scopolamine patch  robinul PRN  keep head of bed >45

## 2023-05-23 NOTE — PROGRESS NOTE ADULT - SUBJECTIVE AND OBJECTIVE BOX
[facility] NEPHROLOGY- PROGRESS NOTE    73y Male with history of intellectual disability, BPH, chance for urinary retention, CHF presents with gross hematuria. Nephrology consulted for elevated Scr.    REVIEW OF SYSTEMS: Unable to obtain due to mental status    No Known Allergies      Hospital Medications: Medications reviewed    VITALS:  T(F): 98.4 (05-23-23 @ 05:32), Max: 98.4 (05-23-23 @ 05:32)  HR: 130 (05-23-23 @ 05:32)  BP: 142/94 (05-23-23 @ 05:32)  RR: 21 (05-23-23 @ 05:32)  SpO2: 98% (05-23-23 @ 05:32)  Wt(kg): --    05-22 @ 07:01  -  05-23 @ 07:00  --------------------------------------------------------  IN: 0 mL / OUT: 940 mL / NET: -940 mL    05-23 @ 07:01  -  05-23 @ 11:54  --------------------------------------------------------  IN: 0 mL / OUT: 500 mL / NET: -500 mL      PHYSICAL EXAM:    Gen: Obtunded with gurgling noises  Cards: RRR, +S1/S2, no M/G/R  Resp: Coarse BS  GI: soft, NT, + ab distention, NABS  : + chance with yellow urine  Vascular: +1 LE edema B/L    LABS:        Creatinine Trend: 3.04 <--, 2.81 <--, 3.03 <--, 3.58 <--, 3.62 <--    Urine Studies:

## 2023-05-23 NOTE — PATIENT PROFILE ADULT - FALL HARM RISK - HARM RISK INTERVENTIONS

## 2023-05-23 NOTE — DISCHARGE NOTE PROVIDER - HOSPITAL COURSE
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 Patient is admitted for YASSINE and hematuria likely 2/2 traumatic removal of Chance. Pt's chance was replaced, and obstruction removed, Cr has appropriately improved. Cr of 4.90 on admission. > 5 > 4.7 > 3.62 >3.58 today downtrending. Pt. was given albumin trial 2 days completed 5/15, that improved his urine output. C/w bumex, BB, statin, albuterol, spiriva, montelukast, home meds. CXR 5/17 shows improvement of vascular congestion compared to admission. 5/20 pt is more respiratory distress compared to previous days. Given duoneb x1, dilaudid x1, and his distress improved. No significant changes in CXR. Pt worsening in respiratory distress over the weekend, likely for IPU. started on robinul, dilaudid. ativan, scopolamine. Patient is a 73 year old male from [facility] w/  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  Pt's chance was replaced in the ED with improvement of renal function. Pt. was given albumin trial 2 days completed 5/15, that improved his urine output. He was resumed  on his home medications for CHF including bumex.  CXR 5/17 showed improvement of vascular congestion compared to admission Xray. On 5/20 pt  increased WOB despite duoneb  treatment and  deep suctioning, pt with increased secretions which did not improved with standard medical management. Pt also with rapid decline of his mental status and more agitation. Given overall clinical decline and in light of previous GOC discussion pt to be transitioned to IPU.

## 2023-05-23 NOTE — H&P ADULT - ASSESSMENT
With associated schizophrenia, and likely dementia  Clarified with Dr. Emilia Hearn from [facility]--patient with previous chromosomal analysis in 2011 that was negative for trisomy.  Underlying disability though likely to be 2/2 fetal alcohol syndrome    will d/c all other medications that are not necessary

## 2023-05-23 NOTE — DISCHARGE NOTE PROVIDER - NSDCCPCAREPLAN_GEN_ALL_CORE_FT
PRINCIPAL DISCHARGE DIAGNOSIS  Diagnosis: Acute kidney injury superimposed on CKD  Assessment and Plan of Treatment: You were diagnosed with acute kidney injury superimposed on known chronic kidney disease. Your creatinine was found to be elevated around 5.16. This was due to the fact that you pulled out your chance catheter, and because you had trouble urinating, this resulted in urinary retention, which can damage your kidneys.      SECONDARY DISCHARGE DIAGNOSES  Diagnosis: Chronic combined systolic and diastolic heart failure  Assessment and Plan of Treatment: Your heart pumping function was found to be severely reduced from  with an Ejection fraction of ~15% (normal heart pump function ranges above 55%) and Grade I Diastolic dysfunction. You have been evaluated on your prior admissions to not be a candidate for AICD placement. You are strongly recommended to continue taking your heart failure medications - Metoprolol, IMDUR, and continue taking Bumex 1mg every day as prescribed.    Diagnosis: Hypertension  Assessment and Plan of Treatment:     Diagnosis: Diabetes mellitus  Assessment and Plan of Treatment:     Diagnosis: BPH (benign prostatic hyperplasia)  Assessment and Plan of Treatment:     Diagnosis: Palliative care encounter  Assessment and Plan of Treatment:      PRINCIPAL DISCHARGE DIAGNOSIS  Diagnosis: Acute kidney injury superimposed on CKD  Assessment and Plan of Treatment: You were diagnosed with acute kidney injury superimposed on known chronic kidney disease. Your creatinine was found to be elevated around 5.16. This was due to the fact that you pulled out your chance catheter, and because you had trouble urinating, this resulted in urinary retention, which can damage your kidneys. Renal function improved.      SECONDARY DISCHARGE DIAGNOSES  Diagnosis: Chronic combined systolic and diastolic heart failure  Assessment and Plan of Treatment: Your heart pumping function was found to be severely reduced from  with an Ejection fraction of ~15% (normal heart pump function ranges above 55%) and Grade I Diastolic dysfunction. You have been evaluated on your prior admissions to not be a candidate for AICD placement. Given overall clinical decline you have been transitioned to inpatient hospice

## 2023-05-23 NOTE — PROGRESS NOTE ADULT - REASON FOR ADMISSION
Hematuria 2/2 traumatic Reyes removal.
Hematuria 2/2 traumatic Reyse removal.
Hematuria 2/2 traumatic Reyes removal.

## 2023-05-23 NOTE — DISCHARGE NOTE NURSING/CASE MANAGEMENT/SOCIAL WORK - PATIENT PORTAL LINK FT
You can access the FollowMyHealth Patient Portal offered by [facility] by registering at the following website: [email or web address]. By joining [employer]’s FollowMyHealth portal, you will also be able to view your health information using other applications (apps) compatible with our system.

## 2023-05-23 NOTE — H&P ADULT - NSHPPHYSICALEXAM_GEN_ALL_CORE
Vital Signs Last 24 Hrs  T(C): 36.9 (23 May 2023 05:32), Max: 36.9 (23 May 2023 05:32)  T(F): 98.4 (23 May 2023 05:32), Max: 98.4 (23 May 2023 05:32)  HR: 130 (23 May 2023 05:32) (116 - 130)  BP: 142/94 (23 May 2023 05:32) (137/90 - 142/94)  BP(mean): --  RR: 21 (23 May 2023 05:32) (18 - 21)  SpO2: 98% (23 May 2023 05:32) (98% - 99%) Vital Signs Last 24 Hrs  T(C): 36.9 (23 May 2023 05:32), Max: 36.9 (23 May 2023 05:32)  T(F): 98.4 (23 May 2023 05:32), Max: 98.4 (23 May 2023 05:32)  HR: 130 (23 May 2023 05:32) (116 - 130)  BP: 142/94 (23 May 2023 05:32) (137/90 - 142/94)  BP(mean): --  RR: 21 (23 May 2023 05:32) (18 - 21)  SpO2: 98% (23 May 2023 05:32) (98% - 99%)      General: alert  oriented x _1-2___   mild temporal wasting, appears in mild respiratory distress    Palliative Performance Scale/Karnofsky Score:  30%  http://npcrc.org/files/news/palliative_performance_scale_[email or web address].pdf    HEENT: one eye closed and one eye open (unable to close) MM moist  Lungs: tachypneic with slightly labored breathing, deep audible secretions/congestion  CV:  sl tachycardic, normal S1 and S2, no murmur  GI: soft non distended non tender normal bowel sounds  Last BM:  today (5/19)  : incontinent  + Reyes cath in place  Musculoskeletal: weakness x4 in all extremities, now mostly bedbound, LE edema bilaterally  Skin: no abnormal skin lesions or DU noted  Neuro: severe cognitive impairment  Oral intake ability:  unable, only oral care

## 2023-05-23 NOTE — DISCHARGE NOTE NURSING/CASE MANAGEMENT/SOCIAL WORK - NSPROEXTENSIONSOFSELF_GEN_A_NUR
"  Chief Complaint   Patient presents with     Musculoskeletal Problem     Follow up right knee pain       Initial /74 mmHg  Pulse 88  Temp(Src) 97.2  F (36.2  C) (Tympanic)  Ht 5' 6.25\" (1.683 m)  Wt 210 lb (95.255 kg)  BMI 33.63 kg/m2 Estimated body mass index is 33.63 kg/(m^2) as calculated from the following:    Height as of this encounter: 5' 6.25\" (1.683 m).    Weight as of this encounter: 210 lb (95.255 kg).  BP completed using cuff size: raleigh Briggs      " none

## 2023-05-23 NOTE — H&P ADULT - HISTORY OF PRESENT ILLNESS
Patient is a 73 year old male from [facility] w/  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  Pt's chance was replaced in the ED with improvement of renal function. Pt. was given albumin trial 2 days completed 5/15, that improved his urine output. He was resumed  on his home medications for CHF including bumex.  CXR 5/17 showed improvement of vascular congestion compared to admission Xray. On 5/20 pt  increased WOB despite duoneb  treatment and  deep suctioning, pt with increased secretions which did not improved with standard medical management. Pt also with rapid decline of his mental status and more agitation. Given overall clinical decline and in light of previous GOC discussion pt to be transitioned to IPU.

## 2023-05-23 NOTE — DISCHARGE NOTE NURSING/CASE MANAGEMENT/SOCIAL WORK - NSDCPEFALRISK_GEN_ALL_CORE
For information on Fall & Injury Prevention, visit: [email or web address] OR  [email or web address] OR  https://www.cdc.gov/steadi/patient.html

## 2023-05-23 NOTE — DISCHARGE NOTE PROVIDER - ATTENDING DISCHARGE PHYSICAL EXAMINATION:
Patient seen and examined. Plan for transfer to IPU today.  PHYSICAL EXAM:  GENERAL: in mild respiratory distress   HEENT: Normocephalic;  conjunctivae and sclerae clear; dry mucous membranes;   NECK : supple  CHEST/LUNG: congested, +secretions  HEART: S1 S2; tachycardic; no murmurs, gallops or rubs  ABDOMEN: Soft, Nontender, Nondistended; Bowel sounds present  EXTREMITIES: no cyanosis; edema 1-2+  SKIN: warm and dry; no rash  NERVOUS SYSTEM:  lethargic but awake, not answering any questions,

## 2023-05-23 NOTE — H&P ADULT - PROBLEM SELECTOR PLAN 3
Prior to previous admission in early April 2023, patient was having increasing BEARDEN, SOB with performance of ADLs, and functional limitation due to his heart failure.  With his increasing symptom burden he was not able to return to his group home setting and was recently discharged to [facility].  Symptom burden now exacerbated by recurrent YASSINE on CKD with need for chronic indwelling Reyes catheter.  likely cardiorenal syndrome.    Patient has Stage C NYHA class III heart failure (severe NICM EF of 10-15%) now complicated by worsening YASSINE on CKD and further clinical debility.  He is at risk for continued progression of disease, further functional decline, recurrent infections and hospitalization, increasing morbidity, and sudden death.  Not a candidate for AICD placement (class III contraindication).    Patient is medically appropriate for hospice.  Patient under auspices of CAB; CAB now in written agreement with DNR, DNI, comfort oriented care (as based on MOLST), and hospice.  [facility] and [facility] with no objection.

## 2023-05-24 NOTE — PROGRESS NOTE ADULT - PROBLEM SELECTOR PLAN 1
due to progressive terminal state in setting of end-stage chf, now unarousable and not able to maintain secretions    lasix 40mg IVP x1 now and 20mg IVP daily    NPO, no IVFs  oral care  scopolamine patch  robinul PRN  keep head of bed >45 due to progressive terminal state in setting of end-stage chf, now unarousable and not able to maintain secretions    another lasix 40mg IVP x1 now and increase to 40mg IVP daily    NPO, no IVFs  oral care  scopolamine patch  will do robinul ATC  keep head of bed >45

## 2023-05-24 NOTE — PROGRESS NOTE ADULT - SUBJECTIVE AND OBJECTIVE BOX
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  Pt's chance was replaced in the ED with improvement of renal function. Pt. was given albumin trial 2 days completed 5/15, that improved his urine output. He was resumed  on his home medications for CHF including bumex.  CXR 5/17 showed improvement of vascular congestion compared to admission Xray. On 5/20 pt  increased WOB despite duoneb  treatment and  deep suctioning, pt with increased secretions which did not improved with standard medical management. Pt also with rapid decline of his mental status and more agitation. Given overall clinical decline and in light of previous GOC discussion pt to be transitioned to IPU.    (23 May 2023 13:59)            MEDICATIONS  (STANDING):  furosemide   Injectable 20 milliGRAM(s) IV Push daily  scopolamine 1 mG/72 Hr(s) Patch 1 Patch Transdermal every 72 hours    MEDICATIONS  (PRN):  acetaminophen  Suppository .. 650 milliGRAM(s) Rectal every 6 hours PRN Temp greater or equal to 38C (100.4F), Mild Pain (1 - 3)  bisacodyl Suppository 10 milliGRAM(s) Rectal daily PRN Constipation  glycopyrrolate Injectable 0.4 milliGRAM(s) IV Push every 4 hours PRN Secretions  HYDROmorphone  Injectable 0.5 milliGRAM(s) IV Push every 1 hour PRN Severe Pain (7 - 10)  HYDROmorphone  Injectable 0.5 milliGRAM(s) IV Push every 1 hour PRN respiratory distress, labored breathing  LORazepam   Injectable 1 milliGRAM(s) IV Push every 1 hour PRN Agitation                             Vital Signs Last 24 Hrs  T(C): 37.1 (24 May 2023 12:48), Max: 37.1 (24 May 2023 12:48)  T(F): 98.8 (24 May 2023 12:48), Max: 98.8 (24 May 2023 12:48)  HR: 126 (24 May 2023 12:48) (115 - 126)  BP: 136/89 (24 May 2023 12:48) (136/89 - 147/84)  BP(mean): --  RR: 21 (24 May 2023 12:48) (20 - 22)  SpO2: 97% (24 May 2023 12:48) (97% - 99%)    Parameters below as of 24 May 2023 12:48  Patient On (Oxygen Delivery Method): nasal cannula  O2 Flow (L/min): 2      General: alert  oriented x ____    [lethargic distressed cachexia  nonverbal  unarousable verbal]  Karnofsky Performance Score/Palliative Performance Status Version2:     %    HEENT: dry mouth    Lungs: tachypnea/labored breathing  excessive secretions  CV: RRR, S1S2, tachycardia  GI: soft non distended non tender  incontinent               PEG/NG/OG tube  constipation  last BM:   : incontinent  oliguria/anuria  chance  Musculoskeletal: weakness  edema   fully bedbound  Skin: poor skin turgor, pressure ulcer stage:   Neuro: severe cognitive impairment dsyphagia/dysarthria paresis  Oral intake ability: unable/only mouth care   Code Status: DNR/DNI LITZY MARAVILLA                    73y  Male    Allergies    No Known Allergies    Intolerances        [facility] Service:  Aliza Hsu DO: cell [phone])  Reji Hart MD: cell ([phone])  Can contact via Microsoft Teams for any patient issues or concerns     Symptoms:  unable to assess  Symptom Requiring Inpatient Hospice Admission: secretions, distress    Overnight events/interim history: still very congested, lethargic, several PRNs of robinul and dilaudid given    HPI:  Patient is a 73 year old male from [facility] w/  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  Pt's chance was replaced in the ED with improvement of renal function. Pt. was given albumin trial 2 days completed 5/15, that improved his urine output. He was resumed  on his home medications for CHF including bumex.  CXR 5/17 showed improvement of vascular congestion compared to admission Xray. On 5/20 pt  increased WOB despite duoneb  treatment and  deep suctioning, pt with increased secretions which did not improved with standard medical management. Pt also with rapid decline of his mental status and more agitation. Given overall clinical decline and in light of previous GOC discussion pt to be transitioned to IPU.    (23 May 2023 13:59)            MEDICATIONS  (STANDING):  furosemide   Injectable 20 milliGRAM(s) IV Push daily  scopolamine 1 mG/72 Hr(s) Patch 1 Patch Transdermal every 72 hours    MEDICATIONS  (PRN):  acetaminophen  Suppository .. 650 milliGRAM(s) Rectal every 6 hours PRN Temp greater or equal to 38C (100.4F), Mild Pain (1 - 3)  bisacodyl Suppository 10 milliGRAM(s) Rectal daily PRN Constipation  glycopyrrolate Injectable 0.4 milliGRAM(s) IV Push every 4 hours PRN Secretions  HYDROmorphone  Injectable 0.5 milliGRAM(s) IV Push every 1 hour PRN Severe Pain (7 - 10)  HYDROmorphone  Injectable 0.5 milliGRAM(s) IV Push every 1 hour PRN respiratory distress, labored breathing  LORazepam   Injectable 1 milliGRAM(s) IV Push every 1 hour PRN Agitation                             Vital Signs Last 24 Hrs  T(C): 37.1 (24 May 2023 12:48), Max: 37.1 (24 May 2023 12:48)  T(F): 98.8 (24 May 2023 12:48), Max: 98.8 (24 May 2023 12:48)  HR: 126 (24 May 2023 12:48) (115 - 126)  BP: 136/89 (24 May 2023 12:48) (136/89 - 147/84)  BP(mean): --  RR: 21 (24 May 2023 12:48) (20 - 22)  SpO2: 97% (24 May 2023 12:48) (97% - 99%)    Parameters below as of 24 May 2023 12:48  Patient On (Oxygen Delivery Method): nasal cannula  O2 Flow (L/min): 2      General: alert  oriented x _1-2___   mild temporal wasting, appears in mild respiratory distress    Palliative Performance Scale/Karnofsky Score:  20%  http://npcrc.org/files/news/palliative_performance_scale_ppsv2.pdf    HEENT: eyes open but mostly closed, MM moist  Lungs: tachypneic with slightly labored breathing, still with deep audible secretions/congestion  CV:  sl tachycardic, normal S1 and S2, no murmur  GI: soft non distended non tender normal bowel sounds  Last BM:  today (5/19)  : incontinent  + Chance cath in place  Musculoskeletal: weakness x4 in all extremities, now mostly bedbound, LE edema bilaterally  Skin: no abnormal skin lesions or DU noted  Neuro: severe cognitive impairment  Oral intake ability:  unable, only oral care

## 2023-05-25 NOTE — PROGRESS NOTE ADULT - PROBLEM SELECTOR PLAN 1
due to progressive terminal state in setting of end-stage chf, now unarousable and not able to maintain secretions  will increase to 40mg IVP to 12hr  NPO, no IVFs  oral care  scopolamine patch  will do robinul ATC  keep head of bed >45

## 2023-05-25 NOTE — PROGRESS NOTE ADULT - SUBJECTIVE AND OBJECTIVE BOX
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  Pt's chance was replaced in the ED with improvement of renal function. Pt. was given albumin trial 2 days completed 5/15, that improved his urine output. He was resumed  on his home medications for CHF including bumex.  CXR 5/17 showed improvement of vascular congestion compared to admission Xray. On 5/20 pt  increased WOB despite duoneb  treatment and  deep suctioning, pt with increased secretions which did not improved with standard medical management. Pt also with rapid decline of his mental status and more agitation. Given overall clinical decline and in light of previous GOC discussion pt to be transitioned to IPU.    (23 May 2023 13:59)            MEDICATIONS  (STANDING):  furosemide   Injectable 40 milliGRAM(s) IV Push every 12 hours  glycopyrrolate Injectable 0.4 milliGRAM(s) IV Push every 4 hours  scopolamine 1 mG/72 Hr(s) Patch 1 Patch Transdermal every 72 hours    MEDICATIONS  (PRN):  acetaminophen  Suppository .. 650 milliGRAM(s) Rectal every 6 hours PRN Temp greater or equal to 38C (100.4F), Mild Pain (1 - 3)  bisacodyl Suppository 10 milliGRAM(s) Rectal daily PRN Constipation  HYDROmorphone  Injectable 1 milliGRAM(s) IV Push every 1 hour PRN Severe Pain (7 - 10)  HYDROmorphone  Injectable 1 milliGRAM(s) IV Push every 1 hour PRN labored breathing, respiratory distress  LORazepam   Injectable 1 milliGRAM(s) IV Push every 1 hour PRN Agitation    Vital Signs Last 24 Hrs  T(C): 36.7 (25 May 2023 11:46), Max: 38.7 (24 May 2023 22:15)  T(F): 98 (25 May 2023 11:46), Max: 101.6 (24 May 2023 22:15)  HR: 103 (25 May 2023 11:46) (103 - 135)  BP: 138/88 (25 May 2023 11:46) (138/75 - 138/88)  BP(mean): --  RR: 22 (25 May 2023 11:46) (22 - 24)  SpO2: 100% (25 May 2023 11:46) (92% - 100%)    Parameters below as of 25 May 2023 11:46  Patient On (Oxygen Delivery Method): nasal cannula  O2 Flow (L/min): 2    General: alert  oriented x 0   mild temporal wasting, appears in mild respiratory distress    Palliative Performance Scale/Karnofsky Score:  20%  http://npcrc.org/files/news/palliative_performance_scale_ppsv2.pdf    HEENT: eyes open but mostly closed, MM moist  Lungs: tachypneic with slightly labored breathing, still with deep audible secretions/congestion  CV:  sl tachycardic, normal S1 and S2, no murmur  GI: soft non distended non tender normal bowel sounds  Last BM:  today (5/19)  : incontinent  + Chance cath in place  Musculoskeletal: weakness x4 in all extremities, now mostly bedbound, LE edema bilaterally  Skin: no abnormal skin lesions or DU noted  Neuro: severe cognitive impairment  Oral intake ability:  unable, only oral care

## 2023-05-26 NOTE — PROGRESS NOTE ADULT - PROBLEM SELECTOR PLAN 3
Prior to previous admission in early April 2023, patient was having increasing BEARDEN, SOB with performance of ADLs, and functional limitation due to his heart failure.  With his increasing symptom burden he was not able to return to his group home setting and was recently discharged to [facility].  Symptom burden now exacerbated by recurrent YASSINE on CKD with need for chronic indwelling Reyes catheter.  likely cardiorenal syndrome.    Patient has Stage C NYHA class III heart failure (severe NICM EF of 10-15%) now complicated by worsening YASSINE on CKD and further clinical debility.  He is at risk for continued progression of disease, further functional decline, recurrent infections and hospitalization, increasing morbidity, and sudden death.  Not a candidate for AICD placement (class III contraindication).    Patient is medically appropriate for hospice.  Patient under auspices of CAB; CAB now in written agreement with DNR, DNI, comfort oriented care (as based on MOLST), and hospice.  OPWDD and MHLS with no objection.
Prior to previous admission in early April 2023, patient was having increasing BEARDEN, SOB with performance of ADLs, and functional limitation due to his heart failure.  With his increasing symptom burden he was not able to return to his group home setting and was recently discharged to [facility].  Symptom burden now exacerbated by recurrent YASSINE on CKD with need for chronic indwelling Reyes catheter.  likely cardiorenal syndrome.    Patient has Stage C NYHA class III heart failure (severe NICM EF of 10-15%) now complicated by worsening YASSINE on CKD and further clinical debility.  He is at risk for continued progression of disease, further functional decline, recurrent infections and hospitalization, increasing morbidity, and sudden death.  Not a candidate for AICD placement (class III contraindication).    Patient is medically appropriate for hospice.  Patient under auspices of CAB; CAB now in written agreement with DNR, DNI, comfort oriented care (as based on MOLST), and hospice.  OPWDD and MHLS with no objection.
Prior to previous admission in early April 2023, patient was having increasing BEARDEN, SOB with performance of ADLs, and functional limitation due to his heart failure.  With his increasing symptom burden he was not able to return to his group home setting and was recently discharged to [facility].  Symptom burden now exacerbated by recurrent YASSINE on CKD with need for chronic indwelling Reyes catheter.  likely cardiorenal syndrome.    Patient has Stage C NYHA class III heart failure (severe NICM EF of 10-15%) now complicated by worsening YASSINE on CKD and further clinical debility.  He is at risk for continued progression of disease, further functional decline, recurrent infections and hospitalization, increasing morbidity, and sudden death.  Not a candidate for AICD placement (class III contraindication).    Patient is medically appropriate for hospice.  Patient under auspices of CAB; CAB now in written agreement with DNR, DNI, comfort oriented care (as based on MOLST), and hospice.  OPWDD and MHLS with no objection.

## 2023-05-26 NOTE — PROGRESS NOTE ADULT - SUBJECTIVE AND OBJECTIVE BOX
LITZY MARAVILLA                    73y  Male    Allergies    No Known Allergies    Intolerances        [facility] and Palliative Hospice Service:  Aliza Hsu DO: cell ([phone])  Reji Hart MD: cell ([phone])  Can contact via Microsoft Teams for any patient issues or concerns     Symptoms:  unable to assess  Symptom Requiring Inpatient Hospice Admission: secretions, distress    Overnight events/interim history: still very congested, lethargic, several PRNs of robinul and dilaudid given      HPI:  Patient is a 73 [place] 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  Pt's chance was replaced in the ED with improvement of renal function. Pt. was given albumin trial 2 days completed 5/15, that improved his urine output. He was resumed  on his home medications for CHF including bumex.  CXR 5/17 showed improvement of vascular congestion compared to admission Xray. On 5/20 pt  increased WOB despite duoneb  treatment and  deep suctioning, pt with increased secretions which did not improved with standard medical management. Pt also with rapid decline of his mental status and more agitation. Given overall clinical decline and in light of previous GOC discussion pt to be transitioned to IPU.    (23 May 2023 13:59)            MEDICATIONS  (STANDING):  furosemide   Injectable 40 milliGRAM(s) IV Push every 12 hours  glycopyrrolate Injectable 0.4 milliGRAM(s) IV Push every 4 hours  HYDROmorphone Infusion 0.2 mG/Hr (0.2 mL/Hr) IV Continuous <Continuous>  scopolamine 1 mG/72 Hr(s) Patch 1 Patch Transdermal every 72 hours    MEDICATIONS  (PRN):  acetaminophen  Suppository .. 650 milliGRAM(s) Rectal every 6 hours PRN Temp greater or equal to 38C (100.4F), Mild Pain (1 - 3)  bisacodyl Suppository 10 milliGRAM(s) Rectal daily PRN Constipation  HYDROmorphone  Injectable 1 milliGRAM(s) IV Push every 1 hour PRN Severe Pain (7 - 10)  HYDROmorphone  Injectable 1 milliGRAM(s) IV Push every 1 hour PRN labored breathing, respiratory distress  LORazepam   Injectable 1 milliGRAM(s) IV Push every 1 hour PRN Agitation                             Vital Signs Last 24 Hrs  T(C): 38.4 (26 May 2023 11:36), Max: 39.4 (26 May 2023 05:10)  T(F): 101.2 (26 May 2023 11:36), Max: 102.9 (26 May 2023 05:10)  HR: 42 (26 May 2023 11:36) (42 - 66)  BP: 70/38 (26 May 2023 11:36) (70/38 - 87/53)  BP(mean): --  RR: 20 (26 May 2023 11:36) (20 - 30)  SpO2: 87% (26 May 2023 11:36) (80% - 96%)    Parameters below as of 26 May 2023 11:36  Patient On (Oxygen Delivery Method): nasal cannula  O2 Flow (L/min): 2    General: alert  oriented x 0   mild temporal wasting, appears in mild respiratory distress    Palliative Performance Scale/Karnofsky Score:  20%  http://npcrc.org/files/news/palliative_performance_scale_[email or web address].pdf    HEENT: eyes open but mostly closed, MM moist  Lungs: tachypneic with more labored breathing, still with deep audible secretions/congestion  CV:  sl tachycardic, normal S1 and S2, no murmur  GI: soft non distended non tender normal bowel sounds  Last BM:  today (5/19)  : incontinent  + Chance cath in place  Musculoskeletal: weakness x4 in all extremities, now mostly bedbound, LE edema bilaterally  Skin: no abnormal skin lesions or DU noted  Neuro: severe cognitive impairment  Oral intake ability:  unable, only oral care

## 2023-05-26 NOTE — PROGRESS NOTE ADULT - PROBLEM SELECTOR PLAN 2
due to progressive end of life condition  increase dilaudid 1 mg IVP PRN  Oxygen via NC for comfort
due to progressive end of life condition  start dilaudid 0.2mg/hr continuous infusion  c/w dilaudid 1 mg IVP PRN  Oxygen via NC for comfort
due to progressive end of life condition  dilaudid 0.5 mg IVP PRN  Oxygen via NC for comfort

## 2023-05-26 NOTE — DISCHARGE NOTE FOR THE EXPIRED PATIENT - NS PRELIMINARY CAUSE OF DEATH_RESTRICTED
Cardiopulmonary Arrest/Chronic lung disease/Diabetes/Heart Failure Heart Failure/Multi-organ Dysfunction Syndrome/Renal Failure

## 2023-05-26 NOTE — PROGRESS NOTE ADULT - PROBLEM SELECTOR PROBLEM 3
Acute on chronic combined systolic and diastolic congestive heart failure

## 2023-05-26 NOTE — PROGRESS NOTE ADULT - PROBLEM SELECTOR PLAN 1
due to progressive terminal state in setting of end-stage chf, now unarousable and not able to maintain secretions  c/w 40mg IVP to 12hr  NPO, no IVFs  oral care  scopolamine patch  c/w robinul ATC  keep head of bed >45

## 2023-05-26 NOTE — PROGRESS NOTE ADULT - PROBLEM SELECTOR PLAN 4
Eligible for continued inpatient hospice care admission due to ongoing active symptom management with IV medications  patient is unstable for discharge home or nursing home, acute symptom management cannot be managed outside of inpatient hospice unit at this time  Bowel regimen with Dulcolax suppository PRN  for constipation  Tylenol suppository PRN for fever  oral hygiene  Comfort care  Palliative/Hospice education and counseling to OPWDD team/aide
PAST SURGICAL HISTORY:  H/O  section 2014

## 2023-05-26 NOTE — DISCHARGE NOTE FOR THE EXPIRED PATIENT - HOSPITAL COURSE
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  Pt's chance was replaced in the ED with improvement of renal function. Pt. was given albumin trial 2 days completed 5/15, that improved his urine output. He was resumed  on his home medications for CHF including bumex.  CXR 5/17 showed improvement of vascular congestion compared to admission Xray. On 5/20 pt  increased WOB despite duoneb  treatment and  deep suctioning, pt with increased secretions which did not improved with standard medical management. Pt also with rapid decline of his mental status and more agitation. Given overall clinical decline and in light of previous GOC discussion pt to be transitioned to IPU.    Called to see patient for unresponsiveness. On exam the patient did not respond to verbal, physical or noxious stimuli. Absent heart and breath sounds. Absent central and  peripheral pulses. Pupils fixed and dilated, no corneal reflex. Pronounced dead at . Attending Dr allison. Next of kin notified. Autopsy denied. Organ donation confirmation number to be taken by nursing supervisor. Medical examiner? Hospice care network called? 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  Pt's chance was replaced in the ED with improvement of renal function. Pt. was given albumin trial 2 days completed 5/15, that improved his urine output. He was resumed  on his home medications for CHF including bumex.  CXR 5/17 showed improvement of vascular congestion compared to admission Xray. On 5/20 pt  increased WOB despite duoneb  treatment and  deep suctioning, pt with increased secretions which did not improved with standard medical management. Pt also with rapid decline of his mental status and more agitation. Given overall clinical decline and in light of previous GOC discussion pt transitioned to IPU [address] [place] [address].  Called to see patient for unresponsiveness. On exam the patient did not respond to verbal, physical or noxious stimuli. Absent heart and breath sounds. Absent central and  peripheral pulses. Pupils fixed and dilated, no corneal reflex. Pronounced dead at 7: 27 pm. Attending Dr Aliza Hsu and Dr Hearn informed. No next of kin on chart, the consumer advisory Board [phone] notified. Organ donation confirmation number to be taken by nursing supervisor. Hospice care network called by RN.

## 2023-05-26 NOTE — PROGRESS NOTE ADULT - PROBLEM SELECTOR PROBLEM 1
Increased oropharyngeal secretions

## 2023-08-08 NOTE — PROGRESS NOTE ADULT - PROBLEM SELECTOR PROBLEM 5
S/P  PPD #9. DEVELOPED HA & INC BP. STARTED ON PROCARDIA & KEPRA. CT HEAD NL, ECHO NL. CT CHEST- MILD PLEURAL EFFUSIONS & ATELECTASIS. HOSPT D#2 CHEST CLEAR. PT ASYMPTOMATIC. BPS NL. D/C HOME WITH F/U 1 WEEK
Protein calorie malnutrition
BPH (benign prostatic hyperplasia)
Metabolic encephalopathy
Protein calorie malnutrition
Metabolic encephalopathy

## 2023-08-10 NOTE — H&P ADULT - HISTORY OF PRESENT ILLNESS
73 y o with [facility] w/ PMH of intellectual disability, Down Syndrome, asthma, COPD with chronic cough, HTN, DM,  CKD elevated PSA, BPH, schizophrenia, hepatitis B, carrier, bilateral sensorineural hearing loss, who came in to the ED for sore throat and chest pain.  Patient states he developed a sore throat and dry cough a few days ago. Then last night had chest pain, located on the left side, w/o radiation, described as sharp. Pt states the pain lasted throughout the night and resolved this morning. Currently denies any fever, chills, nausea vomiting SOB, abdominal pain, PND, orthopnea or change in bowel habits  Cibinqo Pregnancy And Lactation Text: It is unknown if this medication will adversely affect pregnancy or breast feeding.  You should not take this medication if you are currently pregnant or planning a pregnancy or while breastfeeding.

## 2023-09-11 NOTE — PROGRESS NOTE ADULT - PROBLEM SELECTOR PLAN 9
Addended by: Bam Brady on: 9/11/2023 04:04 PM     Modules accepted: Orders
d/c meds
Start tamsulosin and finasteride.

## 2023-09-21 NOTE — ED PROVIDER NOTE - IV ALTEPLASE EXCL ABS HIDDEN
Spoke with patient, patient would like to know which shot she should  get prior to appt with Dr Chio Agarwal. RSV or the Covid Vaccine. Please advise. show

## 2023-09-27 NOTE — PROGRESS NOTE ADULT - SUBJECTIVE AND OBJECTIVE BOX
73y old  Male who presents with a chief complaint of AHRF secondary to Acute on Chronic Systolic Heart Failure (15 Apr 2023 06:30)    Patient was seen and examined at bedside  Denies any SOB    INTERVAL HPI/OVERNIGHT EVENTS:  T(C): 36.4 (23 @ 12:41), Max: 37 (23 @ 00:29)  HR: 88 (23 @ 12:41) (86 - 97)  BP: 143/105 (23 @ 12:41) (113/82 - 151/94)  RR: 17 (23 @ 12:41) (17 - 20)  SpO2: 97% (23 @ 12:41) (95% - 98%)  Wt(kg): --  I&O's Summary      REVIEW OF SYSTEMS: denies fever, chills, SOB, palpitations, chest pain, abdominal pain, nausea, vomiting, diarrhea, constipation, dizziness    MEDICATIONS  (STANDING):  albuterol/ipratropium for Nebulization 3 milliLiter(s) Nebulizer every 6 hours  amantadine 100 milliGRAM(s) Oral every 12 hours  aspirin enteric coated 81 milliGRAM(s) Oral daily  atorvastatin 40 milliGRAM(s) Oral at bedtime  finasteride 5 milliGRAM(s) Oral daily  fluticasone propionate 50 MICROgram(s)/spray Nasal Spray 1 Spray(s) Both Nostrils two times a day  heparin   Injectable 5000 Unit(s) SubCutaneous every 12 hours  insulin glargine Injectable (LANTUS) 5 Unit(s) SubCutaneous at bedtime  insulin lispro (ADMELOG) corrective regimen sliding scale   SubCutaneous at bedtime  insulin lispro (ADMELOG) corrective regimen sliding scale   SubCutaneous three times a day before meals  insulin lispro Injectable (ADMELOG) 4 Unit(s) SubCutaneous three times a day before meals  loratadine 10 milliGRAM(s) Oral daily  memantine 5 milliGRAM(s) Oral two times a day  metoprolol succinate ER 50 milliGRAM(s) Oral daily  montelukast 10 milliGRAM(s) Oral daily  OLANZapine 10 milliGRAM(s) Oral at bedtime  predniSONE   Tablet 40 milliGRAM(s) Oral daily  tamsulosin 0.4 milliGRAM(s) Oral at bedtime  tiotropium 2.5 MICROgram(s) Inhaler 2 Puff(s) Inhalation daily  valproic  acid Syrup 1000 milliGRAM(s) Oral at bedtime  valproic  acid Syrup 250 milliGRAM(s) Oral <User Schedule>    MEDICATIONS  (PRN):  albuterol    90 MICROgram(s) HFA Inhaler 2 Puff(s) Inhalation every 6 hours PRN Shortness of Breath and/or Wheezing  guaiFENesin Oral Liquid (Sugar-Free) 200 milliGRAM(s) Oral every 6 hours PRN Cough      PHYSICAL EXAM:  NAD  AAOx2, no focal deficit    Wheezing improved   S1S2 WNL, no MRG   Abd soft, non tender, BS present   BLLE no edema or calf tenderness     LABS:                        14.8   7.43  )-----------( 175      ( 2023 07:00 )             47.2     04-16    144  |  110<H>  |  43<H>  ----------------------------<  192<H>  4.9   |  27  |  2.18<H>    Ca    10.2      2023 07:00  Phos  3.5     04-16  Mg     2.6     04-16    TPro  6.3  /  Alb  2.4<L>  /  TBili  0.6  /  DBili  x   /  AST  32  /  ALT  67<H>  /  AlkPhos  95  04-16      Urinalysis Basic - ( 15 Apr 2023 12:14 )    Color: Yellow / Appearance: Clear / S.015 / pH: x  Gluc: x / Ketone: Negative  / Bili: Negative / Urobili: 12 mg/dL   Blood: x / Protein: 30 mg/dL / Nitrite: Negative   Leuk Esterase: Negative / RBC: 0-2 /HPF / WBC 0-2 /HPF   Sq Epi: x / Non Sq Epi: x / Bacteria: Trace /HPF      CAPILLARY BLOOD GLUCOSE      POCT Blood Glucose.: 193 mg/dL (2023 11:48)  POCT Blood Glucose.: 189 mg/dL (2023 09:23)  POCT Blood Glucose.: 175 mg/dL (2023 08:02)  POCT Blood Glucose.: 79 mg/dL (15 Apr 2023 21:13)  POCT Blood Glucose.: 156 mg/dL (15 Apr 2023 16:47)        Urinalysis Basic - ( 15 Apr 2023 12:14 )    Color: Yellow / Appearance: Clear / S.015 / pH: x  Gluc: x / Ketone: Negative  / Bili: Negative / Urobili: 12 mg/dL   Blood: x / Protein: 30 mg/dL / Nitrite: Negative   Leuk Esterase: Negative / RBC: 0-2 /HPF / WBC 0-2 /HPF   Sq Epi: x / Non Sq Epi: x / Bacteria: Trace /HPF     no
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
## 2024-02-15 NOTE — PROGRESS NOTE ADULT - SUBJECTIVE AND OBJECTIVE BOX
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  Since then has been experiencing rhinorrhea, congestion, itchy eyes, now with patchy rash that comes and goes, itchy.  Patient states multiple people at work have the same symptoms, new for them as well.    Also questions stress for his symptoms.  Patient lost his high paying job 6 months ago, has been under a lot of family stress as well.  Lots of anxiety symptoms and worry, also down days.    Has noticed white buildup in the mouth.    Also bump on his tongue has increased in size         Patient Care Team:  Aaron Lucas DO as PCP - General (Family Practice)     REVIEW OF SYSTEMS:    A complete 12 point review of systems is negative except that which is highlighted in the above HPI.     OBJECTIVE:    PROBLEM LIST:    Patient Active Problem List   Diagnosis    Hypertriglyceridemia    HTN (hypertension)    Nicotine dependence    Anemia    Gastric mass    Gastroesophageal reflux disease    Status post partial gastrectomy    Musculoskeletal neck pain    Stress    Other male erectile dysfunction    Stress and adjustment reaction        PAST HISTORIES:     I have reviewed the pertinent past medical history, family history, social history, medications and allergies listed in the medical record as obtained by my nursing staff and support staff and agree with their documentation.    PHYSICAL EXAM:   Vital Signs:  Visit Vitals  BP (!) 152/92   Pulse (!) 115   Temp 98.8 °F (37.1 °C) (Oral)   Ht 6' (1.829 m)   Wt 96.1 kg (211 lb 13.8 oz)   SpO2 99%   BMI 28.73 kg/m²     General:   Alert, cooperative, conversive in no acute distress.  Skin:   Multiple circular patchy lesions on the arms and trunk  Head:  Normocephalic, atraumatic.   Neck:  Trachea is midline. No masses visualized.  Eyes:  Normal conjunctivae and sclerae.  Pupils equal, round and reactive to light.  Extraocular movements intact.  ENT:  White buildup on tongue and buccal mucosa.  Bump on the tip of the tongue.  Cardiovascular:  Regular rate and rhythm without murmur.   Neurologic:   Oriented x4.  Cranial nerves II-XII are intact.  No focal deficits or lateralizing signs.  Psychiatric:   Cooperative.  Appropriate mood and affect.    LAB RESULTS:   All pertinent laboratory results were reviewed.    ASSESSMENT:   1. Mouth lesion    2. Primary hypertension    3. Allergic reaction, initial encounter    4. Oral thrush    5. Rash    6. Stress and adjustment reaction        PLAN:     Symptoms started after switching jobs, multiple people at work with similar symptoms.  I did encourage patient to talk to his manager regarding solvents, exposure.  Will treat symptoms, start with steroid pack, start on Zyrtec in the morning, Benadryl at night.  Topical triamcinolone for rash.    Long discussion regarding anxiety, stress, agreeable to start Lexapro, side effects explained.  Will see back in 4 weeks.      Oral thrush, will treat with nystatin    I would like ENT to evaluate the bump on his tongue.    Hopefully blood pressure will be improved with better stress management, if not will need to adjust medications    No problem-specific Assessment & Plan notes found for this encounter.    Orders Placed This Encounter    SERVICE TO ENT    amLODIPine (NORVASC) 10 MG tablet    hydroCHLOROthiazide 12.5 MG tablet    valsartan (DIOVAN) 320 MG tablet    nystatin (MYCOSTATIN) 765952 UNIT/ML suspension    methylPREDNISolone (MEDROL, KRISTEN,) 4 MG tablet    triamcinolone (ARISTOCORT) 0.1 % cream    escitalopram (LEXAPRO) 10 MG tablet         TIME:  45 minutes were spent for this encounter today, including pre-charting,  note writing, face-to-face time, and/or care coordination.    The patient agreed to and expressed understanding of the assessment and plan and will call or message me with any questions or concerns.    Return in about 4 weeks (around 3/14/2024).        Aaron Lucas, DO       PRESENTING CC: Cough    SUBJ:     In summary, this is 72 yo M with Intellectual disability, HTN, HLD, COPD, DM, CKD, non ischemic CM (HFrEF 40-45% 2020) with non-obstructive CAD on CCTA (2021) who presented with dyspnea in the setting of underlying pneumonia.          Overnight, there were no acute events. Patient       ------------------------  PMH: As above    Allergies    No Known Allergies    MEDICATIONS  (STANDING):  albuterol/ipratropium for Nebulization 3 milliLiter(s) Nebulizer every 6 hours  amantadine 100 milliGRAM(s) Oral two times a day  aspirin enteric coated 81 milliGRAM(s) Oral daily  atorvastatin 40 milliGRAM(s) Oral at bedtime  enoxaparin Injectable 40 milliGRAM(s) SubCutaneous every 24 hours  finasteride 5 milliGRAM(s) Oral daily  furosemide   Injectable 40 milliGRAM(s) IV Push daily  insulin lispro (ADMELOG) corrective regimen sliding scale   SubCutaneous three times a day before meals  insulin lispro (ADMELOG) corrective regimen sliding scale   SubCutaneous at bedtime  lactulose Syrup 20 Gram(s) Oral at bedtime  memantine 5 milliGRAM(s) Oral two times a day  metoprolol succinate ER 25 milliGRAM(s) Oral daily  montelukast 10 milliGRAM(s) Oral daily  OLANZapine 10 milliGRAM(s) Oral at bedtime  tamsulosin 0.4 milliGRAM(s) Oral at bedtime  valproic  acid Syrup 250 milliGRAM(s) Oral daily  valproic  acid Syrup 1000 milliGRAM(s) Oral at bedtime    MEDICATIONS  (PRN):  albuterol    90 MICROgram(s) HFA Inhaler 2 Puff(s) Inhalation every 6 hours PRN Respiratory Distress  clonazePAM Oral Disintegrating Tablet 0.5 milliGRAM(s) Oral at bedtime PRN anxiety  dextrose Oral Gel 15 Gram(s) Oral once PRN Blood Glucose LESS THAN 70 milliGRAM(s)/deciliter      FAMILY HISTORY:  No pertinent family history in first degree relatives      Reviewed; no change from my prior note    SOCIAL HISTORY:  Reviewed, no change from my prior note    REVIEW OF SYSTEMS:  Constitutional: [ ] fever, [ ]weight loss,  [ ]fatigue  Eyes: [ ] visual changes  Respiratory: [ ]shortness of breath;  [ ] cough, [ ]wheezing, [ ]chills, [ ]hemoptysis  Cardiovascular: [ ] chest pain, [ ]palpitations, [ ]dizziness,  [ ]leg swelling [ ]syncope  Gastrointestinal: [ ] abdominal pain, [ ]nausea, [ ]vomiting,  [ ]diarrhea   Genitourinary: [ ] dysuria, [ ] hematuria  Neurologic: [ ] headaches [ ] tremors [ ] weakness [ ] lightheadedness  Skin: [ ] itching, [ ]burning, [ ] rashes  Endocrine: [ ] heat or cold intolerance  Musculoskeletal: [ ] joint pain or swelling; [ ] muscle, back, or extremity pain  Psychiatric: [ ] depression, [ ]anxiety, [ ]mood swings, or [ ]difficulty sleeping  Hematologic: [ ] easy bruising, [ ] bleeding gums    [x] All remaining systems negative except as per above.   [  ] Unable to obtain    Vital Signs Last 24 Hrs  T(C): 36.2 (29 Mar 2023 10:55), Max: 37.1 (28 Mar 2023 15:56)  T(F): 97.2 (29 Mar 2023 10:55), Max: 98.7 (28 Mar 2023 15:56)  HR: 82 (29 Mar 2023 10:55) (82 - 112)  BP: 136/90 (29 Mar 2023 10:55) (130/97 - 172/116)  BP(mean): --  RR: 20 (29 Mar 2023 10:55) (18 - 30)  SpO2: 94% (29 Mar 2023 10:55) (93% - 100%)    Parameters below as of 29 Mar 2023 10:55  Patient On (Oxygen Delivery Method): room air      I&O's Summary    29 Mar 2023 07:01  -  29 Mar 2023 11:50  --------------------------------------------------------  IN: 200 mL / OUT: 0 mL / NET: 200 mL    PHYSICAL EXAM:  General: No acute distress  HEENT: EOMI, PERRL  Neck: Supple, No JVD  Lungs: Clear to auscultation bilaterally; No rales or wheezing  Heart: Regular rate and rhythm; No murmurs, rubs, or gallops  Abdomen: Nontender, bowel sounds present  Extremities: No clubbing, cyanosis, or edema  Nervous system:  Alert & Oriented X3, no focal deficits  Psychiatric: Normal affect  Skin: No rashes or lesions    LABS:  03-29    143  |  113<H>  |  28<H>  ----------------------------<  152<H>  3.9   |  26  |  1.38<H>    Ca    10.1      29 Mar 2023 06:12  Phos  3.0     03-29  Mg     2.8     03-29    TPro  6.9  /  Alb  3.0<L>  /  TBili  0.4  /  DBili  x   /  AST  32  /  ALT  47  /  AlkPhos  95  03-29    Creatinine Trend: 1.38<--, 1.34<--                        16.7   5.44  )-----------( 176      ( 29 Mar 2023 06:12 )             52.3     PT/INR - ( 28 Mar 2023 09:11 )   PT: 13.2 sec;   INR: 1.11 ratio         PTT - ( 28 Mar 2023 18:00 )  PTT:62.0 sec  Lipid Panel: 03-29-23 @ 06:12  Total cholesterol 152  HDL 79  LDL 59  Triglycerides 71    Cardiac Enzymes:   Troponin I, High Sensitivity Result: 81.8 ng/L (03-28-23 @ 12:11)  Troponin I, High Sensitivity Result: 84.6 ng/L (03-28-23 @ 09:11)    RADIOLOGY:   < from: Xray Chest 1 View AP/PA (03.28.23 @ 10:09) >  There is is an infiltrate at the right base and possibly on the left.    < end of copied text >    ECG [my interpretation]: 3/28/23 @ 08:56: Sinus tachycardia with LVH with strain pattern, with T-wave inversions unchanged from previous EKG from 2020 (note patient has different MRN in the system #[id number]).     TELEMETRY:                     PRESENTING CC: Cough    SUBJ:     In summary, this is 74 yo M with Intellectual disability, HTN, HLD, COPD, DM, CKD, non ischemic CM (HFrEF 40-45% 2020) with non-obstructive CAD on CCTA (2021) who presented with dyspnea in the setting of URI and mild systolic HF exacerbation.     Overnight, there were no acute events. Patient reports he still feels dyspneic and is complaining of a dry cough as well as stuffed nose. I was able to have patient lie completely flat in bed while speaking and examining him and he did not experience any orthopnea.     ------------------------  PMH: As above    Allergies    No Known Allergies    MEDICATIONS  (STANDING):  albuterol/ipratropium for Nebulization 3 milliLiter(s) Nebulizer every 6 hours  amantadine 100 milliGRAM(s) Oral two times a day  aspirin enteric coated 81 milliGRAM(s) Oral daily  atorvastatin 40 milliGRAM(s) Oral at bedtime  enoxaparin Injectable 40 milliGRAM(s) SubCutaneous every 24 hours  finasteride 5 milliGRAM(s) Oral daily  furosemide   Injectable 40 milliGRAM(s) IV Push daily  insulin lispro (ADMELOG) corrective regimen sliding scale   SubCutaneous three times a day before meals  insulin lispro (ADMELOG) corrective regimen sliding scale   SubCutaneous at bedtime  lactulose Syrup 20 Gram(s) Oral at bedtime  memantine 5 milliGRAM(s) Oral two times a day  metoprolol succinate ER 25 milliGRAM(s) Oral daily  montelukast 10 milliGRAM(s) Oral daily  OLANZapine 10 milliGRAM(s) Oral at bedtime  tamsulosin 0.4 milliGRAM(s) Oral at bedtime  valproic  acid Syrup 250 milliGRAM(s) Oral daily  valproic  acid Syrup 1000 milliGRAM(s) Oral at bedtime    MEDICATIONS  (PRN):  albuterol    90 MICROgram(s) HFA Inhaler 2 Puff(s) Inhalation every 6 hours PRN Respiratory Distress  clonazePAM Oral Disintegrating Tablet 0.5 milliGRAM(s) Oral at bedtime PRN anxiety  dextrose Oral Gel 15 Gram(s) Oral once PRN Blood Glucose LESS THAN 70 milliGRAM(s)/deciliter      FAMILY HISTORY:  No pertinent family history in first degree relatives      Reviewed; no change from my prior note    SOCIAL HISTORY:  Reviewed, no change from my prior note    REVIEW OF SYSTEMS:  Constitutional: [ ] fever, [ ]weight loss,  [ ]fatigue  Eyes: [ ] visual changes  Respiratory: [ ]shortness of breath;  [ ] cough, [ ]wheezing, [ ]chills, [ ]hemoptysis  Cardiovascular: [ ] chest pain, [ ]palpitations, [ ]dizziness,  [ ]leg swelling [ ]syncope  Gastrointestinal: [ ] abdominal pain, [ ]nausea, [ ]vomiting,  [ ]diarrhea   Genitourinary: [ ] dysuria, [ ] hematuria  Neurologic: [ ] headaches [ ] tremors [ ] weakness [ ] lightheadedness  Skin: [ ] itching, [ ]burning, [ ] rashes  Endocrine: [ ] heat or cold intolerance  Musculoskeletal: [ ] joint pain or swelling; [ ] muscle, back, or extremity pain  Psychiatric: [ ] depression, [ ]anxiety, [ ]mood swings, or [ ]difficulty sleeping  Hematologic: [ ] easy bruising, [ ] bleeding gums    [x] All remaining systems negative except as per above.   [  ] Unable to obtain    Vital Signs Last 24 Hrs  T(C): 36.2 (29 Mar 2023 10:55), Max: 37.1 (28 Mar 2023 15:56)  T(F): 97.2 (29 Mar 2023 10:55), Max: 98.7 (28 Mar 2023 15:56)  HR: 82 (29 Mar 2023 10:55) (82 - 112)  BP: 136/90 (29 Mar 2023 10:55) (130/97 - 172/116)  BP(mean): --  RR: 20 (29 Mar 2023 10:55) (18 - 30)  SpO2: 94% (29 Mar 2023 10:55) (93% - 100%)    Parameters below as of 29 Mar 2023 10:55  Patient On (Oxygen Delivery Method): room air      I&O's Summary    29 Mar 2023 07:01  -  29 Mar 2023 11:50  --------------------------------------------------------  IN: 200 mL / OUT: 0 mL / NET: 200 mL    PHYSICAL EXAM:  General: No acute distress  HEENT: EOMI, PERRL  Neck: Supple, No JVD  Lungs: Diffuse wheezing B/L lung fields  Heart: Regular rate and rhythm; No murmurs, rubs, or gallops  Abdomen: Nontender, bowel sounds present  Extremities: No clubbing, cyanosis, or edema  Nervous system:  Alert & Oriented X3, no focal deficits  Psychiatric: Normal affect  Skin: No rashes or lesions    LABS:  03-29    143  |  113<H>  |  28<H>  ----------------------------<  152<H>  3.9   |  26  |  1.38<H>    Ca    10.1      29 Mar 2023 06:12  Phos  3.0     03-29  Mg     2.8     03-29    TPro  6.9  /  Alb  3.0<L>  /  TBili  0.4  /  DBili  x   /  AST  32  /  ALT  47  /  AlkPhos  95  03-29    Creatinine Trend: 1.38<--, 1.34<--                        16.7   5.44  )-----------( 176      ( 29 Mar 2023 06:12 )             52.3     PT/INR - ( 28 Mar 2023 09:11 )   PT: 13.2 sec;   INR: 1.11 ratio         PTT - ( 28 Mar 2023 18:00 )  PTT:62.0 sec  Lipid Panel: 03-29-23 @ 06:12  Total cholesterol 152  HDL 79  LDL 59  Triglycerides 71    Cardiac Enzymes:   Troponin I, High Sensitivity Result: 81.8 ng/L (03-28-23 @ 12:11)  Troponin I, High Sensitivity Result: 84.6 ng/L (03-28-23 @ 09:11)    RADIOLOGY:   < from: Xray Chest 1 View AP/PA (03.28.23 @ 10:09) >  There is is an infiltrate at the right base and possibly on the left.    < end of copied text >    ECG [my interpretation]: 3/28/23 @ 08:56: Sinus tachycardia with LVH with strain pattern, with T-wave inversions unchanged from previous EKG from 2020 (note patient has different MRN in the system #[id number]).     TELEMETRY: Sinus rhythm with occasional PACs and PVCS

## 2024-03-08 NOTE — CONSULT NOTE ADULT - PROBLEM SELECTOR PROBLEM 4
Physical debility
follow up with PMD in 1-3 days     Shortness of breath could indicate a medical problem. Causes include lung diseases, heart disease, low amount of red blood cells (anemia), poor physical fitness, being overweight, smoking, etc. Your health care provider may not be able to find a cause for your shortness of breath after your exam. In this case, it is important to have a follow-up exam with your primary care physician as instructed. If medicines were prescribed, take them as directed for the full length of time directed. Refrain from tobacco products.    SEEK IMMEDIATE MEDICAL CARE IF YOU HAVE THE FOLLOWING SYMPTOMS: worsening shortness of breath, chest pain, back pain, abdominal pain, fever, coughing up blood, lightheadedness/dizziness.

## 2024-03-28 NOTE — ED ADULT NURSE NOTE - NS PRO AD NO ADVANCE DIRECTIVE
No Pt sleeping in cart in NAD. Awakens easily to voice. Vitals obtained. Breakfast tray ordered. AM meds requested.

## 2024-08-29 NOTE — PROGRESS NOTE ADULT - PROBLEM SELECTOR PLAN 6
- Not in exacerbation.   - C/w albuterol, tiotropium, montelukast.  - Start Duoneb Q6. Initial Lfts (Optional): 5/29/24 Add Pregnancy And Lactation Warning To Medication Counseling?: No Skyrizi Dosing: 150 mg SC week 0 and week 4 then every 12 weeks thereafter Detail Level: Zone Pregnancy And Lactation Warning Text: The risk during pregnancy and breastfeeding is uncertain with this medication. Skyrizi Monitoring Guidelines: A yearly test for tuberculosis is required while taking Skyrizi. Diagnosis (Required): Psoriasis Initial Quantiferon Gold (Optional): 5/30/24 Include Weight Question: Yes - Pounds

## 2024-11-11 NOTE — DIETITIAN INITIAL EVALUATION ADULT - PROBLEM SELECTOR PLAN 8
On Jardiance and Januvia at home.   Start sliding scale in hospital. GOAL: Patient will perform supine to sit and sit to supine with independence by 4 weeks.

## 2025-02-04 NOTE — PROGRESS NOTE ADULT - PROBLEM SELECTOR PLAN 12
c/w amantadine and memantine (home meds)    PROBLEM 13: Prophylactic measures  DVT ppx: Heparin subq
 used
No

## 2025-03-01 NOTE — ED PROVIDER NOTE - CARE PLAN
1 Principal Discharge DX:	Urethral bleeding   Patient requests all Lab, Cardiology, and Radiology Results on their Discharge Instructions

## 2025-07-10 NOTE — PATIENT PROFILE ADULT - PATIENT'S PREFERRED PRONOUN
Detail Level: Generalized Sunscreen Recommendations: Recommend ISDIN sunscreen Detail Level: Detailed Aklief Pregnancy And Lactation Text: It is unknown if this medication is safe to use during pregnancy.  It is unknown if this medication is excreted in breast milk.  Breastfeeding women should use the topical cream on the smallest area of the skin for the shortest time needed while breastfeeding.  Do not apply to nipple and areola. Tazorac Counseling:  Patient advised that medication is irritating and drying.  Patient may need to apply sparingly and wash off after an hour before eventually leaving it on overnight.  The patient verbalized understanding of the proper use and possible adverse effects of tazorac.  All of the patient's questions and concerns were addressed. Erythromycin Pregnancy And Lactation Text: This medication is Pregnancy Category B and is considered safe during pregnancy. It is also excreted in breast milk. Sarecycline Counseling: Patient advised regarding possible photosensitivity and discoloration of the teeth, skin, lips, tongue and gums.  Patient instructed to avoid sunlight, if possible.  When exposed to sunlight, patients should wear protective clothing, sunglasses, and sunscreen.  The patient was instructed to call the office immediately if the following severe adverse effects occur:  hearing changes, easy bruising/bleeding, severe headache, or vision changes.  The patient verbalized understanding of the proper use and possible adverse effects of sarecycline.  All of the patient's questions and concerns were addressed. Use Enhanced Medication Counseling?: No Bactrim Counseling:  I discussed with the patient the risks of sulfa antibiotics including but not limited to GI upset, allergic reaction, drug rash, diarrhea, dizziness, photosensitivity, and yeast infections.  Rarely, more serious reactions can occur including but not limited to aplastic anemia, agranulocytosis, methemoglobinemia, blood dyscrasias, liver or kidney failure, lung infiltrates or desquamative/blistering drug rashes. Doxycycline Pregnancy And Lactation Text: This medication is Pregnancy Category D and not consider safe during pregnancy. It is also excreted in breast milk but is considered safe for shorter treatment courses. Him/He Winlevi Pregnancy And Lactation Text: This medication is considered safe during pregnancy and breastfeeding. Topical Sulfur Applications Pregnancy And Lactation Text: This medication is Pregnancy Category C and has an unknown safety profile during pregnancy. It is unknown if this topical medication is excreted in breast milk. Minocycline Counseling: Patient advised regarding possible photosensitivity and discoloration of the teeth, skin, lips, tongue and gums.  Patient instructed to avoid sunlight, if possible.  When exposed to sunlight, patients should wear protective clothing, sunglasses, and sunscreen.  The patient was instructed to call the office immediately if the following severe adverse effects occur:  hearing changes, easy bruising/bleeding, severe headache, or vision changes.  The patient verbalized understanding of the proper use and possible adverse effects of minocycline.  All of the patient's questions and concerns were addressed. Tetracycline Pregnancy And Lactation Text: This medication is Pregnancy Category D and not consider safe during pregnancy. It is also excreted in breast milk. Topical Retinoid counseling:  Patient advised to apply a pea-sized amount only at bedtime and wait 30 minutes after washing their face before applying.  If too drying, patient may add a non-comedogenic moisturizer. The patient verbalized understanding of the proper use and possible adverse effects of retinoids.  All of the patient's questions and concerns were addressed. Dapsone Pregnancy And Lactation Text: This medication is Pregnancy Category C and is not considered safe during pregnancy or breast feeding. High Dose Vitamin A Counseling: Side effects reviewed, pt to contact office should one occur. Azithromycin Counseling:  I discussed with the patient the risks of azithromycin including but not limited to GI upset, allergic reaction, drug rash, diarrhea, and yeast infections. Detail Level: Zone Benzoyl Peroxide Counseling: Patient counseled that medicine may cause skin irritation and bleach clothing.  In the event of skin irritation, the patient was advised to reduce the amount of the drug applied or use it less frequently.   The patient verbalized understanding of the proper use and possible adverse effects of benzoyl peroxide.  All of the patient's questions and concerns were addressed. Spironolactone Pregnancy And Lactation Text: This medication can cause feminization of the male fetus and should be avoided during pregnancy. The active metabolite is also found in breast milk. Topical Clindamycin Pregnancy And Lactation Text: This medication is Pregnancy Category B and is considered safe during pregnancy. It is unknown if it is excreted in breast milk. Azelaic Acid Counseling: Patient counseled that medicine may cause skin irritation and to avoid applying near the eyes.  In the event of skin irritation, the patient was advised to reduce the amount of the drug applied or use it less frequently.   The patient verbalized understanding of the proper use and possible adverse effects of azelaic acid.  All of the patient's questions and concerns were addressed. Birth Control Pills Pregnancy And Lactation Text: This medication should be avoided if pregnant and for the first 30 days post-partum. Isotretinoin Counseling: Patient should get monthly blood tests, not donate blood, not drive at night if vision affected, not share medication, and not undergo elective surgery for 6 months after tx completed. Side effects reviewed, pt to contact office should one occur. Tazorac Pregnancy And Lactation Text: This medication is not safe during pregnancy. It is unknown if this medication is excreted in breast milk. Bactrim Pregnancy And Lactation Text: This medication is Pregnancy Category D and is known to cause fetal risk.  It is also excreted in breast milk. Erythromycin Counseling:  I discussed with the patient the risks of erythromycin including but not limited to GI upset, allergic reaction, drug rash, diarrhea, increase in liver enzymes, and yeast infections. Aklief counseling:  Patient advised to apply a pea-sized amount only at bedtime and wait 30 minutes after washing their face before applying.  If too drying, patient may add a non-comedogenic moisturizer.  The most commonly reported side effects including irritation, redness, scaling, dryness, stinging, burning, itching, and increased risk of sunburn.  The patient verbalized understanding of the proper use and possible adverse effects of retinoids.  All of the patient's questions and concerns were addressed. Topical Retinoid Pregnancy And Lactation Text: This medication is Pregnancy Category C. It is unknown if this medication is excreted in breast milk. High Dose Vitamin A Pregnancy And Lactation Text: High dose vitamin A therapy is contraindicated during pregnancy and breast feeding. Doxycycline Counseling:  Patient counseled regarding possible photosensitivity and increased risk for sunburn.  Patient instructed to avoid sunlight, if possible.  When exposed to sunlight, patients should wear protective clothing, sunglasses, and sunscreen.  The patient was instructed to call the office immediately if the following severe adverse effects occur:  hearing changes, easy bruising/bleeding, severe headache, or vision changes.  The patient verbalized understanding of the proper use and possible adverse effects of doxycycline.  All of the patient's questions and concerns were addressed. Winlevi Counseling:  I discussed with the patient the risks of topical clascoterone including but not limited to erythema, scaling, itching, and stinging. Patient voiced their understanding. Azithromycin Pregnancy And Lactation Text: This medication is considered safe during pregnancy and is also secreted in breast milk. Topical Sulfur Applications Counseling: Topical Sulfur Counseling: Patient counseled that this medication may cause skin irritation or allergic reactions.  In the event of skin irritation, the patient was advised to reduce the amount of the drug applied or use it less frequently.   The patient verbalized understanding of the proper use and possible adverse effects of topical sulfur application.  All of the patient's questions and concerns were addressed. Benzoyl Peroxide Pregnancy And Lactation Text: This medication is Pregnancy Category C. It is unknown if benzoyl peroxide is excreted in breast milk. Tetracycline Counseling: Patient counseled regarding possible photosensitivity and increased risk for sunburn.  Patient instructed to avoid sunlight, if possible.  When exposed to sunlight, patients should wear protective clothing, sunglasses, and sunscreen.  The patient was instructed to call the office immediately if the following severe adverse effects occur:  hearing changes, easy bruising/bleeding, severe headache, or vision changes.  The patient verbalized understanding of the proper use and possible adverse effects of tetracycline.  All of the patient's questions and concerns were addressed. Patient understands to avoid pregnancy while on therapy due to potential birth defects. Isotretinoin Pregnancy And Lactation Text: This medication is Pregnancy Category X and is considered extremely dangerous during pregnancy. It is unknown if it is excreted in breast milk. Dapsone Counseling: I discussed with the patient the risks of dapsone including but not limited to hemolytic anemia, agranulocytosis, rashes, methemoglobinemia, kidney failure, peripheral neuropathy, headaches, GI upset, and liver toxicity.  Patients who start dapsone require monitoring including baseline LFTs and weekly CBCs for the first month, then every month thereafter.  The patient verbalized understanding of the proper use and possible adverse effects of dapsone.  All of the patient's questions and concerns were addressed. Spironolactone Counseling: Patient advised regarding risks of diarrhea, abdominal pain, hyperkalemia, birth defects (for female patients), liver toxicity and renal toxicity. The patient may need blood work to monitor liver and kidney function and potassium levels while on therapy. The patient verbalized understanding of the proper use and possible adverse effects of spironolactone.  All of the patient's questions and concerns were addressed. Birth Control Pills Counseling: Birth Control Pill Counseling: I discussed with the patient the potential side effects of OCPs including but not limited to increased risk of stroke, heart attack, thrombophlebitis, deep venous thrombosis, hepatic adenomas, breast changes, GI upset, headaches, and depression.  The patient verbalized understanding of the proper use and possible adverse effects of OCPs. All of the patient's questions and concerns were addressed. Topical Clindamycin Counseling: Patient counseled that this medication may cause skin irritation or allergic reactions.  In the event of skin irritation, the patient was advised to reduce the amount of the drug applied or use it less frequently.   The patient verbalized understanding of the proper use and possible adverse effects of clindamycin.  All of the patient's questions and concerns were addressed. Azelaic Acid Pregnancy And Lactation Text: This medication is considered safe during pregnancy and breast feeding.